# Patient Record
Sex: FEMALE | Race: BLACK OR AFRICAN AMERICAN | Employment: OTHER
[De-identification: names, ages, dates, MRNs, and addresses within clinical notes are randomized per-mention and may not be internally consistent; named-entity substitution may affect disease eponyms.]

---

## 2017-01-13 DIAGNOSIS — M17.11 PRIMARY OSTEOARTHRITIS OF RIGHT KNEE: Primary | ICD-10-CM

## 2017-01-30 ENCOUNTER — OFFICE VISIT (OUTPATIENT)
Dept: ORTHOPEDIC SURGERY | Facility: CLINIC | Age: 62
End: 2017-01-30

## 2017-01-30 VITALS — WEIGHT: 233.69 LBS | HEIGHT: 72 IN | BODY MASS INDEX: 31.65 KG/M2

## 2017-01-30 DIAGNOSIS — M17.11 PRIMARY OSTEOARTHRITIS OF RIGHT KNEE: Primary | ICD-10-CM

## 2017-01-30 DIAGNOSIS — M79.645 PAIN OF LEFT MIDDLE FINGER: ICD-10-CM

## 2017-01-30 PROCEDURE — 99213 OFFICE O/P EST LOW 20 MIN: CPT | Performed by: ORTHOPAEDIC SURGERY

## 2017-01-31 ENCOUNTER — OFFICE VISIT (OUTPATIENT)
Dept: NEUROSURGERY | Facility: CLINIC | Age: 62
End: 2017-01-31

## 2017-01-31 VITALS
HEART RATE: 82 BPM | SYSTOLIC BLOOD PRESSURE: 130 MMHG | BODY MASS INDEX: 31.29 KG/M2 | HEIGHT: 72 IN | WEIGHT: 231 LBS | DIASTOLIC BLOOD PRESSURE: 86 MMHG

## 2017-01-31 DIAGNOSIS — M51.9 LUMBAR DISC DISEASE: Primary | ICD-10-CM

## 2017-01-31 PROCEDURE — 99203 OFFICE O/P NEW LOW 30 MIN: CPT | Performed by: NEUROLOGICAL SURGERY

## 2017-02-01 ENCOUNTER — OFFICE VISIT (OUTPATIENT)
Dept: INTERNAL MEDICINE | Facility: CLINIC | Age: 62
End: 2017-02-01

## 2017-02-01 VITALS
DIASTOLIC BLOOD PRESSURE: 85 MMHG | BODY MASS INDEX: 31.37 KG/M2 | WEIGHT: 231.6 LBS | SYSTOLIC BLOOD PRESSURE: 129 MMHG | HEIGHT: 72 IN | TEMPERATURE: 97.9 F | HEART RATE: 76 BPM

## 2017-02-01 DIAGNOSIS — J41.1 MUCOPURULENT CHRONIC BRONCHITIS (HCC): ICD-10-CM

## 2017-02-01 DIAGNOSIS — E11.42 DIABETIC POLYNEUROPATHY ASSOCIATED WITH TYPE 2 DIABETES MELLITUS (HCC): ICD-10-CM

## 2017-02-01 DIAGNOSIS — J96.11 HYPOXEMIC RESPIRATORY FAILURE, CHRONIC (HCC): ICD-10-CM

## 2017-02-01 DIAGNOSIS — R11.0 NAUSEA: ICD-10-CM

## 2017-02-01 DIAGNOSIS — B18.2 CHRONIC HEPATITIS C WITHOUT HEPATIC COMA (HCC): ICD-10-CM

## 2017-02-01 DIAGNOSIS — I50.22 CHRONIC SYSTOLIC CONGESTIVE HEART FAILURE (HCC): Primary | ICD-10-CM

## 2017-02-01 PROCEDURE — 99214 OFFICE O/P EST MOD 30 MIN: CPT | Performed by: INTERNAL MEDICINE

## 2017-02-01 RX ORDER — ONDANSETRON 8 MG/1
8 TABLET, ORALLY DISINTEGRATING ORAL EVERY 8 HOURS PRN
Qty: 20 TABLET | Refills: 0 | Status: SHIPPED | OUTPATIENT
Start: 2017-02-01 | End: 2017-02-20

## 2017-02-07 ENCOUNTER — HOSPITAL ENCOUNTER (OUTPATIENT)
Dept: NON INVASIVE DIAGNOSTICS | Age: 62
Discharge: HOME | End: 2017-02-07
Payer: COMMERCIAL

## 2017-02-07 ENCOUNTER — HOSPITAL ENCOUNTER (OUTPATIENT)
Age: 62
Setting detail: SPECIMEN
Discharge: HOME | End: 2017-02-07
Payer: COMMERCIAL

## 2017-02-07 DIAGNOSIS — I50.22 CHRONIC SYSTOLIC CONGESTIVE HEART FAILURE (HCC): ICD-10-CM

## 2017-02-07 LAB
ANION GAP SERPL CALCULATED.3IONS-SCNC: 12 MMOL/L (ref 9–17)
BUN BLDV-MCNC: 10 MG/DL (ref 6–20)
BUN/CREAT BLD: ABNORMAL (ref 9–20)
CALCIUM SERPL-MCNC: 9.1 MG/DL (ref 8.6–10.4)
CHLORIDE BLD-SCNC: 101 MMOL/L (ref 98–107)
CO2: 27 MMOL/L (ref 20–31)
CREAT SERPL-MCNC: 0.54 MG/DL (ref 0.5–0.9)
GFR AFRICAN AMERICAN: >60 ML/MIN
GFR NON-AFRICAN AMERICAN: >60 ML/MIN
GFR SERPL CREATININE-BSD FRML MDRD: ABNORMAL ML/MIN/{1.73_M2}
GFR SERPL CREATININE-BSD FRML MDRD: ABNORMAL ML/MIN/{1.73_M2}
GLUCOSE BLD-MCNC: 101 MG/DL (ref 70–99)
LV EF: 58 %
LVEF MODALITY: NORMAL
POTASSIUM SERPL-SCNC: 4 MMOL/L (ref 3.7–5.3)
SODIUM BLD-SCNC: 140 MMOL/L (ref 135–144)

## 2017-02-07 PROCEDURE — 36415 COLL VENOUS BLD VENIPUNCTURE: CPT

## 2017-02-07 PROCEDURE — 93306 TTE W/DOPPLER COMPLETE: CPT

## 2017-02-07 PROCEDURE — 80048 BASIC METABOLIC PNL TOTAL CA: CPT

## 2017-02-08 ENCOUNTER — OFFICE VISIT (OUTPATIENT)
Dept: INTERNAL MEDICINE | Facility: CLINIC | Age: 62
End: 2017-02-08

## 2017-02-08 VITALS
BODY MASS INDEX: 31.22 KG/M2 | WEIGHT: 230.2 LBS | DIASTOLIC BLOOD PRESSURE: 82 MMHG | HEART RATE: 84 BPM | SYSTOLIC BLOOD PRESSURE: 123 MMHG

## 2017-02-08 DIAGNOSIS — I50.32 CHRONIC DIASTOLIC CONGESTIVE HEART FAILURE (HCC): ICD-10-CM

## 2017-02-08 DIAGNOSIS — R05.9 COUGH: ICD-10-CM

## 2017-02-08 DIAGNOSIS — E11.8 TYPE 2 DIABETES MELLITUS WITH COMPLICATION, UNSPECIFIED LONG TERM INSULIN USE STATUS: Primary | ICD-10-CM

## 2017-02-08 DIAGNOSIS — R60.0 BILATERAL EDEMA OF LOWER EXTREMITY: ICD-10-CM

## 2017-02-08 PROCEDURE — 99213 OFFICE O/P EST LOW 20 MIN: CPT | Performed by: INTERNAL MEDICINE

## 2017-02-13 ENCOUNTER — HOSPITAL ENCOUNTER (OUTPATIENT)
Age: 62
Discharge: HOME | End: 2017-02-13
Payer: COMMERCIAL

## 2017-02-13 ENCOUNTER — HOSPITAL ENCOUNTER (OUTPATIENT)
Age: 62
Setting detail: SPECIMEN
Discharge: HOME | End: 2017-02-13
Payer: COMMERCIAL

## 2017-02-13 ENCOUNTER — HOSPITAL ENCOUNTER (OUTPATIENT)
Dept: GENERAL RADIOLOGY | Age: 62
Discharge: HOME | End: 2017-02-13
Payer: COMMERCIAL

## 2017-02-13 DIAGNOSIS — I50.32 CHRONIC DIASTOLIC CONGESTIVE HEART FAILURE (HCC): ICD-10-CM

## 2017-02-13 DIAGNOSIS — R05.9 COUGH: ICD-10-CM

## 2017-02-13 LAB
BNP INTERPRETATION: NORMAL
CREATININE URINE: 77.9 MG/DL (ref 28–217)
MICROALBUMIN/CREAT 24H UR: <12 MG/L
MICROALBUMIN/CREAT UR-RTO: 15 MCG/MG CREAT
PRO-BNP: 28 PG/ML

## 2017-02-13 PROCEDURE — 82570 ASSAY OF URINE CREATININE: CPT

## 2017-02-13 PROCEDURE — 71020 XR CHEST STANDARD TWO VW: CPT

## 2017-02-13 PROCEDURE — 36415 COLL VENOUS BLD VENIPUNCTURE: CPT

## 2017-02-13 PROCEDURE — 83880 ASSAY OF NATRIURETIC PEPTIDE: CPT

## 2017-02-13 PROCEDURE — 82043 UR ALBUMIN QUANTITATIVE: CPT

## 2017-02-20 ENCOUNTER — HOSPITAL ENCOUNTER (OUTPATIENT)
Age: 62
Discharge: HOME | End: 2017-02-20
Payer: COMMERCIAL

## 2017-02-20 ENCOUNTER — OFFICE VISIT (OUTPATIENT)
Dept: INTERNAL MEDICINE | Facility: CLINIC | Age: 62
End: 2017-02-20

## 2017-02-20 VITALS
BODY MASS INDEX: 31 KG/M2 | SYSTOLIC BLOOD PRESSURE: 113 MMHG | WEIGHT: 228.6 LBS | HEART RATE: 82 BPM | DIASTOLIC BLOOD PRESSURE: 74 MMHG

## 2017-02-20 DIAGNOSIS — J41.1 MUCOPURULENT CHRONIC BRONCHITIS (HCC): ICD-10-CM

## 2017-02-20 DIAGNOSIS — M54.41 CHRONIC BILATERAL LOW BACK PAIN WITH RIGHT-SIDED SCIATICA: ICD-10-CM

## 2017-02-20 DIAGNOSIS — I10 ESSENTIAL HYPERTENSION: ICD-10-CM

## 2017-02-20 DIAGNOSIS — G89.29 CHRONIC BILATERAL LOW BACK PAIN WITH RIGHT-SIDED SCIATICA: ICD-10-CM

## 2017-02-20 DIAGNOSIS — E11.8 TYPE 2 DIABETES MELLITUS WITH COMPLICATION, WITHOUT LONG-TERM CURRENT USE OF INSULIN (HCC): ICD-10-CM

## 2017-02-20 DIAGNOSIS — I50.32 CHRONIC DIASTOLIC CONGESTIVE HEART FAILURE (HCC): ICD-10-CM

## 2017-02-20 DIAGNOSIS — J20.2 ACUTE BRONCHITIS DUE TO STREPTOCOCCUS: Primary | ICD-10-CM

## 2017-02-20 DIAGNOSIS — M25.561 BILATERAL CHRONIC KNEE PAIN: ICD-10-CM

## 2017-02-20 DIAGNOSIS — E11.40 TYPE 2 DIABETES MELLITUS WITH DIABETIC NEUROPATHY, WITHOUT LONG-TERM CURRENT USE OF INSULIN (HCC): ICD-10-CM

## 2017-02-20 DIAGNOSIS — K21.9 GASTROESOPHAGEAL REFLUX DISEASE WITHOUT ESOPHAGITIS: ICD-10-CM

## 2017-02-20 DIAGNOSIS — M25.562 BILATERAL CHRONIC KNEE PAIN: ICD-10-CM

## 2017-02-20 DIAGNOSIS — G89.29 BILATERAL CHRONIC KNEE PAIN: ICD-10-CM

## 2017-02-20 DIAGNOSIS — F51.01 PRIMARY INSOMNIA: ICD-10-CM

## 2017-02-20 DIAGNOSIS — F17.210 CIGARETTE NICOTINE DEPENDENCE WITHOUT COMPLICATION: ICD-10-CM

## 2017-02-20 PROCEDURE — 99212 OFFICE O/P EST SF 10 MIN: CPT | Performed by: INTERNAL MEDICINE

## 2017-02-20 PROCEDURE — 99214 OFFICE O/P EST MOD 30 MIN: CPT | Performed by: INTERNAL MEDICINE

## 2017-02-20 RX ORDER — GABAPENTIN 800 MG/1
800 TABLET ORAL 3 TIMES DAILY
Qty: 90 TABLET | Refills: 2 | Status: SHIPPED | OUTPATIENT
Start: 2017-02-20 | End: 2017-06-28

## 2017-02-20 RX ORDER — FUROSEMIDE 20 MG/1
20 TABLET ORAL 2 TIMES DAILY
Qty: 60 TABLET | Refills: 2 | Status: SHIPPED | OUTPATIENT
Start: 2017-02-20 | End: 2017-06-28

## 2017-02-20 RX ORDER — GUAIFENESIN 600 MG/1
600 TABLET, EXTENDED RELEASE ORAL 2 TIMES DAILY
Qty: 30 TABLET | Refills: 0 | Status: SHIPPED | OUTPATIENT
Start: 2017-02-20 | End: 2017-06-28

## 2017-02-20 RX ORDER — HYDROCHLOROTHIAZIDE 12.5 MG/1
12.5 CAPSULE, GELATIN COATED ORAL EVERY MORNING
Qty: 30 CAPSULE | Refills: 3 | Status: SHIPPED | OUTPATIENT
Start: 2017-02-20 | End: 2017-06-28

## 2017-02-20 RX ORDER — TIZANIDINE 4 MG/1
4 TABLET ORAL 3 TIMES DAILY
Qty: 90 TABLET | Refills: 2 | Status: SHIPPED | OUTPATIENT
Start: 2017-02-20 | End: 2017-06-28

## 2017-02-20 RX ORDER — TRAZODONE HYDROCHLORIDE 100 MG/1
100 TABLET ORAL NIGHTLY PRN
Qty: 30 TABLET | Refills: 2 | Status: SHIPPED | OUTPATIENT
Start: 2017-02-20 | End: 2017-06-28 | Stop reason: SDUPTHER

## 2017-02-20 RX ORDER — OMEPRAZOLE 40 MG/1
40 CAPSULE, DELAYED RELEASE ORAL DAILY
Qty: 30 CAPSULE | Refills: 2 | Status: SHIPPED | OUTPATIENT
Start: 2017-02-20 | End: 2017-03-17

## 2017-02-20 RX ORDER — AZITHROMYCIN 250 MG/1
TABLET, FILM COATED ORAL
Qty: 1 PACKET | Refills: 0 | Status: SHIPPED | OUTPATIENT
Start: 2017-02-20 | End: 2017-03-02

## 2017-02-20 RX ORDER — IBUPROFEN 800 MG/1
800 TABLET ORAL EVERY 8 HOURS PRN
Qty: 90 TABLET | Refills: 2 | Status: SHIPPED | OUTPATIENT
Start: 2017-02-20 | End: 2017-05-18 | Stop reason: SDUPTHER

## 2017-03-03 ENCOUNTER — TELEPHONE (OUTPATIENT)
Dept: INTERNAL MEDICINE | Facility: CLINIC | Age: 62
End: 2017-03-03

## 2017-03-03 DIAGNOSIS — R05.9 COUGH: Primary | ICD-10-CM

## 2017-03-03 DIAGNOSIS — I50.22 CHRONIC SYSTOLIC CONGESTIVE HEART FAILURE (HCC): ICD-10-CM

## 2017-03-03 RX ORDER — BENZONATATE 100 MG/1
100 CAPSULE ORAL 3 TIMES DAILY PRN
Qty: 30 CAPSULE | Refills: 0 | Status: SHIPPED | OUTPATIENT
Start: 2017-03-03 | End: 2017-03-10

## 2017-03-17 ENCOUNTER — OFFICE VISIT (OUTPATIENT)
Dept: INTERNAL MEDICINE | Age: 62
End: 2017-03-17
Payer: COMMERCIAL

## 2017-03-17 ENCOUNTER — CARE COORDINATOR VISIT (OUTPATIENT)
Dept: INTERNAL MEDICINE | Age: 62
End: 2017-03-17

## 2017-03-17 VITALS
DIASTOLIC BLOOD PRESSURE: 73 MMHG | BODY MASS INDEX: 30.5 KG/M2 | SYSTOLIC BLOOD PRESSURE: 113 MMHG | HEART RATE: 80 BPM | HEIGHT: 72 IN | WEIGHT: 225.2 LBS

## 2017-03-17 DIAGNOSIS — K21.9 GASTROESOPHAGEAL REFLUX DISEASE WITHOUT ESOPHAGITIS: Primary | ICD-10-CM

## 2017-03-17 DIAGNOSIS — E78.00 PURE HYPERCHOLESTEROLEMIA: ICD-10-CM

## 2017-03-17 DIAGNOSIS — J41.0 SIMPLE CHRONIC BRONCHITIS (HCC): ICD-10-CM

## 2017-03-17 DIAGNOSIS — Z12.31 ENCOUNTER FOR SCREENING MAMMOGRAM FOR BREAST CANCER: ICD-10-CM

## 2017-03-17 DIAGNOSIS — K59.09 OTHER CONSTIPATION: ICD-10-CM

## 2017-03-17 DIAGNOSIS — R20.8 BURNING SENSATION OF RECTUM: ICD-10-CM

## 2017-03-17 PROCEDURE — 99213 OFFICE O/P EST LOW 20 MIN: CPT | Performed by: INTERNAL MEDICINE

## 2017-03-17 RX ORDER — ALBUTEROL SULFATE 90 UG/1
2 AEROSOL, METERED RESPIRATORY (INHALATION) EVERY 6 HOURS PRN
Qty: 1 INHALER | Refills: 2 | Status: SHIPPED | OUTPATIENT
Start: 2017-03-17 | End: 2017-06-28

## 2017-03-17 RX ORDER — DIAPER,BRIEF,INFANT-TODD,DISP
EACH MISCELLANEOUS
Qty: 1 TUBE | Refills: 1 | Status: SHIPPED | OUTPATIENT
Start: 2017-03-17 | End: 2017-03-24

## 2017-03-17 RX ORDER — PANTOPRAZOLE SODIUM 40 MG/1
40 TABLET, DELAYED RELEASE ORAL DAILY
Qty: 30 TABLET | Refills: 3 | Status: SHIPPED | OUTPATIENT
Start: 2017-03-17 | End: 2017-06-28

## 2017-03-17 RX ORDER — ALBUTEROL SULFATE 2.5 MG/3ML
2.5 SOLUTION RESPIRATORY (INHALATION) EVERY 4 HOURS PRN
Qty: 120 EACH | Refills: 2 | Status: SHIPPED | OUTPATIENT
Start: 2017-03-17 | End: 2017-06-28

## 2017-03-17 RX ORDER — PRAVASTATIN SODIUM 40 MG
40 TABLET ORAL EVERY EVENING
Qty: 30 TABLET | Refills: 2 | Status: SHIPPED | OUTPATIENT
Start: 2017-03-17 | End: 2017-06-28

## 2017-03-20 ENCOUNTER — HOSPITAL ENCOUNTER (OUTPATIENT)
Age: 62
Discharge: HOME | End: 2017-03-20
Payer: COMMERCIAL

## 2017-03-20 DIAGNOSIS — E78.00 PURE HYPERCHOLESTEROLEMIA: ICD-10-CM

## 2017-03-20 LAB
CHOLESTEROL/HDL RATIO: 3.3
CHOLESTEROL: 144 MG/DL
HDLC SERPL-MCNC: 44 MG/DL
LDL CHOLESTEROL: 86 MG/DL (ref 0–130)
TRIGL SERPL-MCNC: 69 MG/DL
VLDLC SERPL CALC-MCNC: NORMAL MG/DL (ref 1–30)

## 2017-03-20 PROCEDURE — 36415 COLL VENOUS BLD VENIPUNCTURE: CPT

## 2017-03-20 PROCEDURE — 83013 H PYLORI (C-13) BREATH: CPT

## 2017-03-20 PROCEDURE — 80061 LIPID PANEL: CPT

## 2017-03-20 PROCEDURE — 83014 H PYLORI DRUG ADMIN: CPT

## 2017-03-21 ENCOUNTER — CARE COORDINATION (OUTPATIENT)
Dept: INTERNAL MEDICINE | Age: 62
End: 2017-03-21

## 2017-03-22 ENCOUNTER — CARE COORDINATION (OUTPATIENT)
Dept: INTERNAL MEDICINE | Age: 62
End: 2017-03-22

## 2017-03-22 LAB — H PYLORI BREATH TEST: NEGATIVE

## 2017-04-05 ENCOUNTER — OFFICE VISIT (OUTPATIENT)
Dept: INTERNAL MEDICINE | Age: 62
End: 2017-04-05
Payer: COMMERCIAL

## 2017-04-05 VITALS
DIASTOLIC BLOOD PRESSURE: 84 MMHG | SYSTOLIC BLOOD PRESSURE: 136 MMHG | WEIGHT: 227 LBS | HEART RATE: 86 BPM | HEIGHT: 72 IN | BODY MASS INDEX: 30.75 KG/M2

## 2017-04-05 DIAGNOSIS — K59.00 CONSTIPATION, UNSPECIFIED CONSTIPATION TYPE: Primary | ICD-10-CM

## 2017-04-05 PROCEDURE — 99213 OFFICE O/P EST LOW 20 MIN: CPT | Performed by: INTERNAL MEDICINE

## 2017-04-05 RX ORDER — DOCUSATE SODIUM 100 MG/1
100 CAPSULE, LIQUID FILLED ORAL DAILY PRN
Qty: 30 CAPSULE | Refills: 2 | Status: SHIPPED | OUTPATIENT
Start: 2017-04-05 | End: 2017-06-28

## 2017-05-18 DIAGNOSIS — G89.29 BILATERAL CHRONIC KNEE PAIN: ICD-10-CM

## 2017-05-18 DIAGNOSIS — M25.561 BILATERAL CHRONIC KNEE PAIN: ICD-10-CM

## 2017-05-18 DIAGNOSIS — M25.562 BILATERAL CHRONIC KNEE PAIN: ICD-10-CM

## 2017-05-18 RX ORDER — IBUPROFEN 800 MG/1
TABLET ORAL
Qty: 90 TABLET | Refills: 2 | Status: SHIPPED | OUTPATIENT
Start: 2017-05-18 | End: 2017-06-28

## 2017-06-12 DIAGNOSIS — E11.8 TYPE 2 DIABETES MELLITUS WITH COMPLICATION, WITHOUT LONG-TERM CURRENT USE OF INSULIN (HCC): ICD-10-CM

## 2017-06-28 ENCOUNTER — OFFICE VISIT (OUTPATIENT)
Dept: INTERNAL MEDICINE | Age: 62
End: 2017-06-28
Payer: COMMERCIAL

## 2017-06-28 ENCOUNTER — HOSPITAL ENCOUNTER (EMERGENCY)
Age: 62
Discharge: HOME | End: 2017-06-28
Attending: EMERGENCY MEDICINE
Payer: COMMERCIAL

## 2017-06-28 VITALS
TEMPERATURE: 98.6 F | SYSTOLIC BLOOD PRESSURE: 169 MMHG | OXYGEN SATURATION: 98 % | HEART RATE: 87 BPM | DIASTOLIC BLOOD PRESSURE: 101 MMHG | RESPIRATION RATE: 18 BRPM

## 2017-06-28 VITALS
HEIGHT: 72 IN | DIASTOLIC BLOOD PRESSURE: 85 MMHG | BODY MASS INDEX: 32.45 KG/M2 | SYSTOLIC BLOOD PRESSURE: 154 MMHG | HEART RATE: 74 BPM | WEIGHT: 239.6 LBS

## 2017-06-28 DIAGNOSIS — G89.29 BILATERAL CHRONIC KNEE PAIN: ICD-10-CM

## 2017-06-28 DIAGNOSIS — J41.1 MUCOPURULENT CHRONIC BRONCHITIS (HCC): ICD-10-CM

## 2017-06-28 DIAGNOSIS — K21.9 GASTROESOPHAGEAL REFLUX DISEASE WITHOUT ESOPHAGITIS: ICD-10-CM

## 2017-06-28 DIAGNOSIS — F51.01 PRIMARY INSOMNIA: ICD-10-CM

## 2017-06-28 DIAGNOSIS — R60.0 BILATERAL LEG EDEMA: ICD-10-CM

## 2017-06-28 DIAGNOSIS — E11.8 TYPE 2 DIABETES MELLITUS WITH COMPLICATION, UNSPECIFIED LONG TERM INSULIN USE STATUS: Primary | ICD-10-CM

## 2017-06-28 DIAGNOSIS — M25.562 BILATERAL CHRONIC KNEE PAIN: ICD-10-CM

## 2017-06-28 DIAGNOSIS — G47.33 OSA (OBSTRUCTIVE SLEEP APNEA): ICD-10-CM

## 2017-06-28 DIAGNOSIS — Z01.30 BLOOD PRESSURE CHECK: Primary | ICD-10-CM

## 2017-06-28 DIAGNOSIS — E11.40 TYPE 2 DIABETES MELLITUS WITH DIABETIC NEUROPATHY, WITHOUT LONG-TERM CURRENT USE OF INSULIN (HCC): ICD-10-CM

## 2017-06-28 DIAGNOSIS — I50.32 CHRONIC DIASTOLIC CONGESTIVE HEART FAILURE (HCC): ICD-10-CM

## 2017-06-28 DIAGNOSIS — I15.9 SECONDARY HYPERTENSION: ICD-10-CM

## 2017-06-28 DIAGNOSIS — M54.41 CHRONIC BILATERAL LOW BACK PAIN WITH RIGHT-SIDED SCIATICA: ICD-10-CM

## 2017-06-28 DIAGNOSIS — K59.09 OTHER CONSTIPATION: ICD-10-CM

## 2017-06-28 DIAGNOSIS — M25.561 BILATERAL CHRONIC KNEE PAIN: ICD-10-CM

## 2017-06-28 DIAGNOSIS — G89.29 CHRONIC BILATERAL LOW BACK PAIN WITH RIGHT-SIDED SCIATICA: ICD-10-CM

## 2017-06-28 DIAGNOSIS — E78.00 PURE HYPERCHOLESTEROLEMIA: ICD-10-CM

## 2017-06-28 DIAGNOSIS — B18.2 HEP C W/O COMA, CHRONIC (HCC): ICD-10-CM

## 2017-06-28 DIAGNOSIS — I10 ESSENTIAL HYPERTENSION: ICD-10-CM

## 2017-06-28 DIAGNOSIS — K59.00 CONSTIPATION, UNSPECIFIED CONSTIPATION TYPE: ICD-10-CM

## 2017-06-28 DIAGNOSIS — J41.0 SIMPLE CHRONIC BRONCHITIS (HCC): ICD-10-CM

## 2017-06-28 LAB — HBA1C MFR BLD: 6.1 %

## 2017-06-28 PROCEDURE — G0444 DEPRESSION SCREEN ANNUAL: HCPCS | Performed by: INTERNAL MEDICINE

## 2017-06-28 PROCEDURE — 99283 EMERGENCY DEPT VISIT LOW MDM: CPT

## 2017-06-28 PROCEDURE — 83036 HEMOGLOBIN GLYCOSYLATED A1C: CPT | Performed by: INTERNAL MEDICINE

## 2017-06-28 PROCEDURE — 99214 OFFICE O/P EST MOD 30 MIN: CPT | Performed by: INTERNAL MEDICINE

## 2017-06-28 RX ORDER — PRAVASTATIN SODIUM 40 MG
40 TABLET ORAL EVERY EVENING
Qty: 30 TABLET | Refills: 2 | Status: SHIPPED | OUTPATIENT
Start: 2017-06-28 | End: 2017-10-30 | Stop reason: SDUPTHER

## 2017-06-28 RX ORDER — FUROSEMIDE 20 MG/1
20 TABLET ORAL 2 TIMES DAILY
Qty: 60 TABLET | Refills: 2 | Status: SHIPPED | OUTPATIENT
Start: 2017-06-28 | End: 2017-10-13 | Stop reason: SDUPTHER

## 2017-06-28 RX ORDER — TIZANIDINE 4 MG/1
4 TABLET ORAL 3 TIMES DAILY
Qty: 90 TABLET | Refills: 2 | Status: SHIPPED | OUTPATIENT
Start: 2017-06-28 | End: 2017-10-30 | Stop reason: SDUPTHER

## 2017-06-28 RX ORDER — ATENOLOL 25 MG/1
25 TABLET ORAL DAILY
Qty: 30 TABLET | Refills: 2 | Status: SHIPPED | OUTPATIENT
Start: 2017-06-28 | End: 2017-09-22 | Stop reason: SDUPTHER

## 2017-06-28 RX ORDER — DOCUSATE SODIUM 100 MG/1
100 CAPSULE, LIQUID FILLED ORAL DAILY PRN
Qty: 30 CAPSULE | Refills: 2 | Status: SHIPPED | OUTPATIENT
Start: 2017-06-28 | End: 2017-10-30

## 2017-06-28 RX ORDER — PANTOPRAZOLE SODIUM 40 MG/1
40 TABLET, DELAYED RELEASE ORAL DAILY
Qty: 30 TABLET | Refills: 3 | Status: SHIPPED | OUTPATIENT
Start: 2017-06-28 | End: 2017-10-30 | Stop reason: SDUPTHER

## 2017-06-28 RX ORDER — ALBUTEROL SULFATE 90 UG/1
2 AEROSOL, METERED RESPIRATORY (INHALATION) EVERY 6 HOURS PRN
Qty: 1 INHALER | Refills: 2 | Status: SHIPPED | OUTPATIENT
Start: 2017-06-28 | End: 2017-10-04 | Stop reason: SDUPTHER

## 2017-06-28 RX ORDER — GABAPENTIN 800 MG/1
800 TABLET ORAL 3 TIMES DAILY
Qty: 90 TABLET | Refills: 2 | Status: SHIPPED | OUTPATIENT
Start: 2017-06-28 | End: 2017-10-30 | Stop reason: SDUPTHER

## 2017-06-28 RX ORDER — IBUPROFEN 800 MG/1
800 TABLET ORAL EVERY 8 HOURS PRN
Qty: 90 TABLET | Refills: 2 | Status: SHIPPED | OUTPATIENT
Start: 2017-06-28 | End: 2017-10-30 | Stop reason: SDUPTHER

## 2017-06-28 RX ORDER — ALBUTEROL SULFATE 2.5 MG/3ML
2.5 SOLUTION RESPIRATORY (INHALATION) EVERY 4 HOURS PRN
Qty: 120 EACH | Refills: 2 | Status: SHIPPED | OUTPATIENT
Start: 2017-06-28 | End: 2017-10-30 | Stop reason: SDUPTHER

## 2017-06-28 RX ORDER — HYDROCHLOROTHIAZIDE 12.5 MG/1
12.5 CAPSULE, GELATIN COATED ORAL EVERY MORNING
Qty: 30 CAPSULE | Refills: 2 | Status: SHIPPED | OUTPATIENT
Start: 2017-06-28 | End: 2017-10-13 | Stop reason: SDUPTHER

## 2017-06-28 RX ORDER — TRAZODONE HYDROCHLORIDE 100 MG/1
200 TABLET ORAL NIGHTLY PRN
Qty: 60 TABLET | Refills: 2 | Status: SHIPPED | OUTPATIENT
Start: 2017-06-28 | End: 2017-09-22 | Stop reason: SDUPTHER

## 2017-06-28 ASSESSMENT — PATIENT HEALTH QUESTIONNAIRE - PHQ9
SUM OF ALL RESPONSES TO PHQ9 QUESTIONS 1 & 2: 2
5. POOR APPETITE OR OVEREATING: 0
1. LITTLE INTEREST OR PLEASURE IN DOING THINGS: 0
7. TROUBLE CONCENTRATING ON THINGS, SUCH AS READING THE NEWSPAPER OR WATCHING TELEVISION: 0
2. FEELING DOWN, DEPRESSED OR HOPELESS: 2
9. THOUGHTS THAT YOU WOULD BE BETTER OFF DEAD, OR OF HURTING YOURSELF: 0
4. FEELING TIRED OR HAVING LITTLE ENERGY: 2
10. IF YOU CHECKED OFF ANY PROBLEMS, HOW DIFFICULT HAVE THESE PROBLEMS MADE IT FOR YOU TO DO YOUR WORK, TAKE CARE OF THINGS AT HOME, OR GET ALONG WITH OTHER PEOPLE: 1
SUM OF ALL RESPONSES TO PHQ QUESTIONS 1-9: 9
3. TROUBLE FALLING OR STAYING ASLEEP: 3
6. FEELING BAD ABOUT YOURSELF - OR THAT YOU ARE A FAILURE OR HAVE LET YOURSELF OR YOUR FAMILY DOWN: 1
8. MOVING OR SPEAKING SO SLOWLY THAT OTHER PEOPLE COULD HAVE NOTICED. OR THE OPPOSITE, BEING SO FIGETY OR RESTLESS THAT YOU HAVE BEEN MOVING AROUND A LOT MORE THAN USUAL: 1

## 2017-06-30 ENCOUNTER — TELEPHONE (OUTPATIENT)
Dept: INTERNAL MEDICINE | Age: 62
End: 2017-06-30

## 2017-07-06 ENCOUNTER — TELEPHONE (OUTPATIENT)
Dept: INTERNAL MEDICINE | Age: 62
End: 2017-07-06

## 2017-07-12 ENCOUNTER — HOSPITAL ENCOUNTER (OUTPATIENT)
Age: 62
Discharge: HOME | End: 2017-07-12
Payer: COMMERCIAL

## 2017-07-12 DIAGNOSIS — I10 ESSENTIAL HYPERTENSION: ICD-10-CM

## 2017-07-12 DIAGNOSIS — B18.2 HEP C W/O COMA, CHRONIC (HCC): ICD-10-CM

## 2017-07-12 LAB
ALBUMIN SERPL-MCNC: 4.1 G/DL (ref 3.5–5.2)
ALBUMIN/GLOBULIN RATIO: 1.1 (ref 1–2.5)
ALP BLD-CCNC: 67 U/L (ref 35–104)
ALT SERPL-CCNC: 8 U/L (ref 5–33)
ANION GAP SERPL CALCULATED.3IONS-SCNC: 12 MMOL/L (ref 9–17)
AST SERPL-CCNC: 15 U/L
BILIRUB SERPL-MCNC: 0.46 MG/DL (ref 0.3–1.2)
BILIRUBIN DIRECT: 0.14 MG/DL
BILIRUBIN, INDIRECT: 0.32 MG/DL (ref 0–1)
BUN BLDV-MCNC: 13 MG/DL (ref 8–23)
BUN/CREAT BLD: ABNORMAL (ref 9–20)
CALCIUM SERPL-MCNC: 9.7 MG/DL (ref 8.6–10.4)
CHLORIDE BLD-SCNC: 103 MMOL/L (ref 98–107)
CO2: 25 MMOL/L (ref 20–31)
CREAT SERPL-MCNC: 0.56 MG/DL (ref 0.5–0.9)
GFR AFRICAN AMERICAN: >60 ML/MIN
GFR NON-AFRICAN AMERICAN: >60 ML/MIN
GFR SERPL CREATININE-BSD FRML MDRD: ABNORMAL ML/MIN/{1.73_M2}
GFR SERPL CREATININE-BSD FRML MDRD: ABNORMAL ML/MIN/{1.73_M2}
GLOBULIN: NORMAL G/DL (ref 1.5–3.8)
GLUCOSE BLD-MCNC: 109 MG/DL (ref 70–99)
POTASSIUM SERPL-SCNC: 3.7 MMOL/L (ref 3.7–5.3)
SODIUM BLD-SCNC: 140 MMOL/L (ref 135–144)
TOTAL PROTEIN: 7.7 G/DL (ref 6.4–8.3)

## 2017-07-12 PROCEDURE — 80076 HEPATIC FUNCTION PANEL: CPT

## 2017-07-12 PROCEDURE — 80048 BASIC METABOLIC PNL TOTAL CA: CPT

## 2017-07-12 PROCEDURE — 87522 HEPATITIS C REVRS TRNSCRPJ: CPT

## 2017-07-14 LAB
DIRECT EXAM: NORMAL
Lab: NORMAL
SPECIMEN DESCRIPTION: NORMAL
STATUS: NORMAL

## 2017-07-24 ENCOUNTER — TELEPHONE (OUTPATIENT)
Dept: INTERNAL MEDICINE | Age: 62
End: 2017-07-24

## 2017-09-22 DIAGNOSIS — F51.01 PRIMARY INSOMNIA: ICD-10-CM

## 2017-09-22 DIAGNOSIS — I10 ESSENTIAL HYPERTENSION: ICD-10-CM

## 2017-09-22 RX ORDER — ATENOLOL 25 MG/1
TABLET ORAL
Qty: 30 TABLET | Refills: 2 | Status: SHIPPED | OUTPATIENT
Start: 2017-09-22 | End: 2017-12-17 | Stop reason: SDUPTHER

## 2017-09-22 RX ORDER — TRAZODONE HYDROCHLORIDE 100 MG/1
TABLET ORAL
Qty: 60 TABLET | Refills: 2 | Status: SHIPPED | OUTPATIENT
Start: 2017-09-22 | End: 2017-12-17 | Stop reason: SDUPTHER

## 2017-10-04 DIAGNOSIS — J41.0 SIMPLE CHRONIC BRONCHITIS (HCC): ICD-10-CM

## 2017-10-04 NOTE — TELEPHONE ENCOUNTER
E-scribe request for VENTOLIN HFA 90 MCG INHALER. Please review and e-scribe if applicable. Last Visit Date:  6/28/17  Next Visit Date:  10/30/2017    Hemoglobin A1C (%)   Date Value   06/28/2017 6.1   11/29/2016 6.0   05/13/2016 6.3             ( goal A1C is < 7)   Microalb/Crt.  Ratio (mcg/mg creat)   Date Value   02/13/2017 15     LDL Cholesterol (mg/dL)   Date Value   03/20/2017 86       (goal LDL is <100)   AST (U/L)   Date Value   07/12/2017 15     ALT (U/L)   Date Value   07/12/2017 8     BUN (mg/dL)   Date Value   07/12/2017 13     BP Readings from Last 3 Encounters:   06/28/17 (!) 154/85   06/28/17 (!) 169/101   04/05/17 136/84          (goal 120/80)        Patient Active Problem List:     Essential hypertension     Pure hypercholesterolemia     Depression     History of heroin use     Hep C w/o coma, chronic ([facility] [address] completed tx 2016     GERD (gastroesophageal reflux disease)     COPD (chronic obstructive pulmonary disease) (HCC)     Tobacco abuse     Chronic diastolic congestive heart failure (HCC)     Diverticulosis     Hyperplastic polyp of intestine     Diabetic polyneuropathy associated with type 2 diabetes mellitus (HCC)     Bilateral chronic knee pain     Chronic respiratory failure with hypoxia (HCC)     Type 2 diabetes mellitus with complication (HCC)     Primary insomnia     Constipation     Cigarette nicotine dependence without complication     Chronic bilateral low back pain with right-sided sciatica      ----JF

## 2017-10-13 DIAGNOSIS — K59.09 OTHER CONSTIPATION: ICD-10-CM

## 2017-10-13 DIAGNOSIS — I10 ESSENTIAL HYPERTENSION: ICD-10-CM

## 2017-10-13 DIAGNOSIS — J41.1 MUCOPURULENT CHRONIC BRONCHITIS (HCC): ICD-10-CM

## 2017-10-13 DIAGNOSIS — I50.32 CHRONIC DIASTOLIC CONGESTIVE HEART FAILURE (HCC): ICD-10-CM

## 2017-10-13 NOTE — TELEPHONE ENCOUNTER
E-scribe request for Hydrochlorothiazide 12.5 MG, Linzess 145 MCG, Dulera inhaler & Furosemide 20 MG. Please review and e-scribe if applicable. Health Maintenance   Topic Date Due    Breast cancer screen  01/28/2017    Zostavax vaccine  06/05/2017    Flu vaccine (1) 09/01/2017    HIV screen  12/19/2017 (Originally 6/5/1972)    Diabetic retinal exam  02/20/2018 (Originally 12/2/2016)    DTaP/Tdap/Td vaccine (1 - Tdap) 07/30/2026 (Originally 7/31/2016)    Diabetic foot exam  11/23/2017    Diabetic microalbuminuria test  02/13/2018    Lipid screen  03/20/2018    Diabetic hemoglobin A1C test  06/28/2018    Colon cancer screen colonoscopy  09/20/2019    Pneumococcal med risk  Completed             (applicable per patient's age: Cancer Screenings, Depression Screening, Fall Risk Screening, Immunizations)    Hemoglobin A1C (%)   Date Value   06/28/2017 6.1   11/29/2016 6.0   05/13/2016 6.3     Microalb/Crt.  Ratio (mcg/mg creat)   Date Value   02/13/2017 15     LDL Cholesterol (mg/dL)   Date Value   03/20/2017 86     AST (U/L)   Date Value   07/12/2017 15     ALT (U/L)   Date Value   07/12/2017 8     BUN (mg/dL)   Date Value   07/12/2017 13      (goal A1C is < 7)   (goal LDL is <100) need 30-50% reduction from baseline     BP Readings from Last 3 Encounters:   06/28/17 (!) 154/85   06/28/17 (!) 169/101   04/05/17 136/84    (goal /80)      All Future Testing planned in [facility]:  Lab Frequency Next Occurrence   Microalbumin, Ur Once 11/30/2017   TRACY Digital Screen Bilateral [CUI0888] Once 05/17/2018   Sleep Study with PAP Titration Once 06/30/2017   Baseline Diagnostic Sleep Study Once 06/30/2017       Next Visit Date:  Future Appointments  Date Time Provider Terry Bradford   10/30/2017 1:45 PM Antonella Sales MD [facility]            Patient Active Problem List:     Essential hypertension     Pure hypercholesterolemia     Depression     History of heroin use     Hep C w/o coma, chronic ([facility] [address] completed tx 2016     GERD (gastroesophageal reflux disease)     COPD (chronic obstructive pulmonary disease) (HCC)     Tobacco abuse     Chronic diastolic congestive heart failure (HCC)     Diverticulosis     Hyperplastic polyp of intestine     Diabetic polyneuropathy associated with type 2 diabetes mellitus (HCC)     Bilateral chronic knee pain     Chronic respiratory failure with hypoxia (HCC)     Type 2 diabetes mellitus with complication (HCC)     Primary insomnia     Constipation     Cigarette nicotine dependence without complication     Chronic bilateral low back pain with right-sided sciatica

## 2017-10-15 RX ORDER — LINACLOTIDE 145 UG/1
CAPSULE, GELATIN COATED ORAL
Qty: 30 CAPSULE | Refills: 2 | Status: SHIPPED | OUTPATIENT
Start: 2017-10-15 | End: 2018-01-14 | Stop reason: SDUPTHER

## 2017-10-15 RX ORDER — MOMETASONE FUROATE AND FORMOTEROL FUMARATE DIHYDRATE 200; 5 UG/1; UG/1
AEROSOL RESPIRATORY (INHALATION)
Qty: 13 INHALER | Refills: 2 | Status: SHIPPED | OUTPATIENT
Start: 2017-10-15 | End: 2018-01-05 | Stop reason: SDUPTHER

## 2017-10-15 RX ORDER — HYDROCHLOROTHIAZIDE 12.5 MG/1
12.5 CAPSULE, GELATIN COATED ORAL EVERY MORNING
Qty: 30 CAPSULE | Refills: 2 | Status: SHIPPED | OUTPATIENT
Start: 2017-10-15 | End: 2018-01-05 | Stop reason: SDUPTHER

## 2017-10-15 RX ORDER — FUROSEMIDE 20 MG/1
TABLET ORAL
Qty: 60 TABLET | Refills: 2 | Status: SHIPPED | OUTPATIENT
Start: 2017-10-15 | End: 2018-01-05 | Stop reason: SDUPTHER

## 2017-10-17 NOTE — TELEPHONE ENCOUNTER
PC to pt's pharmacy---asked that they add note for pt to contact office and make appt before anymore refills will be done

## 2017-10-26 ENCOUNTER — TELEPHONE (OUTPATIENT)
Dept: INTERNAL MEDICINE | Age: 62
End: 2017-10-26

## 2017-10-26 DIAGNOSIS — E11.8 TYPE 2 DIABETES MELLITUS WITH COMPLICATION, WITHOUT LONG-TERM CURRENT USE OF INSULIN (HCC): ICD-10-CM

## 2017-10-26 NOTE — TELEPHONE ENCOUNTER
PC from [facility] Nicole calling from Dr Amarjit Pan office stating that pt is currently;y at their office and states her BS has been running between 250-300 for last of 2-3 days.  Pt wants to be started back on Metformin    Pharmacy verified in chart    Previous order pended    Please review and advise

## 2017-10-30 ENCOUNTER — OFFICE VISIT (OUTPATIENT)
Dept: INTERNAL MEDICINE | Age: 62
End: 2017-10-30
Payer: COMMERCIAL

## 2017-10-30 ENCOUNTER — NURSE ONLY (OUTPATIENT)
Dept: INTERNAL MEDICINE | Age: 62
End: 2017-10-30

## 2017-10-30 VITALS
HEIGHT: 72 IN | HEART RATE: 80 BPM | WEIGHT: 235.2 LBS | DIASTOLIC BLOOD PRESSURE: 72 MMHG | BODY MASS INDEX: 31.86 KG/M2 | SYSTOLIC BLOOD PRESSURE: 125 MMHG

## 2017-10-30 DIAGNOSIS — R15.9 FULL INCONTINENCE OF FECES: ICD-10-CM

## 2017-10-30 DIAGNOSIS — J41.0 SIMPLE CHRONIC BRONCHITIS (HCC): ICD-10-CM

## 2017-10-30 DIAGNOSIS — M54.41 CHRONIC BILATERAL LOW BACK PAIN WITH RIGHT-SIDED SCIATICA: ICD-10-CM

## 2017-10-30 DIAGNOSIS — E11.40 TYPE 2 DIABETES MELLITUS WITH DIABETIC NEUROPATHY, WITHOUT LONG-TERM CURRENT USE OF INSULIN (HCC): Primary | ICD-10-CM

## 2017-10-30 DIAGNOSIS — Z71.89 ACP (ADVANCE CARE PLANNING): Primary | ICD-10-CM

## 2017-10-30 DIAGNOSIS — E78.00 PURE HYPERCHOLESTEROLEMIA: ICD-10-CM

## 2017-10-30 DIAGNOSIS — G89.29 CHRONIC BILATERAL LOW BACK PAIN WITH RIGHT-SIDED SCIATICA: ICD-10-CM

## 2017-10-30 DIAGNOSIS — F33.41 MAJOR DEPRESSIVE DISORDER, RECURRENT, IN PARTIAL REMISSION (HCC): ICD-10-CM

## 2017-10-30 DIAGNOSIS — Z23 NEED FOR PROPHYLACTIC VACCINATION AND INOCULATION AGAINST VARICELLA: ICD-10-CM

## 2017-10-30 DIAGNOSIS — K21.9 GASTROESOPHAGEAL REFLUX DISEASE WITHOUT ESOPHAGITIS: ICD-10-CM

## 2017-10-30 LAB — HBA1C MFR BLD: 6.4 %

## 2017-10-30 PROCEDURE — 99214 OFFICE O/P EST MOD 30 MIN: CPT | Performed by: INTERNAL MEDICINE

## 2017-10-30 RX ORDER — TIZANIDINE 4 MG/1
4 TABLET ORAL 3 TIMES DAILY
Qty: 90 TABLET | Refills: 2 | Status: SHIPPED | OUTPATIENT
Start: 2017-10-30 | End: 2018-01-22 | Stop reason: SDUPTHER

## 2017-10-30 RX ORDER — PANTOPRAZOLE SODIUM 40 MG/1
40 TABLET, DELAYED RELEASE ORAL DAILY
Qty: 30 TABLET | Refills: 2 | Status: SHIPPED | OUTPATIENT
Start: 2017-10-30 | End: 2018-01-22 | Stop reason: SDUPTHER

## 2017-10-30 RX ORDER — ALBUTEROL SULFATE 2.5 MG/3ML
2.5 SOLUTION RESPIRATORY (INHALATION) EVERY 4 HOURS PRN
Qty: 120 EACH | Refills: 2 | Status: SHIPPED | OUTPATIENT
Start: 2017-10-30 | End: 2018-01-22 | Stop reason: SDUPTHER

## 2017-10-30 RX ORDER — AMMONIUM LACTATE 12 G/100G
CREAM TOPICAL
Refills: 0 | COMMUNITY
Start: 2017-10-01 | End: 2018-01-22

## 2017-10-30 RX ORDER — HYDROXYZINE HYDROCHLORIDE 10 MG/1
TABLET, FILM COATED ORAL
Refills: 0 | COMMUNITY
Start: 2017-09-20 | End: 2019-09-17

## 2017-10-30 RX ORDER — BUPRENORPHINE HYDROCHLORIDE, NALOXONE HYDROCHLORIDE 4; 1 MG/1; MG/1
FILM, SOLUBLE BUCCAL; SUBLINGUAL
Refills: 0 | COMMUNITY
Start: 2017-10-11 | End: 2020-04-08

## 2017-10-30 RX ORDER — PRAVASTATIN SODIUM 40 MG
40 TABLET ORAL EVERY EVENING
Qty: 30 TABLET | Refills: 2 | Status: SHIPPED | OUTPATIENT
Start: 2017-10-30 | End: 2018-01-22 | Stop reason: SDUPTHER

## 2017-10-30 RX ORDER — IBUPROFEN 800 MG/1
800 TABLET ORAL EVERY 8 HOURS PRN
Qty: 90 TABLET | Refills: 2 | Status: SHIPPED | OUTPATIENT
Start: 2017-10-30 | End: 2017-12-11 | Stop reason: DRUGHIGH

## 2017-10-30 RX ORDER — GABAPENTIN 800 MG/1
800 TABLET ORAL 3 TIMES DAILY
Qty: 90 TABLET | Refills: 2 | Status: SHIPPED | OUTPATIENT
Start: 2017-10-30 | End: 2018-01-22 | Stop reason: SDUPTHER

## 2017-10-30 ASSESSMENT — ENCOUNTER SYMPTOMS
NAUSEA: 0
DOUBLE VISION: 0
HEMOPTYSIS: 0
HEARTBURN: 0
BLURRED VISION: 0
ABDOMINAL PAIN: 0
COUGH: 0

## 2017-10-30 NOTE — PROGRESS NOTES
[facility]  [facility] [address]  [address] [facility] [address] [address]  Dept: [phone]  Dept Fax: [phone]    Office Progress/Follow Up Note  Date of patient's visit: 10/30/2017  Patient's Name:  Maren Nicole YOB: 1955            Patient Care Team:  Antonella Carrillo MD as PCP - Ramiro Kang MD as PCP - S Attributed Provider  Quique Floyd MD as Consulting Physician (Gastroenterology)  Arianne Vazquez MD as Referring Physician (Internal Medicine)  ================================================================    REASON FOR VISIT/CHIEF COMPLAINT:  3 Month Follow-Up; Hypertension; Diabetes (BS has been running high since taken off of metformin; pt started back 10/27; ); and Health Maintenance (Labs-Reprinted, Mammogram-Reprinted, Flu- Zostavax script-Pended )    HISTORY OF PRESENTING ILLNESS:  History was obtained from: patientErinn Nicole is a 63 y.o. is here for a routine follow-up. Patient was recently re-started on metformin after she started to note that her blood sugar in the 200-300 range. 3 months ago A1c was 6.0. Today A1c is 6.4. She is restarted metformin 2 days ago. She hasn't checked her blood glucose since then. Her main complaint today is control of her bowel movement. She reported that she has to wear diapers. She denies any diarrhea. She denies any abdominal pain. She denies any GI bleeding. She has not been evaluated by GI for this. Extensive medical history has been reviewed and updated. She is due for medication refills.   Problem list, medications and blood work reviewed      Patient Active Problem List   Diagnosis    Essential hypertension    Pure hypercholesterolemia    Depression    History of heroin use    Hep C w/o coma, chronic (HCC) completed tx 2016    GERD (gastroesophageal reflux disease)    COPD [facility]chronic obstructive pulmonary disease) ([facility])    Tobacco abuse    Chronic diastolic

## 2017-10-30 NOTE — ACP (ADVANCE CARE PLANNING)
Met with Amy Sumner today in the office after Dr Carlota Coyle spoke with her regarding ACP. Amy Sumner has not been able to keep a few scheduled appt with ACP facilatator.   We made an appt for Nov 9th at [address] [place] [address]

## 2017-10-30 NOTE — PATIENT INSTRUCTIONS
Labs and mammogram reprinted and given to pt for completion/scheduling. Printed script for Zostavax vaccine signed and given to pt. Referral to Gastroenterology dropped into work queue for [facility] , they will contact the patient to schedule. Phone number given to patient. Summary of Care printed. Return To Clinic 1/31/2018. After Visit Summary  given and reviewed. --MA    It is very important for your care that you keep your appointment. If for some reason you are unable to keep your appointment it is equally important that you call our office at [phone] to cancel your appointment and reschedule. Failure to do so may result in your termination from our practice.

## 2017-10-31 ENCOUNTER — HOSPITAL ENCOUNTER (OUTPATIENT)
Age: 62
Discharge: HOME | End: 2017-10-31
Payer: COMMERCIAL

## 2017-10-31 LAB
AFP: 7.6 UG/L
CREATININE URINE: 135 MG/DL (ref 28–217)
MICROALBUMIN/CREAT 24H UR: <12 MG/L
MICROALBUMIN/CREAT UR-RTO: NORMAL MCG/MG CREAT

## 2017-10-31 PROCEDURE — 87522 HEPATITIS C REVRS TRNSCRPJ: CPT

## 2017-10-31 PROCEDURE — 82105 ALPHA-FETOPROTEIN SERUM: CPT

## 2017-10-31 PROCEDURE — 36415 COLL VENOUS BLD VENIPUNCTURE: CPT

## 2017-10-31 PROCEDURE — 82570 ASSAY OF URINE CREATININE: CPT

## 2017-10-31 PROCEDURE — 82043 UR ALBUMIN QUANTITATIVE: CPT

## 2017-11-01 ENCOUNTER — HOSPITAL ENCOUNTER (OUTPATIENT)
Dept: MAMMOGRAPHY | Age: 62
Discharge: HOME | End: 2017-11-01
Payer: COMMERCIAL

## 2017-11-01 DIAGNOSIS — Z12.31 ENCOUNTER FOR SCREENING MAMMOGRAM FOR BREAST CANCER: ICD-10-CM

## 2017-11-01 LAB
DIRECT EXAM: NORMAL
Lab: NORMAL
SPECIMEN DESCRIPTION: NORMAL
STATUS: NORMAL

## 2017-11-01 PROCEDURE — G0202 SCR MAMMO BI INCL CAD: HCPCS

## 2017-11-02 ENCOUNTER — TELEPHONE (OUTPATIENT)
Dept: INTERNAL MEDICINE | Age: 62
End: 2017-11-02

## 2017-11-02 NOTE — TELEPHONE ENCOUNTER
It usually improves after two weeks. She can take it once a daily and after two weeks increase to 2 times daily.

## 2017-11-06 ENCOUNTER — HOSPITAL ENCOUNTER (OUTPATIENT)
Dept: NUCLEAR MEDICINE | Age: 62
Discharge: HOME | End: 2017-11-06
Payer: COMMERCIAL

## 2017-11-06 DIAGNOSIS — R07.89 OTHER CHEST PAIN: ICD-10-CM

## 2017-11-06 LAB
LV EF: 72 %
LVEF MODALITY: NORMAL

## 2017-11-06 PROCEDURE — 2580000003 HC RX 258: Performed by: NURSE PRACTITIONER

## 2017-11-06 PROCEDURE — 78452 HT MUSCLE IMAGE SPECT MULT: CPT

## 2017-11-06 PROCEDURE — A9500 TC99M SESTAMIBI: HCPCS | Performed by: NURSE PRACTITIONER

## 2017-11-06 PROCEDURE — 3430000000 HC RX DIAGNOSTIC RADIOPHARMACEUTICAL: Performed by: NURSE PRACTITIONER

## 2017-11-06 RX ORDER — SODIUM CHLORIDE 0.9 % (FLUSH) 0.9 %
10 SYRINGE (ML) INJECTION 2 TIMES DAILY
Status: DISCONTINUED | OUTPATIENT
Start: 2017-11-06 | End: 2017-11-09 | Stop reason: HOSPADM

## 2017-11-06 RX ADMIN — SODIUM CHLORIDE, PRESERVATIVE FREE 10 ML: 5 INJECTION INTRAVENOUS at 11:00

## 2017-11-06 RX ADMIN — TETRAKIS(2-METHOXYISOBUTYLISOCYANIDE)COPPER(I) TETRAFLUOROBORATE 15 MILLICURIE: 1 INJECTION, POWDER, LYOPHILIZED, FOR SOLUTION INTRAVENOUS at 08:42

## 2017-11-06 RX ADMIN — SODIUM CHLORIDE, PRESERVATIVE FREE 10 ML: 5 INJECTION INTRAVENOUS at 08:42

## 2017-11-06 RX ADMIN — TETRAKIS(2-METHOXYISOBUTYLISOCYANIDE)COPPER(I) TETRAFLUOROBORATE 39.5 MILLICURIE: 1 INJECTION, POWDER, LYOPHILIZED, FOR SOLUTION INTRAVENOUS at 11:00

## 2017-11-08 ENCOUNTER — TELEPHONE (OUTPATIENT)
Dept: INTERNAL MEDICINE | Age: 62
End: 2017-11-08

## 2017-11-08 DIAGNOSIS — B18.2 HEP C W/O COMA, CHRONIC (HCC): Primary | ICD-10-CM

## 2017-11-08 DIAGNOSIS — R15.9 FULL INCONTINENCE OF FECES: ICD-10-CM

## 2017-11-08 NOTE — TELEPHONE ENCOUNTER
Pt calling stating she got a call because a referral was place for GI for Dr Theodosia Heimlich office, she said she does not want to see him, she has seen him before and wants something at [facility] V[address], GI referral to [address] [facility] pending.  Please sign if appropriate

## 2017-11-16 RX ORDER — GLUCOSAMINE HCL/CHONDROITIN SU 500-400 MG
CAPSULE ORAL
Qty: 100 STRIP | Refills: 11 | Status: SHIPPED | OUTPATIENT
Start: 2017-11-16 | End: 2018-08-09

## 2017-11-28 RX ORDER — LANCETS 30 GAUGE
EACH MISCELLANEOUS
Qty: 100 EACH | Refills: 11 | Status: SHIPPED | OUTPATIENT
Start: 2017-11-28 | End: 2018-01-22 | Stop reason: SDUPTHER

## 2017-11-28 RX ORDER — UBIQUINOL 100 MG
CAPSULE ORAL
Qty: 100 EACH | Refills: 11 | Status: SHIPPED | OUTPATIENT
Start: 2017-11-28 | End: 2018-08-09

## 2017-12-05 ENCOUNTER — TELEPHONE (OUTPATIENT)
Dept: INTERNAL MEDICINE | Age: 62
End: 2017-12-05

## 2017-12-05 RX ORDER — LANCETS 30 GAUGE
EACH MISCELLANEOUS
Qty: 100 EACH | Refills: 10 | Status: SHIPPED | OUTPATIENT
Start: 2017-12-05 | End: 2018-08-09

## 2017-12-05 NOTE — TELEPHONE ENCOUNTER
Patient needs a new lancet DEVICE ordered  The lancets that were ordered do not fit her current lancet device

## 2017-12-11 ENCOUNTER — HOSPITAL ENCOUNTER (EMERGENCY)
Age: 62
Discharge: HOME | End: 2017-12-11
Attending: EMERGENCY MEDICINE
Payer: COMMERCIAL

## 2017-12-11 VITALS
WEIGHT: 232 LBS | DIASTOLIC BLOOD PRESSURE: 75 MMHG | OXYGEN SATURATION: 92 % | TEMPERATURE: 97.2 F | BODY MASS INDEX: 31.42 KG/M2 | HEART RATE: 79 BPM | RESPIRATION RATE: 20 BRPM | SYSTOLIC BLOOD PRESSURE: 128 MMHG | HEIGHT: 72 IN

## 2017-12-11 DIAGNOSIS — R51.9 INTRACTABLE HEADACHE, UNSPECIFIED CHRONICITY PATTERN, UNSPECIFIED HEADACHE TYPE: ICD-10-CM

## 2017-12-11 DIAGNOSIS — F17.200 SMOKER: ICD-10-CM

## 2017-12-11 DIAGNOSIS — R05.9 COUGH: Primary | ICD-10-CM

## 2017-12-11 DIAGNOSIS — R05.8 PRODUCTIVE COUGH: ICD-10-CM

## 2017-12-11 LAB
CHP ED QC CHECK: YES
GLUCOSE BLD-MCNC: 148 MG/DL

## 2017-12-11 PROCEDURE — 82947 ASSAY GLUCOSE BLOOD QUANT: CPT

## 2017-12-11 PROCEDURE — 6370000000 HC RX 637 (ALT 250 FOR IP): Performed by: EMERGENCY MEDICINE

## 2017-12-11 PROCEDURE — 99283 EMERGENCY DEPT VISIT LOW MDM: CPT

## 2017-12-11 RX ORDER — BENZONATATE 100 MG/1
100 CAPSULE ORAL ONCE
Status: COMPLETED | OUTPATIENT
Start: 2017-12-11 | End: 2017-12-11

## 2017-12-11 RX ORDER — OXYMETAZOLINE HYDROCHLORIDE 0.05 G/100ML
2 SPRAY NASAL 2 TIMES DAILY
Qty: 1 BOTTLE | Refills: 0 | Status: SHIPPED | OUTPATIENT
Start: 2017-12-11 | End: 2018-01-10

## 2017-12-11 RX ORDER — ACETAMINOPHEN 325 MG/1
325 TABLET ORAL EVERY 6 HOURS PRN
Qty: 30 TABLET | Refills: 0 | Status: SHIPPED | OUTPATIENT
Start: 2017-12-11 | End: 2018-01-22 | Stop reason: SDUPTHER

## 2017-12-11 RX ORDER — ECHINACEA PURPUREA EXTRACT 125 MG
1 TABLET ORAL PRN
Qty: 1 BOTTLE | Refills: 3 | Status: ON HOLD | OUTPATIENT
Start: 2017-12-11 | End: 2018-09-21 | Stop reason: HOSPADM

## 2017-12-11 RX ORDER — IBUPROFEN 800 MG/1
800 TABLET ORAL ONCE
Status: COMPLETED | OUTPATIENT
Start: 2017-12-11 | End: 2017-12-11

## 2017-12-11 RX ORDER — BENZONATATE 100 MG/1
100 CAPSULE ORAL 3 TIMES DAILY PRN
Qty: 30 CAPSULE | Refills: 0 | Status: SHIPPED | OUTPATIENT
Start: 2017-12-11 | End: 2017-12-18

## 2017-12-11 RX ORDER — IBUPROFEN 600 MG/1
600 TABLET ORAL EVERY 6 HOURS PRN
Qty: 30 TABLET | Refills: 0 | Status: SHIPPED | OUTPATIENT
Start: 2017-12-11 | End: 2018-01-22

## 2017-12-11 RX ADMIN — BENZONATATE 100 MG: 100 CAPSULE ORAL at 08:04

## 2017-12-11 RX ADMIN — IBUPROFEN 800 MG: 800 TABLET, FILM COATED ORAL at 08:04

## 2017-12-11 ASSESSMENT — PAIN DESCRIPTION - PAIN TYPE: TYPE: ACUTE PAIN

## 2017-12-11 ASSESSMENT — PAIN SCALES - GENERAL: PAINLEVEL_OUTOF10: 3

## 2017-12-11 NOTE — ED PROVIDER NOTES
Yina Martinez MD   pravastatin (PRAVACHOL) 40 MG tablet Take 1 tablet by mouth every evening 10/30/17  Yes Antonella Dale MD   albuterol (PROVENTIL) (2.5 MG/3ML) 0.083% nebulizer solution Take 3 mLs by nebulization every 4 hours as needed for Wheezing 10/30/17  Yes Antonella Dale MD   pantoprazole (PROTONIX) 40 MG tablet Take 1 tablet by mouth daily Stop Omeprazole 10/30/17  Yes Antonella Dale MD   metFORMIN (GLUCOPHAGE) 500 MG tablet Take 1 tablet by mouth 2 times daily (with meals) 10/26/17  Yes Antonella Dale MD   hydrochlorothiazide (MICROZIDE) 12.5 MG capsule take 1 capsule by mouth every morning 10/15/17  Yes Antonella Dale MD   DULERA 200-5 MCG/ACT inhaler inhale 2 puffs every 12 hours 10/15/17  Yes Antonella Dale MD   furosemide (LASIX) 20 MG tablet take 1 tablet by mouth twice a day 10/15/17  Yes Antonella Dale MD   VENTOLIN  (90 Base) MCG/ACT inhaler inhale 2 puffs every 6 hours if needed for WHEEZING 10/4/17  Yes Carter Olson MD   traZODone (DESYREL) 100 MG tablet take 2 tablets by mouth NIGHTLY S NEEDED FOR SLEEP 9/22/17  Yes Antonella Dale MD   atenolol (TENORMIN) 25 MG tablet take 1 tablet by mouth once daily 9/22/17  Yes Antonella Dale MD   ARIPiprazole (ABILIFY) 10 MG tablet Take 10 mg by mouth daily 11/25/16  Yes Historical Provider, MD   Lancets [facility] Dx: DM2, use 2-3 times daily. Ok to dispense any brand 12/5/17   Antonella Dale MD   Alcohol Swabs (ALCOHOL PREP) 70 % PADS Use 1-2 times daily. Diagnisis:250.0  Diabetes Mellitus 2 Non-Insulin Dependent 11/28/17   MD Magdalene Del Rosario [facility] Use 1-2 times daily. Diagnisis:250.0  Diabetes Mellitus 2 Non-Insulin Dependent 11/28/17   Antonella Dale MD   Glucose Blood (BLOOD GLUCOSE TEST STRIPS) STRP Use 1-2 times daily.  Diagnisis:250.0  Diabetes Mellitus 2 Non-Insulin Dependent 11/16/17   Antonella Dale MD   ammonium lactate (AMLACTIN) 12 % cream  10/1/17   Historical Provider, MD Escobar (Benjie Broussard) 4 MG tablet Take 1 tablet by mouth 3 times daily 10/30/17   Antonella Mg MD   LINZESS 145 MCG capsule TAKE 1 CAPSULE BY MOUTH EVERY MORNING BEFORE BREAKFAST 10/15/17   Antonella Mg MD   Elastic Bandages & Supports (MEDICAL COMPRESSION STOCKINGS) MISC Dx: leg edema, use daily as needed, 20-30 mmHg 6/28/17   Antonella Mg MD       REVIEW OF SYSTEMS    (2-9 systems for level 4, 10 or more for level 5)        ROS:   For rest of ROS please see HPI  ENT: + sinus problems, congestion/obstruction  Pscyh: Denied altered mental status, SI  Integumentary:Denies skin changes, rashes    PHYSICAL EXAM   (up to 7 for level 4, 8 or more for level 5)      INITIAL VITALS:   /75   Pulse 79   Temp 97.2 °F (36.2 °C) (Oral)   Resp 20   Ht 6' (1.829 m)   Wt 232 lb (105.2 kg)   SpO2 92%   BMI 31.46 kg/m²   Vital signs reviewed. Nursing note reviewed    Constitutional: Well developed; well-nourished resting comfortably  HENT: Normocephalic, atraumatic, MMM, TM clear bilaterally no LAD, neg tonsillitis no exudates or erythema with cobblestoning.  Uvula is midline there is no trismus or drooling or bulging of a tonsil, sinus without purulent drainage but there is clear drainage, there is no submandibular swelling, erythema, patient is tolerating secretions  Eyes: Conj nl  Neck: ROM nl Supple  Pulmonary/Chest Wall: Effort normal limit, clear to ausculte bilaterally   Abdomen: Soft, non-tender  Musculoskeletal: Normal ROM as witnessed by ambulation  Neurological: Alert and Oriented x3,   Skin: Warm and dry cap refill <2 seconds   Psych: Mood/affect normal, behavior normal    DIFFERENTIAL  DIAGNOSIS     PLAN (LABS / IMAGING / EKG):  Orders Placed This Encounter   Procedures    POCT Glucose       MEDICATIONS ORDERED:  Orders Placed This Encounter   Medications    ibuprofen (ADVIL;MOTRIN) tablet 800 mg    benzonatate (TESSALON) capsule 100 mg    benzonatate (TESSALON PERLES) 100 MG capsule     Sig: Take 1 capsule by mouth 3 times daily as needed for Cough     Dispense:  30 capsule     Refill:  0    ibuprofen (ADVIL;MOTRIN) 600 MG tablet     Sig: Take 1 tablet by mouth every 6 hours as needed for Pain     Dispense:  30 tablet     Refill:  0    acetaminophen (TYLENOL) 325 MG tablet     Sig: Take 1 tablet by mouth every 6 hours as needed for Pain     Dispense:  30 tablet     Refill:  0    oxymetazoline (12 HOUR NASAL SPRAY) 0.05 % nasal spray     Si sprays by Nasal route 2 times daily No more than three days in a row     Dispense:  1 Bottle     Refill:  0    sodium chloride (OCEAN NASAL SPRAY) 0.65 % nasal spray     Si spray by Nasal route as needed for Congestion     Dispense:  1 Bottle     Refill:  3       DDX: Viral, Bacterial, Allergic, Flu, Strep, retropharyngeal abscess, peritonsillar abscess, edilson, reyna hunt syndrome      DIAGNOSTIC RESULTS / EMERGENCY DEPARTMENT COURSE / MDM     LABS:  Results for orders placed or performed during the hospital encounter of 17   POCT Glucose   Result Value Ref Range    Glucose 148 mg/dL    QC OK? yes        IMPRESSION: URI, low risk by Centor criteria no need to swab for strep I have low clinical suspicion for retropharyngeal or peritonsillar abscess or edilson angina    RADIOLOGY:  None     EKG  None     All EKG's are interpreted by the Emergency Department Physician who either signs or Co-signs this chart in the absence of a cardiologist.    EMERGENCY DEPARTMENT COURSE:  Scripts noted below for symptomatic treatment, explained to patient that symptoms will take two weeks to resolve and that sinus infections are not considered for antibiotics until after two weeks  Follow up pcp   3 minute smoking cessation provided   PROCEDURES:  None    CONSULTS:  None    CRITICAL CARE:  None    FINAL IMPRESSION      1. Cough    2. Smoker    3. Productive cough    4.  Intractable headache, unspecified chronicity pattern, unspecified headache type          DISPOSITION / PLAN

## 2017-12-11 NOTE — ED PROVIDER NOTES
[facility]     Emergency Department     Faculty Note/ Attestation      Pt Name: Ruthann Castaneda                                       MRN: [id number]  [place] 1955  Date of evaluation: 12/11/2017    Patients PCP:    Antonella Decker MD      Attestation  I performed a history and physical examination of the patient and discussed management with the resident. I reviewed the residents note and agree with the documented findings and plan of care. Any areas of disagreement are noted on the chart. I was personally present for the key portions of any procedures. I have documented in the chart those procedures where I was not present during the key portions. I have reviewed the emergency nurses triage note. I agree with the chief complaint, past medical history, past surgical history, allergies, medications, social and family history as documented unless otherwise noted below. For Physician Assistant/ Nurse Practitioner cases/documentation I have personally evaluated this patient and have completed at least one if not all key elements of the E/M (history, physical exam, and MDM). Additional findings are as noted.     Initial Screens:        Silvina Coma Scale  Eye Opening: Spontaneous  Best Verbal Response: Oriented  Best Motor Response: Obeys commands  Silvina Coma Scale Score: 15    Vitals:    Vitals:    12/11/17 0752   BP: 128/75   Pulse: 79   Resp: 20   Temp: 97.2 °F (36.2 °C)   TempSrc: Oral   SpO2: 92%   Weight: 232 lb (105.2 kg)   Height: 6' (1.829 m)       CHIEF COMPLAINT       Chief Complaint   Patient presents with    Cough     c/o productive cough x 3 days, denies sore throat, denies nausea vomiting             DIAGNOSTIC RESULTS             RADIOLOGY:   No orders to display         LABS:  Labs Reviewed - No data to display      EMERGENCY DEPARTMENT COURSE:     -------------------------  BP: 128/75, Temp: 97.2 °F (36.2 °C), Pulse: 79, Resp: 20      Comments            Mazin BYERS Fishman MD, F.A.C.E.P.   Attending Emergency Physician         Stacey Jacob MD  12/11/17 6112

## 2017-12-12 LAB — GLUCOSE BLD-MCNC: 148 MG/DL (ref 65–105)

## 2017-12-13 ENCOUNTER — CARE COORDINATION (OUTPATIENT)
Dept: CARE COORDINATION | Age: 62
End: 2017-12-13

## 2017-12-17 DIAGNOSIS — I10 ESSENTIAL HYPERTENSION: ICD-10-CM

## 2017-12-17 DIAGNOSIS — F51.01 PRIMARY INSOMNIA: ICD-10-CM

## 2017-12-18 RX ORDER — TRAZODONE HYDROCHLORIDE 100 MG/1
TABLET ORAL
Qty: 60 TABLET | Refills: 2 | Status: SHIPPED | OUTPATIENT
Start: 2017-12-18 | End: 2018-03-09 | Stop reason: SDUPTHER

## 2017-12-18 RX ORDER — ATENOLOL 25 MG/1
TABLET ORAL
Qty: 30 TABLET | Refills: 2 | Status: SHIPPED | OUTPATIENT
Start: 2017-12-18 | End: 2018-02-12 | Stop reason: ALTCHOICE

## 2017-12-18 NOTE — TELEPHONE ENCOUNTER
Pt calling states her new test strips dont fit her current meter and she needs a new one.  meter pended.

## 2017-12-18 NOTE — TELEPHONE ENCOUNTER
E-scribe request for TRAZODONE 100 MG TABLET and ATENOLOL 25 MG TABLET. Please review and e-scribe if applicable. Last Visit Date:  10/30/17  Next Visit Date:  1/31/18    Hemoglobin A1C (%)   Date Value   10/30/2017 6.4   06/28/2017 6.1   11/29/2016 6.0             ( goal A1C is < 7)   Microalb/Crt.  Ratio (mcg/mg creat)   Date Value   10/31/2017 CANNOT BE CALCULATED     LDL Cholesterol (mg/dL)   Date Value   03/20/2017 86       (goal LDL is <100)   AST (U/L)   Date Value   07/12/2017 15     ALT (U/L)   Date Value   07/12/2017 8     BUN (mg/dL)   Date Value   07/12/2017 13     BP Readings from Last 3 Encounters:   12/11/17 128/75   10/30/17 125/72   06/28/17 (!) 154/85          (goal 120/80)        Patient Active Problem List:     Essential hypertension     Pure hypercholesterolemia     Depression     History of heroin use     Hep C w/o coma, chronic ([facility] [address] completed tx 2016     GERD (gastroesophageal reflux disease)     COPD (chronic obstructive pulmonary disease) (HCC)     Tobacco abuse     Chronic diastolic congestive heart failure (HCC)     Diverticulosis     Hyperplastic polyp of intestine     Diabetic polyneuropathy associated with type 2 diabetes mellitus (HCC)     Bilateral chronic knee pain     Chronic respiratory failure with hypoxia (HCC)     Type 2 diabetes mellitus with complication (HCC)     Primary insomnia     Constipation     Cigarette nicotine dependence without complication     Chronic bilateral low back pain with right-sided sciatica      ----JF

## 2017-12-20 ENCOUNTER — TELEPHONE (OUTPATIENT)
Dept: INTERNAL MEDICINE | Age: 62
End: 2017-12-20

## 2017-12-20 DIAGNOSIS — J41.0 SIMPLE CHRONIC BRONCHITIS (HCC): ICD-10-CM

## 2017-12-20 NOTE — TELEPHONE ENCOUNTER
disease) ([facility] [address]     Tobacco abuse     Chronic diastolic congestive heart failure (HCC)     Diverticulosis     Hyperplastic polyp of intestine     Diabetic polyneuropathy associated with type 2 diabetes mellitus (HCC)     Bilateral chronic knee pain     Chronic respiratory failure with hypoxia (HCC)     Type 2 diabetes mellitus with complication (HCC)     Primary insomnia     Constipation     Cigarette nicotine dependence without complication     Chronic bilateral low back pain with right-sided sciatica

## 2017-12-27 DIAGNOSIS — M54.41 CHRONIC BILATERAL LOW BACK PAIN WITH RIGHT-SIDED SCIATICA: ICD-10-CM

## 2017-12-27 DIAGNOSIS — G89.29 CHRONIC BILATERAL LOW BACK PAIN WITH RIGHT-SIDED SCIATICA: ICD-10-CM

## 2017-12-27 RX ORDER — IBUPROFEN 800 MG/1
TABLET ORAL
Qty: 90 TABLET | Refills: 2 | Status: SHIPPED | OUTPATIENT
Start: 2017-12-27 | End: 2018-05-07 | Stop reason: SDUPTHER

## 2017-12-27 NOTE — TELEPHONE ENCOUNTER
E-Scribe request for IBUPROFEN 800 MG TABLET. Pt last seen 10/30/17  Next Visit Date:  Future Appointments  Date Time Provider Terry Bradford   1/10/2018 1:30 PM Nain Ruelas DPM [facility]   1/31/2018 1:45 PM [facility]   2/12/2018 9:00 AM Caleb Guzmán MD Resp Spec [address] Maintenance   Topic Date Due    HIV screen  06/05/1972    Zostavax vaccine  06/05/2017    Flu vaccine (1) 09/01/2017    Diabetic foot exam  11/23/2017    Diabetic retinal exam  02/20/2018 (Originally 12/2/2016)    DTaP/Tdap/Td vaccine (1 - Tdap) 07/30/2026 (Originally 7/31/2016)    Lipid screen  03/20/2018    Diabetic hemoglobin A1C test  10/30/2018    Diabetic microalbuminuria test  10/31/2018    Colon cancer screen colonoscopy  09/20/2019    Breast cancer screen  11/01/2019    Pneumococcal med risk  Completed             (applicable per patient's age: Cancer Screenings, Depression Screening, Fall Risk Screening, Immunizations)    Hemoglobin A1C (%)   Date Value   10/30/2017 6.4   06/28/2017 6.1   11/29/2016 6.0     Microalb/Crt.  Ratio (mcg/mg creat)   Date Value   10/31/2017 CANNOT BE CALCULATED     LDL Cholesterol (mg/dL)   Date Value   03/20/2017 86     AST (U/L)   Date Value   07/12/2017 15     ALT (U/L)   Date Value   07/12/2017 8     BUN (mg/dL)   Date Value   07/12/2017 13      (goal A1C is < 7)   (goal LDL is <100) need 30-50% reduction from baseline     BP Readings from Last 3 Encounters:   12/11/17 128/75   10/30/17 125/72   06/28/17 (!) 154/85    (goal /80)      All Future Testing planned in [facility]:  Lab Frequency Next Occurrence   Microalbumin, Ur Once 01/01/2018   Sleep Study with PAP Titration Once 06/30/2017   Baseline Diagnostic Sleep Study Once 06/30/2017            Patient Active Problem List:     Essential hypertension     Pure hypercholesterolemia     Depression     History of heroin use     Hep C w/o coma, chronic ([address] completed tx 2016     GERD

## 2018-01-05 DIAGNOSIS — J41.1 MUCOPURULENT CHRONIC BRONCHITIS (HCC): ICD-10-CM

## 2018-01-05 DIAGNOSIS — I10 ESSENTIAL HYPERTENSION: ICD-10-CM

## 2018-01-05 DIAGNOSIS — I50.32 CHRONIC DIASTOLIC CONGESTIVE HEART FAILURE (HCC): ICD-10-CM

## 2018-01-05 RX ORDER — FUROSEMIDE 20 MG/1
TABLET ORAL
Qty: 60 TABLET | Refills: 2 | Status: SHIPPED | OUTPATIENT
Start: 2018-01-05 | End: 2018-04-09 | Stop reason: SDUPTHER

## 2018-01-05 RX ORDER — HYDROCHLOROTHIAZIDE 12.5 MG/1
12.5 CAPSULE, GELATIN COATED ORAL EVERY MORNING
Qty: 30 CAPSULE | Refills: 2 | Status: SHIPPED | OUTPATIENT
Start: 2018-01-05 | End: 2018-04-09 | Stop reason: SDUPTHER

## 2018-01-05 RX ORDER — MOMETASONE FUROATE AND FORMOTEROL FUMARATE DIHYDRATE 200; 5 UG/1; UG/1
AEROSOL RESPIRATORY (INHALATION)
Qty: 13 G | Refills: 2 | Status: SHIPPED | OUTPATIENT
Start: 2018-01-05 | End: 2018-01-18

## 2018-01-05 NOTE — TELEPHONE ENCOUNTER
raffi request for dulera, lasix, microzide future appointment scheduled. Health Maintenance   Topic Date Due    HIV screen  06/05/1972    Creatinine monitoring  09/10/2014    Potassium monitoring  04/04/2017    Zostavax vaccine  06/05/2017    Flu vaccine (1) 09/01/2017    Diabetic foot exam  11/23/2017    Diabetic retinal exam  02/20/2018 (Originally 12/2/2016)    DTaP/Tdap/Td vaccine (1 - Tdap) 07/30/2026 (Originally 7/31/2016)    Lipid screen  03/20/2018    A1C test (Diabetic or Prediabetic)  10/30/2018    Diabetic microalbuminuria test  10/31/2018    Colon cancer screen colonoscopy  09/20/2019    Breast cancer screen  11/01/2019    Pneumococcal med risk  Completed             (applicable per patient's age: Cancer Screenings, Depression Screening, Fall Risk Screening, Immunizations)    Hemoglobin A1C (%)   Date Value   10/30/2017 6.4   06/28/2017 6.1   11/29/2016 6.0     Microalb/Crt.  Ratio (mcg/mg creat)   Date Value   10/31/2017 CANNOT BE CALCULATED     LDL Cholesterol (mg/dL)   Date Value   03/20/2017 86     AST (U/L)   Date Value   07/12/2017 15     ALT (U/L)   Date Value   07/12/2017 8     BUN (mg/dL)   Date Value   07/12/2017 13      (goal A1C is < 7)   (goal LDL is <100) need 30-50% reduction from baseline     BP Readings from Last 3 Encounters:   12/11/17 128/75   10/30/17 125/72   06/28/17 (!) 154/85    (goal /80)      All Future Testing planned in [facility]:  Lab Frequency Next Occurrence   Microalbumin, Ur Once 01/01/2018   Sleep Study with PAP Titration Once 06/30/2017   Baseline Diagnostic Sleep Study Once 06/30/2017       Next Visit Date:  Future Appointments  Date Time Provider Terry Bradford   1/10/2018 1:30 PM Ron Topete DPM [facility]   1/31/2018 1:45 PM Antonella Bernal MD [facility]   2/12/2018 9:00 AM [facility]            Patient Active Problem List:     Essential hypertension     Pure hypercholesterolemia     Depression

## 2018-01-10 ENCOUNTER — OFFICE VISIT (OUTPATIENT)
Dept: PODIATRY | Age: 63
End: 2018-01-10
Payer: COMMERCIAL

## 2018-01-10 VITALS — BODY MASS INDEX: 31.15 KG/M2 | HEIGHT: 72 IN | WEIGHT: 230 LBS

## 2018-01-10 DIAGNOSIS — B35.1 DERMATOPHYTOSIS OF NAIL: ICD-10-CM

## 2018-01-10 DIAGNOSIS — E11.51 TYPE II DIABETES MELLITUS WITH PERIPHERAL CIRCULATORY DISORDER (HCC): Primary | ICD-10-CM

## 2018-01-10 DIAGNOSIS — E11.42 DIABETIC POLYNEUROPATHY ASSOCIATED WITH TYPE 2 DIABETES MELLITUS (HCC): ICD-10-CM

## 2018-01-10 DIAGNOSIS — I73.9 PVD (PERIPHERAL VASCULAR DISEASE) (HCC): ICD-10-CM

## 2018-01-10 PROCEDURE — A5513 MULTI DEN INSERT CUSTOM MOLD: HCPCS | Performed by: PODIATRIST

## 2018-01-10 PROCEDURE — A5500 DIAB SHOE FOR DENSITY INSERT: HCPCS | Performed by: PODIATRIST

## 2018-01-10 PROCEDURE — 11721 DEBRIDE NAIL 6 OR MORE: CPT | Performed by: PODIATRIST

## 2018-01-10 NOTE — PROGRESS NOTES
[facility]  Return Patient    Chief Complaint   Patient presents with    Diabetes    Nail Problem     pcp is dr Naz Oliveira last seen october 30 2017    Peripheral Neuropathy       Subjective: This Yoana Camper comes to clinic for foot and nail care. She complains of diffuse numbness to both feet. She is currently taking Gabapentin 800 mg and states she still has numbness. Pt currently has complaint of thickened, painful, elongated nails that he/she cannot manage by themselves. Pt. Relates pain to nails with shoe gear. She also wishes to  DM shoes today. Denies any recent trauma or hospitalizations. Pt does not recall what her last blood sugar measured. Pt's primary care physician is Antonella Villegas MD.  Past Medical History:   Diagnosis Date    RACHEL (acute kidney injury) ([facility] [address] 1/22/2014    Arthritis     generalized    Cancer ([facility] [address] 2004    uterus    CHF (congestive heart failure) (HCC)     Chronic bilateral low back pain with right-sided sciatica 2/20/2017    COPD (chronic obstructive pulmonary disease) ([facility] [address]     on inhaler    Depression 10/30/2014    Diabetic polyneuropathy associated with type 2 diabetes mellitus ([address]     Diabetic polyneuropathy associated with type 2 diabetes mellitus (HCC)     Diverticulosis     Full dentures     GERD (gastroesophageal reflux disease)     Hep C w/o coma, chronic (HCC)     Hyperlipidemia     Hyperplastic polyp of intestine     Hypertension     DR. MCDANIEL-CARDIO    Liver disease     hepatitis C    Pacemaker 2012    Pneumonia 7/2012    RECENTLY HOSPITALIZED    Rectal bleeding     Tobacco abuse        Allergies   Allergen Reactions    Iv Dye [Iodides] Hives     Current Outpatient Prescriptions on File Prior to Visit   Medication Sig Dispense Refill    hydrochlorothiazide (MICROZIDE) 12.5 MG capsule take 1 capsule by mouth every morning 30 capsule 2    furosemide (LASIX) 20 MG tablet take 1 tablet by mouth twice a day 60 tablet 2    DULERA 200-5 MCG/ACT inhaler INHALE 2 PUFFS BY MOUTH EVERY 12 HOURS 13 g 2    ibuprofen (ADVIL;MOTRIN) 800 MG tablet take 1 tablet by mouth every 8 hours if needed for pain with food 90 tablet 2    VENTOLIN  (90 Base) MCG/ACT inhaler INHALE 2 PUFFS BY MOUTH EVERY 6 HOURS AS NEEDED FOR WHEEZING 18 g 2    atenolol (TENORMIN) 25 MG tablet take 1 tablet by mouth once daily 30 tablet 2    traZODone (DESYREL) 100 MG tablet take 2 tablets by mouth every evening if needed for SLEEP 60 tablet 2    Blood Glucose Monitoring Suppl SERA Dx: DM-2 use 2 times daily as needed 1 Device 0    ibuprofen (ADVIL;MOTRIN) 600 MG tablet Take 1 tablet by mouth every 6 hours as needed for Pain 30 tablet 0    acetaminophen (TYLENOL) 325 MG tablet Take 1 tablet by mouth every 6 hours as needed for Pain 30 tablet 0    oxymetazoline (12 HOUR NASAL SPRAY) 0.05 % nasal spray 2 sprays by Nasal route 2 times daily No more than three days in a row 1 Bottle 0    sodium chloride (OCEAN NASAL SPRAY) 0.65 % nasal spray 1 spray by Nasal route as needed for Congestion 1 Bottle 3    Lancets MISC Dx: DM2, use 2-3 times daily. Ok to dispense any brand 100 each 10    Alcohol Swabs (ALCOHOL PREP) 70 % PADS Use 1-2 times daily. Diagnisis:250.0  Diabetes Mellitus 2 Non-Insulin Dependent 100 each 11    Lancets MISC Use 1-2 times daily. Diagnisis:250.0  Diabetes Mellitus 2 Non-Insulin Dependent 100 each 11    Glucose Blood (BLOOD GLUCOSE TEST STRIPS) STRP Use 1-2 times daily.  Diagnisis:250.0  Diabetes Mellitus 2 Non-Insulin Dependent 100 strip 11    ammonium lactate (AMLACTIN) 12 % cream   0    hydrOXYzine (ATARAX) 10 MG tablet take 1 tablet by mouth three times a day  0    SUBOXONE 4-1 MG FILM SL film DISSOLVE 1 FILM UNDER THE TONGUE DAILY  0    gabapentin (NEURONTIN) 800 MG tablet Take 1 tablet by mouth 3 times daily DC Lyrica 90 tablet 2    pravastatin (PRAVACHOL) 40 MG tablet Take 1 tablet by mouth every evening 30 tablet 2    albuterol (PROVENTIL) (2.5 MG/3ML) 0.083% nebulizer solution Take 3 mLs by nebulization every 4 hours as needed for Wheezing 120 each 2    pantoprazole (PROTONIX) 40 MG tablet Take 1 tablet by mouth daily Stop Omeprazole 30 tablet 2    tiZANidine (ZANAFLEX) 4 MG tablet Take 1 tablet by mouth 3 times daily 90 tablet 2    metFORMIN (GLUCOPHAGE) 500 MG tablet Take 1 tablet by mouth 2 times daily (with meals) 60 tablet 2    LINZESS 145 MCG capsule TAKE 1 CAPSULE BY MOUTH EVERY MORNING BEFORE BREAKFAST 30 capsule 2    Elastic Bandages & Supports (MEDICAL COMPRESSION STOCKINGS) MISC Dx: leg edema, use daily as needed, 20-30 mmHg 2 each 2    ARIPiprazole (ABILIFY) 10 MG tablet Take 10 mg by mouth daily  0     No current facility-administered medications on file prior to visit. Review of Systems  Objective:  General: AAO x 3 in NAD. Derm  Toenail Description  Sites of Onychomycosis Involvement (Check affected area)  [x] [x] [x] [x] [x] [x] [x] [x] [x] [x]  5 4 3 2 1 1 2 3 4 5                          Right                                        Left    Thickness  [x] [x] [x] [x] [x] [x] [x] [x] [x] [x]  5 4 3 2 1 1 2 3 4 5                         Right                                        Left    Dystrophic Changes   [x] [x] [x] [x] [x] [x] [x] [x] [x] [x]  5 4 3 2 1 1 2 3 4 5                         Right                                        Left    Color  [x] [x] [x] [x] [x] [x] [x] [x] [x] [x]  5 4 3 2 1 1 2 3 4 5                          Right                                        Left    Incurvation/Ingrowin   [] [] [] [] [] [] [] [] [] []  5 4 3 2 1 1 2 3 4 5                         Right                                        Left    Inflammation/Pain   [x] [x] [x] [x] [x] [x] [x] [x] [x] [x]  5 4 3  2 1 1 2 3 4 5                         Right                                        Left       Nails are painful 1-10 with direct palpation.         Dermatologic Exam:  Skin accomodative inserts. Diagnosis was discussed with the pt and all of their questions were answered in detail. Proper foot hygiene and care was discussed with the pt. Patient to check feet daily and contact the office with any questions/problems/concerns. Other comorbidity noted and will be managed by PCP. Pain waiver discussed with patient and confirmed.    1/10/2018      Electronically signed by Eileen Kirby DPM on 1/10/2018 at 3:17 PM  1/10/2018

## 2018-01-14 DIAGNOSIS — K59.09 OTHER CONSTIPATION: ICD-10-CM

## 2018-01-15 RX ORDER — LINACLOTIDE 145 UG/1
CAPSULE, GELATIN COATED ORAL
Qty: 30 CAPSULE | Refills: 2 | Status: SHIPPED | OUTPATIENT
Start: 2018-01-15 | End: 2018-04-09 | Stop reason: SDUPTHER

## 2018-01-15 NOTE — TELEPHONE ENCOUNTER
E-scribe request for Linzess 145 MCG. Please review and e-scribe if applicable. Health Maintenance   Topic Date Due    HIV screen  06/05/1972    Zostavax vaccine  06/05/2017    Flu vaccine (1) 09/01/2017    Diabetic retinal exam  02/20/2018 (Originally 12/2/2016)    DTaP/Tdap/Td vaccine (1 - Tdap) 07/30/2026 (Originally 7/31/2016)    Lipid screen  03/20/2018    Potassium monitoring  07/12/2018    Creatinine monitoring  07/12/2018    A1C test (Diabetic or Prediabetic)  10/30/2018    Diabetic microalbuminuria test  10/31/2018    Diabetic foot exam  01/10/2019    Colon cancer screen colonoscopy  09/20/2019    Breast cancer screen  11/01/2019    Pneumococcal med risk  Completed             (applicable per patient's age: Cancer Screenings, Depression Screening, Fall Risk Screening, Immunizations)    Hemoglobin A1C (%)   Date Value   10/30/2017 6.4   06/28/2017 6.1   11/29/2016 6.0     Microalb/Crt.  Ratio (mcg/mg creat)   Date Value   10/31/2017 CANNOT BE CALCULATED     LDL Cholesterol (mg/dL)   Date Value   03/20/2017 86     AST (U/L)   Date Value   07/12/2017 15     ALT (U/L)   Date Value   07/12/2017 8     BUN (mg/dL)   Date Value   07/12/2017 13      (goal A1C is < 7)   (goal LDL is <100) need 30-50% reduction from baseline     BP Readings from Last 3 Encounters:   12/11/17 128/75   10/30/17 125/72   06/28/17 (!) 154/85    (goal /80)      All Future Testing planned in [facility]:  Lab Frequency Next Occurrence   Microalbumin, Ur Once 02/08/2018   Sleep Study with PAP Titration Once 06/30/2017   Baseline Diagnostic Sleep Study Once 06/30/2017       Next Visit Date:  Future Appointments  Date Time Provider Terry Bradford   1/31/2018 1:45 PM Antonella Enriquez MD [facility]   2/12/2018 9:00 AM Gene Hernandez MD Resp Spec [facility]   3/14/2018 1:30 PM Diandra Lee DPM [facility]            Patient Active Problem List:     Essential hypertension     Pure hypercholesterolemia

## 2018-01-17 ENCOUNTER — TELEPHONE (OUTPATIENT)
Dept: INTERNAL MEDICINE | Age: 63
End: 2018-01-17

## 2018-01-18 RX ORDER — BUDESONIDE AND FORMOTEROL FUMARATE DIHYDRATE 160; 4.5 UG/1; UG/1
2 AEROSOL RESPIRATORY (INHALATION) 2 TIMES DAILY
Qty: 1 INHALER | Refills: 2 | Status: SHIPPED | OUTPATIENT
Start: 2018-01-18 | End: 2018-05-07 | Stop reason: SDUPTHER

## 2018-01-22 ENCOUNTER — OFFICE VISIT (OUTPATIENT)
Dept: INTERNAL MEDICINE | Age: 63
End: 2018-01-22
Payer: COMMERCIAL

## 2018-01-22 VITALS
HEART RATE: 83 BPM | SYSTOLIC BLOOD PRESSURE: 123 MMHG | BODY MASS INDEX: 31.69 KG/M2 | WEIGHT: 234 LBS | DIASTOLIC BLOOD PRESSURE: 64 MMHG | HEIGHT: 72 IN

## 2018-01-22 DIAGNOSIS — G89.29 CHRONIC BILATERAL LOW BACK PAIN WITH RIGHT-SIDED SCIATICA: ICD-10-CM

## 2018-01-22 DIAGNOSIS — E11.40 TYPE 2 DIABETES MELLITUS WITH DIABETIC NEUROPATHY, WITHOUT LONG-TERM CURRENT USE OF INSULIN (HCC): ICD-10-CM

## 2018-01-22 DIAGNOSIS — K21.9 GASTROESOPHAGEAL REFLUX DISEASE WITHOUT ESOPHAGITIS: ICD-10-CM

## 2018-01-22 DIAGNOSIS — J96.11 HYPOXEMIC RESPIRATORY FAILURE, CHRONIC (HCC): ICD-10-CM

## 2018-01-22 DIAGNOSIS — M17.11 PRIMARY OSTEOARTHRITIS OF RIGHT KNEE: Primary | ICD-10-CM

## 2018-01-22 DIAGNOSIS — E11.8 TYPE 2 DIABETES MELLITUS WITH COMPLICATION, WITHOUT LONG-TERM CURRENT USE OF INSULIN (HCC): ICD-10-CM

## 2018-01-22 DIAGNOSIS — J41.0 SIMPLE CHRONIC BRONCHITIS (HCC): ICD-10-CM

## 2018-01-22 DIAGNOSIS — F33.42 RECURRENT MAJOR DEPRESSIVE EPISODES, IN FULL REMISSION (HCC): ICD-10-CM

## 2018-01-22 DIAGNOSIS — I50.32 HEART FAILURE, DIASTOLIC, CHRONIC (HCC): ICD-10-CM

## 2018-01-22 DIAGNOSIS — K73.9 CHRONIC HEPATITIS (HCC): ICD-10-CM

## 2018-01-22 DIAGNOSIS — J44.9 COPD WITH CHRONIC BRONCHITIS (HCC): ICD-10-CM

## 2018-01-22 DIAGNOSIS — E78.00 PURE HYPERCHOLESTEROLEMIA: ICD-10-CM

## 2018-01-22 DIAGNOSIS — M54.41 CHRONIC BILATERAL LOW BACK PAIN WITH RIGHT-SIDED SCIATICA: ICD-10-CM

## 2018-01-22 PROCEDURE — 99214 OFFICE O/P EST MOD 30 MIN: CPT | Performed by: INTERNAL MEDICINE

## 2018-01-22 RX ORDER — TIZANIDINE 4 MG/1
4 TABLET ORAL 3 TIMES DAILY
Qty: 90 TABLET | Refills: 2 | Status: SHIPPED | OUTPATIENT
Start: 2018-01-22 | End: 2018-05-07 | Stop reason: SDUPTHER

## 2018-01-22 RX ORDER — PANTOPRAZOLE SODIUM 40 MG/1
40 TABLET, DELAYED RELEASE ORAL DAILY
Qty: 30 TABLET | Refills: 2 | Status: SHIPPED | OUTPATIENT
Start: 2018-01-22 | End: 2018-05-07 | Stop reason: SDUPTHER

## 2018-01-22 RX ORDER — GABAPENTIN 800 MG/1
800 TABLET ORAL 3 TIMES DAILY
Qty: 90 TABLET | Refills: 2 | Status: SHIPPED | OUTPATIENT
Start: 2018-01-22 | End: 2018-05-07 | Stop reason: SDUPTHER

## 2018-01-22 RX ORDER — PRAVASTATIN SODIUM 40 MG
40 TABLET ORAL EVERY EVENING
Qty: 30 TABLET | Refills: 2 | Status: SHIPPED | OUTPATIENT
Start: 2018-01-22 | End: 2018-05-07 | Stop reason: SDUPTHER

## 2018-01-22 RX ORDER — ALBUTEROL SULFATE 2.5 MG/3ML
2.5 SOLUTION RESPIRATORY (INHALATION) EVERY 4 HOURS PRN
Qty: 120 EACH | Refills: 2 | Status: SHIPPED | OUTPATIENT
Start: 2018-01-22 | End: 2018-05-07 | Stop reason: SDUPTHER

## 2018-01-22 RX ORDER — ACETAMINOPHEN 325 MG/1
325 TABLET ORAL EVERY 6 HOURS PRN
Qty: 30 TABLET | Refills: 0 | Status: SHIPPED | OUTPATIENT
Start: 2018-01-22 | End: 2018-05-23 | Stop reason: SDUPTHER

## 2018-01-22 ASSESSMENT — ENCOUNTER SYMPTOMS
HEMOPTYSIS: 0
DOUBLE VISION: 0
HEARTBURN: 0
NAUSEA: 0
COUGH: 0
ABDOMINAL PAIN: 0
BLURRED VISION: 0

## 2018-01-22 NOTE — PATIENT INSTRUCTIONS
You have been given an order for a x-ray. Please have x-ray performed at [place], there is no need to schedule test.    Appt scheduled with Dr. Юлия Casas office on [address]. Feb 5 at 10:40 am     Return appointment card and Summary of Care was reviewed and copy was given to the patient.   RAMÍREZ

## 2018-02-08 DIAGNOSIS — M25.561 RIGHT KNEE PAIN, UNSPECIFIED CHRONICITY: Primary | ICD-10-CM

## 2018-02-12 ENCOUNTER — HOSPITAL ENCOUNTER (OUTPATIENT)
Age: 63
Discharge: HOME | End: 2018-02-14
Payer: COMMERCIAL

## 2018-02-12 ENCOUNTER — OFFICE VISIT (OUTPATIENT)
Dept: PULMONOLOGY | Age: 63
End: 2018-02-12
Payer: COMMERCIAL

## 2018-02-12 VITALS
DIASTOLIC BLOOD PRESSURE: 78 MMHG | WEIGHT: 224 LBS | TEMPERATURE: 98.4 F | OXYGEN SATURATION: 93 % | BODY MASS INDEX: 30.38 KG/M2 | SYSTOLIC BLOOD PRESSURE: 129 MMHG | RESPIRATION RATE: 16 BRPM | HEART RATE: 81 BPM

## 2018-02-12 DIAGNOSIS — J44.9 COPD WITH CHRONIC BRONCHITIS AND EMPHYSEMA (HCC): Primary | ICD-10-CM

## 2018-02-12 DIAGNOSIS — R07.1 CHEST PAIN ON BREATHING: ICD-10-CM

## 2018-02-12 DIAGNOSIS — I10 ESSENTIAL HYPERTENSION: ICD-10-CM

## 2018-02-12 DIAGNOSIS — E11.65 TYPE 2 DIABETES MELLITUS WITH HYPERGLYCEMIA, WITHOUT LONG-TERM CURRENT USE OF INSULIN (HCC): ICD-10-CM

## 2018-02-12 DIAGNOSIS — F32.4 MAJOR DEPRESSIVE DISORDER WITH SINGLE EPISODE, IN PARTIAL REMISSION (HCC): ICD-10-CM

## 2018-02-12 DIAGNOSIS — E66.9 OBESITY (BMI 30.0-34.9): ICD-10-CM

## 2018-02-12 DIAGNOSIS — G47.61 PERIODIC LIMB MOVEMENT DISORDER (PLMD): ICD-10-CM

## 2018-02-12 PROCEDURE — 99215 OFFICE O/P EST HI 40 MIN: CPT | Performed by: INTERNAL MEDICINE

## 2018-02-12 RX ORDER — ROPINIROLE 0.5 MG/1
1 TABLET, FILM COATED ORAL DAILY
Refills: 0 | COMMUNITY
Start: 2018-02-05 | End: 2018-05-07 | Stop reason: SDUPTHER

## 2018-02-12 NOTE — PROGRESS NOTES
([id number]) 10/22/2015, 12/29/2017    Td 07/30/2016        Pneumococcal Vaccine     [x] Up to date    [] Indicated   [] Refused  [] Contraindicated       Influenza Vaccine   [x] Up to date    [] Indicated   [] Refused  [] Contraindicated   REVIEW OF SYSTEMS: Reveals a systolic conductor for all other system including upper and lower extremities. No additional information was obtained. Review of Systems -  General ROS: negative for - chills, fatigue, fever or weight loss  ENT ROS: negative for - headaches, oral lesions or sore throat  Cardiovascular ROS: no chest pain , orthopnea or pnd   Gastrointestinal ROS: no abdominal pain, change in bowel habits, or black or bloody stools  Skin - no rash   Neuro - no blurry vision , no loc .  No focal weakness   msk - no jt tenderness or swelling    Vascular - no claudication , rest completed and negative   Lymphatic - complete and negative   Hematology - oncology - complete and negative   Allergy immunology - complete and negative    no burning or hematuria   Upper Extremities  Lower Extremities      Current Outpatient Prescriptions   Medication Sig Dispense Refill    rOPINIRole (REQUIP) 0.5 MG tablet Take 1 tablet by mouth daily  0    tiotropium (SPIRIVA HANDIHALER) 18 MCG inhalation capsule Inhale 1 capsule into the lungs daily 30 capsule 11    acetaminophen (TYLENOL) 325 MG tablet Take 1 tablet by mouth every 6 hours as needed for Pain 30 tablet 0    metFORMIN (GLUCOPHAGE) 500 MG tablet Take 1 tablet by mouth 2 times daily (with meals) 60 tablet 2    tiZANidine (ZANAFLEX) 4 MG tablet Take 1 tablet by mouth 3 times daily 90 tablet 2    pantoprazole (PROTONIX) 40 MG tablet Take 1 tablet by mouth daily Stop Omeprazole 30 tablet 2    albuterol (PROVENTIL) (2.5 MG/3ML) 0.083% nebulizer solution Take 3 mLs by nebulization every 4 hours as needed for Wheezing 120 each 2    pravastatin (PRAVACHOL) 40 MG tablet Take 1 tablet by mouth every evening 30 tablet 2   

## 2018-02-13 ENCOUNTER — HOSPITAL ENCOUNTER (OUTPATIENT)
Dept: GENERAL RADIOLOGY | Age: 63
Discharge: HOME | End: 2018-02-15
Payer: COMMERCIAL

## 2018-02-13 ENCOUNTER — HOSPITAL ENCOUNTER (OUTPATIENT)
Age: 63
Discharge: HOME | End: 2018-02-15
Payer: COMMERCIAL

## 2018-02-13 DIAGNOSIS — J44.9 COPD WITH CHRONIC BRONCHITIS AND EMPHYSEMA (HCC): ICD-10-CM

## 2018-02-13 DIAGNOSIS — R07.1 CHEST PAIN ON BREATHING: ICD-10-CM

## 2018-02-13 PROCEDURE — 71046 X-RAY EXAM CHEST 2 VIEWS: CPT

## 2018-02-21 ENCOUNTER — TELEPHONE (OUTPATIENT)
Dept: INTERNAL MEDICINE | Age: 63
End: 2018-02-21

## 2018-02-21 NOTE — TELEPHONE ENCOUNTER
Anoro-Ellipta is an inhaler for COPD. It is not indicated for quitting smoking. I will encourage her to decrease her smoking by 1 cigarette every week. For example if she smokes a pack a day which is 20 cigarettes a day, then starting today she will start smoking 19 cigarettes. Next week 18 cigarettes.   The following week 17 cigarette and so forth

## 2018-02-23 ENCOUNTER — TELEPHONE (OUTPATIENT)
Dept: INTERNAL MEDICINE | Age: 63
End: 2018-02-23

## 2018-02-27 ENCOUNTER — TELEPHONE (OUTPATIENT)
Dept: GASTROENTEROLOGY | Age: 63
End: 2018-02-27

## 2018-02-27 NOTE — LETTER
[address] [facility]  [address]  [address] [facility] [address] [place] [facility] [address]  Phone: [phone]  Fax: [phone]    Damien Vernon MD        February 27, 2018    [address] [facility] [address]      Dear Herve Conteh: This letter is to inform you that your appointment on Monday March 12, 2018 @ 10:00 a.m. with our GI clinic, has been rescheduled for Monday May 14, 2018 at 9:30 a.m. If you are unable to keep this appointment, please call [phone] to cancel or reschedule. If you have any questions or concerns, please don't hesitate to call.     Sincerely,        Damien Vernon MD

## 2018-03-04 DIAGNOSIS — J41.0 SIMPLE CHRONIC BRONCHITIS (HCC): ICD-10-CM

## 2018-03-05 NOTE — TELEPHONE ENCOUNTER
E-scribe request for Ventolin HFA 90 MCG inhaler . Please review and e-scribe if applicable. Health Maintenance   Topic Date Due    Shingles Vaccine (1 of 2 - 2 Dose Series) 06/05/2007    Diabetic retinal exam  12/02/2016    DTaP/Tdap/Td vaccine (1 - Tdap) 07/30/2026 (Originally 7/31/2016)    HIV screen  01/05/2029 (Originally 6/5/1972)    Lipid screen  03/20/2018    Potassium monitoring  07/12/2018    Creatinine monitoring  07/12/2018    A1C test (Diabetic or Prediabetic)  10/30/2018    Diabetic microalbuminuria test  10/31/2018    Diabetic foot exam  01/10/2019    Colon cancer screen colonoscopy  09/20/2019    Breast cancer screen  11/01/2019    Flu vaccine  Completed    Pneumococcal med risk  Completed             (applicable per patient's age: Cancer Screenings, Depression Screening, Fall Risk Screening, Immunizations)    Hemoglobin A1C (%)   Date Value   10/30/2017 6.4   06/28/2017 6.1   11/29/2016 6.0     Microalb/Crt.  Ratio (mcg/mg creat)   Date Value   10/31/2017 CANNOT BE CALCULATED     LDL Cholesterol (mg/dL)   Date Value   03/20/2017 86     AST (U/L)   Date Value   07/12/2017 15     ALT (U/L)   Date Value   07/12/2017 8     BUN (mg/dL)   Date Value   07/12/2017 13      (goal A1C is < 7)   (goal LDL is <100) need 30-50% reduction from baseline     BP Readings from Last 3 Encounters:   02/12/18 129/78   01/22/18 123/64   12/11/17 128/75    (goal /80)      All Future Testing planned in [facility]:  Lab Frequency Next Occurrence   Sleep Study with PAP Titration Once 06/30/2017   Baseline Diagnostic Sleep Study Once 06/30/2017   XR KNEE RIGHT (1-2 VIEWS) Once 04/20/2018   XR KNEE RIGHT (MIN 4 VIEWS) Once 03/01/2018       Next Visit Date:  Future Appointments  Date Time Provider Terry Bradford   3/7/2018 1:10 PM SCHEDULE, [facility]   3/14/2018 1:30 PM [facility]   3/16/2018 10:45 AM Gene Hernandez MD Resp Spec [address]

## 2018-03-09 DIAGNOSIS — I10 ESSENTIAL HYPERTENSION: ICD-10-CM

## 2018-03-09 DIAGNOSIS — F51.01 PRIMARY INSOMNIA: ICD-10-CM

## 2018-03-09 RX ORDER — TRAZODONE HYDROCHLORIDE 100 MG/1
TABLET ORAL
Qty: 60 TABLET | Refills: 2 | Status: SHIPPED | OUTPATIENT
Start: 2018-03-09 | End: 2018-05-07 | Stop reason: SDUPTHER

## 2018-03-09 RX ORDER — ATENOLOL 25 MG/1
TABLET ORAL
Qty: 30 TABLET | Refills: 2 | Status: SHIPPED | OUTPATIENT
Start: 2018-03-09 | End: 2018-05-07 | Stop reason: SDUPTHER

## 2018-03-12 ENCOUNTER — TELEPHONE (OUTPATIENT)
Dept: PULMONOLOGY | Age: 63
End: 2018-03-12

## 2018-04-09 DIAGNOSIS — K59.09 OTHER CONSTIPATION: ICD-10-CM

## 2018-04-09 DIAGNOSIS — I10 ESSENTIAL HYPERTENSION: ICD-10-CM

## 2018-04-09 DIAGNOSIS — I50.32 CHRONIC DIASTOLIC CONGESTIVE HEART FAILURE (HCC): ICD-10-CM

## 2018-04-09 RX ORDER — LINACLOTIDE 145 UG/1
CAPSULE, GELATIN COATED ORAL
Qty: 30 CAPSULE | Refills: 2 | Status: SHIPPED | OUTPATIENT
Start: 2018-04-09 | End: 2018-06-30 | Stop reason: SDUPTHER

## 2018-04-09 RX ORDER — HYDROCHLOROTHIAZIDE 12.5 MG/1
12.5 CAPSULE, GELATIN COATED ORAL EVERY MORNING
Qty: 30 CAPSULE | Refills: 2 | Status: SHIPPED | OUTPATIENT
Start: 2018-04-09 | End: 2018-06-30 | Stop reason: SDUPTHER

## 2018-04-09 RX ORDER — FUROSEMIDE 20 MG/1
TABLET ORAL
Qty: 60 TABLET | Refills: 2 | Status: SHIPPED | OUTPATIENT
Start: 2018-04-09 | End: 2018-06-30 | Stop reason: SDUPTHER

## 2018-04-17 ENCOUNTER — OFFICE VISIT (OUTPATIENT)
Dept: PULMONOLOGY | Age: 63
End: 2018-04-17
Payer: COMMERCIAL

## 2018-04-17 VITALS
BODY MASS INDEX: 31.4 KG/M2 | TEMPERATURE: 98.3 F | RESPIRATION RATE: 16 BRPM | OXYGEN SATURATION: 95 % | HEIGHT: 72 IN | HEART RATE: 84 BPM | WEIGHT: 231.8 LBS | DIASTOLIC BLOOD PRESSURE: 78 MMHG | SYSTOLIC BLOOD PRESSURE: 126 MMHG

## 2018-04-17 DIAGNOSIS — J20.4 ACUTE BRONCHITIS DUE TO PARAINFLUENZA VIRUS: ICD-10-CM

## 2018-04-17 DIAGNOSIS — E11.51 DM (DIABETES MELLITUS) TYPE II CONTROLLED PERIPHERAL VASCULAR DISORDER: ICD-10-CM

## 2018-04-17 DIAGNOSIS — I10 ESSENTIAL HYPERTENSION: ICD-10-CM

## 2018-04-17 DIAGNOSIS — F32.4 MAJOR DEPRESSIVE DISORDER WITH SINGLE EPISODE, IN PARTIAL REMISSION (HCC): ICD-10-CM

## 2018-04-17 DIAGNOSIS — E66.9 OBESITY (BMI 35.0-39.9 WITHOUT COMORBIDITY): ICD-10-CM

## 2018-04-17 DIAGNOSIS — G47.61 PLMD (PERIODIC LIMB MOVEMENT DISORDER): ICD-10-CM

## 2018-04-17 DIAGNOSIS — J44.9 COPD WITH CHRONIC BRONCHITIS AND EMPHYSEMA (HCC): Primary | ICD-10-CM

## 2018-04-17 PROCEDURE — 99215 OFFICE O/P EST HI 40 MIN: CPT | Performed by: INTERNAL MEDICINE

## 2018-04-17 RX ORDER — LEVOFLOXACIN 500 MG/1
500 TABLET, FILM COATED ORAL DAILY
Qty: 5 TABLET | Refills: 0 | Status: SHIPPED | OUTPATIENT
Start: 2018-04-17 | End: 2018-04-22

## 2018-04-17 RX ORDER — PREDNISONE 20 MG/1
40 TABLET ORAL DAILY
Qty: 10 TABLET | Refills: 0 | Status: SHIPPED | OUTPATIENT
Start: 2018-04-17 | End: 2018-04-22

## 2018-04-17 RX ORDER — UMECLIDINIUM BROMIDE AND VILANTEROL TRIFENATATE 62.5; 25 UG/1; UG/1
1 POWDER RESPIRATORY (INHALATION) DAILY
Refills: 0 | COMMUNITY
Start: 2018-02-22 | End: 2018-05-07

## 2018-04-24 ENCOUNTER — TELEPHONE (OUTPATIENT)
Dept: INTERNAL MEDICINE | Age: 63
End: 2018-04-24

## 2018-04-24 DIAGNOSIS — F17.210 CIGARETTE NICOTINE DEPENDENCE WITHOUT COMPLICATION: Primary | ICD-10-CM

## 2018-05-01 DIAGNOSIS — E11.8 TYPE 2 DIABETES MELLITUS WITH COMPLICATION, WITHOUT LONG-TERM CURRENT USE OF INSULIN (HCC): ICD-10-CM

## 2018-05-07 ENCOUNTER — OFFICE VISIT (OUTPATIENT)
Dept: INTERNAL MEDICINE | Age: 63
End: 2018-05-07
Payer: COMMERCIAL

## 2018-05-07 VITALS
SYSTOLIC BLOOD PRESSURE: 123 MMHG | HEART RATE: 71 BPM | WEIGHT: 220.8 LBS | HEIGHT: 72 IN | BODY MASS INDEX: 29.91 KG/M2 | DIASTOLIC BLOOD PRESSURE: 73 MMHG

## 2018-05-07 DIAGNOSIS — E11.40 TYPE 2 DIABETES MELLITUS WITH DIABETIC NEUROPATHY, WITHOUT LONG-TERM CURRENT USE OF INSULIN (HCC): ICD-10-CM

## 2018-05-07 DIAGNOSIS — E11.8 TYPE 2 DIABETES MELLITUS WITH COMPLICATION, UNSPECIFIED LONG TERM INSULIN USE STATUS: Primary | ICD-10-CM

## 2018-05-07 DIAGNOSIS — J41.0 SIMPLE CHRONIC BRONCHITIS (HCC): ICD-10-CM

## 2018-05-07 DIAGNOSIS — F51.01 PRIMARY INSOMNIA: ICD-10-CM

## 2018-05-07 DIAGNOSIS — E78.00 PURE HYPERCHOLESTEROLEMIA: ICD-10-CM

## 2018-05-07 DIAGNOSIS — G89.29 CHRONIC BILATERAL LOW BACK PAIN WITH RIGHT-SIDED SCIATICA: ICD-10-CM

## 2018-05-07 DIAGNOSIS — K21.9 GASTROESOPHAGEAL REFLUX DISEASE WITHOUT ESOPHAGITIS: ICD-10-CM

## 2018-05-07 DIAGNOSIS — M54.41 CHRONIC BILATERAL LOW BACK PAIN WITH RIGHT-SIDED SCIATICA: ICD-10-CM

## 2018-05-07 DIAGNOSIS — G25.81 RLS (RESTLESS LEGS SYNDROME): ICD-10-CM

## 2018-05-07 DIAGNOSIS — I10 ESSENTIAL HYPERTENSION: ICD-10-CM

## 2018-05-07 LAB — HBA1C MFR BLD: 6.4 %

## 2018-05-07 PROCEDURE — 99212 OFFICE O/P EST SF 10 MIN: CPT | Performed by: INTERNAL MEDICINE

## 2018-05-07 PROCEDURE — 83036 HEMOGLOBIN GLYCOSYLATED A1C: CPT | Performed by: INTERNAL MEDICINE

## 2018-05-07 PROCEDURE — 99214 OFFICE O/P EST MOD 30 MIN: CPT | Performed by: INTERNAL MEDICINE

## 2018-05-07 RX ORDER — BUDESONIDE AND FORMOTEROL FUMARATE DIHYDRATE 160; 4.5 UG/1; UG/1
2 AEROSOL RESPIRATORY (INHALATION) 2 TIMES DAILY
Qty: 1 INHALER | Refills: 2 | Status: SHIPPED | OUTPATIENT
Start: 2018-05-07 | End: 2018-07-18 | Stop reason: SDUPTHER

## 2018-05-07 RX ORDER — ALBUTEROL SULFATE 90 UG/1
1 AEROSOL, METERED RESPIRATORY (INHALATION) EVERY 6 HOURS PRN
Qty: 18 G | Refills: 2 | Status: SHIPPED | OUTPATIENT
Start: 2018-05-07 | End: 2018-07-18 | Stop reason: SDUPTHER

## 2018-05-07 RX ORDER — TIZANIDINE 4 MG/1
4 TABLET ORAL 3 TIMES DAILY
Qty: 90 TABLET | Refills: 2 | Status: SHIPPED | OUTPATIENT
Start: 2018-05-07 | End: 2018-07-18 | Stop reason: SDUPTHER

## 2018-05-07 RX ORDER — PANTOPRAZOLE SODIUM 40 MG/1
40 TABLET, DELAYED RELEASE ORAL DAILY
Qty: 30 TABLET | Refills: 2 | Status: SHIPPED | OUTPATIENT
Start: 2018-05-07 | End: 2018-07-18 | Stop reason: SDUPTHER

## 2018-05-07 RX ORDER — ATENOLOL 25 MG/1
TABLET ORAL
Qty: 30 TABLET | Refills: 2 | Status: SHIPPED | OUTPATIENT
Start: 2018-05-07 | End: 2018-07-18 | Stop reason: SDUPTHER

## 2018-05-07 RX ORDER — IBUPROFEN 800 MG/1
800 TABLET ORAL EVERY 8 HOURS PRN
Qty: 90 TABLET | Refills: 2 | Status: SHIPPED | OUTPATIENT
Start: 2018-05-07 | End: 2018-07-18 | Stop reason: SDUPTHER

## 2018-05-07 RX ORDER — PRAVASTATIN SODIUM 40 MG
40 TABLET ORAL EVERY EVENING
Qty: 30 TABLET | Refills: 2 | Status: SHIPPED | OUTPATIENT
Start: 2018-05-07 | End: 2018-07-18 | Stop reason: SDUPTHER

## 2018-05-07 RX ORDER — TRAZODONE HYDROCHLORIDE 100 MG/1
100 TABLET ORAL NIGHTLY
Qty: 60 TABLET | Refills: 2 | Status: SHIPPED | OUTPATIENT
Start: 2018-05-07 | End: 2018-07-18 | Stop reason: SDUPTHER

## 2018-05-07 RX ORDER — ROPINIROLE 0.5 MG/1
0.5 TABLET, FILM COATED ORAL DAILY
Qty: 30 TABLET | Refills: 2 | Status: SHIPPED | OUTPATIENT
Start: 2018-05-07 | End: 2018-07-18 | Stop reason: SDUPTHER

## 2018-05-07 RX ORDER — GABAPENTIN 800 MG/1
800 TABLET ORAL 3 TIMES DAILY
Qty: 90 TABLET | Refills: 2 | Status: SHIPPED | OUTPATIENT
Start: 2018-05-07 | End: 2018-07-18 | Stop reason: SDUPTHER

## 2018-05-07 RX ORDER — ALBUTEROL SULFATE 2.5 MG/3ML
2.5 SOLUTION RESPIRATORY (INHALATION) EVERY 4 HOURS PRN
Qty: 120 EACH | Refills: 2 | Status: SHIPPED | OUTPATIENT
Start: 2018-05-07 | End: 2018-07-18 | Stop reason: SDUPTHER

## 2018-05-08 ASSESSMENT — ENCOUNTER SYMPTOMS
DOUBLE VISION: 0
NAUSEA: 0
ABDOMINAL PAIN: 0
COUGH: 0
HEARTBURN: 0
BLURRED VISION: 0
HEMOPTYSIS: 0

## 2018-05-09 ENCOUNTER — OFFICE VISIT (OUTPATIENT)
Dept: PODIATRY | Age: 63
End: 2018-05-09
Payer: COMMERCIAL

## 2018-05-09 ENCOUNTER — TELEPHONE (OUTPATIENT)
Dept: GASTROENTEROLOGY | Age: 63
End: 2018-05-09

## 2018-05-09 VITALS — BODY MASS INDEX: 29.8 KG/M2 | HEART RATE: 68 BPM | HEIGHT: 72 IN | WEIGHT: 220 LBS | RESPIRATION RATE: 16 BRPM

## 2018-05-09 DIAGNOSIS — E11.42 DIABETIC POLYNEUROPATHY ASSOCIATED WITH TYPE 2 DIABETES MELLITUS (HCC): ICD-10-CM

## 2018-05-09 DIAGNOSIS — B35.1 DERMATOPHYTOSIS OF NAIL: Primary | ICD-10-CM

## 2018-05-09 DIAGNOSIS — M79.605 PAIN IN BOTH LOWER EXTREMITIES: ICD-10-CM

## 2018-05-09 DIAGNOSIS — M79.604 PAIN IN BOTH LOWER EXTREMITIES: ICD-10-CM

## 2018-05-09 DIAGNOSIS — I73.9 PVD (PERIPHERAL VASCULAR DISEASE) (HCC): ICD-10-CM

## 2018-05-09 PROCEDURE — 11721 DEBRIDE NAIL 6 OR MORE: CPT | Performed by: PODIATRIST

## 2018-05-09 PROCEDURE — 99213 OFFICE O/P EST LOW 20 MIN: CPT | Performed by: PODIATRIST

## 2018-05-14 ENCOUNTER — OFFICE VISIT (OUTPATIENT)
Dept: GASTROENTEROLOGY | Age: 63
End: 2018-05-14
Payer: COMMERCIAL

## 2018-05-14 VITALS
SYSTOLIC BLOOD PRESSURE: 135 MMHG | HEART RATE: 77 BPM | DIASTOLIC BLOOD PRESSURE: 78 MMHG | HEIGHT: 72 IN | WEIGHT: 226 LBS | BODY MASS INDEX: 30.61 KG/M2

## 2018-05-14 DIAGNOSIS — K60.2 ANAL FISSURE: Primary | ICD-10-CM

## 2018-05-14 PROCEDURE — 99203 OFFICE O/P NEW LOW 30 MIN: CPT

## 2018-05-14 PROCEDURE — 99213 OFFICE O/P EST LOW 20 MIN: CPT | Performed by: INTERNAL MEDICINE

## 2018-05-14 RX ORDER — DOCUSATE SODIUM 100 MG/1
100 CAPSULE, LIQUID FILLED ORAL 2 TIMES DAILY
Qty: 60 CAPSULE | Refills: 5 | Status: SHIPPED | OUTPATIENT
Start: 2018-05-14 | End: 2018-06-13

## 2018-05-14 RX ORDER — LIDOCAINE 40 MG/G
CREAM TOPICAL
Qty: 1 TUBE | Refills: 0 | Status: SHIPPED | OUTPATIENT
Start: 2018-05-14 | End: 2018-11-19

## 2018-05-23 ENCOUNTER — TELEPHONE (OUTPATIENT)
Dept: INTERNAL MEDICINE | Age: 63
End: 2018-05-23

## 2018-05-23 ENCOUNTER — OFFICE VISIT (OUTPATIENT)
Dept: INTERNAL MEDICINE | Age: 63
End: 2018-05-23
Payer: COMMERCIAL

## 2018-05-23 ENCOUNTER — HOSPITAL ENCOUNTER (OUTPATIENT)
Dept: OTHER | Age: 63
Discharge: HOME | End: 2018-05-23
Payer: COMMERCIAL

## 2018-05-23 VITALS
WEIGHT: 228 LBS | BODY MASS INDEX: 30.88 KG/M2 | HEIGHT: 72 IN | HEART RATE: 79 BPM | SYSTOLIC BLOOD PRESSURE: 124 MMHG | DIASTOLIC BLOOD PRESSURE: 63 MMHG

## 2018-05-23 VITALS
BODY MASS INDEX: 31.07 KG/M2 | SYSTOLIC BLOOD PRESSURE: 130 MMHG | DIASTOLIC BLOOD PRESSURE: 79 MMHG | RESPIRATION RATE: 18 BRPM | HEART RATE: 72 BPM | OXYGEN SATURATION: 96 % | WEIGHT: 229.13 LBS

## 2018-05-23 DIAGNOSIS — L03.119 CELLULITIS AND ABSCESS OF LOWER EXTREMITY: Primary | ICD-10-CM

## 2018-05-23 DIAGNOSIS — L02.419 CELLULITIS AND ABSCESS OF LOWER EXTREMITY: Primary | ICD-10-CM

## 2018-05-23 PROCEDURE — 99213 OFFICE O/P EST LOW 20 MIN: CPT | Performed by: INTERNAL MEDICINE

## 2018-05-23 PROCEDURE — 99212 OFFICE O/P EST SF 10 MIN: CPT | Performed by: INTERNAL MEDICINE

## 2018-05-23 PROCEDURE — 99211 OFF/OP EST MAY X REQ PHY/QHP: CPT

## 2018-05-23 RX ORDER — DOXYCYCLINE HYCLATE 100 MG
100 TABLET ORAL 2 TIMES DAILY
Qty: 20 TABLET | Refills: 0 | Status: SHIPPED | OUTPATIENT
Start: 2018-05-23 | End: 2018-06-02

## 2018-05-23 RX ORDER — ACETAMINOPHEN 500 MG
500 TABLET ORAL EVERY 6 HOURS PRN
Qty: 45 TABLET | Refills: 0 | Status: SHIPPED | OUTPATIENT
Start: 2018-05-23 | End: 2018-11-19

## 2018-05-23 ASSESSMENT — ENCOUNTER SYMPTOMS
HEARTBURN: 0
COUGH: 0
DOUBLE VISION: 0
BLURRED VISION: 0
ABDOMINAL PAIN: 0
HEMOPTYSIS: 0
NAUSEA: 0

## 2018-05-23 ASSESSMENT — PAIN DESCRIPTION - FREQUENCY: FREQUENCY: CONTINUOUS

## 2018-05-23 ASSESSMENT — PAIN DESCRIPTION - LOCATION: LOCATION: LEG

## 2018-05-23 ASSESSMENT — PAIN DESCRIPTION - DESCRIPTORS: DESCRIPTORS: BURNING;ACHING

## 2018-05-23 ASSESSMENT — PAIN DESCRIPTION - ORIENTATION: ORIENTATION: RIGHT;LEFT

## 2018-05-23 ASSESSMENT — PAIN DESCRIPTION - PROGRESSION: CLINICAL_PROGRESSION: GRADUALLY WORSENING

## 2018-05-23 ASSESSMENT — PAIN SCALES - GENERAL: PAINLEVEL_OUTOF10: 8

## 2018-05-29 ENCOUNTER — HOSPITAL ENCOUNTER (OUTPATIENT)
Dept: VASCULAR LAB | Age: 63
Discharge: HOME | End: 2018-05-29
Payer: COMMERCIAL

## 2018-05-29 DIAGNOSIS — L03.119 CELLULITIS AND ABSCESS OF LOWER EXTREMITY: ICD-10-CM

## 2018-05-29 DIAGNOSIS — L02.419 CELLULITIS AND ABSCESS OF LOWER EXTREMITY: ICD-10-CM

## 2018-05-29 PROCEDURE — 93970 EXTREMITY STUDY: CPT

## 2018-05-30 ENCOUNTER — TELEPHONE (OUTPATIENT)
Dept: INTERNAL MEDICINE | Age: 63
End: 2018-05-30

## 2018-06-20 ENCOUNTER — OFFICE VISIT (OUTPATIENT)
Dept: INTERNAL MEDICINE | Age: 63
End: 2018-06-20
Payer: COMMERCIAL

## 2018-06-20 VITALS
SYSTOLIC BLOOD PRESSURE: 121 MMHG | HEART RATE: 74 BPM | DIASTOLIC BLOOD PRESSURE: 72 MMHG | HEIGHT: 72 IN | WEIGHT: 227.6 LBS | BODY MASS INDEX: 30.83 KG/M2

## 2018-06-20 DIAGNOSIS — M25.561 BILATERAL CHRONIC KNEE PAIN: ICD-10-CM

## 2018-06-20 DIAGNOSIS — I87.2 VENOUS STASIS DERMATITIS OF BOTH LOWER EXTREMITIES: ICD-10-CM

## 2018-06-20 DIAGNOSIS — M25.562 BILATERAL CHRONIC KNEE PAIN: ICD-10-CM

## 2018-06-20 DIAGNOSIS — J20.2 ACUTE BRONCHITIS DUE TO STREPTOCOCCUS: Primary | ICD-10-CM

## 2018-06-20 DIAGNOSIS — G89.29 BILATERAL CHRONIC KNEE PAIN: ICD-10-CM

## 2018-06-20 PROCEDURE — 99213 OFFICE O/P EST LOW 20 MIN: CPT | Performed by: INTERNAL MEDICINE

## 2018-06-20 RX ORDER — AZITHROMYCIN 250 MG/1
TABLET, FILM COATED ORAL
Qty: 6 TABLET | Refills: 0 | Status: SHIPPED | OUTPATIENT
Start: 2018-06-20 | End: 2018-06-30

## 2018-06-20 RX ORDER — BENZONATATE 100 MG/1
100 CAPSULE ORAL 3 TIMES DAILY PRN
Qty: 21 CAPSULE | Refills: 0 | Status: SHIPPED | OUTPATIENT
Start: 2018-06-20 | End: 2018-06-27

## 2018-06-20 ASSESSMENT — ENCOUNTER SYMPTOMS
BLURRED VISION: 0
DOUBLE VISION: 0
BACK PAIN: 1
ABDOMINAL PAIN: 0
NAUSEA: 0
HEARTBURN: 0
COUGH: 0
HEMOPTYSIS: 0

## 2018-06-30 DIAGNOSIS — K59.09 OTHER CONSTIPATION: ICD-10-CM

## 2018-06-30 DIAGNOSIS — I10 ESSENTIAL HYPERTENSION: ICD-10-CM

## 2018-06-30 DIAGNOSIS — I50.32 CHRONIC DIASTOLIC CONGESTIVE HEART FAILURE (HCC): ICD-10-CM

## 2018-07-02 RX ORDER — LINACLOTIDE 145 UG/1
CAPSULE, GELATIN COATED ORAL
Qty: 30 CAPSULE | Refills: 2 | Status: SHIPPED | OUTPATIENT
Start: 2018-07-02 | End: 2018-09-17

## 2018-07-02 RX ORDER — HYDROCHLOROTHIAZIDE 12.5 MG/1
CAPSULE, GELATIN COATED ORAL
Qty: 30 CAPSULE | Refills: 2 | Status: SHIPPED | OUTPATIENT
Start: 2018-07-02 | End: 2018-09-17 | Stop reason: SDUPTHER

## 2018-07-02 RX ORDER — FUROSEMIDE 20 MG/1
TABLET ORAL
Qty: 60 TABLET | Refills: 2 | Status: SHIPPED | OUTPATIENT
Start: 2018-07-02 | End: 2018-09-17 | Stop reason: SDUPTHER

## 2018-07-02 NOTE — TELEPHONE ENCOUNTER
E-scribe request for Linzess 145 MCG, Hydrochlorothiazide 12.5 MG & Furosemide 20 MG. Please review and e-scribe if applicable. Health Maintenance   Topic Date Due    Shingles Vaccine (1 of 2 - 2 Dose Series) 06/05/2007    Diabetic retinal exam  12/02/2016    Lipid screen  03/20/2018    Potassium monitoring  07/12/2018    Creatinine monitoring  07/12/2018    DTaP/Tdap/Td vaccine (1 - Tdap) 07/30/2026 (Originally 7/31/2016)    HIV screen  01/05/2029 (Originally 6/5/1972)    Flu vaccine (1) 09/01/2018    Diabetic microalbuminuria test  10/31/2018    A1C test (Diabetic or Prediabetic)  05/07/2019    Diabetic foot exam  05/09/2019    Colon cancer screen colonoscopy  09/20/2019    Breast cancer screen  11/01/2019    Pneumococcal med risk  Completed             (applicable per patient's age: Cancer Screenings, Depression Screening, Fall Risk Screening, Immunizations)    Hemoglobin A1C (%)   Date Value   05/07/2018 6.4   10/30/2017 6.4   06/28/2017 6.1     Microalb/Crt.  Ratio (mcg/mg creat)   Date Value   10/31/2017 CANNOT BE CALCULATED     LDL Cholesterol (mg/dL)   Date Value   03/20/2017 86     AST (U/L)   Date Value   07/12/2017 15     ALT (U/L)   Date Value   07/12/2017 8     BUN (mg/dL)   Date Value   07/12/2017 13      (goal A1C is < 7)   (goal LDL is <100) need 30-50% reduction from baseline     BP Readings from Last 3 Encounters:   06/20/18 121/72   05/23/18 130/79   05/23/18 124/63    (goal /80)      All Future Testing planned in [facility]:  Lab Frequency Next Occurrence   XR KNEE RIGHT (1-2 VIEWS) Once 01/22/2019       Next Visit Date:  Future Appointments  Date Time Provider Terry Bradford   7/9/2018 10:30 AM Pedro [facility]ole [facility]   7/11/2018 2:30 PM Eileen Conroy DPM [facility]   7/30/2018 9:45 AM Lilly Pavon MD Resp Spec [facility]   9/21/2018 1:30 PM Mbonu Hassel Hodgkins, MD [facility] IM [facility]            Patient Active Problem List:     Essential hypertension     Pure

## 2018-07-09 ENCOUNTER — OFFICE VISIT (OUTPATIENT)
Dept: GASTROENTEROLOGY | Age: 63
End: 2018-07-09
Payer: COMMERCIAL

## 2018-07-09 VITALS
SYSTOLIC BLOOD PRESSURE: 117 MMHG | HEART RATE: 84 BPM | WEIGHT: 227 LBS | DIASTOLIC BLOOD PRESSURE: 87 MMHG | HEIGHT: 72 IN | BODY MASS INDEX: 30.75 KG/M2

## 2018-07-09 DIAGNOSIS — K60.2 ANAL FISSURE: Primary | ICD-10-CM

## 2018-07-09 PROCEDURE — 99213 OFFICE O/P EST LOW 20 MIN: CPT | Performed by: INTERNAL MEDICINE

## 2018-07-09 NOTE — PROGRESS NOTES
DIGESTIVE HEALTH PROGRESS NOTE    HISTORY OF PRESENT ILLNESS: Ms. Naldo Garner is a 66 y.o. female who presents for follow up on fecal incontinence. She continues to report incontinence every other day or so. She wears diapers. She reports also urinary incontinence. She reports a bowel movement every few days. Perianal burning. Past Medical, Family, and Social History reviewed and does not contribute to the patient presenting condition. Patient's PMH/PSH,SH,PSYCH Hx, MEDs, ALLERGIES, and ROS were all reviewed and updated in the appropriate sections. PAST MEDICAL HISTORY:  Past Medical History:   Diagnosis Date    RACHEL (acute kidney injury) ([facility] [address] 1/22/2014    Arthritis     generalized    Cancer ([facility] [address] 2004    uterus    CHF (congestive heart failure) (HCC)     Chronic bilateral low back pain with right-sided sciatica 2/20/2017    COPD (chronic obstructive pulmonary disease) ([facility] [address]     on inhaler    Depression 10/30/2014    Diabetic polyneuropathy associated with type 2 diabetes mellitus ([facility] [address]     Diabetic polyneuropathy associated with type 2 diabetes mellitus (HCC)     Diverticulosis     Full dentures     GERD (gastroesophageal reflux disease)     Hep C w/o coma, chronic (HCC)     Hyperlipidemia     Hyperplastic polyp of intestine     Hypertension     DR. MCDANIEL-CARDIO    Liver disease     hepatitis C    Pacemaker 2012    Pneumonia 7/2012    RECENTLY HOSPITALIZED    Rectal bleeding     Tobacco abuse        Past Surgical History:   Procedure Laterality Date    [facility]       cath dec. 2013    COLONOSCOPY      COLONOSCOPY  1/23/15    severe diverticula    COLONOSCOPY  09/20/2016    HYPERPLASTIC POLYP X 2     ENDOSCOPY, COLON, DIAGNOSTIC      HERNIA REPAIR      UMBILICAL    HYSTERECTOMY      LUMBAR SPINE SURGERY  9/24/13    decompression & re-exploration L3-4, L4-5    PACEMAKER INSERTION      PACEMAKER PLACEMENT      SIGMOIDOSCOPY N/A 6/26/15 flexable sigmoidscopy,HYPERPLASTIC POLYP    TONSILLECTOMY         CURRENT MEDICATIONS:    Current Outpatient Prescriptions:     LINZESS 145 MCG capsule, take 1 capsule by mouth every morning BEFORE BREAKFAST, Disp: 30 capsule, Rfl: 2    hydrochlorothiazide (MICROZIDE) 12.5 MG capsule, take 1 capsule by mouth once daily every morning, Disp: 30 capsule, Rfl: 2    furosemide (LASIX) 20 MG tablet, take 1 tablet by mouth twice a day, Disp: 60 tablet, Rfl: 2    Elastic Bandages & Supports (MEDICAL COMPRESSION STOCKINGS) MISC, Dx: leg edema, use daily as needed, 20-30 mmHg, Disp: 2 each, Rfl: 2    Elastic Bandages & Supports (LUMBAR BACK BRACE/SUPPORT PAD) MISC, Dx: Chronic back pain, use daily as needed, Disp: 1 each, Rfl: 0    acetaminophen (TYLENOL) 500 MG tablet, Take 1 tablet by mouth every 6 hours as needed for Pain, Disp: 45 tablet, Rfl: 0    lidocaine (LMX) 4 % cream, Apply topically cristopher per day as needed. , Disp: 1 Tube, Rfl: 0    tiZANidine (ZANAFLEX) 4 MG tablet, Take 1 tablet by mouth 3 times daily, Disp: 90 tablet, Rfl: 2    pantoprazole (PROTONIX) 40 MG tablet, Take 1 tablet by mouth daily Stop Omeprazole, Disp: 30 tablet, Rfl: 2    albuterol (PROVENTIL) (2.5 MG/3ML) 0.083% nebulizer solution, Take 3 mLs by nebulization every 4 hours as needed for Wheezing, Disp: 120 each, Rfl: 2    pravastatin (PRAVACHOL) 40 MG tablet, Take 1 tablet by mouth every evening, Disp: 30 tablet, Rfl: 2    albuterol sulfate HFA (VENTOLIN HFA) 108 (90 Base) MCG/ACT inhaler, Inhale 1 puff into the lungs every 6 hours as needed for Wheezing, Disp: 18 g, Rfl: 2    traZODone (DESYREL) 100 MG tablet, Take 1 tablet by mouth nightly, Disp: 60 tablet, Rfl: 2    atenolol (TENORMIN) 25 MG tablet, take 1 tablet by mouth once daily, Disp: 30 tablet, Rfl: 2    budesonide-formoterol (SYMBICORT) 160-4.5 MCG/ACT AERO, Inhale 2 puffs into the lungs 2 times daily Rinse mouth after use., Disp: 1 Inhaler, Rfl: 2    ibuprofen 13.8 05/05/2016    MPV 8.6 05/05/2016    BASOPCT 0 05/05/2016    LYMPHSABS 0.70 (L) 05/05/2016    MONOSABS 0.20 05/05/2016    NEUTROABS 8.30 (H) 05/05/2016    EOSABS 0.00 05/05/2016    BASOSABS 0.00 05/05/2016         DIAGNOSTIC TESTING:   No results found. IMPRESSION: Ms. Perea He is a 66 y.o. female with Fecal incontinence. Urinary incontinence. Very likely due to DM neuropathy. Refer to urology for sacral nerve stimulator. Follow-up in 3 months. Bernie Mendieta MD [facility]      Please note that this chart was generated using voice recognition Dragon dictation software. Although every effort was made to ensure the accuracy of this automated transcription, some errors in transcription may have occurred.

## 2018-07-18 ENCOUNTER — OFFICE VISIT (OUTPATIENT)
Dept: INTERNAL MEDICINE | Age: 63
End: 2018-07-18
Payer: COMMERCIAL

## 2018-07-18 VITALS
DIASTOLIC BLOOD PRESSURE: 64 MMHG | WEIGHT: 229.8 LBS | SYSTOLIC BLOOD PRESSURE: 113 MMHG | BODY MASS INDEX: 31.13 KG/M2 | TEMPERATURE: 98.2 F | HEIGHT: 72 IN

## 2018-07-18 DIAGNOSIS — K21.9 GASTROESOPHAGEAL REFLUX DISEASE WITHOUT ESOPHAGITIS: ICD-10-CM

## 2018-07-18 DIAGNOSIS — J20.2 ACUTE BRONCHITIS DUE TO STREPTOCOCCUS: ICD-10-CM

## 2018-07-18 DIAGNOSIS — G25.81 RLS (RESTLESS LEGS SYNDROME): ICD-10-CM

## 2018-07-18 DIAGNOSIS — R04.2 HEMOPTYSIS: ICD-10-CM

## 2018-07-18 DIAGNOSIS — J41.0 SIMPLE CHRONIC BRONCHITIS (HCC): ICD-10-CM

## 2018-07-18 DIAGNOSIS — R10.84 GENERALIZED ABDOMINAL PAIN: ICD-10-CM

## 2018-07-18 DIAGNOSIS — E11.8 TYPE 2 DIABETES MELLITUS WITH COMPLICATION, WITHOUT LONG-TERM CURRENT USE OF INSULIN (HCC): ICD-10-CM

## 2018-07-18 DIAGNOSIS — Z13.220 SCREENING FOR HYPERLIPIDEMIA: ICD-10-CM

## 2018-07-18 DIAGNOSIS — G89.29 CHRONIC BILATERAL LOW BACK PAIN WITH RIGHT-SIDED SCIATICA: ICD-10-CM

## 2018-07-18 DIAGNOSIS — F51.01 PRIMARY INSOMNIA: ICD-10-CM

## 2018-07-18 DIAGNOSIS — E11.40 TYPE 2 DIABETES MELLITUS WITH DIABETIC NEUROPATHY, WITHOUT LONG-TERM CURRENT USE OF INSULIN (HCC): ICD-10-CM

## 2018-07-18 DIAGNOSIS — E78.00 PURE HYPERCHOLESTEROLEMIA: ICD-10-CM

## 2018-07-18 DIAGNOSIS — I10 ESSENTIAL HYPERTENSION: Primary | ICD-10-CM

## 2018-07-18 DIAGNOSIS — M54.41 CHRONIC BILATERAL LOW BACK PAIN WITH RIGHT-SIDED SCIATICA: ICD-10-CM

## 2018-07-18 PROCEDURE — 99214 OFFICE O/P EST MOD 30 MIN: CPT | Performed by: INTERNAL MEDICINE

## 2018-07-18 RX ORDER — TIZANIDINE 4 MG/1
4 TABLET ORAL 3 TIMES DAILY
Qty: 90 TABLET | Refills: 2 | Status: SHIPPED | OUTPATIENT
Start: 2018-07-18 | End: 2018-09-17 | Stop reason: SDUPTHER

## 2018-07-18 RX ORDER — AZITHROMYCIN 250 MG/1
TABLET, FILM COATED ORAL
Qty: 6 TABLET | Refills: 0 | Status: SHIPPED | OUTPATIENT
Start: 2018-07-18 | End: 2018-07-28

## 2018-07-18 RX ORDER — GUAIFENESIN 100 MG/5ML
10 SYRUP ORAL EVERY 4 HOURS PRN
Qty: 240 ML | Refills: 0 | Status: SHIPPED | OUTPATIENT
Start: 2018-07-18 | End: 2018-09-17

## 2018-07-18 RX ORDER — ROPINIROLE 0.5 MG/1
0.5 TABLET, FILM COATED ORAL DAILY
Qty: 30 TABLET | Refills: 2 | Status: SHIPPED | OUTPATIENT
Start: 2018-07-18 | End: 2018-09-17 | Stop reason: SDUPTHER

## 2018-07-18 RX ORDER — ATENOLOL 25 MG/1
TABLET ORAL
Qty: 30 TABLET | Refills: 2 | Status: SHIPPED | OUTPATIENT
Start: 2018-07-18 | End: 2018-09-17 | Stop reason: SDUPTHER

## 2018-07-18 RX ORDER — ALBUTEROL SULFATE 90 UG/1
1 AEROSOL, METERED RESPIRATORY (INHALATION) EVERY 6 HOURS PRN
Qty: 18 G | Refills: 2 | Status: SHIPPED | OUTPATIENT
Start: 2018-07-18 | End: 2018-09-17 | Stop reason: SDUPTHER

## 2018-07-18 RX ORDER — GABAPENTIN 800 MG/1
800 TABLET ORAL 3 TIMES DAILY
Qty: 90 TABLET | Refills: 2 | Status: SHIPPED | OUTPATIENT
Start: 2018-07-18 | End: 2018-11-08 | Stop reason: SDUPTHER

## 2018-07-18 RX ORDER — IBUPROFEN 800 MG/1
800 TABLET ORAL EVERY 8 HOURS PRN
Qty: 90 TABLET | Refills: 2 | Status: SHIPPED | OUTPATIENT
Start: 2018-07-18 | End: 2018-09-17 | Stop reason: SDUPTHER

## 2018-07-18 RX ORDER — ALBUTEROL SULFATE 2.5 MG/3ML
2.5 SOLUTION RESPIRATORY (INHALATION) EVERY 4 HOURS PRN
Qty: 120 EACH | Refills: 2 | Status: SHIPPED | OUTPATIENT
Start: 2018-07-18 | End: 2018-09-17 | Stop reason: SDUPTHER

## 2018-07-18 RX ORDER — PANTOPRAZOLE SODIUM 40 MG/1
40 TABLET, DELAYED RELEASE ORAL DAILY
Qty: 30 TABLET | Refills: 2 | Status: SHIPPED | OUTPATIENT
Start: 2018-07-18 | End: 2018-09-17 | Stop reason: SDUPTHER

## 2018-07-18 RX ORDER — TRAZODONE HYDROCHLORIDE 100 MG/1
100 TABLET ORAL NIGHTLY
Qty: 60 TABLET | Refills: 2 | Status: SHIPPED | OUTPATIENT
Start: 2018-07-18 | End: 2018-09-17 | Stop reason: SDUPTHER

## 2018-07-18 RX ORDER — BUDESONIDE AND FORMOTEROL FUMARATE DIHYDRATE 160; 4.5 UG/1; UG/1
2 AEROSOL RESPIRATORY (INHALATION) 2 TIMES DAILY
Qty: 1 INHALER | Refills: 2 | Status: SHIPPED | OUTPATIENT
Start: 2018-07-18 | End: 2018-09-17 | Stop reason: SDUPTHER

## 2018-07-18 RX ORDER — PRAVASTATIN SODIUM 40 MG
40 TABLET ORAL EVERY EVENING
Qty: 30 TABLET | Refills: 2 | Status: SHIPPED | OUTPATIENT
Start: 2018-07-18 | End: 2018-09-17 | Stop reason: SDUPTHER

## 2018-07-18 ASSESSMENT — ENCOUNTER SYMPTOMS
HEARTBURN: 0
ABDOMINAL PAIN: 0
DOUBLE VISION: 0
COUGH: 0
BLURRED VISION: 0
HEMOPTYSIS: 0
NAUSEA: 0

## 2018-07-18 NOTE — PROGRESS NOTES
[facility]  [facility] [address] [facility]  [address] [facility] [address]  Dept: [phone]  Dept Fax: [phone]    Office Progress/Follow Up Note  Date of patient's visit: 7/18/2018  Patient's Name:  Song Cruz YOB: 1955            Patient Care Team:  Antonella Street MD as PCP - General  Antonella Street MD as PCP - [facility] Attributed Provider  Kaylee Maldonado MD as Consulting Physician (Gastroenterology)  Alis Tony MD as Referring Physician (Internal Medicine)  Yara Quiroz MD as Consulting Physician (Pulmonology)  Yara Quiroz MD as Consulting Physician (Pulmonology)  ================================================================    REASON FOR VISIT/CHIEF COMPLAINT:  Abdominal Pain (times two weeks and also coughing up blood times 2-3 days); Health Maintenance (declines shingrix); and Fatigue (times one to two weeks)    HISTORY OF PRESENTING ILLNESS:  History was obtained from: patientErinn Cruz is a 66 y.o. is here for a same-day visit with a complaint of hemoptysis which has been ongoing for a few days. Patient has history of COPD and currently smokes. She also has history of chronic respiratory failure for which she uses oxygen occasionally. She denies any worsening of shortness of breath. She is not losing weight. She is also complaining of abdominal pain and points to have need abdominal wall area. His any trauma. She doesn't have any nausea or vomiting. She denies any diarrhea or melena. She has extensive medical history as noted below. Blood pressures controlled today. Diabetes is also controlled. She has diastolic heart failure and has stable weights today. She doesn't have any congestive symptoms. Her depression is also controlled. She is due for medication refills today.   Problem list, medications and blood work reviewed       Patient Active Problem List   Diagnosis    Essential hypertension    Pure hypercholesterolemia    Depression    History of heroin use    Hep C w/o coma, chronic (HCC) completed tx 2016    GERD (gastroesophageal reflux disease)    COPD (chronic obstructive pulmonary disease) (HCC)    Tobacco abuse    Chronic diastolic congestive heart failure (HCC)    Diverticulosis    Hyperplastic polyp of intestine    Diabetic polyneuropathy associated with type 2 diabetes mellitus (HCC)    Bilateral chronic knee pain    Chronic respiratory failure with hypoxia (HCC)    Type 2 diabetes mellitus with complication (HCC)    Primary insomnia    Constipation    Cigarette nicotine dependence without complication    Chronic bilateral low back pain with right-sided sciatica       Health Maintenance Due   Topic Date Due    Shingles Vaccine (1 of 2 - 2 Dose Series) 06/05/2007    Diabetic retinal exam  12/02/2016    Lipid screen  03/20/2018    Potassium monitoring  07/12/2018    Creatinine monitoring  07/12/2018       Allergies   Allergen Reactions    Iv Dye [Iodides] Hives         Current Outpatient Prescriptions   Medication Sig Dispense Refill    tiZANidine (ZANAFLEX) 4 MG tablet Take 1 tablet by mouth 3 times daily 90 tablet 2    pantoprazole (PROTONIX) 40 MG tablet Take 1 tablet by mouth daily Stop Omeprazole 30 tablet 2    albuterol (PROVENTIL) (2.5 MG/3ML) 0.083% nebulizer solution Take 3 mLs by nebulization every 4 hours as needed for Wheezing 120 each 2    pravastatin (PRAVACHOL) 40 MG tablet Take 1 tablet by mouth every evening 30 tablet 2    gabapentin (NEURONTIN) 800 MG tablet Take 1 tablet by mouth 3 times daily for 59 days. DC Lyrica.  90 tablet 2    albuterol sulfate HFA (VENTOLIN HFA) 108 (90 Base) MCG/ACT inhaler Inhale 1 puff into the lungs every 6 hours as needed for Wheezing 18 g 2    traZODone (DESYREL) 100 MG tablet Take 1 tablet by mouth nightly 60 tablet 2    atenolol (TENORMIN) 25 MG tablet take 1 tablet by mouth once daily 30 tablet 2    budesonide-formoterol MG tablet Take 1 tablet by mouth every 6 hours as needed for Pain 45 tablet 0    nicotine polacrilex (NICORETTE) 2 MG gum Take 1 each by mouth as needed for Smoking cessation Maximum dose: 24 pieces/24 hours. 110 each 3    sodium chloride (OCEAN NASAL SPRAY) 0.65 % nasal spray 1 spray by Nasal route as needed for Congestion 1 Bottle 3     No current facility-administered medications for this visit. Social History   Substance Use Topics    Smoking status: Current Some Day Smoker     Packs/day: 0.50     Years: 40.00     Types: Cigarettes    Smokeless tobacco: Never Used    Alcohol use No       Family History   Problem Relation Age of Onset    Cancer Mother     Stroke Father     Other Sister         CROHNS        REVIEW OF SYSTEMS:  Review of Systems   Constitutional: Negative for chills and fever. HENT: Negative for congestion, ear discharge and nosebleeds. Eyes: Negative for blurred vision and double vision. Respiratory: Negative for cough and hemoptysis. Cardiovascular: Negative for chest pain and palpitations. Gastrointestinal: Negative for abdominal pain, heartburn and nausea. Genitourinary: Negative for dysuria and urgency. Musculoskeletal: Negative for myalgias and neck pain. Neurological: Negative for dizziness and headaches. Endo/Heme/Allergies: Does not bruise/bleed easily. Psychiatric/Behavioral: Negative for depression and suicidal ideas. PHYSICAL EXAM:  Vitals:    07/18/18 1406   BP: 113/64   Site: Left Arm   Position: Sitting   Cuff Size: Medium Adult   Temp: 98.2 °F (36.8 °C)   TempSrc: Oral   Weight: 229 lb 12.8 oz (104.2 kg)   Height: 6' (1.829 m)     BP Readings from Last 3 Encounters:   07/18/18 113/64   07/09/18 117/87   06/20/18 121/72        Physical Exam   Constitutional: She is oriented to person, place, and time and well-developed, well-nourished, and in no distress. No distress. HENT:   Mouth/Throat: No oropharyngeal exudate.    Neck: Normal range of Take 1 tablet by mouth 3 times daily  -     ibuprofen (ADVIL;MOTRIN) 800 MG tablet; Take 1 tablet by mouth every 8 hours as needed for Pain    Gastroesophageal reflux disease without esophagitis  -     pantoprazole (PROTONIX) 40 MG tablet; Take 1 tablet by mouth daily Stop Omeprazole    Simple chronic bronchitis (HCC)  -     albuterol (PROVENTIL) (2.5 MG/3ML) 0.083% nebulizer solution; Take 3 mLs by nebulization every 4 hours as needed for Wheezing  -     albuterol sulfate HFA (VENTOLIN HFA) 108 (90 Base) MCG/ACT inhaler; Inhale 1 puff into the lungs every 6 hours as needed for Wheezing  -     budesonide-formoterol (SYMBICORT) 160-4.5 MCG/ACT AERO; Inhale 2 puffs into the lungs 2 times daily Rinse mouth after use. Pure hypercholesterolemia  -     pravastatin (PRAVACHOL) 40 MG tablet; Take 1 tablet by mouth every evening    Type 2 diabetes mellitus with diabetic neuropathy, without long-term current use of insulin (HCC)  -     gabapentin (NEURONTIN) 800 MG tablet; Take 1 tablet by mouth 3 times daily for 59 days. DC Lyrica. Primary insomnia  -     traZODone (DESYREL) 100 MG tablet; Take 1 tablet by mouth nightly    RLS (restless legs syndrome)  -     rOPINIRole (REQUIP) 0.5 MG tablet; Take 1 tablet by mouth daily    Hemoptysis  -     CT CHEST WO CONTRAST; Future    Generalized abdominal pain  -     CT ABDOMEN PELVIS WO CONTRAST Additional Contrast? None; Future    Acute bronchitis due to Streptococcus  -     azithromycin (ZITHROMAX Z-JACE) 250 MG tablet; Take 2 tablets daily on day 1, then 1 tablet daily on days 2-5.  -     guaiFENesin (ALTARUSSIN) 100 MG/5ML syrup; Take 10 mLs by mouth every 4 hours as needed for Cough      FOLLOW UP AND INSTRUCTIONS:  · Return in about 3 months (around 10/18/2018) for HTN, DM-2. · Maureen Barrett received counseling on the following healthy behaviors: nutrition and exercise    · Discussed use, benefit, and side effects of prescribed medications.   Barriers to medication compliance

## 2018-07-18 NOTE — PROGRESS NOTES
Visit Information    Have you changed or started any medications since your last visit including any over-the-counter medicines, vitamins, or herbal medicines? no   Have you stopped taking any of your medications? Is so, why? -  no  Are you having any side effects from any of your medications? - no    Have you seen any other physician or provider since your last visit? yes - GI   Have you had any other diagnostic tests since your last visit?  no   Have you been seen in the emergency room and/or had an admission in a hospital since we last saw you?  no   Have you had your routine dental cleaning in the past 6 months?  no     Do you have an active MyChart account? If no, what is the barrier?   Yes    Patient Care Team:  Antonella Hannah MD as PCP - General  Antonella Hannah MD as PCP - S Attributed Provider  Juni Damon MD as Consulting Physician (Gastroenterology)  Antonella Hannah MD as Referring Physician (Internal Medicine)  Pepper Lozada MD as Consulting Physician (Pulmonology)  Pepper Lozada MD as Consulting Physician (Pulmonology)    Medical History Review  Past Medical, Family, and Social History reviewed and does contribute to the patient presenting condition    Health Maintenance   Topic Date Due    Shingles Vaccine (1 of 2 - 2 Dose Series) 06/05/2007    Diabetic retinal exam  12/02/2016    Lipid screen  03/20/2018    Potassium monitoring  07/12/2018    Creatinine monitoring  07/12/2018    DTaP/Tdap/Td vaccine (1 - Tdap) 07/30/2026 (Originally 7/31/2016)    HIV screen  01/05/2029 (Originally 6/5/1972)    Flu vaccine (1) 09/01/2018    Diabetic microalbuminuria test  10/31/2018    A1C test (Diabetic or Prediabetic)  05/07/2019    Diabetic foot exam  05/09/2019    Colon cancer screen colonoscopy  09/20/2019    Breast cancer screen  11/01/2019    Pneumococcal med risk  Completed

## 2018-07-23 ENCOUNTER — TELEPHONE (OUTPATIENT)
Dept: INTERNAL MEDICINE | Age: 63
End: 2018-07-23

## 2018-07-23 DIAGNOSIS — J42 CHRONIC BRONCHITIS, UNSPECIFIED CHRONIC BRONCHITIS TYPE (HCC): Primary | ICD-10-CM

## 2018-07-23 NOTE — TELEPHONE ENCOUNTER
pc asking for nebulizer / please advice   [facility]     Next Visit Date:  Future Appointments  Date Time Provider [place]   7/25/2018 2:45 PM Tacho Byers DPM [facility]   7/25/2018 4:30 PM STV CT  [facility] CT SCAN [facility] Radiolog   7/25/2018 5:00 PM STV CT  [facility] CT SCAN [facility] Radiolog   7/30/2018 9:45 AM Nona Green MD Resp Spec [place]   10/8/2018 3:15 PM Antonella Molina MD [facility] IM TOLP   10/15/2018 10:30 AM Rossana Kerr MD [facility] [address] Maintenance   Topic Date Due    Shingles Vaccine (1 of 2 - 2 Dose Series) 06/05/2007    Lipid screen  03/20/2018    Potassium monitoring  07/12/2018    Creatinine monitoring  07/12/2018    DTaP/Tdap/Td vaccine (1 - Tdap) 07/30/2026 (Originally 7/31/2016)    HIV screen  01/05/2029 (Originally 6/5/1972)    Flu vaccine (1) 09/01/2018    Diabetic microalbuminuria test  10/31/2018    Diabetic retinal exam  01/01/2019    A1C test (Diabetic or Prediabetic)  05/07/2019    Diabetic foot exam  05/09/2019    Colon cancer screen colonoscopy  09/20/2019    Breast cancer screen  11/01/2019    Pneumococcal med risk  Completed       Hemoglobin A1C (%)   Date Value   05/07/2018 6.4   10/30/2017 6.4   06/28/2017 6.1             ( goal A1C is < 7)   Microalb/Crt.  Ratio (mcg/mg creat)   Date Value   10/31/2017 CANNOT BE CALCULATED     LDL Cholesterol (mg/dL)   Date Value   03/20/2017 86   01/25/2016 78       (goal LDL is <100)   AST (U/L)   Date Value   07/12/2017 15     ALT (U/L)   Date Value   07/12/2017 8     BUN (mg/dL)   Date Value   07/12/2017 13     BP Readings from Last 3 Encounters:   07/18/18 113/64   07/09/18 117/87   06/20/18 121/72          (goal 120/80)    All Future Testing planned in [facility]  Lab Frequency Next Occurrence   XR KNEE RIGHT (1-2 VIEWS) Once 01/22/2019   Lipid, Fasting Once 88/96/9238   Basic Metabolic Panel Once 58/08/0439   CT CHEST [address] Once 07/18/2018   CT ABDOMEN PELVIS WO CONTRAST Additional Contrast? None Once 07/18/2018               Patient Active Problem List:     Essential hypertension     Pure hypercholesterolemia     Depression     History of heroin use     Hep C w/o coma, chronic ([address] completed tx 2016     GERD (gastroesophageal reflux disease)     COPD (chronic obstructive pulmonary disease) (HCC)     Tobacco abuse     Chronic diastolic congestive heart failure (HCC)     Diverticulosis     Hyperplastic polyp of intestine     Diabetic polyneuropathy associated with type 2 diabetes mellitus (HCC)     Bilateral chronic knee pain     Chronic respiratory failure with hypoxia (HCC)     Type 2 diabetes mellitus with complication (HCC)     Primary insomnia     Constipation     Cigarette nicotine dependence without complication     Chronic bilateral low back pain with right-sided sciatica

## 2018-07-25 ENCOUNTER — HOSPITAL ENCOUNTER (OUTPATIENT)
Dept: CT IMAGING | Age: 63
Discharge: HOME | End: 2018-07-27
Payer: COMMERCIAL

## 2018-07-25 DIAGNOSIS — R04.2 HEMOPTYSIS: ICD-10-CM

## 2018-07-25 DIAGNOSIS — R10.84 GENERALIZED ABDOMINAL PAIN: ICD-10-CM

## 2018-07-25 PROCEDURE — 71250 CT THORAX DX C-: CPT

## 2018-07-25 PROCEDURE — 74176 CT ABD & PELVIS W/O CONTRAST: CPT

## 2018-07-26 ENCOUNTER — TELEPHONE (OUTPATIENT)
Dept: INTERNAL MEDICINE | Age: 63
End: 2018-07-26

## 2018-07-29 ENCOUNTER — APPOINTMENT (OUTPATIENT)
Dept: GENERAL RADIOLOGY | Age: 63
DRG: 190 | End: 2018-07-29
Payer: COMMERCIAL

## 2018-07-29 ENCOUNTER — HOSPITAL ENCOUNTER (INPATIENT)
Age: 63
LOS: 3 days | Discharge: HOME HEALTH | DRG: 190 | End: 2018-08-01
Attending: EMERGENCY MEDICINE | Admitting: INTERNAL MEDICINE
Payer: COMMERCIAL

## 2018-07-29 ENCOUNTER — APPOINTMENT (OUTPATIENT)
Dept: CT IMAGING | Age: 63
DRG: 190 | End: 2018-07-29
Payer: COMMERCIAL

## 2018-07-29 DIAGNOSIS — J96.11 CHRONIC RESPIRATORY FAILURE WITH HYPOXIA (HCC): ICD-10-CM

## 2018-07-29 DIAGNOSIS — R07.9 CHEST PAIN, UNSPECIFIED TYPE: Primary | ICD-10-CM

## 2018-07-29 DIAGNOSIS — E87.6 HYPOKALEMIA: ICD-10-CM

## 2018-07-29 DIAGNOSIS — J18.9 COMMUNITY ACQUIRED PNEUMONIA, UNSPECIFIED LATERALITY: ICD-10-CM

## 2018-07-29 LAB
ABSOLUTE EOS #: 0.48 K/UL (ref 0–0.44)
ABSOLUTE IMMATURE GRANULOCYTE: 0.03 K/UL (ref 0–0.3)
ABSOLUTE LYMPH #: 1.75 K/UL (ref 1.1–3.7)
ABSOLUTE MONO #: 0.73 K/UL (ref 0.1–1.2)
ALBUMIN SERPL-MCNC: 3.6 G/DL (ref 3.5–5.2)
ALBUMIN/GLOBULIN RATIO: 0.8 (ref 1–2.5)
ALP BLD-CCNC: 89 U/L (ref 35–104)
ALT SERPL-CCNC: 9 U/L (ref 5–33)
ANION GAP SERPL CALCULATED.3IONS-SCNC: 10 MMOL/L (ref 9–17)
AST SERPL-CCNC: 14 U/L
BASOPHILS # BLD: 0 % (ref 0–2)
BASOPHILS ABSOLUTE: 0.03 K/UL (ref 0–0.2)
BILIRUB SERPL-MCNC: 0.5 MG/DL (ref 0.3–1.2)
BNP INTERPRETATION: ABNORMAL
BUN BLDV-MCNC: 7 MG/DL (ref 8–23)
BUN/CREAT BLD: ABNORMAL (ref 9–20)
CALCIUM SERPL-MCNC: 9.3 MG/DL (ref 8.6–10.4)
CHLORIDE BLD-SCNC: 96 MMOL/L (ref 98–107)
CO2: 30 MMOL/L (ref 20–31)
CREAT SERPL-MCNC: 0.54 MG/DL (ref 0.5–0.9)
DIFFERENTIAL TYPE: ABNORMAL
EKG ATRIAL RATE: 83 BPM
EKG P AXIS: 18 DEGREES
EKG P-R INTERVAL: 240 MS
EKG Q-T INTERVAL: 542 MS
EKG QRS DURATION: 102 MS
EKG QTC CALCULATION (BAZETT): 636 MS
EKG R AXIS: -16 DEGREES
EKG T AXIS: 33 DEGREES
EKG VENTRICULAR RATE: 83 BPM
EOSINOPHILS RELATIVE PERCENT: 5 % (ref 1–4)
GFR AFRICAN AMERICAN: >60 ML/MIN
GFR NON-AFRICAN AMERICAN: >60 ML/MIN
GFR SERPL CREATININE-BSD FRML MDRD: ABNORMAL ML/MIN/{1.73_M2}
GFR SERPL CREATININE-BSD FRML MDRD: ABNORMAL ML/MIN/{1.73_M2}
GLUCOSE BLD-MCNC: 160 MG/DL (ref 65–105)
GLUCOSE BLD-MCNC: 166 MG/DL (ref 70–99)
HCT VFR BLD CALC: 41.6 % (ref 36.3–47.1)
HEMOGLOBIN: 13.3 G/DL (ref 11.9–15.1)
IMMATURE GRANULOCYTES: 0 %
LIPASE: 12 U/L (ref 13–60)
LYMPHOCYTES # BLD: 17 % (ref 24–43)
MCH RBC QN AUTO: 27.8 PG (ref 25.2–33.5)
MCHC RBC AUTO-ENTMCNC: 32 G/DL (ref 28.4–34.8)
MCV RBC AUTO: 87 FL (ref 82.6–102.9)
MONOCYTES # BLD: 7 % (ref 3–12)
NRBC AUTOMATED: 0 PER 100 WBC
PDW BLD-RTO: 13.1 % (ref 11.8–14.4)
PLATELET # BLD: ABNORMAL K/UL (ref 138–453)
PLATELET ESTIMATE: ABNORMAL
PLATELET, FLUORESCENCE: NORMAL K/UL (ref 138–453)
PLATELET, IMMATURE FRACTION: NORMAL % (ref 1.1–10.3)
PMV BLD AUTO: ABNORMAL FL (ref 8.1–13.5)
POC TROPONIN I: 0 NG/ML (ref 0–0.1)
POC TROPONIN I: 0 NG/ML (ref 0–0.1)
POC TROPONIN INTERP: NORMAL
POC TROPONIN INTERP: NORMAL
POTASSIUM SERPL-SCNC: 2.8 MMOL/L (ref 3.7–5.3)
PRO-BNP: 352 PG/ML
RBC # BLD: 4.78 M/UL (ref 3.95–5.11)
RBC # BLD: ABNORMAL 10*6/UL
SEG NEUTROPHILS: 71 % (ref 36–65)
SEGMENTED NEUTROPHILS ABSOLUTE COUNT: 7.51 K/UL (ref 1.5–8.1)
SODIUM BLD-SCNC: 136 MMOL/L (ref 135–144)
TOTAL PROTEIN: 7.9 G/DL (ref 6.4–8.3)
WBC # BLD: 10.5 K/UL (ref 3.5–11.3)
WBC # BLD: ABNORMAL 10*3/UL

## 2018-07-29 PROCEDURE — 85055 RETICULATED PLATELET ASSAY: CPT

## 2018-07-29 PROCEDURE — 87899 AGENT NOS ASSAY W/OPTIC: CPT

## 2018-07-29 PROCEDURE — 80053 COMPREHEN METABOLIC PANEL: CPT

## 2018-07-29 PROCEDURE — 99285 EMERGENCY DEPT VISIT HI MDM: CPT

## 2018-07-29 PROCEDURE — 83690 ASSAY OF LIPASE: CPT

## 2018-07-29 PROCEDURE — 96376 TX/PRO/DX INJ SAME DRUG ADON: CPT

## 2018-07-29 PROCEDURE — 2500000003 HC RX 250 WO HCPCS: Performed by: EMERGENCY MEDICINE

## 2018-07-29 PROCEDURE — 96375 TX/PRO/DX INJ NEW DRUG ADDON: CPT

## 2018-07-29 PROCEDURE — S0028 INJECTION, FAMOTIDINE, 20 MG: HCPCS | Performed by: EMERGENCY MEDICINE

## 2018-07-29 PROCEDURE — G0378 HOSPITAL OBSERVATION PER HR: HCPCS

## 2018-07-29 PROCEDURE — 85025 COMPLETE CBC W/AUTO DIFF WBC: CPT

## 2018-07-29 PROCEDURE — 96365 THER/PROPH/DIAG IV INF INIT: CPT

## 2018-07-29 PROCEDURE — 2580000003 HC RX 258: Performed by: INTERNAL MEDICINE

## 2018-07-29 PROCEDURE — 6360000002 HC RX W HCPCS: Performed by: EMERGENCY MEDICINE

## 2018-07-29 PROCEDURE — 86738 MYCOPLASMA ANTIBODY: CPT

## 2018-07-29 PROCEDURE — 83880 ASSAY OF NATRIURETIC PEPTIDE: CPT

## 2018-07-29 PROCEDURE — 80307 DRUG TEST PRSMV CHEM ANLYZR: CPT

## 2018-07-29 PROCEDURE — 93005 ELECTROCARDIOGRAM TRACING: CPT

## 2018-07-29 PROCEDURE — 6360000002 HC RX W HCPCS: Performed by: STUDENT IN AN ORGANIZED HEALTH CARE EDUCATION/TRAINING PROGRAM

## 2018-07-29 PROCEDURE — 87449 NOS EACH ORGANISM AG IA: CPT

## 2018-07-29 PROCEDURE — 6370000000 HC RX 637 (ALT 250 FOR IP): Performed by: EMERGENCY MEDICINE

## 2018-07-29 PROCEDURE — 36415 COLL VENOUS BLD VENIPUNCTURE: CPT

## 2018-07-29 PROCEDURE — 84484 ASSAY OF TROPONIN QUANT: CPT

## 2018-07-29 PROCEDURE — 82947 ASSAY GLUCOSE BLOOD QUANT: CPT

## 2018-07-29 PROCEDURE — 71046 X-RAY EXAM CHEST 2 VIEWS: CPT

## 2018-07-29 PROCEDURE — 1200000000 HC SEMI PRIVATE

## 2018-07-29 PROCEDURE — 2580000003 HC RX 258: Performed by: EMERGENCY MEDICINE

## 2018-07-29 RX ORDER — DEXTROSE MONOHYDRATE 50 MG/ML
100 INJECTION, SOLUTION INTRAVENOUS PRN
Status: DISCONTINUED | OUTPATIENT
Start: 2018-07-29 | End: 2018-08-01 | Stop reason: HOSPADM

## 2018-07-29 RX ORDER — ALBUTEROL SULFATE 90 UG/1
1 AEROSOL, METERED RESPIRATORY (INHALATION) EVERY 6 HOURS PRN
Status: DISCONTINUED | OUTPATIENT
Start: 2018-07-29 | End: 2018-08-01 | Stop reason: HOSPADM

## 2018-07-29 RX ORDER — ACETAMINOPHEN 325 MG/1
650 TABLET ORAL EVERY 4 HOURS PRN
Status: DISCONTINUED | OUTPATIENT
Start: 2018-07-29 | End: 2018-08-01 | Stop reason: HOSPADM

## 2018-07-29 RX ORDER — GABAPENTIN 800 MG/1
800 TABLET ORAL 3 TIMES DAILY
Status: DISCONTINUED | OUTPATIENT
Start: 2018-07-29 | End: 2018-08-01 | Stop reason: HOSPADM

## 2018-07-29 RX ORDER — ALBUTEROL SULFATE 2.5 MG/3ML
2.5 SOLUTION RESPIRATORY (INHALATION) EVERY 4 HOURS PRN
Status: DISCONTINUED | OUTPATIENT
Start: 2018-07-29 | End: 2018-08-01 | Stop reason: HOSPADM

## 2018-07-29 RX ORDER — SODIUM CHLORIDE 0.9 % (FLUSH) 0.9 %
10 SYRINGE (ML) INJECTION PRN
Status: DISCONTINUED | OUTPATIENT
Start: 2018-07-29 | End: 2018-08-01 | Stop reason: HOSPADM

## 2018-07-29 RX ORDER — 0.9 % SODIUM CHLORIDE 0.9 %
500 INTRAVENOUS SOLUTION INTRAVENOUS ONCE
Status: COMPLETED | OUTPATIENT
Start: 2018-07-29 | End: 2018-07-29

## 2018-07-29 RX ORDER — DIPHENHYDRAMINE HYDROCHLORIDE 50 MG/ML
50 INJECTION INTRAMUSCULAR; INTRAVENOUS ONCE
Status: COMPLETED | OUTPATIENT
Start: 2018-07-29 | End: 2018-07-29

## 2018-07-29 RX ORDER — IPRATROPIUM BROMIDE AND ALBUTEROL SULFATE 2.5; .5 MG/3ML; MG/3ML
1 SOLUTION RESPIRATORY (INHALATION)
Status: DISCONTINUED | OUTPATIENT
Start: 2018-07-30 | End: 2018-07-31

## 2018-07-29 RX ORDER — AZITHROMYCIN 250 MG/1
500 TABLET, FILM COATED ORAL ONCE
Status: COMPLETED | OUTPATIENT
Start: 2018-07-29 | End: 2018-07-29

## 2018-07-29 RX ORDER — ASPIRIN 81 MG/1
324 TABLET, CHEWABLE ORAL ONCE
Status: COMPLETED | OUTPATIENT
Start: 2018-07-29 | End: 2018-07-29

## 2018-07-29 RX ORDER — PANTOPRAZOLE SODIUM 40 MG/1
40 TABLET, DELAYED RELEASE ORAL DAILY
Status: DISCONTINUED | OUTPATIENT
Start: 2018-07-29 | End: 2018-08-01 | Stop reason: HOSPADM

## 2018-07-29 RX ORDER — TRAZODONE HYDROCHLORIDE 100 MG/1
100 TABLET ORAL NIGHTLY
Status: DISCONTINUED | OUTPATIENT
Start: 2018-07-29 | End: 2018-08-01 | Stop reason: HOSPADM

## 2018-07-29 RX ORDER — GUAIFENESIN 100 MG/5ML
10 SOLUTION ORAL EVERY 4 HOURS PRN
Status: DISCONTINUED | OUTPATIENT
Start: 2018-07-29 | End: 2018-08-01 | Stop reason: HOSPADM

## 2018-07-29 RX ORDER — SODIUM CHLORIDE 0.9 % (FLUSH) 0.9 %
10 SYRINGE (ML) INJECTION EVERY 12 HOURS SCHEDULED
Status: DISCONTINUED | OUTPATIENT
Start: 2018-07-29 | End: 2018-08-01 | Stop reason: HOSPADM

## 2018-07-29 RX ORDER — METOCLOPRAMIDE HYDROCHLORIDE 5 MG/ML
10 INJECTION INTRAMUSCULAR; INTRAVENOUS ONCE
Status: COMPLETED | OUTPATIENT
Start: 2018-07-29 | End: 2018-07-29

## 2018-07-29 RX ORDER — DEXTROSE MONOHYDRATE 25 G/50ML
12.5 INJECTION, SOLUTION INTRAVENOUS PRN
Status: DISCONTINUED | OUTPATIENT
Start: 2018-07-29 | End: 2018-08-01 | Stop reason: HOSPADM

## 2018-07-29 RX ORDER — ONDANSETRON 2 MG/ML
4 INJECTION INTRAMUSCULAR; INTRAVENOUS EVERY 8 HOURS PRN
Status: DISCONTINUED | OUTPATIENT
Start: 2018-07-29 | End: 2018-08-01 | Stop reason: HOSPADM

## 2018-07-29 RX ORDER — ONDANSETRON 2 MG/ML
4 INJECTION INTRAMUSCULAR; INTRAVENOUS EVERY 6 HOURS PRN
Status: DISCONTINUED | OUTPATIENT
Start: 2018-07-29 | End: 2018-07-29

## 2018-07-29 RX ORDER — DIPHENHYDRAMINE HYDROCHLORIDE 50 MG/ML
25 INJECTION INTRAMUSCULAR; INTRAVENOUS ONCE
Status: COMPLETED | OUTPATIENT
Start: 2018-07-29 | End: 2018-07-29

## 2018-07-29 RX ORDER — TIZANIDINE 4 MG/1
4 TABLET ORAL 3 TIMES DAILY
Status: DISCONTINUED | OUTPATIENT
Start: 2018-07-29 | End: 2018-08-01 | Stop reason: HOSPADM

## 2018-07-29 RX ORDER — METHYLPREDNISOLONE SODIUM SUCCINATE 125 MG/2ML
40 INJECTION, POWDER, LYOPHILIZED, FOR SOLUTION INTRAMUSCULAR; INTRAVENOUS EVERY 4 HOURS
Status: COMPLETED | OUTPATIENT
Start: 2018-07-29 | End: 2018-07-29

## 2018-07-29 RX ORDER — PRAVASTATIN SODIUM 20 MG
40 TABLET ORAL EVERY EVENING
Status: DISCONTINUED | OUTPATIENT
Start: 2018-07-29 | End: 2018-08-01 | Stop reason: HOSPADM

## 2018-07-29 RX ORDER — POTASSIUM CHLORIDE 750 MG/1
40 CAPSULE, EXTENDED RELEASE ORAL DAILY
Status: DISCONTINUED | OUTPATIENT
Start: 2018-07-29 | End: 2018-08-01 | Stop reason: HOSPADM

## 2018-07-29 RX ORDER — NICOTINE POLACRILEX 4 MG
15 LOZENGE BUCCAL PRN
Status: DISCONTINUED | OUTPATIENT
Start: 2018-07-29 | End: 2018-08-01 | Stop reason: HOSPADM

## 2018-07-29 RX ORDER — ROPINIROLE 0.25 MG/1
0.5 TABLET, FILM COATED ORAL DAILY
Status: DISCONTINUED | OUTPATIENT
Start: 2018-07-29 | End: 2018-08-01 | Stop reason: HOSPADM

## 2018-07-29 RX ORDER — KETOROLAC TROMETHAMINE 15 MG/ML
15 INJECTION, SOLUTION INTRAMUSCULAR; INTRAVENOUS ONCE
Status: COMPLETED | OUTPATIENT
Start: 2018-07-29 | End: 2018-07-29

## 2018-07-29 RX ORDER — MAGNESIUM SULFATE 1 G/100ML
1 INJECTION INTRAVENOUS ONCE
Status: COMPLETED | OUTPATIENT
Start: 2018-07-29 | End: 2018-07-29

## 2018-07-29 RX ORDER — ARIPIPRAZOLE 10 MG/1
10 TABLET ORAL DAILY
Status: DISCONTINUED | OUTPATIENT
Start: 2018-07-29 | End: 2018-08-01 | Stop reason: HOSPADM

## 2018-07-29 RX ORDER — SODIUM CHLORIDE 0.9 % (FLUSH) 0.9 %
10 SYRINGE (ML) INJECTION EVERY 12 HOURS SCHEDULED
Status: DISCONTINUED | OUTPATIENT
Start: 2018-07-29 | End: 2018-07-29

## 2018-07-29 RX ORDER — LEVOFLOXACIN 750 MG/1
750 TABLET ORAL DAILY
Status: DISCONTINUED | OUTPATIENT
Start: 2018-07-29 | End: 2018-08-01 | Stop reason: HOSPADM

## 2018-07-29 RX ORDER — NICOTINE 21 MG/24HR
1 PATCH, TRANSDERMAL 24 HOURS TRANSDERMAL DAILY
Status: DISCONTINUED | OUTPATIENT
Start: 2018-07-29 | End: 2018-08-01 | Stop reason: HOSPADM

## 2018-07-29 RX ORDER — SODIUM CHLORIDE 0.9 % (FLUSH) 0.9 %
10 SYRINGE (ML) INJECTION PRN
Status: DISCONTINUED | OUTPATIENT
Start: 2018-07-29 | End: 2018-07-29

## 2018-07-29 RX ORDER — MORPHINE SULFATE 4 MG/ML
4 INJECTION, SOLUTION INTRAMUSCULAR; INTRAVENOUS ONCE
Status: COMPLETED | OUTPATIENT
Start: 2018-07-29 | End: 2018-07-29

## 2018-07-29 RX ADMIN — ASPIRIN 81 MG 324 MG: 81 TABLET ORAL at 15:50

## 2018-07-29 RX ADMIN — POTASSIUM CHLORIDE 40 MEQ: 750 CAPSULE, EXTENDED RELEASE ORAL at 16:53

## 2018-07-29 RX ADMIN — METHYLPREDNISOLONE SODIUM SUCCINATE 40 MG: 125 INJECTION, POWDER, FOR SOLUTION INTRAMUSCULAR; INTRAVENOUS at 17:55

## 2018-07-29 RX ADMIN — Medication 10 ML: at 22:27

## 2018-07-29 RX ADMIN — DIPHENHYDRAMINE HYDROCHLORIDE 25 MG: 50 INJECTION, SOLUTION INTRAMUSCULAR; INTRAVENOUS at 15:43

## 2018-07-29 RX ADMIN — SODIUM CHLORIDE 500 ML: 9 INJECTION, SOLUTION INTRAVENOUS at 15:41

## 2018-07-29 RX ADMIN — FAMOTIDINE 20 MG: 10 INJECTION, SOLUTION INTRAVENOUS at 15:43

## 2018-07-29 RX ADMIN — MORPHINE SULFATE 4 MG: 4 INJECTION INTRAVENOUS at 16:53

## 2018-07-29 RX ADMIN — DIPHENHYDRAMINE HYDROCHLORIDE 50 MG: 50 INJECTION, SOLUTION INTRAMUSCULAR; INTRAVENOUS at 17:54

## 2018-07-29 RX ADMIN — AZITHROMYCIN 500 MG: 250 TABLET, FILM COATED ORAL at 15:50

## 2018-07-29 RX ADMIN — DIPHENHYDRAMINE HYDROCHLORIDE 50 MG: 50 INJECTION, SOLUTION INTRAMUSCULAR; INTRAVENOUS at 22:26

## 2018-07-29 RX ADMIN — MAGNESIUM SULFATE HEPTAHYDRATE 1 G: 1 INJECTION, SOLUTION INTRAVENOUS at 16:08

## 2018-07-29 RX ADMIN — KETOROLAC TROMETHAMINE 15 MG: 15 INJECTION, SOLUTION INTRAMUSCULAR; INTRAVENOUS at 15:43

## 2018-07-29 RX ADMIN — METOCLOPRAMIDE 10 MG: 5 INJECTION, SOLUTION INTRAMUSCULAR; INTRAVENOUS at 15:43

## 2018-07-29 RX ADMIN — METHYLPREDNISOLONE SODIUM SUCCINATE 40 MG: 125 INJECTION, POWDER, FOR SOLUTION INTRAMUSCULAR; INTRAVENOUS at 22:26

## 2018-07-29 ASSESSMENT — PAIN SCALES - GENERAL
PAINLEVEL_OUTOF10: 10

## 2018-07-29 ASSESSMENT — ENCOUNTER SYMPTOMS
COLOR CHANGE: 0
ABDOMINAL PAIN: 1
COUGH: 1
DIARRHEA: 0
NAUSEA: 0
VOMITING: 0
SHORTNESS OF BREATH: 1
WHEEZING: 0
BACK PAIN: 0
BLOOD IN STOOL: 0

## 2018-07-29 ASSESSMENT — PAIN DESCRIPTION - LOCATION: LOCATION: HEAD;CHEST

## 2018-07-29 ASSESSMENT — HEART SCORE: ECG: 0

## 2018-07-29 NOTE — ED NOTES
Pt helped to RR. Ambulates with a steady gait. NAD noted.  Will medicate when returned to room      Lucy [place], [place]  07/29/18 [address]

## 2018-07-29 NOTE — ED PROVIDER NOTES
of RACHEL (acute kidney injury) ([facility].); Arthritis; Cancer [facility]); CHF (congestive heart failure) ([facility].); Chronic bilateral low back pain with right-sided sciatica; COPD (chronic obstructive pulmonary disease) ([facility] Depression; Diabetic polyneuropathy associated with type 2 diabetes mellitus ([facility] Diabetic polyneuropathy associated with type 2 diabetes mellitus ([facility] Diverticulosis; Full dentures; GERD (gastroesophageal reflux disease); Hep C w/o coma, chronic ([facility] Hyperlipidemia; Hyperplastic polyp of intestine; Hypertension; Liver disease; Pacemaker; Pneumonia; Rectal bleeding; and Tobacco abuse.     has a past surgical history that includes Pacemaker insertion; Hysterectomy; back surgery (2008); Tonsillectomy; hernia repair; Lumbar spine surgery (9/24/13); Endoscopy, colon, diagnostic; Colonoscopy; pacemaker placement; Cardiac surgery; Colonoscopy (1/23/15); Sigmoidoscopy (N/A, 6/26/15); and Colonoscopy (09/20/2016). Social History     Social History    Marital status: Single     Spouse name: N/A    Number of children: N/A    Years of education: N/A     Occupational History    Not on file. Social History Main Topics    Smoking status: Current Some Day Smoker     Packs/day: 0.50     Years: 40.00     Types: Cigarettes    Smokeless tobacco: Never Used    Alcohol use No    Drug use: No    Sexual activity: No     Other Topics Concern    Not on file     Social History Narrative    No narrative on file       Family History   Problem Relation Age of Onset    Cancer Mother     Stroke Father     Other Sister         CROHNS       Allergies: Iv dye [iodides]    Home Medications:  Prior to Admission medications    Medication Sig Start Date End Date Taking?  Authorizing Provider   Nebulizers (COMPRESSOR/NEBULIZER) MISC Dx: COPD use 3-4 times daily as needed 7/23/18   Antonella Benavides MD   tiZANidine (ZANAFLEX) 4 MG tablet Take 1 tablet by mouth 3 times daily 7/18/18   Antonella Benavides MD   pantoprazole (PROTONIX) 40 MG tablet Take 1 tablet by mouth daily Stop Omeprazole 7/18/18   Antonella Lopez MD   albuterol (PROVENTIL) (2.5 MG/3ML) 0.083% nebulizer solution Take 3 mLs by nebulization every 4 hours as needed for Wheezing 7/18/18   Antonella Lopez MD   pravastatin (PRAVACHOL) 40 MG tablet Take 1 tablet by mouth every evening 7/18/18   Antonella Lopez MD   gabapentin (NEURONTIN) 800 MG tablet Take 1 tablet by mouth 3 times daily for 59 days. DC Lyrica. 7/18/18 9/15/18  Antonella Lopez MD   albuterol sulfate HFA (VENTOLIN HFA) 108 (90 Base) MCG/ACT inhaler Inhale 1 puff into the lungs every 6 hours as needed for Wheezing 7/18/18   Antonella Lopez MD   traZODone (DESYREL) 100 MG tablet Take 1 tablet by mouth nightly 7/18/18   Antonella Lopez MD   atenolol (TENORMIN) 25 MG tablet take 1 tablet by mouth once daily 7/18/18   Antonella Lopez MD   budesonide-formoterol (SYMBICORT) 160-4.5 MCG/ACT AERO Inhale 2 puffs into the lungs 2 times daily Rinse mouth after use.  7/18/18   Antonella Lopez MD   ibuprofen (ADVIL;MOTRIN) 800 MG tablet Take 1 tablet by mouth every 8 hours as needed for Pain 7/18/18   Antonella Lopez MD   rOPINIRole (REQUIP) 0.5 MG tablet Take 1 tablet by mouth daily 7/18/18   Antonella Lopez MD   metFORMIN (GLUCOPHAGE) 500 MG tablet Take 1 tablet by mouth 2 times daily (with meals) 7/18/18   Antonella Lopez MD   guaiFENesin (ALTARUSSIN) 100 MG/5ML syrup Take 10 mLs by mouth every 4 hours as needed for Cough 7/18/18   Antonella Lopez MD   LINZESS 145 MCG capsule take 1 capsule by mouth every morning BEFORE BREAKFAST 7/2/18   Antonella Lopez MD   hydrochlorothiazide (MICROZIDE) 12.5 MG capsule take 1 capsule by mouth once daily every morning 7/2/18   Antonella Lopez MD   furosemide (LASIX) 20 MG tablet take 1 tablet by mouth twice a day 7/2/18   Antonella Lopez MD   Elastic Bandages & Supports (MEDICAL COMPRESSION STOCKINGS) MISC Dx: leg edema, use daily as needed, 20-30 mmHg Metabolic Panel    LIPASE    Brain Natriuretic Peptide    Immature Platelet Fraction    Inpatient consult to Internal Medicine    POCT troponin    POCT troponin    POCT troponin    EKG 12 Lead    PATIENT STATUS (FROM ED OR OR/PROCEDURAL) Inpatient       MEDICATIONS ORDERED:  Orders Placed This Encounter   Medications    aspirin chewable tablet 324 mg    metoclopramide (REGLAN) injection 10 mg    diphenhydrAMINE (BENADRYL) injection 25 mg    ketorolac (TORADOL) injection 15 mg    0.9 % sodium chloride bolus    famotidine (PEPCID) injection 20 mg    azithromycin (ZITHROMAX) tablet 500 mg    DISCONTD: magnesium sulfate 2 g in dextrose 5 % 100 mL IVPB    magnesium sulfate 1 g in dextrose 5% 100 mL IVPB    potassium chloride (MICRO-K) extended release capsule 40 mEq    morphine (PF) injection 4 mg    methylPREDNISolone sodium (SOLU-MEDROL) injection 40 mg    diphenhydrAMINE (BENADRYL) injection 50 mg       DDX: ACS versus STEMI versus dissection versus pneumonia versus tamponade versus musculoskeletal chest pain  versus PE versus SAH vs meningitis      DIAGNOSTIC RESULTS / EMERGENCY DEPARTMENT COURSE / MDM     LABS:  Results for orders placed or performed during the hospital encounter of 07/29/18   CBC WITH AUTO DIFFERENTIAL   Result Value Ref Range    WBC 10.5 3.5 - 11.3 k/uL    RBC 4.78 3.95 - 5.11 m/uL    Hemoglobin 13.3 11.9 - 15.1 g/dL    Hematocrit 41.6 36.3 - 47.1 %    MCV 87.0 82.6 - 102.9 fL    MCH 27.8 25.2 - 33.5 pg    MCHC 32.0 28.4 - 34.8 g/dL    RDW 13.1 11.8 - 14.4 %    Platelets See Reflexed IPF Result 138 - 453 k/uL    MPV NOT REPORTED 8.1 - 13.5 fL    NRBC Automated 0.0 0.0 per 100 WBC    Differential Type NOT REPORTED     WBC Morphology NOT REPORTED     RBC Morphology NOT REPORTED     Platelet Estimate NOT REPORTED     Seg Neutrophils 71 (H) 36 - 65 %    Lymphocytes 17 (L) 24 - 43 %    Monocytes 7 3 - 12 %    Eosinophils % 5 (H) 1 - 4 %    Basophils 0 0 - 2 %    Immature Granulocytes 0 0 %    Segs Absolute 7.51 1.50 - 8.10 k/uL    Absolute Lymph # 1.75 1.10 - 3.70 k/uL    Absolute Mono # 0.73 0.10 - 1.20 k/uL    Absolute Eos # 0.48 (H) 0.00 - 0.44 k/uL    Basophils # 0.03 0.00 - 0.20 k/uL    Absolute Immature Granulocyte 0.03 0.00 - 0.30 k/uL   Comprehensive Metabolic Panel   Result Value Ref Range    Glucose 166 (H) 70 - 99 mg/dL    BUN 7 (L) 8 - 23 mg/dL    CREATININE 0.54 0.50 - 0.90 mg/dL    Bun/Cre Ratio NOT REPORTED 9 - 20    Calcium 9.3 8.6 - 10.4 mg/dL    Sodium 136 135 - 144 mmol/L    Potassium 2.8 (LL) 3.7 - 5.3 mmol/L    Chloride 96 (L) 98 - 107 mmol/L    CO2 30 20 - 31 mmol/L    Anion Gap 10 9 - 17 mmol/L    Alkaline Phosphatase 89 35 - 104 U/L    ALT 9 5 - 33 U/L    AST 14 <32 U/L    Total Bilirubin 0.50 0.3 - 1.2 mg/dL    Total Protein 7.9 6.4 - 8.3 g/dL    Alb 3.6 3.5 - 5.2 g/dL    Albumin/Globulin Ratio 0.8 (L) 1.0 - 2.5    GFR Non-African American >60 >60 mL/min    GFR African American >60 >60 mL/min    GFR Comment          GFR Staging NOT REPORTED    LIPASE   Result Value Ref Range    Lipase 12 (L) 13 - 60 U/L   Brain Natriuretic Peptide   Result Value Ref Range    Pro- (H) <300 pg/mL    BNP Interpretation         Immature Platelet Fraction   Result Value Ref Range    Platelet, Immature Fraction NOT REPORTED 1.1 - 10.3 %    Platelet, Fluorescence Platelet clumps present, count appears adequate. 138 - 453 k/uL   POCT troponin   Result Value Ref Range    POC Troponin I 0.00 0.00 - 0.10 ng/mL    POC Troponin Interp       The Troponin-I (POC) results cannot be compared to the Troponin-T results. POCT troponin   Result Value Ref Range    POC Troponin I 0.00 0.00 - 0.10 ng/mL    POC Troponin Interp       The Troponin-I (POC) results cannot be compared to the Troponin-T results. IMPRESSION: 24-year-old female comes in with squeezing chest pain with radiations to her right shoulder and shortness of breath with associated nausea.   Patient also complaining of a headache, no focal neurologic deficits, no thunderclap quality, no fevers, no neck rigidity, headache has been ongoing for the last 5 days, lower concern for a subarachnoid hemorrhage, we will have her go ahead and treat symptomatically and will reassess from headache perspective. We will also do a cardiac workup including EKG, chest x-ray, 2 sets of troponins, patient recently had a CT was concerning for an infectious pneumonitis, we'll start a Z-Marvin as well. QTC was prolonged, given magnesium as well. Plan to admit for cardiology given elevated heart score and multiple risk factors. RADIOLOGY:    Ct Abdomen Pelvis Wo Contrast Additional Contrast? None    Result Date: 7/25/2018  EXAMINATION: CT OF THE CHEST WITHOUT CONTRAST; CT OF THE ABDOMEN AND PELVIS WITHOUT CONTRAST 7/25/2018 3:34 pm TECHNIQUE: CT of the chest was performed without the administration of intravenous contrast. Multiplanar reformatted images are provided for review. Dose modulation, iterative reconstruction, and/or weight based adjustment of the mA/kV was utilized to reduce the radiation dose to as low as reasonably achievable.; CT of the abdomen and pelvis was performed without the administration of intravenous contrast. Multiplanar reformatted images are provided for review. Dose modulation, iterative reconstruction, and/or weight based adjustment of the mA/kV was utilized to reduce the radiation dose to as low as reasonably achievable. COMPARISON: 02/18/2014 and 04/15/2015 HISTORY: ORDERING SYSTEM PROVIDED HISTORY: Hemoptysis TECHNOLOGIST PROVIDED HISTORY: Reason for exam:->pain FINDINGS: Mediastinum: Visualized portions of the thyroid gland are unremarkable. There is a conventional branching pattern to the great vessels. Borderline prominent pretracheal lymph node measuring 1 cm in short axis, otherwise no mediastinal or hilar lymphadenopathy. The heart is normal in size without pericardial fluid collection. Lungs/pleura:  There is extensive interlobular septal thickening and superimposed ground-glass opacities within the right upper and lower lobes, compatible with a crazy paving type appearance. Subtle similar opacities within the left upper lobe also noted. No pleural effusion or pneumothorax. No associated bronchiectasis or discrete nodules/masses. Central airways are patent and clear. Organs: The liver, gallbladder, pancreas, and spleen are grossly within normal limits. No evidence for intrahepatic or extrahepatic biliary ductal dilatation. GI/Bowel: There is no evidence for bowel obstruction or inflammation. No free intraperitoneal air or fluid. Small bowel loops are normal in caliber. No evidence for hiatal hernia. Appendix is unremarkable. Pelvis: No evidence for free fluid. Peritoneum/Retroperitoneum: Adrenal glands are normal in size and configuration. The kidneys are normal in size without definite nephrolithiasis or hydronephrosis. The ureters run a nonobstructed course to a normal-appearing urinary bladder. Vasculature: The aorta is normal in caliber. No definite lymphadenopathy identified. Bones/Soft Tissues:  Status post lower cervical spine fusion. Multilevel degenerative disease of the thoracic spine. Multilevel Schmorl's node formation. Status post posterior decompression within the lower lumbar spine. Severe degenerative disc disease with endplate sclerosis and vacuum disc formation involving the L3 through S1 levels. .     Interlobular septal thickening and superimposed ground-glass opacities within the right upper lower lobes and, to a lesser extent, within the left upper lobe. Differential considerations are broad and include infectious/inflammatory pathologies, ARDS, acute interstitial pneumonia, pulmonary alveolar proteinosis, and other infectious/inflammatory pathologies. No evidence for acute intra-abdominal or intrapelvic pathology. Multiple chronic findings as described above.  IMPRESSION: branching pattern to the great vessels. Borderline prominent pretracheal lymph node measuring 1 cm in short axis, otherwise no mediastinal or hilar lymphadenopathy. The heart is normal in size without pericardial fluid collection. Lungs/pleura: There is extensive interlobular septal thickening and superimposed ground-glass opacities within the right upper and lower lobes, compatible with a crazy paving type appearance. Subtle similar opacities within the left upper lobe also noted. No pleural effusion or pneumothorax. No associated bronchiectasis or discrete nodules/masses. Central airways are patent and clear. Organs: The liver, gallbladder, pancreas, and spleen are grossly within normal limits. No evidence for intrahepatic or extrahepatic biliary ductal dilatation. GI/Bowel: There is no evidence for bowel obstruction or inflammation. No free intraperitoneal air or fluid. Small bowel loops are normal in caliber. No evidence for hiatal hernia. Appendix is unremarkable. Pelvis: No evidence for free fluid. Peritoneum/Retroperitoneum: Adrenal glands are normal in size and configuration. The kidneys are normal in size without definite nephrolithiasis or hydronephrosis. The ureters run a nonobstructed course to a normal-appearing urinary bladder. Vasculature: The aorta is normal in caliber. No definite lymphadenopathy identified. Bones/Soft Tissues:  Status post lower cervical spine fusion. Multilevel degenerative disease of the thoracic spine. Multilevel Schmorl's node formation. Status post posterior decompression within the lower lumbar spine. Severe degenerative disc disease with endplate sclerosis and vacuum disc formation involving the L3 through S1 levels. .     Interlobular septal thickening and superimposed ground-glass opacities within the right upper lower lobes and, to a lesser extent, within the left upper lobe.   Differential considerations are broad and include infectious/inflammatory

## 2018-07-30 ENCOUNTER — APPOINTMENT (OUTPATIENT)
Dept: GENERAL RADIOLOGY | Age: 63
DRG: 190 | End: 2018-07-30
Payer: COMMERCIAL

## 2018-07-30 ENCOUNTER — APPOINTMENT (OUTPATIENT)
Dept: CT IMAGING | Age: 63
DRG: 190 | End: 2018-07-30
Payer: COMMERCIAL

## 2018-07-30 LAB
ABSOLUTE EOS #: <0.03 K/UL (ref 0–0.44)
ABSOLUTE IMMATURE GRANULOCYTE: 0.04 K/UL (ref 0–0.3)
ABSOLUTE LYMPH #: 0.97 K/UL (ref 1.1–3.7)
ABSOLUTE MONO #: 0.21 K/UL (ref 0.1–1.2)
ADENOVIRUS PCR: NOT DETECTED
AMPHETAMINE SCREEN URINE: NEGATIVE
ANION GAP SERPL CALCULATED.3IONS-SCNC: 12 MMOL/L (ref 9–17)
BARBITURATE SCREEN URINE: NEGATIVE
BASOPHILS # BLD: 0 % (ref 0–2)
BASOPHILS ABSOLUTE: <0.03 K/UL (ref 0–0.2)
BENZODIAZEPINE SCREEN, URINE: NEGATIVE
BORDETELLA PERTUSSIS PCR: NOT DETECTED
BUN BLDV-MCNC: 10 MG/DL (ref 8–23)
BUN/CREAT BLD: ABNORMAL (ref 9–20)
BUPRENORPHINE URINE: ABNORMAL
CALCIUM SERPL-MCNC: 9.3 MG/DL (ref 8.6–10.4)
CANNABINOID SCREEN URINE: NEGATIVE
CHLAMYDIA PNEUMONIAE BY PCR: NOT DETECTED
CHLORIDE BLD-SCNC: 97 MMOL/L (ref 98–107)
CO2: 28 MMOL/L (ref 20–31)
COCAINE METABOLITE, URINE: NEGATIVE
CORONAVIRUS 229E PCR: NOT DETECTED
CORONAVIRUS HKU1 PCR: NOT DETECTED
CORONAVIRUS NL63 PCR: NOT DETECTED
CORONAVIRUS OC43 PCR: NOT DETECTED
CREAT SERPL-MCNC: 0.54 MG/DL (ref 0.5–0.9)
DIFFERENTIAL TYPE: ABNORMAL
DIRECT EXAM: NORMAL
EOSINOPHILS RELATIVE PERCENT: 0 % (ref 1–4)
GFR AFRICAN AMERICAN: >60 ML/MIN
GFR NON-AFRICAN AMERICAN: >60 ML/MIN
GFR SERPL CREATININE-BSD FRML MDRD: ABNORMAL ML/MIN/{1.73_M2}
GFR SERPL CREATININE-BSD FRML MDRD: ABNORMAL ML/MIN/{1.73_M2}
GLUCOSE BLD-MCNC: 162 MG/DL (ref 65–105)
GLUCOSE BLD-MCNC: 174 MG/DL (ref 65–105)
GLUCOSE BLD-MCNC: 188 MG/DL (ref 70–99)
GLUCOSE BLD-MCNC: 198 MG/DL (ref 65–105)
GLUCOSE BLD-MCNC: 222 MG/DL (ref 65–105)
HCT VFR BLD CALC: 39.5 % (ref 36.3–47.1)
HEMOGLOBIN: 13 G/DL (ref 11.9–15.1)
HUMAN METAPNEUMOVIRUS PCR: NOT DETECTED
IMMATURE GRANULOCYTES: 1 %
INFLUENZA A BY PCR: NOT DETECTED
INFLUENZA A H1 (2009) PCR: NORMAL
INFLUENZA A H1 PCR: NORMAL
INFLUENZA A H3 PCR: NORMAL
INFLUENZA B BY PCR: NOT DETECTED
LEFT VENTRICULAR EJECTION FRACTION HIGH VALUE: 55 %
LEFT VENTRICULAR EJECTION FRACTION MODE: NORMAL
LV EF: 55 %
LVEF MODALITY: NORMAL
LYMPHOCYTES # BLD: 12 % (ref 24–43)
Lab: NORMAL
MAGNESIUM: 2.7 MG/DL (ref 1.6–2.6)
MCH RBC QN AUTO: 27.8 PG (ref 25.2–33.5)
MCHC RBC AUTO-ENTMCNC: 32.9 G/DL (ref 28.4–34.8)
MCV RBC AUTO: 84.6 FL (ref 82.6–102.9)
MDMA URINE: ABNORMAL
METHADONE SCREEN, URINE: NEGATIVE
METHAMPHETAMINE, URINE: ABNORMAL
MONOCYTES # BLD: 3 % (ref 3–12)
MYCOPLASMA PNEUMONIAE IGM: 0.84
MYCOPLASMA PNEUMONIAE PCR: NOT DETECTED
NRBC AUTOMATED: 0 PER 100 WBC
OPIATES, URINE: POSITIVE
OXYCODONE SCREEN URINE: NEGATIVE
PARAINFLUENZA 1 PCR: NOT DETECTED
PARAINFLUENZA 2 PCR: NOT DETECTED
PARAINFLUENZA 3 PCR: NOT DETECTED
PARAINFLUENZA 4 PCR: NOT DETECTED
PDW BLD-RTO: 13.2 % (ref 11.8–14.4)
PHENCYCLIDINE, URINE: NEGATIVE
PLATELET # BLD: ABNORMAL K/UL (ref 138–453)
PLATELET ESTIMATE: ABNORMAL
PLATELET, FLUORESCENCE: 309 K/UL (ref 138–453)
PLATELET, IMMATURE FRACTION: 2.2 % (ref 1.1–10.3)
PMV BLD AUTO: ABNORMAL FL (ref 8.1–13.5)
POTASSIUM SERPL-SCNC: 3.4 MMOL/L (ref 3.7–5.3)
PROPOXYPHENE, URINE: ABNORMAL
RBC # BLD: 4.67 M/UL (ref 3.95–5.11)
RBC # BLD: ABNORMAL 10*6/UL
RESP SYNCYTIAL VIRUS PCR: NOT DETECTED
RHINO/ENTEROVIRUS PCR: NOT DETECTED
SEG NEUTROPHILS: 85 % (ref 36–65)
SEGMENTED NEUTROPHILS ABSOLUTE COUNT: 6.82 K/UL (ref 1.5–8.1)
SODIUM BLD-SCNC: 137 MMOL/L (ref 135–144)
SOURCE: NORMAL
SPECIMEN DESCRIPTION: NORMAL
STATUS: NORMAL
TEST INFORMATION: ABNORMAL
TRICYCLIC ANTIDEPRESSANTS, UR: ABNORMAL
TROPONIN INTERP: NORMAL
TROPONIN T: <0.03 NG/ML
WBC # BLD: 8.1 K/UL (ref 3.5–11.3)
WBC # BLD: ABNORMAL 10*3/UL

## 2018-07-30 PROCEDURE — G0378 HOSPITAL OBSERVATION PER HR: HCPCS

## 2018-07-30 PROCEDURE — 6370000000 HC RX 637 (ALT 250 FOR IP): Performed by: STUDENT IN AN ORGANIZED HEALTH CARE EDUCATION/TRAINING PROGRAM

## 2018-07-30 PROCEDURE — 87633 RESP VIRUS 12-25 TARGETS: CPT

## 2018-07-30 PROCEDURE — 6370000000 HC RX 637 (ALT 250 FOR IP): Performed by: HOSPITALIST

## 2018-07-30 PROCEDURE — 93306 TTE W/DOPPLER COMPLETE: CPT

## 2018-07-30 PROCEDURE — 87804 INFLUENZA ASSAY W/OPTIC: CPT

## 2018-07-30 PROCEDURE — 36415 COLL VENOUS BLD VENIPUNCTURE: CPT

## 2018-07-30 PROCEDURE — 82947 ASSAY GLUCOSE BLOOD QUANT: CPT

## 2018-07-30 PROCEDURE — 87581 M.PNEUMON DNA AMP PROBE: CPT

## 2018-07-30 PROCEDURE — 71260 CT THORAX DX C+: CPT

## 2018-07-30 PROCEDURE — 99222 1ST HOSP IP/OBS MODERATE 55: CPT | Performed by: INTERNAL MEDICINE

## 2018-07-30 PROCEDURE — 80048 BASIC METABOLIC PNL TOTAL CA: CPT

## 2018-07-30 PROCEDURE — 96372 THER/PROPH/DIAG INJ SC/IM: CPT

## 2018-07-30 PROCEDURE — 6370000000 HC RX 637 (ALT 250 FOR IP): Performed by: EMERGENCY MEDICINE

## 2018-07-30 PROCEDURE — 6360000002 HC RX W HCPCS: Performed by: HOSPITALIST

## 2018-07-30 PROCEDURE — 87486 CHLMYD PNEUM DNA AMP PROBE: CPT

## 2018-07-30 PROCEDURE — 36416 COLLJ CAPILLARY BLOOD SPEC: CPT

## 2018-07-30 PROCEDURE — 6370000000 HC RX 637 (ALT 250 FOR IP): Performed by: INTERNAL MEDICINE

## 2018-07-30 PROCEDURE — 85025 COMPLETE CBC W/AUTO DIFF WBC: CPT

## 2018-07-30 PROCEDURE — 96376 TX/PRO/DX INJ SAME DRUG ADON: CPT

## 2018-07-30 PROCEDURE — 2580000003 HC RX 258: Performed by: INTERNAL MEDICINE

## 2018-07-30 PROCEDURE — 94640 AIRWAY INHALATION TREATMENT: CPT

## 2018-07-30 PROCEDURE — 86698 HISTOPLASMA ANTIBODY: CPT

## 2018-07-30 PROCEDURE — 71045 X-RAY EXAM CHEST 1 VIEW: CPT

## 2018-07-30 PROCEDURE — 87798 DETECT AGENT NOS DNA AMP: CPT

## 2018-07-30 PROCEDURE — 1200000000 HC SEMI PRIVATE

## 2018-07-30 PROCEDURE — 84484 ASSAY OF TROPONIN QUANT: CPT

## 2018-07-30 PROCEDURE — 96375 TX/PRO/DX INJ NEW DRUG ADDON: CPT

## 2018-07-30 PROCEDURE — 6360000004 HC RX CONTRAST MEDICATION: Performed by: INTERNAL MEDICINE

## 2018-07-30 PROCEDURE — 85055 RETICULATED PLATELET ASSAY: CPT

## 2018-07-30 PROCEDURE — 83735 ASSAY OF MAGNESIUM: CPT

## 2018-07-30 PROCEDURE — 87385 HISTOPLASMA CAPSUL AG IA: CPT

## 2018-07-30 PROCEDURE — 6360000002 HC RX W HCPCS: Performed by: INTERNAL MEDICINE

## 2018-07-30 PROCEDURE — 94762 N-INVAS EAR/PLS OXIMTRY CONT: CPT

## 2018-07-30 RX ORDER — BUPRENORPHINE AND NALOXONE 2; .5 MG/1; MG/1
1.5 FILM, SOLUBLE BUCCAL; SUBLINGUAL DAILY
Status: ON HOLD | COMMUNITY
End: 2018-08-01 | Stop reason: HOSPADM

## 2018-07-30 RX ORDER — FUROSEMIDE 10 MG/ML
40 INJECTION INTRAMUSCULAR; INTRAVENOUS ONCE
Status: COMPLETED | OUTPATIENT
Start: 2018-07-30 | End: 2018-07-30

## 2018-07-30 RX ORDER — BUPRENORPHINE AND NALOXONE 2; .5 MG/1; MG/1
1.5 FILM, SOLUBLE BUCCAL; SUBLINGUAL DAILY
Status: DISCONTINUED | OUTPATIENT
Start: 2018-07-30 | End: 2018-07-30 | Stop reason: CLARIF

## 2018-07-30 RX ORDER — IBUPROFEN 800 MG/1
400 TABLET ORAL EVERY 6 HOURS PRN
Status: DISCONTINUED | OUTPATIENT
Start: 2018-07-30 | End: 2018-08-01 | Stop reason: HOSPADM

## 2018-07-30 RX ORDER — BUPRENORPHINE HYDROCHLORIDE AND NALOXONE HYDROCHLORIDE DIHYDRATE 2; .5 MG/1; MG/1
1.5 TABLET SUBLINGUAL DAILY
Status: DISCONTINUED | OUTPATIENT
Start: 2018-07-30 | End: 2018-08-01 | Stop reason: HOSPADM

## 2018-07-30 RX ADMIN — ROPINIROLE HYDROCHLORIDE 0.5 MG: 0.25 TABLET, FILM COATED ORAL at 07:56

## 2018-07-30 RX ADMIN — GABAPENTIN 800 MG: 800 TABLET ORAL at 07:56

## 2018-07-30 RX ADMIN — GABAPENTIN 800 MG: 800 TABLET ORAL at 00:23

## 2018-07-30 RX ADMIN — IPRATROPIUM BROMIDE AND ALBUTEROL SULFATE 1 AMPULE: .5; 3 SOLUTION RESPIRATORY (INHALATION) at 21:20

## 2018-07-30 RX ADMIN — MOMETASONE FUROATE AND FORMOTEROL FUMARATE DIHYDRATE 2 PUFF: 200; 5 AEROSOL RESPIRATORY (INHALATION) at 08:12

## 2018-07-30 RX ADMIN — ACETAMINOPHEN 650 MG: 325 TABLET ORAL at 00:30

## 2018-07-30 RX ADMIN — ACETAMINOPHEN 650 MG: 325 TABLET ORAL at 07:55

## 2018-07-30 RX ADMIN — INSULIN LISPRO 2 UNITS: 100 INJECTION, SOLUTION INTRAVENOUS; SUBCUTANEOUS at 12:00

## 2018-07-30 RX ADMIN — TIZANIDINE 4 MG: 4 TABLET ORAL at 22:45

## 2018-07-30 RX ADMIN — Medication 10 ML: at 07:57

## 2018-07-30 RX ADMIN — INSULIN LISPRO 2 UNITS: 100 INJECTION, SOLUTION INTRAVENOUS; SUBCUTANEOUS at 17:27

## 2018-07-30 RX ADMIN — GABAPENTIN 800 MG: 800 TABLET ORAL at 22:45

## 2018-07-30 RX ADMIN — PANTOPRAZOLE SODIUM 40 MG: 40 TABLET, DELAYED RELEASE ORAL at 07:56

## 2018-07-30 RX ADMIN — FUROSEMIDE 40 MG: 10 INJECTION, SOLUTION INTRAMUSCULAR; INTRAVENOUS at 07:55

## 2018-07-30 RX ADMIN — GABAPENTIN 800 MG: 800 TABLET ORAL at 15:30

## 2018-07-30 RX ADMIN — ARIPIPRAZOLE 10 MG: 10 TABLET ORAL at 07:56

## 2018-07-30 RX ADMIN — MOMETASONE FUROATE AND FORMOTEROL FUMARATE DIHYDRATE 2 PUFF: 200; 5 AEROSOL RESPIRATORY (INHALATION) at 21:20

## 2018-07-30 RX ADMIN — TIZANIDINE 4 MG: 4 TABLET ORAL at 00:23

## 2018-07-30 RX ADMIN — TRAZODONE HYDROCHLORIDE 100 MG: 100 TABLET ORAL at 00:23

## 2018-07-30 RX ADMIN — TIZANIDINE 4 MG: 4 TABLET ORAL at 15:30

## 2018-07-30 RX ADMIN — LEVOFLOXACIN 750 MG: 750 TABLET, FILM COATED ORAL at 07:55

## 2018-07-30 RX ADMIN — LEVOFLOXACIN 750 MG: 750 TABLET, FILM COATED ORAL at 00:23

## 2018-07-30 RX ADMIN — Medication 10 ML: at 22:45

## 2018-07-30 RX ADMIN — PANTOPRAZOLE SODIUM 40 MG: 40 TABLET, DELAYED RELEASE ORAL at 00:24

## 2018-07-30 RX ADMIN — IPRATROPIUM BROMIDE AND ALBUTEROL SULFATE 1 AMPULE: .5; 3 SOLUTION RESPIRATORY (INHALATION) at 11:34

## 2018-07-30 RX ADMIN — IPRATROPIUM BROMIDE AND ALBUTEROL SULFATE 1 AMPULE: .5; 3 SOLUTION RESPIRATORY (INHALATION) at 08:11

## 2018-07-30 RX ADMIN — ROPINIROLE HYDROCHLORIDE 0.5 MG: 0.25 TABLET, FILM COATED ORAL at 00:23

## 2018-07-30 RX ADMIN — IOPAMIDOL 75 ML: 755 INJECTION, SOLUTION INTRAVENOUS at 00:14

## 2018-07-30 RX ADMIN — PRAVASTATIN SODIUM 40 MG: 20 TABLET ORAL at 00:24

## 2018-07-30 RX ADMIN — GUAIFENESIN 200 MG: 200 SOLUTION ORAL at 07:55

## 2018-07-30 RX ADMIN — INSULIN LISPRO 1 UNITS: 100 INJECTION, SOLUTION INTRAVENOUS; SUBCUTANEOUS at 22:50

## 2018-07-30 RX ADMIN — BUPRENORPHINE AND NALOXONE 1.5 TABLET: 2; .5 TABLET SUBLINGUAL at 11:51

## 2018-07-30 RX ADMIN — POTASSIUM CHLORIDE 40 MEQ: 750 CAPSULE, EXTENDED RELEASE ORAL at 07:56

## 2018-07-30 RX ADMIN — IBUPROFEN 400 MG: 800 TABLET ORAL at 15:29

## 2018-07-30 RX ADMIN — ENOXAPARIN SODIUM 40 MG: 40 INJECTION SUBCUTANEOUS at 07:56

## 2018-07-30 RX ADMIN — TRAZODONE HYDROCHLORIDE 100 MG: 100 TABLET ORAL at 22:45

## 2018-07-30 RX ADMIN — TIZANIDINE 4 MG: 4 TABLET ORAL at 07:56

## 2018-07-30 RX ADMIN — PRAVASTATIN SODIUM 40 MG: 20 TABLET ORAL at 17:27

## 2018-07-30 RX ADMIN — INSULIN LISPRO 4 UNITS: 100 INJECTION, SOLUTION INTRAVENOUS; SUBCUTANEOUS at 07:55

## 2018-07-30 ASSESSMENT — PAIN DESCRIPTION - ONSET: ONSET: ON-GOING

## 2018-07-30 ASSESSMENT — PAIN SCALES - GENERAL
PAINLEVEL_OUTOF10: 8

## 2018-07-30 ASSESSMENT — PAIN DESCRIPTION - PAIN TYPE
TYPE: ACUTE PAIN
TYPE: ACUTE PAIN

## 2018-07-30 ASSESSMENT — PAIN DESCRIPTION - LOCATION
LOCATION: HEAD
LOCATION: HEAD

## 2018-07-30 ASSESSMENT — PAIN DESCRIPTION - FREQUENCY: FREQUENCY: CONTINUOUS

## 2018-07-30 ASSESSMENT — PAIN DESCRIPTION - DESCRIPTORS: DESCRIPTORS: CONSTANT;DISCOMFORT

## 2018-07-30 ASSESSMENT — PAIN DESCRIPTION - PROGRESSION: CLINICAL_PROGRESSION: NOT CHANGED

## 2018-07-30 NOTE — PROGRESS NOTES
IM RESIDENT PROGRESS NOTE        Patient:  Arden Francois  YOB: 1955    MRN: [id number]     Acct: [de-identified]     Admit date: 7/29/2018    Pt seen and Chart reviewed. Chief Complaint   Patient presents with    Chest Pain    Shortness of Breath    Headache     Subjective:   Continues to have headache, chest tenderness, mild dyspnea  Denies increased phlegm production    Diet:  DIET CARB CONTROL; Carb Control: 3 carb choices (45 gms)/meal    ROS  · Constitutional: Negative for weight loss, chills  · Eyes: Negative for visual changes, diplopia, scleral icterus. · ENT: Negative for Headaches, hearing loss, vertigo, mouth sores, sore throat. · Cardiovascular: Negative for lightheadedness/orthostatic symptoms ,chest pain, dyspnea on exertion, palpitations or loss of consciousness. · Respiratory: Negative for wheezing. Cough, SOB, and pleuritic CP present. · Gastrointestinal: Negative for nausea/vomiting, change in bowel habits, dysphagia/appetite loss, hematemesis, blood in stools. Epigastric abdominal pain. · Genitourinary:Negative for change in bladder habits, dysuria, trouble voiding, hematuria. · Musculoskeletal: Negative for gait disturbance, weakness, joint complaints. · Integumentary: Negative for rash, pruritis. · Neurological: Negative for dizziness, change in muscle strength, numbness/tingling, change in gait, balance, coordination. Headaches present. · Psychiatric: negative for change in mood, affect, memory, mentation, behavior. · Endocrine: negative for temperature intolerance, excessive thirst, fluid intake, or urination, tremor. · Hematologic/Lymphatic: negative for abnormal bruising or bleeding, blood clots, swollen lymph nodes. · Allergic/Immunologic: negative for nasal congestion, pruritis, hives.     Medications:Current Inpatient    Scheduled Meds:   buprenorphine-naloxone  1.5 Film Sublingual Daily adenopathy  Chest - clear to auscultation, no wheezes, rales or rhonchi, symmetric air entry, tender in chest wall around sternum  Heart - normal rate, regular rhythm, normal S1, S2, no murmurs, rubs, clicks or gallops  Abdomen - soft, nondistended, no masses or organomegaly.  Diffuse tenderness  Neurological - alert, oriented, normal speech, no focal findings or movement disorder noted  Extremities - peripheral pulses normal, no pedal edema, no clubbing or cyanosis  Skin - normal coloration and turgor, no rashes, no suspicious skin lesions noted     Labs:-    CBC:   Recent Labs      07/29/18   1535  07/30/18   0559   WBC  10.5  8.1   HGB  13.3  13.0   PLT  See Reflexed IPF Result  See Reflexed IPF Result     BMP:  Recent Labs      07/29/18   1535  07/30/18   0559   NA  136  137   K  2.8*  3.4*   CL  96*  97*   CO2  30  28   BUN  7*  10   CREATININE  0.54  0.54   GLUCOSE  166*  188*     Calcium:  Recent Labs      07/30/18   0559   CALCIUM  9.3     Magnesium:  Recent Labs      07/30/18   0559   MG  2.7*     Glucose:  Recent Labs      07/29/18   2129  07/30/18   0625   POCGLU  160*  222*       Hepatic: Recent Labs      07/29/18   1535   ALKPHOS  89   ALT  9   AST  14   PROT  7.9   BILITOT  0.50   LABALBU  3.6     CARDIAC ENZYMES:  Recent Labs      07/29/18   1523  07/29/18   1722   TROPONINI  0.00  0.00     Thyroid:   Lab Results   Component Value Date    TSH 1.40 11/10/2014      Urinalysis: Color, UA   Date Value Ref Range Status   05/05/2016 YELLOW YEL Final     pH, UA   Date Value Ref Range Status   05/05/2016 6.0 5.0 - 8.0 Final     Specific Gravity, UA   Date Value Ref Range Status   05/05/2016 1.016 1.005 - 1.030 Final     Protein, UA   Date Value Ref Range Status   05/05/2016 NEGATIVE NEG Final     RBC, UA   Date Value Ref Range Status   01/22/2014 None 0 - 2 /HPF Final     Bacteria, UA   Date Value Ref Range Status   01/22/2014 FEW (A) NONE Final     Nitrite, Urine   Date Value Ref Range Status   05/05/2016

## 2018-07-30 NOTE — PROGRESS NOTES
Medicine Senior Resident Note    Pt seen and examined at bedside. Agree with intern assessment and plan. Added assessment and plan include:     Principal Problem:    Pneumonia  Active Problems:    Essential hypertension    History of heroin use    COPD (chronic obstructive pulmonary disease) (HCC)    Tobacco abuse    Chronic diastolic congestive heart failure (HCC)    Type 2 diabetes mellitus with complication (HCC)  Resolved Problems:    * No resolved hospital problems. *          Plan: Will treat as pneumonia. Worsening of multifocal bilateral opacifications on CT chest when compared to the CT chest on 25th of this month  Start Levaquin 750 mg daily which will also cover atypicals.   Respiratory care evaluation and treat  DuoNeb every 4 hours while awake  Monitor vitals  Will check for respiratory viral panel  Check strep pneumonia, mycoplasma, legionella  We will get 2-D echocardiogram.  Will check for histoplasma          Owen Ramirez MD  7/30/2018, 1:28 AM,  PGY - 2, Department of Internal Medicine,  [facility]

## 2018-07-30 NOTE — PLAN OF CARE
Problem: RESPIRATORY  Intervention: Respiratory assessment  PATIENT REFUSES TO WEAR BIPAP     [x] Risks and benefits explained to patient   [x] Patient refuses to wear Bipap stating \" I dont want to wear it tonight, but I will try it tomorrow. \"  [x] Patient verbalizes understanding of information presented.

## 2018-07-30 NOTE — H&P
[address]     HISTORY AND PHYSICAL EXAMINATION            Date:   7/30/2018  Patient name:  Dora Mehta  Date of admission:  7/29/2018  3:12 PM  MRN:   [id number]  YOB: 1955    CHIEF COMPLAINT     History Obtained From:  Patient and chart review. Chief Complaint   Patient presents with    Chest Pain    Shortness of Breath    Headache       HISTORY OF PRESENT ILLNESS      The patient is a 66 y.o.  female with PMH of DM2, HTN, smoker with emphysematous COPD is admitted to the hospital for dyspnea and worsening of cough. Symptoms began 2-3d ago and associated with occasional streaks of hemoptysis, but not purulent. During same timeframe patient was experiencing runny nose and chills. Patient reported to PCP Dr. Leonid Donaldson on 7/25/2018 and received a chest CT which showed ground glass opacities in RUL and LUIS FERNANDO. Patient was instructed to come to ED for workup of infectious pneumonitis vs pneumonia. Patient denies fevers, chills, nausea, vomiting. Symptoms accompanied with positional chest tenderness on right side. Patient additionally has headache, complaint with BP medications, denies dizziness, weakness. Patient additionally complains of abdominal pain, denies constipation, BM appropriate, CT abdomen done by Dr. Leonid Donaldson and is following. Patient on suboxone. In ED, repeat cxr showed worsening of infiltrates in upper lobes. Chest CT was ordered and patient was started on z-pac and admitted. PAST MEDICAL HISTORY       has a past medical history of RACHEL (acute kidney injury) ([facility] Arthritis; Cancer [facility]); CHF (congestive heart failure) ([address] Chronic bilateral low back pain with right-sided sciatica; COPD (chronic obstructive pulmonary disease) ([address] Depression; Diabetic polyneuropathy associated with type 2 diabetes mellitus ([address] Diabetic polyneuropathy associated with type 2 diabetes mellitus ([address] Diverticulosis;  Full dentures; GERD (gastroesophageal reflux GLUCOSE TEST STRIPS) STRP Use 1-2 times daily. Diagnisis:250.0  Diabetes Mellitus 2 Non-Insulin Dependent 11/16/17   Mbonu Corrinne Shiver, MD   hydrOXYzine (ATARAX) 10 MG tablet take 1 tablet by mouth three times a day 9/20/17   Historical Provider, MD   SUBOXONE 4-1 MG FILM SL film DISSOLVE 1 FILM UNDER THE TONGUE DAILY 10/11/17   Historical Provider, MD   ARIPiprazole (ABILIFY) 10 MG tablet Take 10 mg by mouth daily 11/25/16   Historical Provider, MD       ALLERGIES      Iv dye [iodides]    SOCIAL HISTORY       reports that she has been smoking Cigarettes. She has a 20.00 pack-year smoking history. She has never used smokeless tobacco. She reports that she does not drink alcohol or use drugs. FAMILY HISTORY      family history includes Cancer in her mother; Other in her sister; Stroke in her father. REVIEW OF SYSTEMS      · Constitutional: Negative for weight loss, chills  · Eyes: Negative for visual changes, diplopia, scleral icterus. · ENT: Negative for Headaches, hearing loss, vertigo, mouth sores, sore throat. · Cardiovascular: Negative for lightheadedness/orthostatic symptoms ,chest pain, dyspnea on exertion, palpitations or loss of consciousness. · Respiratory: Negative for wheezing. Cough, SOB, and pleuritic CP present. · Gastrointestinal: Negative for nausea/vomiting, change in bowel habits, dysphagia/appetite loss, hematemesis, blood in stools. Epigastric abdominal pain. · Genitourinary:Negative for change in bladder habits, dysuria, trouble voiding, hematuria. · Musculoskeletal: Negative for gait disturbance, weakness, joint complaints. · Integumentary: Negative for rash, pruritis. · Neurological: Negative for dizziness, change in muscle strength, numbness/tingling, change in gait, balance, coordination. Headaches present. · Psychiatric: negative for change in mood, affect, memory, mentation, behavior.   · Endocrine: negative for temperature intolerance, excessive thirst, fluid intake, or Alexey Deleon MD on   7/30/18 at 4:51 PM    Please note that this chart was generated using voice recognition Dragon dictation software. Although every effort was made to ensure the accuracy of this automated transcription, some errors in transcription may have occurred.

## 2018-07-30 NOTE — PROGRESS NOTES
Physical Therapy  DATE: 2018    NAME: Kain Diaz  MRN: [id number]   : 1955    Patient not seen this date for Physical Therapy due to:  [] Blood transfusion in progress  [] Hemodialysis  []  Patient Declined  [] Spine Precautions   [] Strict Bedrest  [] Surgery/ Procedure  [] Testing      [x] Other: Pt notes baseline mobility, denies any safety mobility concerns and requests to defer PT. [x] PT being discontinued at this time. Patient baseline. No further needs. [] PT being discontinued at this time as the patient has been transferred to palliative care. No further needs. Physical therapy to sign off at this time.   Becca Bautista, PT

## 2018-07-30 NOTE — PROGRESS NOTES
Graham Larkin, [facility] Pneumonia [J18.9] . Vitals:    07/29/18 2015   BP: (!) 145/75   Pulse: 75   Resp: 15   Temp: 98.5 °F (36.9 °C)   SpO2: 96%   . Patients home meds are   Prior to Admission medications    Medication Sig Start Date End Date Taking? Authorizing Provider   acetaminophen (TYLENOL) 500 MG tablet Take 1 tablet by mouth every 6 hours as needed for Pain 5/23/18  Yes Antonella Dawkins MD   Nebulizers (COMPRESSOR/NEBULIZER) MISC Dx: COPD use 3-4 times daily as needed 7/23/18   Antonella Dawkins MD   tiZANidine (ZANAFLEX) 4 MG tablet Take 1 tablet by mouth 3 times daily 7/18/18   Antonella Dawkins MD   pantoprazole (PROTONIX) 40 MG tablet Take 1 tablet by mouth daily Stop Omeprazole 7/18/18   Antonella Dawkins MD   albuterol (PROVENTIL) (2.5 MG/3ML) 0.083% nebulizer solution Take 3 mLs by nebulization every 4 hours as needed for Wheezing 7/18/18   Antonella Dawkins MD   pravastatin (PRAVACHOL) 40 MG tablet Take 1 tablet by mouth every evening 7/18/18   Antonella Dawkins MD   gabapentin (NEURONTIN) 800 MG tablet Take 1 tablet by mouth 3 times daily for 59 days. DC Lyrica. 7/18/18 9/15/18  Antonella Dawkins MD   albuterol sulfate HFA (VENTOLIN HFA) 108 (90 Base) MCG/ACT inhaler Inhale 1 puff into the lungs every 6 hours as needed for Wheezing 7/18/18   Antonella Dawkins MD   traZODone (DESYREL) 100 MG tablet Take 1 tablet by mouth nightly 7/18/18   Antonella Dawkins MD   atenolol (TENORMIN) 25 MG tablet take 1 tablet by mouth once daily 7/18/18   Antonella Dawkins MD   budesonide-formoterol (SYMBICORT) 160-4.5 MCG/ACT AERO Inhale 2 puffs into the lungs 2 times daily Rinse mouth after use.  7/18/18   Antonella Dawkins MD   ibuprofen (ADVIL;MOTRIN) 800 MG tablet Take 1 tablet by mouth every 8 hours as needed for Pain 7/18/18   Antonella Dawkins MD   rOPINIRole (REQUIP) 0.5 MG tablet Take 1 tablet by mouth daily 7/18/18   Antonella Dawkins MD   metFORMIN (GLUCOPHAGE) 500 MG tablet Take 1 ARIPiprazole (ABILIFY) 10 MG tablet Take 10 mg by mouth daily 11/25/16   Historical Provider, MD     Assessment   2-3 days of SOB without relief of her rescue inhaler which she has been using 4-5 times a day.   Chest pain, CHF , COPD and pneumonia   Pt also leaving floor to go outside and smoke    RR 20  Breath Sounds: Diminished t/o      · Bronchodilator assessment at level  3  · Hyperinflation assessment at level   · Secretion Management assessment at level    ·   · [x]    Bronchodilator Assessment  BRONCHODILATOR ASSESSMENT SCORE  Score 0 1 2 3 4 5   Breath Sounds   []  Patient Baseline []  No Wheeze good aeration []  Faint, scattered wheezing, good aeration [x]  Expiratory Wheezing and or moderately diminished []  Insp/Exp wheeze and/or very diminished []  Insp/Exp and/ or marked distress   Respiratory Rate   []  Patient Baseline []  Less than 20 []  Less than 20 [x]  20-25 []  Greater than 25 []  Greater than 25   Peak flow % of Pred or PB [x]  NA   []  Greater than 90%  []  81-90% []  71-80% []  Less than or equal to 70%  or unable to perform []  Unable due to Respiratory Distress   Dyspnea re []  Patient Baseline []  No SOB []  No SOB [x]  SOB on exertion []  SOB min activity []  At rest/acute   e FEV% Predicted       [x]  NA []  Above 69%  []  Unable []  Above 60-69%  []  Unable []  Above 50-59%  []  Unable []  Above 35-49%  []  Unable []  Less than 35%  []  Unable                 []  Hyperinflation Assessment  Score 1 2 3   CXR and Breath Sounds   []  Clear []  No atelectasis  Basilar aeration []  Atelectasis or absent basilar breath sounds   Incentive Spirometry Volume  (Per IBW)   []  Greater than or equal to 15ml/Kg []  less than 15ml/Kg []  less than 15ml/Kg   Surgery within last 2 weeks []  None or general   []  Abdominal or thoracic surgery  []  Abdominal or thoracic   Chronic Pulmonary Historyre []  No []  Yes []  Yes     []  Secretion Management Assessment  Score 1 2 3   Bilateral Breath Sounds []  Occasional Rhonchi []  Scattered Rhonchi []  Course Rhonchi and/or poor aeration   Sputum    []  Small amount of thin secretions []  Moderate amount of viscous secretions []  Copius, Viscious Yellow/ Secretions   CXR as reported by physician []  clear  []  Unavailable []  Infiltrates and/or consolidation  []  Unavailable []  Mucus Plugging and or lobar consolidation  []  Unavailable   Cough []  Strong, productive cough []  Weak productive cough []  No cough or weak non-productive cough   Graham Larkin  10:55 PM                            FEMALE                                  MALE                            FEV1 Predicted Normal Values                        FEV1 Predicted Normal Values          Age                                     Height in Feet and Inches       Age                                     Height in Feet and Inches       4' 11\" 5' 1\" 5' 3\" 5' 5\" 5' 7\" 5' 9\" 5' 11\" 6' 1\"  4' 11\" 5' 1\" 5' 3\" 5' 5\" 5' 7\" 5' 9\" 5' 11\" 6' 1\"   42 - 45 2.49 2.66 2.84 3.03 3.22 3.42 3.62 3.83 42 - 45 2.82 3.03 3.26 3.49 3.72 3.96 4.22 4.47   46 - 49 2.40 2.57 2.76 2.94 3.14 3.33 3.54 3.75 46 - 49 2.70 2.92 3.14 3.37 3.61 3.85 4.10 4.36   50 - 53 2.31 2.48 2.66 2.85 3.04 3.24 3.45 3.66 50 - 53 2.58 2.80 3.02 3.25 3.49 3.73 3.98 4.24   54 - 57 2.21 2.38 2.57 2.75 2.95 3.14 3.35 3.56 54 - 57 2.46 2.67 2.89 3.12 3.36 3.60 3.85 4.11   58 - 61 2.10 2.28 2.46 2.65 2.84 3.04 3.24 3.45 58 - 61 2.32 2.54 2.76 2.99 3.23 3.47 3.72 3.98   62 - 65 1.99 2.17 2.35 2.54 2.73 2.93 3.13 3.34 62 - 65 2.19 2.40 2.62 2.85 3.09 3.33 3.58 3.84   66 - 69 1.88 2.05 2.23 2.42 2.61 2.81 3.02 3.23 66 - 69 2.04 2.26 2.48 2.71 2.95 3.19 3.44 3.70   70+ 1.82 1.99 2.17 2.36 2.55 2.75 2.95 3.16 70+ 1.97 2.19 2.41 2.64 2.87 3.12 3.37 3.62             Predicted Peak Expiratory Flow Rate                                       Height (in)  Female       Height (in) Male           Age 64 62 64 63 57 71 78 74 Age            21 344 357 372 [phone]

## 2018-07-31 ENCOUNTER — APPOINTMENT (OUTPATIENT)
Dept: GENERAL RADIOLOGY | Age: 63
DRG: 190 | End: 2018-07-31
Payer: COMMERCIAL

## 2018-07-31 LAB
ABSOLUTE EOS #: <0.03 K/UL (ref 0–0.44)
ABSOLUTE IMMATURE GRANULOCYTE: 0.09 K/UL (ref 0–0.3)
ABSOLUTE LYMPH #: 2.26 K/UL (ref 1.1–3.7)
ABSOLUTE MONO #: 0.82 K/UL (ref 0.1–1.2)
ACTION: NORMAL
ALLEN TEST: POSITIVE
ANION GAP SERPL CALCULATED.3IONS-SCNC: 13 MMOL/L (ref 9–17)
BASOPHILS # BLD: 0 % (ref 0–2)
BASOPHILS ABSOLUTE: 0.05 K/UL (ref 0–0.2)
BUN BLDV-MCNC: 17 MG/DL (ref 8–23)
BUN/CREAT BLD: ABNORMAL (ref 9–20)
CALCIUM SERPL-MCNC: 8.9 MG/DL (ref 8.6–10.4)
CHLORIDE BLD-SCNC: 98 MMOL/L (ref 98–107)
CO2: 29 MMOL/L (ref 20–31)
CREAT SERPL-MCNC: 0.56 MG/DL (ref 0.5–0.9)
DATE AND TIME: NORMAL
DIFFERENTIAL TYPE: ABNORMAL
EOSINOPHILS RELATIVE PERCENT: 0 % (ref 1–4)
FIO2: ABNORMAL
GFR AFRICAN AMERICAN: >60 ML/MIN
GFR NON-AFRICAN AMERICAN: >60 ML/MIN
GFR SERPL CREATININE-BSD FRML MDRD: ABNORMAL ML/MIN/{1.73_M2}
GFR SERPL CREATININE-BSD FRML MDRD: ABNORMAL ML/MIN/{1.73_M2}
GLUCOSE BLD-MCNC: 123 MG/DL (ref 65–105)
GLUCOSE BLD-MCNC: 137 MG/DL (ref 65–105)
GLUCOSE BLD-MCNC: 148 MG/DL (ref 70–99)
GLUCOSE BLD-MCNC: 149 MG/DL (ref 65–105)
GLUCOSE BLD-MCNC: 163 MG/DL (ref 65–105)
HCT VFR BLD CALC: 36.6 % (ref 36.3–47.1)
HEMOGLOBIN: 12.2 G/DL (ref 11.9–15.1)
IMMATURE GRANULOCYTES: 1 %
LYMPHOCYTES # BLD: 20 % (ref 24–43)
MAGNESIUM: 2.2 MG/DL (ref 1.6–2.6)
MCH RBC QN AUTO: 28 PG (ref 25.2–33.5)
MCHC RBC AUTO-ENTMCNC: 33.3 G/DL (ref 28.4–34.8)
MCV RBC AUTO: 84.1 FL (ref 82.6–102.9)
MODE: ABNORMAL
MONOCYTES # BLD: 7 % (ref 3–12)
NEGATIVE BASE EXCESS, ART: ABNORMAL (ref 0–2)
NOTIFY: NORMAL
NRBC AUTOMATED: 0 PER 100 WBC
O2 DEVICE/FLOW/%: ABNORMAL
PATIENT TEMP: ABNORMAL
PDW BLD-RTO: 13.2 % (ref 11.8–14.4)
PLATELET # BLD: 272 K/UL (ref 138–453)
PLATELET ESTIMATE: ABNORMAL
PMV BLD AUTO: 10.2 FL (ref 8.1–13.5)
POC HCO3: 33.8 MMOL/L (ref 21–28)
POC O2 SATURATION: 85 % (ref 94–98)
POC PCO2 TEMP: ABNORMAL MM HG
POC PCO2: 49.1 MM HG (ref 35–48)
POC PH TEMP: ABNORMAL
POC PH: 7.45 (ref 7.35–7.45)
POC PO2 TEMP: ABNORMAL MM HG
POC PO2: 48.4 MM HG (ref 83–108)
POSITIVE BASE EXCESS, ART: 8 (ref 0–3)
POTASSIUM SERPL-SCNC: 3 MMOL/L (ref 3.7–5.3)
RBC # BLD: 4.35 M/UL (ref 3.95–5.11)
RBC # BLD: ABNORMAL 10*6/UL
READ BACK: YES
SAMPLE SITE: ABNORMAL
SEG NEUTROPHILS: 72 % (ref 36–65)
SEGMENTED NEUTROPHILS ABSOLUTE COUNT: 8.26 K/UL (ref 1.5–8.1)
SODIUM BLD-SCNC: 140 MMOL/L (ref 135–144)
TCO2 (CALC), ART: 35 MMOL/L (ref 22–29)
TROPONIN INTERP: NORMAL
TROPONIN T: <0.03 NG/ML
WBC # BLD: 11.5 K/UL (ref 3.5–11.3)
WBC # BLD: ABNORMAL 10*3/UL

## 2018-07-31 PROCEDURE — 84484 ASSAY OF TROPONIN QUANT: CPT

## 2018-07-31 PROCEDURE — 6370000000 HC RX 637 (ALT 250 FOR IP): Performed by: EMERGENCY MEDICINE

## 2018-07-31 PROCEDURE — 36415 COLL VENOUS BLD VENIPUNCTURE: CPT

## 2018-07-31 PROCEDURE — 71046 X-RAY EXAM CHEST 2 VIEWS: CPT

## 2018-07-31 PROCEDURE — G0378 HOSPITAL OBSERVATION PER HR: HCPCS

## 2018-07-31 PROCEDURE — 85025 COMPLETE CBC W/AUTO DIFF WBC: CPT

## 2018-07-31 PROCEDURE — 6370000000 HC RX 637 (ALT 250 FOR IP): Performed by: HOSPITALIST

## 2018-07-31 PROCEDURE — 6370000000 HC RX 637 (ALT 250 FOR IP): Performed by: STUDENT IN AN ORGANIZED HEALTH CARE EDUCATION/TRAINING PROGRAM

## 2018-07-31 PROCEDURE — 6360000002 HC RX W HCPCS: Performed by: HOSPITALIST

## 2018-07-31 PROCEDURE — 6360000002 HC RX W HCPCS: Performed by: INTERNAL MEDICINE

## 2018-07-31 PROCEDURE — 82803 BLOOD GASES ANY COMBINATION: CPT

## 2018-07-31 PROCEDURE — 99406 BEHAV CHNG SMOKING 3-10 MIN: CPT

## 2018-07-31 PROCEDURE — 36600 WITHDRAWAL OF ARTERIAL BLOOD: CPT

## 2018-07-31 PROCEDURE — 2580000003 HC RX 258: Performed by: INTERNAL MEDICINE

## 2018-07-31 PROCEDURE — 96372 THER/PROPH/DIAG INJ SC/IM: CPT

## 2018-07-31 PROCEDURE — 83735 ASSAY OF MAGNESIUM: CPT

## 2018-07-31 PROCEDURE — 1200000000 HC SEMI PRIVATE

## 2018-07-31 PROCEDURE — 94640 AIRWAY INHALATION TREATMENT: CPT

## 2018-07-31 PROCEDURE — 99232 SBSQ HOSP IP/OBS MODERATE 35: CPT | Performed by: INTERNAL MEDICINE

## 2018-07-31 PROCEDURE — 82947 ASSAY GLUCOSE BLOOD QUANT: CPT

## 2018-07-31 PROCEDURE — 80048 BASIC METABOLIC PNL TOTAL CA: CPT

## 2018-07-31 RX ORDER — DIPHENHYDRAMINE HCL 25 MG
25 TABLET ORAL ONCE
Status: COMPLETED | OUTPATIENT
Start: 2018-07-31 | End: 2018-07-31

## 2018-07-31 RX ORDER — IPRATROPIUM BROMIDE AND ALBUTEROL SULFATE 2.5; .5 MG/3ML; MG/3ML
1 SOLUTION RESPIRATORY (INHALATION) 3 TIMES DAILY
Status: DISCONTINUED | OUTPATIENT
Start: 2018-07-31 | End: 2018-08-01 | Stop reason: HOSPADM

## 2018-07-31 RX ADMIN — POTASSIUM CHLORIDE 40 MEQ: 750 CAPSULE, EXTENDED RELEASE ORAL at 09:16

## 2018-07-31 RX ADMIN — TIZANIDINE 4 MG: 4 TABLET ORAL at 09:17

## 2018-07-31 RX ADMIN — MOMETASONE FUROATE AND FORMOTEROL FUMARATE DIHYDRATE 2 PUFF: 200; 5 AEROSOL RESPIRATORY (INHALATION) at 07:55

## 2018-07-31 RX ADMIN — IPRATROPIUM BROMIDE AND ALBUTEROL SULFATE 1 AMPULE: .5; 3 SOLUTION RESPIRATORY (INHALATION) at 07:55

## 2018-07-31 RX ADMIN — Medication 10 ML: at 20:47

## 2018-07-31 RX ADMIN — TRAZODONE HYDROCHLORIDE 100 MG: 100 TABLET ORAL at 20:48

## 2018-07-31 RX ADMIN — TIZANIDINE 4 MG: 4 TABLET ORAL at 20:48

## 2018-07-31 RX ADMIN — DIPHENHYDRAMINE HCL 25 MG: 25 TABLET ORAL at 16:45

## 2018-07-31 RX ADMIN — INSULIN LISPRO 2 UNITS: 100 INJECTION, SOLUTION INTRAVENOUS; SUBCUTANEOUS at 13:28

## 2018-07-31 RX ADMIN — PANTOPRAZOLE SODIUM 40 MG: 40 TABLET, DELAYED RELEASE ORAL at 09:16

## 2018-07-31 RX ADMIN — TIZANIDINE 4 MG: 4 TABLET ORAL at 14:54

## 2018-07-31 RX ADMIN — ENOXAPARIN SODIUM 40 MG: 40 INJECTION SUBCUTANEOUS at 09:17

## 2018-07-31 RX ADMIN — IPRATROPIUM BROMIDE AND ALBUTEROL SULFATE 1 AMPULE: .5; 3 SOLUTION RESPIRATORY (INHALATION) at 20:52

## 2018-07-31 RX ADMIN — LEVOFLOXACIN 750 MG: 750 TABLET, FILM COATED ORAL at 09:17

## 2018-07-31 RX ADMIN — PRAVASTATIN SODIUM 40 MG: 20 TABLET ORAL at 18:04

## 2018-07-31 RX ADMIN — ROPINIROLE HYDROCHLORIDE 0.5 MG: 0.25 TABLET, FILM COATED ORAL at 09:16

## 2018-07-31 RX ADMIN — GABAPENTIN 800 MG: 800 TABLET ORAL at 20:48

## 2018-07-31 RX ADMIN — IPRATROPIUM BROMIDE AND ALBUTEROL SULFATE 1 AMPULE: .5; 3 SOLUTION RESPIRATORY (INHALATION) at 14:16

## 2018-07-31 RX ADMIN — GABAPENTIN 800 MG: 800 TABLET ORAL at 14:54

## 2018-07-31 RX ADMIN — BUPRENORPHINE AND NALOXONE 1.5 TABLET: 2; .5 TABLET SUBLINGUAL at 09:16

## 2018-07-31 RX ADMIN — IBUPROFEN 400 MG: 800 TABLET ORAL at 18:09

## 2018-07-31 RX ADMIN — MOMETASONE FUROATE AND FORMOTEROL FUMARATE DIHYDRATE 2 PUFF: 200; 5 AEROSOL RESPIRATORY (INHALATION) at 20:52

## 2018-07-31 RX ADMIN — ARIPIPRAZOLE 10 MG: 10 TABLET ORAL at 09:17

## 2018-07-31 RX ADMIN — IBUPROFEN 400 MG: 800 TABLET ORAL at 03:46

## 2018-07-31 RX ADMIN — INSULIN LISPRO 2 UNITS: 100 INJECTION, SOLUTION INTRAVENOUS; SUBCUTANEOUS at 18:04

## 2018-07-31 RX ADMIN — GABAPENTIN 800 MG: 800 TABLET ORAL at 09:17

## 2018-07-31 ASSESSMENT — PAIN SCALES - GENERAL
PAINLEVEL_OUTOF10: 0
PAINLEVEL_OUTOF10: 8
PAINLEVEL_OUTOF10: 0
PAINLEVEL_OUTOF10: 7
PAINLEVEL_OUTOF10: 9

## 2018-07-31 ASSESSMENT — PAIN DESCRIPTION - LOCATION: LOCATION: HEAD

## 2018-07-31 ASSESSMENT — PAIN DESCRIPTION - PAIN TYPE: TYPE: ACUTE PAIN

## 2018-07-31 NOTE — PROGRESS NOTES
TAB WILLETT, [facility] Pneumonia [J18.9] . Vitals:    07/30/18 1926   BP: (!) 106/53   Pulse: 77   Resp: 16   Temp: 97.7 °F (36.5 °C)   SpO2: 96%   . Patients home meds are   Prior to Admission medications    Medication Sig Start Date End Date Taking? Authorizing Provider   buprenorphine-naloxone (SUBOXONE) 2-0.5 MG FILM SL film Place 1.5 Film under the tongue daily. .   Yes Historical Provider, MD   acetaminophen (TYLENOL) 500 MG tablet Take 1 tablet by mouth every 6 hours as needed for Pain 5/23/18  Yes Antonella Wang MD   Nebulizers (COMPRESSOR/NEBULIZER) MISC Dx: COPD use 3-4 times daily as needed 7/23/18   Antonella Wang MD   tiZANidine (ZANAFLEX) 4 MG tablet Take 1 tablet by mouth 3 times daily 7/18/18   Antonella Wang MD   pantoprazole (PROTONIX) 40 MG tablet Take 1 tablet by mouth daily Stop Omeprazole 7/18/18   Antonella Wang MD   albuterol (PROVENTIL) (2.5 MG/3ML) 0.083% nebulizer solution Take 3 mLs by nebulization every 4 hours as needed for Wheezing 7/18/18   Antonella Wang MD   pravastatin (PRAVACHOL) 40 MG tablet Take 1 tablet by mouth every evening 7/18/18   Antonella Wang MD   gabapentin (NEURONTIN) 800 MG tablet Take 1 tablet by mouth 3 times daily for 59 days. DC Lyrica. 7/18/18 9/15/18  Antonella Wang MD   albuterol sulfate HFA (VENTOLIN HFA) 108 (90 Base) MCG/ACT inhaler Inhale 1 puff into the lungs every 6 hours as needed for Wheezing 7/18/18   Antonella Wang MD   traZODone (DESYREL) 100 MG tablet Take 1 tablet by mouth nightly 7/18/18   Antonella Wang MD   atenolol (TENORMIN) 25 MG tablet take 1 tablet by mouth once daily 7/18/18   Antonella Wang MD   budesonide-formoterol (SYMBICORT) 160-4.5 MCG/ACT AERO Inhale 2 puffs into the lungs 2 times daily Rinse mouth after use.  7/18/18   Antonella Wang MD   ibuprofen (ADVIL;MOTRIN) 800 MG tablet Take 1 tablet by mouth every 8 hours as needed for Pain 7/18/18   Antonella Wang MD rOPINIRole (REQUIP) 0.5 MG tablet Take 1 tablet by mouth daily 7/18/18   Mbonu Hassel Hodgkins, MD   metFORMIN (GLUCOPHAGE) 500 MG tablet Take 1 tablet by mouth 2 times daily (with meals) 7/18/18   Mbonu Hassel Hodgkins, MD   guaiFENesin (ALTARUSSIN) 100 MG/5ML syrup Take 10 mLs by mouth every 4 hours as needed for Cough 7/18/18   Mbonu Hassel Hodgkins, MD   LINZESS 145 MCG capsule take 1 capsule by mouth every morning BEFORE BREAKFAST 7/2/18   Mbonu Hassel Hodgkins, MD   hydrochlorothiazide (MICROZIDE) 12.5 MG capsule take 1 capsule by mouth once daily every morning 7/2/18   Mbonu Hassel Hodgkins, MD   furosemide (LASIX) 20 MG tablet take 1 tablet by mouth twice a day 7/2/18   Mbonu Hassel Hodgkins, MD   Elastic Bandages & Supports ([address]) MISC Dx: leg edema, use daily as needed, 20-30 mmHg 6/20/18   Mbonu Hassel Hodgkins, MD   lidocaine (LMX) 4 % cream Apply topically crsitopher per day as needed. 5/14/18   Rosy Wharton MD   Umeclidinium Bromide (INCRUSE ELLIPTA) 62.5 MCG/INH AEPB Inhale 1 puff into the lungs daily 5/7/18   Mbonu Hassel Hodgkins, MD   nicotine polacrilex (NICORETTE) 2 MG gum Take 1 each by mouth as needed for Smoking cessation Maximum dose: 24 pieces/24 hours. 4/24/18   Mbonu Hassel Hodgkins, MD   Blood Glucose Monitoring Suppl SERA Dx: DM-2 use 2 times daily as needed 12/18/17   Mbonu Hassel Hodgkins, MD   sodium chloride (OCEAN NASAL SPRAY) 0.65 % nasal spray 1 spray by Nasal route as needed for Congestion 12/11/17   MD Magdalene Lam MISC Dx: DM2, use 2-3 times daily. Ok to dispense any brand 12/5/17   Mbonu Hassel Hodgkins, MD   Alcohol Swabs (ALCOHOL PREP) 70 % PADS Use 1-2 times daily. Diagnisis:250.0  Diabetes Mellitus 2 Non-Insulin Dependent 11/28/17   Mbonu Hassel Hodgkins, MD   Glucose Blood (BLOOD GLUCOSE TEST STRIPS) STRP Use 1-2 times daily.  Diagnisis:250.0  Diabetes Mellitus 2 Non-Insulin Dependent 11/16/17   Mbonu Hassel Hodgkins, MD   hydrOXYzine (ATARAX) 10 MG tablet take 1 tablet by mouth three times a day

## 2018-07-31 NOTE — PROGRESS NOTES
Dr. Karina Javed informed of blood gas results. No new orders at this time.    Electronically signed by Jasmina Delgado RN on 7/31/2018 at 6:21 PM

## 2018-07-31 NOTE — PLAN OF CARE
Problem: Pain:  Goal: Pain level will decrease  Pain level will decrease   Outcome: Ongoing    Goal: Control of acute pain  Control of acute pain   Outcome: Ongoing      Problem: Falls - Risk of:  Goal: Will remain free from falls  Will remain free from falls   Outcome: Ongoing

## 2018-07-31 NOTE — PROGRESS NOTES
ipratropium-albuterol  1 ampule Inhalation TID    buprenorphine-naloxone  1.5 tablet Sublingual Daily    potassium chloride  40 mEq Oral Daily    sodium chloride flush  10 mL Intravenous 2 times per day    enoxaparin  40 mg Subcutaneous Daily    ARIPiprazole  10 mg Oral Daily    mometasone-formoterol  2 puff Inhalation BID    gabapentin  800 mg Oral TID    pantoprazole  40 mg Oral Daily    pravastatin  40 mg Oral QPM    rOPINIRole  0.5 mg Oral Daily    tiZANidine  4 mg Oral TID    traZODone  100 mg Oral Nightly    nicotine  1 patch Transdermal Daily    insulin lispro  0-12 Units Subcutaneous TID WC    insulin lispro  0-6 Units Subcutaneous Nightly    levofloxacin  750 mg Oral Daily     Continuous Infusions:   dextrose       PRN Meds:ibuprofen, sodium chloride flush, acetaminophen, ondansetron, magnesium hydroxide, albuterol, albuterol sulfate HFA, guaiFENesin, glucose, dextrose, glucagon (rDNA), dextrose    Objective:    Physical Exam:  Vitals: BP (!) 102/45   Pulse 71   Temp 97.5 °F (36.4 °C) (Oral)   Resp 16   Ht 6' (1.829 m)   Wt 225 lb 12 oz (102.4 kg)   SpO2 (!) 89%   BMI 30.62 kg/m²   24 hour intake/output:    Intake/Output Summary (Last 24 hours) at 07/31/18 1842  Last data filed at 07/31/18 0655   Gross per 24 hour   Intake              600 ml   Output             1100 ml   Net             -500 ml     Last 3 weights:   Wt Readings from Last 3 Encounters:   07/29/18 225 lb 12 oz (102.4 kg)   07/18/18 229 lb 12.8 oz (104.2 kg)   07/09/18 227 lb (103 kg)       Physical Examination:   General appearance - alert, well appearing, and in no distress  Mental status - alert, oriented to person, place, and time  Head- normocephalic, no lesions, without obvious abnormality  Eyes - pupils equal and reactive, extraocular eye movements intact  Ears - bilateral TM's and external ear canals normal  Nose - normal and patent, no erythema, discharge or polyps  Mouth - mucous membranes moist, pharynx normal without lesions  Neck - supple, no significant adenopathy  Chest - clear to auscultation, no wheezes, rales or rhonchi, symmetric air entry, tender in chest wall around sternum  Heart - normal rate, regular rhythm, normal S1, S2, no murmurs, rubs, clicks or gallops  Abdomen - soft, nondistended, no masses or organomegaly.  Diffuse tenderness  Neurological - alert, oriented, normal speech, no focal findings or movement disorder noted  Extremities - peripheral pulses normal, no pedal edema, no clubbing or cyanosis  Skin - normal coloration and turgor, no rashes, no suspicious skin lesions noted     Labs:-    CBC:   Recent Labs      07/29/18   1535  07/30/18   0559  07/31/18   0608   WBC  10.5  8.1  11.5*   HGB  13.3  13.0  12.2   PLT  See Reflexed IPF Result  See Reflexed IPF Result  272     BMP:    Recent Labs      07/29/18   1535  07/30/18   0559  07/31/18   0608   NA  136  137  140   K  2.8*  3.4*  3.0*   CL  96*  97*  98   CO2  30  28  29   BUN  7*  10  17   CREATININE  0.54  0.54  0.56   GLUCOSE  166*  188*  148*     Calcium:  Recent Labs      07/31/18   0608   CALCIUM  8.9     Magnesium:  Recent Labs      07/31/18   0608   MG  2.2     Glucose:  Recent Labs      07/31/18   0838  07/31/18   1126  07/31/18   1701   POCGLU  137*  149*  163*       Hepatic:   Recent Labs      07/29/18   1535   ALKPHOS  89   ALT  9   AST  14   PROT  7.9   BILITOT  0.50   LABALBU  3.6     CARDIAC ENZYMES:  Recent Labs      07/29/18   1523  07/29/18   1722   TROPONINI  0.00  0.00     Thyroid:   Lab Results   Component Value Date    TSH 1.40 11/10/2014      Urinalysis:   Color, UA   Date Value Ref Range Status   05/05/2016 YELLOW YEL Final     pH, UA   Date Value Ref Range Status   05/05/2016 6.0 5.0 - 8.0 Final     Specific Gravity, UA   Date Value Ref Range Status   05/05/2016 1.016 1.005 - 1.030 Final     Protein, UA   Date Value Ref Range Status   05/05/2016 NEGATIVE NEG Final     RBC, UA   Date Value Ref Range Status

## 2018-07-31 NOTE — PROGRESS NOTES
Patient's k is 3.0, patient received scheduled potassium. Waiting to see if additional coverage is needed. Patient is also wanting a nebulizer and home O2 eval is needed prior to d/c.   Electronically signed by Riccardo Jimenez RN on 7/31/2018 at 11:29 AM

## 2018-07-31 NOTE — CARE COORDINATION
Spoke with patient. [facility] is her current [facility] [place]   Phone [phone]  Fax [phone]   left with [facility] intake to verify status. Awaiting call back.

## 2018-07-31 NOTE — PROGRESS NOTES
Dr. Deng Brianna rounding at bedside, informed of K of 3.0, and patient requesting nebulizer and needing a home O2 evaluation. Order received for a 2 view CXR and resident to place additional orders.   Electronically signed by Jasmina Delgado RN on 7/31/2018 at 11:44 AM

## 2018-08-01 VITALS
BODY MASS INDEX: 30.25 KG/M2 | SYSTOLIC BLOOD PRESSURE: 118 MMHG | HEART RATE: 70 BPM | TEMPERATURE: 97.9 F | WEIGHT: 223.33 LBS | RESPIRATION RATE: 16 BRPM | HEIGHT: 72 IN | OXYGEN SATURATION: 98 % | DIASTOLIC BLOOD PRESSURE: 70 MMHG

## 2018-08-01 LAB
ABSOLUTE EOS #: 0.13 K/UL (ref 0–0.44)
ABSOLUTE IMMATURE GRANULOCYTE: 0.07 K/UL (ref 0–0.3)
ABSOLUTE LYMPH #: 2.42 K/UL (ref 1.1–3.7)
ABSOLUTE MONO #: 0.94 K/UL (ref 0.1–1.2)
ANION GAP SERPL CALCULATED.3IONS-SCNC: 9 MMOL/L (ref 9–17)
BASOPHILS # BLD: 1 % (ref 0–2)
BASOPHILS ABSOLUTE: 0.06 K/UL (ref 0–0.2)
BUN BLDV-MCNC: 12 MG/DL (ref 8–23)
BUN/CREAT BLD: ABNORMAL (ref 9–20)
CALCIUM SERPL-MCNC: 8.6 MG/DL (ref 8.6–10.4)
CHLORIDE BLD-SCNC: 101 MMOL/L (ref 98–107)
CO2: 28 MMOL/L (ref 20–31)
CREAT SERPL-MCNC: 0.48 MG/DL (ref 0.5–0.9)
DIFFERENTIAL TYPE: ABNORMAL
EOSINOPHILS RELATIVE PERCENT: 2 % (ref 1–4)
GFR AFRICAN AMERICAN: >60 ML/MIN
GFR NON-AFRICAN AMERICAN: >60 ML/MIN
GFR SERPL CREATININE-BSD FRML MDRD: ABNORMAL ML/MIN/{1.73_M2}
GFR SERPL CREATININE-BSD FRML MDRD: ABNORMAL ML/MIN/{1.73_M2}
GLUCOSE BLD-MCNC: 116 MG/DL (ref 70–99)
GLUCOSE BLD-MCNC: 148 MG/DL (ref 65–105)
HCT VFR BLD CALC: 38.4 % (ref 36.3–47.1)
HEMOGLOBIN: 12.3 G/DL (ref 11.9–15.1)
IMMATURE GRANULOCYTES: 1 %
LYMPHOCYTES # BLD: 29 % (ref 24–43)
MCH RBC QN AUTO: 27.7 PG (ref 25.2–33.5)
MCHC RBC AUTO-ENTMCNC: 32 G/DL (ref 28.4–34.8)
MCV RBC AUTO: 86.5 FL (ref 82.6–102.9)
MONOCYTES # BLD: 11 % (ref 3–12)
NRBC AUTOMATED: 0 PER 100 WBC
PDW BLD-RTO: 13.3 % (ref 11.8–14.4)
PLATELET # BLD: 260 K/UL (ref 138–453)
PLATELET ESTIMATE: ABNORMAL
PMV BLD AUTO: 9.6 FL (ref 8.1–13.5)
POTASSIUM SERPL-SCNC: 3.7 MMOL/L (ref 3.7–5.3)
RBC # BLD: 4.44 M/UL (ref 3.95–5.11)
RBC # BLD: ABNORMAL 10*6/UL
SEG NEUTROPHILS: 56 % (ref 36–65)
SEGMENTED NEUTROPHILS ABSOLUTE COUNT: 4.65 K/UL (ref 1.5–8.1)
SODIUM BLD-SCNC: 138 MMOL/L (ref 135–144)
URIC ACID: 3.5 MG/DL (ref 2.4–5.7)
WBC # BLD: 8.3 K/UL (ref 3.5–11.3)
WBC # BLD: ABNORMAL 10*3/UL

## 2018-08-01 PROCEDURE — 6370000000 HC RX 637 (ALT 250 FOR IP): Performed by: STUDENT IN AN ORGANIZED HEALTH CARE EDUCATION/TRAINING PROGRAM

## 2018-08-01 PROCEDURE — G0378 HOSPITAL OBSERVATION PER HR: HCPCS

## 2018-08-01 PROCEDURE — 99238 HOSP IP/OBS DSCHRG MGMT 30/<: CPT | Performed by: INTERNAL MEDICINE

## 2018-08-01 PROCEDURE — 82947 ASSAY GLUCOSE BLOOD QUANT: CPT

## 2018-08-01 PROCEDURE — 6360000002 HC RX W HCPCS: Performed by: INTERNAL MEDICINE

## 2018-08-01 PROCEDURE — 6360000002 HC RX W HCPCS: Performed by: HOSPITALIST

## 2018-08-01 PROCEDURE — 85025 COMPLETE CBC W/AUTO DIFF WBC: CPT

## 2018-08-01 PROCEDURE — 80048 BASIC METABOLIC PNL TOTAL CA: CPT

## 2018-08-01 PROCEDURE — 36415 COLL VENOUS BLD VENIPUNCTURE: CPT

## 2018-08-01 PROCEDURE — 2580000003 HC RX 258: Performed by: INTERNAL MEDICINE

## 2018-08-01 PROCEDURE — 94761 N-INVAS EAR/PLS OXIMETRY MLT: CPT

## 2018-08-01 PROCEDURE — 84550 ASSAY OF BLOOD/URIC ACID: CPT

## 2018-08-01 PROCEDURE — 94640 AIRWAY INHALATION TREATMENT: CPT

## 2018-08-01 PROCEDURE — 6370000000 HC RX 637 (ALT 250 FOR IP): Performed by: EMERGENCY MEDICINE

## 2018-08-01 PROCEDURE — 96372 THER/PROPH/DIAG INJ SC/IM: CPT

## 2018-08-01 RX ORDER — IPRATROPIUM BROMIDE AND ALBUTEROL SULFATE 2.5; .5 MG/3ML; MG/3ML
3 SOLUTION RESPIRATORY (INHALATION) 3 TIMES DAILY
Qty: 360 ML | Refills: 2 | Status: SHIPPED | OUTPATIENT
Start: 2018-08-01 | End: 2018-11-19 | Stop reason: SDUPTHER

## 2018-08-01 RX ORDER — DIAPER,BRIEF,INFANT-TODD,DISP
EACH MISCELLANEOUS 2 TIMES DAILY
Status: DISCONTINUED | OUTPATIENT
Start: 2018-08-01 | End: 2018-08-01 | Stop reason: HOSPADM

## 2018-08-01 RX ADMIN — ARIPIPRAZOLE 10 MG: 10 TABLET ORAL at 09:21

## 2018-08-01 RX ADMIN — IPRATROPIUM BROMIDE AND ALBUTEROL SULFATE 1 AMPULE: .5; 3 SOLUTION RESPIRATORY (INHALATION) at 14:17

## 2018-08-01 RX ADMIN — GABAPENTIN 800 MG: 800 TABLET ORAL at 09:21

## 2018-08-01 RX ADMIN — POTASSIUM CHLORIDE 40 MEQ: 750 CAPSULE, EXTENDED RELEASE ORAL at 09:21

## 2018-08-01 RX ADMIN — Medication 10 ML: at 11:43

## 2018-08-01 RX ADMIN — BUPRENORPHINE AND NALOXONE 1.5 TABLET: 2; .5 TABLET SUBLINGUAL at 09:31

## 2018-08-01 RX ADMIN — INSULIN LISPRO 2 UNITS: 100 INJECTION, SOLUTION INTRAVENOUS; SUBCUTANEOUS at 11:43

## 2018-08-01 RX ADMIN — HYDROCORTISONE: 10 CREAM TOPICAL at 11:28

## 2018-08-01 RX ADMIN — PANTOPRAZOLE SODIUM 40 MG: 40 TABLET, DELAYED RELEASE ORAL at 09:21

## 2018-08-01 RX ADMIN — MOMETASONE FUROATE AND FORMOTEROL FUMARATE DIHYDRATE 2 PUFF: 200; 5 AEROSOL RESPIRATORY (INHALATION) at 08:58

## 2018-08-01 RX ADMIN — GABAPENTIN 800 MG: 800 TABLET ORAL at 15:10

## 2018-08-01 RX ADMIN — ENOXAPARIN SODIUM 40 MG: 40 INJECTION SUBCUTANEOUS at 11:43

## 2018-08-01 RX ADMIN — TIZANIDINE 4 MG: 4 TABLET ORAL at 09:21

## 2018-08-01 RX ADMIN — LEVOFLOXACIN 750 MG: 750 TABLET, FILM COATED ORAL at 09:21

## 2018-08-01 RX ADMIN — IPRATROPIUM BROMIDE AND ALBUTEROL SULFATE 1 AMPULE: .5; 3 SOLUTION RESPIRATORY (INHALATION) at 08:57

## 2018-08-01 RX ADMIN — ROPINIROLE HYDROCHLORIDE 0.5 MG: 0.25 TABLET, FILM COATED ORAL at 09:21

## 2018-08-01 ASSESSMENT — PAIN SCALES - GENERAL: PAINLEVEL_OUTOF10: 7

## 2018-08-01 NOTE — CARE COORDINATION
Faxed Demographics and testing O2 eval to Kimberley Ibarra at [phone] with [place]. Patient discharging today. Awaiting Home O2 orders; nebulizer order and face to face. [address], face to face and Home O2 order to Kimberley Ibarra at [place]. 1600 Faxed HH order, JAZMYN to [facility],  [facility] at [phone]; spoke with vinay Bhandari, to notify patient is ready for discharge. 1607: RN notified.     1610: Discharge [facility] Case Management Department  Written by: Morenita Eduardo RN    Patient Name: Gladis Rowley  Attending Provider: Isabel Worthington MD  Admit Date: 2018  3:12 PM  MRN: [id number]  Account: [de-identified]                     : 1955  Discharge Date: 2018      Disposition: Home with [facility]    Oxygen delivered to room per Dany Gardiner RN

## 2018-08-01 NOTE — PROGRESS NOTES
Home Oxygen Evaluation    Home Oxygen Evaluation completed. Patient is on 2 liters per minute via nasal cannula.   Resting SpO2 = 94%  Resting SpO2 on room air = 85%    SpO2 on room air with exercise = n/a  SpO2 on oxygen as above with exercise = n/a      IBRAHIMA CASAS  10:15 AM

## 2018-08-01 NOTE — PROGRESS NOTES
[facility]  Internal Medicine Residency Program  Inpatient Daily Progress Note  ______________________________________________________________________________    Patient: En Kim  YOB: 1955   MRN: [id number]    Acct: [de-identified]     Admit date: 7/29/2018  Today's date: 08/01/18  Number of days in the hospital: 3  Expected Discharge Date: 08/01/18  CC--COPD  Admitting Diagnosis: Community acquired pneumonia    Subjective:   Pt seen and Chart reviewed. No acute issues overnight, saturating well on 2L nasal cannula. Review of Systems - General ROS: negative for - chills, fatigue or fever  Ophthalmic ROS: negative for - blurry vision or double vision  ENT ROS: negative for - epistaxis, headaches or hearing change  Hematological and Lymphatic ROS: negative for - bleeding problems or blood clots  Endocrine ROS: negative for - breast changes, polydipsia/polyuria or skin changes  Respiratory ROS: negative for - hemoptysis or orthopnea  Cardiovascular ROS: negative for - chest pain or edema  Gastrointestinal ROS: negative for - abdominal pain or blood in stools  Genito-Urinary ROS: negative for - dysuria or erectile dysfunction  Musculoskeletal ROS: negative for - joint stiffness or joint swelling  Neurological ROS: negative for - gait disturbance or memory loss  Dermatological ROS: negative for - acne or dry skin        Objective:   Vital Sign:  /70   Pulse 70   Temp 97.9 °F (36.6 °C) (Oral)   Resp 20   Ht 6' (1.829 m)   Wt 223 lb 5.2 oz (101.3 kg)   SpO2 98%   BMI 30.29 kg/m²       Physical Exam:  General appearance:   alert, well appearing, and in no distress  Mental Status: alert, oriented to person, place, and time  Neurologic:  alert, oriented, normal speech, no focal findings or movement disorder noted  Lungs:  Decreased breath sounds.   Heart[de-identified] normal rate and regular rhythm, S1 and S2 normal  Abdomen:  soft, nontender, nondistended, no masses or organomegaly  Extremities: peripheral pulses normal, no pedal edema, no clubbing or cyanosis   Skin: normal coloration and turgor, no rashes, no suspicious skin lesions noted  Psych--negative  Lymphatic--No enlarged nodes  Vascular--no DVT  Eyes--no Diego,no jaundice  Nose--No polyps  Medications:  Scheduled Medications   hydrocortisone   Topical BID    ipratropium-albuterol  1 ampule Inhalation TID    buprenorphine-naloxone  1.5 tablet Sublingual Daily    potassium chloride  40 mEq Oral Daily    sodium chloride flush  10 mL Intravenous 2 times per day    enoxaparin  40 mg Subcutaneous Daily    ARIPiprazole  10 mg Oral Daily    mometasone-formoterol  2 puff Inhalation BID    gabapentin  800 mg Oral TID    pantoprazole  40 mg Oral Daily    pravastatin  40 mg Oral QPM    rOPINIRole  0.5 mg Oral Daily    tiZANidine  4 mg Oral TID    traZODone  100 mg Oral Nightly    nicotine  1 patch Transdermal Daily    insulin lispro  0-12 Units Subcutaneous TID WC    insulin lispro  0-6 Units Subcutaneous Nightly    levofloxacin  750 mg Oral Daily       PRN Medications  ibuprofen 400 mg Q6H PRN   sodium chloride flush 10 mL PRN   acetaminophen 650 mg Q4H PRN   ondansetron 4 mg Q8H PRN   magnesium hydroxide 30 mL Daily PRN   albuterol 2.5 mg Q4H PRN   albuterol sulfate HFA 1 puff Q6H PRN   guaiFENesin 10 mL Q4H PRN   glucose 15 g PRN   dextrose 12.5 g PRN   glucagon (rDNA) 1 mg PRN   dextrose 100 mL/hr PRN       Diagnostic Labs and Imaging:  CBC:  Recent Labs      07/30/18   0559  07/31/18   0608  08/01/18   0451   WBC  8.1  11.5*  8.3   HGB  13.0  12.2  12.3   PLT  See Reflexed IPF Result  272  260     BMP: Recent Labs      07/30/18   0559  07/31/18   0608  08/01/18   0451   NA  137  140  138   K  3.4*  3.0*  3.7   CL  97*  98  101   CO2  28  29  28   BUN  10  17  12   CREATININE  0.54  0.56  0.48*   GLUCOSE  188*  148*  116*     Hepatic: Recent Labs      07/29/18   1535   AST  14   ALT  9   BILITOT

## 2018-08-01 NOTE — PROGRESS NOTES
Patient was evaluated today for the diagnosis of COPD with acute respiratory failure  I entered a DME order for home oxygen because the diagnosis and testing requires the patient to have supplemental oxygen. Condition will improve or be benefited by oxygen use. The patient is not able to perform good mobility in a home setting and therefore does require the use of a portable oxygen system. The need for this equipment was discussed with the patient and she understands and is in agreement.     Dr Erwin Course

## 2018-08-02 ENCOUNTER — CARE COORDINATION (OUTPATIENT)
Dept: CASE MANAGEMENT | Age: 63
End: 2018-08-02

## 2018-08-02 DIAGNOSIS — J18.9 COMMUNITY ACQUIRED PNEUMONIA, UNSPECIFIED LATERALITY: Primary | ICD-10-CM

## 2018-08-02 LAB
HISTOPLASMA AG, S: NORMAL
HISTOPLASMA ANTIGEN, SERUM: NORMAL

## 2018-08-02 PROCEDURE — 1111F DSCHRG MED/CURRENT MED MERGE: CPT | Performed by: INTERNAL MEDICINE

## 2018-08-02 NOTE — CARE COORDINATION
[facility] Transitions Initial Follow Up Call    Call within 2 business days of discharge: Yes    Patient: Trisha Duarter Patient : 1955   MRN: [id number]  Reason for Admission: Community acquired PNA  Discharge Date: 18 RARS: Readmission Risk Score: 16     Spoke with: [address]: [facility]    Non-face-to-face services provided:  Scheduled appointment with PCP-18 with PCP  Obtained and reviewed discharge summary and/or continuity of care documents     Patient reports she is feeling better and having no issues at this time. She has some dyspnea but is improved since admission and feels she is getting better. She has O2 at home as well as a nebulizer. Medications reviewed with patient, 1111F  done. [facility] is coming tomorrow at 1 pm.  She has scheduled her PCP appointment for Monday but is having difficulty getting pulmonary office. Requested that writer schedule pulmonary appointment. Denies any further needs or concerns at this time. Appointment made with [facility] for Dr. Louise Jernigan on 8/10 @ 2:20pm.  Message left with patient for appointment date and time. She is to have a chest x-ray done before appointment and has script.      Care Transitions 24 Hour Call    Schedule Follow Up Appointment with PCP:  Completed  Do you have any ongoing symptoms?:  Yes  Patient-reported symptoms:  Shortness of Breath (Comment: DOMINGUEZ but improving)  Do you have a copy of your discharge instructions?:  Yes  Do you have all of your prescriptions and are they filled?:  Yes  Have you been contacted by a [address]?:  No  Have you scheduled your follow up appointment?:  Yes  How are you going to get to your appointment?:  Public transportation (Comment: [employer])  Were you discharged with any Home Care or Post Acute Services:  Yes  Post Acute Services:  Home Health (Comment: [facility])  Do you feel like you have everything you need to keep you well at home?: Yes  Care Transitions Interventions         Follow Up  Future Appointments  Date Time Provider Terry Suzette   [facility]6/2018 1:15 PM [facility]   8/9/2018 12:30 PM [facility] RM 1 [facility]   10/8/2018 3:15 PM [facility]   10/15/2018 10:30 AM Nikki Farah MD [facility] CHELSEY Vázquez RN

## 2018-08-03 LAB
HISTOPLASMA ABS, ID: NORMAL
HISTOPLASMA ANTIBODY MYCELIAL CF: NORMAL
HISTOPLASMA ANTIBODY YEAST CF: NORMAL

## 2018-08-06 ENCOUNTER — CARE COORDINATION (OUTPATIENT)
Dept: CASE MANAGEMENT | Age: 63
End: 2018-08-06

## 2018-08-06 ENCOUNTER — OFFICE VISIT (OUTPATIENT)
Dept: INTERNAL MEDICINE | Age: 63
End: 2018-08-06
Payer: COMMERCIAL

## 2018-08-06 ENCOUNTER — HOSPITAL ENCOUNTER (OUTPATIENT)
Age: 63
Discharge: HOME | End: 2018-08-08
Payer: COMMERCIAL

## 2018-08-06 ENCOUNTER — HOSPITAL ENCOUNTER (OUTPATIENT)
Dept: GENERAL RADIOLOGY | Age: 63
Discharge: HOME | End: 2018-08-08
Payer: COMMERCIAL

## 2018-08-06 VITALS
BODY MASS INDEX: 28.85 KG/M2 | HEART RATE: 82 BPM | SYSTOLIC BLOOD PRESSURE: 109 MMHG | DIASTOLIC BLOOD PRESSURE: 61 MMHG | HEIGHT: 72 IN | WEIGHT: 213 LBS

## 2018-08-06 DIAGNOSIS — J96.11 CHRONIC RESPIRATORY FAILURE WITH HYPOXIA (HCC): ICD-10-CM

## 2018-08-06 DIAGNOSIS — F17.210 CIGARETTE NICOTINE DEPENDENCE WITHOUT COMPLICATION: ICD-10-CM

## 2018-08-06 DIAGNOSIS — E78.00 PURE HYPERCHOLESTEROLEMIA: ICD-10-CM

## 2018-08-06 DIAGNOSIS — J13 PNEUMONIA DUE TO STREPTOCOCCUS PNEUMONIAE, UNSPECIFIED LATERALITY, UNSPECIFIED PART OF LUNG (HCC): Primary | ICD-10-CM

## 2018-08-06 PROBLEM — J18.9 COMMUNITY ACQUIRED PNEUMONIA: Status: RESOLVED | Noted: 2018-07-29 | Resolved: 2018-08-06

## 2018-08-06 PROCEDURE — 1111F DSCHRG MED/CURRENT MED MERGE: CPT | Performed by: INTERNAL MEDICINE

## 2018-08-06 PROCEDURE — 99212 OFFICE O/P EST SF 10 MIN: CPT | Performed by: INTERNAL MEDICINE

## 2018-08-06 PROCEDURE — 71046 X-RAY EXAM CHEST 2 VIEWS: CPT

## 2018-08-06 PROCEDURE — 99213 OFFICE O/P EST LOW 20 MIN: CPT | Performed by: INTERNAL MEDICINE

## 2018-08-06 NOTE — CARE COORDINATION
Attempted to reach pt for care transition follow up call. Left message requesting call back.     [facility]   [phone]

## 2018-08-06 NOTE — PATIENT INSTRUCTIONS
LABORATORY INSTRUCTIONS    Your doctor has ordered blood or urine testing. You can get this testing done at the Lab located on the first floor of the [facility], or at any other [facility]. Please stop at Main Registration, before going to the lab, as you must be registered first.     Please get this lab done before your next visit. You may NOT eat, drink, smoke, or chew anything before this test for 8 hours. You may still have water. Return To Clinic 9/17/2018. After Visit Summary  given and reviewed. --MA    It is very important for your care that you keep your appointment. If for some reason you are unable to keep your appointment it is equally important that you call our office at [phone] to cancel your appointment and reschedule. Failure to do so may result in your termination from our practice.

## 2018-08-06 NOTE — PROGRESS NOTES
Visit Information    Have you changed or started any medications since your last visit including any over-the-counter medicines, vitamins, or herbal medicines? no   Have you stopped taking any of your medications? Is so, why? -  no  Are you having any side effects from any of your medications? - no    Have you seen any other physician or provider since your last visit?  no   Have you had any other diagnostic tests since your last visit? yes -    Have you been seen in the emergency room and/or had an admission in a hospital since we last saw you?  yes -    Have you had your routine dental cleaning in the past 6 months?  no     Do you have an active MyChart account? If no, what is the barrier?   Yes    Patient Care Team:  Mbonu Corrinne Shiver, MD as PCP - General  Mbonu Corrinne Shiver, MD as PCP - S Attributed Provider  Catherine Awan MD as Consulting Physician (Gastroenterology)  Mbonu Corrinne Shiver, MD as Referring Physician (Internal Medicine)  Mihaela Vigil MD as Consulting Physician (Pulmonology)  Nathalie Price RN as Care Transition  Noel Bullock MA as Care Transition  Jostin Delcid RN as Care Transition    Medical History Review  Past Medical, Family, and Social History reviewed and does contribute to the patient presenting condition    Health Maintenance   Topic Date Due    Shingles Vaccine (1 of 2 - 2 Dose Series) 06/05/2007    Lipid screen  03/20/2018    DTaP/Tdap/Td vaccine (1 - Tdap) 07/30/2026 (Originally 7/31/2016)    HIV screen  01/05/2029 (Originally 6/5/1970)    Flu vaccine (1) 09/01/2018    Diabetic microalbuminuria test  10/31/2018    Diabetic retinal exam  01/01/2019    A1C test (Diabetic or Prediabetic)  05/07/2019    Diabetic foot exam  05/09/2019    Potassium monitoring  08/01/2019    Creatinine monitoring  08/01/2019    Colon cancer screen colonoscopy  09/20/2019    Breast cancer screen  11/01/2019    Pneumococcal med risk  Completed

## 2018-08-06 NOTE — PROGRESS NOTES
Post-Discharge Transitional Care Management Services      Mita Romo   YOB: 1955    Date of Office Visit:  8/6/2018  Date of Hospital Admission: 7/29/18  Date of Hospital Discharge: 8/1/18  Risk of hospital readmission (high >=14%.  Medium >=10%) :Readmission Risk Score: 16    Care management risk score Rising risk (score 2-5) and Complex Care (Scores >=6): 7     Non face to face  following discharge, date last encounter closed (first attempt may have been earlier): 8/2/2018  2:22 PM    Call initiated 2 business days of discharge: Yes    Patient Active Problem List   Diagnosis    Essential hypertension    Pure hypercholesterolemia    Depression    History of heroin use    Hep C w/o coma, chronic ([facility] completed tx 2016    GERD (gastroesophageal reflux disease)    COPD (chronic obstructive pulmonary disease) ([facility]    Tobacco abuse    Chronic diastolic congestive heart failure ([facility]    Diverticulosis    Hyperplastic polyp of intestine    Diabetic polyneuropathy associated with type 2 diabetes mellitus ([facility]    Bilateral chronic knee pain    Chronic respiratory failure with hypoxia (HCC)    Type 2 diabetes mellitus with complication (HCC)    Primary insomnia    Constipation    Cigarette nicotine dependence without complication    Chronic bilateral low back pain with right-sided sciatica       Allergies   Allergen Reactions    Iv Dye [Iodides] Hives       Medications listed as ordered at the time of discharge from hospital   Sejose Garza   [place] Medication Instructions CZD:[id number]    Printed on:08/06/18 1321   Medication Information                      acetaminophen (TYLENOL) 500 MG tablet  Take 1 tablet by mouth every 6 hours as needed for Pain             albuterol (PROVENTIL) (2.5 MG/3ML) 0.083% nebulizer solution  Take 3 mLs by nebulization every 4 hours as needed for Wheezing             albuterol sulfate HFA (VENTOLIN HFA) 108 (90 Base) MCG/ACT inhaler  Inhale 1 (COMPRESSOR/NEBULIZER) MISC  Dx: COPD use 3-4 times daily as needed             nicotine polacrilex (NICORETTE) 2 MG gum  Take 1 each by mouth as needed for Smoking cessation Maximum dose: 24 pieces/24 hours. pantoprazole (PROTONIX) 40 MG tablet  Take 1 tablet by mouth daily Stop Omeprazole             pravastatin (PRAVACHOL) 40 MG tablet  Take 1 tablet by mouth every evening             rOPINIRole (REQUIP) 0.5 MG tablet  Take 1 tablet by mouth daily             sodium chloride (OCEAN NASAL SPRAY) 0.65 % nasal spray  1 spray by Nasal route as needed for Congestion             SUBOXONE 4-1 MG FILM SL film  DISSOLVE 1 FILM UNDER THE TONGUE DAILY             tiZANidine (ZANAFLEX) 4 MG tablet  Take 1 tablet by mouth 3 times daily             traZODone (DESYREL) 100 MG tablet  Take 1 tablet by mouth nightly             Umeclidinium Bromide (INCRUSE ELLIPTA) 62.5 MCG/INH AEPB  Inhale 1 puff into the lungs daily                   Medications marked \"taking\" at this time  Outpatient Prescriptions Marked as Taking for the 8/6/18 encounter (Office Visit) with Antonella Roe MD   Medication Sig Dispense Refill    ipratropium-albuterol (DUONEB) 0.5-2.5 (3) MG/3ML SOLN nebulizer solution Inhale 3 mLs into the lungs three times daily 360 mL 2    Nebulizers (COMPRESSOR/NEBULIZER) MISC Dx: COPD use 3-4 times daily as needed 1 each 0    tiZANidine (ZANAFLEX) 4 MG tablet Take 1 tablet by mouth 3 times daily 90 tablet 2    pantoprazole (PROTONIX) 40 MG tablet Take 1 tablet by mouth daily Stop Omeprazole 30 tablet 2    albuterol (PROVENTIL) (2.5 MG/3ML) 0.083% nebulizer solution Take 3 mLs by nebulization every 4 hours as needed for Wheezing 120 each 2    pravastatin (PRAVACHOL) 40 MG tablet Take 1 tablet by mouth every evening 30 tablet 2    gabapentin (NEURONTIN) 800 MG tablet Take 1 tablet by mouth 3 times daily for 59 days. DC Lyrica.  90 tablet 2    albuterol sulfate HFA (VENTOLIN HFA) 108 (90 Base) MCG/ACT Medications patient taking as of now reconciled against medications ordered at time of hospital discharge: No    Chief Complaint   Patient presents with   [address] from [facility] Maintenance     decline shingles vac       History of Present illness - Follow up of Hospital diagnosis(es):     Patient is here for posthospital visit. She was admitted because of multilobar pneumonia and treated with antibiotics. She reported that her shortness of breath is back to baseline. She denies any fever or chills. She still has cough but reported that is improving. She has history of COPD with chronic respiratory failure. She is on inhalers and home oxygen respectively. Continue to smoke. She is asking for Nicotrol to help her quit smoking. Problem list, medications and blood work reviewed      Vitals:    08/06/18 1311   BP: 109/61   Site: Left Arm   Position: Sitting   Cuff Size: Medium Adult   Pulse: 82   Weight: 213 lb (96.6 kg)   Height: 6' (1.829 m)     Body mass index is 28.89 kg/m².    Wt Readings from Last 3 Encounters:   08/06/18 213 lb (96.6 kg)   08/01/18 223 lb 5.2 oz (101.3 kg)   07/18/18 229 lb 12.8 oz (104.2 kg)     BP Readings from Last 3 Encounters:   08/06/18 109/61   08/01/18 118/70   07/18/18 113/64        Physical Exam:  General Appearance: alert and oriented to person, place and time, well developed and well- nourished, in no acute distress  Skin: warm and dry, no rash or erythema  Head: normocephalic and atraumatic  Eyes: pupils equal, round, and reactive to light, extraocular eye movements intact, conjunctivae normal  ENT: tympanic membrane, external ear and ear canal normal bilaterally, nose without deformity, nasal mucosa and turbinates normal without polyps  Neck: supple and non-tender without mass, no thyromegaly or thyroid nodules, no cervical lymphadenopathy  Pulmonary/Chest: clear to auscultation bilaterally- no wheezes, rales or rhonchi, normal air movement, no respiratory distress  Cardiovascular: normal rate, regular rhythm, normal S1 and S2, no murmurs, rubs, clicks, or gallops, distal pulses intact, no carotid bruits  Abdomen: soft, non-tender, non-distended, normal bowel sounds, no masses or organomegaly  Extremities: no cyanosis, clubbing or edema  Musculoskeletal: normal range of motion, no joint swelling, deformity or tenderness  Neurologic: reflexes normal and symmetric, no cranial nerve deficit, gait, coordination and speech normal    Assessment/Plan:  Maureen Barrett was seen today for follow-up from hospital and health maintenance. Diagnoses and all orders for this visit:    Pneumonia due to Streptococcus pneumoniae, unspecified laterality, unspecified part of lung ([address]  -     IA DISCHARGE MEDS RECONCILED W/ CURRENT OUTPATIENT MED LIST    Chronic respiratory failure with hypoxia ([address]    Pure hypercholesterolemia  -     Lipid Panel; Future    Cigarette nicotine dependence without complication  -     nicotine (NICOTROL) 10 MG inhaler;  Inhale 1 puff into the lungs 3 times daily as needed for Smoking cessation          Medical Decision Making: low complexity

## 2018-08-07 NOTE — CARE COORDINATION
[facility] Transitions Follow Up Call    2018    Patient: Kain Diaz  Patient : 1955   MRN: <[id number]>  Reason for Admission: There are no discharge diagnoses documented for the most recent discharge. Discharge Date: 18 RARS: Readmission Risk Score: 12       Spoke with: [address] [place] [address] Transitions Subsequent and Final Call    Subsequent and Final Calls  Do you have any ongoing symptoms?:  Yes  Onset of Patient-reported symptoms:  Today  Interventions for patient-reported symptoms:  Notified PCP/Physician  Have your medications changed?:  No  Do you have any questions related to your medications?:  No  Do you currently have any active services?:  Yes  Are you currently active with any services?:  Home Health  Do you have any needs or concerns that I can assist you with?:  No  Care Transitions Interventions  Other Interventions:        Pt states she has chills this morning. She does not have a thermometer so she can't take her temperature. States she has no other symptoms. Reports her BP was 107/61 this morning. Pt did have her chest x-ray yesterday and plans to attend her appt on Thursday. States she's staying at her cousin's house now and her cousin is at the pharmacy picking up her medications, then plans to  her oxygen from her house. Placed call to [facility] and informed of address change as pt is staying with cousin. Also informed of pt symptoms today and requested nurse visit. Severiano Mcbride at [place] [address] states nurse is scheduled to visit pt tomorrow but she will try to change to today. Pt informed of above and notified to call home care or PCP if she develops any other symptoms. Pt verbalized understanding.      Follow Up  Future Appointments  Date Time Provider Terry Bradford   2018 9:30 AM Sarah Ventura MD Resp Spec [address] [facility]   2018 12:30 PM STV CHF CLINIC RM 1 STVZ CHF CLI [facility]   2018 1:00 PM Antonella Curtis MD [facility] 10/8/2018 3:15 PM Antonella Hong MD [address] [place] [address] [facility]   10/15/2018 10:30 AM [facility]       Tabatha Ma

## 2018-08-09 ENCOUNTER — OFFICE VISIT (OUTPATIENT)
Dept: PULMONOLOGY | Age: 63
End: 2018-08-09
Payer: COMMERCIAL

## 2018-08-09 VITALS
WEIGHT: 213 LBS | DIASTOLIC BLOOD PRESSURE: 71 MMHG | HEART RATE: 70 BPM | OXYGEN SATURATION: 94 % | RESPIRATION RATE: 14 BRPM | HEIGHT: 72 IN | BODY MASS INDEX: 28.85 KG/M2 | SYSTOLIC BLOOD PRESSURE: 112 MMHG | TEMPERATURE: 97.7 F

## 2018-08-09 VITALS — HEIGHT: 72 IN | HEART RATE: 70 BPM | BODY MASS INDEX: 28.85 KG/M2 | WEIGHT: 213 LBS | OXYGEN SATURATION: 98 %

## 2018-08-09 DIAGNOSIS — J18.9 COMMUNITY ACQUIRED PNEUMONIA, UNSPECIFIED LATERALITY: ICD-10-CM

## 2018-08-09 DIAGNOSIS — I10 ESSENTIAL HYPERTENSION: ICD-10-CM

## 2018-08-09 DIAGNOSIS — E11.40 CONTROLLED TYPE 2 DIABETES MELLITUS WITH DIABETIC NEUROPATHY, UNSPECIFIED WHETHER LONG TERM INSULIN USE (HCC): ICD-10-CM

## 2018-08-09 DIAGNOSIS — I35.1 NONRHEUMATIC AORTIC VALVE INSUFFICIENCY: ICD-10-CM

## 2018-08-09 DIAGNOSIS — J44.9 COPD WITH CHRONIC BRONCHITIS AND EMPHYSEMA (HCC): ICD-10-CM

## 2018-08-09 DIAGNOSIS — G47.61 PLMD (PERIODIC LIMB MOVEMENT DISORDER): ICD-10-CM

## 2018-08-09 DIAGNOSIS — I50.1 PULMONARY EDEMA CARDIAC CAUSE (HCC): Primary | ICD-10-CM

## 2018-08-09 DIAGNOSIS — J96.11 CHRONIC RESPIRATORY FAILURE WITH HYPOXIA (HCC): Primary | ICD-10-CM

## 2018-08-09 DIAGNOSIS — Z95.0 PACEMAKER: ICD-10-CM

## 2018-08-09 DIAGNOSIS — J44.9 CHRONIC OBSTRUCTIVE PULMONARY DISEASE, UNSPECIFIED COPD TYPE (HCC): ICD-10-CM

## 2018-08-09 DIAGNOSIS — F32.4 MAJOR DEPRESSIVE DISORDER WITH SINGLE EPISODE, IN PARTIAL REMISSION (HCC): ICD-10-CM

## 2018-08-09 DIAGNOSIS — F17.200 SMOKING ADDICTION: ICD-10-CM

## 2018-08-09 DIAGNOSIS — E66.9 OBESITY (BMI 35.0-39.9 WITHOUT COMORBIDITY): ICD-10-CM

## 2018-08-09 PROCEDURE — 94726 PLETHYSMOGRAPHY LUNG VOLUMES: CPT | Performed by: INTERNAL MEDICINE

## 2018-08-09 PROCEDURE — 99214 OFFICE O/P EST MOD 30 MIN: CPT | Performed by: INTERNAL MEDICINE

## 2018-08-09 PROCEDURE — 94375 RESPIRATORY FLOW VOLUME LOOP: CPT | Performed by: INTERNAL MEDICINE

## 2018-08-09 PROCEDURE — 94729 DIFFUSING CAPACITY: CPT | Performed by: INTERNAL MEDICINE

## 2018-08-09 NOTE — PROGRESS NOTES
Community acquired pneumonia, unspecified laterality  CT CHEST HIGH RESOLUTION   3. Obesity (BMI 35.0-39.9 without comorbidity)     4. Essential hypertension     5. COPD with chronic bronchitis and emphysema (HCC)  FULL PFT STUDY WITH PRE AND POST   6. Smoking addiction     7. Controlled type 2 diabetes mellitus with diabetic neuropathy, unspecified whether long term insulin use ([facility]     8. Pacemaker     9. PLMD (periodic limb movement disorder)     10. Major depressive disorder with single episode, in partial remission ([facility]     11. Nonrheumatic aortic valve insufficiency         Plan:  Patient is doing well since discharge from the hospital.  No fever, chills or rigors. She not on any antibiotics. No cough, sputum production. I advised her to give up smoking. He continue her bronchodilators as before. I arranged for her to have a repeat high-resolution CT scan of the chest to look at the infiltrates. She'll continue the bronchodilators as before. We'll also get a pulmonary function study to further evaluate her lung disease. Her COPD is under good control. She'll continue the bronchodilators as before. She does have home oxygen that she uses at night. We will arrange for her to have a pulmonary function study done. New per    Patient was once again advised to give up smoking altogether. However, I'm afraid this never happened. Her pacemaker is functioning well. She continued to be overweight and has not been able to lose much weight. Her depression is more partially under good control. No suicidal tendencies. She has nonrheumatic aortic insufficiency noted on the echocardiography but is not hemodynamically significant. She has diabetes and that there is evidence of diabetic neuropathy which is asymptomatic, however. She'll continue her present medication for diabetes and hypertension.   It was our impression during her hospitalization that her infiltrates are very likely due

## 2018-08-10 ENCOUNTER — CARE COORDINATION (OUTPATIENT)
Dept: CASE MANAGEMENT | Age: 63
End: 2018-08-10

## 2018-08-10 NOTE — CARE COORDINATION
[facility] Transitions Follow Up Call    8/10/2018    Patient: Arden Francois  Patient : 1955   MRN: [id number]  Reason for Admission: Community acquired pneumonia  Discharge Date: 18 RARS: Readmission Risk Score: 16       Spoke with: Candice Hendrickson to patient for care transitions. Patient reports feeling well. Patient denies questions/changes to her medications at the time of writer's call. Patient confirmed [facility] continues to be active with services. Patient reports completion of hospital follow-up with PCP office and visit with pulmonary. Patient reports plan for repeat CT Scan of her chest in the future. Patient denies new needs/concerns at the time of writer's call. Care Transitions Subsequent and Final Call    Subsequent and Final Calls  Do you have any ongoing symptoms?:  No  Have your medications changed?:  No  Do you have any questions related to your medications?:  No  Do you currently have any active services?:  No  Are you currently active with any services?:  Home Health  Do you have any needs or concerns that I can assist you with?:  No  Identified Barriers:  Lack of Education, Lack of Motivation  Care Transitions Interventions  Other Interventions:             Follow Up  Future Appointments  Date Time Provider Terry Bradford   2018 4:00 PM STV CT  STVZ CT SCAN [facility]iolog   2018 10:00 AM Lilly Pavon MD Resp Spec [facility]   2018 9:30 AM [facility] CLINIC RM [facility]   2018 1:00 PM Antonella Dodge MD [facility]   10/8/2018 3:15 PM Antonella Dodge MD [facility]   10/15/2018 10:30 AM Pedro [facility]       Hyacinth Singh MA

## 2018-08-14 ENCOUNTER — HOSPITAL ENCOUNTER (OUTPATIENT)
Dept: CT IMAGING | Age: 63
Discharge: HOME | End: 2018-08-16
Payer: COMMERCIAL

## 2018-08-14 DIAGNOSIS — I50.1 PULMONARY EDEMA CARDIAC CAUSE (HCC): ICD-10-CM

## 2018-08-14 DIAGNOSIS — J18.9 COMMUNITY ACQUIRED PNEUMONIA, UNSPECIFIED LATERALITY: ICD-10-CM

## 2018-08-14 PROCEDURE — 71250 CT THORAX DX C-: CPT

## 2018-08-14 NOTE — DISCHARGE SUMMARY
[address] [place] [address]     Department of Internal Medicine - Staff Internal Medicine Service    INPATIENT DISCHARGE SUMMARY      PATIENT IDENTIFICATION:  NAME:  Talia Myles   :   1955  MRN:    [id number]     Acct:    [de-identified]   Admit Date:  2018  Discharge date:  2018  6:25 PM   Attending Provider: No att. providers found                                     REASON FOR HOSPITALIZATION:   Talia Myles is a 66 y.o. female who presented with dyspnea and cough. DIAGNOSES:  Primary:   Pneumonia [J18.9]    Secondary: Active Hospital Problems    Diagnosis Date Noted    Type 2 diabetes mellitus with complication ([facility] [U86.4] 2016    Chronic diastolic congestive heart failure (HCC) [I50.32] 10/16/2015    COPD (chronic obstructive pulmonary disease) ([facility] [J44.9] 2015    Tobacco abuse [Z72.0] 2015    History of heroin use [Z86.59] 10/30/2014    Essential hypertension [I10] 2013       TREATMENT:  Brief Inpatient Course: The patient is a 66 y.o.  female with PMH of DM2, HTN, smoker with emphysematous COPD is admitted to the hospital for dyspnea and worsening of cough. Symptoms began 2-3d prior to admission and associated with occasional streaks of hemoptysis, but not purulent. During same timeframe patient was experiencing runny nose and chills. Patient reported to PCP Dr. Mihaela Bae on 2018 and received a chest CT which showed ground glass opacities in RUL and LUIS FERNANDO. Patient was instructed to come to ED for workup of infectious pneumonitis vs pneumonia. Patient on suboxone.      In ED, repeat cxr showed worsening of infiltrates in upper lobes. Chest CT was ordered and patient was started on z-pac and admitted. Patient azithro d/c and started on levaquin. Resp viral panel with mycoplasma and histoplasmosis tests done which showed neg.  With worsening cough and sputum with increased O2 requirements, COPD exacerbation was suspected and was evaluated for home daily, Disp-30 tablet, R-2Normal      metFORMIN (GLUCOPHAGE) 500 MG tablet Take 1 tablet by mouth 2 times daily (with meals), Disp-60 tablet, R-2Normal      guaiFENesin (ALTARUSSIN) 100 MG/5ML syrup Take 10 mLs by mouth every 4 hours as needed for Cough, Disp-240 mL, R-0Normal      LINZESS 145 MCG capsule take 1 capsule by mouth every morning BEFORE BREAKFAST, Disp-30 capsule, R-2Normal      hydrochlorothiazide (MICROZIDE) 12.5 MG capsule take 1 capsule by mouth once daily every morning, Disp-30 capsule, R-2Normal      furosemide (LASIX) 20 MG tablet take 1 tablet by mouth twice a day, Disp-60 tablet, R-2Normal      Elastic Bandages & Supports (MEDICAL COMPRESSION STOCKINGS) MISC Disp-2 each, R-2, PrintDx: leg edema, use daily as needed, 20-30 mmHg      acetaminophen (TYLENOL) 500 MG tablet Take 1 tablet by mouth every 6 hours as needed for Pain, Disp-45 tablet, R-0Normal      lidocaine (LMX) 4 % cream Apply topically cristopher per day as needed. , Disp-1 Tube, R-0, Normal      Umeclidinium Bromide (INCRUSE ELLIPTA) 62.5 MCG/INH AEPB Inhale 1 puff into the lungs daily, Disp-1 each, R-5Normal      nicotine polacrilex (NICORETTE) 2 MG gum Take 1 each by mouth as needed for Smoking cessation Maximum dose: 24 pieces/24 hours. , Disp-110 each, R-3OTC      Blood Glucose Monitoring Suppl SERA Disp-1 Device, R-0, NormalDx: DM-2 use 2 times daily as needed      sodium chloride (OCEAN NASAL SPRAY) 0.65 % nasal spray 1 spray by Nasal route as needed for Congestion, Disp-1 Bottle, R-3Print      Lancets MISC Disp-100 each, R-10, NormalDx: DM2, use 2-3 times daily. Ok to dispense any brand      Alcohol Swabs (ALCOHOL PREP) 70 % PADS Disp-100 each, R-11, NormalUse 1-2 times daily. Diagnisis:250.0  Diabetes Mellitus 2 Non-Insulin Dependent      Glucose Blood (BLOOD GLUCOSE TEST STRIPS) STRP Use 1-2 times daily.  Diagnisis:250.0  Diabetes Mellitus 2 Non-Insulin Dependent, Disp-100 strip, R-11Normal      hydrOXYzine (ATARAX) 10 MG tablet take 1 tablet by mouth three times a day, R-0Historical Med      SUBOXONE 4-1 MG FILM SL film DISSOLVE 1 FILM UNDER THE TONGUE DAILY, R-0, DAWHistorical Med      ARIPiprazole (ABILIFY) 10 MG tablet Take 10 mg by mouth daily, R-0         STOP taking these medications       buprenorphine-naloxone (SUBOXONE) 2-0.5 MG FILM SL film Comments:   Reason for Stopping:             Activity: activity as tolerated    Diet: cardiac diet    Disposition: home    Follow-up:  in the next few weeks with Antonella Frederick MD,  in 1 week with Dr. Cesar Rocha .         Denita Main MD  PGY-1, Internal Medicine Resident  [facility], [place], [place]  8/14/2018, 3:25 PM

## 2018-08-15 ENCOUNTER — CARE COORDINATION (OUTPATIENT)
Dept: CASE MANAGEMENT | Age: 63
End: 2018-08-15

## 2018-08-15 NOTE — CARE COORDINATION
Attempt to reach patient for Care Transitions. [facility] requesting return call. Contact information provided.

## 2018-08-20 ENCOUNTER — TELEPHONE (OUTPATIENT)
Dept: PULMONOLOGY | Age: 63
End: 2018-08-20

## 2018-08-20 NOTE — TELEPHONE ENCOUNTER
Patient insurance denied CT Chest and they will need you to do a peer to peer review. Please call [phone] option #4 reference number [id number].

## 2018-09-04 ENCOUNTER — TELEPHONE (OUTPATIENT)
Dept: INFECTIOUS DISEASES | Age: 63
End: 2018-09-04

## 2018-09-04 NOTE — TELEPHONE ENCOUNTER
Dr. Chaka Ospina do you want to call the patient with her results of the CT Scan or do you want us to fit her in on your all ready double booked days. Please advise.

## 2018-09-07 NOTE — TELEPHONE ENCOUNTER
Please let me know if you are going to call the patient about her results or if you want her put in on your already double booked schedule.

## 2018-09-10 ENCOUNTER — OFFICE VISIT (OUTPATIENT)
Dept: PULMONOLOGY | Age: 63
End: 2018-09-10
Payer: COMMERCIAL

## 2018-09-10 VITALS
HEART RATE: 83 BPM | HEIGHT: 72 IN | BODY MASS INDEX: 28.85 KG/M2 | RESPIRATION RATE: 16 BRPM | OXYGEN SATURATION: 93 % | SYSTOLIC BLOOD PRESSURE: 117 MMHG | WEIGHT: 213 LBS | DIASTOLIC BLOOD PRESSURE: 77 MMHG

## 2018-09-10 DIAGNOSIS — F17.200 SMOKING ADDICTION: ICD-10-CM

## 2018-09-10 DIAGNOSIS — I50.1 PULMONARY EDEMA CARDIAC CAUSE (HCC): Primary | ICD-10-CM

## 2018-09-10 DIAGNOSIS — E11.65 TYPE 2 DIABETES MELLITUS WITH HYPERGLYCEMIA, WITHOUT LONG-TERM CURRENT USE OF INSULIN (HCC): ICD-10-CM

## 2018-09-10 DIAGNOSIS — J44.9 COPD WITH CHRONIC BRONCHITIS AND EMPHYSEMA (HCC): ICD-10-CM

## 2018-09-10 DIAGNOSIS — I10 ESSENTIAL HYPERTENSION: ICD-10-CM

## 2018-09-10 DIAGNOSIS — G47.33 OSA (OBSTRUCTIVE SLEEP APNEA): ICD-10-CM

## 2018-09-10 DIAGNOSIS — J96.11 CHRONIC RESPIRATORY FAILURE WITH HYPOXIA (HCC): ICD-10-CM

## 2018-09-10 DIAGNOSIS — E66.9 OBESITY (BMI 35.0-39.9 WITHOUT COMORBIDITY): ICD-10-CM

## 2018-09-10 PROCEDURE — 99213 OFFICE O/P EST LOW 20 MIN: CPT | Performed by: INTERNAL MEDICINE

## 2018-09-11 ENCOUNTER — HOSPITAL ENCOUNTER (OUTPATIENT)
Age: 63
Discharge: HOME | End: 2018-09-11
Payer: COMMERCIAL

## 2018-09-11 DIAGNOSIS — E78.00 PURE HYPERCHOLESTEROLEMIA: ICD-10-CM

## 2018-09-11 LAB
CHOLESTEROL/HDL RATIO: 3.1
CHOLESTEROL: 122 MG/DL
HDLC SERPL-MCNC: 40 MG/DL
LDL CHOLESTEROL: 65 MG/DL (ref 0–130)
TRIGL SERPL-MCNC: 84 MG/DL
VLDLC SERPL CALC-MCNC: ABNORMAL MG/DL (ref 1–30)

## 2018-09-11 PROCEDURE — 80061 LIPID PANEL: CPT

## 2018-09-11 PROCEDURE — 36415 COLL VENOUS BLD VENIPUNCTURE: CPT

## 2018-09-11 NOTE — PROGRESS NOTES
Adolph Turner  9/11/2018    Chief Complaint   Patient presents with    COPD        Adolph Turner  presented for follow up for Chronic obstructive lung disease due to chronic bronchitis and emphysema. Abdomen status is stable. There is no evidence of an acute exacerbation of COPD. In no excessive cough or sputum production. No hemoptysis. No increase in the volume or color of the sputum. She has no pedal edema. No thrombolic process. She has chronic respiratory failure, which is hypoxic in nature and she does have home oxygen that she uses regularly every night. Oxygen saturation the daytime has been satisfactory. She also uses the oxygen and she is ambulating. Patient has systemic hypertension and type 2 diabetes mellitus, which is under good control with the present regimen. She does a history of smoking. Unfortunately, she had given up smoking. Patient was recently admitted to the hospital and noted to have bilateral alveolar infiltrates which was found to be secondary to pulmonary edema, probably related to ischemic cardiomyopathy. Her cardiovascular status has been improving. Her recent x-rays revealed significant improvement in the alveolar infiltrates. There is no evidence of any pleural effusions. Denies any chest pain. Denies any proximal nocturnal dyspnea  She does history of pathological snoring and clinical features of sleep apnea and therefore more daytime sleepiness. Sleep is not  refreshing. No morning headaches. Does complain of dry mouth. Does not experience any choking episodes at night, but has to go to the bathroom frequently but she has been on diuretic and have evidence of heart failure. She has no history of thyroid dysfunction. No flowsheet data found. Review of Systems -the review is assistant was conducted for all other system including upper and lower extremities and no additional information was obtained.   General ROS: negative for - chills, fatigue, fever or weight loss  ENT ROS: negative for - headaches, oral lesions or sore throat  Cardiovascular ROS: no chest pain , orthopnea or pnd   Gastrointestinal ROS: no abdominal pain, change in bowel habits, or black or bloody stools  Skin - no rash   Neuro - no blurry vision , no loc . No focal weakness   msk - no jt tenderness or swelling    Vascular - no claudication , rest completed and negative   Lymphatic - complete and negative   Hematology - oncology - complete and negative   Allergy immunology - complete and negative    no burning or hematuria       LUNG CANCER SCREENING     1. CRITERIA MET    []     CT ORDERED  []      2. CRITERIA NOT MET   [x]      3. REFUSED                    []        REASON CRITERIA NOT MET     1. SMOKING LESS THAN 30 PY  []      2. AGE LESS THAN 55 or GREATER 77 YEARS  []      3. QUIT SMOKING 15 YEARS OR GREATER   []      4. RECENT CT WITH IN 11 MONTHS    [x]      5. LIFE EXPECTANCY < 5 YEARS   []      6.  SIGNS  AND SYMPTOMS OF LUNG CANCER   []           Immunization   Immunization History   Administered Date(s) Administered    Influenza Virus Vaccine 01/22/2014, 10/22/2015, 07/30/2016, 12/29/2017    Pneumococcal 13-valent Conjugate ([id number]) 08/15/2016    Pneumococcal Polysaccharide ([id number]) 10/22/2015, 12/29/2017    Td 07/30/2016        Pneumococcal Vaccine     [x] Up to date    [] Indicated   [] Refused  [] Contraindicated       Influenza Vaccine   [x] Up to date    [] Indicated   [] Refused  [] Contraindicated       PAST MEDICAL HISTORY:         Diagnosis Date    RACHEL (acute kidney injury) ([facility] 1/22/2014    Arthritis     generalized    Cancer ([facility] [address] 2004    uterus    CHF (congestive heart failure) ([address]     Chronic bilateral low back pain with right-sided sciatica 2/20/2017    COPD (chronic obstructive pulmonary disease) ([address]     on inhaler    Depression 10/30/2014    Diabetic polyneuropathy associated with type 2 diabetes mellitus ([address]     Diabetic polyneuropathy associated with type 2 diabetes mellitus (HCC)     Diverticulosis     Full dentures     GERD (gastroesophageal reflux disease)     Hep C w/o coma, chronic (HCC)     Hyperlipidemia     Hyperplastic polyp of intestine     Hypertension     DR. MCDANIEL-CARDIO    Liver disease     hepatitis C    Pacemaker 2012    Pneumonia 7/2012    RECENTLY HOSPITALIZED    Pneumonia     Rectal bleeding     Tobacco abuse        Family History:   Family History   Problem Relation Age of Onset    Cancer Mother     Stroke Father     Other Sister         CROHNS       SURGICAL HISTORY:   Past Surgical History:   Procedure Laterality Date    BACK SURGERY  2008    CARDIAC SURGERY       cath dec. 2013    COLONOSCOPY      COLONOSCOPY  1/23/15    severe diverticula    COLONOSCOPY  09/20/2016    HYPERPLASTIC POLYP X 2     ENDOSCOPY, COLON, DIAGNOSTIC      HERNIA REPAIR      UMBILICAL    HYSTERECTOMY      LUMBAR SPINE SURGERY  9/24/13    decompression & re-exploration L3-4, L4-5    PACEMAKER INSERTION      PACEMAKER PLACEMENT      SIGMOIDOSCOPY N/A 6/26/15    flexable sigmoidscopy,HYPERPLASTIC POLYP    TONSILLECTOMY                Not in a hospital admission. Allergies   Allergen Reactions    Iv Dye [Iodides] Hives     History   Smoking Status    Current Some Day Smoker    Packs/day: 0.50    Years: 40.00    Types: Cigarettes   Smokeless Tobacco    Never Used     Prior to Admission medications    Medication Sig Start Date End Date Taking?  Authorizing Provider   nicotine (NICOTROL) 10 MG inhaler Inhale 1 puff into the lungs 3 times daily as needed for Smoking cessation 8/6/18  Yes Antonella Hirsch MD   ipratropium-albuterol (DUONEB) 0.5-2.5 (3) MG/3ML SOLN nebulizer solution Inhale 3 mLs into the lungs three times daily 8/1/18  Yes Rosario Diallo MD   Nebulizers (COMPRESSOR/NEBULIZER) MISC Dx: COPD use 3-4 times daily as needed 7/23/18  Yes Antonella Hirsch MD   tiZANidine (ZANAFLEX) 4 MG

## 2018-09-17 ENCOUNTER — OFFICE VISIT (OUTPATIENT)
Dept: INTERNAL MEDICINE | Age: 63
End: 2018-09-17
Payer: COMMERCIAL

## 2018-09-17 VITALS
DIASTOLIC BLOOD PRESSURE: 70 MMHG | HEART RATE: 80 BPM | SYSTOLIC BLOOD PRESSURE: 128 MMHG | WEIGHT: 214 LBS | BODY MASS INDEX: 29.02 KG/M2

## 2018-09-17 DIAGNOSIS — J41.0 SIMPLE CHRONIC BRONCHITIS (HCC): ICD-10-CM

## 2018-09-17 DIAGNOSIS — I10 ESSENTIAL HYPERTENSION: Primary | ICD-10-CM

## 2018-09-17 DIAGNOSIS — G89.29 CHRONIC BILATERAL LOW BACK PAIN WITH RIGHT-SIDED SCIATICA: ICD-10-CM

## 2018-09-17 DIAGNOSIS — F51.01 PRIMARY INSOMNIA: ICD-10-CM

## 2018-09-17 DIAGNOSIS — E11.8 TYPE 2 DIABETES MELLITUS WITH COMPLICATION, WITHOUT LONG-TERM CURRENT USE OF INSULIN (HCC): ICD-10-CM

## 2018-09-17 DIAGNOSIS — M54.41 CHRONIC BILATERAL LOW BACK PAIN WITH RIGHT-SIDED SCIATICA: ICD-10-CM

## 2018-09-17 DIAGNOSIS — Z23 NEED FOR INFLUENZA VACCINATION: ICD-10-CM

## 2018-09-17 DIAGNOSIS — I50.32 CHRONIC DIASTOLIC CONGESTIVE HEART FAILURE (HCC): ICD-10-CM

## 2018-09-17 DIAGNOSIS — G25.81 RLS (RESTLESS LEGS SYNDROME): ICD-10-CM

## 2018-09-17 DIAGNOSIS — K21.9 GASTROESOPHAGEAL REFLUX DISEASE WITHOUT ESOPHAGITIS: ICD-10-CM

## 2018-09-17 DIAGNOSIS — E78.00 PURE HYPERCHOLESTEROLEMIA: ICD-10-CM

## 2018-09-17 PROCEDURE — 90471 IMMUNIZATION ADMIN: CPT | Performed by: INTERNAL MEDICINE

## 2018-09-17 PROCEDURE — 90688 IIV4 VACCINE SPLT 0.5 ML IM: CPT | Performed by: INTERNAL MEDICINE

## 2018-09-17 PROCEDURE — 99211 OFF/OP EST MAY X REQ PHY/QHP: CPT | Performed by: INTERNAL MEDICINE

## 2018-09-17 PROCEDURE — G0008 ADMIN INFLUENZA VIRUS VAC: HCPCS | Performed by: INTERNAL MEDICINE

## 2018-09-17 PROCEDURE — 99214 OFFICE O/P EST MOD 30 MIN: CPT | Performed by: INTERNAL MEDICINE

## 2018-09-17 RX ORDER — PRAVASTATIN SODIUM 40 MG
40 TABLET ORAL EVERY EVENING
Qty: 30 TABLET | Refills: 2 | Status: SHIPPED | OUTPATIENT
Start: 2018-09-17 | End: 2018-11-19 | Stop reason: SDUPTHER

## 2018-09-17 RX ORDER — ALBUTEROL SULFATE 90 UG/1
1 AEROSOL, METERED RESPIRATORY (INHALATION) EVERY 6 HOURS PRN
Qty: 18 G | Refills: 2 | Status: SHIPPED | OUTPATIENT
Start: 2018-09-17 | End: 2018-11-19 | Stop reason: SDUPTHER

## 2018-09-17 RX ORDER — BUDESONIDE AND FORMOTEROL FUMARATE DIHYDRATE 160; 4.5 UG/1; UG/1
2 AEROSOL RESPIRATORY (INHALATION) 2 TIMES DAILY
Qty: 1 INHALER | Refills: 2 | Status: SHIPPED | OUTPATIENT
Start: 2018-09-17 | End: 2018-11-19 | Stop reason: SDUPTHER

## 2018-09-17 RX ORDER — FUROSEMIDE 20 MG/1
20 TABLET ORAL 2 TIMES DAILY
Qty: 60 TABLET | Refills: 2 | Status: SHIPPED | OUTPATIENT
Start: 2018-09-17 | End: 2018-11-19 | Stop reason: SDUPTHER

## 2018-09-17 RX ORDER — ALBUTEROL SULFATE 2.5 MG/3ML
2.5 SOLUTION RESPIRATORY (INHALATION) EVERY 4 HOURS PRN
Qty: 120 EACH | Refills: 2 | Status: ON HOLD | OUTPATIENT
Start: 2018-09-17 | End: 2018-09-21 | Stop reason: HOSPADM

## 2018-09-17 RX ORDER — ROPINIROLE 0.5 MG/1
0.5 TABLET, FILM COATED ORAL DAILY
Qty: 30 TABLET | Refills: 2 | Status: SHIPPED | OUTPATIENT
Start: 2018-09-17 | End: 2018-11-19 | Stop reason: SDUPTHER

## 2018-09-17 RX ORDER — TIZANIDINE 4 MG/1
4 TABLET ORAL 3 TIMES DAILY
Qty: 90 TABLET | Refills: 2 | Status: SHIPPED | OUTPATIENT
Start: 2018-09-17 | End: 2018-11-19 | Stop reason: SDUPTHER

## 2018-09-17 RX ORDER — HYDROCHLOROTHIAZIDE 12.5 MG/1
12.5 CAPSULE, GELATIN COATED ORAL EVERY MORNING
Qty: 30 CAPSULE | Refills: 2 | Status: SHIPPED | OUTPATIENT
Start: 2018-09-17 | End: 2018-11-19 | Stop reason: SDUPTHER

## 2018-09-17 RX ORDER — IBUPROFEN 800 MG/1
800 TABLET ORAL EVERY 8 HOURS PRN
Qty: 90 TABLET | Refills: 2 | Status: SHIPPED | OUTPATIENT
Start: 2018-09-17 | End: 2018-11-19 | Stop reason: SDUPTHER

## 2018-09-17 RX ORDER — PANTOPRAZOLE SODIUM 40 MG/1
40 TABLET, DELAYED RELEASE ORAL DAILY
Qty: 30 TABLET | Refills: 2 | Status: SHIPPED | OUTPATIENT
Start: 2018-09-17 | End: 2018-11-19 | Stop reason: SDUPTHER

## 2018-09-17 RX ORDER — ATENOLOL 25 MG/1
TABLET ORAL
Qty: 30 TABLET | Refills: 2 | Status: SHIPPED | OUTPATIENT
Start: 2018-09-17 | End: 2018-11-19 | Stop reason: SDUPTHER

## 2018-09-17 RX ORDER — TRAZODONE HYDROCHLORIDE 100 MG/1
100 TABLET ORAL NIGHTLY
Qty: 60 TABLET | Refills: 2 | Status: SHIPPED | OUTPATIENT
Start: 2018-09-17 | End: 2018-11-19 | Stop reason: SDUPTHER

## 2018-09-17 ASSESSMENT — ENCOUNTER SYMPTOMS
NAUSEA: 0
HEARTBURN: 0
HEMOPTYSIS: 0
DOUBLE VISION: 0
COUGH: 0
BLURRED VISION: 0
ABDOMINAL PAIN: 0

## 2018-09-17 NOTE — PROGRESS NOTES
Prediabetic)  05/07/2019    Diabetic foot exam  05/09/2019    Potassium monitoring  08/01/2019    Creatinine monitoring  08/01/2019    Lipid screen  09/11/2019    Colon cancer screen colonoscopy  09/20/2019    Breast cancer screen  11/01/2019    Pneumococcal med risk  Completed

## 2018-09-20 ENCOUNTER — APPOINTMENT (OUTPATIENT)
Dept: GENERAL RADIOLOGY | Age: 63
End: 2018-09-20
Payer: COMMERCIAL

## 2018-09-20 ENCOUNTER — HOSPITAL ENCOUNTER (OUTPATIENT)
Age: 63
Setting detail: OBSERVATION
Discharge: HOME | End: 2018-09-21
Attending: EMERGENCY MEDICINE | Admitting: EMERGENCY MEDICINE
Payer: COMMERCIAL

## 2018-09-20 DIAGNOSIS — R07.9 CHEST PAIN, UNSPECIFIED TYPE: Primary | ICD-10-CM

## 2018-09-20 LAB
ABSOLUTE EOS #: 0.25 K/UL (ref 0–0.44)
ABSOLUTE IMMATURE GRANULOCYTE: <0.03 K/UL (ref 0–0.3)
ABSOLUTE LYMPH #: 3.71 K/UL (ref 1.1–3.7)
ABSOLUTE MONO #: 0.73 K/UL (ref 0.1–1.2)
ANION GAP SERPL CALCULATED.3IONS-SCNC: 11 MMOL/L (ref 9–17)
BASOPHILS # BLD: 1 % (ref 0–2)
BASOPHILS ABSOLUTE: 0.06 K/UL (ref 0–0.2)
BNP INTERPRETATION: NORMAL
BUN BLDV-MCNC: 10 MG/DL (ref 8–23)
BUN/CREAT BLD: ABNORMAL (ref 9–20)
CALCIUM SERPL-MCNC: 10 MG/DL (ref 8.6–10.4)
CHLORIDE BLD-SCNC: 103 MMOL/L (ref 98–107)
CO2: 26 MMOL/L (ref 20–31)
CREAT SERPL-MCNC: 0.59 MG/DL (ref 0.5–0.9)
DIFFERENTIAL TYPE: ABNORMAL
EOSINOPHILS RELATIVE PERCENT: 3 % (ref 1–4)
GFR AFRICAN AMERICAN: >60 ML/MIN
GFR NON-AFRICAN AMERICAN: >60 ML/MIN
GFR SERPL CREATININE-BSD FRML MDRD: ABNORMAL ML/MIN/{1.73_M2}
GFR SERPL CREATININE-BSD FRML MDRD: ABNORMAL ML/MIN/{1.73_M2}
GLUCOSE BLD-MCNC: 131 MG/DL (ref 70–99)
HCT VFR BLD CALC: 44.1 % (ref 36.3–47.1)
HEMOGLOBIN: 13.9 G/DL (ref 11.9–15.1)
IMMATURE GRANULOCYTES: 0 %
LYMPHOCYTES # BLD: 42 % (ref 24–43)
MCH RBC QN AUTO: 28.1 PG (ref 25.2–33.5)
MCHC RBC AUTO-ENTMCNC: 31.5 G/DL (ref 28.4–34.8)
MCV RBC AUTO: 89.1 FL (ref 82.6–102.9)
MONOCYTES # BLD: 8 % (ref 3–12)
NRBC AUTOMATED: 0 PER 100 WBC
PDW BLD-RTO: 14.7 % (ref 11.8–14.4)
PLATELET # BLD: 216 K/UL (ref 138–453)
PLATELET ESTIMATE: ABNORMAL
PMV BLD AUTO: 11.2 FL (ref 8.1–13.5)
POC TROPONIN I: 0 NG/ML (ref 0–0.1)
POC TROPONIN I: 0.01 NG/ML (ref 0–0.1)
POC TROPONIN INTERP: NORMAL
POC TROPONIN INTERP: NORMAL
POTASSIUM SERPL-SCNC: 3.9 MMOL/L (ref 3.7–5.3)
PRO-BNP: 65 PG/ML
RBC # BLD: 4.95 M/UL (ref 3.95–5.11)
RBC # BLD: ABNORMAL 10*6/UL
SEG NEUTROPHILS: 46 % (ref 36–65)
SEGMENTED NEUTROPHILS ABSOLUTE COUNT: 4.08 K/UL (ref 1.5–8.1)
SODIUM BLD-SCNC: 140 MMOL/L (ref 135–144)
WBC # BLD: 8.8 K/UL (ref 3.5–11.3)
WBC # BLD: ABNORMAL 10*3/UL

## 2018-09-20 PROCEDURE — 84484 ASSAY OF TROPONIN QUANT: CPT

## 2018-09-20 PROCEDURE — G0378 HOSPITAL OBSERVATION PER HR: HCPCS

## 2018-09-20 PROCEDURE — 93005 ELECTROCARDIOGRAM TRACING: CPT

## 2018-09-20 PROCEDURE — 80048 BASIC METABOLIC PNL TOTAL CA: CPT

## 2018-09-20 PROCEDURE — 83880 ASSAY OF NATRIURETIC PEPTIDE: CPT

## 2018-09-20 PROCEDURE — 96374 THER/PROPH/DIAG INJ IV PUSH: CPT

## 2018-09-20 PROCEDURE — 6370000000 HC RX 637 (ALT 250 FOR IP): Performed by: STUDENT IN AN ORGANIZED HEALTH CARE EDUCATION/TRAINING PROGRAM

## 2018-09-20 PROCEDURE — 6360000002 HC RX W HCPCS: Performed by: STUDENT IN AN ORGANIZED HEALTH CARE EDUCATION/TRAINING PROGRAM

## 2018-09-20 PROCEDURE — 99285 EMERGENCY DEPT VISIT HI MDM: CPT

## 2018-09-20 PROCEDURE — 85025 COMPLETE CBC W/AUTO DIFF WBC: CPT

## 2018-09-20 PROCEDURE — 71046 X-RAY EXAM CHEST 2 VIEWS: CPT

## 2018-09-20 RX ORDER — PRAVASTATIN SODIUM 20 MG
40 TABLET ORAL EVERY EVENING
Status: DISCONTINUED | OUTPATIENT
Start: 2018-09-20 | End: 2018-09-21 | Stop reason: HOSPADM

## 2018-09-20 RX ORDER — ASPIRIN 81 MG/1
324 TABLET, CHEWABLE ORAL ONCE
Status: COMPLETED | OUTPATIENT
Start: 2018-09-20 | End: 2018-09-20

## 2018-09-20 RX ORDER — IPRATROPIUM BROMIDE AND ALBUTEROL SULFATE 2.5; .5 MG/3ML; MG/3ML
3 SOLUTION RESPIRATORY (INHALATION) 3 TIMES DAILY
Status: DISCONTINUED | OUTPATIENT
Start: 2018-09-21 | End: 2018-09-21 | Stop reason: HOSPADM

## 2018-09-20 RX ORDER — ACETAMINOPHEN 500 MG
500 TABLET ORAL EVERY 6 HOURS PRN
Status: DISCONTINUED | OUTPATIENT
Start: 2018-09-20 | End: 2018-09-20 | Stop reason: SDUPTHER

## 2018-09-20 RX ORDER — SODIUM CHLORIDE 0.9 % (FLUSH) 0.9 %
10 SYRINGE (ML) INJECTION EVERY 12 HOURS SCHEDULED
Status: DISCONTINUED | OUTPATIENT
Start: 2018-09-20 | End: 2018-09-21 | Stop reason: HOSPADM

## 2018-09-20 RX ORDER — TRAZODONE HYDROCHLORIDE 100 MG/1
100 TABLET ORAL NIGHTLY
Status: DISCONTINUED | OUTPATIENT
Start: 2018-09-20 | End: 2018-09-21 | Stop reason: HOSPADM

## 2018-09-20 RX ORDER — ARIPIPRAZOLE 10 MG/1
10 TABLET ORAL DAILY
Status: DISCONTINUED | OUTPATIENT
Start: 2018-09-21 | End: 2018-09-21 | Stop reason: HOSPADM

## 2018-09-20 RX ORDER — PANTOPRAZOLE SODIUM 40 MG/1
40 TABLET, DELAYED RELEASE ORAL DAILY
Status: DISCONTINUED | OUTPATIENT
Start: 2018-09-21 | End: 2018-09-21 | Stop reason: HOSPADM

## 2018-09-20 RX ORDER — IBUPROFEN 800 MG/1
800 TABLET ORAL EVERY 8 HOURS PRN
Status: DISCONTINUED | OUTPATIENT
Start: 2018-09-20 | End: 2018-09-21 | Stop reason: HOSPADM

## 2018-09-20 RX ORDER — ALBUTEROL SULFATE 90 UG/1
1 AEROSOL, METERED RESPIRATORY (INHALATION) EVERY 6 HOURS PRN
Status: DISCONTINUED | OUTPATIENT
Start: 2018-09-20 | End: 2018-09-21 | Stop reason: HOSPADM

## 2018-09-20 RX ORDER — HYDROCHLOROTHIAZIDE 12.5 MG/1
12.5 CAPSULE, GELATIN COATED ORAL EVERY MORNING
Status: DISCONTINUED | OUTPATIENT
Start: 2018-09-21 | End: 2018-09-21 | Stop reason: HOSPADM

## 2018-09-20 RX ORDER — ECHINACEA PURPUREA EXTRACT 125 MG
1 TABLET ORAL PRN
Status: DISCONTINUED | OUTPATIENT
Start: 2018-09-20 | End: 2018-09-21 | Stop reason: HOSPADM

## 2018-09-20 RX ORDER — GABAPENTIN 800 MG/1
800 TABLET ORAL 3 TIMES DAILY
Status: DISCONTINUED | OUTPATIENT
Start: 2018-09-20 | End: 2018-09-21 | Stop reason: HOSPADM

## 2018-09-20 RX ORDER — NICOTINE 21 MG/24HR
1 PATCH, TRANSDERMAL 24 HOURS TRANSDERMAL DAILY
Status: DISCONTINUED | OUTPATIENT
Start: 2018-09-21 | End: 2018-09-21 | Stop reason: HOSPADM

## 2018-09-20 RX ORDER — FUROSEMIDE 20 MG/1
20 TABLET ORAL 2 TIMES DAILY
Status: DISCONTINUED | OUTPATIENT
Start: 2018-09-20 | End: 2018-09-21 | Stop reason: HOSPADM

## 2018-09-20 RX ORDER — TIZANIDINE 4 MG/1
4 TABLET ORAL 3 TIMES DAILY
Status: DISCONTINUED | OUTPATIENT
Start: 2018-09-20 | End: 2018-09-21 | Stop reason: HOSPADM

## 2018-09-20 RX ORDER — ROPINIROLE 0.25 MG/1
0.5 TABLET, FILM COATED ORAL DAILY
Status: DISCONTINUED | OUTPATIENT
Start: 2018-09-21 | End: 2018-09-21 | Stop reason: HOSPADM

## 2018-09-20 RX ORDER — ATENOLOL 25 MG/1
25 TABLET ORAL DAILY
Status: DISCONTINUED | OUTPATIENT
Start: 2018-09-21 | End: 2018-09-21 | Stop reason: HOSPADM

## 2018-09-20 RX ORDER — BUPRENORPHINE HYDROCHLORIDE AND NALOXONE HYDROCHLORIDE DIHYDRATE 2; .5 MG/1; MG/1
2 TABLET SUBLINGUAL DAILY
Status: DISCONTINUED | OUTPATIENT
Start: 2018-09-21 | End: 2018-09-21 | Stop reason: HOSPADM

## 2018-09-20 RX ORDER — SODIUM CHLORIDE 0.9 % (FLUSH) 0.9 %
10 SYRINGE (ML) INJECTION PRN
Status: DISCONTINUED | OUTPATIENT
Start: 2018-09-20 | End: 2018-09-21 | Stop reason: HOSPADM

## 2018-09-20 RX ORDER — NITROGLYCERIN 0.4 MG/1
0.4 TABLET SUBLINGUAL EVERY 5 MIN PRN
Status: DISCONTINUED | OUTPATIENT
Start: 2018-09-20 | End: 2018-09-21 | Stop reason: HOSPADM

## 2018-09-20 RX ORDER — ALBUTEROL SULFATE 2.5 MG/3ML
2.5 SOLUTION RESPIRATORY (INHALATION) EVERY 4 HOURS PRN
Status: DISCONTINUED | OUTPATIENT
Start: 2018-09-20 | End: 2018-09-21 | Stop reason: HOSPADM

## 2018-09-20 RX ORDER — ACETAMINOPHEN 325 MG/1
650 TABLET ORAL EVERY 4 HOURS PRN
Status: DISCONTINUED | OUTPATIENT
Start: 2018-09-20 | End: 2018-09-21 | Stop reason: HOSPADM

## 2018-09-20 RX ORDER — HYDROXYZINE HYDROCHLORIDE 10 MG/1
10 TABLET, FILM COATED ORAL 3 TIMES DAILY
Status: DISCONTINUED | OUTPATIENT
Start: 2018-09-20 | End: 2018-09-21 | Stop reason: HOSPADM

## 2018-09-20 RX ORDER — SODIUM CHLORIDE 9 MG/ML
INJECTION, SOLUTION INTRAVENOUS CONTINUOUS
Status: DISCONTINUED | OUTPATIENT
Start: 2018-09-20 | End: 2018-09-21 | Stop reason: HOSPADM

## 2018-09-20 RX ORDER — KETOROLAC TROMETHAMINE 30 MG/ML
30 INJECTION, SOLUTION INTRAMUSCULAR; INTRAVENOUS ONCE
Status: COMPLETED | OUTPATIENT
Start: 2018-09-20 | End: 2018-09-20

## 2018-09-20 RX ADMIN — KETOROLAC TROMETHAMINE 30 MG: 30 INJECTION, SOLUTION INTRAMUSCULAR at 22:15

## 2018-09-20 RX ADMIN — ASPIRIN 81 MG 324 MG: 81 TABLET ORAL at 20:33

## 2018-09-20 ASSESSMENT — PAIN DESCRIPTION - LOCATION
LOCATION: CHEST
LOCATION: CHEST

## 2018-09-20 ASSESSMENT — PAIN DESCRIPTION - ONSET: ONSET: ON-GOING

## 2018-09-20 ASSESSMENT — ENCOUNTER SYMPTOMS
CHEST TIGHTNESS: 1
SHORTNESS OF BREATH: 1
VOMITING: 0
PHOTOPHOBIA: 0
NAUSEA: 0
RHINORRHEA: 0
COLOR CHANGE: 0
BACK PAIN: 0
COUGH: 1

## 2018-09-20 ASSESSMENT — PAIN SCALES - GENERAL
PAINLEVEL_OUTOF10: 10
PAINLEVEL_OUTOF10: 5
PAINLEVEL_OUTOF10: 10

## 2018-09-20 ASSESSMENT — PAIN DESCRIPTION - DESCRIPTORS
DESCRIPTORS: DISCOMFORT;SORE
DESCRIPTORS: PRESSURE;SHARP

## 2018-09-20 ASSESSMENT — PAIN DESCRIPTION - PAIN TYPE: TYPE: ACUTE PAIN

## 2018-09-20 ASSESSMENT — PAIN DESCRIPTION - ORIENTATION
ORIENTATION: MID
ORIENTATION: MID

## 2018-09-21 VITALS
WEIGHT: 215 LBS | HEART RATE: 68 BPM | BODY MASS INDEX: 29.12 KG/M2 | HEIGHT: 72 IN | RESPIRATION RATE: 14 BRPM | SYSTOLIC BLOOD PRESSURE: 121 MMHG | DIASTOLIC BLOOD PRESSURE: 79 MMHG | TEMPERATURE: 97.7 F | OXYGEN SATURATION: 91 %

## 2018-09-21 LAB
EKG ATRIAL RATE: 72 BPM
EKG ATRIAL RATE: 88 BPM
EKG P AXIS: 15 DEGREES
EKG P AXIS: 3 DEGREES
EKG P-R INTERVAL: 222 MS
EKG P-R INTERVAL: 288 MS
EKG Q-T INTERVAL: 364 MS
EKG Q-T INTERVAL: 412 MS
EKG QRS DURATION: 86 MS
EKG QRS DURATION: 90 MS
EKG QTC CALCULATION (BAZETT): 440 MS
EKG QTC CALCULATION (BAZETT): 451 MS
EKG R AXIS: -24 DEGREES
EKG R AXIS: -7 DEGREES
EKG T AXIS: 27 DEGREES
EKG T AXIS: 48 DEGREES
EKG VENTRICULAR RATE: 72 BPM
EKG VENTRICULAR RATE: 88 BPM

## 2018-09-21 PROCEDURE — 6370000000 HC RX 637 (ALT 250 FOR IP): Performed by: EMERGENCY MEDICINE

## 2018-09-21 PROCEDURE — 6360000002 HC RX W HCPCS: Performed by: EMERGENCY MEDICINE

## 2018-09-21 PROCEDURE — 2580000003 HC RX 258: Performed by: EMERGENCY MEDICINE

## 2018-09-21 PROCEDURE — G0378 HOSPITAL OBSERVATION PER HR: HCPCS

## 2018-09-21 PROCEDURE — 6370000000 HC RX 637 (ALT 250 FOR IP): Performed by: STUDENT IN AN ORGANIZED HEALTH CARE EDUCATION/TRAINING PROGRAM

## 2018-09-21 PROCEDURE — 94640 AIRWAY INHALATION TREATMENT: CPT

## 2018-09-21 RX ADMIN — GABAPENTIN 800 MG: 800 TABLET, FILM COATED ORAL at 10:43

## 2018-09-21 RX ADMIN — BUPRENORPHINE AND NALOXONE 2 TABLET: 2; .5 TABLET SUBLINGUAL at 10:47

## 2018-09-21 RX ADMIN — TIZANIDINE 4 MG: 4 TABLET ORAL at 00:11

## 2018-09-21 RX ADMIN — SODIUM CHLORIDE: 9 INJECTION, SOLUTION INTRAVENOUS at 00:12

## 2018-09-21 RX ADMIN — IPRATROPIUM BROMIDE AND ALBUTEROL SULFATE 3 ML: .5; 3 SOLUTION RESPIRATORY (INHALATION) at 08:28

## 2018-09-21 RX ADMIN — HYDROXYZINE HYDROCHLORIDE 10 MG: 10 TABLET ORAL at 10:43

## 2018-09-21 RX ADMIN — PANTOPRAZOLE SODIUM 40 MG: 40 TABLET, DELAYED RELEASE ORAL at 10:42

## 2018-09-21 RX ADMIN — Medication 2 PUFF: at 08:29

## 2018-09-21 RX ADMIN — TIOTROPIUM BROMIDE 18 MCG: 18 CAPSULE ORAL; RESPIRATORY (INHALATION) at 08:29

## 2018-09-21 RX ADMIN — IBUPROFEN 800 MG: 800 TABLET ORAL at 10:51

## 2018-09-21 RX ADMIN — METFORMIN HYDROCHLORIDE 500 MG: 500 TABLET ORAL at 10:42

## 2018-09-21 RX ADMIN — TIZANIDINE 4 MG: 4 TABLET ORAL at 10:43

## 2018-09-21 ASSESSMENT — PAIN SCALES - GENERAL
PAINLEVEL_OUTOF10: 6
PAINLEVEL_OUTOF10: 6

## 2018-09-21 ASSESSMENT — PAIN DESCRIPTION - LOCATION: LOCATION: CHEST

## 2018-09-21 ASSESSMENT — PAIN DESCRIPTION - PAIN TYPE: TYPE: ACUTE PAIN

## 2018-09-21 NOTE — ED NOTES
Pt to room 39 with c/o mid sternal chest pain. Pt reports that pain started a little bit ago while she was cooking dinner. Pt reports that she has had pain like this before, but the pain would normally pass. Pt reports that this pain has persisted. Pt denies N/V/D. Pt placed on continuous cardiac monitor, bp and pulse ox. EKG completed, IV established, blood work drawn. POC troponin running. Pt alert and oriented x4, talking in complete sentences, respirations even and unlabored. Pt acting age appropriate. White board updated, will continue to monitor.        Patricia Jaeger RN  09/20/18 2000

## 2018-09-21 NOTE — CONSULTS
[facility]  CONSULT NOTE                  Date:   9/21/2018  Patient name: Ketan Deleon  Date of admission:  9/20/2018  7:46 PM  MRN:   [id number]  YOB: 1955    Reason for Admission: Cardiac evaluation    CHIEF COMPLAINT:   Right-sided chest pain    History Obtained From:  Patient and chart review     HISTORY OF PRESENT ILLNESS:    53-year-old female who presented with right-sided chest pain. The pain started when she was doing her household work in the kitchen yesterday. The pain is localized in the right lower sternal border. It was sharp pain and nonradiating. It lasted for many hours and was relieved partially by pain medication and ER. Prior to this episode she is a pretty active and denies any exertional angina and dyspnea. She also denies any orthopnea palpitations or syncope. No previous cardiac history. She denies any recent drug use but has history of illicit drug abuse with hepatitis C. Past Medical History:   has a past medical history of RACHEL (acute kidney injury) ([facility] [address] Arthritis; Cancer [facility]); CHF (congestive heart failure) ([facility] [address] Chronic bilateral low back pain with right-sided sciatica; COPD (chronic obstructive pulmonary disease) ([facility] [address] Depression; Diabetic polyneuropathy associated with type 2 diabetes mellitus ([facility] [address] Diabetic polyneuropathy associated with type 2 diabetes mellitus ([facility] [address] Diverticulosis; Full dentures; GERD (gastroesophageal reflux disease); Hep C w/o coma, chronic ([facility] Hyperlipidemia; Hyperplastic polyp of intestine; Hypertension; Liver disease; Pacemaker; Pneumonia; Pneumonia; Rectal bleeding; and Tobacco abuse. Past Surgical History:   has a past surgical history that includes Pacemaker insertion; Hysterectomy; back surgery (2008); Tonsillectomy; hernia repair; Lumbar spine surgery (9/24/13); Endoscopy, colon, diagnostic; Colonoscopy; pacemaker placement; Cardiac surgery; Colonoscopy (1/23/15);  Sigmoidoscopy (N/A, 6/26/15); and Colonoscopy (09/20/2016). Home Medications:    Prior to Admission medications    Medication Sig Start Date End Date Taking? Authorizing Provider   hydrochlorothiazide (MICROZIDE) 12.5 MG capsule Take 1 capsule by mouth every morning 9/17/18  Yes Antonella Curtis MD   furosemide (LASIX) 20 MG tablet Take 1 tablet by mouth 2 times daily 9/17/18  Yes Antonella Curtis MD   metFORMIN (GLUCOPHAGE) 500 MG tablet Take 1 tablet by mouth 2 times daily (with meals) 9/17/18  Yes Antonella Curtis MD   rOPINIRole (REQUIP) 0.5 MG tablet Take 1 tablet by mouth daily 9/17/18  Yes Antonella Curtis MD   ibuprofen (ADVIL;MOTRIN) 800 MG tablet Take 1 tablet by mouth every 8 hours as needed for Pain 9/17/18  Yes Antonella Curtis MD   budesonide-formoterol (SYMBICORT) 160-4.5 MCG/ACT AERO Inhale 2 puffs into the lungs 2 times daily Rinse mouth after use.  9/17/18  Yes Antonella Curtis MD   atenolol (TENORMIN) 25 MG tablet take 1 tablet by mouth once daily 9/17/18  Yes Antonella Curtis MD   traZODone (DESYREL) 100 MG tablet Take 1 tablet by mouth nightly 9/17/18  Yes Antonella Curtis MD   albuterol sulfate HFA (VENTOLIN HFA) 108 (90 Base) MCG/ACT inhaler Inhale 1 puff into the lungs every 6 hours as needed for Wheezing 9/17/18  Yes Antonella Curtis MD   pravastatin (PRAVACHOL) 40 MG tablet Take 1 tablet by mouth every evening 9/17/18  Yes Antonella Curtis MD   albuterol (PROVENTIL) (2.5 MG/3ML) 0.083% nebulizer solution Take 3 mLs by nebulization every 4 hours as needed for Wheezing 9/17/18  Yes Antonella Curtis MD   pantoprazole (PROTONIX) 40 MG tablet Take 1 tablet by mouth daily Stop Omeprazole 9/17/18  Yes Antonella Curtis MD   tiZANidine (ZANAFLEX) 4 MG tablet Take 1 tablet by mouth 3 times daily 9/17/18  Yes Antonella Curtis MD   nicotine (NICOTROL) 10 MG inhaler Inhale 1 puff into the lungs 3 times daily as needed for Smoking cessation 8/6/18  Yes Antonella Curtis MD   ipratropium-albuterol (DUONEB) 0.5-2.5 (3) hematuria. · Musculoskeletal:  No gait disturbance, No weakness or joint complaints. · Integumentary: No rash or pruritis. · Neurological: No headache, weakness, numbness or tingling. No change in gait, balance, coordination. · Psychiatric: No anxiety, or depression. · Endocrine: No temperature intolerance. No excessive thirst, fluid intake, or urination. No tremor. · Hematologic/Lymphatic: No abnormal bruising or bleeding, blood clots or swollen lymph nodes. · Allergic/Immunologic: No nasal congestion or hives. PHYSICAL EXAM:    Physical Examination:    /70   Pulse 70   Temp 98.1 °F (36.7 °C) (Oral)   Resp 15   Ht 6' (1.829 m)   Wt 215 lb (97.5 kg)   SpO2 98%   BMI 29.16 kg/m²    Constitutional and General Appearance: alert, cooperative, in no distress   HEENT: PERRL, no cervical lymphadenopathy. Normal oral mucosa  Respiratory:  · Normal excursion and expansion without use of accessory muscles  · Resp Auscultation: Good respiratory effort. No for increased work of breathing. On auscultation: clear to auscultation bilaterally, no wheezing, no rales. Cardiovascular:  · The apical impulse is not displaced  · Heart tones are crisp and normal. regular S1 and S2. Murmurs: None   · tenderness to palpation of right lower chest  · Jugular venous pulsation Normal  · Thre is no carotid bruit   · Peripheral pulses are symmetrical and full   Abdomen:  · No masses or tenderness  · Bowel sounds present  Extremities:  ·  No Cyanosis or Clubbing  ·  Lower extremity edema: No edema   ·  Skin: Warm and dry  Neurological:  · Not done     DATA:    Diagnostics:      EKG:   Atrial paced rhythm, incomplete RBBB, inferior infract can not be ruled out (no new change from before)         Previous cardiac testing:     ECHO 7/30/18: EF 55%, mild LVH, moderate AI, trivial MR, mild TR, RVSP is 30 mmHg.      STRESS 11/7/17: No ischemia or infarct.  EF 72%.      CATH 12/13/13: Normal coronaries, mild PHTN     PPM 10/24/12:

## 2018-09-21 NOTE — CARE COORDINATION
Case Management Initial Discharge Plan  Kain Diaz,             Met with:patient to discuss discharge plans. Information verified: address, contacts, phone number, , insurance Yes  PCP: Antonella Curtis MD  Date of last visit: Last Monday    Insurance Provider: Vibha Juárez    Discharge Planning    Living Arrangements:  Alone   Support Systems:  Family Members    Home has 1 stories  0 stairs to climb to get into front door, na stairs to climb to reach second floor  Location of bedroom/bathroom in home first floor    Patient able to perform ADL's:Independent    Current Services (outpatient & in home) none   DME equipment: wheeled walker  DME provider: huber     Pharmacy: [facility] on [address] Medications:  No  Does patient want to participate in local refill/ meds to beds program?  N/A    Potential Assistance Needed:  N/A    Patient agreeable to home care: No  Nash of choice provided:  n/a    Prior SNF/Rehab Placement and Facility: no  Agreeable to SNF/Rehab: No  Nash of choice provided: n/a   Evaluation: no    Expected Discharge date:  18  Patient expects to be discharged to:  home  Follow Up Appointment: Best Day/ Time: Monday AM    Transportation provider: STEVE stein  Transportation arrangements needed for discharge: No    Readmission Risk              Risk of Unplanned Readmission:        13               Does patient have a readmission risk score greater than 14?: No  If yes, follow-up appointment must be made within 7 days of discharge.      Discharge Plan: Home independently           Electronically signed by Floresita Garcia RN on 18 at 12:56 PM

## 2018-09-21 NOTE — H&P
back pain with right-sided sciatica; COPD (chronic obstructive pulmonary disease) ([facility] Depression; Diabetic poly[facility]uropathy associated with type [facility] Diabetic polyneuropathy associated with type 2 diabetes mellitus ([facility] Diverticulosis; Full dentures; GERD (gastroesophageal reflux disease); Hep C w/o coma, chronic ([facility] Hyperlipidemia; Hyperplastic polyp of intestine; Hypertension; Liver disease; Pacemaker; Pneumonia; Pneumonia; Rectal bleeding; and Tobacco abuse. I have reviewed the past medical history with the patient and it is pertinent to this complaint. SURGICAL HISTORY      has a past surgical history that includes Pacemaker insertion; Hysterectomy; back surgery (2008); Tonsillectomy; hernia repair; Lumbar spine surgery (9/24/13); Endoscopy, colon, diagnostic; Colonoscopy; pacemaker placement; Cardiac surgery; Colonoscopy (1/23/15); Sigmoidoscopy (N/A, 6/26/15); and Colonoscopy (09/20/2016). I have reviewed and agree with Surgical History entered and it is pertinent to this complaint.      CURRENT MEDICATIONS       nitroGLYCERIN (NITROSTAT) SL tablet 0.4 mg Q5 Min PRN   ARIPiprazole (ABILIFY) tablet 10 mg Daily   hydrOXYzine (ATARAX) tablet 10 mg TID   buprenorphine-naloxone (SUBOXONE) 2-0.5 MG SL tablet 2 tablet Daily   sodium chloride (OCEAN) 0.65 % nasal spray 1 spray PRN   gabapentin (NEURONTIN) tablet 800 mg TID   ipratropium-albuterol (DUONEB) nebulizer solution 3 mL TID   nicotine (NICODERM CQ) 21 MG/24HR 1 patch Daily   hydrochlorothiazide (MICROZIDE) capsule 12.5 mg QAM   furosemide (LASIX) tablet 20 mg BID   metFORMIN (GLUCOPHAGE) tablet 500 mg BID WC   rOPINIRole (REQUIP) tablet 0.5 mg Daily   ibuprofen (ADVIL;MOTRIN) tablet 800 mg Q8H PRN   mometasone-formoterol (DULERA) 200-5 MCG/ACT inhaler 2 puff BID   atenolol (TENORMIN) tablet 25 mg Daily   traZODone (DESYREL) tablet 100 mg Nightly   albuterol sulfate  (90 Base) MCG/ACT inhaler 1 puff Q6H PRN   pravastatin and volume loss most consistent with atelectasis. Superimposed mild edema or infection may be present in the appropriate clinical setting. LABS:  I have reviewed and interpreted all available lab results. Labs Reviewed   BASIC METABOLIC PANEL - Abnormal; Notable for the following:        Result Value    Glucose 131 (*)     All other components within normal limits   CBC WITH AUTO DIFFERENTIAL - Abnormal; Notable for the following:     RDW 14.7 (*)     Absolute Lymph # 3.71 (*)     All other components within normal limits   BRAIN NATRIURETIC PEPTIDE   POCT TROPONIN   POCT TROPONIN   POCT TROPONIN   POCT TROPONIN     SCREENING TOOLS:    HEART Risk Score for Chest Pain Patients   History and Physical Exam Suspicion Level  (Nausea, Vomiting, Diaphoresis, Radiation, Exertion)   Slightly Suspicious (0 pts)   Moderately Suspicious (1 pt)   Highly Suspicious (2 pts)   EKG Interpretation   Normal (0 pts)   Non-Specific Repolarization Disturbance (1 pt)   Significant ST-Depression (2 pts)   Age of Patient (in years)   = 39 (0 pts)   46-64 (1 pt)   = 65 (2 pts)   Risk Factors   No Risk Factors (0 pts)   1-2 Risk Factors (1 pt)   = 3 Risk Factors (2 pts)   Risk Factors Include:   Hypercholesterolemia   Hypertension   Diabetes Mellitus   Cigarette smoking   Positive family history   Obesity   CAD   (SLE, CKDz, HIV, Cocaine abuse)   Troponin Levels   = Normal Limit (0 pts)   1-3 Times Normal Limit (1 pt)   > 3 Times Normal Limit (2 pts)  TOTAL: 3    Percent Risk for Major Adverse Cardiac Event (MACE)  0-3 pts indicates low risk for MACE   2.5% (DISCHARGE)   4-7 pts indicates moderate risk for MACE  20.3% (OBS)  8-10 pts indicates high risk for MACE  72.7% (EARLY INVASIVE TX)    CDU IMPRESSION / PLAN      Kain Diaz is a 66 y.o. female who presents with:    1. Acute onset of right sided chest pain, of uncertain etiology:  -ED workup negative  -EKG and HEART score as above  -Cardiology consulted.  Appreciate recommendations. · Continue home medications and pain control  · Monitor vitals, labs, and imaging  · DISPO: pending consults and clinical improvement    CONSULTS:    IP CONSULT TO CARDIOLOGY    PROCEDURES:  Not indicated     PATIENT REFERRED TO:    Antonella Pemberton MD  [address]  [phone]          Antonella Pemberton MD  [address] [facility] [id number] [phone]            --  Craige Ditch, DO   Craige Ditch    This dictation was generated by voice recognition computer software. Although all attempts are made to edit the dictation for accuracy, there may be errors in the transcription that are not intended.

## 2018-09-21 NOTE — PROGRESS NOTES
[address] [facility]  CDU / OBSERVATION eNCOUnter  Attending NOte       I performed a history and physical examination of the patient and discussed management with the resident. I reviewed the residents note and agree with the documented findings and plan of care. Any areas of disagreement are noted on the chart. I was personally present for the key portions of any procedures. I have documented in the chart those procedures where I was not present during the key portions. I have reviewed the nurses notes. I agree with the chief complaint, past medical history, past surgical history, allergies, medications, social and family history as documented unless otherwise noted below. The Family history, social history, and ROS are effectively unchanged since admission unless noted elsewhere in the chart. Complaints of chest pain now better. Reproducible. Seen by audiology. No further workup or cardiology team.  Patient comfortable. Acceptable for discharge.     Aubrey Nichole MD  Attending Emergency  Physician

## 2018-09-21 NOTE — ED PROVIDER NOTES
visual disturbance. Respiratory: Positive for cough, chest tightness and shortness of breath (baseline per patient). Cardiovascular: Positive for chest pain. Negative for palpitations and leg swelling. Gastrointestinal: Negative for nausea and vomiting. Genitourinary: Negative for decreased urine volume and urgency. Musculoskeletal: Negative for back pain and gait problem. Skin: Negative for color change and pallor. Neurological: Negative for light-headedness and headaches. PHYSICAL EXAM   (up to 7 for level 4, 8 or more for level 5)      INITIAL VITALS:   /70   Pulse 70   Temp 98.1 °F (36.7 °C) (Oral)   Resp 15   Ht 6' (1.829 m)   Wt 215 lb (97.5 kg)   SpO2 98%   BMI 29.16 kg/m²     Physical Exam   Constitutional: She is oriented to person, place, and time. She appears well-developed and well-nourished. No distress. HENT:   Head: Normocephalic and atraumatic. Mouth/Throat: Oropharynx is clear and moist.   Eyes: Pupils are equal, round, and reactive to light. Right eye exhibits no discharge. Left eye exhibits no discharge. Cardiovascular: Normal rate. No murmur heard. Pulmonary/Chest: Effort normal. No respiratory distress. She has no wheezes. She exhibits tenderness. Reproducible midline chestwall tenderness     Musculoskeletal: Normal range of motion. She exhibits no edema, tenderness or deformity. Neurological: She is alert and oriented to person, place, and time. Skin: Skin is warm. No rash noted. No pallor. Nursing note and vitals reviewed.       DIFFERENTIAL  DIAGNOSIS     PLAN (LABS / IMAGING / EKG):  Orders Placed This Encounter   Procedures    XR CHEST STANDARD (2 VW)    Basic Metabolic Panel    CBC Auto Differential    Brain Natriuretic Peptide    Diet NPO, After Midnight    Continuous Pulse Oximetry    Telemetry monitoring    Vital signs per unit routine    Notify physician    Notify physician    Up as tolerated    Place intermittent pneumatic Value Ref Range    Glucose 131 (H) 70 - 99 mg/dL    BUN 10 8 - 23 mg/dL    CREATININE 0.59 0.50 - 0.90 mg/dL    Bun/Cre Ratio NOT REPORTED 9 - 20    Calcium 10.0 8.6 - 10.4 mg/dL    Sodium 140 135 - 144 mmol/L    Potassium 3.9 3.7 - 5.3 mmol/L    Chloride 103 98 - 107 mmol/L    CO2 26 20 - 31 mmol/L    Anion Gap 11 9 - 17 mmol/L    GFR Non-African American >60 >60 mL/min    GFR African American >60 >60 mL/min    GFR Comment          GFR Staging NOT REPORTED    CBC Auto Differential   Result Value Ref Range    WBC 8.8 3.5 - 11.3 k/uL    RBC 4.95 3.95 - 5.11 m/uL    Hemoglobin 13.9 11.9 - 15.1 g/dL    Hematocrit 44.1 36.3 - 47.1 %    MCV 89.1 82.6 - 102.9 fL    MCH 28.1 25.2 - 33.5 pg    MCHC 31.5 28.4 - 34.8 g/dL    RDW 14.7 (H) 11.8 - 14.4 %    Platelets 825 153 - 167 k/uL    MPV 11.2 8.1 - 13.5 fL    NRBC Automated 0.0 0.0 per 100 WBC    Differential Type NOT REPORTED     Seg Neutrophils 46 36 - 65 %    Lymphocytes 42 24 - 43 %    Monocytes 8 3 - 12 %    Eosinophils % 3 1 - 4 %    Basophils 1 0 - 2 %    Immature Granulocytes 0 0 %    Segs Absolute 4.08 1.50 - 8.10 k/uL    Absolute Lymph # 3.71 (H) 1.10 - 3.70 k/uL    Absolute Mono # 0.73 0.10 - 1.20 k/uL    Absolute Eos # 0.25 0.00 - 0.44 k/uL    Basophils # 0.06 0.00 - 0.20 k/uL    Absolute Immature Granulocyte <0.03 0.00 - 0.30 k/uL    WBC Morphology NOT REPORTED     RBC Morphology ANISOCYTOSIS PRESENT     Platelet Estimate NOT REPORTED    Brain Natriuretic Peptide   Result Value Ref Range    Pro-BNP 65 <300 pg/mL    BNP Interpretation         POCT troponin   Result Value Ref Range    POC Troponin I 0.01 0.00 - 0.10 ng/mL    POC Troponin Interp       The Troponin-I (POC) results cannot be compared to the Troponin-T results. POCT troponin   Result Value Ref Range    POC Troponin I 0.00 0.00 - 0.10 ng/mL    POC Troponin Interp       The Troponin-I (POC) results cannot be compared to the Troponin-T results.    EKG 12 Lead   Result Value Ref Range    Ventricular Rate 72 BPM    Atrial Rate 72 BPM    P-R Interval 288 ms    QRS Duration 90 ms    Q-T Interval 412 ms    QTc Calculation (Bazett) 451 ms    P Axis 3 degrees    R Axis -7 degrees    T Axis 27 degrees       RADIOLOGY:  Xr Chest Standard (2 Vw)    Result Date: 9/20/2018  EXAMINATION: TWO VIEWS OF THE CHEST 9/20/2018 9:06 pm COMPARISON: 08/06/2018 HISTORY: ORDERING SYSTEM PROVIDED HISTORY: cp/sob TECHNOLOGIST PROVIDED HISTORY: cp/sob Ordering Physician Provided Reason for Exam: Chest Pain short of breath Acuity: Unknown Type of Exam: Unknown FINDINGS: Cardiac and mediastinal contours are stable. Pacemaker remains in place. There is increased bibasilar ground-glass opacity and volume loss. No pneumothorax or pleural effusion is seen. No acute osseous abnormality is identified. Lower cervical spinal fixation hardware is noted. Increased bibasilar opacity and volume loss most consistent with atelectasis. Superimposed mild edema or infection may be present in the appropriate clinical setting. EKG  EKG Interpretation    Interpreted by me    Rhythm: Atrial paced  Rate: normal  Axis: normal  Ectopy: none  Conduction: Atrial paced, normal  ST Segments: no acute change  T Waves: no acute change  Q Waves: Inferior leads     Clinical Impression: Atrial paced rhythm, nonspecific EKG      All EKG's are interpreted by the Emergency Department Physician who either signs or Co-signs this chart in the absence of a cardiologist.    [facility]/EMERGENCY DEPARTMENT COURSE:  601 PM: 82-year-old female with multiple risk factors presenting with mid acute onset of midsternal chest pain. Nontoxic-appearing, vital signs within normal limits, does have some reproducible midline chest wall tenderness, but given risk factors and age, we'll get a cardiac workup and plan for admission. Patient follows with Dr. Kiki Macedo, cardiology. We will plan for admission to the ETU workup is negative for evaluation by cardiology.     ED Course as of

## 2018-09-21 NOTE — PROGRESS NOTES
Cardiac Testing      ECHO 7/30/18: EF 55%, mild LVH, moderate AI, trivial MR, mild TR, RVSP is 30 mmHg. STRESS 11/7/17: No ischemia or infarct. EF 72%. CATH 12/13/13: Normal coronaries, mild PHTN    PPM 10/24/12: Medtronic device placed by Dr. Francesca Whelan for SB. 1. Vasovagal syncope, status post Medtronic Adapta pacemaker. 2. History of drug abuse with hepatitis C.   3. COPD.   4. Obesity. 5. Tobacco abuse.

## 2018-09-21 NOTE — ED PROVIDER NOTES
[address] [facility]     Emergency Department     Faculty Attestation    I performed a history and physical examination of the patient and discussed management with the resident. I reviewed the residents note and agree with the documented findings and plan of care. Any areas of disagreement are noted on the chart. I was personally present for the key portions of any procedures. I have documented in the chart those procedures where I was not present during the key portions. I have reviewed the emergency nurses triage note. I agree with the chief complaint, past medical history, past surgical history, allergies, medications, social and family history as documented unless otherwise noted below. For Physician Assistant/ Nurse Practitioner cases/documentation I have personally evaluated this patient and have completed at least one if not all key elements of the E/M (history, physical exam, and MDM). Additional findings are as noted. Decreased breath sounds at the bases,  Heart regular rate and rhythm , trace bilateral pretibial pitting without calf pain , equal pulses both wrists , trachea midline. Abdomen is nontender without pulsatile mass or bruit. Chest pain does not radiate to the back , and the pain is not pleuritic. Patient appears uncomfortable, skin is warm and dry.     Gene Blanc MD [facility]  Attending Physician     EKG Interpretation    Interpreted by me    Rhythm: Atrial paced rhythm  Rate: normal/88  Axis: normal -24  Ectopy: none  Conduction: Atrial paced UT interval 222 ms, RR prime pattern without QRS widening  ST Segments: no acute change  T Waves: no acute change  Q Waves: Inferior    Clinical Impression: Abnormal EKG               Ryne Jensen MD  09/20/18 [id number]

## 2018-09-22 NOTE — DISCHARGE SUMMARY
daily             lidocaine (LMX) 4 % cream  Apply topically cristopher per day as needed.              metFORMIN (GLUCOPHAGE) 500 MG tablet  Take 1 tablet by mouth 2 times daily (with meals)             Nebulizers (COMPRESSOR/NEBULIZER) MISC  Dx: COPD use 3-4 times daily as needed             nicotine (NICOTROL) 10 MG inhaler  Inhale 1 puff into the lungs 3 times daily as needed for Smoking cessation             pantoprazole (PROTONIX) 40 MG tablet  Take 1 tablet by mouth daily Stop Omeprazole             pravastatin (PRAVACHOL) 40 MG tablet  Take 1 tablet by mouth every evening             rOPINIRole (REQUIP) 0.5 MG tablet  Take 1 tablet by mouth daily             SUBOXONE 4-1 MG FILM SL film  DISSOLVE 1 FILM UNDER THE TONGUE DAILY             tiZANidine (ZANAFLEX) 4 MG tablet  Take 1 tablet by mouth 3 times daily             traZODone (DESYREL) 100 MG tablet  Take 1 tablet by mouth nightly             Umeclidinium Bromide (INCRUSE ELLIPTA) 62.5 MCG/INH AEPB  Inhale 1 puff into the lungs daily                 Diet:    , Advance as tolerated     Activity:  As tolerated    Consultants: IP CONSULT TO CARDIOLOGY    Procedures:  Not indicated     Diagnostic Test:   Results for orders placed or performed during the hospital encounter of 96/54/42   Basic Metabolic Panel   Result Value Ref Range    Glucose 131 (H) 70 - 99 mg/dL    BUN 10 8 - 23 mg/dL    CREATININE 0.59 0.50 - 0.90 mg/dL    Bun/Cre Ratio NOT REPORTED 9 - 20    Calcium 10.0 8.6 - 10.4 mg/dL    Sodium 140 135 - 144 mmol/L    Potassium 3.9 3.7 - 5.3 mmol/L    Chloride 103 98 - 107 mmol/L    CO2 26 20 - 31 mmol/L    Anion Gap 11 9 - 17 mmol/L    GFR Non-African American >60 >60 mL/min    GFR African American >60 >60 mL/min    GFR Comment          GFR Staging NOT REPORTED    CBC Auto Differential   Result Value Ref Range    WBC 8.8 3.5 - 11.3 k/uL    RBC 4.95 3.95 - 5.11 m/uL    Hemoglobin 13.9 11.9 - 15.1 g/dL    Hematocrit 44.1 36.3 - 47.1 %    MCV 89.1 82.6 - 102.9 fL    MCH 28.1 25.2 - 33.5 pg    MCHC 31.5 28.4 - 34.8 g/dL    RDW 14.7 (H) 11.8 - 14.4 %    Platelets 966 127 - 793 k/uL    MPV 11.2 8.1 - 13.5 fL    NRBC Automated 0.0 0.0 per 100 WBC    Differential Type NOT REPORTED     Seg Neutrophils 46 36 - 65 %    Lymphocytes 42 24 - 43 %    Monocytes 8 3 - 12 %    Eosinophils % 3 1 - 4 %    Basophils 1 0 - 2 %    Immature Granulocytes 0 0 %    Segs Absolute 4.08 1.50 - 8.10 k/uL    Absolute Lymph # 3.71 (H) 1.10 - 3.70 k/uL    Absolute Mono # 0.73 0.10 - 1.20 k/uL    Absolute Eos # 0.25 0.00 - 0.44 k/uL    Basophils # 0.06 0.00 - 0.20 k/uL    Absolute Immature Granulocyte <0.03 0.00 - 0.30 k/uL    WBC Morphology NOT REPORTED     RBC Morphology ANISOCYTOSIS PRESENT     Platelet Estimate NOT REPORTED    Brain Natriuretic Peptide   Result Value Ref Range    Pro-BNP 65 <300 pg/mL    BNP Interpretation         POCT troponin   Result Value Ref Range    POC Troponin I 0.01 0.00 - 0.10 ng/mL    POC Troponin Interp       The Troponin-I (POC) results cannot be compared to the Troponin-T results. POCT troponin   Result Value Ref Range    POC Troponin I 0.00 0.00 - 0.10 ng/mL    POC Troponin Interp       The Troponin-I (POC) results cannot be compared to the Troponin-T results.    EKG 12 Lead   Result Value Ref Range    Ventricular Rate 88 BPM    Atrial Rate 88 BPM    P-R Interval 222 ms    QRS Duration 86 ms    Q-T Interval 364 ms    QTc Calculation (Bazett) 440 ms    P Axis 15 degrees    R Axis -24 degrees    T Axis 48 degrees   EKG 12 Lead   Result Value Ref Range    Ventricular Rate 72 BPM    Atrial Rate 72 BPM    P-R Interval 288 ms    QRS Duration 90 ms    Q-T Interval 412 ms    QTc Calculation (Bazett) 451 ms    P Axis 3 degrees    R Axis -7 degrees    T Axis 27 degrees     Xr Chest Standard (2 Vw)    Result Date: 9/20/2018  EXAMINATION: TWO VIEWS OF THE CHEST 9/20/2018 9:06 pm COMPARISON: 08/06/2018 HISTORY: ORDERING SYSTEM PROVIDED HISTORY: cp/sob TECHNOLOGIST PROVIDED HISTORY: cp/sob Ordering Physician Provided Reason for Exam: Chest Pain short of breath Acuity: Unknown Type of Exam: Unknown FINDINGS: Cardiac and mediastinal contours are stable. Pacemaker remains in place. There is increased bibasilar ground-glass opacity and volume loss. No pneumothorax or pleural effusion is seen. No acute osseous abnormality is identified. Lower cervical spinal fixation hardware is noted. Increased bibasilar opacity and volume loss most consistent with atelectasis. Superimposed mild edema or infection may be present in the appropriate clinical setting. Physical Exam:    General appearance - NAD, AOx 3   Lungs -CTAB, no R/R/R  Heart - RRR, no M/R/G  Abdomen - Soft, NT/ND  Neurological:  MAEx4, No focal motor deficit, sensory loss  Extremities - No lower extremity edema or calf tenderness to palpation. Skin -warm, dry over exposed skin      Hospital Course:  Clinical course has improved, labs and imaging reviewed. Naldo Garner originally presented to the hospital on 9/20/2018  7:46 PM. with right sided chest pain. ED work up negative. At that time it was determined that She required further observation and cardiology consultation. She was admitted and labs and imaging were followed daily. Imaging results as above. Cardiology did not feel further work up was warranted given negative cardiac testing and reproducibility of chest pain with palpation. She is medically stable to be discharged. Disposition: Home    Patient stated that they will not drive themselves home from the hospital if they have gotten pain killers/ narcotics earlier that day and that they will arrange for transportation on their own or work with the  for a ride. Patient counseled NOT to drive while under the influence of narcotics/ pain killers. Condition: Good    Patient stable and ready for discharge home. I have discussed plan of care with patient and they are in understanding. They were instructed to read discharge paperwork. All of their questions and concerns were addressed. Time Spent: 0 day      --  Mckenna Silverman, DO Mckenna Silverman  Emergency Medicine Resident Physician    This dictation was generated by voice recognition computer software. Although all attempts are made to edit the dictation for accuracy, there may be errors in the transcription that are not intended.

## 2018-09-25 ENCOUNTER — HOSPITAL ENCOUNTER (OUTPATIENT)
Dept: OTHER | Age: 63
Discharge: HOME | End: 2018-09-25
Payer: COMMERCIAL

## 2018-09-25 VITALS
OXYGEN SATURATION: 99 % | BODY MASS INDEX: 29 KG/M2 | WEIGHT: 213.8 LBS | RESPIRATION RATE: 16 BRPM | SYSTOLIC BLOOD PRESSURE: 117 MMHG | DIASTOLIC BLOOD PRESSURE: 76 MMHG | HEART RATE: 77 BPM

## 2018-09-25 PROCEDURE — 99211 OFF/OP EST MAY X REQ PHY/QHP: CPT

## 2018-09-25 ASSESSMENT — PAIN SCALES - GENERAL: PAINLEVEL_OUTOF10: 0

## 2018-10-01 ENCOUNTER — OFFICE VISIT (OUTPATIENT)
Dept: INTERNAL MEDICINE | Age: 63
End: 2018-10-01
Payer: COMMERCIAL

## 2018-10-01 VITALS
DIASTOLIC BLOOD PRESSURE: 58 MMHG | WEIGHT: 209.4 LBS | SYSTOLIC BLOOD PRESSURE: 108 MMHG | TEMPERATURE: 98.7 F | BODY MASS INDEX: 28.4 KG/M2 | HEART RATE: 81 BPM

## 2018-10-01 DIAGNOSIS — J44.1 COPD EXACERBATION (HCC): ICD-10-CM

## 2018-10-01 DIAGNOSIS — J20.2 ACUTE BRONCHITIS DUE TO STREPTOCOCCUS: Primary | ICD-10-CM

## 2018-10-01 PROCEDURE — 99213 OFFICE O/P EST LOW 20 MIN: CPT | Performed by: INTERNAL MEDICINE

## 2018-10-01 PROCEDURE — 99211 OFF/OP EST MAY X REQ PHY/QHP: CPT | Performed by: INTERNAL MEDICINE

## 2018-10-01 RX ORDER — PREDNISONE 10 MG/1
10 TABLET ORAL DAILY
Qty: 5 TABLET | Refills: 0 | Status: SHIPPED | OUTPATIENT
Start: 2018-10-01 | End: 2018-10-06

## 2018-10-01 RX ORDER — GUAIFENESIN 100 MG/5ML
10 SYRUP ORAL EVERY 4 HOURS PRN
Qty: 240 ML | Refills: 0 | Status: SHIPPED | OUTPATIENT
Start: 2018-10-01 | End: 2018-11-19

## 2018-10-01 RX ORDER — AZITHROMYCIN 250 MG/1
TABLET, FILM COATED ORAL
Qty: 6 TABLET | Refills: 0 | Status: SHIPPED | OUTPATIENT
Start: 2018-10-01 | End: 2018-10-11

## 2018-10-01 ASSESSMENT — ENCOUNTER SYMPTOMS
BLURRED VISION: 0
COUGH: 1
ABDOMINAL PAIN: 0
HEARTBURN: 0
NAUSEA: 0
HEMOPTYSIS: 0
DOUBLE VISION: 0
SPUTUM PRODUCTION: 1

## 2018-10-03 DIAGNOSIS — K59.09 OTHER CONSTIPATION: ICD-10-CM

## 2018-10-03 RX ORDER — LINACLOTIDE 145 UG/1
CAPSULE, GELATIN COATED ORAL
Qty: 30 CAPSULE | Refills: 2 | Status: SHIPPED | OUTPATIENT
Start: 2018-10-03 | End: 2018-11-19 | Stop reason: SDUPTHER

## 2018-10-03 NOTE — TELEPHONE ENCOUNTER
1/11/2019 10:45 AM Tan Massey MD Resp Spec [address] [facility]            Patient Active Problem List:     Essential hypertension     Pure hypercholesterolemia     Depression     History of heroin use     Hep C w/o coma, chronic ([address] completed tx 2016     GERD (gastroesophageal reflux disease)     COPD (chronic obstructive pulmonary disease) (HCC)     Tobacco abuse     Chronic diastolic congestive heart failure (HCC)     Diverticulosis     Hyperplastic polyp of intestine     Diabetic polyneuropathy associated with type 2 diabetes mellitus (HCC)     Bilateral chronic knee pain     Chronic respiratory failure with hypoxia (HCC)     Type 2 diabetes mellitus with complication (HCC)     Primary insomnia     Constipation     Cigarette nicotine dependence without complication     Chronic bilateral low back pain with right-sided sciatica     Chest pain

## 2018-10-08 ENCOUNTER — TELEPHONE (OUTPATIENT)
Dept: PULMONOLOGY | Age: 63
End: 2018-10-08

## 2018-10-08 DIAGNOSIS — J96.11 CHRONIC RESPIRATORY FAILURE WITH HYPOXIA (HCC): Primary | ICD-10-CM

## 2018-10-08 DIAGNOSIS — J44.9 COPD WITH CHRONIC BRONCHITIS AND EMPHYSEMA (HCC): ICD-10-CM

## 2018-10-08 NOTE — TELEPHONE ENCOUNTER
Patient is asking for a POC. Thought it was supposed to be ordered already. Order sent to [place] [facility] [place].

## 2018-10-12 ENCOUNTER — TELEPHONE (OUTPATIENT)
Dept: INTERNAL MEDICINE | Age: 63
End: 2018-10-12

## 2018-10-12 DIAGNOSIS — J43.2 CENTRILOBULAR EMPHYSEMA (HCC): Primary | ICD-10-CM

## 2018-10-16 NOTE — TELEPHONE ENCOUNTER
Script faxed to [facility] RM 1 [facility] CLI [facility]   11/19/2018 1:45 PM Antonella Sellers MD [facility] IM [facility]   1/11/2019 10:45 AM Loly Hatch MD Resp Spec [address] [facility]            Patient Active Problem List:     Essential hypertension     Pure hypercholesterolemia     Depression     History of heroin use     Hep C w/o coma, chronic ([address] completed tx 2016     GERD (gastroesophageal reflux disease)     COPD (chronic obstructive pulmonary disease) (HCC)     Tobacco abuse     Chronic diastolic congestive heart failure (HCC)     Diverticulosis     Hyperplastic polyp of intestine     Diabetic polyneuropathy associated with type 2 diabetes mellitus (HCC)     Bilateral chronic knee pain     Chronic respiratory failure with hypoxia (HCC)     Type 2 diabetes mellitus with complication (HCC)     Primary insomnia     Constipation     Cigarette nicotine dependence without complication     Chronic bilateral low back pain with right-sided sciatica     Chest pain

## 2018-10-22 ENCOUNTER — OFFICE VISIT (OUTPATIENT)
Dept: GASTROENTEROLOGY | Age: 63
End: 2018-10-22
Payer: COMMERCIAL

## 2018-10-22 VITALS
WEIGHT: 214.7 LBS | SYSTOLIC BLOOD PRESSURE: 135 MMHG | HEART RATE: 72 BPM | BODY MASS INDEX: 29.12 KG/M2 | DIASTOLIC BLOOD PRESSURE: 64 MMHG

## 2018-10-22 DIAGNOSIS — R15.9 INCONTINENCE OF FECES, UNSPECIFIED FECAL INCONTINENCE TYPE: ICD-10-CM

## 2018-10-22 DIAGNOSIS — R14.0 BLOATING: ICD-10-CM

## 2018-10-22 DIAGNOSIS — K57.90 DIVERTICULOSIS OF INTESTINE WITHOUT BLEEDING, UNSPECIFIED INTESTINAL TRACT LOCATION: Primary | ICD-10-CM

## 2018-10-22 DIAGNOSIS — R32 URINARY INCONTINENCE, UNSPECIFIED TYPE: ICD-10-CM

## 2018-10-22 DIAGNOSIS — R20.8 RECTAL BURNING: ICD-10-CM

## 2018-10-22 DIAGNOSIS — R10.9 ABDOMINAL PAIN, UNSPECIFIED ABDOMINAL LOCATION: ICD-10-CM

## 2018-10-22 DIAGNOSIS — R19.7 DIARRHEA, UNSPECIFIED TYPE: ICD-10-CM

## 2018-10-22 PROCEDURE — 99214 OFFICE O/P EST MOD 30 MIN: CPT | Performed by: INTERNAL MEDICINE

## 2018-10-22 RX ORDER — CLOPIDOGREL BISULFATE 75 MG/1
75 TABLET ORAL DAILY
COMMUNITY
End: 2018-10-22

## 2018-10-22 RX ORDER — HYOSCYAMINE SULFATE 0.12 MG/1
1 TABLET SUBLINGUAL 4 TIMES DAILY PRN
Qty: 120 EACH | Refills: 5 | Status: ON HOLD | OUTPATIENT
Start: 2018-10-22 | End: 2019-03-14 | Stop reason: ALTCHOICE

## 2018-10-22 RX ORDER — ASPIRIN 81 MG/1
81 TABLET, CHEWABLE ORAL DAILY
COMMUNITY
End: 2018-10-22

## 2018-10-22 ASSESSMENT — ENCOUNTER SYMPTOMS
ABDOMINAL DISTENTION: 1
SINUS PAIN: 0
TROUBLE SWALLOWING: 0
ABDOMINAL PAIN: 1
COUGH: 0
DIARRHEA: 1
CHEST TIGHTNESS: 1
SHORTNESS OF BREATH: 0
SINUS PRESSURE: 0
BACK PAIN: 0
RECTAL PAIN: 1
SORE THROAT: 0

## 2018-10-22 NOTE — PROGRESS NOTES
UMBILICAL    HYSTERECTOMY      LUMBAR SPINE SURGERY  9/24/13    decompression & re-exploration L3-4, L4-5    PACEMAKER INSERTION      PACEMAKER PLACEMENT      SIGMOIDOSCOPY N/A 6/26/15    flexable sigmoidscopy,HYPERPLASTIC POLYP    TONSILLECTOMY         CURRENT MEDICATIONS:    Current Outpatient Prescriptions:     Nebulizers MISC, Dx: COPD use daily as needed, Disp: 1 each, Rfl: 0    LINZESS 145 MCG capsule, take 1 capsule by mouth every morning before BREAKFAST, Disp: 30 capsule, Rfl: 2    guaiFENesin (ALTARUSSIN) 100 MG/5ML syrup, Take 10 mLs by mouth every 4 hours as needed for Cough, Disp: 240 mL, Rfl: 0    hydrochlorothiazide (MICROZIDE) 12.5 MG capsule, Take 1 capsule by mouth every morning, Disp: 30 capsule, Rfl: 2    furosemide (LASIX) 20 MG tablet, Take 1 tablet by mouth 2 times daily, Disp: 60 tablet, Rfl: 2    metFORMIN (GLUCOPHAGE) 500 MG tablet, Take 1 tablet by mouth 2 times daily (with meals), Disp: 60 tablet, Rfl: 2    rOPINIRole (REQUIP) 0.5 MG tablet, Take 1 tablet by mouth daily, Disp: 30 tablet, Rfl: 2    ibuprofen (ADVIL;MOTRIN) 800 MG tablet, Take 1 tablet by mouth every 8 hours as needed for Pain, Disp: 90 tablet, Rfl: 2    budesonide-formoterol (SYMBICORT) 160-4.5 MCG/ACT AERO, Inhale 2 puffs into the lungs 2 times daily Rinse mouth after use., Disp: 1 Inhaler, Rfl: 2    atenolol (TENORMIN) 25 MG tablet, take 1 tablet by mouth once daily, Disp: 30 tablet, Rfl: 2    traZODone (DESYREL) 100 MG tablet, Take 1 tablet by mouth nightly, Disp: 60 tablet, Rfl: 2    albuterol sulfate HFA (VENTOLIN HFA) 108 (90 Base) MCG/ACT inhaler, Inhale 1 puff into the lungs every 6 hours as needed for Wheezing, Disp: 18 g, Rfl: 2    pravastatin (PRAVACHOL) 40 MG tablet, Take 1 tablet by mouth every evening, Disp: 30 tablet, Rfl: 2    pantoprazole (PROTONIX) 40 MG tablet, Take 1 tablet by mouth daily Stop Omeprazole, Disp: 30 tablet, Rfl: 2    tiZANidine (ZANAFLEX) 4 MG tablet, Take 1 tablet by for unexpected weight change (loss). Negative for appetite change, fatigue and fever. HENT: Negative for congestion, sinus pain, sinus pressure, sore throat and trouble swallowing. Eyes: Positive for visual disturbance (reading glasses). Respiratory: Positive for chest tightness. Negative for cough and shortness of breath. Cardiovascular: Positive for chest pain. Negative for palpitations and leg swelling. Gastrointestinal: Positive for abdominal distention, abdominal pain, diarrhea and rectal pain (burning). Endocrine: Negative. Genitourinary: Negative for difficulty urinating. Musculoskeletal: Negative for arthralgias, back pain and gait problem. Skin: Negative. Allergic/Immunologic: Negative for environmental allergies and food allergies. Neurological: Negative for dizziness, weakness, light-headedness and headaches. Hematological: Does not bruise/bleed easily. Psychiatric/Behavioral: Positive for sleep disturbance. The patient is not nervous/anxious. PHYSICAL EXAMINATION: Vital signs reviewed per the nursing documentation. /64 (Site: Left Upper Arm, Position: Sitting, Cuff Size: Large Adult)   Pulse 72   Wt 214 lb 11.2 oz (97.4 kg)   BMI 29.12 kg/m²   Body mass index is 29.12 kg/m². I personally reviewed the nurse's notes and documentation and I agree with her notes. General: alert, appears stated age and cooperative Psych: Normal. and Alert and oriented, appropriate affect. . Normal affect. Mentation normal  HEENT: PERRLA. Clear conjunctivae and sclerae. Moist oral mucosae, no lesions or ulcers. The neck is supple, without lymphadenopathy or jugular venous distension. No masses. Normal thyroid. Cardiovascular: S1 S2 RRR no rubs or murmurs. Pulmonary: clear BL. No accessory muscle usage. Abdominal Exam: Soft, NT ND, no hepato or spleno megaly, +BS, no ascites.         LABORATORY DATA: Reviewed  Lab Results   Component Value Date    WBC 8.8 09/20/2018

## 2018-10-23 ENCOUNTER — HOSPITAL ENCOUNTER (OUTPATIENT)
Dept: OTHER | Age: 63
Discharge: HOME | End: 2018-10-23
Payer: COMMERCIAL

## 2018-10-23 VITALS
SYSTOLIC BLOOD PRESSURE: 107 MMHG | RESPIRATION RATE: 20 BRPM | DIASTOLIC BLOOD PRESSURE: 70 MMHG | HEART RATE: 77 BPM | OXYGEN SATURATION: 98 % | WEIGHT: 215 LBS | BODY MASS INDEX: 29.16 KG/M2

## 2018-10-23 PROCEDURE — 99211 OFF/OP EST MAY X REQ PHY/QHP: CPT

## 2018-10-23 ASSESSMENT — PAIN DESCRIPTION - FREQUENCY: FREQUENCY: INTERMITTENT

## 2018-10-23 ASSESSMENT — PAIN DESCRIPTION - DESCRIPTORS: DESCRIPTORS: ACHING

## 2018-10-23 ASSESSMENT — PAIN DESCRIPTION - LOCATION: LOCATION: ABDOMEN

## 2018-10-23 ASSESSMENT — PAIN SCALES - GENERAL: PAINLEVEL_OUTOF10: 4

## 2018-11-05 ENCOUNTER — TELEPHONE (OUTPATIENT)
Dept: GASTROENTEROLOGY | Age: 63
End: 2018-11-05

## 2018-11-05 NOTE — TELEPHONE ENCOUNTER
Patient came into [facility] this morning to inform us that her insurance will not cover the medication prescribed for her by Dr. Hughes Holding at her last OV and would like to know if there is something else he can prescribe for her. She states that she is still in pain and her stomach was hurting her this morning as well.

## 2018-11-06 ENCOUNTER — TELEPHONE (OUTPATIENT)
Dept: GASTROENTEROLOGY | Age: 63
End: 2018-11-06

## 2018-11-08 DIAGNOSIS — E11.40 TYPE 2 DIABETES MELLITUS WITH DIABETIC NEUROPATHY, WITHOUT LONG-TERM CURRENT USE OF INSULIN (HCC): ICD-10-CM

## 2018-11-08 RX ORDER — GABAPENTIN 800 MG/1
800 TABLET ORAL 3 TIMES DAILY
Qty: 90 TABLET | Refills: 2 | Status: SHIPPED | OUTPATIENT
Start: 2018-11-08 | End: 2019-02-05 | Stop reason: SDUPTHER

## 2018-11-08 NOTE — TELEPHONE ENCOUNTER
[facility]   1/11/2019 10:45 AM Austyn Schneider MD Resp Spec [facility]   1/28/2019 8:45 AM Messi Barroso MD [facility]            Patient Active Problem List:     Essential hypertension     Pure hypercholesterolemia     Depression     History of heroin use     Hep C w/o coma, chronic ([facility] [address] completed tx 2016     GERD (gastroesophageal reflux disease)     COPD (chronic obstructive pulmonary disease) (HCC)     Tobacco abuse     Chronic diastolic congestive heart failure (HCC)     Diverticulosis     Hyperplastic polyp of intestine     Diabetic polyneuropathy associated with type 2 diabetes mellitus (HCC)     Bilateral chronic knee pain     Chronic respiratory failure with hypoxia (HCC)     Type 2 diabetes mellitus with complication (HCC)     Primary insomnia     Constipation     Cigarette nicotine dependence without complication     Chronic bilateral low back pain with right-sided sciatica     Chest pain     Rectal burning     Diarrhea     Abdominal cramping     Bloating

## 2018-11-09 RX ORDER — DICYCLOMINE HCL 20 MG
20 TABLET ORAL 3 TIMES DAILY PRN
Qty: 120 TABLET | Refills: 0 | Status: SHIPPED | OUTPATIENT
Start: 2018-11-09 | End: 2018-11-19

## 2018-11-12 RX ORDER — DOCUSATE SODIUM 100 MG/1
CAPSULE ORAL
Qty: 60 CAPSULE | Refills: 3 | Status: SHIPPED | OUTPATIENT
Start: 2018-11-12 | End: 2019-02-15 | Stop reason: SDUPTHER

## 2018-11-19 ENCOUNTER — OFFICE VISIT (OUTPATIENT)
Dept: INTERNAL MEDICINE | Age: 63
End: 2018-11-19
Payer: COMMERCIAL

## 2018-11-19 VITALS
WEIGHT: 218 LBS | HEART RATE: 70 BPM | HEIGHT: 72 IN | DIASTOLIC BLOOD PRESSURE: 78 MMHG | SYSTOLIC BLOOD PRESSURE: 125 MMHG | BODY MASS INDEX: 29.53 KG/M2

## 2018-11-19 DIAGNOSIS — E78.00 PURE HYPERCHOLESTEROLEMIA: ICD-10-CM

## 2018-11-19 DIAGNOSIS — F17.210 CIGARETTE NICOTINE DEPENDENCE WITHOUT COMPLICATION: ICD-10-CM

## 2018-11-19 DIAGNOSIS — J41.0 SIMPLE CHRONIC BRONCHITIS (HCC): Primary | ICD-10-CM

## 2018-11-19 DIAGNOSIS — K59.09 OTHER CONSTIPATION: ICD-10-CM

## 2018-11-19 DIAGNOSIS — I50.32 CHRONIC DIASTOLIC CONGESTIVE HEART FAILURE (HCC): ICD-10-CM

## 2018-11-19 DIAGNOSIS — G89.29 CHRONIC BILATERAL LOW BACK PAIN WITH RIGHT-SIDED SCIATICA: ICD-10-CM

## 2018-11-19 DIAGNOSIS — K21.9 GASTROESOPHAGEAL REFLUX DISEASE WITHOUT ESOPHAGITIS: ICD-10-CM

## 2018-11-19 DIAGNOSIS — E11.8 TYPE 2 DIABETES MELLITUS WITH COMPLICATION, WITHOUT LONG-TERM CURRENT USE OF INSULIN (HCC): ICD-10-CM

## 2018-11-19 DIAGNOSIS — G25.81 RLS (RESTLESS LEGS SYNDROME): ICD-10-CM

## 2018-11-19 DIAGNOSIS — F51.01 PRIMARY INSOMNIA: ICD-10-CM

## 2018-11-19 DIAGNOSIS — M54.41 CHRONIC BILATERAL LOW BACK PAIN WITH RIGHT-SIDED SCIATICA: ICD-10-CM

## 2018-11-19 DIAGNOSIS — Z23 NEED FOR PROPHYLACTIC VACCINATION AND INOCULATION AGAINST VARICELLA: ICD-10-CM

## 2018-11-19 DIAGNOSIS — I10 ESSENTIAL HYPERTENSION: ICD-10-CM

## 2018-11-19 DIAGNOSIS — E11.42 DIABETIC POLYNEUROPATHY ASSOCIATED WITH TYPE 2 DIABETES MELLITUS (HCC): ICD-10-CM

## 2018-11-19 LAB — HBA1C MFR BLD: 6.1 %

## 2018-11-19 PROCEDURE — 99214 OFFICE O/P EST MOD 30 MIN: CPT | Performed by: INTERNAL MEDICINE

## 2018-11-19 PROCEDURE — 99211 OFF/OP EST MAY X REQ PHY/QHP: CPT | Performed by: INTERNAL MEDICINE

## 2018-11-19 PROCEDURE — 83036 HEMOGLOBIN GLYCOSYLATED A1C: CPT | Performed by: INTERNAL MEDICINE

## 2018-11-19 RX ORDER — PANTOPRAZOLE SODIUM 40 MG/1
40 TABLET, DELAYED RELEASE ORAL DAILY
Qty: 30 TABLET | Refills: 2 | Status: SHIPPED | OUTPATIENT
Start: 2018-11-19 | End: 2019-02-15 | Stop reason: SDUPTHER

## 2018-11-19 RX ORDER — TRAZODONE HYDROCHLORIDE 100 MG/1
100 TABLET ORAL NIGHTLY
Qty: 60 TABLET | Refills: 2 | Status: SHIPPED | OUTPATIENT
Start: 2018-11-19 | End: 2019-02-15 | Stop reason: SDUPTHER

## 2018-11-19 RX ORDER — IPRATROPIUM BROMIDE AND ALBUTEROL SULFATE 2.5; .5 MG/3ML; MG/3ML
3 SOLUTION RESPIRATORY (INHALATION) 3 TIMES DAILY
Qty: 360 ML | Refills: 5 | Status: SHIPPED | OUTPATIENT
Start: 2018-11-19 | End: 2019-05-29 | Stop reason: SDUPTHER

## 2018-11-19 RX ORDER — PRAVASTATIN SODIUM 40 MG
40 TABLET ORAL EVERY EVENING
Qty: 30 TABLET | Refills: 2 | Status: SHIPPED | OUTPATIENT
Start: 2018-11-19 | End: 2019-02-15 | Stop reason: SDUPTHER

## 2018-11-19 RX ORDER — ALBUTEROL SULFATE 90 UG/1
1 AEROSOL, METERED RESPIRATORY (INHALATION) EVERY 6 HOURS PRN
Qty: 18 G | Refills: 2 | Status: SHIPPED | OUTPATIENT
Start: 2018-11-19 | End: 2019-02-15 | Stop reason: SDUPTHER

## 2018-11-19 RX ORDER — ROPINIROLE 0.5 MG/1
0.5 TABLET, FILM COATED ORAL DAILY
Qty: 30 TABLET | Refills: 2 | Status: SHIPPED | OUTPATIENT
Start: 2018-11-19 | End: 2019-02-15 | Stop reason: SDUPTHER

## 2018-11-19 RX ORDER — TIZANIDINE 4 MG/1
4 TABLET ORAL 3 TIMES DAILY
Qty: 90 TABLET | Refills: 2 | Status: SHIPPED | OUTPATIENT
Start: 2018-11-19 | End: 2019-02-15 | Stop reason: SDUPTHER

## 2018-11-19 RX ORDER — BUDESONIDE AND FORMOTEROL FUMARATE DIHYDRATE 160; 4.5 UG/1; UG/1
2 AEROSOL RESPIRATORY (INHALATION) 2 TIMES DAILY
Qty: 1 INHALER | Refills: 2 | Status: SHIPPED | OUTPATIENT
Start: 2018-11-19 | End: 2019-02-15 | Stop reason: SDUPTHER

## 2018-11-19 RX ORDER — FUROSEMIDE 20 MG/1
20 TABLET ORAL 2 TIMES DAILY
Qty: 60 TABLET | Refills: 2 | Status: SHIPPED | OUTPATIENT
Start: 2018-11-19 | End: 2019-02-15 | Stop reason: SDUPTHER

## 2018-11-19 RX ORDER — IBUPROFEN 800 MG/1
800 TABLET ORAL EVERY 8 HOURS PRN
Qty: 90 TABLET | Refills: 2 | Status: SHIPPED | OUTPATIENT
Start: 2018-11-19 | End: 2019-02-01 | Stop reason: SDUPTHER

## 2018-11-19 RX ORDER — ATENOLOL 25 MG/1
TABLET ORAL
Qty: 30 TABLET | Refills: 2 | Status: SHIPPED | OUTPATIENT
Start: 2018-11-19 | End: 2019-02-15 | Stop reason: SDUPTHER

## 2018-11-19 RX ORDER — HYDROCHLOROTHIAZIDE 12.5 MG/1
12.5 CAPSULE, GELATIN COATED ORAL EVERY MORNING
Qty: 30 CAPSULE | Refills: 2 | Status: SHIPPED | OUTPATIENT
Start: 2018-11-19 | End: 2019-02-15 | Stop reason: SDUPTHER

## 2018-11-19 ASSESSMENT — ENCOUNTER SYMPTOMS
ABDOMINAL PAIN: 0
WHEEZING: 0
SORE THROAT: 0
COUGH: 0
BLOOD IN STOOL: 0
DIARRHEA: 0
SHORTNESS OF BREATH: 0
NAUSEA: 0
VOMITING: 0
COLOR CHANGE: 0
EYE DISCHARGE: 0
CONSTIPATION: 0
PHOTOPHOBIA: 0
EYE PAIN: 0

## 2018-11-19 NOTE — PROGRESS NOTES
Diverticulosis    Hyperplastic polyp of intestine    Diabetic polyneuropathy associated with type 2 diabetes mellitus (HCC)    Bilateral chronic knee pain    Chronic respiratory failure with hypoxia (HCC)    Type 2 diabetes mellitus with complication (HCC)    Primary insomnia    Constipation    Cigarette nicotine dependence without complication    Chronic bilateral low back pain with right-sided sciatica    Chest pain    Rectal burning    Diarrhea    Abdominal cramping    Bloating       Health Maintenance Due   Topic Date Due    Shingles Vaccine (1 of 2 - 2 Dose Series) 06/05/2007    Diabetic microalbuminuria test  10/31/2018       Allergies   Allergen Reactions    Iv Dye [Iodides] Hives         Current Outpatient Prescriptions   Medication Sig Dispense Refill    ipratropium-albuterol (DUONEB) 0.5-2.5 (3) MG/3ML SOLN nebulizer solution Inhale 3 mLs into the lungs three times daily 360 mL 5    Umeclidinium Bromide (INCRUSE ELLIPTA) 62.5 MCG/INH AEPB Inhale 1 puff into the lungs daily 1 each 5    nicotine (NICOTROL) 10 MG inhaler Inhale 1 puff into the lungs 3 times daily as needed for Smoking cessation 90 each 1    tiZANidine (ZANAFLEX) 4 MG tablet Take 1 tablet by mouth 3 times daily 90 tablet 2    pantoprazole (PROTONIX) 40 MG tablet Take 1 tablet by mouth daily Stop Omeprazole 30 tablet 2    pravastatin (PRAVACHOL) 40 MG tablet Take 1 tablet by mouth every evening 30 tablet 2    albuterol sulfate HFA (VENTOLIN HFA) 108 (90 Base) MCG/ACT inhaler Inhale 1 puff into the lungs every 6 hours as needed for Wheezing 18 g 2    traZODone (DESYREL) 100 MG tablet Take 1 tablet by mouth nightly 60 tablet 2    atenolol (TENORMIN) 25 MG tablet take 1 tablet by mouth once daily 30 tablet 2    budesonide-formoterol (SYMBICORT) 160-4.5 MCG/ACT AERO Inhale 2 puffs into the lungs 2 times daily Rinse mouth after use.  1 Inhaler 2    ibuprofen (ADVIL;MOTRIN) 800 MG tablet Take 1 tablet by mouth every 8 hours as needed for Pain 90 tablet 2    rOPINIRole (REQUIP) 0.5 MG tablet Take 1 tablet by mouth daily 30 tablet 2    metFORMIN (GLUCOPHAGE) 500 MG tablet Take 1 tablet by mouth 2 times daily (with meals) 60 tablet 2    furosemide (LASIX) 20 MG tablet Take 1 tablet by mouth 2 times daily 60 tablet 2    hydrochlorothiazide (MICROZIDE) 12.5 MG capsule Take 1 capsule by mouth every morning 30 capsule 2    linaclotide (LINZESS) 145 MCG capsule Take 1 capsule by mouth every morning (before breakfast) 30 capsule 2    DOCQLACE 100 MG capsule take 1 capsule by mouth twice a day 60 capsule 3    gabapentin (NEURONTIN) 800 MG tablet Take 1 tablet by mouth 3 times daily for 59 days. DC Lyrica. 90 tablet 2    Hyoscyamine Sulfate SL (LEVSIN/SL) 0.125 MG SUBL Place 1 tablet under the tongue 4 times daily as needed (abdominal pain) 120 each 5    hydrOXYzine (ATARAX) 10 MG tablet take 1 tablet by mouth three times a day  0    SUBOXONE 4-1 MG FILM SL film DISSOLVE 1 FILM UNDER THE TONGUE DAILY  0     No current facility-administered medications for this visit. Social History   Substance Use Topics    Smoking status: Current Some Day Smoker     Packs/day: 0.50     Years: 40.00     Types: Cigarettes    Smokeless tobacco: Never Used    Alcohol use No       Family History   Problem Relation Age of Onset    Cancer Mother     Stroke Father     Other Sister         CROHNS        REVIEW OF SYSTEMS:  Review of Systems   Constitutional: Negative for fatigue and fever. HENT: Negative for congestion and sore throat. Eyes: Negative for photophobia, pain, discharge and visual disturbance. Respiratory: Negative for cough, shortness of breath and wheezing. Cardiovascular: Negative for chest pain, palpitations and leg swelling. Gastrointestinal: Negative for abdominal pain, blood in stool, constipation, diarrhea, nausea and vomiting. Genitourinary: Negative for dysuria, frequency and urgency.    Musculoskeletal: Negative for arthralgias and myalgias. Skin: Negative for color change and rash. Neurological: Negative for dizziness, tremors, seizures, syncope, weakness, numbness and headaches. Psychiatric/Behavioral: Negative for agitation, behavioral problems, confusion, sleep disturbance and suicidal ideas. PHYSICAL EXAM:  Vitals:    11/19/18 1358 11/19/18 1403   BP: (!) 140/80 125/78   Site: Left Upper Arm    Position: Sitting    Cuff Size: Medium Adult    Pulse: 84 70   Weight: 218 lb (98.9 kg)    Height: 6' (1.829 m)      BP Readings from Last 3 Encounters:   11/19/18 125/78   10/23/18 107/70   10/22/18 135/64        Physical Exam   Constitutional: She is oriented to person, place, and time. No distress. HENT:   Head: Normocephalic and atraumatic. Right Ear: External ear normal.   Left Ear: External ear normal.   Nose: Nose normal.   Mouth/Throat: No oropharyngeal exudate. Eyes: Pupils are equal, round, and reactive to light. EOM are normal.   Neck: Normal range of motion. Neck supple. No thyromegaly present. Cardiovascular: Normal rate, regular rhythm, normal heart sounds and intact distal pulses. Pulmonary/Chest: Effort normal and breath sounds normal. No respiratory distress. She has no wheezes. Abdominal: Soft. Bowel sounds are normal. She exhibits no distension and no mass. There is no tenderness. Musculoskeletal: Normal range of motion. She exhibits no edema or tenderness. Neurological: She is alert and oriented to person, place, and time. No cranial nerve deficit. Skin: Skin is warm. No rash noted. She is not diaphoretic. No erythema. Psychiatric: Judgment normal.         DIAGNOSTIC FINDINGS:  CBC:  Lab Results   Component Value Date    WBC 8.8 09/20/2018    HGB 13.9 09/20/2018     09/20/2018    PLT Platelet clumps present, count appears adequate.  02/26/2012       BMP:    Lab Results   Component Value Date     09/20/2018    K 3.9 09/20/2018     09/20/2018    CO2 26

## 2018-11-21 ENCOUNTER — TELEPHONE (OUTPATIENT)
Dept: INTERNAL MEDICINE | Age: 63
End: 2018-11-21

## 2018-11-21 NOTE — TELEPHONE ENCOUNTER
1/28/2019 8:45 AM Messi Barroso MD [facility]   2/11/2019 1:00 PM Antonella Alas MD [facility]            Patient Active Problem List:     Essential hypertension     Pure hypercholesterolemia     Depression     History of heroin use     Hep C w/o coma, chronic ([facility] [address] completed tx 2016     GERD (gastroesophageal reflux disease)     COPD (chronic obstructive pulmonary disease) (HCC)     Tobacco abuse     Chronic diastolic congestive heart failure (HCC)     Diverticulosis     Hyperplastic polyp of intestine     Diabetic polyneuropathy associated with type 2 diabetes mellitus (HCC)     Bilateral chronic knee pain     Chronic respiratory failure with hypoxia (HCC)     Type 2 diabetes mellitus with complication (HCC)     Primary insomnia     Constipation     Cigarette nicotine dependence without complication     Chronic bilateral low back pain with right-sided sciatica     Chest pain     Rectal burning     Diarrhea     Abdominal cramping     Bloating

## 2018-11-28 ENCOUNTER — TELEPHONE (OUTPATIENT)
Dept: PULMONOLOGY | Age: 63
End: 2018-11-28

## 2018-11-28 NOTE — TELEPHONE ENCOUNTER
Pt called stated she still hasnt received her POC, called over to [address] [place] [address] to figure out what is missing, waiting to hear back

## 2018-12-06 ENCOUNTER — TELEPHONE (OUTPATIENT)
Dept: GASTROENTEROLOGY | Age: 63
End: 2018-12-06

## 2018-12-06 NOTE — TELEPHONE ENCOUNTER
Writer spoke with patient and confirmed colon scheduled 12/10/18 at [facility], understanding of instructions, arrival time and need for a .

## 2018-12-10 ENCOUNTER — TELEPHONE (OUTPATIENT)
Dept: GASTROENTEROLOGY | Age: 63
End: 2018-12-10

## 2018-12-10 ENCOUNTER — HOSPITAL ENCOUNTER (OUTPATIENT)
Age: 63
Setting detail: OUTPATIENT SURGERY
Discharge: HOME HEALTH | End: 2018-12-10
Attending: INTERNAL MEDICINE | Admitting: INTERNAL MEDICINE
Payer: COMMERCIAL

## 2018-12-10 LAB — GLUCOSE BLD-MCNC: 92 MG/DL (ref 65–105)

## 2018-12-10 PROCEDURE — 82947 ASSAY GLUCOSE BLOOD QUANT: CPT

## 2018-12-18 RX ORDER — DICYCLOMINE HCL 20 MG
TABLET ORAL
Qty: 120 TABLET | Refills: 0 | Status: SHIPPED | OUTPATIENT
Start: 2018-12-18 | End: 2019-01-27 | Stop reason: SDUPTHER

## 2019-01-29 RX ORDER — DICYCLOMINE HCL 20 MG
TABLET ORAL
Qty: 120 TABLET | Refills: 0 | Status: SHIPPED | OUTPATIENT
Start: 2019-01-29 | End: 2019-02-15 | Stop reason: SDUPTHER

## 2019-02-01 DIAGNOSIS — G89.29 CHRONIC BILATERAL LOW BACK PAIN WITH RIGHT-SIDED SCIATICA: ICD-10-CM

## 2019-02-01 DIAGNOSIS — M54.41 CHRONIC BILATERAL LOW BACK PAIN WITH RIGHT-SIDED SCIATICA: ICD-10-CM

## 2019-02-01 RX ORDER — IBUPROFEN 800 MG/1
800 TABLET ORAL EVERY 8 HOURS PRN
Qty: 90 TABLET | Refills: 2 | Status: SHIPPED | OUTPATIENT
Start: 2019-02-01 | End: 2019-05-29 | Stop reason: SDUPTHER

## 2019-02-05 ENCOUNTER — TELEPHONE (OUTPATIENT)
Dept: GASTROENTEROLOGY | Age: 64
End: 2019-02-05

## 2019-02-05 DIAGNOSIS — E11.40 TYPE 2 DIABETES MELLITUS WITH DIABETIC NEUROPATHY, WITHOUT LONG-TERM CURRENT USE OF INSULIN (HCC): ICD-10-CM

## 2019-02-06 RX ORDER — GABAPENTIN 800 MG/1
800 TABLET ORAL 3 TIMES DAILY
Qty: 90 TABLET | Refills: 2 | Status: SHIPPED | OUTPATIENT
Start: 2019-02-06 | End: 2019-05-24 | Stop reason: SDUPTHER

## 2019-02-15 ENCOUNTER — OFFICE VISIT (OUTPATIENT)
Dept: INTERNAL MEDICINE | Age: 64
End: 2019-02-15
Payer: COMMERCIAL

## 2019-02-15 VITALS
SYSTOLIC BLOOD PRESSURE: 129 MMHG | WEIGHT: 212.4 LBS | DIASTOLIC BLOOD PRESSURE: 69 MMHG | HEART RATE: 77 BPM | BODY MASS INDEX: 28.81 KG/M2

## 2019-02-15 DIAGNOSIS — J41.0 SIMPLE CHRONIC BRONCHITIS (HCC): ICD-10-CM

## 2019-02-15 DIAGNOSIS — J43.2 CENTRILOBULAR EMPHYSEMA (HCC): ICD-10-CM

## 2019-02-15 DIAGNOSIS — G25.81 RLS (RESTLESS LEGS SYNDROME): ICD-10-CM

## 2019-02-15 DIAGNOSIS — I10 ESSENTIAL HYPERTENSION: ICD-10-CM

## 2019-02-15 DIAGNOSIS — G89.29 CHRONIC BILATERAL LOW BACK PAIN WITH RIGHT-SIDED SCIATICA: ICD-10-CM

## 2019-02-15 DIAGNOSIS — K58.2 IRRITABLE BOWEL SYNDROME WITH BOTH CONSTIPATION AND DIARRHEA: ICD-10-CM

## 2019-02-15 DIAGNOSIS — F17.210 CIGARETTE NICOTINE DEPENDENCE WITHOUT COMPLICATION: ICD-10-CM

## 2019-02-15 DIAGNOSIS — E78.00 PURE HYPERCHOLESTEROLEMIA: ICD-10-CM

## 2019-02-15 DIAGNOSIS — E11.8 TYPE 2 DIABETES MELLITUS WITH COMPLICATION, WITHOUT LONG-TERM CURRENT USE OF INSULIN (HCC): ICD-10-CM

## 2019-02-15 DIAGNOSIS — I50.32 CHRONIC DIASTOLIC CONGESTIVE HEART FAILURE (HCC): ICD-10-CM

## 2019-02-15 DIAGNOSIS — J96.11 CHRONIC RESPIRATORY FAILURE WITH HYPOXIA (HCC): ICD-10-CM

## 2019-02-15 DIAGNOSIS — F32.4 MAJOR DEPRESSIVE DISORDER, SINGLE EPISODE, IN PARTIAL REMISSION (HCC): ICD-10-CM

## 2019-02-15 DIAGNOSIS — B18.2 HEP C W/O COMA, CHRONIC (HCC): ICD-10-CM

## 2019-02-15 DIAGNOSIS — E11.42 DIABETIC POLYNEUROPATHY ASSOCIATED WITH TYPE 2 DIABETES MELLITUS (HCC): ICD-10-CM

## 2019-02-15 DIAGNOSIS — K21.9 GASTROESOPHAGEAL REFLUX DISEASE WITHOUT ESOPHAGITIS: ICD-10-CM

## 2019-02-15 DIAGNOSIS — F51.01 PRIMARY INSOMNIA: ICD-10-CM

## 2019-02-15 DIAGNOSIS — M54.41 CHRONIC BILATERAL LOW BACK PAIN WITH RIGHT-SIDED SCIATICA: ICD-10-CM

## 2019-02-15 PROBLEM — R10.9 ABDOMINAL CRAMPING: Status: RESOLVED | Noted: 2018-10-22 | Resolved: 2019-02-15

## 2019-02-15 PROBLEM — R19.7 DIARRHEA: Status: RESOLVED | Noted: 2018-10-22 | Resolved: 2019-02-15

## 2019-02-15 PROBLEM — R14.0 BLOATING: Status: RESOLVED | Noted: 2018-10-22 | Resolved: 2019-02-15

## 2019-02-15 PROBLEM — R20.8 RECTAL BURNING: Status: RESOLVED | Noted: 2018-10-22 | Resolved: 2019-02-15

## 2019-02-15 PROBLEM — R07.9 CHEST PAIN: Status: RESOLVED | Noted: 2018-09-20 | Resolved: 2019-02-15

## 2019-02-15 PROCEDURE — 99214 OFFICE O/P EST MOD 30 MIN: CPT | Performed by: INTERNAL MEDICINE

## 2019-02-15 PROCEDURE — 99211 OFF/OP EST MAY X REQ PHY/QHP: CPT | Performed by: INTERNAL MEDICINE

## 2019-02-15 RX ORDER — ATENOLOL 25 MG/1
TABLET ORAL
Qty: 30 TABLET | Refills: 2 | Status: SHIPPED | OUTPATIENT
Start: 2019-02-15 | End: 2019-05-13 | Stop reason: SDUPTHER

## 2019-02-15 RX ORDER — DICYCLOMINE HCL 20 MG
20 TABLET ORAL EVERY 6 HOURS
Qty: 120 TABLET | Refills: 2 | Status: SHIPPED | OUTPATIENT
Start: 2019-02-15 | End: 2019-04-07

## 2019-02-15 RX ORDER — PANTOPRAZOLE SODIUM 40 MG/1
40 TABLET, DELAYED RELEASE ORAL DAILY
Qty: 30 TABLET | Refills: 2 | Status: SHIPPED | OUTPATIENT
Start: 2019-02-15 | End: 2019-05-29 | Stop reason: SDUPTHER

## 2019-02-15 RX ORDER — DOCUSATE SODIUM 100 MG/1
100 CAPSULE, LIQUID FILLED ORAL 2 TIMES DAILY
Qty: 60 CAPSULE | Refills: 2 | Status: SHIPPED | OUTPATIENT
Start: 2019-02-15 | End: 2019-05-29 | Stop reason: SDUPTHER

## 2019-02-15 RX ORDER — PRAVASTATIN SODIUM 40 MG
40 TABLET ORAL EVERY EVENING
Qty: 30 TABLET | Refills: 2 | Status: SHIPPED | OUTPATIENT
Start: 2019-02-15 | End: 2019-05-29 | Stop reason: SDUPTHER

## 2019-02-15 RX ORDER — TIZANIDINE 4 MG/1
4 TABLET ORAL 3 TIMES DAILY
Qty: 90 TABLET | Refills: 2 | Status: SHIPPED | OUTPATIENT
Start: 2019-02-15 | End: 2019-05-24 | Stop reason: SDUPTHER

## 2019-02-15 RX ORDER — TRAZODONE HYDROCHLORIDE 100 MG/1
100 TABLET ORAL NIGHTLY
Qty: 60 TABLET | Refills: 2 | Status: SHIPPED | OUTPATIENT
Start: 2019-02-15 | End: 2019-05-29 | Stop reason: SDUPTHER

## 2019-02-15 RX ORDER — FUROSEMIDE 20 MG/1
20 TABLET ORAL 2 TIMES DAILY
Qty: 60 TABLET | Refills: 2 | Status: SHIPPED | OUTPATIENT
Start: 2019-02-15 | End: 2019-05-29 | Stop reason: SDUPTHER

## 2019-02-15 RX ORDER — VORTIOXETINE 10 MG/1
10 TABLET, FILM COATED ORAL DAILY
Refills: 0 | Status: ON HOLD | COMMUNITY
Start: 2019-02-06 | End: 2019-03-14 | Stop reason: ALTCHOICE

## 2019-02-15 RX ORDER — HYDROCHLOROTHIAZIDE 12.5 MG/1
12.5 CAPSULE, GELATIN COATED ORAL EVERY MORNING
Qty: 30 CAPSULE | Refills: 2 | Status: SHIPPED | OUTPATIENT
Start: 2019-02-15 | End: 2019-05-29 | Stop reason: SDUPTHER

## 2019-02-15 RX ORDER — BUDESONIDE AND FORMOTEROL FUMARATE DIHYDRATE 160; 4.5 UG/1; UG/1
2 AEROSOL RESPIRATORY (INHALATION) 2 TIMES DAILY
Qty: 1 INHALER | Refills: 2 | Status: SHIPPED | OUTPATIENT
Start: 2019-02-15 | End: 2019-05-29 | Stop reason: SDUPTHER

## 2019-02-15 RX ORDER — ROPINIROLE 0.5 MG/1
0.5 TABLET, FILM COATED ORAL DAILY
Qty: 30 TABLET | Refills: 2 | Status: SHIPPED | OUTPATIENT
Start: 2019-02-15 | End: 2019-05-29 | Stop reason: SDUPTHER

## 2019-02-15 RX ORDER — ALBUTEROL SULFATE 90 UG/1
1 AEROSOL, METERED RESPIRATORY (INHALATION) EVERY 6 HOURS PRN
Qty: 18 G | Refills: 2 | Status: SHIPPED | OUTPATIENT
Start: 2019-02-15 | End: 2019-05-29 | Stop reason: SDUPTHER

## 2019-02-15 ASSESSMENT — ENCOUNTER SYMPTOMS
COUGH: 0
SHORTNESS OF BREATH: 0
PHOTOPHOBIA: 0
EYE DISCHARGE: 0
CONSTIPATION: 0
NAUSEA: 0
SORE THROAT: 0
ABDOMINAL PAIN: 0
DIARRHEA: 0
WHEEZING: 0
BLOOD IN STOOL: 0
EYE PAIN: 0
COLOR CHANGE: 0
VOMITING: 0

## 2019-02-19 ENCOUNTER — TELEPHONE (OUTPATIENT)
Dept: GASTROENTEROLOGY | Age: 64
End: 2019-02-19

## 2019-02-22 RX ORDER — GLUCOSAMINE HCL/CHONDROITIN SU 500-400 MG
CAPSULE ORAL
Qty: 100 EACH | Refills: 5 | Status: SHIPPED | OUTPATIENT
Start: 2019-02-22 | End: 2019-05-29 | Stop reason: SDUPTHER

## 2019-02-25 RX ORDER — PERPHENAZINE 16 MG/1
TABLET, FILM COATED ORAL
Qty: 1150 STRIP | Refills: 0 | Status: SHIPPED | OUTPATIENT
Start: 2019-02-25 | End: 2019-05-29 | Stop reason: SDUPTHER

## 2019-03-06 ENCOUNTER — HOSPITAL ENCOUNTER (EMERGENCY)
Age: 64
Discharge: HOME | End: 2019-03-06
Attending: EMERGENCY MEDICINE
Payer: COMMERCIAL

## 2019-03-06 ENCOUNTER — OFFICE VISIT (OUTPATIENT)
Dept: GASTROENTEROLOGY | Age: 64
End: 2019-03-06
Payer: COMMERCIAL

## 2019-03-06 ENCOUNTER — APPOINTMENT (OUTPATIENT)
Dept: CT IMAGING | Age: 64
End: 2019-03-06
Payer: COMMERCIAL

## 2019-03-06 VITALS
DIASTOLIC BLOOD PRESSURE: 74 MMHG | SYSTOLIC BLOOD PRESSURE: 117 MMHG | BODY MASS INDEX: 29.51 KG/M2 | WEIGHT: 217.6 LBS | HEART RATE: 77 BPM

## 2019-03-06 VITALS
RESPIRATION RATE: 14 BRPM | TEMPERATURE: 97 F | OXYGEN SATURATION: 98 % | SYSTOLIC BLOOD PRESSURE: 149 MMHG | DIASTOLIC BLOOD PRESSURE: 93 MMHG | HEART RATE: 61 BPM

## 2019-03-06 DIAGNOSIS — S06.0X0A CONCUSSION WITHOUT LOSS OF CONSCIOUSNESS, INITIAL ENCOUNTER: ICD-10-CM

## 2019-03-06 DIAGNOSIS — B18.2 HEP C W/O COMA, CHRONIC (HCC): ICD-10-CM

## 2019-03-06 DIAGNOSIS — S16.1XXA ACUTE STRAIN OF NECK MUSCLE, INITIAL ENCOUNTER: Primary | ICD-10-CM

## 2019-03-06 DIAGNOSIS — K21.9 GASTROESOPHAGEAL REFLUX DISEASE WITHOUT ESOPHAGITIS: Primary | ICD-10-CM

## 2019-03-06 PROCEDURE — 70450 CT HEAD/BRAIN W/O DYE: CPT

## 2019-03-06 PROCEDURE — 99284 EMERGENCY DEPT VISIT MOD MDM: CPT

## 2019-03-06 PROCEDURE — 72125 CT NECK SPINE W/O DYE: CPT

## 2019-03-06 PROCEDURE — 99213 OFFICE O/P EST LOW 20 MIN: CPT | Performed by: INTERNAL MEDICINE

## 2019-03-06 ASSESSMENT — ENCOUNTER SYMPTOMS
BACK PAIN: 0
ABDOMINAL PAIN: 0
EYE PAIN: 0
SHORTNESS OF BREATH: 0

## 2019-03-06 ASSESSMENT — PAIN DESCRIPTION - LOCATION: LOCATION: NECK;HEAD

## 2019-03-06 ASSESSMENT — PAIN SCALES - GENERAL: PAINLEVEL_OUTOF10: 7

## 2019-03-06 ASSESSMENT — PAIN DESCRIPTION - DESCRIPTORS: DESCRIPTORS: ACHING

## 2019-03-06 ASSESSMENT — PAIN DESCRIPTION - FREQUENCY: FREQUENCY: CONTINUOUS

## 2019-03-06 ASSESSMENT — PAIN DESCRIPTION - PAIN TYPE: TYPE: ACUTE PAIN

## 2019-03-07 ENCOUNTER — CARE COORDINATION (OUTPATIENT)
Dept: CARE COORDINATION | Age: 64
End: 2019-03-07

## 2019-03-08 RX ORDER — SODIUM, POTASSIUM,MAG SULFATES 17.5-3.13G
SOLUTION, RECONSTITUTED, ORAL ORAL
Qty: 1 BOTTLE | Refills: 0 | Status: SHIPPED | OUTPATIENT
Start: 2019-03-08 | End: 2019-04-06

## 2019-03-12 ENCOUNTER — ANESTHESIA EVENT (OUTPATIENT)
Dept: ENDOSCOPY | Age: 64
End: 2019-03-12
Payer: COMMERCIAL

## 2019-03-13 ENCOUNTER — TELEPHONE (OUTPATIENT)
Dept: GASTROENTEROLOGY | Age: 64
End: 2019-03-13

## 2019-03-13 ENCOUNTER — ANESTHESIA (OUTPATIENT)
Dept: ENDOSCOPY | Age: 64
End: 2019-03-13
Payer: COMMERCIAL

## 2019-03-14 ENCOUNTER — HOSPITAL ENCOUNTER (OUTPATIENT)
Age: 64
Setting detail: OUTPATIENT SURGERY
Discharge: HOME | End: 2019-03-14
Attending: INTERNAL MEDICINE | Admitting: INTERNAL MEDICINE
Payer: COMMERCIAL

## 2019-03-14 VITALS
WEIGHT: 210 LBS | RESPIRATION RATE: 16 BRPM | OXYGEN SATURATION: 98 % | SYSTOLIC BLOOD PRESSURE: 131 MMHG | HEIGHT: 72 IN | TEMPERATURE: 98.4 F | BODY MASS INDEX: 28.44 KG/M2 | HEART RATE: 74 BPM | DIASTOLIC BLOOD PRESSURE: 57 MMHG

## 2019-03-14 VITALS
DIASTOLIC BLOOD PRESSURE: 52 MMHG | OXYGEN SATURATION: 98 % | RESPIRATION RATE: 16 BRPM | SYSTOLIC BLOOD PRESSURE: 113 MMHG

## 2019-03-14 PROCEDURE — 76937 US GUIDE VASCULAR ACCESS: CPT

## 2019-03-14 PROCEDURE — 3700000001 HC ADD 15 MINUTES (ANESTHESIA): Performed by: INTERNAL MEDICINE

## 2019-03-14 PROCEDURE — 2709999900 HC NON-CHARGEABLE SUPPLY: Performed by: INTERNAL MEDICINE

## 2019-03-14 PROCEDURE — 88342 IMHCHEM/IMCYTCHM 1ST ANTB: CPT

## 2019-03-14 PROCEDURE — 3700000000 HC ANESTHESIA ATTENDED CARE: Performed by: INTERNAL MEDICINE

## 2019-03-14 PROCEDURE — 3609012400 HC EGD TRANSORAL BIOPSY SINGLE/MULTIPLE: Performed by: INTERNAL MEDICINE

## 2019-03-14 PROCEDURE — 43239 EGD BIOPSY SINGLE/MULTIPLE: CPT | Performed by: INTERNAL MEDICINE

## 2019-03-14 PROCEDURE — 2580000003 HC RX 258: Performed by: INTERNAL MEDICINE

## 2019-03-14 PROCEDURE — 6360000002 HC RX W HCPCS: Performed by: NURSE ANESTHETIST, CERTIFIED REGISTERED

## 2019-03-14 PROCEDURE — G0121 COLON CA SCRN NOT HI RSK IND: HCPCS | Performed by: INTERNAL MEDICINE

## 2019-03-14 PROCEDURE — 7100000010 HC PHASE II RECOVERY - FIRST 15 MIN: Performed by: INTERNAL MEDICINE

## 2019-03-14 PROCEDURE — 2500000003 HC RX 250 WO HCPCS: Performed by: NURSE ANESTHETIST, CERTIFIED REGISTERED

## 2019-03-14 PROCEDURE — 3609009500 HC COLONOSCOPY DIAGNOSTIC OR SCREENING: Performed by: INTERNAL MEDICINE

## 2019-03-14 PROCEDURE — 7100000011 HC PHASE II RECOVERY - ADDTL 15 MIN: Performed by: INTERNAL MEDICINE

## 2019-03-14 PROCEDURE — 88305 TISSUE EXAM BY PATHOLOGIST: CPT

## 2019-03-14 RX ORDER — SODIUM CHLORIDE 9 MG/ML
INJECTION, SOLUTION INTRAVENOUS ONCE
Status: COMPLETED | OUTPATIENT
Start: 2019-03-14 | End: 2019-03-14

## 2019-03-14 RX ORDER — PROPOFOL 10 MG/ML
INJECTION, EMULSION INTRAVENOUS PRN
Status: DISCONTINUED | OUTPATIENT
Start: 2019-03-14 | End: 2019-03-14 | Stop reason: SDUPTHER

## 2019-03-14 RX ORDER — LIDOCAINE HYDROCHLORIDE 10 MG/ML
INJECTION, SOLUTION EPIDURAL; INFILTRATION; INTRACAUDAL; PERINEURAL PRN
Status: DISCONTINUED | OUTPATIENT
Start: 2019-03-14 | End: 2019-03-14 | Stop reason: SDUPTHER

## 2019-03-14 RX ADMIN — SODIUM CHLORIDE: 9 INJECTION, SOLUTION INTRAVENOUS at 11:40

## 2019-03-14 RX ADMIN — PROPOFOL 660 MG: 10 INJECTION, EMULSION INTRAVENOUS at 12:22

## 2019-03-14 RX ADMIN — LIDOCAINE HYDROCHLORIDE 50 MG: 10 INJECTION, SOLUTION EPIDURAL; INFILTRATION; INTRACAUDAL; PERINEURAL at 12:22

## 2019-03-14 ASSESSMENT — PAIN SCALES - GENERAL
PAINLEVEL_OUTOF10: 5
PAINLEVEL_OUTOF10: 0
PAINLEVEL_OUTOF10: 0

## 2019-03-14 ASSESSMENT — PAIN DESCRIPTION - DESCRIPTORS: DESCRIPTORS: CRAMPING

## 2019-03-14 ASSESSMENT — PAIN DESCRIPTION - LOCATION: LOCATION: ABDOMEN

## 2019-03-14 ASSESSMENT — PAIN - FUNCTIONAL ASSESSMENT: PAIN_FUNCTIONAL_ASSESSMENT: 0-10

## 2019-03-14 ASSESSMENT — PAIN DESCRIPTION - FREQUENCY: FREQUENCY: INTERMITTENT

## 2019-03-15 LAB — SURGICAL PATHOLOGY REPORT: NORMAL

## 2019-03-25 ENCOUNTER — TELEPHONE (OUTPATIENT)
Dept: GASTROENTEROLOGY | Age: 64
End: 2019-03-25

## 2019-04-01 ENCOUNTER — OFFICE VISIT (OUTPATIENT)
Dept: PULMONOLOGY | Age: 64
End: 2019-04-01
Payer: COMMERCIAL

## 2019-04-01 VITALS
HEIGHT: 72 IN | HEART RATE: 83 BPM | BODY MASS INDEX: 28.58 KG/M2 | WEIGHT: 211 LBS | OXYGEN SATURATION: 96 % | RESPIRATION RATE: 16 BRPM | SYSTOLIC BLOOD PRESSURE: 132 MMHG | DIASTOLIC BLOOD PRESSURE: 82 MMHG

## 2019-04-01 DIAGNOSIS — E11.8 TYPE 2 DIABETES MELLITUS WITH COMPLICATION, WITHOUT LONG-TERM CURRENT USE OF INSULIN (HCC): ICD-10-CM

## 2019-04-01 DIAGNOSIS — Z99.81 ON HOME O2: ICD-10-CM

## 2019-04-01 DIAGNOSIS — E66.01 CLASS 2 SEVERE OBESITY DUE TO EXCESS CALORIES WITH SERIOUS COMORBIDITY IN ADULT, UNSPECIFIED BMI (HCC): ICD-10-CM

## 2019-04-01 DIAGNOSIS — Z91.14 NONCOMPLIANCE WITH CPAP TREATMENT: ICD-10-CM

## 2019-04-01 DIAGNOSIS — J42 CHRONIC BRONCHITIS, UNSPECIFIED CHRONIC BRONCHITIS TYPE (HCC): Primary | ICD-10-CM

## 2019-04-01 DIAGNOSIS — I10 ESSENTIAL HYPERTENSION: ICD-10-CM

## 2019-04-01 DIAGNOSIS — Z72.0 TOBACCO ABUSE: ICD-10-CM

## 2019-04-01 DIAGNOSIS — G47.33 OSA (OBSTRUCTIVE SLEEP APNEA): ICD-10-CM

## 2019-04-01 PROCEDURE — 99214 OFFICE O/P EST MOD 30 MIN: CPT | Performed by: INTERNAL MEDICINE

## 2019-04-01 NOTE — PROGRESS NOTES
Lisa Gramajo  4/1/2019    Chief Complaint   Patient presents with    Follow-up    COPD, obesity, sleep apnea syndrome    Lisa Gramajo  presented for follow up for chronic obstructive lung disease due to chronic bronchitis and emphysema causing chronic respiratory failure. She is not a CO2 retainer. She does have home oxygen that she uses at night. He uses the oxygen every night. She doesn't benefit her. According to the patient. She has not noted any morning headaches. Does not wake up at night because of orthopnea or paroxysmal nocturnal dyspnea. There is no evidence of an acute exacerbation of COPD is no increase in the sputum. No change in the color of the sputum, no increased dyspnea or wheezing. No fever, chills or rigors. No pedal edema no thromboembolic process. Unfortunately, the patient continued to smoke in spite of repeated counseling advice and I am afraid she is unable to give it up also. She is known to have systemic hypertension that is under good control with present therapy. She is known to have sleep apnea syndrome, but refused to use the CPAP or BiPAP because she cannot tolerate it. She is a morbidly obese and has not been amenable to lose any weight. No flowsheet data found. Patient did not complete his [place] Sleepiness Scale            Review of Systems -as assistant were conductive for all other system including upper and lower extremities. No additional information was obtained. General ROS: negative for - chills, fatigue, fever or weight loss  ENT ROS: negative for - headaches, oral lesions or sore throat  Cardiovascular ROS: no chest pain , orthopnea or pnd   Gastrointestinal ROS: no abdominal pain, change in bowel habits, or black or bloody stools  Skin - no rash   Neuro - no blurry vision , no loc .  No focal weakness   msk - no jt tenderness or swelling    Vascular - no claudication , rest completed and negative   Lymphatic - complete and negative   Hematology - oncology - complete and negative   Allergy immunology - complete and negative    no burning or hematuria       LUNG CANCER SCREENING     1. CRITERIA MET    []     CT ORDERED  [x]      2. CRITERIA NOT MET   []      3. REFUSED                    []        REASON CRITERIA NOT MET     1. SMOKING LESS THAN 30 PY  []      2. AGE LESS THAN 55 or GREATER 77 YEARS  []      3. QUIT SMOKING 15 YEARS OR GREATER   []      4. RECENT CT WITH IN 11 MONTHS    []      5. LIFE EXPECTANCY < 5 YEARS   []      6.  SIGNS  AND SYMPTOMS OF LUNG CANCER   []           Immunization   Immunization History   Administered Date(s) Administered    Influenza Virus Vaccine 01/22/2014, 10/22/2015, 07/30/2016, 12/29/2017    Influenza, Levipeyton Patel, 3 Years and older, IM (Fluzone 3 yrs and older or Afluria 5 yrs and older) 09/17/2018    Pneumococcal 13-valent Conjugate ([id number]) 08/15/2016    Pneumococcal Polysaccharide ([id number]) 10/22/2015, 12/29/2017    Td, unspecified formulation 07/30/2016        Pneumococcal Vaccine     [x] Up to date    [] Indicated   [] Refused  [] Contraindicated       Influenza Vaccine   [x] Up to date    [] Indicated   [] Refused  [] Contraindicated       PAST MEDICAL HISTORY:         Diagnosis Date    RACHEL (acute kidney injury) ([address] 1/22/2014    Arthritis     generalized    Bronchiectasis without complication ([address]     Cancer ([address] 2004    uterus    CHF (congestive heart failure) (HCC)     Chronic bilateral low back pain with right-sided sciatica 2/20/2017    [facility]     COPD (chronic obstructive pulmonary disease) ([address]     on inhaler /  COPD with chronic bronchitis and emphysema    Depression 10/30/2014    Diabetic polyneuropathy associated with type 2 diabetes mellitus (HCC)     Diverticulosis     Full dentures     GERD (gastroesophageal reflux disease)     Hep C w/o coma, chronic (HCC)     Hyperlipidemia     Hyperplastic polyp of intestine     Hypertension Antonella Matson MD   ibuprofen (ADVIL;MOTRIN) 800 MG tablet Take 1 tablet by mouth every 8 hours as needed for Pain 2/1/19  Yes Antonella Matson MD   ipratropium-albuterol (DUONEB) 0.5-2.5 (3) MG/3ML SOLN nebulizer solution Inhale 3 mLs into the lungs three times daily 11/19/18  Yes Antonella Matson MD   Umeclidinium Bromide (INCRUSE ELLIPTA) 62.5 MCG/INH AEPB Inhale 1 puff into the lungs daily 11/19/18  Yes Antonella Matson MD   hydrOXYzine (ATARAX) 10 MG tablet take 1 tablet by mouth three times a day 9/20/17  Yes Historical Provider, MD   SUBOXONE 4-1 MG FILM SL film DISSOLVE 1 FILM UNDER THE TONGUE DAILY 10/11/17  Yes Historical Provider, MD   magnesium citrate solution Take 296 mLs by mouth once for 1 dose Patient to drink (2) bottles in divided doses as additional bowel prep. 3/13/19 3/13/19  Delphine Carrion MD         Physical Exam  General Appearance:    Alert, cooperative, no distress, appears stated age, morbid obesity, no distress, not using accessory muscles. Very pleasant    Head:    Normocephalic, without obvious abnormality, atraumatic   Eye examination revealed no jaundice. No Diego's syndrome. Nose revealed no polyps. No sinus tenderness. Throat examination unremarkable. Ear examination is unremarkable.                 :    Neck:   Supple, symmetrical, trachea midline, no adenopathy;     thyroid:  no enlargement/tenderness/nodules; no carotid    bruit or JVD   Back:     Symmetric, no curvature, ROM normal, no CVA tenderness   Lungs:       Lv is increased. Percussion note is hyperresonant. Expiration is prolonged. No rales or rhonchi are audible. No pleural friction rub is audible.        Chest Wall:    No tenderness or deformity      Heart:    Regular rate and rhythm, S1 and S2 normal, no murmur, rub        or gallop no rvh                           Abdomen:                                                 Pulses:                                            Lymph nodes: Neurologic:                  Soft, non-tender, bowel sounds active all four quadrants,     no masses, no organomegaly         2+ and symmetric all extremities            Cervical, supraclavicular not enlarged or matted or tender      CNII-XII intact, normal strength 5/5 . Sensation grossly normal  and reflexes normal 2+  throughout     Clubbing No  Lower ext edema No1+   [] , 2 +  [] , 3+   []  Upper ext edema No       Musculoskeletal - no joint swelling or tenderness or synovitis               /82 (Site: Left Upper Arm, Position: Sitting, Cuff Size: Medium Adult)   Pulse 83   Resp 16   Ht 6' (1.829 m)   Wt 211 lb (95.7 kg)   SpO2 96% Comment: room air at restr  BMI 28.62 kg/m²     CXR  No recent chest x-ray showed chest x-ray reviewed and didn't reveal COPD no other abnormality detected      CT Scans  UCD was also evaluated no nodules are evidence of malignancy noted    Echo  No recent echo        Assessment   Diagnosis Orders   1. Chronic bronchitis, unspecified chronic bronchitis type ([address]     2. Class 2 severe obesity due to excess calories with serious comorbidity in adult, unspecified BMI ([address]     3. Essential hypertension     4. Type 2 diabetes mellitus with complication, without long-term current use of insulin ([address]     5. Tobacco abuse     6. On home O2     7. JIMENA (obstructive sleep apnea)     8. Noncompliance with CPAP treatment         1. Plan:  2. Patient has a COPD. Morphology. She continues smoke. I advised her to continue the present bronchodilator therapy, which is giving her relief from the symptoms. 3.   4. Patient was advised to give up smoking. 5.   6. She already had Pneumovax. New pressure already had influenza vaccine. She did not require any prescriptions. 7.   8. The no evidence of clinical heart failure. 9.   10. She'll continue her antihypertensive therapy as before. 11.   12. She'll continue her home oxygen as before.   13.   14. We discussed about the

## 2019-04-06 ENCOUNTER — HOSPITAL ENCOUNTER (EMERGENCY)
Age: 64
Discharge: HOME | End: 2019-04-07
Attending: EMERGENCY MEDICINE
Payer: COMMERCIAL

## 2019-04-06 ENCOUNTER — APPOINTMENT (OUTPATIENT)
Dept: GENERAL RADIOLOGY | Age: 64
End: 2019-04-06
Payer: COMMERCIAL

## 2019-04-06 DIAGNOSIS — R10.9 CHRONIC ABDOMINAL PAIN: Primary | ICD-10-CM

## 2019-04-06 DIAGNOSIS — G89.29 CHRONIC ABDOMINAL PAIN: Primary | ICD-10-CM

## 2019-04-06 LAB
ABSOLUTE EOS #: 0.12 K/UL (ref 0–0.44)
ABSOLUTE IMMATURE GRANULOCYTE: <0.03 K/UL (ref 0–0.3)
ABSOLUTE LYMPH #: 3.09 K/UL (ref 1.1–3.7)
ABSOLUTE MONO #: 0.46 K/UL (ref 0.1–1.2)
BASOPHILS # BLD: 0 % (ref 0–2)
BASOPHILS ABSOLUTE: 0.03 K/UL (ref 0–0.2)
DIFFERENTIAL TYPE: NORMAL
EOSINOPHILS RELATIVE PERCENT: 2 % (ref 1–4)
HCT VFR BLD CALC: 46.4 % (ref 36.3–47.1)
HEMOGLOBIN: 14.6 G/DL (ref 11.9–15.1)
IMMATURE GRANULOCYTES: 0 %
LACTIC ACID, WHOLE BLOOD: 1.4 MMOL/L (ref 0.7–2.1)
LYMPHOCYTES # BLD: 40 % (ref 24–43)
MCH RBC QN AUTO: 29.9 PG (ref 25.2–33.5)
MCHC RBC AUTO-ENTMCNC: 31.5 G/DL (ref 28.4–34.8)
MCV RBC AUTO: 94.9 FL (ref 82.6–102.9)
MONOCYTES # BLD: 6 % (ref 3–12)
NRBC AUTOMATED: 0 PER 100 WBC
PDW BLD-RTO: 13.3 % (ref 11.8–14.4)
PLATELET # BLD: 171 K/UL (ref 138–453)
PLATELET ESTIMATE: NORMAL
PMV BLD AUTO: 10.2 FL (ref 8.1–13.5)
RBC # BLD: 4.89 M/UL (ref 3.95–5.11)
RBC # BLD: NORMAL 10*6/UL
SEG NEUTROPHILS: 52 % (ref 36–65)
SEGMENTED NEUTROPHILS ABSOLUTE COUNT: 4.06 K/UL (ref 1.5–8.1)
TROPONIN INTERP: NORMAL
TROPONIN T: NORMAL NG/ML
TROPONIN, HIGH SENSITIVITY: <6 NG/L (ref 0–14)
WBC # BLD: 7.8 K/UL (ref 3.5–11.3)
WBC # BLD: NORMAL 10*3/UL

## 2019-04-06 PROCEDURE — 6370000000 HC RX 637 (ALT 250 FOR IP): Performed by: EMERGENCY MEDICINE

## 2019-04-06 PROCEDURE — 99284 EMERGENCY DEPT VISIT MOD MDM: CPT

## 2019-04-06 PROCEDURE — 80053 COMPREHEN METABOLIC PANEL: CPT

## 2019-04-06 PROCEDURE — 85025 COMPLETE CBC W/AUTO DIFF WBC: CPT

## 2019-04-06 PROCEDURE — 83690 ASSAY OF LIPASE: CPT

## 2019-04-06 PROCEDURE — 74022 RADEX COMPL AQT ABD SERIES: CPT

## 2019-04-06 PROCEDURE — 93005 ELECTROCARDIOGRAM TRACING: CPT

## 2019-04-06 PROCEDURE — 83605 ASSAY OF LACTIC ACID: CPT

## 2019-04-06 PROCEDURE — 84484 ASSAY OF TROPONIN QUANT: CPT

## 2019-04-06 RX ORDER — 0.9 % SODIUM CHLORIDE 0.9 %
1000 INTRAVENOUS SOLUTION INTRAVENOUS ONCE
Status: COMPLETED | OUTPATIENT
Start: 2019-04-06 | End: 2019-04-07

## 2019-04-06 RX ORDER — MAGNESIUM HYDROXIDE/ALUMINUM HYDROXICE/SIMETHICONE 120; 1200; 1200 MG/30ML; MG/30ML; MG/30ML
30 SUSPENSION ORAL ONCE
Status: COMPLETED | OUTPATIENT
Start: 2019-04-06 | End: 2019-04-06

## 2019-04-06 RX ORDER — ONDANSETRON 2 MG/ML
4 INJECTION INTRAMUSCULAR; INTRAVENOUS ONCE
Status: DISCONTINUED | OUTPATIENT
Start: 2019-04-06 | End: 2019-04-07

## 2019-04-06 RX ADMIN — ALUMINUM HYDROXIDE, MAGNESIUM HYDROXIDE, AND SIMETHICONE 30 ML: 200; 200; 20 SUSPENSION ORAL at 22:04

## 2019-04-06 ASSESSMENT — ENCOUNTER SYMPTOMS
SHORTNESS OF BREATH: 1
CHEST TIGHTNESS: 0
BLOOD IN STOOL: 0
CONSTIPATION: 1
NAUSEA: 1
WHEEZING: 0
SORE THROAT: 0
PHOTOPHOBIA: 0
EYE REDNESS: 0
COUGH: 0
BACK PAIN: 0
ABDOMINAL PAIN: 1
DIARRHEA: 0
TROUBLE SWALLOWING: 0
VOMITING: 0

## 2019-04-06 ASSESSMENT — PAIN DESCRIPTION - PROGRESSION: CLINICAL_PROGRESSION: GRADUALLY WORSENING

## 2019-04-06 ASSESSMENT — PAIN DESCRIPTION - LOCATION: LOCATION: ABDOMEN

## 2019-04-06 ASSESSMENT — PAIN SCALES - GENERAL: PAINLEVEL_OUTOF10: 10

## 2019-04-06 ASSESSMENT — PAIN DESCRIPTION - ONSET: ONSET: ON-GOING

## 2019-04-06 ASSESSMENT — PAIN DESCRIPTION - PAIN TYPE: TYPE: ACUTE PAIN

## 2019-04-06 ASSESSMENT — PAIN DESCRIPTION - FREQUENCY: FREQUENCY: CONTINUOUS

## 2019-04-07 VITALS
TEMPERATURE: 98.3 F | DIASTOLIC BLOOD PRESSURE: 68 MMHG | OXYGEN SATURATION: 94 % | WEIGHT: 214 LBS | SYSTOLIC BLOOD PRESSURE: 136 MMHG | RESPIRATION RATE: 17 BRPM | BODY MASS INDEX: 29.02 KG/M2 | HEART RATE: 71 BPM

## 2019-04-07 LAB
-: NORMAL
ALBUMIN SERPL-MCNC: 4 G/DL (ref 3.5–5.2)
ALBUMIN/GLOBULIN RATIO: 1.3 (ref 1–2.5)
ALP BLD-CCNC: 72 U/L (ref 35–104)
ALT SERPL-CCNC: 6 U/L (ref 5–33)
ANION GAP SERPL CALCULATED.3IONS-SCNC: 11 MMOL/L (ref 9–17)
AST SERPL-CCNC: 11 U/L
BILIRUB SERPL-MCNC: <0.1 MG/DL (ref 0.3–1.2)
BILIRUBIN URINE: NEGATIVE
BUN BLDV-MCNC: 11 MG/DL (ref 8–23)
BUN/CREAT BLD: ABNORMAL (ref 9–20)
CALCIUM SERPL-MCNC: 9.8 MG/DL (ref 8.6–10.4)
CHLORIDE BLD-SCNC: 98 MMOL/L (ref 98–107)
CO2: 28 MMOL/L (ref 20–31)
COLOR: YELLOW
COMMENT UA: NORMAL
CREAT SERPL-MCNC: 0.74 MG/DL (ref 0.5–0.9)
GFR AFRICAN AMERICAN: >60 ML/MIN
GFR NON-AFRICAN AMERICAN: >60 ML/MIN
GFR SERPL CREATININE-BSD FRML MDRD: ABNORMAL ML/MIN/{1.73_M2}
GFR SERPL CREATININE-BSD FRML MDRD: ABNORMAL ML/MIN/{1.73_M2}
GLUCOSE BLD-MCNC: 129 MG/DL (ref 70–99)
GLUCOSE URINE: NEGATIVE
KETONES, URINE: NEGATIVE
LEUKOCYTE ESTERASE, URINE: NEGATIVE
LIPASE: 18 U/L (ref 13–60)
NITRITE, URINE: NEGATIVE
PH UA: 5.5 (ref 5–8)
POTASSIUM SERPL-SCNC: 3.3 MMOL/L (ref 3.7–5.3)
PROTEIN UA: NEGATIVE
REASON FOR REJECTION: NORMAL
SODIUM BLD-SCNC: 137 MMOL/L (ref 135–144)
SPECIFIC GRAVITY UA: 1.03 (ref 1–1.03)
TOTAL PROTEIN: 7.1 G/DL (ref 6.4–8.3)
TURBIDITY: CLEAR
URINE HGB: NEGATIVE
UROBILINOGEN, URINE: NORMAL
ZZ NTE CLEAN UP: ORDERED TEST: NORMAL
ZZ NTE WITH NAME CLEAN UP: SPECIMEN SOURCE: NORMAL

## 2019-04-07 PROCEDURE — 2580000003 HC RX 258: Performed by: EMERGENCY MEDICINE

## 2019-04-07 PROCEDURE — 81003 URINALYSIS AUTO W/O SCOPE: CPT

## 2019-04-07 PROCEDURE — 6370000000 HC RX 637 (ALT 250 FOR IP): Performed by: EMERGENCY MEDICINE

## 2019-04-07 RX ORDER — ONDANSETRON 4 MG/1
4 TABLET, ORALLY DISINTEGRATING ORAL ONCE
Status: COMPLETED | OUTPATIENT
Start: 2019-04-07 | End: 2019-04-07

## 2019-04-07 RX ORDER — DICYCLOMINE HCL 20 MG
20 TABLET ORAL 3 TIMES DAILY PRN
Qty: 60 TABLET | Refills: 0 | Status: SHIPPED | OUTPATIENT
Start: 2019-04-07 | End: 2019-05-29 | Stop reason: SDUPTHER

## 2019-04-07 RX ORDER — FAMOTIDINE 20 MG/1
20 TABLET, FILM COATED ORAL ONCE
Status: COMPLETED | OUTPATIENT
Start: 2019-04-07 | End: 2019-04-07

## 2019-04-07 RX ADMIN — ONDANSETRON 4 MG: 4 TABLET, ORALLY DISINTEGRATING ORAL at 00:13

## 2019-04-07 RX ADMIN — FAMOTIDINE 20 MG: 20 TABLET, FILM COATED ORAL at 00:13

## 2019-04-07 RX ADMIN — SODIUM CHLORIDE 1000 ML: 9 INJECTION, SOLUTION INTRAVENOUS at 00:16

## 2019-04-07 NOTE — ED PROVIDER NOTES
[facility]     Emergency Department     Faculty Attestation    I performed a history and physical examination of the patient and discussed management with the resident. I reviewed the residents note and agree with the documented findings and plan of care. Any areas of disagreement are noted on the chart. I was personally present for the key portions of any procedures. I have documented in the chart those procedures where I was not present during the key portions. I have reviewed the emergency nurses triage note. I agree with the chief complaint, past medical history, past surgical history, allergies, medications, social and family history as documented unless otherwise noted below. For Physician Assistant/ Nurse Practitioner cases/documentation I have personally evaluated this patient and have completed at least one if not all key elements of the E/M (history, physical exam, and MDM). Additional findings are as noted. Abdominal pain, chronic issue for patient, no new symptoms. No vomiting. No fevers. Denied vertigo to me just feels lightheaded. On exam nontoxic well-appearing. Abdomen soft no focal tenderness no rebound or guarding. We'll check labs, EKG, review prior workup, probable discharge given chronicity of symptoms. EKG interpretation: Atrial paced rhythm with a rate is 72. Normal intervals. Left axis deviation.   No acute ST changes, however patient has T-wave inversion in V2 and a biphasic T-wave in V3 jennifer that is seen on a prior EKG from 9/20/18 with an incomplete right bundle branch block    Critical Care     none    Tanya Barr MD, Ravi Hernandez  Attending Emergency  Physician             Tanya Barr MD  04/06/19 0838

## 2019-04-07 NOTE — ED PROVIDER NOTES
[facility] ED  Emergency Department Encounter  EmergencyMedicine Resident     Pt Name:Donna Myles  MRN: [id number]  Ryliegfrox 1955  Date of evaluation: 4/6/19  PCP:  Antonella Melendez MD    [address] [place] [address]       Chief Complaint   Patient presents with    Abdominal Pain     Stomach pain and 'feels dizzy\", vertigo for 3 days, stomach pain going on for awhile        HISTORY OF PRESENT ILLNESS  (Location/Symptom, Timing/Onset, Context/Setting, Quality, Duration, Modifying Factors, Severity.)      Tylor Edwards is a 66 y.o. female who presents with abdominal pain and lightheadedness. He states that abdominal pain is epigastric, sharp in nature and nonradiating. Associated with nausea without any vomiting. She states it is constant and has no association with eating or drinking. She denies any fevers or chills. Denies any vomiting. She denies any diarrhea but does note a chronic history of constipation. She recently had a colonoscopy. PAST MEDICAL / SURGICAL / SOCIAL / FAMILY HISTORY      has a past medical history of RACHEL (acute kidney injury) ([address] Arthritis, Bronchiectasis without complication ([address] Cancer ([address] CHF (congestive heart failure) ([address] Chronic bilateral low back pain with right-sided sciatica, Chronic respiratory failure with hypoxia ([address] COPD (chronic obstructive pulmonary disease) ([address] Depression, Diabetic polyneuropathy associated with type 2 diabetes mellitus ([address] Diverticulosis, Full dentures, GERD (gastroesophageal reflux disease), Hep C w/o coma, chronic ([address] Hyperlipidemia, Hyperplastic polyp of intestine, Hypertension, Liver disease, Nasal polyposis, Obesity (BMI 35.0-39.9 without comorbidity), JIMENA (obstructive sleep apnea), Pacemaker, Pneumonia, Pulmonary edema cardiac cause ([address] Rectal bleeding, Smoking addiction, and Tobacco abuse.     has a past surgical history that includes Pacemaker insertion; Hysterectomy; back surgery (2008);  Tonsillectomy; hernia repair; Lumbar spine surgery (9/24/13); Endoscopy, colon, diagnostic; Colonoscopy; pacemaker placement; Cardiac surgery; Colonoscopy (1/23/15); Sigmoidoscopy (N/A, 6/26/15); Colonoscopy (09/20/2016); Colonoscopy (N/A, 3/14/2019); and Upper gastrointestinal endoscopy (N/A, 3/14/2019). Social History     Socioeconomic History    Marital status: Single     Spouse name: Not on file    Number of children: Not on file    Years of education: Not on file    Highest education level: Not on file   Occupational History    Not on file   Social Needs    Financial resource strain: Not on file    Food insecurity:     Worry: Not on file     Inability: Not on file    Transportation needs:     Medical: Not on file     Non-medical: Not on file   Tobacco Use    Smoking status: Current Some Day Smoker     Packs/day: 0.50     Years: 40.00     Pack years: 20.00     Types: Cigarettes     Start date: 1969    Smokeless tobacco: Never Used   Substance and Sexual Activity    Alcohol use: No     Alcohol/week: 0.0 oz    Drug use: No     Comment:  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    Sexual activity: Never   Lifestyle    Physical activity:     Days per week: Not on file     Minutes per session: Not on file    Stress: Not on file   Relationships    Social connections:     Talks on phone: Not on file     Gets together: Not on file     Attends Nondenominational service: Not on file     Active member of club or organization: Not on file     Attends meetings of clubs or organizations: Not on file     Relationship status: Not on file    Intimate partner violence:     Fear of current or ex partner: Not on file     Emotionally abused: Not on file     Physically abused: Not on file     Forced sexual activity: Not on file   Other Topics Concern    Not on file   Social History Narrative    Not on file       Family History   Problem Relation Age of Onset    Cancer Mother     Stroke Father     Crohn's Disease Sister        Allergies:   Iv dye [iodides]    Home Medications:  Prior to Admission medications    Medication Sig Start Date End Date Taking?  Authorizing Provider   dicyclomine (BENTYL) 20 MG tablet Take 1 tablet by mouth 3 times daily as needed (abdominal pain) 4/7/19  Yes Vergia Neer, DO   CONTOUR NEXT TEST strip TEST 1-2 TIMES PER DAY 2/25/19   Antonella Mcgovern MD   Alcohol Swabs 70 % PADS Dx: DM-2 use 2-3 times daily 2/22/19   Antonella Mcgovern MD   tiZANidine (ZANAFLEX) 4 MG tablet Take 1 tablet by mouth 3 times daily 2/15/19   Antonella Mcgovern MD   pantoprazole (PROTONIX) 40 MG tablet Take 1 tablet by mouth daily Stop Omeprazole 2/15/19   Antonella Mcgovern MD   pravastatin (PRAVACHOL) 40 MG tablet Take 1 tablet by mouth every evening 2/15/19   Antonella Mcgovern MD   albuterol sulfate HFA (VENTOLIN HFA) 108 (90 Base) MCG/ACT inhaler Inhale 1 puff into the lungs every 6 hours as needed for Wheezing 2/15/19   Antonella Mcgovern MD   traZODone (DESYREL) 100 MG tablet Take 1 tablet by mouth nightly 2/15/19   Antonella Mcgovern MD   atenolol (TENORMIN) 25 MG tablet take 1 tablet by mouth once daily 2/15/19   Antonella Mcgovern MD   budesonide-formoterol (SYMBICORT) 160-4.5 MCG/ACT AERO Inhale 2 puffs into the lungs 2 times daily Rinse mouth after use. 2/15/19   Antonella Mcgovern MD   rOPINIRole (REQUIP) 0.5 MG tablet Take 1 tablet by mouth daily 2/15/19   Antonella Mcgovern MD   metFORMIN (GLUCOPHAGE) 500 MG tablet Take 1 tablet by mouth 2 times daily (with meals) 2/15/19   Antonella Mcgovern MD   furosemide (LASIX) 20 MG tablet Take 1 tablet by mouth 2 times daily 2/15/19   Antonella Mcgovern MD   hydrochlorothiazide (MICROZIDE) 12.5 MG capsule Take 1 capsule by mouth every morning 2/15/19   Antonella cMgovern MD   linaclotide (LINZESS) 145 MCG capsule Take 1 capsule by mouth every morning (before breakfast) 2/15/19   Antonella Mcgovern MD   docusate sodium (DOCQLACE) 100 MG capsule Take 1 capsule by mouth 2 times daily 2/15/19   Antonella Villela, MD   gabapentin (NEURONTIN) 800 MG tablet Take 1 tablet by mouth 3 times daily for 59 days. DC Lyrica. 2/6/19 4/6/19  Antonella Romero MD   ibuprofen (ADVIL;MOTRIN) 800 MG tablet Take 1 tablet by mouth every 8 hours as needed for Pain 2/1/19   Antonella Romero MD   ipratropium-albuterol (DUONEB) 0.5-2.5 (3) MG/3ML SOLN nebulizer solution Inhale 3 mLs into the lungs three times daily 11/19/18   Antonella Romero MD   Umeclidinium Bromide (INCRUSE ELLIPTA) 62.5 MCG/INH AEPB Inhale 1 puff into the lungs daily 11/19/18   Antonella Romero MD   hydrOXYzine (ATARAX) 10 MG tablet take 1 tablet by mouth three times a day 9/20/17   Historical Provider, MD   SUBOXONE 4-1 MG FILM SL film DISSOLVE 1 FILM UNDER THE TONGUE DAILY 10/11/17   Historical Provider, MD       REVIEW OF SYSTEMS    (2-9 systems for level 4, 10 or more for level 5)      Review of Systems   Constitutional: Negative for chills, fatigue and fever. HENT: Negative for congestion, sore throat and trouble swallowing. Eyes: Negative for photophobia and redness. Respiratory: Positive for shortness of breath. Negative for cough, chest tightness and wheezing. Cardiovascular: Negative for chest pain, palpitations and leg swelling. Gastrointestinal: Positive for abdominal pain, constipation and nausea. Negative for blood in stool, diarrhea and vomiting. Genitourinary: Negative for dysuria, hematuria, pelvic pain, vaginal bleeding and vaginal discharge. Musculoskeletal: Negative for back pain and neck pain. Skin: Negative for pallor and rash. Neurological: Positive for light-headedness. Negative for syncope and headaches. Psychiatric/Behavioral: Negative for agitation and confusion. The patient is not nervous/anxious. All other systems reviewed and are negative.       PHYSICAL EXAM   (up to 7 for level 4, 8 or more for level 5)      INITIAL VITALS:   /68   Pulse 71   Temp 98.3 °F (36.8 °C) (Oral)   Resp 17   Wt 214 lb (97.1 kg) SpO2 94%   BMI 29.02 kg/m²     Physical Exam   Constitutional: She is oriented to person, place, and time. She appears well-developed and well-nourished. No distress. HENT:   Head: Normocephalic and atraumatic. Nose: Nose normal.   Mouth/Throat: Oropharynx is clear and moist. No oropharyngeal exudate. Eyes: Pupils are equal, round, and reactive to light. Conjunctivae and EOM are normal. No scleral icterus. Neck: Normal range of motion. Neck supple. No JVD present. No tracheal deviation present. Cardiovascular: Normal rate, regular rhythm, normal heart sounds and intact distal pulses. Exam reveals no gallop and no friction rub. No murmur heard. Pulmonary/Chest: Breath sounds normal. No stridor. No respiratory distress. She has no wheezes. She has no rales. She exhibits no tenderness. Abdominal: Soft. Bowel sounds are normal. There is generalized tenderness. There is no rigidity, no rebound, no guarding, no tenderness at McBurney's point and negative Marti's sign. Musculoskeletal: Normal range of motion. She exhibits no edema or tenderness. Lymphadenopathy:     She has no cervical adenopathy. Neurological: She is alert and oriented to person, place, and time. She has normal reflexes. She displays normal reflexes. No cranial nerve deficit. She exhibits normal muscle tone. Coordination normal.   Skin: Skin is warm and dry. No rash noted. She is not diaphoretic. No erythema. No pallor. Psychiatric: She has a normal mood and affect. Her behavior is normal. Judgment and thought content normal.   Nursing note and vitals reviewed.       DIFFERENTIAL  DIAGNOSIS     PLAN (LABS / IMAGING / EKG):  Orders Placed This Encounter   Procedures    XR Acute Abd Series Chest 1 VW    CBC WITH AUTO DIFFERENTIAL    Comprehensive Metabolic Panel    LIPASE    Lactic Acid, Whole Blood    Troponin    SPECIMEN REJECTION    PREVIOUS SPECIMEN    Urinalysis Reflex to Culture    EKG 12 Lead       MEDICATIONS ORDERED:  Orders Placed This Encounter   Medications    0.9 % sodium chloride bolus    DISCONTD: ondansetron (ZOFRAN) injection 4 mg    aluminum & magnesium hydroxide-simethicone (MAALOX) 200-200-20 MG/5ML suspension 30 mL    DISCONTD: famotidine (PEPCID) injection 20 mg    famotidine (PEPCID) tablet 20 mg    ondansetron (ZOFRAN-ODT) disintegrating tablet 4 mg    dicyclomine (BENTYL) 20 MG tablet     Sig: Take 1 tablet by mouth 3 times daily as needed (abdominal pain)     Dispense:  60 tablet     Refill:  0       DDX: gastritis, gastroenteritis, pancreatitis, biliary colic.      DIAGNOSTIC RESULTS / EMERGENCY DEPARTMENT COURSE / MDM     LABS:  Results for orders placed or performed during the hospital encounter of 04/06/19   CBC WITH AUTO DIFFERENTIAL   Result Value Ref Range    WBC 7.8 3.5 - 11.3 k/uL    RBC 4.89 3.95 - 5.11 m/uL    Hemoglobin 14.6 11.9 - 15.1 g/dL    Hematocrit 46.4 36.3 - 47.1 %    MCV 94.9 82.6 - 102.9 fL    MCH 29.9 25.2 - 33.5 pg    MCHC 31.5 28.4 - 34.8 g/dL    RDW 13.3 11.8 - 14.4 %    Platelets 384 513 - 886 k/uL    MPV 10.2 8.1 - 13.5 fL    NRBC Automated 0.0 0.0 per 100 WBC    Differential Type NOT REPORTED     Seg Neutrophils 52 36 - 65 %    Lymphocytes 40 24 - 43 %    Monocytes 6 3 - 12 %    Eosinophils % 2 1 - 4 %    Basophils 0 0 - 2 %    Immature Granulocytes 0 0 %    Segs Absolute 4.06 1.50 - 8.10 k/uL    Absolute Lymph # 3.09 1.10 - 3.70 k/uL    Absolute Mono # 0.46 0.10 - 1.20 k/uL    Absolute Eos # 0.12 0.00 - 0.44 k/uL    Basophils # 0.03 0.00 - 0.20 k/uL    Absolute Immature Granulocyte <0.03 0.00 - 0.30 k/uL    WBC Morphology NOT REPORTED     RBC Morphology NOT REPORTED     Platelet Estimate NOT REPORTED    Comprehensive Metabolic Panel   Result Value Ref Range    Glucose 129 (H) 70 - 99 mg/dL    BUN 11 8 - 23 mg/dL    CREATININE 0.74 0.50 - 0.90 mg/dL    Bun/Cre Ratio NOT REPORTED 9 - 20    Calcium 9.8 8.6 - 10.4 mg/dL    Sodium 137 135 - 144 mmol/L    Potassium 3.3 (L) with generalized tenderness but no rebound or guarding. Plan is labs, an acute abdominal series will consider CT scan. RADIOLOGY:  Xr Acute Abd Series Chest 1 Vw    Result Date: 4/7/2019  EXAMINATION: TWO XRAY VIEWS OF THE ABDOMEN AND SINGLE  XRAY VIEW OF THE CHEST 4/6/2019 11:50 pm COMPARISON: None. HISTORY: ORDERING SYSTEM PROVIDED HISTORY: lightheadedness, epigastric pain TECHNOLOGIST PROVIDED HISTORY: lightheadedness, epigastric pain FINDINGS: Bipolar pacer on the left. Mild cardiomegaly. Mediastinum unremarkable. Bibasilar interstitial markings prominent. No subdiaphragmatic air. Cervical hardware noted. Bowel gas pattern nonspecific. Laminectomy and spondylosis lower lumbar spine. No acute disease       EKG  EKG Interpretation    Interpreted by me    Rhythm: Atrial paced rhythm 72   Rate: normal  Axis: Left  Ectopy: none  Conduction: right bundle branch block (incomplete)  ST Segments: depression in  v1 and v2  T Waves: inversion in  v1 and v2  Q Waves: nonspecific    Clinical Impression: no acute changes and incomplete right bundle branch block      All EKG's are interpreted by the Emergency Department Physician who either signs or Co-signs this chart in the absence of a cardiologist.    EMERGENCY DEPARTMENT COURSE:  Patient reports some improvement was found to be sleeping in her bed. Lab and workup is within normal limits. Patient's okay with discharge and follow up with PCP outpatient. We'll discharge her home. PROCEDURES:  None    CONSULTS:  None    CRITICAL CARE:  None    FINAL IMPRESSION      1.  Chronic abdominal pain          DISPOSITION / PLAN     DISPOSITION  Discharge      PATIENT REFERRED TO:  Antonella Morse MD  [address] [place] [address]  [phone]            DISCHARGE MEDICATIONS:  Discharge Medication List as of 4/7/2019  1:32 AM          Saji Bentley DO  Emergency Medicine Resident    (Please note that portions of thisnote were completed with a voice recognition program.  Efforts were made to edit the dictations but occasionally words are mis-transcribed. )        Raad Rust DO  Resident  04/07/19 [id number]

## 2019-04-09 LAB
EKG ATRIAL RATE: 72 BPM
EKG P AXIS: 12 DEGREES
EKG P-R INTERVAL: 244 MS
EKG Q-T INTERVAL: 422 MS
EKG QRS DURATION: 96 MS
EKG QTC CALCULATION (BAZETT): 462 MS
EKG R AXIS: -29 DEGREES
EKG T AXIS: 29 DEGREES
EKG VENTRICULAR RATE: 72 BPM

## 2019-04-10 ENCOUNTER — OFFICE VISIT (OUTPATIENT)
Dept: GASTROENTEROLOGY | Age: 64
End: 2019-04-10
Payer: COMMERCIAL

## 2019-04-10 VITALS
DIASTOLIC BLOOD PRESSURE: 85 MMHG | HEART RATE: 75 BPM | SYSTOLIC BLOOD PRESSURE: 139 MMHG | WEIGHT: 213 LBS | BODY MASS INDEX: 28.89 KG/M2

## 2019-04-10 DIAGNOSIS — R10.9 CHRONIC ABDOMINAL PAIN: ICD-10-CM

## 2019-04-10 DIAGNOSIS — K59.00 CONSTIPATION, UNSPECIFIED CONSTIPATION TYPE: Primary | ICD-10-CM

## 2019-04-10 DIAGNOSIS — G89.29 CHRONIC ABDOMINAL PAIN: ICD-10-CM

## 2019-04-10 PROCEDURE — 99213 OFFICE O/P EST LOW 20 MIN: CPT | Performed by: INTERNAL MEDICINE

## 2019-04-10 NOTE — PROGRESS NOTES
DIGESTIVE HEALTH PROGRESS NOTE    HISTORY OF PRESENT ILLNESS: Ms. Dhiraj Thorpe is a 66 y.o. female who presents for follow up on abd pain. She continues to report diffuse abdominal pain. No clear triggers. She had to go the emergency room recently for pain. Constant. Cramping. She reports constipation is not under good control. She has been taking Linzess daily. She also takes Colace and MiraLAX. She reports normal bowel movements for about 1 week. Recent colonoscopy showed no evidence of blockage. Past Medical, Family, and Social History reviewed and does not contribute to the patient presenting condition. Patient's PMH/PSH,SH,PSYCH Hx, MEDs, ALLERGIES, and ROS were all reviewed and updated in the appropriate sections. PAST MEDICAL HISTORY:  Past Medical History:   Diagnosis Date    RACHEL (acute kidney injury) ([address] 1/22/2014    Arthritis     generalized    Bronchiectasis without complication ([address]     Cancer ([address] 2004    uterus    CHF (congestive heart failure) (HCC)     Chronic bilateral low back pain with right-sided sciatica 2/20/2017    Chronic respiratory failure with hypoxia (HCC)     COPD (chronic obstructive pulmonary disease) (HCC)     on inhaler /  COPD with chronic bronchitis and emphysema    Depression 10/30/2014    Diabetic polyneuropathy associated with type 2 diabetes mellitus (HCC)     Diverticulosis     Full dentures     GERD (gastroesophageal reflux disease)     Hep C w/o coma, chronic (HCC)     Hyperlipidemia     Hyperplastic polyp of intestine     Hypertension     DR. MCDANIEL-CARDIO    Liver disease     hepatitis C    Nasal polyposis     Obesity (BMI 35.0-39.9 without comorbidity)     JIMENA (obstructive sleep apnea)     Pacemaker 2012    Pneumonia 7/2012    RECENTLY HOSPITALIZED    Pulmonary edema cardiac cause (HCC)     Rectal bleeding     Smoking addiction     Tobacco abuse        Past Surgical History:   Procedure Laterality Date    BACK SURGERY tablet, Rfl: 2    metFORMIN (GLUCOPHAGE) 500 MG tablet, Take 1 tablet by mouth 2 times daily (with meals), Disp: 60 tablet, Rfl: 2    furosemide (LASIX) 20 MG tablet, Take 1 tablet by mouth 2 times daily, Disp: 60 tablet, Rfl: 2    hydrochlorothiazide (MICROZIDE) 12.5 MG capsule, Take 1 capsule by mouth every morning, Disp: 30 capsule, Rfl: 2    linaclotide (LINZESS) 145 MCG capsule, Take 1 capsule by mouth every morning (before breakfast), Disp: 30 capsule, Rfl: 2    docusate sodium (DOCQLACE) 100 MG capsule, Take 1 capsule by mouth 2 times daily, Disp: 60 capsule, Rfl: 2    ibuprofen (ADVIL;MOTRIN) 800 MG tablet, Take 1 tablet by mouth every 8 hours as needed for Pain, Disp: 90 tablet, Rfl: 2    ipratropium-albuterol (DUONEB) 0.5-2.5 (3) MG/3ML SOLN nebulizer solution, Inhale 3 mLs into the lungs three times daily, Disp: 360 mL, Rfl: 5    Umeclidinium Bromide (INCRUSE ELLIPTA) 62.5 MCG/INH AEPB, Inhale 1 puff into the lungs daily, Disp: 1 each, Rfl: 5    hydrOXYzine (ATARAX) 10 MG tablet, take 1 tablet by mouth three times a day, Disp: , Rfl: 0    SUBOXONE 4-1 MG FILM SL film, DISSOLVE 1 FILM UNDER THE TONGUE DAILY, Disp: , Rfl: 0    gabapentin (NEURONTIN) 800 MG tablet, Take 1 tablet by mouth 3 times daily for 59 days.  DC Lyrica., Disp: 90 tablet, Rfl: 2    ALLERGIES:   Allergies   Allergen Reactions    Iv Dye [Iodides] Hives       SOCIAL HISTORY:   Social History     Socioeconomic History    Marital status: Single     Spouse name: Not on file    Number of children: Not on file    Years of education: Not on file    Highest education level: Not on file   Occupational History    Not on file   Social Needs    Financial resource strain: Not on file    Food insecurity:     Worry: Not on file     Inability: Not on file    Transportation needs:     Medical: Not on file     Non-medical: Not on file   Tobacco Use    Smoking status: Current Some Day Smoker     Packs/day: 0.50     Years: 40.00     Pack +BS, no ascites. LABORATORY DATA: Reviewed  Lab Results   Component Value Date    WBC 7.8 04/06/2019    HGB 14.6 04/06/2019    HCT 46.4 04/06/2019    MCV 94.9 04/06/2019     04/06/2019     04/06/2019    K 3.3 (L) 04/06/2019    CL 98 04/06/2019    CO2 28 04/06/2019    BUN 11 04/06/2019    CREATININE 0.74 04/06/2019    LABPROT 7.5 11/12/2012    LABALBU 4.0 04/06/2019    BILITOT <0.10 (L) 04/06/2019    ALKPHOS 72 04/06/2019    AST 11 04/06/2019    ALT 6 04/06/2019    INR 1.1 03/05/2015         Lab Results   Component Value Date    RBC 4.89 04/06/2019    HGB 14.6 04/06/2019    MCV 94.9 04/06/2019    MCH 29.9 04/06/2019    MCHC 31.5 04/06/2019    RDW 13.3 04/06/2019    MPV 10.2 04/06/2019    BASOPCT 0 04/06/2019    LYMPHSABS 3.09 04/06/2019    MONOSABS 0.46 04/06/2019    NEUTROABS 4.06 04/06/2019    EOSABS 0.12 04/06/2019    BASOSABS 0.03 04/06/2019         DIAGNOSTIC TESTING:   Xr Acute Abd Series Chest 1 Vw    Result Date: 4/7/2019  EXAMINATION: TWO XRAY VIEWS OF THE ABDOMEN AND SINGLE  XRAY VIEW OF THE CHEST 4/6/2019 11:50 pm COMPARISON: None. HISTORY: ORDERING SYSTEM PROVIDED HISTORY: lightheadedness, epigastric pain TECHNOLOGIST PROVIDED HISTORY: lightheadedness, epigastric pain FINDINGS: Bipolar pacer on the left. Mild cardiomegaly. Mediastinum unremarkable. Bibasilar interstitial markings prominent. No subdiaphragmatic air. Cervical hardware noted. Bowel gas pattern nonspecific. Laminectomy and spondylosis lower lumbar spine. No acute disease          IMPRESSION: Ms. Remigio Ramsey is a 66 y.o. female with abdominal pain. Chronic constipation. We will obtain CT scan of the abdomen and pelvis. We will give her prescription for magnesium citrate for 1 time. She would need to add some milk of magnesia on daily basis. Follow-up in 3-4 weeks. Lindy Seip MD [facility]      Please note that this chart was generated using voice recognition Dragon dictation software.   Although every effort was made

## 2019-04-12 DIAGNOSIS — K58.2 IRRITABLE BOWEL SYNDROME WITH BOTH CONSTIPATION AND DIARRHEA: Primary | ICD-10-CM

## 2019-04-22 ENCOUNTER — HOSPITAL ENCOUNTER (OUTPATIENT)
Dept: CT IMAGING | Age: 64
Discharge: HOME | End: 2019-04-24
Payer: COMMERCIAL

## 2019-04-22 DIAGNOSIS — K58.2 IRRITABLE BOWEL SYNDROME WITH BOTH CONSTIPATION AND DIARRHEA: ICD-10-CM

## 2019-04-22 PROCEDURE — 74176 CT ABD & PELVIS W/O CONTRAST: CPT

## 2019-04-23 ENCOUNTER — HOSPITAL ENCOUNTER (OUTPATIENT)
Age: 64
Discharge: HOME | End: 2019-04-23
Payer: COMMERCIAL

## 2019-04-23 LAB
ABSOLUTE EOS #: 0.32 K/UL (ref 0–0.44)
ABSOLUTE IMMATURE GRANULOCYTE: <0.03 K/UL (ref 0–0.3)
ABSOLUTE LYMPH #: 2.86 K/UL (ref 1.1–3.7)
ABSOLUTE MONO #: 0.66 K/UL (ref 0.1–1.2)
ALBUMIN SERPL-MCNC: 4.1 G/DL (ref 3.5–5.2)
ALBUMIN/GLOBULIN RATIO: 1.2 (ref 1–2.5)
ALP BLD-CCNC: 64 U/L (ref 35–104)
ALT SERPL-CCNC: 7 U/L (ref 5–33)
ANION GAP SERPL CALCULATED.3IONS-SCNC: 12 MMOL/L (ref 9–17)
AST SERPL-CCNC: 11 U/L
BASOPHILS # BLD: 1 % (ref 0–2)
BASOPHILS ABSOLUTE: 0.06 K/UL (ref 0–0.2)
BILIRUB SERPL-MCNC: 0.2 MG/DL (ref 0.3–1.2)
BUN BLDV-MCNC: 17 MG/DL (ref 8–23)
BUN/CREAT BLD: ABNORMAL (ref 9–20)
CALCIUM SERPL-MCNC: 9.7 MG/DL (ref 8.6–10.4)
CHLORIDE BLD-SCNC: 105 MMOL/L (ref 98–107)
CHOLESTEROL, FASTING: 149 MG/DL
CHOLESTEROL/HDL RATIO: 3.4
CO2: 25 MMOL/L (ref 20–31)
CREAT SERPL-MCNC: 0.57 MG/DL (ref 0.5–0.9)
DIFFERENTIAL TYPE: NORMAL
EOSINOPHILS RELATIVE PERCENT: 4 % (ref 1–4)
ESTIMATED AVERAGE GLUCOSE: 128 MG/DL
GFR AFRICAN AMERICAN: >60 ML/MIN
GFR NON-AFRICAN AMERICAN: >60 ML/MIN
GFR SERPL CREATININE-BSD FRML MDRD: ABNORMAL ML/MIN/{1.73_M2}
GFR SERPL CREATININE-BSD FRML MDRD: ABNORMAL ML/MIN/{1.73_M2}
GLUCOSE BLD-MCNC: 120 MG/DL (ref 70–99)
HBA1C MFR BLD: 6.1 % (ref 4–6)
HCT VFR BLD CALC: 43.2 % (ref 36.3–47.1)
HDLC SERPL-MCNC: 44 MG/DL
HEMOGLOBIN: 14.1 G/DL (ref 11.9–15.1)
IMMATURE GRANULOCYTES: 0 %
LDL CHOLESTEROL: 88 MG/DL (ref 0–130)
LYMPHOCYTES # BLD: 36 % (ref 24–43)
MCH RBC QN AUTO: 30.2 PG (ref 25.2–33.5)
MCHC RBC AUTO-ENTMCNC: 32.6 G/DL (ref 28.4–34.8)
MCV RBC AUTO: 92.5 FL (ref 82.6–102.9)
MONOCYTES # BLD: 8 % (ref 3–12)
NRBC AUTOMATED: 0 PER 100 WBC
PDW BLD-RTO: 13 % (ref 11.8–14.4)
PLATELET # BLD: 202 K/UL (ref 138–453)
PLATELET ESTIMATE: NORMAL
PMV BLD AUTO: 10.7 FL (ref 8.1–13.5)
POTASSIUM SERPL-SCNC: 3.3 MMOL/L (ref 3.7–5.3)
RBC # BLD: 4.67 M/UL (ref 3.95–5.11)
RBC # BLD: NORMAL 10*6/UL
SEG NEUTROPHILS: 51 % (ref 36–65)
SEGMENTED NEUTROPHILS ABSOLUTE COUNT: 4.12 K/UL (ref 1.5–8.1)
SODIUM BLD-SCNC: 142 MMOL/L (ref 135–144)
TOTAL PROTEIN: 7.5 G/DL (ref 6.4–8.3)
TRIGLYCERIDE, FASTING: 87 MG/DL
TSH SERPL DL<=0.05 MIU/L-ACNC: 1.31 MIU/L (ref 0.3–5)
VLDLC SERPL CALC-MCNC: NORMAL MG/DL (ref 1–30)
WBC # BLD: 8 K/UL (ref 3.5–11.3)
WBC # BLD: NORMAL 10*3/UL

## 2019-04-23 PROCEDURE — 84443 ASSAY THYROID STIM HORMONE: CPT

## 2019-04-23 PROCEDURE — 80061 LIPID PANEL: CPT

## 2019-04-23 PROCEDURE — 83036 HEMOGLOBIN GLYCOSYLATED A1C: CPT

## 2019-04-23 PROCEDURE — 80053 COMPREHEN METABOLIC PANEL: CPT

## 2019-04-23 PROCEDURE — 85025 COMPLETE CBC W/AUTO DIFF WBC: CPT

## 2019-04-23 PROCEDURE — 36415 COLL VENOUS BLD VENIPUNCTURE: CPT

## 2019-04-29 ENCOUNTER — TELEPHONE (OUTPATIENT)
Dept: INTERNAL MEDICINE | Age: 64
End: 2019-04-29

## 2019-04-29 NOTE — TELEPHONE ENCOUNTER
Patient's insurance does not cover Lyndle Conception. Patient needs to be referred to another DME supplier. See form  scanned in patient's media.

## 2019-05-10 DIAGNOSIS — I10 ESSENTIAL HYPERTENSION: ICD-10-CM

## 2019-05-10 NOTE — TELEPHONE ENCOUNTER
depressive disorder ([facility]     History of heroin use     Hep C w/o coma, chronic ([facility] completed tx 2016     GERD (gastroesophageal reflux disease)     COPD (chronic obstructive pulmonary disease) (HCC)     Tobacco abuse     Chronic diastolic congestive heart failure (HCC)     Diverticulosis     Hyperplastic polyp of intestine     Diabetic polyneuropathy associated with type 2 diabetes mellitus (HCC)     Bilateral chronic knee pain     Chronic respiratory failure with hypoxia (HCC)     Type 2 diabetes mellitus with complication (HCC)     Primary insomnia     Cigarette nicotine dependence without complication     Chronic bilateral low back pain with right-sided sciatica     Irritable bowel syndrome with both constipation and diarrhea

## 2019-05-13 RX ORDER — ATENOLOL 25 MG/1
TABLET ORAL
Qty: 30 TABLET | Refills: 2 | Status: SHIPPED | OUTPATIENT
Start: 2019-05-13 | End: 2019-08-08 | Stop reason: SDUPTHER

## 2019-05-24 DIAGNOSIS — E11.40 TYPE 2 DIABETES MELLITUS WITH DIABETIC NEUROPATHY, WITHOUT LONG-TERM CURRENT USE OF INSULIN (HCC): ICD-10-CM

## 2019-05-24 DIAGNOSIS — G89.29 CHRONIC BILATERAL LOW BACK PAIN WITH RIGHT-SIDED SCIATICA: ICD-10-CM

## 2019-05-24 DIAGNOSIS — M54.41 CHRONIC BILATERAL LOW BACK PAIN WITH RIGHT-SIDED SCIATICA: ICD-10-CM

## 2019-05-24 RX ORDER — TIZANIDINE 4 MG/1
4 TABLET ORAL 3 TIMES DAILY
Qty: 90 TABLET | Refills: 2 | Status: SHIPPED | OUTPATIENT
Start: 2019-05-24 | End: 2019-08-12 | Stop reason: SDUPTHER

## 2019-05-24 RX ORDER — ARIPIPRAZOLE 15 MG/1
TABLET ORAL
Refills: 0 | COMMUNITY
Start: 2019-04-24 | End: 2019-05-24 | Stop reason: SDUPTHER

## 2019-05-24 RX ORDER — GABAPENTIN 800 MG/1
800 TABLET ORAL 3 TIMES DAILY
Qty: 90 TABLET | Refills: 2 | Status: SHIPPED | OUTPATIENT
Start: 2019-05-24 | End: 2019-08-12 | Stop reason: SDUPTHER

## 2019-05-24 RX ORDER — ARIPIPRAZOLE 15 MG/1
15 TABLET ORAL DAILY
Qty: 30 TABLET | Refills: 0 | Status: SHIPPED | OUTPATIENT
Start: 2019-05-24 | End: 2019-09-17 | Stop reason: SDUPTHER

## 2019-05-24 NOTE — TELEPHONE ENCOUNTER
Refill requested. Last visit: 2/15/19  Last Med refill: 4/15/19  Does patient have enough medication for 72 hours: No: last filled on 4/15/19    Next Visit Date:  Future Appointments   Date Time Provider Terry Gonzalezi   5/29/2019  2:00 PM Antonella Kendall MD [id number]   8/5/2019 10:45 AM Annie Tyler MD Resp Spec [address] Maintenance   Topic Date Due    Hepatitis A vaccine (1 of 2 - Risk 2-dose series) 06/05/1956    Hepatitis B Vaccine (1 of 3 - Risk 3-dose series) 06/05/1976    Shingles Vaccine (1 of 2) 06/05/2007    Diabetic microalbuminuria test  10/31/2018    Diabetic retinal exam  01/01/2019    Diabetic foot exam  05/09/2019    DTaP/Tdap/Td vaccine (1 - Tdap) 07/30/2026 (Originally 7/31/2016)    HIV screen  01/05/2029 (Originally 6/5/1970)    Breast cancer screen  11/01/2019    A1C test (Diabetic or Prediabetic)  04/23/2020    Lipid screen  04/23/2020    Potassium monitoring  04/23/2020    Creatinine monitoring  04/23/2020    Colon cancer screen colonoscopy  03/14/2022    Flu vaccine  Completed    Pneumococcal 0-64 years Vaccine  Completed       Hemoglobin A1C (%)   Date Value   04/23/2019 6.1 (H)   11/19/2018 6.1   05/07/2018 6.4             ( goal A1C is < 7)   Microalb/Crt.  Ratio (mcg/mg creat)   Date Value   10/31/2017 CANNOT BE CALCULATED     LDL Cholesterol (mg/dL)   Date Value   04/23/2019 88   09/11/2018 65       (goal LDL is <100)   AST (U/L)   Date Value   04/23/2019 11     ALT (U/L)   Date Value   04/23/2019 7     BUN (mg/dL)   Date Value   04/23/2019 17     BP Readings from Last 3 Encounters:   04/10/19 139/85   04/07/19 136/68   04/01/19 132/82          (goal 120/80)    All Future Testing planned in [facility]  Lab Frequency Next Occurrence   Basic Metabolic Panel Once 71/46/0167   Baseline Diagnostic Sleep Study Once 09/17/2018   Sleep Study with PAP Titration Once 09/17/2018   EGD Once 01/22/2019   Microalbumin, Ur Once 05/29/2019   EGD Once 06/06/2019 Patient Active Problem List:     Essential hypertension     Pure hypercholesterolemia     Moderate episode of recurrent major depressive disorder ([facility]     History of heroin use     Hep C w/o coma, chronic (HCC) completed tx 2016     GERD (gastroesophageal reflux disease)     COPD (chronic obstructive pulmonary disease) (HCC)     Tobacco abuse     Chronic diastolic congestive heart failure (HCC)     Diverticulosis     Hyperplastic polyp of intestine     Diabetic polyneuropathy associated with type 2 diabetes mellitus (HCC)     Bilateral chronic knee pain     Chronic respiratory failure with hypoxia (HCC)     Type 2 diabetes mellitus with complication (HCC)     Primary insomnia     Cigarette nicotine dependence without complication     Chronic bilateral low back pain with right-sided sciatica     Irritable bowel syndrome with both constipation and diarrhea

## 2019-05-29 ENCOUNTER — OFFICE VISIT (OUTPATIENT)
Dept: INTERNAL MEDICINE | Age: 64
End: 2019-05-29
Payer: COMMERCIAL

## 2019-05-29 VITALS
BODY MASS INDEX: 29.53 KG/M2 | WEIGHT: 218 LBS | SYSTOLIC BLOOD PRESSURE: 122 MMHG | HEIGHT: 72 IN | HEART RATE: 87 BPM | DIASTOLIC BLOOD PRESSURE: 80 MMHG

## 2019-05-29 DIAGNOSIS — T50.2X5A DIURETIC-INDUCED HYPOKALEMIA: ICD-10-CM

## 2019-05-29 DIAGNOSIS — I10 ESSENTIAL HYPERTENSION: ICD-10-CM

## 2019-05-29 DIAGNOSIS — E11.8 TYPE 2 DIABETES MELLITUS WITH COMPLICATION, WITHOUT LONG-TERM CURRENT USE OF INSULIN (HCC): ICD-10-CM

## 2019-05-29 DIAGNOSIS — F51.01 PRIMARY INSOMNIA: ICD-10-CM

## 2019-05-29 DIAGNOSIS — G89.29 CHRONIC BILATERAL LOW BACK PAIN WITH RIGHT-SIDED SCIATICA: ICD-10-CM

## 2019-05-29 DIAGNOSIS — I50.32 CHRONIC DIASTOLIC CONGESTIVE HEART FAILURE (HCC): ICD-10-CM

## 2019-05-29 DIAGNOSIS — E87.6 DIURETIC-INDUCED HYPOKALEMIA: ICD-10-CM

## 2019-05-29 DIAGNOSIS — E78.00 PURE HYPERCHOLESTEROLEMIA: ICD-10-CM

## 2019-05-29 DIAGNOSIS — M54.41 CHRONIC BILATERAL LOW BACK PAIN WITH RIGHT-SIDED SCIATICA: ICD-10-CM

## 2019-05-29 DIAGNOSIS — K58.2 IRRITABLE BOWEL SYNDROME WITH BOTH CONSTIPATION AND DIARRHEA: ICD-10-CM

## 2019-05-29 DIAGNOSIS — J41.0 SIMPLE CHRONIC BRONCHITIS (HCC): Primary | ICD-10-CM

## 2019-05-29 DIAGNOSIS — K21.9 GASTROESOPHAGEAL REFLUX DISEASE WITHOUT ESOPHAGITIS: ICD-10-CM

## 2019-05-29 DIAGNOSIS — K59.01 SLOW TRANSIT CONSTIPATION: ICD-10-CM

## 2019-05-29 DIAGNOSIS — G25.81 RLS (RESTLESS LEGS SYNDROME): ICD-10-CM

## 2019-05-29 PROCEDURE — 99214 OFFICE O/P EST MOD 30 MIN: CPT | Performed by: INTERNAL MEDICINE

## 2019-05-29 PROCEDURE — 99211 OFF/OP EST MAY X REQ PHY/QHP: CPT | Performed by: INTERNAL MEDICINE

## 2019-05-29 RX ORDER — IPRATROPIUM BROMIDE AND ALBUTEROL SULFATE 2.5; .5 MG/3ML; MG/3ML
3 SOLUTION RESPIRATORY (INHALATION) 3 TIMES DAILY
Qty: 360 ML | Refills: 5 | Status: SHIPPED | OUTPATIENT
Start: 2019-05-29 | End: 2019-09-17 | Stop reason: SDUPTHER

## 2019-05-29 RX ORDER — DICYCLOMINE HCL 20 MG
20 TABLET ORAL 2 TIMES DAILY PRN
Qty: 60 TABLET | Refills: 2 | Status: SHIPPED | OUTPATIENT
Start: 2019-05-29 | End: 2019-09-17 | Stop reason: SDUPTHER

## 2019-05-29 RX ORDER — IBUPROFEN 800 MG/1
800 TABLET ORAL EVERY 8 HOURS PRN
Qty: 90 TABLET | Refills: 2 | Status: SHIPPED | OUTPATIENT
Start: 2019-05-29 | End: 2019-09-17

## 2019-05-29 RX ORDER — POTASSIUM CHLORIDE 20 MEQ/1
20 TABLET, EXTENDED RELEASE ORAL DAILY
Qty: 30 TABLET | Refills: 2 | Status: SHIPPED | OUTPATIENT
Start: 2019-05-29 | End: 2020-04-08

## 2019-05-29 RX ORDER — FUROSEMIDE 20 MG/1
20 TABLET ORAL 2 TIMES DAILY
Qty: 60 TABLET | Refills: 2 | Status: SHIPPED | OUTPATIENT
Start: 2019-05-29 | End: 2019-08-28 | Stop reason: SDUPTHER

## 2019-05-29 RX ORDER — PANTOPRAZOLE SODIUM 40 MG/1
40 TABLET, DELAYED RELEASE ORAL DAILY
Qty: 30 TABLET | Refills: 2 | Status: SHIPPED | OUTPATIENT
Start: 2019-05-29 | End: 2019-09-17 | Stop reason: SDUPTHER

## 2019-05-29 RX ORDER — ROPINIROLE 0.5 MG/1
0.5 TABLET, FILM COATED ORAL DAILY
Qty: 30 TABLET | Refills: 2 | Status: SHIPPED | OUTPATIENT
Start: 2019-05-29 | End: 2019-09-17 | Stop reason: SDUPTHER

## 2019-05-29 RX ORDER — TRAZODONE HYDROCHLORIDE 100 MG/1
100 TABLET ORAL NIGHTLY
Qty: 60 TABLET | Refills: 2 | Status: SHIPPED | OUTPATIENT
Start: 2019-05-29 | End: 2019-09-17 | Stop reason: SDUPTHER

## 2019-05-29 RX ORDER — DOCUSATE SODIUM 100 MG/1
100 CAPSULE, LIQUID FILLED ORAL 2 TIMES DAILY
Qty: 60 CAPSULE | Refills: 2 | Status: SHIPPED | OUTPATIENT
Start: 2019-05-29 | End: 2019-09-22 | Stop reason: SDUPTHER

## 2019-05-29 RX ORDER — ALBUTEROL SULFATE 90 UG/1
1 AEROSOL, METERED RESPIRATORY (INHALATION) EVERY 6 HOURS PRN
Qty: 18 G | Refills: 2 | Status: SHIPPED | OUTPATIENT
Start: 2019-05-29 | End: 2019-09-17 | Stop reason: SDUPTHER

## 2019-05-29 RX ORDER — GLUCOSAMINE HCL/CHONDROITIN SU 500-400 MG
CAPSULE ORAL
Qty: 100 EACH | Refills: 5 | Status: SHIPPED | OUTPATIENT
Start: 2019-05-29 | End: 2019-09-17 | Stop reason: SDUPTHER

## 2019-05-29 RX ORDER — BUDESONIDE AND FORMOTEROL FUMARATE DIHYDRATE 160; 4.5 UG/1; UG/1
2 AEROSOL RESPIRATORY (INHALATION) 2 TIMES DAILY
Qty: 1 INHALER | Refills: 2 | Status: SHIPPED | OUTPATIENT
Start: 2019-05-29 | End: 2019-09-17 | Stop reason: SDUPTHER

## 2019-05-29 RX ORDER — HYDROCHLOROTHIAZIDE 12.5 MG/1
12.5 CAPSULE, GELATIN COATED ORAL EVERY MORNING
Qty: 30 CAPSULE | Refills: 2 | Status: SHIPPED | OUTPATIENT
Start: 2019-05-29 | End: 2019-08-21 | Stop reason: SDUPTHER

## 2019-05-29 RX ORDER — PRAVASTATIN SODIUM 40 MG
40 TABLET ORAL EVERY EVENING
Qty: 30 TABLET | Refills: 2 | Status: SHIPPED | OUTPATIENT
Start: 2019-05-29 | End: 2019-09-17 | Stop reason: SDUPTHER

## 2019-05-29 ASSESSMENT — ENCOUNTER SYMPTOMS
COLOR CHANGE: 0
CONSTIPATION: 0
PHOTOPHOBIA: 0
VOMITING: 0
EYE PAIN: 0
BLOOD IN STOOL: 0
NAUSEA: 0
EYE DISCHARGE: 0
DIARRHEA: 0
SHORTNESS OF BREATH: 0
COUGH: 0
ABDOMINAL PAIN: 0
WHEEZING: 0
SORE THROAT: 0

## 2019-05-29 NOTE — PROGRESS NOTES
[facility]  [facility] [address] [facility]  [address] [facility] [address]  Dept: [phone]  Dept Fax: [phone]    Office Progress/Follow Up Note  Date ofpatient's visit: 5/29/2019  Patient's Name:  Kristy Palacios YOB: 1955            Patient Care Team:  Antonella Sen MD as PCP - General  Antonella Sen MD as PCP - S Attributed Provider  Braxton Chin MD as Consulting Physician (Gastroenterology)  Philip Loza MD as Referring Physician (Internal Medicine)  Dmitri Christian MD as Consulting Physician (Pulmonology)  ================================================================    REASON FOR VISIT/CHIEF COMPLAINT:  Hypertension; Diabetes; and Medication Refill    HISTORY OF PRESENTING ILLNESS:  History was obtained from: patient. Bernadine Soliman a 66 y.o. is here for a follow-up visit. Overall patient is doing well. She doesn't have any new complaints today. She has hypertension which is controlled. Her depression is also controlled. She has good affect today and denies any suicidal ideations. The OPD and takes an inhaler. She denies any exertional shortness of breath or chest pain today. She is due for medication refills.   Problem list, medications and blood work reviewed       Patient Active Problem List   Diagnosis    Essential hypertension    Pure hypercholesterolemia    Moderate episode of recurrent major depressive disorder ([address]    History of heroin use    Hep C w/o coma, chronic (HCC) completed tx 2016    GERD (gastroesophageal reflux disease)    COPD (chronic obstructive pulmonary disease) (HCC)    Tobacco abuse    Chronic diastolic congestive heart failure (HCC)    Diverticulosis    Hyperplastic polyp of intestine    Diabetic polyneuropathy associated with type 2 diabetes mellitus (HCC)    Bilateral chronic knee pain    Chronic respiratory failure with hypoxia (HCC)    Type 2 diabetes mellitus with complication ([address] daily 60 capsule 2    ibuprofen (ADVIL;MOTRIN) 800 MG tablet Take 1 tablet by mouth every 8 hours as needed for Pain 90 tablet 2    Umeclidinium Bromide (INCRUSE ELLIPTA) 62.5 MCG/INH AEPB Inhale 1 puff into the lungs daily 1 each 5    ipratropium-albuterol (DUONEB) 0.5-2.5 (3) MG/3ML SOLN nebulizer solution Inhale 3 mLs into the lungs three times daily 360 mL 5    dicyclomine (BENTYL) 20 MG tablet Take 1 tablet by mouth 2 times daily as needed (abdominal pain) 60 tablet 2    magnesium citrate solution Take 296 mLs by mouth once for 1 dose 296 mL 2    potassium chloride (KLOR-CON M) 20 MEQ extended release tablet Take 1 tablet by mouth daily 30 tablet 2    tiZANidine (ZANAFLEX) 4 MG tablet Take 1 tablet by mouth 3 times daily 90 tablet 2    gabapentin (NEURONTIN) 800 MG tablet Take 1 tablet by mouth 3 times daily for 59 days. DC Lyrica 90 tablet 2    ARIPiprazole (ABILIFY) 15 MG tablet Take 1 tablet by mouth daily 30 tablet 0    atenolol (TENORMIN) 25 MG tablet take 1 tablet by mouth once daily 30 tablet 2    hydrOXYzine (ATARAX) 10 MG tablet take 1 tablet by mouth three times a day  0    SUBOXONE 4-1 MG FILM SL film DISSOLVE 1 FILM UNDER THE TONGUE DAILY  0     No current facility-administered medications for this visit. Social History     Tobacco Use    Smoking status: Current Some Day Smoker     Packs/day: 0.50     Years: 40.00     Pack years: 20.00     Types: Cigarettes     Start date: 1969    Smokeless tobacco: Never Used   Substance Use Topics    Alcohol use: No     Alcohol/week: 0.0 oz    Drug use: No     Comment:  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       Family History   Problem Relation Age of Onset    Cancer Mother         lungs and brain    Stroke Father     Crohn's Disease Sister         REVIEW OF SYSTEMS:  Review of Systems   Constitutional: Negative for fatigue and fever. HENT: Negative for congestion and sore throat.     Eyes: Negative for photophobia, pain, discharge and visual disturbance. Respiratory: Negative for cough, shortness of breath and wheezing. Cardiovascular: Negative for chest pain, palpitations and leg swelling. Gastrointestinal: Negative for abdominal pain, blood in stool, constipation, diarrhea, nausea and vomiting. Genitourinary: Negative for dysuria, frequency and urgency. Musculoskeletal: Negative for arthralgias and myalgias. Skin: Negative for color change and rash. Neurological: Negative for dizziness, tremors, seizures, syncope, weakness, numbness and headaches. Psychiatric/Behavioral: Negative for agitation, behavioral problems, confusion, sleep disturbance and suicidal ideas. PHYSICAL EXAM:  Vitals:    05/29/19 1414   BP: 122/80   Site: Left Upper Arm   Position: Sitting   Cuff Size: Medium Adult   Pulse: 87   Weight: 218 lb (98.9 kg)   Height: 6' (1.829 m)     BP Readings from Last 3 Encounters:   05/29/19 122/80   04/10/19 139/85   04/07/19 136/68        Physical Exam   Constitutional: She is oriented to person, place, and time. No distress. HENT:   Head: Normocephalic and atraumatic. Right Ear: External ear normal.   Left Ear: External ear normal.   Nose: Nose normal.   Mouth/Throat: No oropharyngeal exudate. Eyes: Pupils are equal, round, and reactive to light. EOM are normal.   Neck: Normal range of motion. Neck supple. No thyromegaly present. Cardiovascular: Normal rate, regular rhythm, normal heart sounds and intact distal pulses. Pulmonary/Chest: Effort normal and breath sounds normal. No respiratory distress. She has no wheezes. Abdominal: Soft. Bowel sounds are normal. She exhibits no distension and no mass. There is no tenderness. Musculoskeletal: Normal range of motion. She exhibits no edema or tenderness. Neurological: She is alert and oriented to person, place, and time. No cranial nerve deficit. Skin: Skin is warm. No rash noted. She is not diaphoretic. No erythema.    Psychiatric: Judgment normal. times daily.  -     Alcohol Swabs 70 % PADS; Dx: DM-2 use 2-3 times daily  -     metFORMIN (GLUCOPHAGE) 500 MG tablet; Take 1 tablet by mouth 2 times daily (with meals)  -     Microalbumin, Ur; Future    Chronic diastolic congestive heart failure (HCC)  -     furosemide (LASIX) 20 MG tablet; Take 1 tablet by mouth 2 times daily    Essential hypertension  -     hydrochlorothiazide (MICROZIDE) 12.5 MG capsule; Take 1 capsule by mouth every morning    Irritable bowel syndrome with both constipation and diarrhea  -     linaclotide (LINZESS) 145 MCG capsule; Take 1 capsule by mouth every morning (before breakfast)  -     docusate sodium (DOCQLACE) 100 MG capsule; Take 1 capsule by mouth 2 times daily    Chronic bilateral low back pain with right-sided sciatica  -     ibuprofen (ADVIL;MOTRIN) 800 MG tablet; Take 1 tablet by mouth every 8 hours as needed for Pain    Slow transit constipation  -     dicyclomine (BENTYL) 20 MG tablet; Take 1 tablet by mouth 2 times daily as needed (abdominal pain)  -     magnesium citrate solution; Take 296 mLs by mouth once for 1 dose    Diuretic-induced hypokalemia  -     potassium chloride (KLOR-CON M) 20 MEQ extended release tablet; Take 1 tablet by mouth daily      FOLLOW UP AND INSTRUCTIONS:  · Return in about 3 months (around 8/29/2019) for HTN, DM-2. · Bob Blinks received counseling on the following healthy behaviors: nutrition and exercise    · Discussed use, benefit, and side effects of prescribed medications. Barriers to medication compliance addressed. All patient questions answered. Pt voiced understanding. · Patient given educational materials - see patient instructions    Antonella eSn M.D., F.A.C.P.   Internal Medicine  5/29/2019, 4:02 PM    This note is created with the assistance of a speech-recognition program. While intending to generate a document that actually reflects the content of thevisit, the document can still have some mistakes which may not have been identified and corrected by editing.

## 2019-05-29 NOTE — PATIENT INSTRUCTIONS
LABORATORY INSTRUCTIONS    Your doctor has ordered blood or urine testing. You can get this testing done at the Lab located on the first floor of the [facility], or at any other [facility]. Please stop at Main Registration, before going to the lab, as you must be registered first.     Please get this lab done before your next visit. You may eat or drink before this test.     We will call you to schedule your 3 month follow up appointment. Please return to the clinic as needed. Return To Clinic As Needed. After Visit Summary  given and reviewed. --MA    It is very important for your care that you keep your appointment. If for some reason you are unable to keep your appointment it is equally important that you call our office at [phone] to cancel your appointment and reschedule. Failure to do so may result in your termination from our practice.

## 2019-05-29 NOTE — PROGRESS NOTES
Risk 2-dose series) 06/05/1956    Hepatitis B Vaccine (1 of 3 - Risk 3-dose series) 06/05/1976    Shingles Vaccine (1 of 2) 06/05/2007    Diabetic microalbuminuria test  10/31/2018    Diabetic retinal exam  01/01/2019    Diabetic foot exam  05/09/2019    DTaP/Tdap/Td vaccine (1 - Tdap) 07/30/2026 (Originally 7/31/2016)    HIV screen  01/05/2029 (Originally 6/5/1972)    Breast cancer screen  11/01/2019    A1C test (Diabetic or Prediabetic)  04/23/2020    Lipid screen  04/23/2020    Potassium monitoring  04/23/2020    Creatinine monitoring  04/23/2020    Colon cancer screen colonoscopy  03/14/2022    Flu vaccine  Completed    Pneumococcal 0-64 years Vaccine  Completed

## 2019-06-11 ENCOUNTER — HOSPITAL ENCOUNTER (OUTPATIENT)
Age: 64
Setting detail: OBSERVATION
Discharge: HOME | End: 2019-06-12
Attending: EMERGENCY MEDICINE | Admitting: EMERGENCY MEDICINE
Payer: COMMERCIAL

## 2019-06-11 ENCOUNTER — APPOINTMENT (OUTPATIENT)
Dept: CT IMAGING | Age: 64
End: 2019-06-11
Payer: COMMERCIAL

## 2019-06-11 DIAGNOSIS — R10.9 ABDOMINAL PAIN, UNSPECIFIED ABDOMINAL LOCATION: Primary | ICD-10-CM

## 2019-06-11 DIAGNOSIS — R11.2 NON-INTRACTABLE VOMITING WITH NAUSEA, UNSPECIFIED VOMITING TYPE: ICD-10-CM

## 2019-06-11 DIAGNOSIS — K52.9 ENTERITIS: ICD-10-CM

## 2019-06-11 LAB
ABSOLUTE EOS #: 0.03 K/UL (ref 0–0.44)
ABSOLUTE IMMATURE GRANULOCYTE: <0.03 K/UL (ref 0–0.3)
ABSOLUTE LYMPH #: 1.49 K/UL (ref 1.1–3.7)
ABSOLUTE MONO #: 0.43 K/UL (ref 0.1–1.2)
ALBUMIN SERPL-MCNC: 4.2 G/DL (ref 3.5–5.2)
ALBUMIN/GLOBULIN RATIO: 1.3 (ref 1–2.5)
ALP BLD-CCNC: 72 U/L (ref 35–104)
ALT SERPL-CCNC: 9 U/L (ref 5–33)
ANION GAP SERPL CALCULATED.3IONS-SCNC: 11 MMOL/L (ref 9–17)
AST SERPL-CCNC: 12 U/L
BASOPHILS # BLD: 0 % (ref 0–2)
BASOPHILS ABSOLUTE: <0.03 K/UL (ref 0–0.2)
BILIRUB SERPL-MCNC: 0.29 MG/DL (ref 0.3–1.2)
BILIRUBIN DIRECT: 0.1 MG/DL
BILIRUBIN, INDIRECT: 0.19 MG/DL (ref 0–1)
BUN BLDV-MCNC: 11 MG/DL (ref 8–23)
BUN/CREAT BLD: ABNORMAL (ref 9–20)
CALCIUM SERPL-MCNC: 9.5 MG/DL (ref 8.6–10.4)
CHLORIDE BLD-SCNC: 104 MMOL/L (ref 98–107)
CO2: 26 MMOL/L (ref 20–31)
CREAT SERPL-MCNC: 0.49 MG/DL (ref 0.5–0.9)
DIFFERENTIAL TYPE: ABNORMAL
EOSINOPHILS RELATIVE PERCENT: 1 % (ref 1–4)
GFR AFRICAN AMERICAN: >60 ML/MIN
GFR NON-AFRICAN AMERICAN: >60 ML/MIN
GFR SERPL CREATININE-BSD FRML MDRD: ABNORMAL ML/MIN/{1.73_M2}
GFR SERPL CREATININE-BSD FRML MDRD: ABNORMAL ML/MIN/{1.73_M2}
GLOBULIN: ABNORMAL G/DL (ref 1.5–3.8)
GLUCOSE BLD-MCNC: 112 MG/DL (ref 65–105)
GLUCOSE BLD-MCNC: 149 MG/DL (ref 70–99)
HCT VFR BLD CALC: 46.1 % (ref 36.3–47.1)
HEMOGLOBIN: 14.9 G/DL (ref 11.9–15.1)
IMMATURE GRANULOCYTES: 0 %
LACTIC ACID, WHOLE BLOOD: 1.2 MMOL/L (ref 0.7–2.1)
LIPASE: 21 U/L (ref 13–60)
LYMPHOCYTES # BLD: 25 % (ref 24–43)
MCH RBC QN AUTO: 29.7 PG (ref 25.2–33.5)
MCHC RBC AUTO-ENTMCNC: 32.3 G/DL (ref 28.4–34.8)
MCV RBC AUTO: 92 FL (ref 82.6–102.9)
MONOCYTES # BLD: 7 % (ref 3–12)
NRBC AUTOMATED: 0 PER 100 WBC
PDW BLD-RTO: 13.6 % (ref 11.8–14.4)
PLATELET # BLD: ABNORMAL K/UL (ref 138–453)
PLATELET ESTIMATE: ABNORMAL
PLATELET, FLUORESCENCE: 134 K/UL (ref 138–453)
PLATELET, IMMATURE FRACTION: 3.6 % (ref 1.1–10.3)
PMV BLD AUTO: ABNORMAL FL (ref 8.1–13.5)
POTASSIUM SERPL-SCNC: 3.7 MMOL/L (ref 3.7–5.3)
RBC # BLD: 5.01 M/UL (ref 3.95–5.11)
RBC # BLD: ABNORMAL 10*6/UL
SEG NEUTROPHILS: 67 % (ref 36–65)
SEGMENTED NEUTROPHILS ABSOLUTE COUNT: 3.99 K/UL (ref 1.5–8.1)
SODIUM BLD-SCNC: 141 MMOL/L (ref 135–144)
TOTAL PROTEIN: 7.4 G/DL (ref 6.4–8.3)
WBC # BLD: 6 K/UL (ref 3.5–11.3)
WBC # BLD: ABNORMAL 10*3/UL

## 2019-06-11 PROCEDURE — 85025 COMPLETE CBC W/AUTO DIFF WBC: CPT

## 2019-06-11 PROCEDURE — 2580000003 HC RX 258: Performed by: EMERGENCY MEDICINE

## 2019-06-11 PROCEDURE — 80048 BASIC METABOLIC PNL TOTAL CA: CPT

## 2019-06-11 PROCEDURE — 6370000000 HC RX 637 (ALT 250 FOR IP): Performed by: EMERGENCY MEDICINE

## 2019-06-11 PROCEDURE — 82947 ASSAY GLUCOSE BLOOD QUANT: CPT

## 2019-06-11 PROCEDURE — 6360000002 HC RX W HCPCS: Performed by: EMERGENCY MEDICINE

## 2019-06-11 PROCEDURE — 80076 HEPATIC FUNCTION PANEL: CPT

## 2019-06-11 PROCEDURE — 93005 ELECTROCARDIOGRAM TRACING: CPT | Performed by: EMERGENCY MEDICINE

## 2019-06-11 PROCEDURE — 93005 ELECTROCARDIOGRAM TRACING: CPT

## 2019-06-11 PROCEDURE — 85055 RETICULATED PLATELET ASSAY: CPT

## 2019-06-11 PROCEDURE — 96372 THER/PROPH/DIAG INJ SC/IM: CPT

## 2019-06-11 PROCEDURE — G0378 HOSPITAL OBSERVATION PER HR: HCPCS

## 2019-06-11 PROCEDURE — 96374 THER/PROPH/DIAG INJ IV PUSH: CPT

## 2019-06-11 PROCEDURE — 96361 HYDRATE IV INFUSION ADD-ON: CPT

## 2019-06-11 PROCEDURE — 83690 ASSAY OF LIPASE: CPT

## 2019-06-11 PROCEDURE — 83605 ASSAY OF LACTIC ACID: CPT

## 2019-06-11 PROCEDURE — 94640 AIRWAY INHALATION TREATMENT: CPT

## 2019-06-11 PROCEDURE — 99285 EMERGENCY DEPT VISIT HI MDM: CPT

## 2019-06-11 PROCEDURE — 74176 CT ABD & PELVIS W/O CONTRAST: CPT

## 2019-06-11 PROCEDURE — 6360000002 HC RX W HCPCS: Performed by: STUDENT IN AN ORGANIZED HEALTH CARE EDUCATION/TRAINING PROGRAM

## 2019-06-11 RX ORDER — ACETAMINOPHEN 325 MG/1
650 TABLET ORAL EVERY 4 HOURS PRN
Status: DISCONTINUED | OUTPATIENT
Start: 2019-06-11 | End: 2019-06-12 | Stop reason: HOSPADM

## 2019-06-11 RX ORDER — TIZANIDINE 4 MG/1
4 TABLET ORAL 3 TIMES DAILY
Status: DISCONTINUED | OUTPATIENT
Start: 2019-06-11 | End: 2019-06-12 | Stop reason: HOSPADM

## 2019-06-11 RX ORDER — MAGNESIUM HYDROXIDE/ALUMINUM HYDROXICE/SIMETHICONE 120; 1200; 1200 MG/30ML; MG/30ML; MG/30ML
30 SUSPENSION ORAL ONCE
Status: COMPLETED | OUTPATIENT
Start: 2019-06-11 | End: 2019-06-11

## 2019-06-11 RX ORDER — IPRATROPIUM BROMIDE AND ALBUTEROL SULFATE 2.5; .5 MG/3ML; MG/3ML
3 SOLUTION RESPIRATORY (INHALATION) 3 TIMES DAILY
Status: DISCONTINUED | OUTPATIENT
Start: 2019-06-11 | End: 2019-06-11

## 2019-06-11 RX ORDER — SODIUM CHLORIDE 0.9 % (FLUSH) 0.9 %
10 SYRINGE (ML) INJECTION EVERY 12 HOURS SCHEDULED
Status: DISCONTINUED | OUTPATIENT
Start: 2019-06-11 | End: 2019-06-12 | Stop reason: HOSPADM

## 2019-06-11 RX ORDER — PROMETHAZINE HYDROCHLORIDE 25 MG/ML
12.5 INJECTION, SOLUTION INTRAMUSCULAR; INTRAVENOUS ONCE
Status: COMPLETED | OUTPATIENT
Start: 2019-06-11 | End: 2019-06-11

## 2019-06-11 RX ORDER — MORPHINE SULFATE 4 MG/ML
4 INJECTION, SOLUTION INTRAMUSCULAR; INTRAVENOUS ONCE
Status: COMPLETED | OUTPATIENT
Start: 2019-06-11 | End: 2019-06-11

## 2019-06-11 RX ORDER — ONDANSETRON 2 MG/ML
4 INJECTION INTRAMUSCULAR; INTRAVENOUS ONCE
Status: DISCONTINUED | OUTPATIENT
Start: 2019-06-11 | End: 2019-06-12 | Stop reason: HOSPADM

## 2019-06-11 RX ORDER — ATENOLOL 25 MG/1
25 TABLET ORAL DAILY
Status: DISCONTINUED | OUTPATIENT
Start: 2019-06-11 | End: 2019-06-12 | Stop reason: HOSPADM

## 2019-06-11 RX ORDER — BUPRENORPHINE HYDROCHLORIDE AND NALOXONE HYDROCHLORIDE DIHYDRATE 2; .5 MG/1; MG/1
1.5 TABLET SUBLINGUAL DAILY
Status: DISCONTINUED | OUTPATIENT
Start: 2019-06-11 | End: 2019-06-12 | Stop reason: HOSPADM

## 2019-06-11 RX ORDER — GABAPENTIN 800 MG/1
800 TABLET ORAL 3 TIMES DAILY
Status: DISCONTINUED | OUTPATIENT
Start: 2019-06-11 | End: 2019-06-12 | Stop reason: HOSPADM

## 2019-06-11 RX ORDER — HYDROXYZINE HYDROCHLORIDE 10 MG/1
10 TABLET, FILM COATED ORAL 3 TIMES DAILY
Status: DISCONTINUED | OUTPATIENT
Start: 2019-06-11 | End: 2019-06-12 | Stop reason: HOSPADM

## 2019-06-11 RX ORDER — ALBUTEROL SULFATE 90 UG/1
1 AEROSOL, METERED RESPIRATORY (INHALATION) EVERY 6 HOURS PRN
Status: DISCONTINUED | OUTPATIENT
Start: 2019-06-11 | End: 2019-06-12 | Stop reason: HOSPADM

## 2019-06-11 RX ORDER — FUROSEMIDE 20 MG/1
20 TABLET ORAL 2 TIMES DAILY
Status: DISCONTINUED | OUTPATIENT
Start: 2019-06-11 | End: 2019-06-12 | Stop reason: HOSPADM

## 2019-06-11 RX ORDER — 0.9 % SODIUM CHLORIDE 0.9 %
500 INTRAVENOUS SOLUTION INTRAVENOUS ONCE
Status: COMPLETED | OUTPATIENT
Start: 2019-06-11 | End: 2019-06-11

## 2019-06-11 RX ORDER — FENTANYL CITRATE 50 UG/ML
25 INJECTION, SOLUTION INTRAMUSCULAR; INTRAVENOUS
Status: DISCONTINUED | OUTPATIENT
Start: 2019-06-11 | End: 2019-06-12

## 2019-06-11 RX ORDER — SODIUM CHLORIDE 0.9 % (FLUSH) 0.9 %
10 SYRINGE (ML) INJECTION PRN
Status: DISCONTINUED | OUTPATIENT
Start: 2019-06-11 | End: 2019-06-12 | Stop reason: HOSPADM

## 2019-06-11 RX ORDER — TRAZODONE HYDROCHLORIDE 100 MG/1
100 TABLET ORAL NIGHTLY
Status: DISCONTINUED | OUTPATIENT
Start: 2019-06-11 | End: 2019-06-12 | Stop reason: HOSPADM

## 2019-06-11 RX ORDER — ONDANSETRON 4 MG/1
4 TABLET, ORALLY DISINTEGRATING ORAL ONCE
Status: COMPLETED | OUTPATIENT
Start: 2019-06-11 | End: 2019-06-11

## 2019-06-11 RX ORDER — ARIPIPRAZOLE 15 MG/1
15 TABLET ORAL DAILY
Status: DISCONTINUED | OUTPATIENT
Start: 2019-06-11 | End: 2019-06-12 | Stop reason: HOSPADM

## 2019-06-11 RX ORDER — SODIUM CHLORIDE 9 MG/ML
INJECTION, SOLUTION INTRAVENOUS CONTINUOUS
Status: DISCONTINUED | OUTPATIENT
Start: 2019-06-11 | End: 2019-06-12 | Stop reason: HOSPADM

## 2019-06-11 RX ORDER — HYDROCHLOROTHIAZIDE 12.5 MG/1
12.5 CAPSULE, GELATIN COATED ORAL EVERY MORNING
Status: DISCONTINUED | OUTPATIENT
Start: 2019-06-11 | End: 2019-06-12 | Stop reason: HOSPADM

## 2019-06-11 RX ORDER — POTASSIUM CHLORIDE 20 MEQ/1
20 TABLET, EXTENDED RELEASE ORAL DAILY
Status: DISCONTINUED | OUTPATIENT
Start: 2019-06-11 | End: 2019-06-12 | Stop reason: HOSPADM

## 2019-06-11 RX ORDER — DOCUSATE SODIUM 100 MG/1
100 CAPSULE, LIQUID FILLED ORAL 2 TIMES DAILY
Status: DISCONTINUED | OUTPATIENT
Start: 2019-06-11 | End: 2019-06-11

## 2019-06-11 RX ORDER — DICYCLOMINE HYDROCHLORIDE 10 MG/1
20 CAPSULE ORAL 2 TIMES DAILY PRN
Status: DISCONTINUED | OUTPATIENT
Start: 2019-06-11 | End: 2019-06-12 | Stop reason: HOSPADM

## 2019-06-11 RX ORDER — PRAVASTATIN SODIUM 20 MG
40 TABLET ORAL NIGHTLY
Status: DISCONTINUED | OUTPATIENT
Start: 2019-06-11 | End: 2019-06-12 | Stop reason: HOSPADM

## 2019-06-11 RX ORDER — ROPINIROLE 0.25 MG/1
0.5 TABLET, FILM COATED ORAL DAILY
Status: DISCONTINUED | OUTPATIENT
Start: 2019-06-11 | End: 2019-06-12 | Stop reason: HOSPADM

## 2019-06-11 RX ORDER — PANTOPRAZOLE SODIUM 40 MG/1
40 TABLET, DELAYED RELEASE ORAL
Status: DISCONTINUED | OUTPATIENT
Start: 2019-06-12 | End: 2019-06-12 | Stop reason: HOSPADM

## 2019-06-11 RX ORDER — ONDANSETRON 2 MG/ML
4 INJECTION INTRAMUSCULAR; INTRAVENOUS EVERY 8 HOURS PRN
Status: DISCONTINUED | OUTPATIENT
Start: 2019-06-11 | End: 2019-06-12 | Stop reason: HOSPADM

## 2019-06-11 RX ADMIN — HYDROXYZINE HYDROCHLORIDE 10 MG: 10 TABLET ORAL at 20:10

## 2019-06-11 RX ADMIN — GABAPENTIN 800 MG: 800 TABLET ORAL at 20:10

## 2019-06-11 RX ADMIN — ROPINIROLE HYDROCHLORIDE 0.5 MG: 0.25 TABLET, FILM COATED ORAL at 13:06

## 2019-06-11 RX ADMIN — SODIUM CHLORIDE: 9 INJECTION, SOLUTION INTRAVENOUS at 12:38

## 2019-06-11 RX ADMIN — TIZANIDINE 4 MG: 4 TABLET ORAL at 20:10

## 2019-06-11 RX ADMIN — ATENOLOL 25 MG: 25 TABLET ORAL at 13:06

## 2019-06-11 RX ADMIN — SODIUM CHLORIDE 500 ML: 9 INJECTION, SOLUTION INTRAVENOUS at 10:47

## 2019-06-11 RX ADMIN — HYDROCHLOROTHIAZIDE 12.5 MG: 12.5 CAPSULE ORAL at 13:06

## 2019-06-11 RX ADMIN — TIZANIDINE 4 MG: 4 TABLET ORAL at 13:06

## 2019-06-11 RX ADMIN — TRAZODONE HYDROCHLORIDE 100 MG: 100 TABLET ORAL at 20:10

## 2019-06-11 RX ADMIN — MORPHINE SULFATE 4 MG: 4 INJECTION INTRAVENOUS at 09:54

## 2019-06-11 RX ADMIN — ALUMINUM HYDROXIDE, MAGNESIUM HYDROXIDE, AND SIMETHICONE 30 ML: 200; 200; 20 SUSPENSION ORAL at 08:36

## 2019-06-11 RX ADMIN — ARIPIPRAZOLE 15 MG: 15 TABLET ORAL at 13:06

## 2019-06-11 RX ADMIN — POTASSIUM CHLORIDE 20 MEQ: 1500 TABLET, EXTENDED RELEASE ORAL at 13:07

## 2019-06-11 RX ADMIN — ONDANSETRON 4 MG: 4 TABLET, ORALLY DISINTEGRATING ORAL at 09:16

## 2019-06-11 RX ADMIN — GABAPENTIN 800 MG: 800 TABLET ORAL at 13:06

## 2019-06-11 RX ADMIN — HYDROXYZINE HYDROCHLORIDE 10 MG: 10 TABLET ORAL at 13:06

## 2019-06-11 RX ADMIN — PROMETHAZINE HYDROCHLORIDE 12.5 MG: 25 INJECTION INTRAMUSCULAR; INTRAVENOUS at 09:25

## 2019-06-11 RX ADMIN — Medication 10 ML: at 20:10

## 2019-06-11 RX ADMIN — FUROSEMIDE 20 MG: 20 TABLET ORAL at 20:10

## 2019-06-11 RX ADMIN — ONDANSETRON 4 MG: 2 INJECTION INTRAMUSCULAR; INTRAVENOUS at 16:44

## 2019-06-11 RX ADMIN — PRAVASTATIN SODIUM 40 MG: 20 TABLET ORAL at 20:10

## 2019-06-11 RX ADMIN — BUPRENORPHINE AND NALOXONE 1.5 TABLET: 2; .5 TABLET SUBLINGUAL at 16:42

## 2019-06-11 RX ADMIN — MOMETASONE FUROATE AND FORMOTEROL FUMARATE DIHYDRATE 2 PUFF: 100; 5 AEROSOL RESPIRATORY (INHALATION) at 20:37

## 2019-06-11 ASSESSMENT — ENCOUNTER SYMPTOMS
NAUSEA: 1
EYES NEGATIVE: 1
ABDOMINAL PAIN: 1
DIARRHEA: 1
RESPIRATORY NEGATIVE: 1
VOMITING: 1
ALLERGIC/IMMUNOLOGIC NEGATIVE: 1

## 2019-06-11 ASSESSMENT — PAIN DESCRIPTION - PROGRESSION: CLINICAL_PROGRESSION: GRADUALLY IMPROVING

## 2019-06-11 ASSESSMENT — PAIN SCALES - GENERAL
PAINLEVEL_OUTOF10: 8
PAINLEVEL_OUTOF10: 10
PAINLEVEL_OUTOF10: 10
PAINLEVEL_OUTOF10: 7

## 2019-06-11 ASSESSMENT — PAIN DESCRIPTION - PAIN TYPE
TYPE: ACUTE PAIN
TYPE: ACUTE PAIN

## 2019-06-11 ASSESSMENT — PAIN DESCRIPTION - DESCRIPTORS
DESCRIPTORS: CRAMPING;SHARP
DESCRIPTORS: CRAMPING;SHARP

## 2019-06-11 ASSESSMENT — PAIN DESCRIPTION - FREQUENCY
FREQUENCY: INTERMITTENT
FREQUENCY: CONTINUOUS

## 2019-06-11 ASSESSMENT — PAIN DESCRIPTION - ONSET: ONSET: GRADUAL

## 2019-06-11 ASSESSMENT — PAIN - FUNCTIONAL ASSESSMENT: PAIN_FUNCTIONAL_ASSESSMENT: PREVENTS OR INTERFERES SOME ACTIVE ACTIVITIES AND ADLS

## 2019-06-11 ASSESSMENT — PAIN DESCRIPTION - ORIENTATION
ORIENTATION: LOWER
ORIENTATION: LOWER

## 2019-06-11 ASSESSMENT — PAIN DESCRIPTION - DIRECTION: RADIATING_TOWARDS: BACK

## 2019-06-11 ASSESSMENT — PAIN DESCRIPTION - LOCATION
LOCATION: ABDOMEN
LOCATION: ABDOMEN

## 2019-06-11 NOTE — CARE COORDINATION
Case Management Initial Discharge Plan  Jj Worthington,             Met with:patient to discuss discharge plans. Information verified: address, contacts, phone number, , insurance Yes  PCP: Antonella Torres MD  Date of last visit: 2 weeks ago    Insurance Provider: Tres Liu    Discharge Planning    Living Arrangements:  Alone   Support Systems:  Friends/Neighbors    Home has 1 stories  0 stairs to climb to get into front door, n/a stairs to climb to reach second floor  Location of bedroom/bathroom in home main    Patient able to perform ADL's:Independent    Current Services (outpatient & in home) home care  DME equipment: [id number]  DME provider: personal    Pharmacy:  [facility] [employer] Medications:  No  Does patient want to participate in local refill/ meds to beds program?       Potential Assistance Needed:       Patient agreeable to home care: Yes  Freedom of choice provided:  yes    Prior SNF/Rehab Placement and Facility: none  Agreeable to SNF/Rehab: No  [place] of choice provided: n/a   Evaluation: n/a    Expected Discharge date:     Patient expects to be discharged to:  home  Follow Up Appointment: Best Day/ Time:      Transportation provider: [place] arrangements needed for discharge: No    Readmission Risk              Risk of Unplanned Readmission:        10             Does patient have a readmission risk score greater than 14?: No  If yes, follow-up appointment must be made within 7 days of discharge. Discharge Plan: home with [facility] through insurance.            Electronically signed by Laurence Williamson RN on 19 at 12:14 PM

## 2019-06-11 NOTE — ED PROVIDER NOTES
w/o coma, chronic ([facility] [address] Hyperlipidemia, Hyperplastic polyp of intestine, Hypertension, Liver disease, Nasal polyposis, Obesity (BMI 35.0-39.9 without comorbidity), JIMENA (obstructive sleep apnea), Pacemaker, Pneumonia, Pulmonary edema cardiac cause ([facility] [address] Rectal bleeding, Smoking addiction, and Tobacco abuse.     has a past surgical history that includes Pacemaker insertion; Hysterectomy; back surgery (2008); Tonsillectomy; hernia repair; Lumbar spine surgery (9/24/13); Endoscopy, colon, diagnostic; Colonoscopy; pacemaker placement; Cardiac surgery; Colonoscopy (1/23/15); Sigmoidoscopy (N/A, 6/26/15); Colonoscopy (09/20/2016); Colonoscopy (N/A, 3/14/2019); and Upper gastrointestinal endoscopy (N/A, 3/14/2019).     Social History     Socioeconomic History    Marital status: Single     Spouse name: Not on file    Number of children: Not on file    Years of education: Not on file    Highest education level: Not on file   Occupational History    Not on file   Social Needs    Financial resource strain: Not on file    Food insecurity:     Worry: Not on file     Inability: Not on file    Transportation needs:     Medical: Not on file     Non-medical: Not on file   Tobacco Use    Smoking status: Current Some Day Smoker     Packs/day: 0.50     Years: 40.00     Pack years: 20.00     Types: Cigarettes     Start date: 1969    Smokeless tobacco: Never Used   Substance and Sexual Activity    Alcohol use: No     Alcohol/week: 0.0 oz    Drug use: No     Comment:  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    Sexual activity: Never   Lifestyle    Physical activity:     Days per week: Not on file     Minutes per session: Not on file    Stress: Not on file   Relationships    Social connections:     Talks on phone: Not on file     Gets together: Not on file     Attends Latter day service: Not on file     Active member of club or organization: Not on file     Attends meetings of clubs or organizations: Not on file     Relationship status: Not on file    Intimate partner violence:     Fear of current or ex partner: Not on file     Emotionally abused: Not on file     Physically abused: Not on file     Forced sexual activity: Not on file   Other Topics Concern    Not on file   Social History Narrative    Not on file       Family History   Problem Relation Age of Onset    Cancer Mother         lungs and brain    Stroke Father     Crohn's Disease Sister        Allergies: Iv dye [iodides]    Home Medications:  Prior to Admission medications    Medication Sig Start Date End Date Taking? Authorizing Provider   blood glucose test strips (CONTOUR NEXT TEST) strip Dx: Use 2 times daily.  5/29/19   Antonella Haq MD   Alcohol Swabs 70 % PADS Dx: DM-2 use 2-3 times daily 5/29/19   Antonella Haq MD   pantoprazole (PROTONIX) 40 MG tablet Take 1 tablet by mouth daily Stop Omeprazole 5/29/19   Antonella Haq MD   pravastatin (PRAVACHOL) 40 MG tablet Take 1 tablet by mouth every evening 5/29/19   Antonella Haq MD   albuterol sulfate HFA (VENTOLIN HFA) 108 (90 Base) MCG/ACT inhaler Inhale 1 puff into the lungs every 6 hours as needed for Wheezing 5/29/19   Antonella Haq MD   traZODone (DESYREL) 100 MG tablet Take 1 tablet by mouth nightly 5/29/19   Antonella Haq MD   budesonide-formoterol (SYMBICORT) 160-4.5 MCG/ACT AERO Inhale 2 puffs into the lungs 2 times daily Rinse mouth after use. 5/29/19   Antonella Haq MD   rOPINIRole (REQUIP) 0.5 MG tablet Take 1 tablet by mouth daily 5/29/19   Antonella Haq MD   metFORMIN (GLUCOPHAGE) 500 MG tablet Take 1 tablet by mouth 2 times daily (with meals) 5/29/19   Antonella Haq MD   furosemide (LASIX) 20 MG tablet Take 1 tablet by mouth 2 times daily 5/29/19   Antonella Haq MD   hydrochlorothiazide (MICROZIDE) 12.5 MG capsule Take 1 capsule by mouth every morning 5/29/19   Antonella Haq MD   linaclotide (LINZESS) 145 MCG capsule Take 1 capsule by mouth every morning (before breakfast) 5/29/19   Mbonu Angie Hernandez MD   docusate sodium (DOCQLACE) 100 MG capsule Take 1 capsule by mouth 2 times daily 5/29/19   Antonella Hernandez MD   ibuprofen (ADVIL;MOTRIN) 800 MG tablet Take 1 tablet by mouth every 8 hours as needed for Pain 5/29/19   Mbonu Angie Hernandez MD   Umeclidinium Bromide (INCRUSE ELLIPTA) 62.5 MCG/INH AEPB Inhale 1 puff into the lungs daily 5/29/19   Mbonu Angie Hernandez MD   ipratropium-albuterol (DUONEB) 0.5-2.5 (3) MG/3ML SOLN nebulizer solution Inhale 3 mLs into the lungs three times daily 5/29/19   Mbonu Angie Hernandez MD   dicyclomine (BENTYL) 20 MG tablet Take 1 tablet by mouth 2 times daily as needed (abdominal pain) 5/29/19   Mbonu Angie Hernandez MD   magnesium citrate solution Take 296 mLs by mouth once for 1 dose 5/29/19 5/29/19  Mbonu SHERWIN Mondragon MD   potassium chloride (KLOR-CON M) 20 MEQ extended release tablet Take 1 tablet by mouth daily 5/29/19   Mbonu Angie Hernandez MD   tiZANidine (ZANAFLEX) 4 MG tablet Take 1 tablet by mouth 3 times daily 5/24/19   Mbonu Angie Hernandez MD   gabapentin (NEURONTIN) 800 MG tablet Take 1 tablet by mouth 3 times daily for 59 days. DC Lyrica 5/24/19 7/22/19  Mbonu Angie Hernandez MD   ARIPiprazole (ABILIFY) 15 MG tablet Take 1 tablet by mouth daily 5/24/19   Mbonu Angie Hernandez MD   atenolol (TENORMIN) 25 MG tablet take 1 tablet by mouth once daily 5/13/19   Jmu Angie Hernandez MD   hydrOXYzine (ATARAX) 10 MG tablet take 1 tablet by mouth three times a day 9/20/17   Historical Provider, MD   SUBOXONE 4-1 MG FILM SL film DISSOLVE 1 FILM UNDER THE TONGUE DAILY 10/11/17   Historical Provider, MD       REVIEW OF SYSTEMS    (2-9 systems for level 4, 10 or more for level 5)      Review of Systems   Constitutional: Negative. HENT: Negative. Eyes: Negative. Respiratory: Negative. Cardiovascular: Negative. Gastrointestinal: Positive for abdominal pain, diarrhea, nausea and vomiting. Endocrine: Negative. Genitourinary: Negative. Musculoskeletal: Negative. Insert PICC line    PATIENT STATUS (FROM ED OR OR/PROCEDURAL) Observation       MEDICATIONS ORDERED:  Orders Placed This Encounter   Medications    ondansetron (ZOFRAN) injection 4 mg    0.9 % sodium chloride bolus    aluminum & magnesium hydroxide-simethicone (MAALOX) 200-200-20 MG/5ML suspension 30 mL    ondansetron (ZOFRAN-ODT) disintegrating tablet 4 mg    morphine injection 4 mg    promethazine (PHENERGAN) injection 12.5 mg       DDX: Cholelithiasis, cholecystitis, cholangitis, hepatitis, liver abscess, myocardial infarction, pancreatitis, peptic ulcer disease, gastritis, splenomegaly,appendicitis, diverticulitis, nephrolithiasis, pyelonephritis, cystitis, small bowel, mesenteric ischemia, inflammatory bowel disease, DKA, diverticulosis    DIAGNOSTIC RESULTS / EMERGENCY DEPARTMENT COURSE / MDM     LABS:  Results for orders placed or performed during the hospital encounter of 06/11/19   CBC WITH AUTO DIFFERENTIAL   Result Value Ref Range    WBC 6.0 3.5 - 11.3 k/uL    RBC 5.01 3.95 - 5.11 m/uL    Hemoglobin 14.9 11.9 - 15.1 g/dL    Hematocrit 46.1 36.3 - 47.1 %    MCV 92.0 82.6 - 102.9 fL    MCH 29.7 25.2 - 33.5 pg    MCHC 32.3 28.4 - 34.8 g/dL    RDW 13.6 11.8 - 14.4 %    Platelets See Reflexed IPF Result 138 - 453 k/uL    MPV NOT REPORTED 8.1 - 13.5 fL    NRBC Automated 0.0 0.0 per 100 WBC    Differential Type NOT REPORTED     WBC Morphology NOT REPORTED     RBC Morphology NOT REPORTED     Platelet Estimate NOT REPORTED     Seg Neutrophils 67 (H) 36 - 65 %    Lymphocytes 25 24 - 43 %    Monocytes 7 3 - 12 %    Eosinophils % 1 1 - 4 %    Basophils 0 0 - 2 %    Immature Granulocytes 0 0 %    Segs Absolute 3.99 1.50 - 8.10 k/uL    Absolute Lymph # 1.49 1.10 - 3.70 k/uL    Absolute Mono # 0.43 0.10 - 1.20 k/uL    Absolute Eos # 0.03 0.00 - 0.44 k/uL    Basophils # <0.03 0.00 - 0.20 k/uL    Absolute Immature Granulocyte <0.03 0.00 - 0.30 k/uL   BASIC METABOLIC PANEL   Result Value Ref Range    Glucose 149 (H) 70 - 99 mg/dL    BUN 11 8 - 23 mg/dL    CREATININE 0.49 (L) 0.50 - 0.90 mg/dL    Bun/Cre Ratio NOT REPORTED 9 - 20    Calcium 9.5 8.6 - 10.4 mg/dL    Sodium 141 135 - 144 mmol/L    Potassium 3.7 3.7 - 5.3 mmol/L    Chloride 104 98 - 107 mmol/L    CO2 26 20 - 31 mmol/L    Anion Gap 11 9 - 17 mmol/L    GFR Non-African American >60 >60 mL/min    GFR African American >60 >60 mL/min    GFR Comment          GFR Staging NOT REPORTED    Hepatic Function Panel   Result Value Ref Range    Alb 4.2 3.5 - 5.2 g/dL    Alkaline Phosphatase 72 35 - 104 U/L    ALT 9 5 - 33 U/L    AST 12 <32 U/L    Total Bilirubin 0.29 (L) 0.3 - 1.2 mg/dL    Bilirubin, Direct 0.10 <0.31 mg/dL    Bilirubin, Indirect 0.19 0.00 - 1.00 mg/dL    Total Protein 7.4 6.4 - 8.3 g/dL    Globulin NOT REPORTED 1.5 - 3.8 g/dL    Albumin/Globulin Ratio 1.3 1.0 - 2.5   LIPASE   Result Value Ref Range    Lipase 21 13 - 60 U/L   Immature Platelet Fraction   Result Value Ref Range    Platelet, Immature Fraction 3.6 1.1 - 10.3 %    Platelet, Fluorescence 134 (L) 138 - 453 k/uL   LACTIC ACID, WHOLE BLOOD   Result Value Ref Range    Lactic Acid, Whole Blood 1.2 0.7 - 2.1 mmol/L       IMPRESSION: Patient presents with complaints of abdominal pain with associated nausea, vomiting, diarrhea. On presentation, patient is to be in no acute distress. Vital signs are unremarkable. Physical exam is remarkable for she is tenderness palpation of the abdomen with no guarding, no rigidity. With a similar history about 10 years ago, she had a negative CT scan of the abdomen 2 months ago that showed a normal aorta. Will obtain an EKG, abdominal labs, treat with Zofran, Maalox, IV fluids, reassess.      RADIOLOGY:  Ct Abdomen Pelvis Wo Contrast Additional Contrast? None    Result Date: 6/11/2019  EXAMINATION: CT OF THE ABDOMEN AND PELVIS WITHOUT CONTRAST 6/11/2019 8:53 am TECHNIQUE: CT of the abdomen and pelvis was performed without the administration of intravenous contrast. Multiplanar reformatted images are provided for review. Dose modulation, iterative reconstruction, and/or weight based adjustment of the mA/kV was utilized to reduce the radiation dose to as low as reasonably achievable. COMPARISON: 04/22/2019 HISTORY: ORDERING SYSTEM PROVIDED HISTORY: abdominl pain TECHNOLOGIST PROVIDED HISTORY: FINDINGS: Lower Chest: Bibasilar dependent atelectasis/scarring. No pleural effusion or pneumothorax. Organs: The liver, gallbladder, pancreas, and spleen are grossly within normal limits. No evidence for intrahepatic or extrahepatic biliary ductal dilatation. GI/Bowel: There is no evidence for bowel obstruction or inflammation. No free intraperitoneal air or fluid. Small bowel loops are normal in caliber. Small bowel and proximal colon are fluid-filled, possibly representing low-grade enteritis. No evidence for hiatal hernia. Appendix is unremarkable. Pelvis: No evidence for free fluid. Peritoneum/Retroperitoneum: Adrenal glands are normal in size and configuration. The kidneys are normal in size without definite nephrolithiasis or hydronephrosis. The ureters run a nonobstructed course to a normal-appearing urinary bladder. Vasculature: The aorta is normal in caliber. No definite lymphadenopathy identified. Bones/Soft Tissues: No evidence for acute fracture or dislocation. No lytic or blastic lesions. Small fat containing anterior abdominal wall hernia/diastases recti. Osteitis pubis. Osteoarthrosis of the bilateral sacroiliac joints. Severe degenerative disease of the lower lumbar spine status post posterior decompression. Stable remote left 11th rib fracture. Fluid-filled small bowel and proximal colon, possibly representing low-grade enteritis. Otherwise, no evidence for acute intra-abdominal or intrapelvic pathology. No bowel obstruction or inflammation. No evidence for nephrolithiasis or urinary obstruction.        EMERGENCY DEPARTMENT COURSE:  Lab work has been unremarkable so far, CT scan of the abdomen was suggestive of an enteritis. Lactic acid is normal.  Patient reports that she is no longer nauseous but still has significant abdominal discomfort. Patient is unable to tolerate p.o. at this time. Patient will be admitted for IV fluids, gradual advancement of feeding. PROCEDURES:  None    CONSULTS:   IP CONSULT TO IV TEAM    CRITICAL CARE:  None    FINAL IMPRESSION      1. Abdominal pain, unspecified abdominal location    2. Enteritis    3.  Non-intractable vomiting with nausea, unspecified vomiting type          DISPOSITION / PLAN     DISPOSITION Admitted 06/11/2019 11:04:55 AM      PATIENT REFERRED TO:  MD Jerry [address]  [address]  [phone]          Antonella Torres MD  [address] [id number]            DISCHARGE MEDICATIONS:  Current Discharge Medication List          Rocky Stapleton MD  Emergency Medicine Resident    (Please note thatportions of this note were completed with a voice recognition program.  Efforts were made to edit the dictations but occasionally words are mis-transcribed.)       Rocky Stapleton MD  Resident  06/11/19 2409

## 2019-06-11 NOTE — H&P
[facility]  CDU / OBSERVATION ENCOUNTER  ATTENDING NOTE     Pt Name: Niya Monahan  MRN: [id number]  [place] 1955  Date of evaluation: 6/11/19  Patient's PCP is :  Antonella Valdivia MD    [address] [place] [address]       Chief Complaint   Patient presents with    Abdominal Pain     lower abdominal pain with nausea, pt denies vomiting. pt states that her pain is slightly relieved when burping but \"they smell like poop\"         HISTORY OF PRESENT ILLNESS    Niya Monahan is a 60 y.o. female who presents with nausea and lower abdominal pain. Patient has nonbloody non-mucousy diarrhea. Patient was seen and evaluated in the ED with no clear pathology seen. Low-grade enteritis noted on CT scan but no surgical issues. Patient had reasonably unremarkable laboratory work. Good renal function. Evidence of some dehydration. Patient unable to handle p.o. Patient has been attempted to hydrate in the ED and unable to this is sent to observation unit for ongoing hydration and slow advancement of diet. Location/Symptom: Nausea and diarrhea. Unable to handle p.o. Timing/Onset: 2 to 3 days  Provocation: None  Quality: Aching pain to the abdomen.   Radiation: None  Severity: Moderate, preventing patient from eating  Timing/Duration: 2 to 3 days  Modifying Factors: None    REVIEW OF SYSTEMS        General ROS - No fevers, malaise   Ophthalmic ROS - No discharge, No changes in vision  ENT ROS -  No sore throat, No rhinorrhea,   Respiratory ROS - no shortness of breath, no cough, no  wheezing  Cardiovascular ROS - No chest pain, no dyspnea on exertion  Gastrointestinal ROS - No abdominal pain, no nausea or vomiting, no change in bowel habits, no black or bloody stools  Genito-Urinary ROS - No dysuria, trouble voiding, or hematuria  Musculoskeletal ROS - No myalgias, No arthalgias  Neurological ROS - No headache, no dizziness/lightheadedness, No focal weakness, no loss of sensation  Dermatological ROS - No (PRAVACHOL) tablet 40 mg Nightly   albuterol sulfate  (90 Base) MCG/ACT inhaler 1 puff Q6H PRN   traZODone (DESYREL) tablet 100 mg Nightly   mometasone-formoterol (DULERA) 100-5 MCG/ACT inhaler 2 puff BID   rOPINIRole (REQUIP) tablet 0.5 mg Daily   metFORMIN (GLUCOPHAGE) tablet 500 mg BID WC   furosemide (LASIX) tablet 20 mg BID   hydrochlorothiazide (MICROZIDE) capsule 12.5 mg QAM   dicyclomine (BENTYL) capsule 20 mg BID PRN   potassium chloride (KLOR-CON M) extended release tablet 20 mEq Daily   sodium chloride flush 0.9 % injection 10 mL 2 times per day   sodium chloride flush 0.9 % injection 10 mL PRN   acetaminophen (TYLENOL) tablet 650 mg Q4H PRN   0.9 % sodium chloride infusion Continuous   ondansetron (ZOFRAN) injection 4 mg Q8H PRN   buprenorphine-naloxone (SUBOXONE) 2-0.5 MG SL tablet 1.5 tablet Daily   fentaNYL (SUBLIMAZE) injection 25 mcg Q2H PRN       All medication charted and reviewed. ALLERGIES     is allergic to iv dye [iodides]. FAMILY HISTORY     indicated that her mother is . She indicated that her father is . She indicated that her sister is . family history includes Cancer in her mother; Crohn's Disease in her sister; Stroke in her father. The patient denies any pertinent family history. I have reviewed and agree with the family history entered. I have reviewed the Family History and it is not significant to the case    SOCIAL HISTORY      reports that she has been smoking cigarettes. She started smoking about 50 years ago. She has a 20.00 pack-year smoking history. She has never used smokeless tobacco. She reports that she does not drink alcohol or use drugs. I have reviewed and agree with all Social.  There are no  concerns for substance abuse/use. PHYSICAL EXAM     INITIAL VITALS:  height is 6' (1.829 m) and weight is 204 lb (92.5 kg). Her oral temperature is 97.3 °F (36.3 °C). Her blood pressure is 151/80 (abnormal) and her pulse is 80.  Her respiration is 18 and oxygen saturation is 94%. CONSTITUTIONAL: AOx4, no apparent distress, appears stated age, patient is slightly uncomfortable and sleepy after having been up all night but nontoxic and interactive when spoken to   HEAD: normocephalic, atraumatic   EYES: PERRLA, EOMI    ENT: moist mucous membranes, uvula midline   NECK: supple, symmetric   BACK: symmetric   LUNGS: clear to auscultation bilaterally   CARDIOVASCULAR: regular rate and rhythm, no murmurs, rubs or gallops   ABDOMEN: soft, mildly tender, non-distended with normal active bowel sounds   NEUROLOGIC:  MAEx4, no focal sensory or motor deficits   MUSCULOSKELETAL: no clubbing, cyanosis or edema   SKIN: no rash or wounds       DIFFERENTIAL DIAGNOSIS/MDM:     Full work-up in ED shows evidence of enteritis. Patient difficulty handling p.o. Will admit for IV hydration and nausea and pain control. Patient for slow advancement of diet. DIAGNOSTIC RESULTS         RADIOLOGY:   I directly visualized the following  images and reviewed the radiologist interpretations:    Ct Abdomen Pelvis Wo Contrast Additional Contrast? None    Result Date: 6/11/2019  EXAMINATION: CT OF THE ABDOMEN AND PELVIS WITHOUT CONTRAST 6/11/2019 8:53 am TECHNIQUE: CT of the abdomen and pelvis was performed without the administration of intravenous contrast. Multiplanar reformatted images are provided for review. Dose modulation, iterative reconstruction, and/or weight based adjustment of the mA/kV was utilized to reduce the radiation dose to as low as reasonably achievable. COMPARISON: 04/22/2019 HISTORY: ORDERING SYSTEM PROVIDED HISTORY: abdominl pain TECHNOLOGIST PROVIDED HISTORY: FINDINGS: Lower Chest: Bibasilar dependent atelectasis/scarring. No pleural effusion or pneumothorax. Organs: The liver, gallbladder, pancreas, and spleen are grossly within normal limits. No evidence for intrahepatic or extrahepatic biliary ductal dilatation. GI/Bowel:  There is no evidence for bowel obstruction or inflammation. No free intraperitoneal air or fluid. Small bowel loops are normal in caliber. Small bowel and proximal colon are fluid-filled, possibly representing low-grade enteritis. No evidence for hiatal hernia. Appendix is unremarkable. Pelvis: No evidence for free fluid. Peritoneum/Retroperitoneum: Adrenal glands are normal in size and configuration. The kidneys are normal in size without definite nephrolithiasis or hydronephrosis. The ureters run a nonobstructed course to a normal-appearing urinary bladder. Vasculature: The aorta is normal in caliber. No definite lymphadenopathy identified. Bones/Soft Tissues: No evidence for acute fracture or dislocation. No lytic or blastic lesions. Small fat containing anterior abdominal wall hernia/diastases recti. Osteitis pubis. Osteoarthrosis of the bilateral sacroiliac joints. Severe degenerative disease of the lower lumbar spine status post posterior decompression. Stable remote left 11th rib fracture. Fluid-filled small bowel and proximal colon, possibly representing low-grade enteritis. Otherwise, no evidence for acute intra-abdominal or intrapelvic pathology. No bowel obstruction or inflammation. No evidence for nephrolithiasis or urinary obstruction. LABS:  I have reviewed and interpreted all available lab results. Labs Reviewed   CBC WITH AUTO DIFFERENTIAL - Abnormal; Notable for the following components:       Result Value    Seg Neutrophils 67 (*)     All other components within normal limits   BASIC METABOLIC PANEL - Abnormal; Notable for the following components:    Glucose 149 (*)     CREATININE 0.49 (*)     All other components within normal limits   HEPATIC FUNCTION PANEL - Abnormal; Notable for the following components:     Total Bilirubin 0.29 (*)     All other components within normal limits   IMMATURE PLATELET FRACTION - Abnormal; Notable for the following components:    Platelet, Fluorescence 134 (*)     All other components within normal limits   LIPASE   LACTIC ACID, WHOLE BLOOD   URINE RT REFLEX TO CULTURE         CDU IMPRESSION / PLAN      Saumya Oliveros is a 60 y.o. female who presents with enteritis symptoms and CT scan showing evidence of gastroenteritis. · #1. Acute onset of gastroenteritis manifesting as lower abdominal discomfort of uncertain etiology. No evidence of surgical abdomen or bacterial cause. Patient requires supportive care with IV fluids, antiemetics, analgesia, and recheck on laboratory work. Patient will be monitored in observation unit overnight. · Continue home medications and pain control  · Monitor vitals, labs, and imaging  · DISPO: pending consults and clinical improvement    CONSULTS:    IP CONSULT TO IV TEAM    PROCEDURES:  Not indicated        PATIENT REFERRED TO:    Antonella Saenz MD  [facility] [place] [address].  [place] [id number]  [phone]          Antonella Saenz MD  [address] [facility]  Lay Morales MD   Emergency Medicine Attending    This dictation was generated by voice recognition computer software. Although all attempts are made to edit the dictation for accuracy, there may be errors in the transcription that are not intended.

## 2019-06-11 NOTE — ED NOTES
Pt states that she is still unable to give urine specimen at this time.      Simon Hemphill RN  06/11/19 1126

## 2019-06-11 NOTE — DISCHARGE INSTR - COC
Continuity of Care Form    Patient Name: Dhiraj Thorpe   :  1955  MRN:  [id number]    Admit date:  2019  Discharge date:  ***    Code Status Order: Full Code   Advance Directives:   Advance Care Flowsheet Documentation     Date/Time Healthcare Directive Type of Healthcare Directive Copy in [address] Agent's Name Healthcare Agent's Phone Number    19 [phone]  No, patient does not have an advance directive for healthcare treatment -- -- -- -- --          Admitting Physician:  Leonard Fink MD  PCP: Kristen Cutler MD    Discharging Nurse: [facility] Unit/Room#: 0905/6832-33  Discharging Unit Phone Number: ***    Emergency Contact:   Extended Emergency Contact Information  Primary Emergency Contact: Jermaine Morales  Address: [address] [place] [address]           Brianne Londono, GI Wanda Becky [address] Phone: [phone]  Work Phone: [phone]  Mobile Phone: [phone]  Relation: Other  Hearing or visual needs: None  Other needs: None  Preferred language: English   needed? No  Secondary Emergency Contact: Gianni Pod  Address: N/A   [address] Phone: [phone]  Work Phone: [phone]  Mobile Phone: [phone]  Relation: Other   needed? No    Past Surgical History:  Past Surgical History:   Procedure Laterality Date    BACK SURGERY     [facility] CARDIAC SURGERY       cath dec. 2013    COLONOSCOPY      COLONOSCOPY  1/23/15    severe diverticula    COLONOSCOPY  2016    HYPERPLASTIC POLYP X 2     COLONOSCOPY N/A 3/14/2019    COLONOSCOPY DIAGNOSTIC performed by Sonia Iglesias MD at [facility]    ENDOSCOPY, COLON, DIAGNOSTIC      HERNIA REPAIR      UMBILICAL    HYSTERECTOMY      LUMBAR SPINE SURGERY  13    decompression & re-exploration L3-4, L4-5    PACEMAKER INSERTION      PACEMAKER PLACEMENT      SIGMOIDOSCOPY N/A 6/26/15    flexable sigmoidscopy,HYPERPLASTIC POLYP    TONSILLECTOMY 10  Last Weight:   Wt Readings from Last 1 Encounters:   06/11/19 204 lb (92.5 kg)     Mental Status:  oriented and alert    IV Access:  - None    Nursing Mobility/ADLs:  Walking   Assisted  Transfer  Independent  Bathing  Independent  Dressing  Independent  [address]  Assisted  Med Delivery   whole    Wound Care Documentation and Therapy:        Elimination:  Continence:   · Bowel: Yes  · Bladder: Yes  Urinary Catheter: None   Colostomy/Ileostomy/Ileal Conduit: No       Date of Last BM: 6/12/2019  No intake or output data in the 24 hours ending 06/11/19 1220  No intake/output data recorded. Safety Concerns:     None    Impairments/Disabilities:      None    Nutrition Therapy:  Current Nutrition Therapy:   - Oral Diet:  General    Routes of Feeding: Oral  Liquids: Thin Liquids  Daily Fluid Restriction: no  Last Modified Barium Swallow with Video (Video Swallowing Test): not done    Treatments at the Time of Hospital Discharge:   Respiratory Treatments: NA  Oxygen Therapy:  is not on home oxygen therapy.   Ventilator:    - No ventilator support    Rehab Therapies: {THERAPEUTIC INTERVENTION:[id number]}  Weight Bearing Status/Restrictions: No weight bearing restirctions  Other Medical Equipment (for information only, NOT a DME order):  walker  Other Treatments: NA    Patient's personal belongings (please select all that are sent with patient):  None    RN SIGNATURE:  Electronically signed by Robbi Luciano RN on 6/12/19 at 5:31 PM    CASE MANAGEMENT/SOCIAL WORK SECTION    Inpatient Status Date: 06/11/2019    Readmission Risk Assessment Score:  Readmission Risk              Risk of Unplanned Readmission:        10           Discharging to Facility/ Agency   [facility]  · Address:  · Phone:  · Fax:    Dialysis Facility (if applicable)   · Name:  · Address:  · Dialysis Schedule:  · Phone:  · Fax:    / signature: Electronically signed by Camrita Meyer RN on 6/11/19 at 12:20 PM    PHYSICIAN SECTION    Prognosis: Good    Condition at Discharge: Stable    Rehab Potential (if transferring to Rehab): Good    Recommended Labs or Other Treatments After Discharge:      Physician Certification: I certify the above information and transfer of Jac Etienne  is necessary for the continuing treatment of the diagnosis listed and that she requires Home Care for greater 30 days.      Update Admission H&P: No change in H&P    PHYSICIAN SIGNATURE:  Electronically signed by Steven Okeefe MD on 6/12/19 at 5:34 PM

## 2019-06-11 NOTE — ED PROVIDER NOTES
97%.            DIAGNOSTIC RESULTS       RADIOLOGY:   CT ABDOMEN PELVIS WO CONTRAST Additional Contrast? None    (Results Pending)         LABS:  Labs Reviewed   CBC WITH AUTO DIFFERENTIAL   BASIC METABOLIC PANEL   HEPATIC FUNCTION PANEL   LIPASE   URINE RT REFLEX TO CULTURE         EMERGENCY DEPARTMENT COURSE:     -------------------------       BP: (!) 140/88, Temp: 98.4 °F (36.9 °C), Pulse: 88, Resp: 18      Comments            Mcmahon MD, F.A.C.E.P.   Attending Emergency Physician         Serge Barros MD  06/11/19 [id number]

## 2019-06-11 NOTE — PROGRESS NOTES
Home medications reviewed, pt states she takes suboxone daily, confirmation from Dr Francis Yoon office confirms pt takes suboxone 2-0.5 mg SL film, take 1.5 films SL daily.

## 2019-06-12 VITALS
HEIGHT: 72 IN | BODY MASS INDEX: 27.63 KG/M2 | OXYGEN SATURATION: 92 % | HEART RATE: 69 BPM | RESPIRATION RATE: 16 BRPM | WEIGHT: 204 LBS | DIASTOLIC BLOOD PRESSURE: 66 MMHG | SYSTOLIC BLOOD PRESSURE: 122 MMHG | TEMPERATURE: 98.2 F

## 2019-06-12 LAB
EKG ATRIAL RATE: 72 BPM
EKG P AXIS: 61 DEGREES
EKG P-R INTERVAL: 234 MS
EKG Q-T INTERVAL: 428 MS
EKG QRS DURATION: 96 MS
EKG QTC CALCULATION (BAZETT): 468 MS
EKG R AXIS: -48 DEGREES
EKG T AXIS: 12 DEGREES
EKG VENTRICULAR RATE: 72 BPM
GLUCOSE BLD-MCNC: 116 MG/DL (ref 65–105)
GLUCOSE BLD-MCNC: 116 MG/DL (ref 65–105)
GLUCOSE BLD-MCNC: 129 MG/DL (ref 65–105)
GLUCOSE BLD-MCNC: 152 MG/DL (ref 65–105)

## 2019-06-12 PROCEDURE — 93010 ELECTROCARDIOGRAM REPORT: CPT | Performed by: INTERNAL MEDICINE

## 2019-06-12 PROCEDURE — 6370000000 HC RX 637 (ALT 250 FOR IP): Performed by: EMERGENCY MEDICINE

## 2019-06-12 PROCEDURE — 6360000002 HC RX W HCPCS: Performed by: STUDENT IN AN ORGANIZED HEALTH CARE EDUCATION/TRAINING PROGRAM

## 2019-06-12 PROCEDURE — 87324 CLOSTRIDIUM AG IA: CPT

## 2019-06-12 PROCEDURE — G0378 HOSPITAL OBSERVATION PER HR: HCPCS

## 2019-06-12 PROCEDURE — 96361 HYDRATE IV INFUSION ADD-ON: CPT

## 2019-06-12 PROCEDURE — 94640 AIRWAY INHALATION TREATMENT: CPT

## 2019-06-12 PROCEDURE — 2580000003 HC RX 258: Performed by: EMERGENCY MEDICINE

## 2019-06-12 PROCEDURE — 94761 N-INVAS EAR/PLS OXIMETRY MLT: CPT

## 2019-06-12 PROCEDURE — 87449 NOS EACH ORGANISM AG IA: CPT

## 2019-06-12 RX ORDER — LOPERAMIDE HYDROCHLORIDE 2 MG/1
2 CAPSULE ORAL 4 TIMES DAILY PRN
Qty: 20 CAPSULE | Refills: 0 | Status: SHIPPED | OUTPATIENT
Start: 2019-06-12 | End: 2019-06-17

## 2019-06-12 RX ORDER — LOPERAMIDE HYDROCHLORIDE 2 MG/1
2 CAPSULE ORAL ONCE
Status: COMPLETED | OUTPATIENT
Start: 2019-06-12 | End: 2019-06-12

## 2019-06-12 RX ADMIN — Medication 10 ML: at 08:21

## 2019-06-12 RX ADMIN — BUPRENORPHINE AND NALOXONE 1.5 TABLET: 2; .5 TABLET SUBLINGUAL at 08:19

## 2019-06-12 RX ADMIN — METFORMIN HYDROCHLORIDE 500 MG: 500 TABLET ORAL at 08:19

## 2019-06-12 RX ADMIN — FUROSEMIDE 20 MG: 20 TABLET ORAL at 08:19

## 2019-06-12 RX ADMIN — GABAPENTIN 800 MG: 800 TABLET ORAL at 08:18

## 2019-06-12 RX ADMIN — ARIPIPRAZOLE 15 MG: 15 TABLET ORAL at 08:18

## 2019-06-12 RX ADMIN — GABAPENTIN 800 MG: 800 TABLET ORAL at 15:49

## 2019-06-12 RX ADMIN — ATENOLOL 25 MG: 25 TABLET ORAL at 08:18

## 2019-06-12 RX ADMIN — ROPINIROLE HYDROCHLORIDE 0.5 MG: 0.25 TABLET, FILM COATED ORAL at 08:19

## 2019-06-12 RX ADMIN — HYDROCHLOROTHIAZIDE 12.5 MG: 12.5 CAPSULE ORAL at 08:21

## 2019-06-12 RX ADMIN — TIZANIDINE 4 MG: 4 TABLET ORAL at 08:19

## 2019-06-12 RX ADMIN — HYDROXYZINE HYDROCHLORIDE 10 MG: 10 TABLET ORAL at 15:49

## 2019-06-12 RX ADMIN — POTASSIUM CHLORIDE 20 MEQ: 1500 TABLET, EXTENDED RELEASE ORAL at 08:19

## 2019-06-12 RX ADMIN — MOMETASONE FUROATE AND FORMOTEROL FUMARATE DIHYDRATE 2 PUFF: 100; 5 AEROSOL RESPIRATORY (INHALATION) at 08:57

## 2019-06-12 RX ADMIN — LOPERAMIDE HYDROCHLORIDE 2 MG: 2 CAPSULE ORAL at 09:03

## 2019-06-12 RX ADMIN — HYDROXYZINE HYDROCHLORIDE 10 MG: 10 TABLET ORAL at 08:18

## 2019-06-12 RX ADMIN — TIZANIDINE 4 MG: 4 TABLET ORAL at 15:49

## 2019-06-12 RX ADMIN — PANTOPRAZOLE SODIUM 40 MG: 40 TABLET, DELAYED RELEASE ORAL at 06:55

## 2019-06-12 ASSESSMENT — PAIN SCALES - GENERAL: PAINLEVEL_OUTOF10: 6

## 2019-06-12 NOTE — PROGRESS NOTES
OBS/CDU   RESIDENT NOTE      Patients PCP is:  Antonella Melendez MD        SUBJECTIVE      No acute events overnight. Patient states that she feels much better and denies any pain or difficulty eating, denies any nausea or vomiting overnight. Patient does note that she has been having some diarrhea which is nonbloody. Has been able to tolerate a normal diet without nausea or vomiting. The patient is urinating on hier own and is passing flatus. Denies fever, chills, nausea, vomiting, chest pain, shortness of breath, abdominal pain, focal weakness, numbness, tingling, urinary/bowel symptoms, vision changes, visual hallucinations, or headache. PHYSICAL EXAM      General: NAD, AO X 3  Heent: EMOI, PERRL  Neck: SUPPLE, NO JVD  Cardiovascular: RRR, S1S2  Pulmonary: CTAB, NO SOB  Abdomen: SOFT, NTTP, ND, +BS  Extremities: +2/4 PULSES DISTAL, NO SWELLING  Neuro / Psych: NO NUMBNESS OR TINGLING, MENTATION AT BASELINE    PERTINENT TEST /EXAMS      I have reviewed all available laboratory results.     MEDICATIONS CURRENT     ondansetron (ZOFRAN) injection 4 mg Once   hydrOXYzine (ATARAX) tablet 10 mg TID   atenolol (TENORMIN) tablet 25 mg Daily   tiZANidine (ZANAFLEX) tablet 4 mg TID   gabapentin (NEURONTIN) tablet 800 mg TID   ARIPiprazole (ABILIFY) tablet 15 mg Daily   pantoprazole (PROTONIX) tablet 40 mg QAM AC   pravastatin (PRAVACHOL) tablet 40 mg Nightly   albuterol sulfate  (90 Base) MCG/ACT inhaler 1 puff Q6H PRN   traZODone (DESYREL) tablet 100 mg Nightly   mometasone-formoterol (DULERA) 100-5 MCG/ACT inhaler 2 puff BID   rOPINIRole (REQUIP) tablet 0.5 mg Daily   metFORMIN (GLUCOPHAGE) tablet 500 mg BID WC   furosemide (LASIX) tablet 20 mg BID   hydrochlorothiazide (MICROZIDE) capsule 12.5 mg QAM   dicyclomine (BENTYL) capsule 20 mg BID PRN   potassium chloride (KLOR-CON M) extended release tablet 20 mEq Daily   sodium chloride flush 0.9 % injection 10 mL 2 times per day   sodium chloride flush 0.9 % injection 10 mL PRN   acetaminophen (TYLENOL) tablet 650 mg Q4H PRN   0.9 % sodium chloride infusion Continuous   ondansetron (ZOFRAN) injection 4 mg Q8H PRN   buprenorphine-naloxone (SUBOXONE) 2-0.5 MG SL tablet 1.5 tablet Daily   fentaNYL (SUBLIMAZE) injection 25 mcg Q2H PRN       All medication charted and reviewed. CONSULTS      IP CONSULT TO IV TEAM  IP CONSULT TO IV TEAM    ASSESSMENT/PLAN       Malachi Johnson is a 60 y.o. female who presents with    1.) Acute onset of gastroenteritis manifesting as lower abdominal discomfort of uncertain etiology. No evidence of surgical abdomen or bacterial cause. -Received IV fluids, IV antiemetics, and IV analgesia overnight  -Symptoms much improved this morning, patient eating and drinking without difficulty or nausea/vomiting  -Patient requesting discharge      · Continue home medications  · Monitor vitals, labs, and imaging  · DISPO: Discharge    --  Tristin Mckeon  Emergency Medicine Resident Physician     This dictation was generated by voice recognition computer software. Although all attempts are made to edit the dictation for accuracy, there may be errors in the transcription that are not intended.

## 2019-06-12 NOTE — PROGRESS NOTES
[address] [facility]  CDU / OBSERVATION eNCOUnter  Attending NOte       I performed a history and physical examination of the patient and discussed management with the resident. I reviewed the residents note and agree with the documented findings and plan of care. Any areas of disagreement are noted on the chart. I was personally present for the key portions of any procedures. I have documented in the chart those procedures where I was not present during the key portions. I have reviewed the nurses notes. I agree with the chief complaint, past medical history, past surgical history, allergies, medications, social and family history as documented unless otherwise noted below. The Family history, social history, and ROS are effectively unchanged since admission unless noted elsewhere in the chart. Some ongoing symptomatology. No vomiting. Handling p.o. Will advance diet. Patient with diarrhea which is nonbloody and no mucus. Normal lactate. Pending C. difficile. Patient somewhat improved but still has symptoms. Will have to improve symptoms a bit further prior to discharge. Patient without blood loss or mucus. CT scan shows mild enteritis. Will reevaluate later this morning. Possible discharge this afternoon.     Shy Borden MD  Attending Emergency  Physician

## 2019-06-13 ENCOUNTER — TELEPHONE (OUTPATIENT)
Dept: INTERNAL MEDICINE | Age: 64
End: 2019-06-13

## 2019-06-13 LAB
C DIFF AG + TOXIN: NEGATIVE
SPECIMEN DESCRIPTION: NORMAL

## 2019-06-13 NOTE — TELEPHONE ENCOUNTER
Patient said she gave a stool specimen because she has been having diarrhea times 4 days.   Results should be in computer    Please advise

## 2019-06-13 NOTE — DISCHARGE SUMMARY
CDU Discharge Summary        Patient:  Jenifer Manning  YOB: 1955    MRN: [id number]   Acct: [de-identified]    Primary Care Physician: Antonella Matson MD    Admit date:  6/11/2019  7:53 AM  Discharge date: 6/12/2019  5:35 PM     Discharge Diagnoses:     1.) Acute onset of gastroenteritis manifesting as lower abdominal discomfort of uncertain etiology. No evidence of surgical abdomen or bacterial cause.     -Received IV fluids, IV antiemetics, and IV analgesia overnight  -Symptoms much improved this morning, patient eating and drinking without difficulty or nausea/vomiting  -Patient did have multiple episodes of watery diarrhea, C. difficile sample was obtained, diarrhea improved with Imodium and patient was discharged with instructions to follow-up with primary care physician  -Patient requesting discharge      Follow-up:  Call today/tomorrow for a follow up appointment with Antonella Matson MD , or return to the Emergency Room with worsening symptoms    Stressed to patient the importance of following up with primary care doctor for further workup/management of symptoms. Pt verbalizes understanding and agrees with plan.     Discharge Medication Changes:       Medication List      START taking these medications    loperamide 2 MG capsule  Commonly known as:  RA ANTI-DIARRHEAL  Take 1 capsule by mouth 4 times daily as needed for Diarrhea        CHANGE how you take these medications    docusate sodium 100 MG capsule  Commonly known as:  DOCQLACE  Take 1 capsule by mouth 2 times daily  What changed:  when to take this     furosemide 20 MG tablet  Commonly known as:  LASIX  Take 1 tablet by mouth 2 times daily  What changed:  when to take this     metFORMIN 500 MG tablet  Commonly known as:  GLUCOPHAGE  Take 1 tablet by mouth 2 times daily (with meals)  What changed:  when to take this        CONTINUE taking these medications    albuterol sulfate  (90 Base) MCG/ACT inhaler  Commonly known as: VENTOLIN HFA  Inhale 1 puff into the lungs every 6 hours as needed for Wheezing     Alcohol Swabs 70 % Pads  Dx: DM-2 use 2-3 times daily     ARIPiprazole 15 MG tablet  Commonly known as:  ABILIFY  Take 1 tablet by mouth daily     atenolol 25 MG tablet  Commonly known as:  TENORMIN  take 1 tablet by mouth once daily     blood glucose test strips strip  Commonly known as:  CONTOUR NEXT TEST  Dx: Use 2 times daily. budesonide-formoterol 160-4.5 MCG/ACT Aero  Commonly known as:  SYMBICORT  Inhale 2 puffs into the lungs 2 times daily Rinse mouth after use. dicyclomine 20 MG tablet  Commonly known as:  BENTYL  Take 1 tablet by mouth 2 times daily as needed (abdominal pain)     gabapentin 800 MG tablet  Commonly known as:  NEURONTIN  Take 1 tablet by mouth 3 times daily for 59 days.  DC Lyrica     hydrochlorothiazide 12.5 MG capsule  Commonly known as:  MICROZIDE  Take 1 capsule by mouth every morning     hydrOXYzine 10 MG tablet  Commonly known as:  ATARAX     ibuprofen 800 MG tablet  Commonly known as:  ADVIL;MOTRIN  Take 1 tablet by mouth every 8 hours as needed for Pain     ipratropium-albuterol 0.5-2.5 (3) MG/3ML Soln nebulizer solution  Commonly known as:  DUONEB  Inhale 3 mLs into the lungs three times daily     linaclotide 145 MCG capsule  Commonly known as:  LINZESS  Take 1 capsule by mouth every morning (before breakfast)     magnesium citrate solution  Take 296 mLs by mouth once for 1 dose     pantoprazole 40 MG tablet  Commonly known as:  PROTONIX  Take 1 tablet by mouth daily Stop Omeprazole     potassium chloride 20 MEQ extended release tablet  Commonly known as:  KLOR-CON M  Take 1 tablet by mouth daily     pravastatin 40 MG tablet  Commonly known as:  PRAVACHOL  Take 1 tablet by mouth every evening     rOPINIRole 0.5 MG tablet  Commonly known as:  REQUIP  Take 1 tablet by mouth daily     SUBOXONE 4-1 MG Film SL film  Generic drug:  buprenorphine-naloxone     tiZANidine 4 MG tablet  Commonly known as:   ZANAFLEX  Take 1 tablet by mouth 3 times daily     traZODone 100 MG tablet  Commonly known as:  DESYREL  Take 1 tablet by mouth nightly     Umeclidinium Bromide 62.5 MCG/INH Aepb  Commonly known as:  INCRUSE ELLIPTA  Inhale 1 puff into the lungs daily           Where to Get Your Medications      You can get these medications from any pharmacy    Bring a paper prescription for each of these medications  · loperamide 2 MG capsule         Diet:  No diet orders on file, advance as tolerated     Activity:  As tolerated    Consultants: IP CONSULT TO IV TEAM  IP CONSULT TO IV TEAM    Procedures:  Not indicated     Diagnostic Test:   Results for orders placed or performed during the hospital encounter of 06/11/19   CBC WITH AUTO DIFFERENTIAL   Result Value Ref Range    WBC 6.0 3.5 - 11.3 k/uL    RBC 5.01 3.95 - 5.11 m/uL    Hemoglobin 14.9 11.9 - 15.1 g/dL    Hematocrit 46.1 36.3 - 47.1 %    MCV 92.0 82.6 - 102.9 fL    MCH 29.7 25.2 - 33.5 pg    MCHC 32.3 28.4 - 34.8 g/dL    RDW 13.6 11.8 - 14.4 %    Platelets See Reflexed IPF Result 138 - 453 k/uL    MPV NOT REPORTED 8.1 - 13.5 fL    NRBC Automated 0.0 0.0 per 100 WBC    Differential Type NOT REPORTED     WBC Morphology NOT REPORTED     RBC Morphology NOT REPORTED     Platelet Estimate NOT REPORTED     Seg Neutrophils 67 (H) 36 - 65 %    Lymphocytes 25 24 - 43 %    Monocytes 7 3 - 12 %    Eosinophils % 1 1 - 4 %    Basophils 0 0 - 2 %    Immature Granulocytes 0 0 %    Segs Absolute 3.99 1.50 - 8.10 k/uL    Absolute Lymph # 1.49 1.10 - 3.70 k/uL    Absolute Mono # 0.43 0.10 - 1.20 k/uL    Absolute Eos # 0.03 0.00 - 0.44 k/uL    Basophils # <0.03 0.00 - 0.20 k/uL    Absolute Immature Granulocyte <0.03 0.00 - 0.30 k/uL   BASIC METABOLIC PANEL   Result Value Ref Range    Glucose 149 (H) 70 - 99 mg/dL    BUN 11 8 - 23 mg/dL    CREATININE 0.49 (L) 0.50 - 0.90 mg/dL    Bun/Cre Ratio NOT REPORTED 9 - 20    Calcium 9.5 8.6 - 10.4 mg/dL    Sodium 141 135 - 144 mmol/L Potassium 3.7 3.7 - 5.3 mmol/L    Chloride 104 98 - 107 mmol/L    CO2 26 20 - 31 mmol/L    Anion Gap 11 9 - 17 mmol/L    GFR Non-African American >60 >60 mL/min    GFR African American >60 >60 mL/min    GFR Comment          GFR Staging NOT REPORTED    Hepatic Function Panel   Result Value Ref Range    Alb 4.2 3.5 - 5.2 g/dL    Alkaline Phosphatase 72 35 - 104 U/L    ALT 9 5 - 33 U/L    AST 12 <32 U/L    Total Bilirubin 0.29 (L) 0.3 - 1.2 mg/dL    Bilirubin, Direct 0.10 <0.31 mg/dL    Bilirubin, Indirect 0.19 0.00 - 1.00 mg/dL    Total Protein 7.4 6.4 - 8.3 g/dL    Globulin NOT REPORTED 1.5 - 3.8 g/dL    Albumin/Globulin Ratio 1.3 1.0 - 2.5   LIPASE   Result Value Ref Range    Lipase 21 13 - 60 U/L   Immature Platelet Fraction   Result Value Ref Range    Platelet, Immature Fraction 3.6 1.1 - 10.3 %    Platelet, Fluorescence 134 (L) 138 - 453 k/uL   LACTIC ACID, WHOLE BLOOD   Result Value Ref Range    Lactic Acid, Whole Blood 1.2 0.7 - 2.1 mmol/L   POC Glucose Fingerstick   Result Value Ref Range    POC Glucose 112 (H) 65 - 105 mg/dL   POC Glucose Fingerstick   Result Value Ref Range    POC Glucose 152 (H) 65 - 105 mg/dL   POC Glucose Fingerstick   Result Value Ref Range    POC Glucose 116 (H) 65 - 105 mg/dL   POC Glucose Fingerstick   Result Value Ref Range    POC Glucose 129 (H) 65 - 105 mg/dL   POC Glucose Fingerstick   Result Value Ref Range    POC Glucose 116 (H) 65 - 105 mg/dL   EKG 12 Lead   Result Value Ref Range    Ventricular Rate 72 BPM    Atrial Rate 72 BPM    P-R Interval 234 ms    QRS Duration 96 ms    Q-T Interval 428 ms    QTc Calculation (Bazett) 468 ms    P Axis 61 degrees    R Axis -48 degrees    T Axis 12 degrees     Ct Abdomen Pelvis Wo Contrast Additional Contrast? None    Result Date: 6/11/2019  EXAMINATION: CT OF THE ABDOMEN AND PELVIS WITHOUT CONTRAST 6/11/2019 8:53 am TECHNIQUE: CT of the abdomen and pelvis was performed without the administration of intravenous contrast. Multiplanar transcription that are not intended.

## 2019-06-13 NOTE — TELEPHONE ENCOUNTER
CT of the abdomen shows mild case of inflammation in the small bowel. No antibiotics is indicated at this time. Would just have to watch it. She should call us back if she starts to have blood in her stools or watery diarrhea.

## 2019-07-04 DIAGNOSIS — J41.0 SIMPLE CHRONIC BRONCHITIS (HCC): ICD-10-CM

## 2019-07-04 DIAGNOSIS — K59.01 SLOW TRANSIT CONSTIPATION: ICD-10-CM

## 2019-07-05 RX ORDER — ALBUTEROL SULFATE 90 UG/1
AEROSOL, METERED RESPIRATORY (INHALATION)
Qty: 54 G | Refills: 0 | Status: SHIPPED | OUTPATIENT
Start: 2019-07-05 | End: 2019-09-17 | Stop reason: ALTCHOICE

## 2019-07-05 NOTE — TELEPHONE ENCOUNTER
Haylie Nelson MD Resp Spec [facility]            Patient Active Problem List:     Essential hypertension     Pure hypercholesterolemia     Moderate episode of recurrent major depressive disorder ([facility] [address]     History of heroin use     Hep C w/o coma, chronic ([facility] [address] completed tx 2016     GERD (gastroesophageal reflux disease)     COPD (chronic obstructive pulmonary disease) (HCC)     Tobacco abuse     Chronic diastolic congestive heart failure (HCC)     Diverticulosis     Hyperplastic polyp of intestine     Diabetic polyneuropathy associated with type 2 diabetes mellitus (HCC)     Bilateral chronic knee pain     Chronic respiratory failure with hypoxia (HCC)     Type 2 diabetes mellitus with complication (HCC)     Primary insomnia     Cigarette nicotine dependence without complication     Chronic bilateral low back pain with right-sided sciatica     Irritable bowel syndrome with both constipation and diarrhea     Enteritis

## 2019-08-08 DIAGNOSIS — I10 ESSENTIAL HYPERTENSION: ICD-10-CM

## 2019-08-08 NOTE — TELEPHONE ENCOUNTER
Refill request for Atenolol. If appropriate please send medication(s) to patients pharmacy. Next appt: 09[facility]17/2019[facility]    Health Maintenance   Topic Date Due    Hepatitis A vaccine (1 of 2 - Risk 2-dose series) 06/05/1956    Hepatitis B Vaccine (1 of 3 - Risk 3-dose series) 06/05/1976    Shingles Vaccine (1 of 2) 06/05/2007    Diabetic microalbuminuria test  10/31/2018    Diabetic retinal exam  01/01/2019    Diabetic foot exam  05/09/2019    HIV screen  01/05/2029 (Originally 6/5/1972)    Flu vaccine (1) 09/01/2019    Breast cancer screen  11/01/2019    A1C test (Diabetic or Prediabetic)  04/23/2020    Lipid screen  04/23/2020    Potassium monitoring  06/11/2020    Creatinine monitoring  06/11/2020    Colon cancer screen colonoscopy  03/14/2022    DTaP/Tdap/Td vaccine (2 - Td) 09/30/2026    Pneumococcal 0-64 years Vaccine  Completed       Hemoglobin A1C (%)   Date Value   04/23/2019 6.1 (H)   11/19/2018 6.1   05/07/2018 6.4             ( goal A1C is < 7)   Microalb/Crt.  Ratio (mcg/mg creat)   Date Value   10/31/2017 CANNOT BE CALCULATED     LDL Cholesterol (mg/dL)   Date Value   04/23/2019 88       (goal LDL is <100)   AST (U/L)   Date Value   06/11/2019 12     ALT (U/L)   Date Value   06/11/2019 9     BUN (mg/dL)   Date Value   06/11/2019 11     BP Readings from Last 3 Encounters:   06/12/19 122/66   05/29/19 122/80   04/10/19 139/85          (goal 120/80)          Patient Active Problem List:     Essential hypertension     Pure hypercholesterolemia     Moderate episode of recurrent major depressive disorder ([address]     History of heroin use     Hep C w/o coma, chronic ([address] completed tx 2016     GERD (gastroesophageal reflux disease)     COPD (chronic obstructive pulmonary disease) (HCC)     Tobacco abuse     Chronic diastolic congestive heart failure (HCC)     Diverticulosis     Hyperplastic polyp of intestine     Diabetic polyneuropathy associated with type 2 diabetes mellitus ([address]

## 2019-08-09 DIAGNOSIS — F51.01 PRIMARY INSOMNIA: ICD-10-CM

## 2019-08-09 RX ORDER — ATENOLOL 25 MG/1
TABLET ORAL
Qty: 30 TABLET | Refills: 2 | Status: SHIPPED | OUTPATIENT
Start: 2019-08-09 | End: 2019-10-29 | Stop reason: SDUPTHER

## 2019-08-09 RX ORDER — TRAZODONE HYDROCHLORIDE 100 MG/1
100 TABLET ORAL NIGHTLY
Qty: 180 TABLET | Refills: 3 | Status: SHIPPED | OUTPATIENT
Start: 2019-08-09 | End: 2019-09-17

## 2019-08-12 DIAGNOSIS — M54.41 CHRONIC BILATERAL LOW BACK PAIN WITH RIGHT-SIDED SCIATICA: ICD-10-CM

## 2019-08-12 DIAGNOSIS — G89.29 CHRONIC BILATERAL LOW BACK PAIN WITH RIGHT-SIDED SCIATICA: ICD-10-CM

## 2019-08-12 DIAGNOSIS — E11.40 TYPE 2 DIABETES MELLITUS WITH DIABETIC NEUROPATHY, WITHOUT LONG-TERM CURRENT USE OF INSULIN (HCC): ICD-10-CM

## 2019-08-12 NOTE — TELEPHONE ENCOUNTER
Refill request for Tizanidine & Gabapentin. If appropriate please send medication(s) to patients pharmacy. Next appt: 09/17/2019[facility]    Health Maintenance   Topic Date Due    Hepatitis A vaccine (1 of 2 - Risk 2-dose series) 06/05/1956    Hepatitis B Vaccine (1 of 3 - Risk 3-dose series) 06/05/1976    Shingles Vaccine (1 of 2) 06/05/2007    Diabetic microalbuminuria test  10/31/2018    Diabetic retinal exam  01/01/2019    Diabetic foot exam  05/09/2019    HIV screen  01/05/2029 (Originally 6/5/1972)    Flu vaccine (1) 09/01/2019    Breast cancer screen  11/01/2019    A1C test (Diabetic or Prediabetic)  04/23/2020    Lipid screen  04/23/2020    Potassium monitoring  06/11/2020    Creatinine monitoring  06/11/2020    Colon cancer screen colonoscopy  03/14/2022    DTaP/Tdap/Td vaccine (2 - Td) 09/30/2026    Pneumococcal 0-64 years Vaccine  Completed       Hemoglobin A1C (%)   Date Value   04/23/2019 6.1 (H)   11/19/2018 6.1   05/07/2018 6.4             ( goal A1C is < 7)   Microalb/Crt.  Ratio (mcg/mg creat)   Date Value   10/31/2017 CANNOT BE CALCULATED     LDL Cholesterol (mg/dL)   Date Value   04/23/2019 88       (goal LDL is <100)   AST (U/L)   Date Value   06/11/2019 12     ALT (U/L)   Date Value   06/11/2019 9     BUN (mg/dL)   Date Value   06/11/2019 11     BP Readings from Last 3 Encounters:   06/12/19 122/66   05/29/19 122/80   04/10/19 139/85          (goal 120/80)          Patient Active Problem List:     Essential hypertension     Pure hypercholesterolemia     Moderate episode of recurrent major depressive disorder ([address]     History of heroin use     Hep C w/o coma, chronic ([address] completed tx 2016     GERD (gastroesophageal reflux disease)     COPD (chronic obstructive pulmonary disease) (HCC)     Tobacco abuse     Chronic diastolic congestive heart failure (HCC)     Diverticulosis     Hyperplastic polyp of intestine     Diabetic polyneuropathy associated with type 2 diabetes

## 2019-08-14 RX ORDER — GABAPENTIN 800 MG/1
TABLET ORAL
Qty: 90 TABLET | Refills: 2 | Status: SHIPPED | OUTPATIENT
Start: 2019-08-14 | End: 2019-09-17 | Stop reason: SDUPTHER

## 2019-08-14 RX ORDER — TIZANIDINE 4 MG/1
TABLET ORAL
Qty: 90 TABLET | Refills: 2 | Status: SHIPPED | OUTPATIENT
Start: 2019-08-14 | End: 2019-09-17

## 2019-08-21 DIAGNOSIS — I10 ESSENTIAL HYPERTENSION: ICD-10-CM

## 2019-08-21 RX ORDER — HYDROCHLOROTHIAZIDE 12.5 MG/1
12.5 CAPSULE, GELATIN COATED ORAL EVERY MORNING
Qty: 90 CAPSULE | Refills: 0 | Status: SHIPPED | OUTPATIENT
Start: 2019-08-21 | End: 2019-09-17 | Stop reason: SDUPTHER

## 2019-08-21 NOTE — TELEPHONE ENCOUNTER
Escribe request for hydrochlorothiazide    Next Visit Date:  Future Appointments   Date Time Provider Terry Gonzalezi   9/17/2019  1:00 PM Casper Melendez MD [facility]   10/2/2019  9:30 AM Oliver Estevez MD [facility]   10/10/2019 11:00 AM Kelly Damon MD [facility] [address] Maintenance   Topic Date Due    Hepatitis A vaccine (1 of 2 - Risk 2-dose series) [birth year]/1958    Hepatitis B Vaccine (1 of 3 - Risk 3-dose series) 06/05/1976    Shingles Vaccine (1 of 2) 06/05/2007    Diabetic microalbuminuria test  10/31/2018    Diabetic retinal exam  01/01/2019    Diabetic foot exam  05/09/2019    HIV screen  01/05/2029 (Originally 6/5/1970)    Flu vaccine (1) 09/01/2019    Breast cancer screen  11/01/2019    A1C test (Diabetic or Prediabetic)  04/23/2020    Lipid screen  04/23/2020    Potassium monitoring  06/11/2020    Creatinine monitoring  06/11/2020    Colon cancer screen colonoscopy  03/14/2022    DTaP/Tdap/Td vaccine (2 - Td) 09/30/2026    Pneumococcal 0-64 years Vaccine  Completed             (applicable per patient's age: Cancer Screenings, Depression Screening, Fall Risk Screening, Immunizations)    Hemoglobin A1C (%)   Date Value   04/23/2019 6.1 (H)   11/19/2018 6.1   05/07/2018 6.4     Microalb/Crt.  Ratio (mcg/mg creat)   Date Value   10/31/2017 CANNOT BE CALCULATED     LDL Cholesterol (mg/dL)   Date Value   04/23/2019 88     AST (U/L)   Date Value   06/11/2019 12     ALT (U/L)   Date Value   06/11/2019 9     BUN (mg/dL)   Date Value   06/11/2019 11      (goal A1C is < 7)   (goal LDL is <100) need 30-50% reduction from baseline     BP Readings from Last 3 Encounters:   06/12/19 122/66   05/29/19 122/80   04/10/19 139/85    (goal /80)      All Future Testing planned in [facility]:  Lab Frequency Next Occurrence   Baseline Diagnostic Sleep Study Once 09/17/2018   Sleep Study with PAP Titration Once 09/17/2018   EGD Once 01/22/2019   EGD Once 06/06/2019

## 2019-08-28 DIAGNOSIS — I50.32 CHRONIC DIASTOLIC CONGESTIVE HEART FAILURE (HCC): ICD-10-CM

## 2019-08-29 RX ORDER — FUROSEMIDE 20 MG/1
TABLET ORAL
Qty: 60 TABLET | Refills: 2 | Status: SHIPPED | OUTPATIENT
Start: 2019-08-29 | End: 2019-09-17 | Stop reason: SDUPTHER

## 2019-09-04 ENCOUNTER — APPOINTMENT (OUTPATIENT)
Dept: GENERAL RADIOLOGY | Age: 64
End: 2019-09-04
Payer: COMMERCIAL

## 2019-09-04 ENCOUNTER — HOSPITAL ENCOUNTER (OUTPATIENT)
Age: 64
Setting detail: OBSERVATION
Discharge: HOME | End: 2019-09-05
Attending: EMERGENCY MEDICINE | Admitting: EMERGENCY MEDICINE
Payer: COMMERCIAL

## 2019-09-04 DIAGNOSIS — R07.9 CHEST PAIN, UNSPECIFIED TYPE: Primary | ICD-10-CM

## 2019-09-04 LAB
ABSOLUTE EOS #: 0.03 K/UL (ref 0–0.44)
ABSOLUTE IMMATURE GRANULOCYTE: <0.03 K/UL (ref 0–0.3)
ABSOLUTE LYMPH #: 2.11 K/UL (ref 1.1–3.7)
ABSOLUTE MONO #: 0.67 K/UL (ref 0.1–1.2)
ANION GAP SERPL CALCULATED.3IONS-SCNC: 14 MMOL/L (ref 9–17)
BASOPHILS # BLD: 1 % (ref 0–2)
BASOPHILS ABSOLUTE: 0.04 K/UL (ref 0–0.2)
BNP INTERPRETATION: NORMAL
BUN BLDV-MCNC: 11 MG/DL (ref 8–23)
BUN/CREAT BLD: ABNORMAL (ref 9–20)
CALCIUM SERPL-MCNC: 9.4 MG/DL (ref 8.6–10.4)
CHLORIDE BLD-SCNC: 103 MMOL/L (ref 98–107)
CO2: 21 MMOL/L (ref 20–31)
CREAT SERPL-MCNC: 0.5 MG/DL (ref 0.5–0.9)
DIFFERENTIAL TYPE: ABNORMAL
EOSINOPHILS RELATIVE PERCENT: 0 % (ref 1–4)
GFR AFRICAN AMERICAN: >60 ML/MIN
GFR NON-AFRICAN AMERICAN: >60 ML/MIN
GFR SERPL CREATININE-BSD FRML MDRD: ABNORMAL ML/MIN/{1.73_M2}
GFR SERPL CREATININE-BSD FRML MDRD: ABNORMAL ML/MIN/{1.73_M2}
GLUCOSE BLD-MCNC: 192 MG/DL (ref 70–99)
HCT VFR BLD CALC: 42.6 % (ref 36.3–47.1)
HEMOGLOBIN: 13.4 G/DL (ref 11.9–15.1)
IMMATURE GRANULOCYTES: 0 %
LYMPHOCYTES # BLD: 28 % (ref 24–43)
MCH RBC QN AUTO: 29.3 PG (ref 25.2–33.5)
MCHC RBC AUTO-ENTMCNC: 31.5 G/DL (ref 28.4–34.8)
MCV RBC AUTO: 93.2 FL (ref 82.6–102.9)
MONOCYTES # BLD: 9 % (ref 3–12)
NRBC AUTOMATED: 0 PER 100 WBC
PDW BLD-RTO: 13.5 % (ref 11.8–14.4)
PLATELET # BLD: 177 K/UL (ref 138–453)
PLATELET ESTIMATE: ABNORMAL
PMV BLD AUTO: 10.4 FL (ref 8.1–13.5)
POTASSIUM SERPL-SCNC: 4.1 MMOL/L (ref 3.7–5.3)
PRO-BNP: 80 PG/ML
RBC # BLD: 4.57 M/UL (ref 3.95–5.11)
RBC # BLD: ABNORMAL 10*6/UL
SEG NEUTROPHILS: 62 % (ref 36–65)
SEGMENTED NEUTROPHILS ABSOLUTE COUNT: 4.81 K/UL (ref 1.5–8.1)
SODIUM BLD-SCNC: 138 MMOL/L (ref 135–144)
TROPONIN INTERP: NORMAL
TROPONIN INTERP: NORMAL
TROPONIN T: NORMAL NG/ML
TROPONIN T: NORMAL NG/ML
TROPONIN, HIGH SENSITIVITY: <6 NG/L (ref 0–14)
TROPONIN, HIGH SENSITIVITY: <6 NG/L (ref 0–14)
WBC # BLD: 7.7 K/UL (ref 3.5–11.3)
WBC # BLD: ABNORMAL 10*3/UL

## 2019-09-04 PROCEDURE — 71046 X-RAY EXAM CHEST 2 VIEWS: CPT

## 2019-09-04 PROCEDURE — 6360000002 HC RX W HCPCS: Performed by: STUDENT IN AN ORGANIZED HEALTH CARE EDUCATION/TRAINING PROGRAM

## 2019-09-04 PROCEDURE — 99285 EMERGENCY DEPT VISIT HI MDM: CPT

## 2019-09-04 PROCEDURE — 36415 COLL VENOUS BLD VENIPUNCTURE: CPT

## 2019-09-04 PROCEDURE — 96374 THER/PROPH/DIAG INJ IV PUSH: CPT

## 2019-09-04 PROCEDURE — 85025 COMPLETE CBC W/AUTO DIFF WBC: CPT

## 2019-09-04 PROCEDURE — 84484 ASSAY OF TROPONIN QUANT: CPT

## 2019-09-04 PROCEDURE — 93005 ELECTROCARDIOGRAM TRACING: CPT | Performed by: STUDENT IN AN ORGANIZED HEALTH CARE EDUCATION/TRAINING PROGRAM

## 2019-09-04 PROCEDURE — 94640 AIRWAY INHALATION TREATMENT: CPT

## 2019-09-04 PROCEDURE — 6370000000 HC RX 637 (ALT 250 FOR IP): Performed by: STUDENT IN AN ORGANIZED HEALTH CARE EDUCATION/TRAINING PROGRAM

## 2019-09-04 PROCEDURE — 83880 ASSAY OF NATRIURETIC PEPTIDE: CPT

## 2019-09-04 PROCEDURE — 80048 BASIC METABOLIC PNL TOTAL CA: CPT

## 2019-09-04 RX ORDER — KETOROLAC TROMETHAMINE 15 MG/ML
15 INJECTION, SOLUTION INTRAMUSCULAR; INTRAVENOUS ONCE
Status: COMPLETED | OUTPATIENT
Start: 2019-09-04 | End: 2019-09-04

## 2019-09-04 RX ORDER — ALBUTEROL SULFATE 90 UG/1
2 AEROSOL, METERED RESPIRATORY (INHALATION)
Status: DISCONTINUED | OUTPATIENT
Start: 2019-09-04 | End: 2019-09-05 | Stop reason: HOSPADM

## 2019-09-04 RX ORDER — ALBUTEROL SULFATE 2.5 MG/3ML
5 SOLUTION RESPIRATORY (INHALATION)
Status: DISCONTINUED | OUTPATIENT
Start: 2019-09-04 | End: 2019-09-05 | Stop reason: HOSPADM

## 2019-09-04 RX ORDER — IPRATROPIUM BROMIDE AND ALBUTEROL SULFATE 2.5; .5 MG/3ML; MG/3ML
1 SOLUTION RESPIRATORY (INHALATION)
Status: DISCONTINUED | OUTPATIENT
Start: 2019-09-05 | End: 2019-09-05 | Stop reason: HOSPADM

## 2019-09-04 RX ORDER — NITROGLYCERIN 0.4 MG/1
0.4 TABLET SUBLINGUAL EVERY 5 MIN PRN
Status: DISCONTINUED | OUTPATIENT
Start: 2019-09-04 | End: 2019-09-05 | Stop reason: HOSPADM

## 2019-09-04 RX ORDER — ASPIRIN 81 MG/1
324 TABLET, CHEWABLE ORAL ONCE
Status: COMPLETED | OUTPATIENT
Start: 2019-09-04 | End: 2019-09-04

## 2019-09-04 RX ADMIN — ALBUTEROL SULFATE 5 MG: 5 SOLUTION RESPIRATORY (INHALATION) at 23:34

## 2019-09-04 RX ADMIN — IPRATROPIUM BROMIDE AND ALBUTEROL SULFATE 1 AMPULE: .5; 3 SOLUTION RESPIRATORY (INHALATION) at 23:34

## 2019-09-04 RX ADMIN — KETOROLAC TROMETHAMINE 15 MG: 15 INJECTION, SOLUTION INTRAMUSCULAR; INTRAVENOUS at 23:22

## 2019-09-04 RX ADMIN — ASPIRIN 81 MG 324 MG: 81 TABLET ORAL at 22:18

## 2019-09-04 ASSESSMENT — PAIN DESCRIPTION - DESCRIPTORS: DESCRIPTORS: SHARP

## 2019-09-04 ASSESSMENT — PAIN DESCRIPTION - LOCATION
LOCATION: CHEST
LOCATION: CHEST

## 2019-09-04 ASSESSMENT — PAIN DESCRIPTION - FREQUENCY: FREQUENCY: CONTINUOUS

## 2019-09-04 ASSESSMENT — PAIN SCALES - GENERAL
PAINLEVEL_OUTOF10: 6
PAINLEVEL_OUTOF10: 10

## 2019-09-04 ASSESSMENT — PAIN DESCRIPTION - PAIN TYPE
TYPE: ACUTE PAIN
TYPE: ACUTE PAIN

## 2019-09-04 ASSESSMENT — HEART SCORE: ECG: 1

## 2019-09-05 VITALS
BODY MASS INDEX: 28.44 KG/M2 | RESPIRATION RATE: 24 BRPM | TEMPERATURE: 98.2 F | OXYGEN SATURATION: 93 % | SYSTOLIC BLOOD PRESSURE: 130 MMHG | HEIGHT: 72 IN | HEART RATE: 72 BPM | DIASTOLIC BLOOD PRESSURE: 67 MMHG | WEIGHT: 210 LBS

## 2019-09-05 LAB
EKG ATRIAL RATE: 72 BPM
EKG ATRIAL RATE: 74 BPM
EKG ATRIAL RATE: 77 BPM
EKG P AXIS: 11 DEGREES
EKG P AXIS: 20 DEGREES
EKG P AXIS: 21 DEGREES
EKG P-R INTERVAL: 238 MS
EKG P-R INTERVAL: 254 MS
EKG P-R INTERVAL: 258 MS
EKG Q-T INTERVAL: 410 MS
EKG Q-T INTERVAL: 410 MS
EKG Q-T INTERVAL: 422 MS
EKG QRS DURATION: 92 MS
EKG QRS DURATION: 92 MS
EKG QRS DURATION: 96 MS
EKG QTC CALCULATION (BAZETT): 455 MS
EKG QTC CALCULATION (BAZETT): 462 MS
EKG QTC CALCULATION (BAZETT): 463 MS
EKG R AXIS: -11 DEGREES
EKG R AXIS: -15 DEGREES
EKG R AXIS: 0 DEGREES
EKG T AXIS: 52 DEGREES
EKG T AXIS: 54 DEGREES
EKG T AXIS: 64 DEGREES
EKG VENTRICULAR RATE: 72 BPM
EKG VENTRICULAR RATE: 74 BPM
EKG VENTRICULAR RATE: 77 BPM

## 2019-09-05 PROCEDURE — 93005 ELECTROCARDIOGRAM TRACING: CPT | Performed by: INTERNAL MEDICINE

## 2019-09-05 PROCEDURE — G0378 HOSPITAL OBSERVATION PER HR: HCPCS

## 2019-09-05 PROCEDURE — 94761 N-INVAS EAR/PLS OXIMETRY MLT: CPT

## 2019-09-05 PROCEDURE — 94640 AIRWAY INHALATION TREATMENT: CPT

## 2019-09-05 PROCEDURE — 6370000000 HC RX 637 (ALT 250 FOR IP): Performed by: STUDENT IN AN ORGANIZED HEALTH CARE EDUCATION/TRAINING PROGRAM

## 2019-09-05 PROCEDURE — 93010 ELECTROCARDIOGRAM REPORT: CPT | Performed by: INTERNAL MEDICINE

## 2019-09-05 PROCEDURE — 93005 ELECTROCARDIOGRAM TRACING: CPT | Performed by: EMERGENCY MEDICINE

## 2019-09-05 PROCEDURE — 96376 TX/PRO/DX INJ SAME DRUG ADON: CPT

## 2019-09-05 PROCEDURE — 6360000002 HC RX W HCPCS: Performed by: GENERAL PRACTICE

## 2019-09-05 RX ORDER — FUROSEMIDE 20 MG/1
20 TABLET ORAL 2 TIMES DAILY
Status: DISCONTINUED | OUTPATIENT
Start: 2019-09-05 | End: 2019-09-05 | Stop reason: HOSPADM

## 2019-09-05 RX ORDER — TRAZODONE HYDROCHLORIDE 100 MG/1
100 TABLET ORAL NIGHTLY
Status: DISCONTINUED | OUTPATIENT
Start: 2019-09-05 | End: 2019-09-05 | Stop reason: HOSPADM

## 2019-09-05 RX ORDER — AZITHROMYCIN 250 MG/1
250 TABLET, FILM COATED ORAL SEE ADMIN INSTRUCTIONS
Qty: 6 TABLET | Refills: 0 | Status: SHIPPED | OUTPATIENT
Start: 2019-09-05 | End: 2019-09-10

## 2019-09-05 RX ORDER — HYDROCHLOROTHIAZIDE 12.5 MG/1
12.5 CAPSULE, GELATIN COATED ORAL EVERY MORNING
Status: DISCONTINUED | OUTPATIENT
Start: 2019-09-05 | End: 2019-09-05 | Stop reason: HOSPADM

## 2019-09-05 RX ORDER — ACETAMINOPHEN 325 MG/1
650 TABLET ORAL EVERY 4 HOURS PRN
Status: DISCONTINUED | OUTPATIENT
Start: 2019-09-05 | End: 2019-09-05 | Stop reason: HOSPADM

## 2019-09-05 RX ORDER — GABAPENTIN 800 MG/1
800 TABLET ORAL 2 TIMES DAILY
Status: DISCONTINUED | OUTPATIENT
Start: 2019-09-05 | End: 2019-09-05 | Stop reason: HOSPADM

## 2019-09-05 RX ORDER — PREDNISONE 20 MG/1
20 TABLET ORAL 2 TIMES DAILY
Qty: 10 TABLET | Refills: 0 | Status: SHIPPED | OUTPATIENT
Start: 2019-09-05 | End: 2019-09-10

## 2019-09-05 RX ORDER — PANTOPRAZOLE SODIUM 40 MG/1
40 TABLET, DELAYED RELEASE ORAL DAILY
Status: DISCONTINUED | OUTPATIENT
Start: 2019-09-05 | End: 2019-09-05 | Stop reason: HOSPADM

## 2019-09-05 RX ORDER — ALBUTEROL SULFATE 90 UG/1
1 AEROSOL, METERED RESPIRATORY (INHALATION) EVERY 6 HOURS PRN
Status: DISCONTINUED | OUTPATIENT
Start: 2019-09-05 | End: 2019-09-05 | Stop reason: HOSPADM

## 2019-09-05 RX ORDER — PRAVASTATIN SODIUM 20 MG
40 TABLET ORAL EVERY EVENING
Status: DISCONTINUED | OUTPATIENT
Start: 2019-09-05 | End: 2019-09-05 | Stop reason: HOSPADM

## 2019-09-05 RX ORDER — KETOROLAC TROMETHAMINE 30 MG/ML
15 INJECTION, SOLUTION INTRAMUSCULAR; INTRAVENOUS ONCE
Status: COMPLETED | OUTPATIENT
Start: 2019-09-05 | End: 2019-09-05

## 2019-09-05 RX ORDER — ATENOLOL 25 MG/1
25 TABLET ORAL DAILY
Status: DISCONTINUED | OUTPATIENT
Start: 2019-09-05 | End: 2019-09-05 | Stop reason: HOSPADM

## 2019-09-05 RX ADMIN — IPRATROPIUM BROMIDE AND ALBUTEROL SULFATE 1 AMPULE: .5; 3 SOLUTION RESPIRATORY (INHALATION) at 12:32

## 2019-09-05 RX ADMIN — IPRATROPIUM BROMIDE AND ALBUTEROL SULFATE 1 AMPULE: .5; 3 SOLUTION RESPIRATORY (INHALATION) at 06:21

## 2019-09-05 RX ADMIN — KETOROLAC TROMETHAMINE 15 MG: 30 INJECTION, SOLUTION INTRAMUSCULAR; INTRAVENOUS at 07:57

## 2019-09-05 RX ADMIN — ATENOLOL 25 MG: 25 TABLET ORAL at 10:27

## 2019-09-05 RX ADMIN — MOMETASONE FUROATE AND FORMOTEROL FUMARATE DIHYDRATE 2 PUFF: 200; 5 AEROSOL RESPIRATORY (INHALATION) at 09:41

## 2019-09-05 RX ADMIN — GABAPENTIN 800 MG: 800 TABLET ORAL at 10:27

## 2019-09-05 RX ADMIN — FUROSEMIDE 20 MG: 20 TABLET ORAL at 10:27

## 2019-09-05 RX ADMIN — PANTOPRAZOLE SODIUM 40 MG: 40 TABLET, DELAYED RELEASE ORAL at 10:27

## 2019-09-05 ASSESSMENT — PAIN SCALES - GENERAL
PAINLEVEL_OUTOF10: 0
PAINLEVEL_OUTOF10: 9

## 2019-09-05 ASSESSMENT — ENCOUNTER SYMPTOMS
NAUSEA: 1
COUGH: 1
ABDOMINAL PAIN: 0
SHORTNESS OF BREATH: 1
VOMITING: 1

## 2019-09-05 NOTE — ED NOTES
Pt reports to ED with complaints of chest pain and SOB. PT says that the chest pain is in the center of her chest and radiated to her left arm. She says that pain comes primarily when she coughs. Pt says it all started 2 nights ago when she was using her oxygen. She has a history of COPD and uses her O2 only at night after work because she is tired. She says that lately the O2 has been making her head hurt and sometimes worsens the chest pain. She also says that her fingers have been getting numb. She also has abdominal pain only when palpated. She says that she hasnt had a bowel movement x1 week, but that is normal for her.  She has a pacemaker - left upper chest.      Andreas Olivarez RN  09/04/19 [id number]

## 2019-09-05 NOTE — ED PROVIDER NOTES
activity:     Days per week: Not on file     Minutes per session: Not on file    Stress: Not on file   Relationships    Social connections:     Talks on phone: Not on file     Gets together: Not on file     Attends Oriental orthodox service: Not on file     Active member of club or organization: Not on file     Attends meetings of clubs or organizations: Not on file     Relationship status: Not on file    Intimate partner violence:     Fear of current or ex partner: Not on file     Emotionally abused: Not on file     Physically abused: Not on file     Forced sexual activity: Not on file   Other Topics Concern    Not on file   Social History Narrative    Not on file         Family History   Problem Relation Age of Onset    Cancer Mother         lungs and brain    Stroke Father     Crohn's Disease Sister        Portions of the past medical history, past surgical history, social history, and family history were discussed and reviewed with thepatient/family and is included in HPI if pertinent. ALLERGIES / IMMUNIZATIONS / HOME MEDICATIONS     Allergies: Iv dye [iodides]    IMMUNIZATIONS    Immunization History   Administered Date(s) Administered    Influenza Virus Vaccine 01/22/2014, 10/22/2015, 07/30/2016, 12/29/2017    Influenza, Cristobal Apa, IM, (6 mo and older Fluzone, Flulaval, Fluarix and 3 yrs and older Afluria) 09/17/2018    Pneumococcal Conjugate 13-valent ([id number]) 08/15/2016    Pneumococcal Polysaccharide ([id number]) 10/22/2015, 12/29/2017    Td, unspecified formulation 07/30/2016         Home Medications:  Prior to Admission medications    Medication Sig Start Date End Date Taking?  Authorizing Provider   furosemide (LASIX) 20 MG tablet take 1 tablet by mouth twice a day 8/29/19   Antonella Dumont MD   hydrochlorothiazide (MICROZIDE) 12.5 MG capsule take 1 capsule by mouth every morning 8/21/19   Antonella Dumont MD   tiZANidine (ZANAFLEX) 4 MG tablet take 1 tablet by mouth three times a day 8/14/19 daily  5/29/19   Antonella Dotson MD   ibuprofen (ADVIL;MOTRIN) 800 MG tablet Take 1 tablet by mouth every 8 hours as needed for Pain 5/29/19   Antonella Dotson MD   Umeclidinium Bromide (INCRUSE ELLIPTA) 62.5 MCG/INH AEPB Inhale 1 puff into the lungs daily 5/29/19   Antonella Dotson MD   ipratropium-albuterol (DUONEB) 0.5-2.5 (3) MG/3ML SOLN nebulizer solution Inhale 3 mLs into the lungs three times daily 5/29/19   Antonella Dotson MD   dicyclomine (BENTYL) 20 MG tablet Take 1 tablet by mouth 2 times daily as needed (abdominal pain) 5/29/19   Antonella Dotson MD   potassium chloride (KLOR-CON M) 20 MEQ extended release tablet Take 1 tablet by mouth daily 5/29/19   Antonella Dotson MD   ARIPiprazole (ABILIFY) 15 MG tablet Take 1 tablet by mouth daily 5/24/19   Antonella Dotson MD   hydrOXYzine (ATARAX) 10 MG tablet take 1 tablet by mouth three times a day 9/20/17   Historical Provider, MD   SUBOXONE 4-1 MG FILM SL film DISSOLVE 1 FILM UNDER THE TONGUE DAILY 10/11/17   Historical Provider, MD       PHYSICAL EXAM   (up to 7 for level 4, 8 or more for level 5)      INITIAL VITALS:    height is 6' (1.829 m) and weight is 210 lb (95.3 kg). Her temperature is 97.9 °F (36.6 °C). Her blood pressure is 108/60 and her pulse is 70. Her respiration is 18 and oxygen saturation is 91%. Physical Exam   Constitutional: She is oriented to person, place, and time. She appears well-developed and well-nourished. HENT:   Head: Normocephalic and atraumatic. Mouth/Throat: Oropharynx is clear and moist.   Eyes: Pupils are equal, round, and reactive to light. EOM are normal.   Neck: Normal range of motion. Neck supple. Cardiovascular: Normal rate, regular rhythm, normal heart sounds and intact distal pulses. Pulmonary/Chest: Effort normal. No respiratory distress. Questionable mild rhonchi otherwise clear breath sounds   Abdominal: Soft. Bowel sounds are normal. She exhibits no distension.  There is tenderness (LUQ- she

## 2019-09-05 NOTE — CONSULTS
I was not present during the key portions. I have personally evaluated this patient and have completed at least one if not all key elements of the E/M (history, physical exam, and MDM). Additional findings are as noted. Chest pain only with coughing with reproducible area on palpation. No further cardiac work up indicated as this point in time.   [facility] Cardiology Consult  [phone]

## 2019-09-05 NOTE — PROGRESS NOTES
FEMALE                                  MALE                            FEV1 Predicted Normal Values                        FEV1 Predicted Normal Values          Age                                     Height in Feet and Inches       Age                                     Height in Feet and Inches       4' 11\" 5' 1\" 5' 3\" 5' 5\" 5' 7\" 5' 9\" 5' 11\" 6' 1\"  4' 11\" 5' 1\" 5' 3\" 5' 5\" 5' 7\" 5' 9\" 5' 11\" 6' 1\"   43 - 45 2.49 2.66 2.84 3.03 3.22 3.42 3.62 3.83 42 - 45 2.82 3.03 3.26 3.49 3.72 3.96 4.22 4.47   46 - 49 2.40 2.57 2.76 2.94 3.14 3.33 3.54 3.75 46 - 49 2.70 2.92 3.14 3.37 3.61 3.85 4.10 4.36   50 - 53 2.31 2.48 2.66 2.85 3.04 3.24 3.45 3.66 50 - 53 2.58 2.80 3.02 3.25 3.49 3.73 3.98 4.24   54 - 57 2.21 2.38 2.57 2.75 2.95 3.14 3.35 3.56 54 - 57 2.46 2.67 2.89 3.12 3.36 3.60 3.85 4.11   58 - 61 2.10 2.28 2.46 2.65 2.84 3.04 3.24 3.45 58 - 61 2.32 2.54 2.76 2.99 3.23 3.47 3.72 3.98   62 - 65 1.99 2.17 2.35 2.54 2.73 2.93 3.13 3.34 62 - 65 2.19 2.40 2.62 2.85 3.09 3.33 3.58 3.84   66 - 69 1.88 2.05 2.23 2.42 2.61 2.81 3.02 3.23 66 - 69 2.04 2.26 2.48 2.71 2.95 3.19 3.44 3.70   70+ 1.82 1.99 2.17 2.36 2.55 2.75 2.95 3.16 70+ 1.97 2.19 2.41 2.64 2.87 3.12 3.37 3.62             Predicted Peak Expiratory Flow Rate                                       Height (in)  Female       Height (in) Male           Age 74 93 82 05 52 77 78 74 Age            21 344 357 372 387 [phone] 446  60 62 64 66 68 70 72 74 76   25 337 352 366 381 396 411 426 441 25 447 476 505 533 562 [phone]   30 329 344 359 374 389 404 419 434 30 437 466 494 523 552 580 [phone]   35 322 337 351 366 381 396 411 426 35 426 455 484 512 541 [phone]   40 314 329 344 359 374 389 404 419 40 416 445 473 502 531 559 [phone]   45 307 322 336 351 366 381 396 411 45 405 434 463 491 520 549 [phone]   50 299 314 329 344 359 374 389 404 50 395 424 452 481 510 538 [phone]   55 292 307 321 336 351 366 381 396 55 [phone]

## 2019-09-05 NOTE — DISCHARGE SUMMARY
MG tablet  Commonly known as:  ZANAFLEX  take 1 tablet by mouth three times a day     * traZODone 100 MG tablet  Commonly known as:  DESYREL  Take 1 tablet by mouth nightly     * traZODone 100 MG tablet  Commonly known as:  DESYREL  take 1 tablet by mouth nightly     Umeclidinium Bromide 62.5 MCG/INH Aepb  Inhale 1 puff into the lungs daily         * This list has 4 medication(s) that are the same as other medications prescribed for you. Read the directions carefully, and ask your doctor or other care provider to review them with you.                Where to Get Your Medications      You can get these medications from any pharmacy    Bring a paper prescription for each of these medications  · azithromycin 250 MG tablet  · predniSONE 20 MG tablet         Diet:  No diet orders on file, advance as tolerated     Activity:  As tolerated    Consultants: IP CONSULT TO CARDIOLOGY  IP CONSULT TO HOME CARE NEEDS    Procedures:  Not indicated     Diagnostic Test:   Results for orders placed or performed during the hospital encounter of 09/04/19   CBC WITH AUTO DIFFERENTIAL   Result Value Ref Range    WBC 7.7 3.5 - 11.3 k/uL    RBC 4.57 3.95 - 5.11 m/uL    Hemoglobin 13.4 11.9 - 15.1 g/dL    Hematocrit 42.6 36.3 - 47.1 %    MCV 93.2 82.6 - 102.9 fL    MCH 29.3 25.2 - 33.5 pg    MCHC 31.5 28.4 - 34.8 g/dL    RDW 13.5 11.8 - 14.4 %    Platelets 868 824 - 254 k/uL    MPV 10.4 8.1 - 13.5 fL    NRBC Automated 0.0 0.0 per 100 WBC    Differential Type NOT REPORTED     Seg Neutrophils 62 36 - 65 %    Lymphocytes 28 24 - 43 %    Monocytes 9 3 - 12 %    Eosinophils % 0 (L) 1 - 4 %    Basophils 1 0 - 2 %    Immature Granulocytes 0 0 %    Segs Absolute 4.81 1.50 - 8.10 k/uL    Absolute Lymph # 2.11 1.10 - 3.70 k/uL    Absolute Mono # 0.67 0.10 - 1.20 k/uL    Absolute Eos # 0.03 0.00 - 0.44 k/uL    Basophils Absolute 0.04 0.00 - 0.20 k/uL    Absolute Immature Granulocyte <0.03 0.00 - 0.30 k/uL    WBC Morphology NOT REPORTED     RBC Morphology NOT REPORTED     Platelet Estimate NOT REPORTED    Basic Metabolic Panel   Result Value Ref Range    Glucose 192 (H) 70 - 99 mg/dL    BUN 11 8 - 23 mg/dL    CREATININE 0.50 0.50 - 0.90 mg/dL    Bun/Cre Ratio NOT REPORTED 9 - 20    Calcium 9.4 8.6 - 10.4 mg/dL    Sodium 138 135 - 144 mmol/L    Potassium 4.1 3.7 - 5.3 mmol/L    Chloride 103 98 - 107 mmol/L    CO2 21 20 - 31 mmol/L    Anion Gap 14 9 - 17 mmol/L    GFR Non-African American >60 >60 mL/min    GFR African American >60 >60 mL/min    GFR Comment          GFR Staging NOT REPORTED    Troponin   Result Value Ref Range    Troponin, High Sensitivity <6 0 - 14 ng/L    Troponin T NOT REPORTED <0.03 ng/mL    Troponin Interp NOT REPORTED    Brain Natriuretic Peptide   Result Value Ref Range    Pro-BNP 80 <300 pg/mL    BNP Interpretation Pro-BNP Reference Range:    Troponin   Result Value Ref Range    Troponin, High Sensitivity <6 0 - 14 ng/L    Troponin T NOT REPORTED <0.03 ng/mL    Troponin Interp NOT REPORTED    EKG 12 Lead   Result Value Ref Range    Ventricular Rate 74 BPM    Atrial Rate 74 BPM    P-R Interval 254 ms    QRS Duration 92 ms    Q-T Interval 410 ms    QTc Calculation (Bazett) 455 ms    P Axis 20 degrees    R Axis -15 degrees    T Axis 64 degrees   EKG 12 Lead   Result Value Ref Range    Ventricular Rate 77 BPM    Atrial Rate 77 BPM    P-R Interval 238 ms    QRS Duration 96 ms    Q-T Interval 410 ms    QTc Calculation (Bazett) 463 ms    P Axis 11 degrees    R Axis -11 degrees    T Axis 52 degrees   EKG 12 Lead   Result Value Ref Range    Ventricular Rate 72 BPM    Atrial Rate 72 BPM    P-R Interval 258 ms    QRS Duration 92 ms    Q-T Interval 422 ms    QTc Calculation (Bazett) 462 ms    P Axis 21 degrees    R Axis 0 degrees    T Axis 54 degrees     Xr Chest Standard (2 Vw)    Result Date: 9/4/2019  EXAMINATION: TWO XRAY VIEWS OF THE CHEST 9/4/2019 10:54 pm COMPARISON: Chest radiograph 02/20/2018.  HISTORY: ORDERING SYSTEM PROVIDED HISTORY:

## 2019-09-05 NOTE — CARE COORDINATION
Case Management Initial Discharge Plan  García Zhao,             Met with:patient to discuss discharge plans. Information verified: address, contacts, phone number, , insurance Yes  PCP: Antonella Mccarty MD  Date of last visit: 4-5 months ago    Insurance Provider: Tania Elias    Discharge Planning    Living Arrangements:  Alone   Support Systems:  None, Family Members    Home has 0 stories  0 stairs to climb to get into front door, 0stairs to climb to reach second floor  Location of bedroom/bathroom in home 0    Patient able to perform ADL's:Independent    Current Services (outpatient & in home) senior care  DME equipment: oxygen  DME provider: smita    Pharmacy: [facility]   Potential Assistance Purchasing Medications:  No  Does patient want to participate in local refill/ meds to beds program?  No    Potential Assistance Needed:  N/A    Patient agreeable to home care: Yes  Freedom of choice provided:  n/a    Prior SNF/Rehab Placement and Facility: no  Agreeable to SNF/Rehab: No  [place] of choice provided: n/a   Evaluation: no    Expected Discharge date:  19  Patient expects to be discharged to:  home  Follow Up Appointment: Best Day/ Time: Monday AM    Transportation provider: black and white  Transportation arrangements needed for discharge: Yes    Readmission Risk              Risk of Unplanned Readmission:        13             Does patient have a readmission risk score greater than 14?: No  If yes, follow-up appointment must be made within 7 days of discharge.      Discharge Plan: discharge home continue senior care          Electronically signed by Anahi Emanuel RN on 19 at 10:30 AM

## 2019-09-05 NOTE — DISCHARGE INSTR - COC
scale): Pain Level: 9  Last Weight:   Wt Readings from Last 1 Encounters:   09/04/19 210 lb (95.3 kg)     Mental Status:  oriented    IV Access:  - None    Nursing Mobility/ADLs:  Walking   Independent  Transfer  Independent  Bathing  Independent  Dressing  Independent  Toileting  Independent  Feeding  Independent  Med [address]  Med Delivery   whole    Wound Care Documentation and Therapy:        Elimination:  Continence:   · Bowel: No  · Bladder: Yes  Urinary Catheter: None   Colostomy/Ileostomy/Ileal Conduit: Yes and No       Date of Last BM: ***  No intake or output data in the 24 hours ending 09/05/19 1334  No intake/output data recorded. Safety Concerns: At Risk for Falls    Impairments/Disabilities:      None    Nutrition Therapy:  Current Nutrition Therapy:   - Oral Diet:  General    Routes of Feeding: Oral  Liquids: No Restrictions  Daily Fluid Restriction: no  Last Modified Barium Swallow with Video (Video Swallowing Test): not done    Treatments at the Time of Hospital Discharge:   Respiratory Treatments: ***  Oxygen Therapy:  is not on home oxygen therapy.   Ventilator:    - No ventilator support    Rehab Therapies: {THERAPEUTIC INTERVENTION:8306601883}  Weight Bearing Status/Restrictions: No weight bearing restirctions  Other Medical Equipment (for information only, NOT a DME order):  {EQUIPMENT:[id number]}  Other Treatments: ***    Patient's personal belongings (please select all that are sent with patient):  {[facility] DME Belongings:200035306}    RN SIGNATURE:  Electronically signed by Ankit Elliott RN on 9/5/19 at 1:57 PM    CASE MANAGEMENT/SOCIAL WORK SECTION    Inpatient Status Date: ***    Readmission Risk Assessment Score:  Readmission Risk              Risk of Unplanned Readmission:        13           Discharging to Facility/ Agency   · Name:   · Address:  · Phone:  · Fax:    Dialysis Facility (if applicable)   · Name:  · Address:  · Dialysis Schedule:  · Phone:  · Fax:    Case Manager/ signature: {Esignature:[id number]}    PHYSICIAN SECTION    Prognosis: Good    Condition at Discharge: Stable    Rehab Potential (if transferring to Rehab): Good    Recommended Labs or Other Treatments After Discharge:     Physician Certification: I certify the above information and transfer of Varsha Valladares  is necessary for the continuing treatment of the diagnosis listed and that she requires Home Care for greater 30 days.      Update Admission H&P: No change in H&P    PHYSICIAN SIGNATURE:  Electronically signed by Yoni Osborne MD on 9/5/19 at 1:34 PM

## 2019-09-05 NOTE — ED PROVIDER NOTES
[facility] ED  Emergency Department  Emergency Medicine Resident Sign-out     Care of Emily Mcdaniels was assumed from Dr. Santi Hebert and is being seen for Chest Pain (for the past 2 days. Hx of COPD. Chest hurts when she coughs )  . The patient's initial evaluation and plan have been discussed with the prior provider who initially evaluated the patient.      EMERGENCY DEPARTMENT COURSE / MEDICAL DECISION MAKING:       MEDICATIONS GIVEN:  Orders Placed This Encounter   Medications    aspirin chewable tablet 324 mg    nitroGLYCERIN (NITROSTAT) SL tablet 0.4 mg    ketorolac (TORADOL) injection 15 mg    albuterol (PROVENTIL) nebulizer solution 5 mg    ipratropium-albuterol (DUONEB) nebulizer solution 1 ampule    albuterol (PROVENTIL) nebulizer solution 5 mg    albuterol sulfate  (90 Base) MCG/ACT inhaler 2 puff    AND Linked Order Group     albuterol sulfate  (90 Base) MCG/ACT inhaler 2 puff     ipratropium (ATROVENT HFA) 17 MCG/ACT inhaler 2 puff    ipratropium (ATROVENT) 0.02 % nebulizer solution 0.5 mg    albuterol sulfate  (90 Base) MCG/ACT inhaler 1 puff    atenolol (TENORMIN) tablet 25 mg    mometasone-formoterol (DULERA) 200-5 MCG/ACT inhaler 2 puff    furosemide (LASIX) tablet 20 mg    gabapentin (NEURONTIN) tablet 800 mg    hydrochlorothiazide (MICROZIDE) capsule 12.5 mg    linaclotide (LINZESS) capsule 145 mcg    pantoprazole (PROTONIX) tablet 40 mg    pravastatin (PRAVACHOL) tablet 40 mg    traZODone (DESYREL) tablet 100 mg       LABS / RADIOLOGY:     Labs Reviewed   CBC WITH AUTO DIFFERENTIAL - Abnormal; Notable for the following components:       Result Value    Eosinophils % 0 (*)     All other components within normal limits   BASIC METABOLIC PANEL - Abnormal; Notable for the following components:    Glucose 192 (*)     All other components within normal limits   TROPONIN   BRAIN NATRIURETIC PEPTIDE   TROPONIN       Xr Chest Standard (2 Vw)    Result Date: 9/4/2019  EXAMINATION: TWO XRAY VIEWS OF THE CHEST 9/4/2019 10:54 pm COMPARISON: Chest radiograph 02/20/2018. HISTORY: ORDERING SYSTEM PROVIDED HISTORY: chest pain TECHNOLOGIST PROVIDED HISTORY: chest pain Reason for Exam: chest pain shortness of breath cough Relevant Medical/Surgical History: chf  pacemaker FINDINGS: Two views provided. Stable mediastinal silhouette with borderline enlarged cardiac silhouette. Left-sided pacemaker with stable appearance of the leads. Stable lung volumes with chronic lower lobe atelectasis. No acute consolidation or interstitial edema. No effusion or pneumothorax. No free subdiaphragmatic air. Stable chronic lower lobe atelectasis. No acute abnormality. RECENT VITALS:     Temp: 97.9 °F (36.6 °C),  Pulse: 70, Resp: 18, BP: 108/60, SpO2: 91 %    This patient is a 60 y.o. Female with Chest pain with SOB, diaphoresis, nasuea. Cardiac work up unremarkable with interval improvement following analgesics, breathing treatment. Hx of COPD. Admitted to ETU for cardiology evaluation. OUTSTANDING TASKS / RECOMMENDATIONS:    1. Monitor until transport to floor     FINAL IMPRESSION:     1.  Chest pain, unspecified type        DISPOSITION:         DISPOSITION:  []  Discharge   []  Transfer -    [x]  Admission -  OBS   []  Against Medical Advice   []  Eloped   FOLLOW-UP: Antonella Arzate MD  [address] [place], [place] [address]  [phone]          Antonella Arzate MD  [phone]           DISCHARGE MEDICATIONS: New Prescriptions    No medications on file          Bettina Schaffer MD  Emergency Medicine Resident  [facility]      Bettina Schaffer MD  09/05/19 3592

## 2019-09-05 NOTE — H&P
components:       Result Value    Eosinophils % 0 (*)     All other components within normal limits   BASIC METABOLIC PANEL - Abnormal; Notable for the following components:    Glucose 192 (*)     All other components within normal limits   TROPONIN   BRAIN NATRIURETIC PEPTIDE   TROPONIN       SCREENING TOOLS:  Heart Score for chest pain patients  History: Highly Suspicious  ECG: Non-Specifc repolarization disturbance/LBTB/PM  Patient Age: > 39 and < 65 years  *Risk factors for Atherosclerotic disease: Cigarette smoking, Diabetes Mellitus, Hypercholesterolemia, Hypertension, Obesity  Risk Factors: > 3 Risk factors or history of atherosclerotic disease*  Troponin: < 1X normal limit  Heart Score Total: 6    Percent Risk for Major Adverse Cardiac Event (MACE)  0-3 pts indicates low risk for MACE   2.5% (DISCHARGE)   4-7 pts indicates moderate risk for MACE  20.3% (OBS)  8-10 pts indicates high risk for MACE  72.7% (EARLY INVASIVE TX)  CDU IMPRESSION / PLAN      Varsha Valladares is a 60 y.o. female who presents with     1) acute substernal chest pain without radiation due to unknown etiology. Suspect COPD exacerbation  - CBC, proBNP, troponin x2, CXR all negative for acute changes. -2D echocardiogram 8/30/2018: Normal EF, no wall motion abnormalities, normal echo. - Lexiscan stress test on 11/6/2017 was negative  -Cardiology consult placed. No further workup recommended.     2) chronic substance abuse: Tobacco  -  -      · Continue home medications and pain control  · Monitor vitals, labs, and imaging  · DISPO: pending consults and clinical improvement    CONSULTS:    IP CONSULT TO CARDIOLOGY    PROCEDURES:  Not indicated       PATIENT REFERRED TO:    Antonella Villanueva MD  [address]  [phone]          Antonella Villanueva MD  [address] [id number]  [facility]   Observation Resident   This dictation was generated by voice

## 2019-09-11 DIAGNOSIS — J41.0 SIMPLE CHRONIC BRONCHITIS (HCC): ICD-10-CM

## 2019-09-11 RX ORDER — ALBUTEROL SULFATE 2.5 MG/3ML
SOLUTION RESPIRATORY (INHALATION)
Qty: 300 ML | Refills: 2 | Status: SHIPPED | OUTPATIENT
Start: 2019-09-11 | End: 2019-09-17 | Stop reason: SDUPTHER

## 2019-09-11 NOTE — TELEPHONE ENCOUNTER
Refill request for Proventil. If appropriate please send medication(s) to patients pharmacy. Next appt: 09/17/2019[facility]    Health Maintenance   Topic Date Due    Hepatitis A vaccine (1 of 2 - Risk 2-dose series) 06/05/1958    Hepatitis B Vaccine (1 of 3 - Risk 3-dose series) 06/05/1976    Shingles Vaccine (1 of 2) 06/05/2007    Diabetic microalbuminuria test  10/31/2018    Diabetic retinal exam  01/01/2019    Diabetic foot exam  05/09/2019    Annual Wellness Visit (AWV)  05/29/2019    Flu vaccine (1) 09/01/2019    HIV screen  01/05/2029 (Originally 6/5/1972)    Breast cancer screen  11/01/2019    A1C test (Diabetic or Prediabetic)  04/23/2020    Lipid screen  04/23/2020    Potassium monitoring  09/04/2020    Creatinine monitoring  09/04/2020    Colon cancer screen colonoscopy  03/14/2022    DTaP/Tdap/Td vaccine (2 - Td) 09/30/2026    Pneumococcal 0-64 years Vaccine  Completed       Hemoglobin A1C (%)   Date Value   04/23/2019 6.1 (H)   11/19/2018 6.1   05/07/2018 6.4             ( goal A1C is < 7)   Microalb/Crt.  Ratio (mcg/mg creat)   Date Value   10/31/2017 CANNOT BE CALCULATED     LDL Cholesterol (mg/dL)   Date Value   04/23/2019 88       (goal LDL is <100)   AST (U/L)   Date Value   06/11/2019 12     ALT (U/L)   Date Value   06/11/2019 9     BUN (mg/dL)   Date Value   09/04/2019 11     BP Readings from Last 3 Encounters:   09/05/19 130/67   06/12/19 122/66   05/29/19 122/80          (goal 120/80)          Patient Active Problem List:     Essential hypertension     Pure hypercholesterolemia     Moderate episode of recurrent major depressive disorder ([address]     History of heroin use     Hep C w/o coma, chronic ([address] completed tx 2016     GERD (gastroesophageal reflux disease)     COPD (chronic obstructive pulmonary disease) (HCC)     Tobacco abuse     Chronic diastolic congestive heart failure (HCC)     Diverticulosis     Hyperplastic polyp of intestine     Diabetic polyneuropathy

## 2019-09-16 ENCOUNTER — TELEPHONE (OUTPATIENT)
Dept: PULMONOLOGY | Age: 64
End: 2019-09-16

## 2019-09-17 ENCOUNTER — OFFICE VISIT (OUTPATIENT)
Dept: INTERNAL MEDICINE | Age: 64
End: 2019-09-17
Payer: MEDICAID

## 2019-09-17 VITALS
SYSTOLIC BLOOD PRESSURE: 105 MMHG | HEIGHT: 72 IN | BODY MASS INDEX: 27.09 KG/M2 | HEART RATE: 81 BPM | DIASTOLIC BLOOD PRESSURE: 66 MMHG | WEIGHT: 200 LBS

## 2019-09-17 DIAGNOSIS — R63.4 UNINTENTIONAL WEIGHT LOSS: Primary | ICD-10-CM

## 2019-09-17 DIAGNOSIS — G25.81 RLS (RESTLESS LEGS SYNDROME): ICD-10-CM

## 2019-09-17 DIAGNOSIS — E11.40 TYPE 2 DIABETES MELLITUS WITH DIABETIC NEUROPATHY, WITHOUT LONG-TERM CURRENT USE OF INSULIN (HCC): ICD-10-CM

## 2019-09-17 DIAGNOSIS — K21.9 GASTROESOPHAGEAL REFLUX DISEASE WITHOUT ESOPHAGITIS: ICD-10-CM

## 2019-09-17 DIAGNOSIS — B18.2 HEP C W/O COMA, CHRONIC (HCC): ICD-10-CM

## 2019-09-17 DIAGNOSIS — I10 ESSENTIAL HYPERTENSION: ICD-10-CM

## 2019-09-17 DIAGNOSIS — E78.00 PURE HYPERCHOLESTEROLEMIA: ICD-10-CM

## 2019-09-17 DIAGNOSIS — E11.8 TYPE 2 DIABETES MELLITUS WITH COMPLICATION, WITHOUT LONG-TERM CURRENT USE OF INSULIN (HCC): ICD-10-CM

## 2019-09-17 DIAGNOSIS — R51.9 NONINTRACTABLE EPISODIC HEADACHE, UNSPECIFIED HEADACHE TYPE: ICD-10-CM

## 2019-09-17 DIAGNOSIS — I50.32 CHRONIC DIASTOLIC CONGESTIVE HEART FAILURE (HCC): ICD-10-CM

## 2019-09-17 DIAGNOSIS — J41.0 SIMPLE CHRONIC BRONCHITIS (HCC): ICD-10-CM

## 2019-09-17 DIAGNOSIS — K59.01 SLOW TRANSIT CONSTIPATION: ICD-10-CM

## 2019-09-17 DIAGNOSIS — I95.9 HYPOTENSION, UNSPECIFIED HYPOTENSION TYPE: ICD-10-CM

## 2019-09-17 DIAGNOSIS — M89.9 DISORDER OF BONE: ICD-10-CM

## 2019-09-17 DIAGNOSIS — M54.41 CHRONIC BILATERAL LOW BACK PAIN WITH RIGHT-SIDED SCIATICA: ICD-10-CM

## 2019-09-17 DIAGNOSIS — G89.29 CHRONIC BILATERAL LOW BACK PAIN WITH RIGHT-SIDED SCIATICA: ICD-10-CM

## 2019-09-17 DIAGNOSIS — F51.01 PRIMARY INSOMNIA: ICD-10-CM

## 2019-09-17 LAB — HBA1C MFR BLD: 5.9 %

## 2019-09-17 PROCEDURE — 99213 OFFICE O/P EST LOW 20 MIN: CPT | Performed by: STUDENT IN AN ORGANIZED HEALTH CARE EDUCATION/TRAINING PROGRAM

## 2019-09-17 PROCEDURE — 83036 HEMOGLOBIN GLYCOSYLATED A1C: CPT | Performed by: STUDENT IN AN ORGANIZED HEALTH CARE EDUCATION/TRAINING PROGRAM

## 2019-09-17 RX ORDER — IBUPROFEN 800 MG/1
800 TABLET ORAL EVERY 8 HOURS PRN
Qty: 90 TABLET | Refills: 2 | Status: CANCELLED | OUTPATIENT
Start: 2019-09-17

## 2019-09-17 RX ORDER — ATENOLOL 25 MG/1
TABLET ORAL
Qty: 30 TABLET | Refills: 2 | Status: CANCELLED | OUTPATIENT
Start: 2019-09-17

## 2019-09-17 RX ORDER — ROPINIROLE 0.5 MG/1
0.5 TABLET, FILM COATED ORAL DAILY
Qty: 30 TABLET | Refills: 2 | Status: SHIPPED | OUTPATIENT
Start: 2019-09-17 | End: 2019-10-09 | Stop reason: SDUPTHER

## 2019-09-17 RX ORDER — TRAZODONE HYDROCHLORIDE 100 MG/1
100 TABLET ORAL NIGHTLY
Qty: 60 TABLET | Refills: 2 | Status: SHIPPED | OUTPATIENT
Start: 2019-09-17 | End: 2020-04-08 | Stop reason: SDUPTHER

## 2019-09-17 RX ORDER — IPRATROPIUM BROMIDE AND ALBUTEROL SULFATE 2.5; .5 MG/3ML; MG/3ML
3 SOLUTION RESPIRATORY (INHALATION) 3 TIMES DAILY
Qty: 360 ML | Refills: 5 | Status: SHIPPED | OUTPATIENT
Start: 2019-09-17 | End: 2019-10-09 | Stop reason: SDUPTHER

## 2019-09-17 RX ORDER — GLUCOSAMINE HCL/CHONDROITIN SU 500-400 MG
CAPSULE ORAL
Qty: 100 EACH | Refills: 5 | Status: SHIPPED | OUTPATIENT
Start: 2019-09-17 | End: 2020-04-08 | Stop reason: SDUPTHER

## 2019-09-17 RX ORDER — ALBUTEROL SULFATE 2.5 MG/3ML
SOLUTION RESPIRATORY (INHALATION)
Qty: 300 ML | Refills: 2 | Status: SHIPPED | OUTPATIENT
Start: 2019-09-17 | End: 2019-10-09 | Stop reason: SDUPTHER

## 2019-09-17 RX ORDER — DICYCLOMINE HCL 20 MG
20 TABLET ORAL 2 TIMES DAILY PRN
Qty: 60 TABLET | Refills: 2 | Status: SHIPPED | OUTPATIENT
Start: 2019-09-17 | End: 2019-12-05 | Stop reason: SDUPTHER

## 2019-09-17 RX ORDER — GABAPENTIN 800 MG/1
400 TABLET ORAL 3 TIMES DAILY
Qty: 45 TABLET | Refills: 0 | Status: SHIPPED | OUTPATIENT
Start: 2019-09-17 | End: 2019-10-09 | Stop reason: SDUPTHER

## 2019-09-17 RX ORDER — ALBUTEROL SULFATE 90 UG/1
1 AEROSOL, METERED RESPIRATORY (INHALATION) EVERY 6 HOURS PRN
Qty: 18 G | Refills: 2 | Status: SHIPPED | OUTPATIENT
Start: 2019-09-17 | End: 2019-10-09 | Stop reason: SDUPTHER

## 2019-09-17 RX ORDER — FUROSEMIDE 20 MG/1
TABLET ORAL
Qty: 60 TABLET | Refills: 2 | Status: SHIPPED | OUTPATIENT
Start: 2019-09-17 | End: 2020-02-28

## 2019-09-17 RX ORDER — HYDROXYZINE HYDROCHLORIDE 10 MG/1
TABLET, FILM COATED ORAL
Refills: 0 | Status: CANCELLED | OUTPATIENT
Start: 2019-09-17

## 2019-09-17 RX ORDER — ALBUTEROL SULFATE 90 UG/1
AEROSOL, METERED RESPIRATORY (INHALATION)
Qty: 54 G | Refills: 0 | Status: SHIPPED | OUTPATIENT
Start: 2019-09-17 | End: 2019-10-09 | Stop reason: SDUPTHER

## 2019-09-17 RX ORDER — TIZANIDINE 4 MG/1
TABLET ORAL
Qty: 90 TABLET | Refills: 2 | Status: CANCELLED | OUTPATIENT
Start: 2019-09-17

## 2019-09-17 RX ORDER — BUDESONIDE AND FORMOTEROL FUMARATE DIHYDRATE 160; 4.5 UG/1; UG/1
2 AEROSOL RESPIRATORY (INHALATION) 2 TIMES DAILY
Qty: 1 INHALER | Refills: 2 | Status: SHIPPED | OUTPATIENT
Start: 2019-09-17 | End: 2019-10-09 | Stop reason: SDUPTHER

## 2019-09-17 RX ORDER — PANTOPRAZOLE SODIUM 40 MG/1
40 TABLET, DELAYED RELEASE ORAL DAILY
Qty: 30 TABLET | Refills: 2 | Status: SHIPPED | OUTPATIENT
Start: 2019-09-17 | End: 2019-12-07 | Stop reason: SDUPTHER

## 2019-09-17 RX ORDER — ARIPIPRAZOLE 15 MG/1
15 TABLET ORAL DAILY
Qty: 30 TABLET | Refills: 0 | Status: SHIPPED | OUTPATIENT
Start: 2019-09-17 | End: 2020-01-12

## 2019-09-17 RX ORDER — HYDROCHLOROTHIAZIDE 12.5 MG/1
12.5 CAPSULE, GELATIN COATED ORAL EVERY MORNING
Qty: 90 CAPSULE | Refills: 0 | Status: SHIPPED | OUTPATIENT
Start: 2019-09-17 | End: 2019-12-10 | Stop reason: SDUPTHER

## 2019-09-17 RX ORDER — PRAVASTATIN SODIUM 40 MG
40 TABLET ORAL EVERY EVENING
Qty: 30 TABLET | Refills: 2 | Status: SHIPPED | OUTPATIENT
Start: 2019-09-17 | End: 2020-01-14

## 2019-09-17 NOTE — PROGRESS NOTES
[facility]    Progress Note    Date of patient's visit: 9/17/2019  YOB: 1955  Patient's Name:  Tish Black    Patient Care Team:  Karrie Vergara MD as PCP - General (Internal Medicine)  Antonella Kathleen MD as PCP - [facility] Empaneled Provider  Cheryl Carson MD as Consulting Physician (Gastroenterology)  Eliu Francois MD as Referring Physician (Internal Medicine)  Antonieta Bloch, MD as Consulting Physician (Pulmonology)    REASON FOR VISIT: Routine outpatient follow     HISTORY OF PRESENT ILLNESS:    History was obtained from the patient. Tish Black is a 60 y.o. is here for a  Follow up visit for her diabetes and hypertension. Diabetes remains well controlled. Patient's HbA1c was checked in office and was 5.9. Patient states that she has recently been losing weight. She has lost 18 pounds in the last 4 months. Patient is a known hypertensive on hydrochlorothiazide 12.5 once daily and atenolol 25mg once daily. Patient reports headache since 4 days, and an episode of headache with low blood pressure last evening where she measured it at 90/60. She also states that her tongue became numb at this time. Patient was recently admitted to the hospital for an episode with vomiting, headache, tongue numbness and was treated for COPD, given 5 days of azithromycin for her cough and 20mg prednisolone twice daily for 5 days.     Patient Active Problem List   Diagnosis    Essential hypertension    Pure hypercholesterolemia    Moderate episode of recurrent major depressive disorder ([facility] [address]    History of heroin use    Hep C w/o coma, chronic (HCC) completed tx 2016    GERD (gastroesophageal reflux disease)    COPD (chronic obstructive pulmonary disease) (HCC)    Tobacco abuse    Chronic diastolic congestive heart failure (HCC)    Diverticulosis    Hyperplastic polyp of intestine    Diabetic polyneuropathy associated with type 2 diabetes negative for black or bloody stools  Neurological: Positive for weakness, peripheral neuropathy, numbness of tongue. Endocrine: positive for weight loss, negative for chills, anxiety. Genito-Urinary: no dysuria, trouble voiding, or hematuria  Musculoskeletal: negative for muscle pain, positive for weakness. Dermatological: negative for rashes, ulcers. PHYSICAL EXAM:      Vitals:    09/17/19 1410   BP: 105/66   Pulse: 81     General appearance - alert, well appearing, and in no distress, oriented to person, place, and time and normal appearing weight  Mental status - alert, oriented to person, place, and time, normal mood, behavior, speech, dress, motor activity, and thought processes  Eyes - pupils equal and reactive, extraocular eye movements intact, sclera anicteric  Chest - clear to auscultation, no wheezes, rales or rhonchi, symmetric air entry  Heart - normal rate and regular rhythm  Abdomen - soft, nontender, nondistended, no masses or organomegaly  tenderness noted generally all over abdomen. Patient reports abdomen is always tender  scars from previous incisions midline scar present from previous hernial repair    LABORATORY FINDINGS:    CBC:  Lab Results   Component Value Date    WBC 7.7 09/04/2019    HGB 13.4 09/04/2019     09/04/2019    PLT Platelet clumps present, count appears adequate.  02/26/2012     BMP:    Lab Results   Component Value Date     09/04/2019    K 4.1 09/04/2019     09/04/2019    CO2 21 09/04/2019    BUN 11 09/04/2019    CREATININE 0.50 09/04/2019    GLUCOSE 192 09/04/2019    GLUCOSE 120 02/26/2012     HEMOGLOBIN A1C:   Lab Results   Component Value Date    LABA1C 6.1 04/23/2019     MICROALBUMIN URINE:   Lab Results   Component Value Date    MICROALBUR <12 10/31/2017     FASTING LIPID PANEL:  Lab Results   Component Value Date    CHOL 122 09/11/2018    HDL 44 04/23/2019    TRIG 84 09/11/2018     LIVER PROFILE:  Lab Results   Component Value Date    ALT 9 30 tablet; Refill: 2    7. Pure hypercholesterolemia  - Well controlled. Continue same medications. Repeat lipid profile in 6 months. - pravastatin (PRAVACHOL) 40 MG tablet; Take 1 tablet by mouth every evening  Dispense: 30 tablet; Refill: 2    8. Gastroesophageal reflux disease without esophagitis  - pantoprazole (PROTONIX) 40 MG tablet; Take 1 tablet by mouth daily Stop Omeprazole  Dispense: 30 tablet; Refill: 2    9. Type 2 diabetes mellitus with complication, without long-term current use of insulin ([facility])  - HbA1c 5.9. Well controlled. Continue medications. - metFORMIN (GLUCOPHAGE) 500 MG tablet; Take 1 tablet by mouth 2 times daily (with meals)  Dispense: 60 tablet; Refill: 2  - blood glucose test strips (CONTOUR NEXT TEST) strip; Dx: Use 2 times daily. Dispense: 60 strip; Refill: 5  - Alcohol Swabs 70 % PADS; Dx: DM-2 use 2-3 times daily  Dispense: 100 each; Refill: 5    10. Slow transit constipation  - magnesium citrate solution; Take 296 mLs by mouth once for 1 dose  Dispense: 296 mL; Refill: 2  - dicyclomine (BENTYL) 20 MG tablet; Take 1 tablet by mouth 2 times daily as needed (abdominal pain)  Dispense: 60 tablet; Refill: 2    11. Type 2 diabetes mellitus with diabetic neuropathy, without long-term current use of insulin ([facility] [address]  - Foot care. - gabapentin (NEURONTIN) 800 MG tablet  Dispense: 90 tablet; Refill: 2    12. Chronic diastolic congestive heart failure (HCC)  - furosemide (LASIX) 20 MG tablet  Dispense: 60 tablet; Refill: 2    13. Nonintractable episodic headache, unspecified headache type  - Associated with episode of vomiting, tongue numbness.  - CT head to evaluate for intracranial pathology    14. Hypotension, unspecified hypotension type  - Acute episode of hypotension. Orthostatics negative. - Continue blood pressure monitoring. FOLLOW UP:       1. Sam Larson received counseling on the following healthy behaviors: tobacco cessation  2.   Patient given educational materials - see patient

## 2019-09-17 NOTE — PROGRESS NOTES
HYPERTENSION visit     BP Readings from Last 3 Encounters:   09/05/19 130/67   06/12/19 122/66   05/29/19 122/80       LDL Cholesterol (mg/dL)   Date Value   04/23/2019 88     HDL (mg/dL)   Date Value   04/23/2019 44     BUN (mg/dL)   Date Value   09/04/2019 11     CREATININE (mg/dL)   Date Value   09/04/2019 0.50     Glucose (mg/dL)   Date Value   09/04/2019 192 (H)   02/26/2012 120 (H)              Have you changed or started any medications since your last visit including any over-the-counter medicines, vitamins, or herbal medicines? no   Have you stopped taking any of your medications? Is so, why? -  no  Are you having any side effects from any of your medications? - no  How often do you miss doses of your medication? no      Have you seen any other physician or provider since your last visit?  no   Have you had any other diagnostic tests since your last visit? yes -    Have you been seen in the emergency room and/or had an admission in a hospital since we last saw you?  yes - [facility]   Have you had your routine dental cleaning in the past 6 months?  no     Do you have an active MyChart account? If no, what is the barrier?   Yes    Patient Care Team:  Antonella Jernigan MD as PCP - General  Antonella Jernigan MD as PCP - [facility] Provider  Venkata Betancourt MD as Consulting Physician (Gastroenterology)  Antonella Jernigan MD as Referring Physician (Internal Medicine)  Trisha Pina MD as Consulting Physician (Pulmonology)    Medical History Review  Past Medical, Family, and Social History reviewed and does not contribute to the patient presenting condition    Health Maintenance   Topic Date Due    Hepatitis A vaccine (1 of 2 - Risk 2-dose series) 06/05/1958    Hepatitis B Vaccine (1 of 3 - Risk 3-dose series) 06/05/1976    Shingles Vaccine (1 of 2) 06/05/2007    Diabetic microalbuminuria test  10/31/2018    Diabetic retinal exam  01/01/2019    Diabetic foot exam  05/09/2019   [place] Annual Wellness Visit (AWV)  05/29/2019    Flu vaccine (1) 09/01/2019    HIV screen  01/05/2029 (Originally 6/5/1972)    Breast cancer screen  11/01/2019    A1C test (Diabetic or Prediabetic)  04/23/2020    Lipid screen  04/23/2020    Potassium monitoring  09/04/2020    Creatinine monitoring  09/04/2020    Colon cancer screen colonoscopy  03/14/2022    DTaP/Tdap/Td vaccine (2 - Td) 09/30/2026    Pneumococcal 0-64 years Vaccine  Completed

## 2019-09-17 NOTE — PATIENT INSTRUCTIONS
-Xray order given to pt, this test does not need to be scheduled, please take order with you to registration. Labs given to patient, they will have them done before their next visit. Medications e-scribe to pharmacy of pt's choice. Follow-up appointment scheduled for   , AVS given to patient.     tv

## 2019-09-18 DIAGNOSIS — G89.29 CHRONIC BILATERAL LOW BACK PAIN WITH RIGHT-SIDED SCIATICA: ICD-10-CM

## 2019-09-18 DIAGNOSIS — M54.41 CHRONIC BILATERAL LOW BACK PAIN WITH RIGHT-SIDED SCIATICA: ICD-10-CM

## 2019-09-20 ENCOUNTER — HOSPITAL ENCOUNTER (OUTPATIENT)
Age: 64
Setting detail: SPECIMEN
Discharge: HOME | End: 2019-09-20
Payer: COMMERCIAL

## 2019-09-20 DIAGNOSIS — E11.8 TYPE 2 DIABETES MELLITUS WITH COMPLICATION, WITHOUT LONG-TERM CURRENT USE OF INSULIN (HCC): ICD-10-CM

## 2019-09-20 DIAGNOSIS — M89.9 DISORDER OF BONE: ICD-10-CM

## 2019-09-20 DIAGNOSIS — R63.4 UNINTENTIONAL WEIGHT LOSS: ICD-10-CM

## 2019-09-20 DIAGNOSIS — B18.2 HEP C W/O COMA, CHRONIC (HCC): ICD-10-CM

## 2019-09-20 LAB
-: ABNORMAL
ABSOLUTE EOS #: 0.15 K/UL (ref 0–0.44)
ABSOLUTE IMMATURE GRANULOCYTE: <0.03 K/UL (ref 0–0.3)
ABSOLUTE LYMPH #: 2.9 K/UL (ref 1.1–3.7)
ABSOLUTE MONO #: 0.55 K/UL (ref 0.1–1.2)
AFP: 5.9 UG/L
ALBUMIN SERPL-MCNC: 4 G/DL (ref 3.5–5.2)
ALBUMIN/GLOBULIN RATIO: 1.1 (ref 1–2.5)
ALP BLD-CCNC: 71 U/L (ref 35–104)
ALT SERPL-CCNC: 7 U/L (ref 5–33)
AMORPHOUS: ABNORMAL
ANION GAP SERPL CALCULATED.3IONS-SCNC: 16 MMOL/L (ref 9–17)
AST SERPL-CCNC: 11 U/L
BACTERIA: ABNORMAL
BASOPHILS # BLD: 1 % (ref 0–2)
BASOPHILS ABSOLUTE: 0.05 K/UL (ref 0–0.2)
BILIRUB SERPL-MCNC: 0.29 MG/DL (ref 0.3–1.2)
BILIRUBIN URINE: NEGATIVE
BUN BLDV-MCNC: 9 MG/DL (ref 8–23)
BUN/CREAT BLD: ABNORMAL (ref 9–20)
C-REACTIVE PROTEIN: 5.3 MG/L (ref 0–5)
CALCIUM SERPL-MCNC: 9.8 MG/DL (ref 8.6–10.4)
CASTS UA: ABNORMAL /LPF (ref 0–2)
CHLORIDE BLD-SCNC: 105 MMOL/L (ref 98–107)
CO2: 25 MMOL/L (ref 20–31)
COLOR: YELLOW
COMMENT UA: ABNORMAL
CREAT SERPL-MCNC: 0.59 MG/DL (ref 0.5–0.9)
CREATININE URINE: 257.1 MG/DL (ref 28–217)
CRYSTALS, UA: ABNORMAL /HPF
CRYSTALS, UA: ABNORMAL /HPF
DIFFERENTIAL TYPE: ABNORMAL
EOSINOPHILS RELATIVE PERCENT: 2 % (ref 1–4)
EPITHELIAL CELLS UA: ABNORMAL /HPF (ref 0–5)
GFR AFRICAN AMERICAN: >60 ML/MIN
GFR NON-AFRICAN AMERICAN: >60 ML/MIN
GFR SERPL CREATININE-BSD FRML MDRD: ABNORMAL ML/MIN/{1.73_M2}
GFR SERPL CREATININE-BSD FRML MDRD: ABNORMAL ML/MIN/{1.73_M2}
GLUCOSE BLD-MCNC: 106 MG/DL (ref 70–99)
GLUCOSE URINE: NEGATIVE
HCT VFR BLD CALC: 44.8 % (ref 36.3–47.1)
HEMOGLOBIN: 14.4 G/DL (ref 11.9–15.1)
HIV AG/AB: NONREACTIVE
IMMATURE GRANULOCYTES: 0 %
KETONES, URINE: NEGATIVE
LEUKOCYTE ESTERASE, URINE: NEGATIVE
LYMPHOCYTES # BLD: 45 % (ref 24–43)
MCH RBC QN AUTO: 30.3 PG (ref 25.2–33.5)
MCHC RBC AUTO-ENTMCNC: 32.1 G/DL (ref 28.4–34.8)
MCV RBC AUTO: 94.1 FL (ref 82.6–102.9)
MICROALBUMIN/CREAT 24H UR: <12 MG/L
MICROALBUMIN/CREAT UR-RTO: ABNORMAL MCG/MG CREAT
MONOCYTES # BLD: 9 % (ref 3–12)
MUCUS: ABNORMAL
NITRITE, URINE: NEGATIVE
NRBC AUTOMATED: 0 PER 100 WBC
OTHER OBSERVATIONS UA: ABNORMAL
PDW BLD-RTO: 13.8 % (ref 11.8–14.4)
PH UA: 5.5 (ref 5–8)
PLATELET # BLD: 232 K/UL (ref 138–453)
PLATELET ESTIMATE: ABNORMAL
PMV BLD AUTO: 10.4 FL (ref 8.1–13.5)
POTASSIUM SERPL-SCNC: 4.1 MMOL/L (ref 3.7–5.3)
PROTEIN UA: ABNORMAL
RBC # BLD: 4.76 M/UL (ref 3.95–5.11)
RBC # BLD: ABNORMAL 10*6/UL
RBC UA: ABNORMAL /HPF (ref 0–2)
RENAL EPITHELIAL, UA: ABNORMAL /HPF
SEDIMENTATION RATE, ERYTHROCYTE: 7 MM (ref 0–20)
SEG NEUTROPHILS: 43 % (ref 36–65)
SEGMENTED NEUTROPHILS ABSOLUTE COUNT: 2.79 K/UL (ref 1.5–8.1)
SODIUM BLD-SCNC: 146 MMOL/L (ref 135–144)
SPECIFIC GRAVITY UA: 1.02 (ref 1–1.03)
TOTAL PROTEIN: 7.5 G/DL (ref 6.4–8.3)
TRICHOMONAS: ABNORMAL
TSH SERPL DL<=0.05 MIU/L-ACNC: 0.6 MIU/L (ref 0.3–5)
TURBIDITY: ABNORMAL
URINE HGB: NEGATIVE
UROBILINOGEN, URINE: NORMAL
VITAMIN D 25-HYDROXY: 29.7 NG/ML (ref 30–100)
WBC # BLD: 6.5 K/UL (ref 3.5–11.3)
WBC # BLD: ABNORMAL 10*3/UL
WBC UA: ABNORMAL /HPF (ref 0–5)
YEAST: ABNORMAL

## 2019-09-20 PROCEDURE — 82306 VITAMIN D 25 HYDROXY: CPT

## 2019-09-20 PROCEDURE — 84443 ASSAY THYROID STIM HORMONE: CPT

## 2019-09-20 PROCEDURE — 36415 COLL VENOUS BLD VENIPUNCTURE: CPT

## 2019-09-20 PROCEDURE — 82105 ALPHA-FETOPROTEIN SERUM: CPT

## 2019-09-20 PROCEDURE — 82570 ASSAY OF URINE CREATININE: CPT

## 2019-09-20 PROCEDURE — 81001 URINALYSIS AUTO W/SCOPE: CPT

## 2019-09-20 PROCEDURE — 87389 HIV-1 AG W/HIV-1&-2 AB AG IA: CPT

## 2019-09-20 PROCEDURE — 86140 C-REACTIVE PROTEIN: CPT

## 2019-09-20 PROCEDURE — 85025 COMPLETE CBC W/AUTO DIFF WBC: CPT

## 2019-09-20 PROCEDURE — 80053 COMPREHEN METABOLIC PANEL: CPT

## 2019-09-20 PROCEDURE — 82043 UR ALBUMIN QUANTITATIVE: CPT

## 2019-09-20 PROCEDURE — 85651 RBC SED RATE NONAUTOMATED: CPT

## 2019-09-22 DIAGNOSIS — K58.2 IRRITABLE BOWEL SYNDROME WITH BOTH CONSTIPATION AND DIARRHEA: ICD-10-CM

## 2019-09-23 ENCOUNTER — TELEPHONE (OUTPATIENT)
Dept: INTERNAL MEDICINE | Age: 64
End: 2019-09-23

## 2019-09-23 NOTE — TELEPHONE ENCOUNTER
Pt calling requesting the results of her Labs that was done on Friday. Health Maintenance   Topic Date Due    Hepatitis A vaccine (1 of 2 - Risk 2-dose series) 06/05/1958    Hepatitis B Vaccine (1 of 3 - Risk 3-dose series) 06/05/1976    Shingles Vaccine (1 of 2) 06/05/2007    Diabetic retinal exam  01/01/2019    Diabetic foot exam  05/09/2019    Annual Wellness Visit (AWV)  05/29/2019    Breast cancer screen  11/01/2019    Lipid screen  04/23/2020    A1C test (Diabetic or Prediabetic)  09/17/2020    Diabetic microalbuminuria test  09/20/2020    Potassium monitoring  09/20/2020    Creatinine monitoring  09/20/2020    Colon cancer screen colonoscopy  03/14/2022    DTaP/Tdap/Td vaccine (2 - Td) 09/30/2026    Flu vaccine  Completed    Pneumococcal 0-64 years Vaccine  Completed    HIV screen  Completed             (applicable per patient's age: Cancer Screenings, Depression Screening, Fall Risk Screening, Immunizations)    Hemoglobin A1C (%)   Date Value   09/17/2019 5.9   04/23/2019 6.1 (H)   11/19/2018 6.1     Microalb/Crt.  Ratio (mcg/mg creat)   Date Value   09/20/2019 CANNOT BE CALCULATED     LDL Cholesterol (mg/dL)   Date Value   04/23/2019 88     AST (U/L)   Date Value   09/20/2019 11     ALT (U/L)   Date Value   09/20/2019 7     BUN (mg/dL)   Date Value   09/20/2019 9      (goal A1C is < 7)   (goal LDL is <100) need 30-50% reduction from baseline     BP Readings from Last 3 Encounters:   09/17/19 105/66   09/05/19 130/67   06/12/19 122/66    (goal /80)      All Future Testing planned in [facility]:  Lab Frequency Next Occurrence   EGD Once 01/22/2019   EGD Once 06/06/2019   XR CHEST STANDARD (2 VW) Once 09/17/2019   US LIVER Once 09/17/2019       Next Visit Date:  Future Appointments   Date Time Provider Terry Bradford   9/30/2019  7:30 AM STV ULTRASOUND RM 1 [facility] US [facility] Radiolog   10/2/2019  9:30 AM Karen Sagastume MD [facility]   10/9/2019  3:20 PM Yomi Green MD [facility] IM

## 2019-09-30 ENCOUNTER — HOSPITAL ENCOUNTER (OUTPATIENT)
Dept: GENERAL RADIOLOGY | Age: 64
Discharge: HOME | End: 2019-10-02
Payer: COMMERCIAL

## 2019-09-30 ENCOUNTER — HOSPITAL ENCOUNTER (OUTPATIENT)
Age: 64
Discharge: HOME | End: 2019-10-02
Payer: COMMERCIAL

## 2019-09-30 ENCOUNTER — HOSPITAL ENCOUNTER (OUTPATIENT)
Dept: ULTRASOUND IMAGING | Age: 64
Discharge: HOME | End: 2019-10-02
Payer: COMMERCIAL

## 2019-09-30 DIAGNOSIS — B18.2 HEP C W/O COMA, CHRONIC (HCC): ICD-10-CM

## 2019-09-30 DIAGNOSIS — R63.4 UNINTENTIONAL WEIGHT LOSS: ICD-10-CM

## 2019-09-30 PROCEDURE — 71046 X-RAY EXAM CHEST 2 VIEWS: CPT

## 2019-09-30 PROCEDURE — 76705 ECHO EXAM OF ABDOMEN: CPT

## 2019-10-01 RX ORDER — DOCUSATE SODIUM 100 MG/1
CAPSULE, LIQUID FILLED ORAL
Qty: 180 CAPSULE | Refills: 0 | Status: SHIPPED | OUTPATIENT
Start: 2019-10-01 | End: 2020-01-12

## 2019-10-01 RX ORDER — LINACLOTIDE 145 UG/1
CAPSULE, GELATIN COATED ORAL
Qty: 90 CAPSULE | Refills: 0 | Status: SHIPPED | OUTPATIENT
Start: 2019-10-01 | End: 2020-01-12

## 2019-10-01 RX ORDER — IBUPROFEN 800 MG/1
TABLET ORAL
Qty: 270 TABLET | Refills: 0 | OUTPATIENT
Start: 2019-10-01

## 2019-10-09 ENCOUNTER — OFFICE VISIT (OUTPATIENT)
Dept: INTERNAL MEDICINE | Age: 64
End: 2019-10-09
Payer: COMMERCIAL

## 2019-10-09 VITALS
BODY MASS INDEX: 27.47 KG/M2 | SYSTOLIC BLOOD PRESSURE: 129 MMHG | HEART RATE: 80 BPM | HEIGHT: 72 IN | WEIGHT: 202.8 LBS | DIASTOLIC BLOOD PRESSURE: 78 MMHG

## 2019-10-09 DIAGNOSIS — K52.9 ENTERITIS: Primary | ICD-10-CM

## 2019-10-09 DIAGNOSIS — Z12.39 SCREENING FOR BREAST CANCER: ICD-10-CM

## 2019-10-09 DIAGNOSIS — F17.200 SMOKER: ICD-10-CM

## 2019-10-09 DIAGNOSIS — B18.2 HEP C W/O COMA, CHRONIC (HCC): ICD-10-CM

## 2019-10-09 DIAGNOSIS — J41.0 SIMPLE CHRONIC BRONCHITIS (HCC): ICD-10-CM

## 2019-10-09 DIAGNOSIS — G25.81 RLS (RESTLESS LEGS SYNDROME): ICD-10-CM

## 2019-10-09 DIAGNOSIS — Z87.891 PERSONAL HISTORY OF TOBACCO USE, PRESENTING HAZARDS TO HEALTH: ICD-10-CM

## 2019-10-09 DIAGNOSIS — G62.9 NEUROPATHY: ICD-10-CM

## 2019-10-09 DIAGNOSIS — R63.4 RECENT UNINTENTIONAL WEIGHT LOSS OVER SEVERAL MONTHS: ICD-10-CM

## 2019-10-09 PROCEDURE — 99214 OFFICE O/P EST MOD 30 MIN: CPT | Performed by: INTERNAL MEDICINE

## 2019-10-09 PROCEDURE — 99211 OFF/OP EST MAY X REQ PHY/QHP: CPT

## 2019-10-09 RX ORDER — NITROGLYCERIN 0.4 MG/1
0.4 TABLET SUBLINGUAL EVERY 5 MIN PRN
Qty: 25 TABLET | Refills: 11 | Status: SHIPPED | OUTPATIENT
Start: 2019-10-09 | End: 2022-01-18 | Stop reason: SDUPTHER

## 2019-10-09 RX ORDER — VORTIOXETINE 10 MG/1
TABLET, FILM COATED ORAL
Refills: 0 | COMMUNITY
Start: 2019-09-20 | End: 2020-04-08

## 2019-10-09 RX ORDER — HYDROXYZINE HYDROCHLORIDE 10 MG/1
TABLET, FILM COATED ORAL
COMMUNITY
Start: 2017-09-20 | End: 2020-01-14 | Stop reason: ALTCHOICE

## 2019-10-09 RX ORDER — IPRATROPIUM BROMIDE AND ALBUTEROL SULFATE 2.5; .5 MG/3ML; MG/3ML
3 SOLUTION RESPIRATORY (INHALATION) 3 TIMES DAILY
Qty: 360 ML | Refills: 5 | Status: SHIPPED | OUTPATIENT
Start: 2019-10-09 | End: 2019-12-10 | Stop reason: SDUPTHER

## 2019-10-09 RX ORDER — ALBUTEROL SULFATE 90 UG/1
AEROSOL, METERED RESPIRATORY (INHALATION)
Qty: 54 G | Refills: 0 | Status: SHIPPED | OUTPATIENT
Start: 2019-10-09 | End: 2021-01-19

## 2019-10-09 RX ORDER — IBUPROFEN 800 MG/1
TABLET ORAL
COMMUNITY
Start: 2019-05-29 | End: 2020-04-08 | Stop reason: SDUPTHER

## 2019-10-09 RX ORDER — BUDESONIDE AND FORMOTEROL FUMARATE DIHYDRATE 160; 4.5 UG/1; UG/1
2 AEROSOL RESPIRATORY (INHALATION) 2 TIMES DAILY
Qty: 1 INHALER | Refills: 2 | Status: SHIPPED | OUTPATIENT
Start: 2019-10-09 | End: 2020-07-07

## 2019-10-09 RX ORDER — GABAPENTIN 600 MG/1
800 TABLET ORAL 3 TIMES DAILY
Qty: 135 TABLET | Refills: 3 | Status: SHIPPED | OUTPATIENT
Start: 2019-10-09 | End: 2019-11-19 | Stop reason: SDUPTHER

## 2019-10-09 RX ORDER — ROPINIROLE 0.5 MG/1
0.5 TABLET, FILM COATED ORAL DAILY
Qty: 30 TABLET | Refills: 2 | Status: SHIPPED | OUTPATIENT
Start: 2019-10-09 | End: 2020-03-09 | Stop reason: SDUPTHER

## 2019-10-09 RX ORDER — IBUPROFEN 800 MG/1
TABLET ORAL
Qty: 120 TABLET | Status: CANCELLED | OUTPATIENT
Start: 2019-10-09

## 2019-10-09 RX ORDER — ALBUTEROL SULFATE 2.5 MG/3ML
SOLUTION RESPIRATORY (INHALATION)
Qty: 300 ML | Refills: 2 | Status: SHIPPED | OUTPATIENT
Start: 2019-10-09 | End: 2020-06-09

## 2019-10-09 RX ORDER — TIZANIDINE 4 MG/1
TABLET ORAL
COMMUNITY
Start: 2019-08-14 | End: 2019-11-19 | Stop reason: SDUPTHER

## 2019-10-09 RX ORDER — ALBUTEROL SULFATE 90 UG/1
1 AEROSOL, METERED RESPIRATORY (INHALATION) EVERY 6 HOURS PRN
Qty: 18 G | Refills: 2 | Status: SHIPPED | OUTPATIENT
Start: 2019-10-09 | End: 2020-06-09

## 2019-10-09 RX ORDER — NITROGLYCERIN 0.4 MG/1
0.4 TABLET SUBLINGUAL
COMMUNITY
Start: 2019-09-04 | End: 2019-10-09 | Stop reason: SDUPTHER

## 2019-10-09 ASSESSMENT — PATIENT HEALTH QUESTIONNAIRE - PHQ9
2. FEELING DOWN, DEPRESSED OR HOPELESS: 0
SUM OF ALL RESPONSES TO PHQ QUESTIONS 1-9: 0
1. LITTLE INTEREST OR PLEASURE IN DOING THINGS: 0
SUM OF ALL RESPONSES TO PHQ9 QUESTIONS 1 & 2: 0
SUM OF ALL RESPONSES TO PHQ QUESTIONS 1-9: 0

## 2019-10-11 ENCOUNTER — HOSPITAL ENCOUNTER (OUTPATIENT)
Age: 64
Setting detail: SPECIMEN
Discharge: HOME | End: 2019-10-11
Payer: COMMERCIAL

## 2019-10-11 DIAGNOSIS — B18.2 CHRONIC HEPATITIS C WITHOUT HEPATIC COMA (HCC): Primary | ICD-10-CM

## 2019-10-11 LAB — HEPATITIS C ANTIBODY: REACTIVE

## 2019-10-11 PROCEDURE — 86803 HEPATITIS C AB TEST: CPT

## 2019-10-11 PROCEDURE — 36415 COLL VENOUS BLD VENIPUNCTURE: CPT

## 2019-10-14 ENCOUNTER — HOSPITAL ENCOUNTER (OUTPATIENT)
Age: 64
Discharge: HOME | End: 2019-10-14
Payer: COMMERCIAL

## 2019-10-14 ENCOUNTER — TELEPHONE (OUTPATIENT)
Dept: ONCOLOGY | Age: 64
End: 2019-10-14

## 2019-10-14 DIAGNOSIS — G62.9 NEUROPATHY: ICD-10-CM

## 2019-10-14 LAB
IRON SATURATION: 33 % (ref 20–55)
IRON: 84 UG/DL (ref 37–145)
TOTAL IRON BINDING CAPACITY: 251 UG/DL (ref 250–450)
UNSATURATED IRON BINDING CAPACITY: 167 UG/DL (ref 112–347)
VITAMIN B-12: 337 PG/ML (ref 232–1245)

## 2019-10-14 PROCEDURE — 36415 COLL VENOUS BLD VENIPUNCTURE: CPT

## 2019-10-14 PROCEDURE — 83540 ASSAY OF IRON: CPT

## 2019-10-14 PROCEDURE — 82607 VITAMIN B-12: CPT

## 2019-10-14 PROCEDURE — 83550 IRON BINDING TEST: CPT

## 2019-10-21 ENCOUNTER — TELEPHONE (OUTPATIENT)
Dept: INTERNAL MEDICINE | Age: 64
End: 2019-10-21

## 2019-10-21 ENCOUNTER — HOSPITAL ENCOUNTER (OUTPATIENT)
Age: 64
Discharge: HOME | End: 2019-10-21
Payer: COMMERCIAL

## 2019-10-21 DIAGNOSIS — E78.00 PURE HYPERCHOLESTEROLEMIA: ICD-10-CM

## 2019-10-21 DIAGNOSIS — Z87.891 PERSONAL HISTORY OF TOBACCO USE, PRESENTING HAZARDS TO HEALTH: Primary | ICD-10-CM

## 2019-10-21 LAB
ABSOLUTE EOS #: 0.41 K/UL (ref 0–0.44)
ABSOLUTE IMMATURE GRANULOCYTE: 0 K/UL (ref 0–0.3)
ABSOLUTE LYMPH #: 3.61 K/UL (ref 1.1–3.7)
ABSOLUTE MONO #: 0.74 K/UL (ref 0.1–1.2)
AFP: 5.1 UG/L
ALBUMIN SERPL-MCNC: 3.9 G/DL (ref 3.5–5.2)
ALBUMIN/GLOBULIN RATIO: 1.1 (ref 1–2.5)
ALP BLD-CCNC: 74 U/L (ref 35–104)
ALT SERPL-CCNC: 8 U/L (ref 5–33)
ANION GAP SERPL CALCULATED.3IONS-SCNC: 11 MMOL/L (ref 9–17)
AST SERPL-CCNC: 13 U/L
BASOPHILS # BLD: 1 % (ref 0–2)
BASOPHILS ABSOLUTE: 0.08 K/UL (ref 0–0.2)
BILIRUB SERPL-MCNC: 0.2 MG/DL (ref 0.3–1.2)
BILIRUBIN URINE: NEGATIVE
BUN BLDV-MCNC: 9 MG/DL (ref 8–23)
BUN/CREAT BLD: ABNORMAL (ref 9–20)
C-REACTIVE PROTEIN: 1.9 MG/L (ref 0–5)
CALCIUM SERPL-MCNC: 9.5 MG/DL (ref 8.6–10.4)
CHLORIDE BLD-SCNC: 103 MMOL/L (ref 98–107)
CO2: 24 MMOL/L (ref 20–31)
COLOR: YELLOW
COMMENT UA: NORMAL
CREAT SERPL-MCNC: 0.61 MG/DL (ref 0.5–0.9)
DIFFERENTIAL TYPE: ABNORMAL
EOSINOPHILS RELATIVE PERCENT: 5 % (ref 1–4)
GFR AFRICAN AMERICAN: >60 ML/MIN
GFR NON-AFRICAN AMERICAN: >60 ML/MIN
GFR SERPL CREATININE-BSD FRML MDRD: ABNORMAL ML/MIN/{1.73_M2}
GFR SERPL CREATININE-BSD FRML MDRD: ABNORMAL ML/MIN/{1.73_M2}
GLUCOSE BLD-MCNC: 98 MG/DL (ref 70–99)
GLUCOSE URINE: NEGATIVE
HCT VFR BLD CALC: 47.2 % (ref 36.3–47.1)
HEMOGLOBIN: 14.9 G/DL (ref 11.9–15.1)
HIV AG/AB: NONREACTIVE
IMMATURE GRANULOCYTES: 0 %
KETONES, URINE: NEGATIVE
LEUKOCYTE ESTERASE, URINE: NEGATIVE
LYMPHOCYTES # BLD: 44 % (ref 24–43)
MCH RBC QN AUTO: 29.8 PG (ref 25.2–33.5)
MCHC RBC AUTO-ENTMCNC: 31.6 G/DL (ref 28.4–34.8)
MCV RBC AUTO: 94.4 FL (ref 82.6–102.9)
MONOCYTES # BLD: 9 % (ref 3–12)
MORPHOLOGY: NORMAL
NITRITE, URINE: NEGATIVE
NRBC AUTOMATED: 0 PER 100 WBC
PDW BLD-RTO: 13.8 % (ref 11.8–14.4)
PH UA: 6.5 (ref 5–8)
PLATELET # BLD: 181 K/UL (ref 138–453)
PLATELET ESTIMATE: ABNORMAL
PMV BLD AUTO: 10.1 FL (ref 8.1–13.5)
POTASSIUM SERPL-SCNC: 3.9 MMOL/L (ref 3.7–5.3)
PROTEIN UA: NEGATIVE
RBC # BLD: 5 M/UL (ref 3.95–5.11)
RBC # BLD: ABNORMAL 10*6/UL
SEDIMENTATION RATE, ERYTHROCYTE: 4 MM (ref 0–20)
SEG NEUTROPHILS: 41 % (ref 36–65)
SEGMENTED NEUTROPHILS ABSOLUTE COUNT: 3.36 K/UL (ref 1.5–8.1)
SODIUM BLD-SCNC: 138 MMOL/L (ref 135–144)
SPECIFIC GRAVITY UA: 1.02 (ref 1–1.03)
TOTAL PROTEIN: 7.4 G/DL (ref 6.4–8.3)
TSH SERPL DL<=0.05 MIU/L-ACNC: 1.3 MIU/L (ref 0.3–5)
TURBIDITY: CLEAR
URINE HGB: NEGATIVE
UROBILINOGEN, URINE: NORMAL
VITAMIN D 25-HYDROXY: 28.8 NG/ML (ref 30–100)
WBC # BLD: 8.2 K/UL (ref 3.5–11.3)
WBC # BLD: ABNORMAL 10*3/UL

## 2019-10-21 PROCEDURE — 82105 ALPHA-FETOPROTEIN SERUM: CPT

## 2019-10-21 PROCEDURE — 87389 HIV-1 AG W/HIV-1&-2 AB AG IA: CPT

## 2019-10-21 PROCEDURE — 36415 COLL VENOUS BLD VENIPUNCTURE: CPT

## 2019-10-21 PROCEDURE — 80053 COMPREHEN METABOLIC PANEL: CPT

## 2019-10-21 PROCEDURE — 85651 RBC SED RATE NONAUTOMATED: CPT

## 2019-10-21 PROCEDURE — 82306 VITAMIN D 25 HYDROXY: CPT

## 2019-10-21 PROCEDURE — 85025 COMPLETE CBC W/AUTO DIFF WBC: CPT

## 2019-10-21 PROCEDURE — 81003 URINALYSIS AUTO W/O SCOPE: CPT

## 2019-10-21 PROCEDURE — 84443 ASSAY THYROID STIM HORMONE: CPT

## 2019-10-21 PROCEDURE — 86140 C-REACTIVE PROTEIN: CPT

## 2019-10-22 RX ORDER — PRAVASTATIN SODIUM 40 MG
TABLET ORAL
Qty: 90 TABLET | Refills: 0 | Status: SHIPPED | OUTPATIENT
Start: 2019-10-22 | End: 2019-11-15

## 2019-10-28 ENCOUNTER — TELEPHONE (OUTPATIENT)
Dept: INTERNAL MEDICINE | Age: 64
End: 2019-10-28

## 2019-10-29 DIAGNOSIS — I10 ESSENTIAL HYPERTENSION: ICD-10-CM

## 2019-10-29 RX ORDER — ATENOLOL 25 MG/1
TABLET ORAL
Qty: 30 TABLET | Refills: 2 | Status: SHIPPED | OUTPATIENT
Start: 2019-10-29 | End: 2020-01-30

## 2019-10-30 ENCOUNTER — TELEPHONE (OUTPATIENT)
Dept: ONCOLOGY | Age: 64
End: 2019-10-30

## 2019-10-30 NOTE — TELEPHONE ENCOUNTER
Patient does not meet criteria for lung screening. Pt at this point does NOT meet criteria. Pt must have a 30 pack year smoking history and Pt. Only has 20 pack year history. If you find that Pt. does have a higher smoking history, an initial CT lung screening Ct needs to be done. Otherwise Patient can follow strictly with pulmonologist. Patient is scheduled on 11/5/19 and will be cancelled if not updated. Thank you. Criteria is: Between ages 50-69         Current smoker with at least 27 pack/year smoking history (pack per day multiplied by years smoked equals pack year). IE: 1 pack a day for 30 years = 30 pack year         Former smoker who has quit with in the last 15 years with at least a 30 pack year history. Smoked cigarettes. Cigars and pipes do not qualify. Also needs evidence of shared decision making prior to baseline screening, NPI of Ordering Provider, NO Chest CT within the last 12 months. Patient also needs to be Asymptomatic, No signs or symptoms  IE: cough, chest pain, hemoptysis, SOB    For Medicare and Medicaid patients, need diagnosis of Z87.897 Personal History of Nicotine Dependence on the order.

## 2019-11-05 ENCOUNTER — HOSPITAL ENCOUNTER (OUTPATIENT)
Dept: CT IMAGING | Age: 64
Discharge: HOME | End: 2019-11-07
Payer: COMMERCIAL

## 2019-11-05 DIAGNOSIS — Z87.891 PERSONAL HISTORY OF TOBACCO USE, PRESENTING HAZARDS TO HEALTH: ICD-10-CM

## 2019-11-05 PROCEDURE — G0297 LDCT FOR LUNG CA SCREEN: HCPCS

## 2019-11-11 ENCOUNTER — OFFICE VISIT (OUTPATIENT)
Dept: PULMONOLOGY | Age: 64
End: 2019-11-11
Payer: MEDICAID

## 2019-11-11 VITALS
HEART RATE: 98 BPM | DIASTOLIC BLOOD PRESSURE: 87 MMHG | TEMPERATURE: 97.4 F | OXYGEN SATURATION: 85 % | WEIGHT: 205.6 LBS | BODY MASS INDEX: 27.88 KG/M2 | SYSTOLIC BLOOD PRESSURE: 136 MMHG

## 2019-11-11 DIAGNOSIS — G47.33 OBSTRUCTIVE SLEEP APNEA: ICD-10-CM

## 2019-11-11 DIAGNOSIS — I10 ESSENTIAL HYPERTENSION: ICD-10-CM

## 2019-11-11 DIAGNOSIS — Z72.0 TOBACCO ABUSE: ICD-10-CM

## 2019-11-11 DIAGNOSIS — E11.42 DIABETIC POLYNEUROPATHY ASSOCIATED WITH TYPE 2 DIABETES MELLITUS (HCC): ICD-10-CM

## 2019-11-11 DIAGNOSIS — Z99.89 OSA ON CPAP: ICD-10-CM

## 2019-11-11 DIAGNOSIS — J96.10 CHRONIC RESPIRATORY FAILURE, UNSPECIFIED WHETHER WITH HYPOXIA OR HYPERCAPNIA (HCC): ICD-10-CM

## 2019-11-11 DIAGNOSIS — E66.01 CLASS 2 SEVERE OBESITY DUE TO EXCESS CALORIES WITH SERIOUS COMORBIDITY IN ADULT, UNSPECIFIED BMI (HCC): ICD-10-CM

## 2019-11-11 DIAGNOSIS — G47.33 OSA ON CPAP: ICD-10-CM

## 2019-11-11 DIAGNOSIS — J42 CHRONIC BRONCHITIS, UNSPECIFIED CHRONIC BRONCHITIS TYPE (HCC): Primary | ICD-10-CM

## 2019-11-11 PROCEDURE — 94618 PULMONARY STRESS TESTING: CPT | Performed by: INTERNAL MEDICINE

## 2019-11-11 PROCEDURE — 99214 OFFICE O/P EST MOD 30 MIN: CPT | Performed by: INTERNAL MEDICINE

## 2019-11-11 ASSESSMENT — SLEEP AND FATIGUE QUESTIONNAIRES
ESS TOTAL SCORE: 18
HOW LIKELY ARE YOU TO NOD OFF OR FALL ASLEEP WHILE WATCHING TV: 3
HOW LIKELY ARE YOU TO NOD OFF OR FALL ASLEEP WHILE SITTING QUIETLY AFTER LUNCH WITHOUT ALCOHOL: 2
HOW LIKELY ARE YOU TO NOD OFF OR FALL ASLEEP WHILE SITTING INACTIVE IN A PUBLIC PLACE: 2
HOW LIKELY ARE YOU TO NOD OFF OR FALL ASLEEP WHILE LYING DOWN TO REST IN THE AFTERNOON WHEN CIRCUMSTANCES PERMIT: 2
HOW LIKELY ARE YOU TO NOD OFF OR FALL ASLEEP WHILE SITTING AND READING: 3
HOW LIKELY ARE YOU TO NOD OFF OR FALL ASLEEP IN A CAR, WHILE STOPPED FOR A FEW MINUTES IN TRAFFIC: 2
HOW LIKELY ARE YOU TO NOD OFF OR FALL ASLEEP WHEN YOU ARE A PASSENGER IN A CAR FOR AN HOUR WITHOUT A BREAK: 3
HOW LIKELY ARE YOU TO NOD OFF OR FALL ASLEEP WHILE SITTING AND TALKING TO SOMEONE: 1

## 2019-11-12 RX ORDER — LANCETS 33 GAUGE
EACH MISCELLANEOUS
Qty: 100 EACH | Refills: 10 | Status: SHIPPED | OUTPATIENT
Start: 2019-11-12 | End: 2020-04-08 | Stop reason: SDUPTHER

## 2019-11-15 ENCOUNTER — OFFICE VISIT (OUTPATIENT)
Dept: INTERNAL MEDICINE | Age: 64
End: 2019-11-15
Payer: COMMERCIAL

## 2019-11-15 VITALS
SYSTOLIC BLOOD PRESSURE: 129 MMHG | DIASTOLIC BLOOD PRESSURE: 72 MMHG | WEIGHT: 206 LBS | HEART RATE: 79 BPM | HEIGHT: 72 IN | BODY MASS INDEX: 27.9 KG/M2

## 2019-11-15 DIAGNOSIS — E11.40 TYPE 2 DIABETES MELLITUS WITH DIABETIC NEUROPATHY, WITHOUT LONG-TERM CURRENT USE OF INSULIN (HCC): ICD-10-CM

## 2019-11-15 DIAGNOSIS — B18.2 HEP C W/O COMA, CHRONIC (HCC): ICD-10-CM

## 2019-11-15 DIAGNOSIS — F17.200 SMOKER: ICD-10-CM

## 2019-11-15 DIAGNOSIS — H00.014 HORDEOLUM EXTERNUM OF LEFT UPPER EYELID: ICD-10-CM

## 2019-11-15 DIAGNOSIS — I10 ESSENTIAL HYPERTENSION: Primary | ICD-10-CM

## 2019-11-15 PROCEDURE — 99211 OFF/OP EST MAY X REQ PHY/QHP: CPT | Performed by: INTERNAL MEDICINE

## 2019-11-15 PROCEDURE — 99213 OFFICE O/P EST LOW 20 MIN: CPT | Performed by: STUDENT IN AN ORGANIZED HEALTH CARE EDUCATION/TRAINING PROGRAM

## 2019-11-15 RX ORDER — CIPROFLOXACIN HYDROCHLORIDE 3.5 MG/ML
1 SOLUTION/ DROPS TOPICAL
Qty: 120 DROP | Refills: 0 | Status: SHIPPED | OUTPATIENT
Start: 2019-11-15 | End: 2019-11-25

## 2019-11-18 ENCOUNTER — TELEPHONE (OUTPATIENT)
Dept: PULMONOLOGY | Age: 64
End: 2019-11-18

## 2019-11-19 DIAGNOSIS — G89.29 CHRONIC BILATERAL LOW BACK PAIN WITH RIGHT-SIDED SCIATICA: Primary | ICD-10-CM

## 2019-11-19 DIAGNOSIS — M54.41 CHRONIC BILATERAL LOW BACK PAIN WITH RIGHT-SIDED SCIATICA: Primary | ICD-10-CM

## 2019-11-19 RX ORDER — GABAPENTIN 800 MG/1
800 TABLET ORAL 3 TIMES DAILY
Qty: 135 TABLET | Refills: 3 | Status: SHIPPED | OUTPATIENT
Start: 2019-11-19 | End: 2020-04-08 | Stop reason: SDUPTHER

## 2019-11-19 RX ORDER — TIZANIDINE 2 MG/1
2 TABLET ORAL 2 TIMES DAILY PRN
Qty: 30 TABLET | Refills: 0 | Status: SHIPPED | OUTPATIENT
Start: 2019-11-19 | End: 2019-11-22 | Stop reason: ALTCHOICE

## 2019-11-22 ENCOUNTER — TELEPHONE (OUTPATIENT)
Dept: INTERNAL MEDICINE | Age: 64
End: 2019-11-22

## 2019-11-22 DIAGNOSIS — M54.41 CHRONIC BILATERAL LOW BACK PAIN WITH RIGHT-SIDED SCIATICA: ICD-10-CM

## 2019-11-22 DIAGNOSIS — G89.29 CHRONIC BILATERAL LOW BACK PAIN WITH RIGHT-SIDED SCIATICA: ICD-10-CM

## 2019-11-22 RX ORDER — TIZANIDINE 4 MG/1
4 TABLET ORAL 3 TIMES DAILY PRN
Qty: 90 TABLET | Refills: 3 | Status: SHIPPED | OUTPATIENT
Start: 2019-11-22 | End: 2020-02-28

## 2019-11-22 NOTE — TELEPHONE ENCOUNTER
pc asking why she has prescribed 2 mg zanaflex , it does not help her and wants her 10 mg back please advise      Health Maintenance   Topic Date Due    Hepatitis A vaccine (1 of 2 - Risk 2-dose series) 06/05/1958    Hepatitis B vaccine (1 of 3 - Risk 3-dose series) 06/05/1976    Shingles Vaccine (1 of 2) 06/05/2007    Diabetic retinal exam  01/01/2019    Diabetic foot exam  05/09/2019    Annual Wellness Visit (AWV)  05/29/2019    Breast cancer screen  11/01/2019    Lipid screen  04/23/2020    A1C test (Diabetic or Prediabetic)  09/17/2020    Diabetic microalbuminuria test  09/20/2020    Potassium monitoring  10/21/2020    Creatinine monitoring  10/21/2020    Colon cancer screen colonoscopy  03/14/2022    DTaP/Tdap/Td vaccine (2 - Td) 09/30/2026    Flu vaccine  Completed    Pneumococcal 0-64 years Vaccine  Completed    HIV screen  Completed             (applicable per patient's age: Cancer Screenings, Depression Screening, Fall Risk Screening, Immunizations)    Hemoglobin A1C (%)   Date Value   09/17/2019 5.9   04/23/2019 6.1 (H)   11/19/2018 6.1     Microalb/Crt.  Ratio (mcg/mg creat)   Date Value   09/20/2019 CANNOT BE CALCULATED     LDL Cholesterol (mg/dL)   Date Value   04/23/2019 88     AST (U/L)   Date Value   10/21/2019 13     ALT (U/L)   Date Value   10/21/2019 8     BUN (mg/dL)   Date Value   10/21/2019 9      (goal A1C is < 7)   (goal LDL is <100) need 30-50% reduction from baseline     BP Readings from Last 3 Encounters:   11/15/19 129/72   11/11/19 136/87   10/09/19 129/78    (goal /80)      All Future Testing planned in [facility]:  Lab Frequency Next Occurrence   EGD Once 06/06/2019   TRACY DIGITAL SCREEN W CAD BILATERAL Once 01/09/2020   Low Dose Chest CT -Abnormal Lung Screen Follow up Once 01/24/2020       Next Visit Date:  Future Appointments   Date Time Provider Terry Bradford   11/25/2019  1:00 PM Treva Hooper DPM [facility]   1/14/2020  2:30 PM Casper Salmon

## 2019-11-25 ENCOUNTER — OFFICE VISIT (OUTPATIENT)
Dept: PODIATRY | Age: 64
End: 2019-11-25
Payer: MEDICAID

## 2019-11-25 VITALS — HEIGHT: 72 IN | RESPIRATION RATE: 16 BRPM | WEIGHT: 206 LBS | BODY MASS INDEX: 27.9 KG/M2

## 2019-11-25 DIAGNOSIS — M79.605 PAIN IN BOTH LOWER EXTREMITIES: ICD-10-CM

## 2019-11-25 DIAGNOSIS — M79.604 PAIN IN BOTH LOWER EXTREMITIES: ICD-10-CM

## 2019-11-25 DIAGNOSIS — D23.71 BENIGN NEOPLASM OF SKIN OF RIGHT FOOT: ICD-10-CM

## 2019-11-25 DIAGNOSIS — L85.3 XEROSIS CUTIS: ICD-10-CM

## 2019-11-25 DIAGNOSIS — E11.51 TYPE II DIABETES MELLITUS WITH PERIPHERAL CIRCULATORY DISORDER (HCC): ICD-10-CM

## 2019-11-25 DIAGNOSIS — M20.41 HAMMERTOE OF RIGHT FOOT: ICD-10-CM

## 2019-11-25 DIAGNOSIS — B35.1 DERMATOPHYTOSIS OF NAIL: Primary | ICD-10-CM

## 2019-11-25 PROCEDURE — 17110 DESTRUCTION B9 LES UP TO 14: CPT | Performed by: PODIATRIST

## 2019-11-25 PROCEDURE — 11721 DEBRIDE NAIL 6 OR MORE: CPT | Performed by: PODIATRIST

## 2019-11-26 DIAGNOSIS — J41.0 SIMPLE CHRONIC BRONCHITIS (HCC): ICD-10-CM

## 2019-11-26 DIAGNOSIS — I10 ESSENTIAL HYPERTENSION: ICD-10-CM

## 2019-12-05 DIAGNOSIS — K59.01 SLOW TRANSIT CONSTIPATION: ICD-10-CM

## 2019-12-06 RX ORDER — DICYCLOMINE HCL 20 MG
TABLET ORAL
Qty: 60 TABLET | Refills: 2 | Status: SHIPPED | OUTPATIENT
Start: 2019-12-06 | End: 2020-01-14 | Stop reason: ALTCHOICE

## 2019-12-07 DIAGNOSIS — K21.9 GASTROESOPHAGEAL REFLUX DISEASE WITHOUT ESOPHAGITIS: ICD-10-CM

## 2019-12-09 ENCOUNTER — HOSPITAL ENCOUNTER (OUTPATIENT)
Dept: CT IMAGING | Age: 64
Discharge: HOME | End: 2019-12-11
Payer: COMMERCIAL

## 2019-12-09 DIAGNOSIS — R10.0 ACUTE ABDOMINAL PAIN SYNDROME: ICD-10-CM

## 2019-12-09 PROCEDURE — 74176 CT ABD & PELVIS W/O CONTRAST: CPT

## 2019-12-10 RX ORDER — PANTOPRAZOLE SODIUM 40 MG/1
TABLET, DELAYED RELEASE ORAL
Qty: 30 TABLET | Refills: 2 | Status: SHIPPED | OUTPATIENT
Start: 2019-12-10 | End: 2020-02-21

## 2019-12-10 RX ORDER — HYDROCHLOROTHIAZIDE 12.5 MG/1
12.5 CAPSULE, GELATIN COATED ORAL EVERY MORNING
Qty: 90 CAPSULE | Refills: 0 | Status: SHIPPED | OUTPATIENT
Start: 2019-12-10 | End: 2020-04-08 | Stop reason: SDUPTHER

## 2019-12-10 RX ORDER — IPRATROPIUM BROMIDE AND ALBUTEROL SULFATE 2.5; .5 MG/3ML; MG/3ML
3 SOLUTION RESPIRATORY (INHALATION) 3 TIMES DAILY
Qty: 360 ML | Refills: 5 | Status: SHIPPED | OUTPATIENT
Start: 2019-12-10 | End: 2020-06-09

## 2019-12-12 ENCOUNTER — TELEPHONE (OUTPATIENT)
Dept: GASTROENTEROLOGY | Age: 64
End: 2019-12-12

## 2019-12-13 ENCOUNTER — TELEPHONE (OUTPATIENT)
Dept: GASTROENTEROLOGY | Age: 64
End: 2019-12-13

## 2019-12-13 DIAGNOSIS — K59.00 CONSTIPATION, UNSPECIFIED CONSTIPATION TYPE: Primary | ICD-10-CM

## 2019-12-16 RX ORDER — SODIUM, POTASSIUM,MAG SULFATES 17.5-3.13G
SOLUTION, RECONSTITUTED, ORAL ORAL
Qty: 1 BOTTLE | Refills: 0 | Status: SHIPPED | OUTPATIENT
Start: 2019-12-16 | End: 2020-01-14 | Stop reason: ALTCHOICE

## 2019-12-30 NOTE — TELEPHONE ENCOUNTER
Request for RA COL-RITE - medication pended. Please fill if appropriate. Next Visit Date:  Future Appointments   Date Time Provider Terry Suzette   1/14/2020  2:30 PM [facility] Wor[facility]   1/22/2020 10:30 AM [facility] Vu [facility]   2/3/2020  1:15 PM Nikki Louis DPM [facility]   2/17/2020  9:15 AM Oralia Wong MD Resp Spec [address] Maintenance   Topic Date Due    Hepatitis A vaccine (1 of 2 - Risk 2-dose series) 06[birth year]05/1958    Hepatitis B vaccine (1 of 3 - Risk 3-dose series) 06/05/1976    Shingles Vaccine (1 of 2) 06/05/2007    Diabetic retinal exam  01/01/2019    Annual Wellness Visit (AWV)  05/29/2019    Breast cancer screen  11/01/2019    Lipid screen  04/23/2020    A1C test (Diabetic or Prediabetic)  09/17/2020    Diabetic microalbuminuria test  09/20/2020    Potassium monitoring  10/21/2020    Creatinine monitoring  10/21/2020    Diabetic foot exam  12/03/2020    Colon cancer screen colonoscopy  03/14/2022    DTaP/Tdap/Td vaccine (2 - Td) 09/30/2026    Flu vaccine  Completed    Pneumococcal 0-64 years Vaccine  Completed    HIV screen  Completed       Hemoglobin A1C (%)   Date Value   09/17/2019 5.9   04/23/2019 6.1 (H)   11/19/2018 6.1             ( goal A1C is < 7)   Microalb/Crt.  Ratio (mcg/mg creat)   Date Value   09/20/2019 CANNOT BE CALCULATED     LDL Cholesterol (mg/dL)   Date Value   04/23/2019 88       (goal LDL is <100)   AST (U/L)   Date Value   10/21/2019 13     ALT (U/L)   Date Value   10/21/2019 8     BUN (mg/dL)   Date Value   10/21/2019 9     BP Readings from Last 3 Encounters:   11/15/19 129/72   11/11/19 136/87   10/09/19 129/78          (goal 120/80)    All Future Testing planned in [facility]  Lab Frequency Next Occurrence   EGD Once 06/06/2019   TRACY DIGITAL SCREEN W CAD BILATERAL Once 01/09/2020   Low Dose Chest CT -Abnormal Lung Screen Follow up Once 01/24/2020   Colonoscopy Routine Once 12/13/2019 Patient Active Problem List:     Essential hypertension     Pure hypercholesterolemia     Moderate episode of recurrent major depressive disorder ([facility] [address]     History of heroin use     Hep C w/o coma, chronic (HCC) completed tx 2016     GERD (gastroesophageal reflux disease)     COPD (chronic obstructive pulmonary disease) (HCC)     Tobacco abuse     Chronic diastolic congestive heart failure (HCC)     Diverticulosis     Hyperplastic polyp of intestine     Diabetic polyneuropathy associated with type 2 diabetes mellitus (HCC)     Bilateral chronic knee pain     Chronic respiratory failure with hypoxia (HCC)     Type 2 diabetes mellitus with complication (HCC)     Primary insomnia     Cigarette nicotine dependence without complication     Chronic bilateral low back pain with right-sided sciatica     Irritable bowel syndrome with both constipation and diarrhea     Enteritis     Chest pain

## 2020-01-07 ENCOUNTER — TELEPHONE (OUTPATIENT)
Dept: GASTROENTEROLOGY | Age: 65
End: 2020-01-07

## 2020-01-07 NOTE — TELEPHONE ENCOUNTER
Spoke with patient to confirm colon scheduled 1/9/20 at [facility]. Patient has a  and expressed understanding of CLD and bowel prep instructions.

## 2020-01-08 ENCOUNTER — ANESTHESIA EVENT (OUTPATIENT)
Dept: OPERATING ROOM | Age: 65
End: 2020-01-08
Payer: COMMERCIAL

## 2020-01-09 ENCOUNTER — ANESTHESIA (OUTPATIENT)
Dept: OPERATING ROOM | Age: 65
End: 2020-01-09
Payer: COMMERCIAL

## 2020-01-09 ENCOUNTER — HOSPITAL ENCOUNTER (OUTPATIENT)
Age: 65
Setting detail: OUTPATIENT SURGERY
Discharge: HOME | End: 2020-01-09
Attending: INTERNAL MEDICINE | Admitting: INTERNAL MEDICINE
Payer: COMMERCIAL

## 2020-01-09 VITALS
OXYGEN SATURATION: 100 % | SYSTOLIC BLOOD PRESSURE: 141 MMHG | RESPIRATION RATE: 11 BRPM | DIASTOLIC BLOOD PRESSURE: 81 MMHG

## 2020-01-09 VITALS
BODY MASS INDEX: 26.55 KG/M2 | HEART RATE: 71 BPM | DIASTOLIC BLOOD PRESSURE: 95 MMHG | SYSTOLIC BLOOD PRESSURE: 137 MMHG | WEIGHT: 196 LBS | TEMPERATURE: 96.8 F | RESPIRATION RATE: 11 BRPM | OXYGEN SATURATION: 98 % | HEIGHT: 72 IN

## 2020-01-09 LAB
GLUCOSE BLD-MCNC: 110 MG/DL (ref 65–105)
GLUCOSE BLD-MCNC: 77 MG/DL (ref 65–105)

## 2020-01-09 PROCEDURE — 2580000003 HC RX 258: Performed by: NURSE ANESTHETIST, CERTIFIED REGISTERED

## 2020-01-09 PROCEDURE — 3700000000 HC ANESTHESIA ATTENDED CARE: Performed by: INTERNAL MEDICINE

## 2020-01-09 PROCEDURE — 45380 COLONOSCOPY AND BIOPSY: CPT | Performed by: INTERNAL MEDICINE

## 2020-01-09 PROCEDURE — 2709999900 HC NON-CHARGEABLE SUPPLY: Performed by: INTERNAL MEDICINE

## 2020-01-09 PROCEDURE — 6360000002 HC RX W HCPCS: Performed by: NURSE ANESTHETIST, CERTIFIED REGISTERED

## 2020-01-09 PROCEDURE — 7100000010 HC PHASE II RECOVERY - FIRST 15 MIN: Performed by: INTERNAL MEDICINE

## 2020-01-09 PROCEDURE — 3609010300 HC COLONOSCOPY W/BIOPSY SINGLE/MULTIPLE: Performed by: INTERNAL MEDICINE

## 2020-01-09 PROCEDURE — 82947 ASSAY GLUCOSE BLOOD QUANT: CPT

## 2020-01-09 PROCEDURE — 3700000001 HC ADD 15 MINUTES (ANESTHESIA): Performed by: INTERNAL MEDICINE

## 2020-01-09 PROCEDURE — 2580000003 HC RX 258: Performed by: ANESTHESIOLOGY

## 2020-01-09 PROCEDURE — 2500000003 HC RX 250 WO HCPCS: Performed by: NURSE ANESTHETIST, CERTIFIED REGISTERED

## 2020-01-09 PROCEDURE — 88305 TISSUE EXAM BY PATHOLOGIST: CPT

## 2020-01-09 PROCEDURE — 7100000011 HC PHASE II RECOVERY - ADDTL 15 MIN: Performed by: INTERNAL MEDICINE

## 2020-01-09 RX ORDER — SODIUM CHLORIDE, SODIUM LACTATE, POTASSIUM CHLORIDE, CALCIUM CHLORIDE 600; 310; 30; 20 MG/100ML; MG/100ML; MG/100ML; MG/100ML
INJECTION, SOLUTION INTRAVENOUS CONTINUOUS
Status: DISCONTINUED | OUTPATIENT
Start: 2020-01-10 | End: 2020-01-09 | Stop reason: HOSPADM

## 2020-01-09 RX ORDER — PROPOFOL 10 MG/ML
INJECTION, EMULSION INTRAVENOUS PRN
Status: DISCONTINUED | OUTPATIENT
Start: 2020-01-09 | End: 2020-01-09 | Stop reason: SDUPTHER

## 2020-01-09 RX ORDER — LIDOCAINE HYDROCHLORIDE 10 MG/ML
1 INJECTION, SOLUTION EPIDURAL; INFILTRATION; INTRACAUDAL; PERINEURAL
Status: DISCONTINUED | OUTPATIENT
Start: 2020-01-10 | End: 2020-01-09 | Stop reason: HOSPADM

## 2020-01-09 RX ORDER — LIDOCAINE HYDROCHLORIDE 20 MG/ML
INJECTION, SOLUTION EPIDURAL; INFILTRATION; INTRACAUDAL; PERINEURAL PRN
Status: DISCONTINUED | OUTPATIENT
Start: 2020-01-09 | End: 2020-01-09 | Stop reason: SDUPTHER

## 2020-01-09 RX ORDER — SODIUM CHLORIDE, SODIUM LACTATE, POTASSIUM CHLORIDE, CALCIUM CHLORIDE 600; 310; 30; 20 MG/100ML; MG/100ML; MG/100ML; MG/100ML
INJECTION, SOLUTION INTRAVENOUS CONTINUOUS PRN
Status: DISCONTINUED | OUTPATIENT
Start: 2020-01-09 | End: 2020-01-09 | Stop reason: SDUPTHER

## 2020-01-09 RX ORDER — AMOXICILLIN 250 MG
1 CAPSULE ORAL 2 TIMES DAILY
Qty: 60 TABLET | Refills: 5 | Status: SHIPPED | OUTPATIENT
Start: 2020-01-09 | End: 2020-04-08 | Stop reason: SDUPTHER

## 2020-01-09 RX ADMIN — PROPOFOL 50 MG: 10 INJECTION, EMULSION INTRAVENOUS at 09:34

## 2020-01-09 RX ADMIN — PROPOFOL 50 MG: 10 INJECTION, EMULSION INTRAVENOUS at 09:49

## 2020-01-09 RX ADMIN — SODIUM CHLORIDE, POTASSIUM CHLORIDE, SODIUM LACTATE AND CALCIUM CHLORIDE: 600; 310; 30; 20 INJECTION, SOLUTION INTRAVENOUS at 09:17

## 2020-01-09 RX ADMIN — PROPOFOL 50 MG: 10 INJECTION, EMULSION INTRAVENOUS at 09:53

## 2020-01-09 RX ADMIN — PROPOFOL 50 MG: 10 INJECTION, EMULSION INTRAVENOUS at 09:38

## 2020-01-09 RX ADMIN — PROPOFOL 50 MG: 10 INJECTION, EMULSION INTRAVENOUS at 09:42

## 2020-01-09 RX ADMIN — PROPOFOL 80 MG: 10 INJECTION, EMULSION INTRAVENOUS at 09:28

## 2020-01-09 RX ADMIN — SODIUM CHLORIDE, POTASSIUM CHLORIDE, SODIUM LACTATE AND CALCIUM CHLORIDE: 600; 310; 30; 20 INJECTION, SOLUTION INTRAVENOUS at 09:07

## 2020-01-09 RX ADMIN — PROPOFOL 50 MG: 10 INJECTION, EMULSION INTRAVENOUS at 09:46

## 2020-01-09 RX ADMIN — PROPOFOL 50 MG: 10 INJECTION, EMULSION INTRAVENOUS at 09:57

## 2020-01-09 RX ADMIN — LIDOCAINE HYDROCHLORIDE 60 MG: 20 INJECTION, SOLUTION EPIDURAL; INFILTRATION; INTRACAUDAL; PERINEURAL at 09:28

## 2020-01-09 RX ADMIN — PROPOFOL 20 MG: 10 INJECTION, EMULSION INTRAVENOUS at 09:31

## 2020-01-09 ASSESSMENT — PULMONARY FUNCTION TESTS
PIF_VALUE: 1
PIF_VALUE: 0
PIF_VALUE: 1

## 2020-01-09 ASSESSMENT — PAIN SCALES - GENERAL
PAINLEVEL_OUTOF10: 0
PAINLEVEL_OUTOF10: 0

## 2020-01-09 ASSESSMENT — PAIN - FUNCTIONAL ASSESSMENT: PAIN_FUNCTIONAL_ASSESSMENT: 0-10

## 2020-01-09 NOTE — H&P
History and Physical Service   [facility] 12    HISTORY AND PHYSICAL EXAMINATION            Date of Evaluation: 1/9/2020  Patient name:  Krunal Barth  MRN:   [id number]  YOB: 1955  PCP:    Yamileth Parada MD    History Obtained From:     Patient, medical records    History of Present Illness: This is Krunal Barth a 60 y.o. female who presents today for a screening colonoscopy by Dr. Caio Davidson. She has had almost a 40  Pound unintentional weight loss in the past 3 months she reports. She has diffuse left lower abdominal pain associated with a fever on 12/9/19 and was told at [facility]'s ED that it looked like she possibly had a bowel infection. CT abdomen pelvis 12/9/19 without constrast:  Impression   Focal area of wall thickening involving the transverse colon could be on an   infectious or inflammatory basis.  Malignancy should be excluded.  Follow-up   with colonoscopy is recommended.         The patient denies any  nausea, vomiting, diarrhea, constipation. No dark stools or bleeding. No family history of colon cancer. Last colonoscopy was 3/2019. The patient has a history of colon polyps, hepatitis C and  GERD, heroin abuse and clean she reports past 10 years, diastolic heart failure with pacemaker, cancer of the uterus and COPD with a history of respiratory failure.    T    Past Medical History:     Past Medical History:   Diagnosis Date    RACHEL (acute kidney injury) ([facility] [address] 1/22/2014    Arthritis     generalized    Bronchiectasis without complication ([facility] [address]     Cancer ([facility] [address] 2004    uterus    CHF (congestive heart failure) (HCC)     Chronic bilateral low back pain with right-sided sciatica 2/20/2017    Chronic bronchitis (HCC)     Chronic respiratory failure with hypoxia (HCC)     COPD (chronic obstructive pulmonary disease) (HCC)     on inhaler /  COPD with chronic bronchitis and emphysema    Depression 10/30/2014    Diabetic polyneuropathy MD Anayeli   blood glucose test strips (CONTOUR NEXT TEST) strip Dx: Use 2 times daily. 11/12/19   Casper Garces MD   mometasone-formoterol (DULERA) 200-5 MCG/ACT inhaler Inhale 2 puffs into the lungs 9/5/19   Historical Provider, MD   albuterol sulfate HFA (VENTOLIN HFA) 108 (90 Base) MCG/ACT inhaler Inhale 1 puff into the lungs every 6 hours as needed for Wheezing 10/9/19   Iona Hernandez MD   albuterol sulfate  (90 Base) MCG/ACT inhaler inhale 1 puff by mouth every 6 hours if needed for wheezing 10/9/19   Iona Hernandez MD   nitroGLYCERIN (NITROSTAT) 0.4 MG SL tablet Place 1 tablet under the tongue every 5 minutes as needed for Chest pain 10/9/19   Iona Hernandez MD   LINZESS 145 MCG capsule take 1 capsule by mouth every morning BEFORE BREAKFAST 10/1/19   Casper Guadalupe MD   RA COL-RITE 100 MG capsule take 1 capsule by mouth twice a day 10/1/19   Casper Guadalupe MD   Umeclidinium Bromide (INCRUSE ELLIPTA) 62.5 MCG/INH AEPB Inhale 1 puff into the lungs daily 9/17/19   Gennaro Dunn MD   furosemide (LASIX) 20 MG tablet take 1 tablet by mouth twice a day 9/17/19   Gennaro Dunn MD   ARIPiprazole (ABILIFY) 15 MG tablet Take 1 tablet by mouth daily 9/17/19   Gennaro Dunn MD   Alcohol Swabs 70 % PADS Dx: DM-2 use 2-3 times daily 9/17/19   Gennaro Dunn MD   potassium chloride (KLOR-CON M) 20 MEQ extended release tablet Take 1 tablet by mouth daily 5/29/19   Antonella Mobley MD        Allergies: Iv dye [iodides]    Social History:     Tobacco:    reports that she has been smoking cigarettes. She started smoking about 51 years ago. She has a 20.00 pack-year smoking history. She has never used smokeless tobacco.  Alcohol:      reports no history of alcohol use. Drug Use:  reports no history of drug use.     Family History:     Family History   Problem Relation Age of Onset   Ana Lilia Fleming Cancer Mother         lungs and brain    Stroke Father     Crohn's Disease Sister        Review of Systems:     Positive and Negative as described in HPI. CONSTITUTIONAL:  negative for fevers, chills, sweats, fatigue, weight loss  HEENT:  negative for vision, hearing changes, runny nose, throat pain  RESPIRATORY:  negative for shortness of breath, cough, congestion, wheezing. CARDIOVASCULAR:  negative for chest pain, palpitations. GASTROINTESTINAL: See HPI. GENITOURINARY:  negative for difficulty of urination, burning with urination, frequency   INTEGUMENT:  negative for rash, skin lesions, easy bruising   HEMATOLOGIC/LYMPHATIC:  negative for swelling/edema   ALLERGIC/IMMUNOLOGIC:  negative for urticaria , itching  ENDOCRINE:  negative increase in drinking, increase in urination, hot or cold intolerance  MUSCULOSKELETAL:  negative joint pains, muscle aches, swelling of joints  NEUROLOGICAL:  negative for headaches, dizziness, lightheadedness, numbness, pain, tingling extremities  BEHAVIOR/PSYCH:  negative for depression, anxiety    Physical Exam:   BP (!) 140/78   Pulse 85   Temp 98.8 °F (37.1 °C) (Oral)   Resp 20   Ht 6' (1.829 m)   Wt 196 lb (88.9 kg)   SpO2 99%   BMI 26.58 kg/m²   No LMP recorded. Patient has had a hysterectomy. General Appearance:  alert, well appearing, and in no acute distress  Mental status: oriented to person, place, and time with normal affect  Head:  normocephalic, atraumatic. Eye: no icterus, redness, pupils equal and reactive, extraocular eye movements intact, conjunctiva clear  Ear: normal external ear, no discharge, hearing intact  Nose:  no drainage noted  Mouth: mucous membranes moist  Neck: supple, no carotid bruits, thyroid not palpable  Lungs: Bilateral equal air entry, clear to ausculation, no wheezing, rales or rhonchi, normal effort  Cardiovascular: normal rate, regular rhythm, no murmur, gallop, rub.   Abdomen: Soft, diffuse mild abdominal pain with palpation; nondistended, normal bowel sounds, no hepatomegaly or splenomegaly  Neurologic: There are no new focal motor or sensory deficits, normal muscle tone and bulk, no abnormal sensation, normal speech, cranial nerves II through XII grossly intact  Skin: No gross lesions, rashes, bruising or bleeding on exposed skin area  Extremities:  peripheral pulses palpable, no pedal edema or calf pain with palpation  Psych: normal affect         Diagnosis:    Screening colonocopy      MARCIA Armando CNP  1/9/2020  8:46 AM

## 2020-01-09 NOTE — OP NOTE
[facility]     PROCEDURE DATE: 01/09/20    REFERRING PHYSICIAN: No ref. provider found     PRIMARY CARE PROVIDER: Edilson Banks MD    ATTENDING PHYSICIAN: Maya Marie MD     HISTORY: Ms. Maura Perez is a 60 y.o. female who presents to the [place] [address] Endoscopy unit for colonoscopy. The patient's clinical history is remarkable for abd pain. She is currently medically stable and appropriate for the planned procedure. PREOPERATIVE DIAGNOSIS: chronic abdominal pain. PROCEDURES:   Transanal Colonoscopy with biopsy. POSTPROCEDURE DIAGNOSIS:  Segmental colitis    MEDICATIONS:     MAC per anesthesia    EBL: minimal    INSTRUMENT: Olympus PCF-H190 AL flexible Colonoscope. PREPARATION: The nature and character of the procedure as well as risks, benefits, and alternatives were discussed with the patient and informed consent was obtained. Complications were said to include, but were not limited to: medication allergy, medication reaction, cardiovascular and respiratory problems, bleeding, perforation, infection, and/or missed diagnosis. Following arrival in the endoscopy room, the patient was placed in the left lateral decubitus position and final time-out accomplished in the presence of the nursing staff. Baseline vital signs were obtained and reviewed, and IV sedation was subsequently initiated. FINDINGS: Rectal examination demonstrated no significant visible external abnormality and digital palpation was unremarkable. Following adequate conscious sedation the colonoscope was introduced and advanced under direct visualization to the terminal ileum. The bowel preparation was felt to be adequate. This included small amounts of thick, thin and watery stool that mostly was able to be adequately irrigated and aspirated. Cecal intubation time was 3 minutes. Once maximally inserted, the endoscope was withdrawn and the mucosa was carefully inspected.  The

## 2020-01-09 NOTE — ANESTHESIA POSTPROCEDURE EVALUATION
Department of Anesthesiology  Postprocedure Note    Patient: Jacky Fong  MRN: [id number]  YOB: 1955  Date of evaluation: 1/9/2020  Time:  4:18 PM     Procedure Summary     Date:  01/09/20 Room / Location:  [place] [address] / [facility] - INPATIENT    Anesthesia Start:  9749 Anesthesia Stop:  1009    Procedure:  COLONOSCOPY WITH BIOPSY (N/A ) Diagnosis:  (DX CONSTIPATION)    Surgeon:  Arelis Rothman MD Responsible Provider:  Kaaren Kawasaki, DO    Anesthesia Type:  MAC, general ASA Status:  3          Anesthesia Type: MAC, general    Michael Phase I: Michael Score: 10    Michael Phase II: Michael Score: 8    Last vitals: Reviewed and per EMR flowsheets.        Anesthesia Post Evaluation    Patient location during evaluation: PACU  Patient participation: complete - patient participated  Level of consciousness: awake and alert  Airway patency: patent  Nausea & Vomiting: no nausea and no vomiting  Complications: no  Cardiovascular status: hemodynamically stable  Respiratory status: acceptable  Hydration status: stable

## 2020-01-10 LAB — SURGICAL PATHOLOGY REPORT: NORMAL

## 2020-01-10 NOTE — TELEPHONE ENCOUNTER
Request for abilify - medication pended. Please fill if appropriate. Next Visit Date:  Future Appointments   Date Time Provider Terry Suzette   1/14/2020  2:30 PM Casper Rust MD [facility]   1/22/2020 10:30 AM Cuba Nolan MD sv gr lks [facility]   1/29/2020  1:15 PM STA MAMMO RM 1 STAZ MAMMO STA Radiolog   2/3/2020  1:15 PM Morelia Gloria DPM [facility]   2/17/2020  9:15 AM Nancy Roberto MD Resp Spec [address] Maintenance   Topic Date Due    Hepatitis A vaccine (1 of 2 - Risk 2-dose series) [birth year]05/1958    Hepatitis B vaccine (1 of 3 - Risk 3-dose series) 06/05/1976    Shingles Vaccine (1 of 2) 06/05/2007    Diabetic retinal exam  01/01/2019    Annual Wellness Visit (AWV)  05/29/2019    Breast cancer screen  11/01/2019    Lipid screen  04/23/2020    A1C test (Diabetic or Prediabetic)  09/17/2020    Diabetic microalbuminuria test  09/20/2020    Potassium monitoring  10/21/2020    Creatinine monitoring  10/21/2020    Diabetic foot exam  12/03/2020    Colon cancer screen colonoscopy  01/09/2023    DTaP/Tdap/Td vaccine (2 - Td) 09/30/2026    Flu vaccine  Completed    Pneumococcal 0-64 years Vaccine  Completed    HIV screen  Completed       Hemoglobin A1C (%)   Date Value   09/17/2019 5.9   04/23/2019 6.1 (H)   11/19/2018 6.1             ( goal A1C is < 7)   Microalb/Crt.  Ratio (mcg/mg creat)   Date Value   09/20/2019 CANNOT BE CALCULATED     LDL Cholesterol (mg/dL)   Date Value   04/23/2019 88       (goal LDL is <100)   AST (U/L)   Date Value   10/21/2019 13     ALT (U/L)   Date Value   10/21/2019 8     BUN (mg/dL)   Date Value   10/21/2019 9     BP Readings from Last 3 Encounters:   01/09/20 (!) 141/81   01/09/20 (!) 137/95   11/15/19 129/72          (goal 120/80)    All Future Testing planned in [facility]  Lab Frequency Next Occurrence   EGD Once 06/06/2019   TRACY DIGITAL SCREEN W CAD BILATERAL Once 01/09/2020   Low Dose Chest CT -Abnormal Lung Screen Follow up Once 01/24/2020   Colonoscopy Routine Once 12/13/2019         Patient Active Problem List:     Essential hypertension     Pure hypercholesterolemia     Moderate episode of recurrent major depressive disorder ([facility] [address]     History of heroin use     Hep C w/o coma, chronic ([facility] [address] completed tx 2016     GERD (gastroesophageal reflux disease)     COPD (chronic obstructive pulmonary disease) (HCC)     Tobacco abuse     Chronic diastolic congestive heart failure (HCC)     Diverticulosis     Hyperplastic polyp of intestine     Diabetic polyneuropathy associated with type 2 diabetes mellitus (HCC)     Bilateral chronic knee pain     Chronic respiratory failure with hypoxia (HCC)     Type 2 diabetes mellitus with complication (HCC)     Primary insomnia     Cigarette nicotine dependence without complication     Chronic bilateral low back pain with right-sided sciatica     Irritable bowel syndrome with both constipation and diarrhea     Enteritis     Chest pain

## 2020-01-12 RX ORDER — LINACLOTIDE 145 UG/1
CAPSULE, GELATIN COATED ORAL
Qty: 90 CAPSULE | Refills: 0 | Status: SHIPPED | OUTPATIENT
Start: 2020-01-12 | End: 2020-01-14 | Stop reason: ALTCHOICE

## 2020-01-12 RX ORDER — ARIPIPRAZOLE 15 MG/1
TABLET ORAL
Qty: 30 TABLET | Refills: 0 | Status: SHIPPED | OUTPATIENT
Start: 2020-01-12 | End: 2020-02-13

## 2020-01-12 RX ORDER — DOCUSATE SODIUM 100 MG/1
CAPSULE, LIQUID FILLED ORAL
Qty: 180 CAPSULE | Refills: 0 | Status: SHIPPED | OUTPATIENT
Start: 2020-01-12 | End: 2020-04-08 | Stop reason: SDUPTHER

## 2020-01-14 ENCOUNTER — OFFICE VISIT (OUTPATIENT)
Dept: INTERNAL MEDICINE | Age: 65
End: 2020-01-14
Payer: COMMERCIAL

## 2020-01-14 VITALS
BODY MASS INDEX: 26.55 KG/M2 | DIASTOLIC BLOOD PRESSURE: 77 MMHG | WEIGHT: 196 LBS | SYSTOLIC BLOOD PRESSURE: 137 MMHG | HEIGHT: 72 IN | HEART RATE: 73 BPM

## 2020-01-14 PROCEDURE — 99214 OFFICE O/P EST MOD 30 MIN: CPT | Performed by: STUDENT IN AN ORGANIZED HEALTH CARE EDUCATION/TRAINING PROGRAM

## 2020-01-14 PROCEDURE — 99211 OFF/OP EST MAY X REQ PHY/QHP: CPT | Performed by: INTERNAL MEDICINE

## 2020-01-14 RX ORDER — POLYETHYLENE GLYCOL 3350 17 G/17G
17 POWDER, FOR SOLUTION ORAL DAILY
Qty: 510 G | Refills: 0 | Status: SHIPPED | OUTPATIENT
Start: 2020-01-14 | End: 2020-02-13

## 2020-01-14 RX ORDER — MELATONIN 10 MG
1 CAPSULE ORAL NIGHTLY PRN
Qty: 30 CAPSULE | Refills: 11 | Status: SHIPPED | OUTPATIENT
Start: 2020-01-14 | End: 2020-04-08 | Stop reason: SDUPTHER

## 2020-01-14 RX ORDER — AMOXICILLIN 250 MG
2 CAPSULE ORAL DAILY PRN
Qty: 60 TABLET | Refills: 2 | Status: SHIPPED | OUTPATIENT
Start: 2020-01-14 | End: 2020-04-08 | Stop reason: SDUPTHER

## 2020-01-14 RX ORDER — PRAVASTATIN SODIUM 40 MG
TABLET ORAL
Qty: 90 TABLET | Refills: 3 | Status: SHIPPED | OUTPATIENT
Start: 2020-01-14 | End: 2020-04-08 | Stop reason: SDUPTHER

## 2020-01-14 RX ORDER — ZOLPIDEM TARTRATE 5 MG/1
5 TABLET ORAL NIGHTLY PRN
Qty: 14 TABLET | Refills: 0 | Status: CANCELLED | OUTPATIENT
Start: 2020-01-14 | End: 2020-01-28

## 2020-01-14 RX ORDER — LEVOFLOXACIN 750 MG/1
750 TABLET ORAL DAILY
Qty: 10 TABLET | Refills: 0 | Status: SHIPPED | OUTPATIENT
Start: 2020-01-14 | End: 2020-01-24

## 2020-01-14 NOTE — PROGRESS NOTES
[facility]    Progress Note    Date of patient's visit: 1/14/2020  YOB: 1955  Patient's Name:  Jacky Fong    Patient Care Team:  Jf Coleman MD as PCP - General (Internal Medicine)  Yarely Guzman MD as Consulting Physician (Gastroenterology)  Efra Morales MD as Referring Physician (Internal Medicine)  Lisy Rojas MD as Consulting Physician (Pulmonology)    REASON FOR VISIT: Routine outpatient follow     HISTORY OF PRESENT ILLNESS:    History was obtained from the patient. Jacky Fong is a 60 y.o. is here for a follow up visit from her visit with me in September. Ms Lars Carrasquillo continues to complain of unintentional weight loss. And has lost 13lbs in the last 4 months. Patient states that she has had recent worsening of her chronic cough that is productive with yellow sputum. She reports contact with sick people during the course of her work. Patient also complains of constipation and states she recently had a colonoscopy and is going to follow up with her gastroenterologist in a few days. Chronic complaints the patient has include HTN, COPD for which she uses oxygen. Patient also reports sleeplessness for which she uses trazodone which has not been helpful.     Patient Active Problem List   Diagnosis    Essential hypertension    Pure hypercholesterolemia    Moderate episode of recurrent major depressive disorder ([address]    History of heroin use    Hep C w/o coma, chronic (HCC) completed tx 2016    GERD (gastroesophageal reflux disease)    COPD (chronic obstructive pulmonary disease) (HCC)    Tobacco abuse    Chronic diastolic congestive heart failure (HCC)    Diverticulosis    Hyperplastic polyp of intestine    Diabetic polyneuropathy associated with type 2 diabetes mellitus (HCC)    Bilateral chronic knee pain    Chronic respiratory failure with hypoxia (HCC)    Type 2 diabetes mellitus with complication ([address]    Primary insomnia    Cigarette nicotine dependence without complication    Chronic bilateral low back pain with right-sided sciatica    Irritable bowel syndrome with both constipation and diarrhea    Enteritis    Chest pain       ALLERGIES      Allergies   Allergen Reactions    Iv Dye [Iodides] Hives         MEDICATIONS:      Current Outpatient Medications on File Prior to Visit   Medication Sig Dispense Refill    LINZESS 145 MCG capsule TAKE 1 CAPSULE BY MOUTH EVERY MORNING BEFORE BREAKFAST 90 capsule 0    RA COL-RITE 100 MG capsule take 1 capsule by mouth twice a day if needed 180 capsule 0    ARIPiprazole (ABILIFY) 15 MG tablet take 1 tablet by mouth once daily 30 tablet 0    senna-docusate (PERICOLACE) 8.6-50 MG per tablet Take 1 tablet by mouth 2 times daily 60 tablet 5    Na Sulfate-K Sulfate-Mg Sulf 17.5-3.13-1.6 GM/177ML SOLN Use as directed in your patient instructions. 1 Bottle 0    hydrochlorothiazide (MICROZIDE) 12.5 MG capsule Take 1 capsule by mouth every morning 90 capsule 0    ipratropium-albuterol (DUONEB) 0.5-2.5 (3) MG/3ML SOLN nebulizer solution Inhale 3 mLs into the lungs three times daily 360 mL 5    pantoprazole (PROTONIX) 40 MG tablet take 1 tablet by mouth once daily --STOP OMEPRAZOLE 30 tablet 2    dicyclomine (BENTYL) 20 MG tablet take 1 tablet by mouth twice a day if needed for abdominal pain 60 tablet 2    tiZANidine (ZANAFLEX) 4 MG tablet Take 1 tablet by mouth 3 times daily as needed (for muscular pain) 90 tablet 3    ONETOUCH DELICA LANCETS 13M MISC USE 2 TO 3 TIMES DAILY AS DIRECTED 100 each 10    blood glucose test strips (CONTOUR NEXT TEST) strip Dx: Use 2 times daily.  60 strip 5    atenolol (TENORMIN) 25 MG tablet take 1 tablet by mouth once daily 30 tablet 2    hydrOXYzine (ATARAX) 10 MG tablet Take by mouth      ibuprofen (ADVIL;MOTRIN) 800 MG tablet Take by mouth      mometasone-formoterol (DULERA) 200-5 MCG/ACT inhaler Inhale 2 puffs into the lungs      TRINTELLIX 10 MG TABS tablet   0    albuterol (PROVENTIL) (2.5 MG/3ML) 0.083% nebulizer solution inhale contents of 1 vial in nebulizer every 4 hours if needed for wheezing 300 mL 2    albuterol sulfate HFA (VENTOLIN HFA) 108 (90 Base) MCG/ACT inhaler Inhale 1 puff into the lungs every 6 hours as needed for Wheezing 18 g 2    albuterol sulfate  (90 Base) MCG/ACT inhaler inhale 1 puff by mouth every 6 hours if needed for wheezing 54 g 0    budesonide-formoterol (SYMBICORT) 160-4.5 MCG/ACT AERO Inhale 2 puffs into the lungs 2 times daily Rinse mouth after use. 1 Inhaler 2    nitroGLYCERIN (NITROSTAT) 0.4 MG SL tablet Place 1 tablet under the tongue every 5 minutes as needed for Chest pain 25 tablet 11    rOPINIRole (REQUIP) 0.5 MG tablet Take 1 tablet by mouth daily 30 tablet 2    Umeclidinium Bromide (INCRUSE ELLIPTA) 62.5 MCG/INH AEPB Inhale 1 puff into the lungs daily 1 each 5    traZODone (DESYREL) 100 MG tablet Take 1 tablet by mouth nightly 60 tablet 2    pravastatin (PRAVACHOL) 40 MG tablet Take 1 tablet by mouth every evening 30 tablet 2    furosemide (LASIX) 20 MG tablet take 1 tablet by mouth twice a day 60 tablet 2    Alcohol Swabs 70 % PADS Dx: DM-2 use 2-3 times daily 100 each 5    potassium chloride (KLOR-CON M) 20 MEQ extended release tablet Take 1 tablet by mouth daily 30 tablet 2    SUBOXONE 4-1 MG FILM SL film DISSOLVE 1 FILM UNDER THE TONGUE DAILY  0    gabapentin (NEURONTIN) 800 MG tablet Take 1 tablet by mouth 3 times daily for 30 days. 135 tablet 3    magnesium citrate solution Take 296 mLs by mouth once for 1 dose 296 mL 2     No current facility-administered medications on file prior to visit. SOCIAL HISTORY    Reviewed and no change from previous record. Laina Cruz  reports that she has been smoking cigarettes. She started smoking about 51 years ago. She has a 20.00 pack-year smoking history.  She has never used smokeless tobacco.    FAMILY HISTORY:    Reviewed and No change from previous visit    REVIEW OF SYSTEMS:    ENT: negative  Respiratory: no cough, shortness of breath, or wheezing  Cardiovascular: no chest pain or dyspnea on exertion  Gastrointestinal: no abdominal pain, black or bloody stools  Neurological: no TIA or stroke symptoms  Endocrine: negative  Genito-Urinary: no dysuria, trouble voiding, or hematuria  Musculoskeletal: negative  Dermatological: negative    PHYSICAL EXAM:      Vitals:    01/14/20 1402   BP: 137/77   Pulse: 73     General appearance - alert, well appearing, and in no distress, oriented to person, place, and time and normal appearing weight  Mental status - alert, oriented to person, place, and time, anxious  Eyes - pupils equal and reactive, extraocular eye movements intact  Chest - no tachypnea, retractions or cyanosis, wheezing noted bilaterally  Heart - normal rate, regular rhythm, normal S1, S2, no murmurs, rubs, clicks or gallops  Abdomen - soft, nontender, nondistended, no masses or organomegaly  Neurological - alert, oriented, normal speech, no focal findings or movement disorder noted  Extremities - peripheral pulses normal, no pedal edema, no cyanosis  Skin - normal coloration and turgor, no rashes, no suspicious skin lesions noted    LABORATORY FINDINGS:    CBC:  Lab Results   Component Value Date    WBC 8.2 10/21/2019    HGB 14.9 10/21/2019     10/21/2019    PLT Platelet clumps present, count appears adequate.  02/26/2012     BMP:    Lab Results   Component Value Date     10/21/2019    K 3.9 10/21/2019     10/21/2019    CO2 24 10/21/2019    BUN 9 10/21/2019    CREATININE 0.61 10/21/2019    GLUCOSE 98 10/21/2019    GLUCOSE 120 02/26/2012     HEMOGLOBIN A1C:   Lab Results   Component Value Date    LABA1C 5.9 09/17/2019     MICROALBUMIN URINE:   Lab Results   Component Value Date    MICROALBUR <12 09/20/2019     FASTING LIPID PANEL:  Lab Results   Component Value Date    CHOL 122 09/11/2018    HDL 44 04/23/2019    TRIG side effects of prescribed medications. Barriers to medication compliance addressed. All patient questions answered. Pt voiced understanding. 4.  Reviewed prior labs and health maintenance  5. Continue current medications, diet and exercise.       Ciara Sanches MD  PGY-1, Internal Medicine Resident  [facility]         1/14/2020, 3:21 PM

## 2020-01-14 NOTE — PATIENT INSTRUCTIONS
Follow-up appointment scheduled for  06/09/20    , AVS given to patient. Medications e-scribed to pharmacy of patient's choice. Your doctor has ordered fasting blood work. It can be done at [facility] or at the [facility]. Jeremias Howard will need to fast for 12 hours and bring order with you to registration department.     tv

## 2020-01-14 NOTE — TELEPHONE ENCOUNTER
Request for pravastatin - medication pended. Please fill if appropriate. Next Visit Date:  Future Appointments   Date Time Provider Terry Bradford   1/14/2020  2:30 PM Casper Juárez MD [facility]   1/22/2020 10:30 AM Naveed Ruiz MD sv gr lks [facility]   1/29/2020  1:15 PM STA MAMMO RM 1 STAZ MAMMO STA Radiolog   2/3/2020  1:15 PM Dennis Escamilla DPM [facility]   2/17/2020  9:15 AM Yaniv Pfeiffer MD Resp Spec [address] Maintenance   Topic Date Due    Hepatitis A vaccine (1 of 2 - Risk 2-dose series) 06/05/1958    Hepatitis B vaccine (1 of 3 - Risk 3-dose series) 06/05/1976    Shingles Vaccine (1 of 2) 06/05/2007    Diabetic retinal exam  01/01/2019    Annual Wellness Visit (AWV)  05/29/2019    Breast cancer screen  11/01/2019    Lipid screen  04/23/2020    A1C test (Diabetic or Prediabetic)  09/17/2020    Diabetic microalbuminuria test  09/20/2020    Potassium monitoring  10/21/2020    Creatinine monitoring  10/21/2020    Diabetic foot exam  12/03/2020    Colon cancer screen colonoscopy  01/09/2023    DTaP/Tdap/Td vaccine (2 - Td) 09/30/2026    Flu vaccine  Completed    Pneumococcal 0-64 years Vaccine  Completed    HIV screen  Completed       Hemoglobin A1C (%)   Date Value   09/17/2019 5.9   04/23/2019 6.1 (H)   11/19/2018 6.1             ( goal A1C is < 7)   Microalb/Crt.  Ratio (mcg/mg creat)   Date Value   09/20/2019 CANNOT BE CALCULATED     LDL Cholesterol (mg/dL)   Date Value   04/23/2019 88       (goal LDL is <100)   AST (U/L)   Date Value   10/21/2019 13     ALT (U/L)   Date Value   10/21/2019 8     BUN (mg/dL)   Date Value   10/21/2019 9     BP Readings from Last 3 Encounters:   01/09/20 (!) 141/81   01/09/20 (!) 137/95   11/15/19 129/72          (goal 120/80)    All Future Testing planned in [facility]  Lab Frequency Next Occurrence   EGD Once 06/06/2019   TRACY DIGITAL SCREEN W CAD BILATERAL Once 01/09/2020   Low Dose Chest CT -Abnormal Lung

## 2020-01-14 NOTE — PROGRESS NOTES
(Diabetic or Prediabetic)  09/17/2020    Diabetic microalbuminuria test  09/20/2020    Potassium monitoring  10/21/2020    Creatinine monitoring  10/21/2020    Diabetic foot exam  12/03/2020    Colon cancer screen colonoscopy  01/09/2023    DTaP/Tdap/Td vaccine (2 - Td) 09/30/2026    Flu vaccine  Completed    Pneumococcal 0-64 years Vaccine  Completed    HIV screen  Completed

## 2020-01-22 ENCOUNTER — OFFICE VISIT (OUTPATIENT)
Dept: GASTROENTEROLOGY | Age: 65
End: 2020-01-22
Payer: MEDICAID

## 2020-01-22 VITALS
DIASTOLIC BLOOD PRESSURE: 64 MMHG | BODY MASS INDEX: 27.26 KG/M2 | SYSTOLIC BLOOD PRESSURE: 101 MMHG | HEART RATE: 76 BPM | WEIGHT: 201 LBS

## 2020-01-22 PROCEDURE — 99213 OFFICE O/P EST LOW 20 MIN: CPT | Performed by: INTERNAL MEDICINE

## 2020-01-22 RX ORDER — MESALAMINE 1.2 G/1
1200 TABLET, DELAYED RELEASE ORAL
Qty: 30 TABLET | Refills: 3 | Status: SHIPPED | OUTPATIENT
Start: 2020-01-22 | End: 2021-01-19

## 2020-01-22 ASSESSMENT — ENCOUNTER SYMPTOMS
DIARRHEA: 0
ANAL BLEEDING: 0
CHOKING: 0
SORE THROAT: 0
WHEEZING: 0
VOMITING: 0
NAUSEA: 0
VOICE CHANGE: 0
ALLERGIC/IMMUNOLOGIC NEGATIVE: 1
RECTAL PAIN: 0
COUGH: 1
ABDOMINAL PAIN: 0
CONSTIPATION: 1
TROUBLE SWALLOWING: 0
ABDOMINAL DISTENTION: 1
BLOOD IN STOOL: 0
BACK PAIN: 1
SINUS PRESSURE: 0

## 2020-01-22 NOTE — PROGRESS NOTES
GI CLINIC FOLLOW UP    INTERVAL HISTORY:   No referring provider defined for this encounter. Chief Complaint   Patient presents with    Constipation     IBS Pericolace and Miralax daily has gotten her regulated.  Colonoscopy     CT ABD abd Pelvis done as well and the colonoscopy 01/09/2020           HISTORY OF PRESENT ILLNESS: Ms.Sharon AGATA Montiel is a 60 y.o. female constipation. She is much better on MiraLAX. Colonoscopy showed colitis. She has family history of Crohn's disease. Past Medical,Family, and Social History reviewed and does not contribute to the patient presentingcondition. Patient's PMH/PSH,SH,PSYCH Hx, MEDs, ALLERGIES, and ROS were all reviewed and updated in the appropriate sections. PAST MEDICAL HISTORY:  Past Medical History:   Diagnosis Date    RACHEL (acute kidney injury) ([facility] 1/22/2014    Arthritis     generalized    Bronchiectasis without complication ([facility] [address] [facility]     Cancer ([facility] [address] 2004    uterus    CHF (congestive heart failure) (HCC)     Chronic bilateral low back pain with right-sided sciatica 2/20/2017    Chronic bronchitis (HCC)     Chronic respiratory failure with hypoxia (HCC)     COPD (chronic obstructive pulmonary disease) (HCC)     on inhaler /  COPD with chronic bronchitis and emphysema    Depression 10/30/2014    Diabetic polyneuropathy associated with type 2 diabetes mellitus (HCC)     Diverticulosis     Full dentures     GERD (gastroesophageal reflux disease)     Hep C w/o coma, chronic (HCC)     Hyperlipidemia     Hyperplastic polyp of intestine     Hypertension     DR. MCDANIEL-CARDIO    Liver disease     hepatitis C    Nasal polyposis     Noncompliance with CPAP treatment     Obesity (BMI 35.0-39.9 without comorbidity)     On home O2     JIMENA (obstructive sleep apnea)     Pacemaker 2012    Pneumonia 7/2012    RECENTLY HOSPITALIZED    Pulmonary edema cardiac cause (HCC)     Rectal bleeding     Smoking addiction     Tobacco abuse Past Surgical History:   Procedure Laterality Date    BACK SURGERY  2008   [facility] CARDIAC SURGERY       cath dec. 2013    COLONOSCOPY      COLONOSCOPY  1/23/15    severe diverticula    COLONOSCOPY  09/20/2016    HYPERPLASTIC POLYP X 2     COLONOSCOPY N/A 3/14/2019    COLONOSCOPY DIAGNOSTIC performed by Alisson Salmeron MD at [facility]    COLONOSCOPY N/A 1/9/2020    COLONOSCOPY WITH BIOPSY performed by Alisson Salmeron MD at [address], COLON, DIAGNOSTIC     [address]  9/24/13    decompression & re-exploration L3-4, L4-5    PACEMAKER INSERTION      PACEMAKER PLACEMENT      SIGMOIDOSCOPY N/A 6/26/15    flexable sigmoidscopy,HYPERPLASTIC POLYP    TONSILLECTOMY      UPPER GASTROINTESTINAL ENDOSCOPY N/A 3/14/2019    EGD BIOPSY performed by Alisson Salmeron MD at [facility]       CURRENT MEDICATIONS:    Current Outpatient Medications:     pravastatin (PRAVACHOL) 40 MG tablet, take 1 tablet by mouth at bedtime, Disp: 90 tablet, Rfl: 3    blood glucose test strips (ONE TOUCH ULTRA TEST) strip, 1 each by In Vitro route 4 times daily As needed. , Disp: 100 each, Rfl: 3    levofloxacin (LEVAQUIN) 750 MG tablet, Take 1 tablet by mouth daily for 10 days, Disp: 10 tablet, Rfl: 0    blood glucose test strips (CONTOUR NEXT TEST) strip, Dx: Use 2 times daily. , Disp: 60 strip, Rfl: 5    polyethylene glycol (GLYCOLAX) powder, Take 17 g by mouth daily, Disp: 510 g, Rfl: 0    senna-docusate (PERICOLACE) 8.6-50 MG per tablet, Take 2 tablets by mouth daily as needed for Constipation, Disp: 60 tablet, Rfl: 2    Melatonin 10 MG CAPS, Take 1 tablet by mouth nightly as needed (for sleep), Disp: 30 capsule, Rfl: 11    RA COL-RITE 100 MG capsule, take 1 capsule by mouth twice a day if needed, Disp: 180 capsule, Rfl: 0    ARIPiprazole (ABILIFY) 15 MG tablet, take 1 tablet by mouth once daily, Disp: 30 tablet, Rfl: 0    senna-docusate Take 1 tablet by mouth daily, Disp: 30 tablet, Rfl: 2    Umeclidinium Bromide (INCRUSE ELLIPTA) 62.5 MCG/INH AEPB, Inhale 1 puff into the lungs daily, Disp: 1 each, Rfl: 5    traZODone (DESYREL) 100 MG tablet, Take 1 tablet by mouth nightly, Disp: 60 tablet, Rfl: 2    furosemide (LASIX) 20 MG tablet, take 1 tablet by mouth twice a day, Disp: 60 tablet, Rfl: 2    Alcohol Swabs 70 % PADS, Dx: DM-2 use 2-3 times daily, Disp: 100 each, Rfl: 5    potassium chloride (KLOR-CON M) 20 MEQ extended release tablet, Take 1 tablet by mouth daily, Disp: 30 tablet, Rfl: 2    SUBOXONE 4-1 MG FILM SL film, DISSOLVE 1 FILM UNDER THE TONGUE DAILY, Disp: , Rfl: 0    ALLERGIES:   Allergies   Allergen Reactions    Iv Dye [Iodides] Hives       FAMILY HISTORY:       Problem Relation Age of Onset    Cancer Mother         lungs and brain    Stroke Father     Crohn's Disease Sister          SOCIAL HISTORY:   Social History     Socioeconomic History    Marital status: Single     Spouse name: Not on file    Number of children: Not on file    Years of education: Not on file    Highest education level: Not on file   Occupational History    Not on file   Social Needs    Financial resource strain: Not on file    Food insecurity:     Worry: Not on file     Inability: Not on file    Transportation needs:     Medical: Not on file     Non-medical: Not on file   Tobacco Use    Smoking status: Current Some Day Smoker     Packs/day: 0.50     Years: 40.00     Pack years: 20.00     Types: Cigarettes     Start date: 1969    Smokeless tobacco: Never Used   Substance and Sexual Activity    Alcohol use: No     Alcohol/week: 0.0 standard drinks    Drug use: No     Comment:  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    Sexual activity: Never   Lifestyle    Physical activity:     Days per week: Not on file     Minutes per session: Not on file    Stress: Not on file   Relationships    Social connections:     Talks on phone: Not on file     Gets together: Ms. Josefa Jeffries. For any further questions please do not hesitate to contact me. I have reviewed and agree with the ROS entered by the MA/LPN. Note is dictated utilizing voice recognition software. Unfortunately this leads to occasional typographical errors. Please contact our office if you have any questions.     Damon Castillo MD  [facility]  O: #[phone]

## 2020-01-24 ENCOUNTER — TELEPHONE (OUTPATIENT)
Dept: GASTROENTEROLOGY | Age: 65
End: 2020-01-24

## 2020-01-27 NOTE — TELEPHONE ENCOUNTER
01/31/2020   Low Dose Chest CT -Abnormal Lung Screen Follow up Once 01/24/2020   Lipid Panel Once 02/14/2020   Hepatitis C RNA, Quantitative, PCR Once 04/22/2020   FL SMALL BOWEL FOLLOW THROUGH ONLY Once 01/22/2020         Patient Active Problem List:     Essential hypertension     Pure hypercholesterolemia     Moderate episode of recurrent major depressive disorder ([facility] [address]     History of heroin use     Hep C w/o coma, chronic ([facility] [address] completed tx 2016     GERD (gastroesophageal reflux disease)     COPD (chronic obstructive pulmonary disease) (HCC)     Tobacco abuse     Chronic diastolic congestive heart failure (HCC)     Diverticulosis     Hyperplastic polyp of intestine     Diabetic polyneuropathy associated with type 2 diabetes mellitus (HCC)     Bilateral chronic knee pain     Chronic respiratory failure with hypoxia (HCC)     Type 2 diabetes mellitus with complication (HCC)     Primary insomnia     Cigarette nicotine dependence without complication     Chronic bilateral low back pain with right-sided sciatica     Irritable bowel syndrome with both constipation and diarrhea     Enteritis     Chest pain

## 2020-01-30 RX ORDER — ATENOLOL 25 MG/1
TABLET ORAL
Qty: 30 TABLET | Refills: 2 | Status: SHIPPED | OUTPATIENT
Start: 2020-01-30 | End: 2020-04-08 | Stop reason: SDUPTHER

## 2020-02-05 ENCOUNTER — HOSPITAL ENCOUNTER (OUTPATIENT)
Age: 65
Discharge: HOME | End: 2020-02-05
Payer: COMMERCIAL

## 2020-02-05 ENCOUNTER — HOSPITAL ENCOUNTER (OUTPATIENT)
Dept: GENERAL RADIOLOGY | Age: 65
Discharge: HOME | End: 2020-02-07
Payer: COMMERCIAL

## 2020-02-05 LAB
CHOLESTEROL/HDL RATIO: 3.5
CHOLESTEROL: 129 MG/DL
HDLC SERPL-MCNC: 37 MG/DL
LDL CHOLESTEROL: 81 MG/DL (ref 0–130)
TRIGL SERPL-MCNC: 54 MG/DL
VLDLC SERPL CALC-MCNC: ABNORMAL MG/DL (ref 1–30)

## 2020-02-05 PROCEDURE — 80061 LIPID PANEL: CPT

## 2020-02-05 PROCEDURE — 2500000003 HC RX 250 WO HCPCS: Performed by: INTERNAL MEDICINE

## 2020-02-05 PROCEDURE — 74250 X-RAY XM SM INT 1CNTRST STD: CPT

## 2020-02-05 PROCEDURE — 36415 COLL VENOUS BLD VENIPUNCTURE: CPT

## 2020-02-05 RX ADMIN — BARIUM SULFATE 600 ML: 960 POWDER, FOR SUSPENSION ORAL at 10:17

## 2020-02-13 RX ORDER — ARIPIPRAZOLE 15 MG/1
TABLET ORAL
Qty: 30 TABLET | Refills: 0 | Status: SHIPPED | OUTPATIENT
Start: 2020-02-13 | End: 2020-03-10

## 2020-02-17 ENCOUNTER — OFFICE VISIT (OUTPATIENT)
Dept: PULMONOLOGY | Age: 65
End: 2020-02-17
Payer: MEDICAID

## 2020-02-17 VITALS
RESPIRATION RATE: 18 BRPM | DIASTOLIC BLOOD PRESSURE: 80 MMHG | HEIGHT: 72 IN | SYSTOLIC BLOOD PRESSURE: 130 MMHG | WEIGHT: 204.6 LBS | BODY MASS INDEX: 27.71 KG/M2 | HEART RATE: 81 BPM | OXYGEN SATURATION: 97 %

## 2020-02-17 PROCEDURE — 99214 OFFICE O/P EST MOD 30 MIN: CPT | Performed by: INTERNAL MEDICINE

## 2020-02-17 RX ORDER — ALBUTEROL SULFATE 90 UG/1
2 AEROSOL, METERED RESPIRATORY (INHALATION) EVERY 6 HOURS PRN
Qty: 1 INHALER | Refills: 3 | Status: SHIPPED | OUTPATIENT
Start: 2020-02-17 | End: 2021-01-19

## 2020-02-17 ASSESSMENT — SLEEP AND FATIGUE QUESTIONNAIRES
HOW LIKELY ARE YOU TO NOD OFF OR FALL ASLEEP WHILE SITTING QUIETLY AFTER LUNCH WITHOUT ALCOHOL: 3
HOW LIKELY ARE YOU TO NOD OFF OR FALL ASLEEP WHILE WATCHING TV: 3
HOW LIKELY ARE YOU TO NOD OFF OR FALL ASLEEP WHEN YOU ARE A PASSENGER IN A CAR FOR AN HOUR WITHOUT A BREAK: 3
HOW LIKELY ARE YOU TO NOD OFF OR FALL ASLEEP WHILE SITTING AND TALKING TO SOMEONE: 0
HOW LIKELY ARE YOU TO NOD OFF OR FALL ASLEEP WHILE LYING DOWN TO REST IN THE AFTERNOON WHEN CIRCUMSTANCES PERMIT: 0
HOW LIKELY ARE YOU TO NOD OFF OR FALL ASLEEP WHILE SITTING AND READING: 3
ESS TOTAL SCORE: 15
HOW LIKELY ARE YOU TO NOD OFF OR FALL ASLEEP WHILE SITTING INACTIVE IN A PUBLIC PLACE: 3
HOW LIKELY ARE YOU TO NOD OFF OR FALL ASLEEP IN A CAR, WHILE STOPPED FOR A FEW MINUTES IN TRAFFIC: 0

## 2020-02-17 NOTE — PROGRESS NOTES
tenderness   Lungs:       Lv is increased. Percussion note is hyperresonant. Expiration is prolonged. No rales or rhonchi are audible. No pleural friction rub is audible. Chest Wall:    No tenderness or deformity significant tenderness over the anterior chest especially on the second left intercostal space and intercostal cartilages. No pericardial friction is audible no pleural pericardial rub is audible. No gallops are audible. Heart:    Regular rate and rhythm, S1 and S2 normal, no murmur, rub        or gallop no rvh murmurs or gallops no pericardial friction rub                          Abdomen:                                                 Pulses:                                            Lymph nodes:                    Neurologic:                  Soft, non-tender, bowel sounds active all four quadrants,     no masses, no organomegaly         2+ and symmetric all extremities            Cervical, supraclavicular not enlarged or matted or tender      CNII-XII intact, normal strength 5/5 . Sensation grossly normal  and reflexes normal 2+  throughout     Clubbing No  Lower ext edema No1+   [] , 2 +  [] , 3+   []  Upper ext edema No       Musculoskeletal - no joint swelling or tenderness or synovitis               /80 (Site: Left Upper Arm, Position: Sitting, Cuff Size: Large Adult)   Pulse 81   Resp 18   Ht 6' (1.829 m)   Wt 204 lb 9.6 oz (92.8 kg)   SpO2 97% Comment: room air at rest  BMI 27.75 kg/m²     CXR  No recent chest x-ray showed chest x-ray reviewed and didn't reveal COPD no other abnormality detected      CT Scans   was also evaluated no nodules are evidence of malignancy noted    Echo  No recent echo        Assessment   Diagnosis Orders   1. Chronic bronchitis, unspecified chronic bronchitis type ([address]     2. Cigarette nicotine dependence without complication     3.  Class 2 severe obesity due to excess calories with serious comorbidity in adult, unspecified BMI

## 2020-02-21 RX ORDER — PANTOPRAZOLE SODIUM 40 MG/1
TABLET, DELAYED RELEASE ORAL
Qty: 30 TABLET | Refills: 2 | Status: SHIPPED | OUTPATIENT
Start: 2020-02-21 | End: 2020-03-09

## 2020-02-26 NOTE — TELEPHONE ENCOUNTER
Request for tizanidine - medication pended. Patient has appt in June. Please fill if appropriate. Next Visit Date:  Future Appointments   Date Time Provider Terry Bradford   3/2/2020  1:30 PM STA MAMMO RM 1 STAZ MAMMO STA Radiolog   3/4/2020  1:15 PM Lino Sorensen DPM [facility]   3/18/2020 10:15 AM Evan Raines MD sv [facility]ks [facility]   6/9/2020  1:00 PM Casper Reed MD [address] [facility]   6/22/2020  9:30 AM Evie Doyle  [address] Maintenance   Topic Date Due    Hepatitis A vaccine (1 of 2 - Risk 2-dose series) 06/05/1956    Hepatitis B vaccine (1 of 3 - Risk 3-dose series) 06/05/1976    Shingles Vaccine (1 of 2) 06/05/2007    Diabetic retinal exam  01/01/2019    Annual Wellness Visit (AWV)  05/29/2019    Breast cancer screen  11/01/2019    A1C test (Diabetic or Prediabetic)  09/17/2020    Diabetic microalbuminuria test  09/20/2020    Potassium monitoring  10/21/2020    Creatinine monitoring  10/21/2020    Diabetic foot exam  12/03/2020    Lipid screen  02/05/2021    Colon cancer screen colonoscopy  01/09/2023    DTaP/Tdap/Td vaccine (2 - Td) 09/30/2026    Flu vaccine  Completed    Pneumococcal 0-64 years Vaccine  Completed    HIV screen  Completed    Hib vaccine  Aged Out    Meningococcal (ACWY) vaccine  Aged Out       Hemoglobin A1C (%)   Date Value   09/17/2019 5.9   04/23/2019 6.1 (H)   11/19/2018 6.1             ( goal A1C is < 7)   Microalb/Crt.  Ratio (mcg/mg creat)   Date Value   09/20/2019 CANNOT BE CALCULATED     LDL Cholesterol (mg/dL)   Date Value   02/05/2020 81       (goal LDL is <100)   AST (U/L)   Date Value   10/21/2019 13     ALT (U/L)   Date Value   10/21/2019 8     BUN (mg/dL)   Date Value   10/21/2019 9     BP Readings from Last 3 Encounters:   02/17/20 130/80   01/22/20 101/64   01/14/20 137/77          (goal 120/80)    All Future Testing planned in [facility]  Lab Frequency Next Occurrence   EGD Once 06/06/2019   TRACY

## 2020-02-27 NOTE — TELEPHONE ENCOUNTER
Request for lasix. Next Visit Date:  Future Appointments   Date Time Provider Terry Bradford   3/2/2020  1:30 PM STA MAMMO RM 1 STAZ MAMMO STA Radiolog   3/4/2020  1:15 PM Jasmyn Cassidy DPM [facility]   3/18/2020 10:15 AM Damon Castillo MD [facility]   6/9/2020  [facility]00 [facility]   6/22/2020  9:30 AM Kaden Wahl  [address] Maintenance   Topic Date Due    Hepatitis A vaccine (1 of 2 - Risk 2-dose series) 06/05/1956    Hepatitis B vaccine (1 of 3 - Risk 3-dose series) 06/05/1976    Shingles Vaccine (1 of 2) 06/05/2007    Diabetic retinal exam  01/01/2019    Annual Wellness Visit (AWV)  05/29/2019    Breast cancer screen  11/01/2019    A1C test (Diabetic or Prediabetic)  09/17/2020    Diabetic microalbuminuria test  09/20/2020    Potassium monitoring  10/21/2020    Creatinine monitoring  10/21/2020    Diabetic foot exam  12/03/2020    Lipid screen  02/05/2021    Colon cancer screen colonoscopy  01/09/2023    DTaP/Tdap/Td vaccine (2 - Td) 09/30/2026    Flu vaccine  Completed    Pneumococcal 0-64 years Vaccine  Completed    HIV screen  Completed    Hib vaccine  Aged Out    Meningococcal (ACWY) vaccine  Aged Out       Hemoglobin A1C (%)   Date Value   09/17/2019 5.9   04/23/2019 6.1 (H)   11/19/2018 6.1             ( goal A1C is < 7)   Microalb/Crt.  Ratio (mcg/mg creat)   Date Value   09/20/2019 CANNOT BE CALCULATED     LDL Cholesterol (mg/dL)   Date Value   02/05/2020 81       (goal LDL is <100)   AST (U/L)   Date Value   10/21/2019 13     ALT (U/L)   Date Value   10/21/2019 8     BUN (mg/dL)   Date Value   10/21/2019 9     BP Readings from Last 3 Encounters:   02/17/20 130/80   01/22/20 101/64   01/14/20 137/77          (goal 120/80)    All Future Testing planned in [facility]  Lab Frequency Next Occurrence   EGD Once 06/06/2019   TRACY DIGITAL SCREEN W CAD BILATERAL Once 03/09/2020   Low Dose Chest CT -Abnormal

## 2020-02-28 RX ORDER — FUROSEMIDE 20 MG/1
TABLET ORAL
Qty: 60 TABLET | Refills: 2 | Status: SHIPPED | OUTPATIENT
Start: 2020-02-28 | End: 2020-04-08 | Stop reason: SDUPTHER

## 2020-02-28 RX ORDER — TIZANIDINE 4 MG/1
TABLET ORAL
Qty: 90 TABLET | Refills: 3 | Status: SHIPPED | OUTPATIENT
Start: 2020-02-28 | End: 2020-04-08 | Stop reason: SDUPTHER

## 2020-03-02 NOTE — TELEPHONE ENCOUNTER
Pt request medication refill, Ropinrole  Next Visit Date:  Future Appointments   Date Time Provider Terry Bradford   3/2/2020  1:30 PM STA MAMMO RM 1 STAZ MAMMO STA Radiolog   3/4/2020  1:15 PM ROSELIA [facility]   3/18/2020 10:15 AM Davey Beckford MD sv [facility]ks [facility]   6/9/2020  1:00 [facility]   6/22/2020  9:30 AM Renay Lopez  [address] Maintenance   Topic Date Due    Hepatitis A vaccine (1 of 2 - Risk 2-dose series) 06/05/1956    Hepatitis B vaccine (1 of 3 - Risk 3-dose series) 06/05/1976    Shingles Vaccine (1 of 2) 06/05/2007    Diabetic retinal exam  01/01/2019    Annual Wellness Visit (AWV)  05/29/2019    Breast cancer screen  11/01/2019    A1C test (Diabetic or Prediabetic)  09/17/2020    Diabetic microalbuminuria test  09/20/2020    Potassium monitoring  10/21/2020    Creatinine monitoring  10/21/2020    Diabetic foot exam  12/03/2020    Lipid screen  02/05/2021    Colon cancer screen colonoscopy  01/09/2023    DTaP/Tdap/Td vaccine (2 - Td) 09/30/2026    Flu vaccine  Completed    Pneumococcal 0-64 years Vaccine  Completed    HIV screen  Completed    Hib vaccine  Aged Out    Meningococcal (ACWY) vaccine  Aged Out       Hemoglobin A1C (%)   Date Value   09/17/2019 5.9   04/23/2019 6.1 (H)   11/19/2018 6.1             ( goal A1C is < 7)   Microalb/Crt.  Ratio (mcg/mg creat)   Date Value   09/20/2019 CANNOT BE CALCULATED     LDL Cholesterol (mg/dL)   Date Value   02/05/2020 81   04/23/2019 88       (goal LDL is <100)   AST (U/L)   Date Value   10/21/2019 13     ALT (U/L)   Date Value   10/21/2019 8     BUN (mg/dL)   Date Value   10/21/2019 9     BP Readings from Last 3 Encounters:   02/17/20 130/80   01/22/20 101/64   01/14/20 137/77          (goal 120/80)    All Future Testing planned in [facility]  Lab Frequency Next Occurrence   EGD Once 06/06/2019   TRACY DIGITAL SCREEN W CAD BILATERAL Once 03/09/2020 Low Dose Chest CT -Abnormal Lung Screen Follow up Once 05/11/2020   Hepatitis C RNA, Quantitative, PCR Once 04/22/2020               Patient Active Problem List:     Essential hypertension     Pure hypercholesterolemia     Moderate episode of recurrent major depressive disorder ([facility] [address]     History of heroin use     Hep C w/o coma, chronic ([facility] [address] completed tx 2016     GERD (gastroesophageal reflux disease)     COPD (chronic obstructive pulmonary disease) (HCC)     Tobacco abuse     Chronic diastolic congestive heart failure (HCC)     Diverticulosis     Hyperplastic polyp of intestine     Diabetic polyneuropathy associated with type 2 diabetes mellitus (HCC)     Bilateral chronic knee pain     Chronic respiratory failure with hypoxia (HCC)     Type 2 diabetes mellitus with complication (HCC)     Primary insomnia     Cigarette nicotine dependence without complication     Chronic bilateral low back pain with right-sided sciatica     Irritable bowel syndrome with both constipation and diarrhea     Enteritis     Chest pain

## 2020-03-09 RX ORDER — PANTOPRAZOLE SODIUM 40 MG/1
TABLET, DELAYED RELEASE ORAL
Qty: 30 TABLET | Refills: 2 | Status: SHIPPED | OUTPATIENT
Start: 2020-03-09 | End: 2020-04-08 | Stop reason: SDUPTHER

## 2020-03-09 RX ORDER — ROPINIROLE 0.5 MG/1
0.5 TABLET, FILM COATED ORAL DAILY
Qty: 30 TABLET | Refills: 2 | Status: SHIPPED | OUTPATIENT
Start: 2020-03-09 | End: 2020-04-08 | Stop reason: SDUPTHER

## 2020-03-09 NOTE — TELEPHONE ENCOUNTER
Pt calling stating she needs a refill on her test strips. Health Maintenance   Topic Date Due    Hepatitis A vaccine (1 of 2 - Risk 2-dose series) 06/05/1956    Hepatitis B vaccine (1 of 3 - Risk 3-dose series) 06/05/1976    Shingles Vaccine (1 of 2) 06/05/2007    Diabetic retinal exam  01/01/2019    Annual Wellness Visit (AWV)  05/29/2019    Breast cancer screen  11/01/2019    A1C test (Diabetic or Prediabetic)  09/17/2020    Diabetic microalbuminuria test  09/20/2020    Potassium monitoring  10/21/2020    Creatinine monitoring  10/21/2020    Diabetic foot exam  12/03/2020    Lipid screen  02/05/2021    Colon cancer screen colonoscopy  01/09/2023    DTaP/Tdap/Td vaccine (2 - Td) 09/30/2026    Flu vaccine  Completed    Pneumococcal 0-64 years Vaccine  Completed    HIV screen  Completed    Hib vaccine  Aged Out    Meningococcal (ACWY) vaccine  Aged Out             (applicable per patient's age: Cancer Screenings, Depression Screening, Fall Risk Screening, Immunizations)    Hemoglobin A1C (%)   Date Value   09/17/2019 5.9   04/23/2019 6.1 (H)   11/19/2018 6.1     Microalb/Crt.  Ratio (mcg/mg creat)   Date Value   09/20/2019 CANNOT BE CALCULATED     LDL Cholesterol (mg/dL)   Date Value   02/05/2020 81     AST (U/L)   Date Value   10/21/2019 13     ALT (U/L)   Date Value   10/21/2019 8     BUN (mg/dL)   Date Value   10/21/2019 9      (goal A1C is < 7)   (goal LDL is <100) need 30-50% reduction from baseline     BP Readings from Last 3 Encounters:   02/17/20 130/80   01/22/20 101/64   01/14/20 137/77    (goal /80)      All Future Testing planned in [facility]:  Lab Frequency Next Occurrence   TRACY DIGITAL SCREEN W CAD BILATERAL Once 03/09/2020   Low Dose Chest CT -Abnormal Lung Screen Follow up Once 05/11/2020   Hepatitis C RNA, Quantitative, PCR Once 04/22/2020       Next Visit Date:  Future Appointments   Date Time Provider Terry Gonzalezi   3/18/2020 10:15 AM Tiffany Rojo MD [facility]

## 2020-03-09 NOTE — TELEPHONE ENCOUNTER
Refilled pantoprazole
Essential hypertension     Pure hypercholesterolemia     Moderate episode of recurrent major depressive disorder (HCC)     History of heroin use     Hep C w/o coma, chronic ([facility] [address] completed tx 2016     GERD (gastroesophageal reflux disease)     COPD (chronic obstructive pulmonary disease) (HCC)     Tobacco abuse     Chronic diastolic congestive heart failure (HCC)     Diverticulosis     Hyperplastic polyp of intestine     Diabetic polyneuropathy associated with type 2 diabetes mellitus (HCC)     Bilateral chronic knee pain     Chronic respiratory failure with hypoxia (HCC)     Type 2 diabetes mellitus with complication (HCC)     Primary insomnia     Cigarette nicotine dependence without complication     Chronic bilateral low back pain with right-sided sciatica     Irritable bowel syndrome with both constipation and diarrhea     Enteritis     Chest pain

## 2020-03-10 RX ORDER — ARIPIPRAZOLE 15 MG/1
TABLET ORAL
Qty: 30 TABLET | Refills: 0 | Status: SHIPPED | OUTPATIENT
Start: 2020-03-10 | End: 2020-04-08 | Stop reason: SDUPTHER

## 2020-03-10 NOTE — TELEPHONE ENCOUNTER
Request for Abilify . Next Visit Date:  Future Appointments   Date Time Provider Terry Gonzalezi   3/18/2020 10:15 AM Leann Balderrama MD sv gr [facility]   6/9/2020  1:00 PM Casper Andrade MD [facility]   6/22/2020  9:30 AM Jin Zuniga MD [address] Maintenance   Topic Date Due    Hepatitis A vaccine (1 of 2 - Risk 2-dose series) [birth year]05/1958    Hepatitis B vaccine (1 of 3 - Risk 3-dose series) 06/05/1976    Shingles Vaccine (1 of 2) 06/05/2007    Diabetic retinal exam  01/01/2019    Annual Wellness Visit (AWV)  05/29/2019    Breast cancer screen  11/01/2019    A1C test (Diabetic or Prediabetic)  09/17/2020    Diabetic microalbuminuria test  09/20/2020    Potassium monitoring  10/21/2020    Creatinine monitoring  10/21/2020    Diabetic foot exam  12/03/2020    Lipid screen  02/05/2021    Colon cancer screen colonoscopy  01/09/2023    DTaP/Tdap/Td vaccine (2 - Td) 09/30/2026    Flu vaccine  Completed    Pneumococcal 0-64 years Vaccine  Completed    HIV screen  Completed    Hib vaccine  Aged Out    Meningococcal (ACWY) vaccine  Aged Out       Hemoglobin A1C (%)   Date Value   09/17/2019 5.9   04/23/2019 6.1 (H)   11/19/2018 6.1             ( goal A1C is < 7)   Microalb/Crt.  Ratio (mcg/mg creat)   Date Value   09/20/2019 CANNOT BE CALCULATED     LDL Cholesterol (mg/dL)   Date Value   02/05/2020 81       (goal LDL is <100)   AST (U/L)   Date Value   10/21/2019 13     ALT (U/L)   Date Value   10/21/2019 8     BUN (mg/dL)   Date Value   10/21/2019 9     BP Readings from Last 3 Encounters:   02/17/20 130/80   01/22/20 101/64   01/14/20 137/77          (goal 120/80)    All Future Testing planned in [facility]  Lab Frequency Next Occurrence   TRACY DIGITAL SCREEN W CAD BILATERAL Once 03/09/2020   Low Dose Chest CT -Abnormal Lung Screen Follow up Once 05/11/2020   Hepatitis C RNA, Quantitative, PCR Once 04/22/2020         Patient Active Problem List:

## 2020-03-17 ENCOUNTER — TELEPHONE (OUTPATIENT)
Dept: INTERNAL MEDICINE | Age: 65
End: 2020-03-17

## 2020-03-17 NOTE — TELEPHONE ENCOUNTER
PC from [facility] stating that the order for the mammogram placed on 10/09/2019 is not covered by pt insurance. The code that can be used is Z12.31. Please revise. Thank you.

## 2020-03-19 ENCOUNTER — TELEPHONE (OUTPATIENT)
Dept: INTERNAL MEDICINE | Age: 65
End: 2020-03-19

## 2020-03-26 ENCOUNTER — TELEPHONE (OUTPATIENT)
Dept: INTERNAL MEDICINE | Age: 65
End: 2020-03-26

## 2020-04-08 RX ORDER — TRAZODONE HYDROCHLORIDE 100 MG/1
100 TABLET ORAL NIGHTLY
Qty: 30 TABLET | Refills: 4 | Status: SHIPPED | OUTPATIENT
Start: 2020-04-08 | End: 2020-10-06

## 2020-04-08 RX ORDER — PRAVASTATIN SODIUM 40 MG
40 TABLET ORAL DAILY
Qty: 30 TABLET | Refills: 4 | Status: SHIPPED | OUTPATIENT
Start: 2020-04-08 | End: 2020-08-27 | Stop reason: SDUPTHER

## 2020-04-08 RX ORDER — GLUCOSAMINE HCL/CHONDROITIN SU 500-400 MG
1 CAPSULE ORAL 2 TIMES DAILY
Qty: 60 EACH | Refills: 4 | Status: SHIPPED | OUTPATIENT
Start: 2020-04-08 | End: 2021-01-19

## 2020-04-08 RX ORDER — HYDROCHLOROTHIAZIDE 12.5 MG/1
12.5 CAPSULE, GELATIN COATED ORAL EVERY MORNING
Qty: 30 CAPSULE | Refills: 4 | Status: SHIPPED | OUTPATIENT
Start: 2020-04-08 | End: 2020-09-24 | Stop reason: SDUPTHER

## 2020-04-08 RX ORDER — FUROSEMIDE 20 MG/1
20 TABLET ORAL 2 TIMES DAILY
Qty: 60 TABLET | Refills: 4 | Status: SHIPPED | OUTPATIENT
Start: 2020-04-08 | End: 2020-09-24 | Stop reason: SDUPTHER

## 2020-04-08 RX ORDER — ARIPIPRAZOLE 15 MG/1
15 TABLET ORAL DAILY
Qty: 30 TABLET | Refills: 4 | Status: SHIPPED | OUTPATIENT
Start: 2020-04-08 | End: 2020-08-27

## 2020-04-08 RX ORDER — PERPHENAZINE 16 MG/1
1 TABLET, FILM COATED ORAL 2 TIMES DAILY
Qty: 60 STRIP | Refills: 4 | Status: SHIPPED | OUTPATIENT
Start: 2020-04-08 | End: 2020-06-09

## 2020-04-08 RX ORDER — IBUPROFEN 800 MG/1
800 TABLET ORAL EVERY 8 HOURS PRN
Qty: 90 TABLET | Refills: 4 | Status: SHIPPED | OUTPATIENT
Start: 2020-04-08 | End: 2020-06-09

## 2020-04-08 RX ORDER — GABAPENTIN 800 MG/1
800 TABLET ORAL 3 TIMES DAILY
Qty: 90 TABLET | Refills: 4 | Status: SHIPPED | OUTPATIENT
Start: 2020-04-08 | End: 2020-09-24 | Stop reason: SDUPTHER

## 2020-04-08 RX ORDER — ATENOLOL 25 MG/1
25 TABLET ORAL DAILY
Qty: 30 TABLET | Refills: 4 | Status: SHIPPED | OUTPATIENT
Start: 2020-04-08 | End: 2020-09-24 | Stop reason: SDUPTHER

## 2020-04-08 RX ORDER — LANCETS 33 GAUGE
1 EACH MISCELLANEOUS 2 TIMES DAILY
Qty: 60 EACH | Refills: 4 | Status: SHIPPED | OUTPATIENT
Start: 2020-04-08 | End: 2022-01-18 | Stop reason: SDUPTHER

## 2020-04-08 RX ORDER — AMOXICILLIN 250 MG
2 CAPSULE ORAL DAILY PRN
Qty: 60 TABLET | Refills: 4 | Status: SHIPPED | OUTPATIENT
Start: 2020-04-08 | End: 2020-09-05

## 2020-04-08 RX ORDER — TIZANIDINE 4 MG/1
4 TABLET ORAL EVERY 8 HOURS PRN
Qty: 90 TABLET | Refills: 4 | Status: SHIPPED | OUTPATIENT
Start: 2020-04-08 | End: 2020-09-05

## 2020-04-08 RX ORDER — MELATONIN 10 MG
1 CAPSULE ORAL NIGHTLY PRN
Qty: 30 CAPSULE | Refills: 4 | Status: SHIPPED | OUTPATIENT
Start: 2020-04-08 | End: 2020-06-09

## 2020-04-08 RX ORDER — ROPINIROLE 0.5 MG/1
0.5 TABLET, FILM COATED ORAL DAILY
Qty: 30 TABLET | Refills: 4 | Status: SHIPPED | OUTPATIENT
Start: 2020-04-08 | End: 2020-11-02

## 2020-04-08 RX ORDER — DOCUSATE SODIUM 100 MG/1
100 CAPSULE, LIQUID FILLED ORAL 2 TIMES DAILY PRN
Qty: 60 CAPSULE | Refills: 4 | Status: SHIPPED | OUTPATIENT
Start: 2020-04-08 | End: 2020-04-09 | Stop reason: SDUPTHER

## 2020-04-08 RX ORDER — AMOXICILLIN 250 MG
1 CAPSULE ORAL 2 TIMES DAILY
Qty: 60 TABLET | Refills: 4 | Status: SHIPPED | OUTPATIENT
Start: 2020-04-08 | End: 2020-06-09

## 2020-04-08 RX ORDER — PANTOPRAZOLE SODIUM 40 MG/1
40 TABLET, DELAYED RELEASE ORAL DAILY
Qty: 30 TABLET | Refills: 4 | Status: SHIPPED | OUTPATIENT
Start: 2020-04-08 | End: 2020-09-24 | Stop reason: SDUPTHER

## 2020-04-09 ENCOUNTER — TELEPHONE (OUTPATIENT)
Dept: GASTROENTEROLOGY | Age: 65
End: 2020-04-09

## 2020-04-09 RX ORDER — DOCUSATE SODIUM 100 MG/1
100 CAPSULE, LIQUID FILLED ORAL 2 TIMES DAILY PRN
Qty: 60 CAPSULE | Refills: 1 | Status: SHIPPED | OUTPATIENT
Start: 2020-04-09 | End: 2020-09-06

## 2020-04-09 RX ORDER — DICYCLOMINE HCL 20 MG
20 TABLET ORAL 4 TIMES DAILY
Qty: 120 TABLET | Refills: 1 | Status: SHIPPED | OUTPATIENT
Start: 2020-04-09 | End: 2020-05-07

## 2020-04-09 NOTE — TELEPHONE ENCOUNTER
Patient called stating she is having the pain on her side again and she needs a refill of the little blue pill she was given . Patient is referring to Bentyl. Refill of Bentyl and Docusate sent to the pharmacy.  Patient will reschedule her appointment when the threat of COVID-19 is done

## 2020-04-30 ENCOUNTER — TELEPHONE (OUTPATIENT)
Dept: INTERNAL MEDICINE | Age: 65
End: 2020-04-30

## 2020-05-07 RX ORDER — DICYCLOMINE HCL 20 MG
TABLET ORAL
Qty: 120 TABLET | Refills: 0 | Status: SHIPPED | OUTPATIENT
Start: 2020-05-07 | End: 2020-06-01

## 2020-05-18 ENCOUNTER — TELEPHONE (OUTPATIENT)
Dept: GASTROENTEROLOGY | Age: 65
End: 2020-05-18

## 2020-05-28 ENCOUNTER — VIRTUAL VISIT (OUTPATIENT)
Dept: GASTROENTEROLOGY | Age: 65
End: 2020-05-28
Payer: MEDICAID

## 2020-05-28 PROCEDURE — 4004F PT TOBACCO SCREEN RCVD TLK: CPT | Performed by: INTERNAL MEDICINE

## 2020-05-28 PROCEDURE — 99213 OFFICE O/P EST LOW 20 MIN: CPT | Performed by: INTERNAL MEDICINE

## 2020-05-28 PROCEDURE — 3017F COLORECTAL CA SCREEN DOC REV: CPT | Performed by: INTERNAL MEDICINE

## 2020-05-28 PROCEDURE — G8419 CALC BMI OUT NRM PARAM NOF/U: HCPCS | Performed by: INTERNAL MEDICINE

## 2020-05-28 PROCEDURE — G8427 DOCREV CUR MEDS BY ELIG CLIN: HCPCS | Performed by: INTERNAL MEDICINE

## 2020-05-28 RX ORDER — LUBIPROSTONE 24 UG/1
24 CAPSULE, GELATIN COATED ORAL 2 TIMES DAILY WITH MEALS
Qty: 60 CAPSULE | Refills: 5 | Status: SHIPPED | OUTPATIENT
Start: 2020-05-28 | End: 2021-10-21

## 2020-05-28 ASSESSMENT — ENCOUNTER SYMPTOMS
NAUSEA: 0
WHEEZING: 0
VOICE CHANGE: 0
VOMITING: 0
SINUS PRESSURE: 0
CONSTIPATION: 1
ABDOMINAL PAIN: 0
DIARRHEA: 0
COUGH: 1
ALLERGIC/IMMUNOLOGIC NEGATIVE: 1
BACK PAIN: 1
BLOOD IN STOOL: 0
RECTAL PAIN: 1
ABDOMINAL DISTENTION: 1
ANAL BLEEDING: 1
CHOKING: 0
TROUBLE SWALLOWING: 0
SORE THROAT: 0

## 2020-05-28 NOTE — PROGRESS NOTES
Pete Gupta MD   atenolol (TENORMIN) 25 MG tablet Take 1 tablet by mouth daily Yes Casper Guadalupe MD   pravastatin (PRAVACHOL) 40 MG tablet Take 1 tablet by mouth daily Yes Desire Padilla MD   blood glucose test strips (CONTOUR NEXT TEST) strip Apply 1 each topically 2 times daily Dx: Use 2 times daily. Yes Larry Guadalupe MD   senna-docusate (PERICOLACE) 8.6-50 MG per tablet Take 2 tablets by mouth daily as needed for Constipation Yes Casper Guadalupe MD   Melatonin 10 MG CAPS Take 1 tablet by mouth nightly as needed (for sleep) Yes MD carolin Garciana-docusate (PERICOLACE) 8.6-50 MG per tablet Take 1 tablet by mouth 2 times daily Yes Casper Guadalupe MD   gabapentin (NEURONTIN) 800 MG tablet Take 1 tablet by mouth 3 times daily for 150 days.  Yes Casper Gupta MD   OneTouch Delica Lancets 97I MISC Apply 1 Units topically 2 times daily USE 2 TO 3 TIMES DAILY AS DIRECTED Yes Larry Guadalupe MD   ibuprofen (ADVIL;MOTRIN) 800 MG tablet Take 1 tablet by mouth every 8 hours as needed for Pain Yes Desire Padilla MD   traZODone (DESYREL) 100 MG tablet Take 1 tablet by mouth nightly Yes Desire Padilla MD   Alcohol Swabs 70 % PADS Apply 1 Units topically 2 times daily Dx: DM-2 use 2-3 times daily Yes Casper Guadalupe MD   hydrochlorothiazide (MICROZIDE) 12.5 MG capsule Take 1 capsule by mouth every morning Yes Larry Guadalupe MD   albuterol sulfate HFA (PROAIR HFA) 108 (90 Base) MCG/ACT inhaler Inhale 2 puffs into the lungs every 6 hours as needed for Wheezing Yes Jarrett Jernigan MD   mesalamine (LIALDA) 1.2 g EC tablet Take 1 tablet by mouth daily (with breakfast) Yes Barrington Dodge MD   ipratropium-albuterol (DUONEB) 0.5-2.5 (3) MG/3ML SOLN nebulizer solution Inhale 3 mLs into the lungs three times daily Yes Casper Neida Ruiz MD   mometasone-formoterol (DULERA) 200-5 MCG/ACT inhaler Inhale 2 puffs into the lungs Yes Historical Provider, MD   albuterol (PROVENTIL) (2.5 MG/3ML) 0.083% nebulizer solution inhale contents of 1 vial in nebulizer every 4 hours if needed for wheezing Yes Susanne Dorado MD   albuterol sulfate HFA (VENTOLIN HFA) 108 (90 Base) MCG/ACT inhaler Inhale 1 puff into the lungs every 6 hours as needed for Wheezing Yes Susanne Dorado MD   albuterol sulfate  (90 Base) MCG/ACT inhaler inhale 1 puff by mouth every 6 hours if needed for wheezing Yes Susanne Dorado MD   budesonide-formoterol (SYMBICORT) 160-4.5 MCG/ACT AERO Inhale 2 puffs into the lungs 2 times daily Rinse mouth after use.  Yes Eleanor Gil MD   nitroGLYCERIN (NITROSTAT) 0.4 MG SL tablet Place 1 tablet under the tongue every 5 minutes as needed for Chest pain Yes Eleanor Gil MD   Umeclidinium Bromide (INCRUSE ELLIPTA) 62.5 MCG/INH AEPB Inhale 1 puff into the lungs daily Yes Basilio Melara MD       Social History     Tobacco Use    Smoking status: Current Some Day Smoker     Packs/day: 0.50     Years: 40.00     Pack years: 20.00     Types: Cigarettes     Start date: 1969    Smokeless tobacco: Never Used   Substance Use Topics    Alcohol use: No     Alcohol/week: 0.0 standard drinks    Drug use: No     Comment:  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        Allergies   Allergen Reactions    Iv Dye [Iodides] Hives   ,   Past Medical History:   Diagnosis Date    RACHEL (acute kidney injury) ([facility] [address] 1/22/2014    Arthritis     generalized    [facility]ct[facility]     Cancer ([facility] 2004    uterus    CHF (congestive heart failure) (HCC)     Chronic bilateral low back pain with right-sided sciatica 2/20/2017    Chronic bronchitis (HCC)     Chronic bronchitis (HCC)     Chronic respiratory failure with hypoxia (HCC)     COPD (chronic obstructive pulmonary disease) (HCC)     on inhaler /  COPD with chronic bronchitis

## 2020-06-01 RX ORDER — DICYCLOMINE HCL 20 MG
TABLET ORAL
Qty: 120 TABLET | Refills: 0 | Status: SHIPPED | OUTPATIENT
Start: 2020-06-01 | End: 2020-07-29

## 2020-06-04 ENCOUNTER — TELEPHONE (OUTPATIENT)
Dept: GASTROENTEROLOGY | Age: 65
End: 2020-06-04

## 2020-06-08 ENCOUNTER — HOSPITAL ENCOUNTER (OUTPATIENT)
Dept: PREADMISSION TESTING | Age: 65
Setting detail: SPECIMEN
Discharge: HOME | End: 2020-06-12
Payer: COMMERCIAL

## 2020-06-09 ENCOUNTER — OFFICE VISIT (OUTPATIENT)
Dept: INTERNAL MEDICINE | Age: 65
End: 2020-06-09
Payer: COMMERCIAL

## 2020-06-09 VITALS
DIASTOLIC BLOOD PRESSURE: 56 MMHG | BODY MASS INDEX: 28.74 KG/M2 | HEART RATE: 71 BPM | TEMPERATURE: 97.2 F | SYSTOLIC BLOOD PRESSURE: 100 MMHG | WEIGHT: 212.2 LBS | HEIGHT: 72 IN

## 2020-06-09 PROBLEM — R07.9 CHEST PAIN: Status: RESOLVED | Noted: 2019-09-04 | Resolved: 2020-06-09

## 2020-06-09 LAB — HBA1C MFR BLD: 6 %

## 2020-06-09 PROCEDURE — 99213 OFFICE O/P EST LOW 20 MIN: CPT | Performed by: STUDENT IN AN ORGANIZED HEALTH CARE EDUCATION/TRAINING PROGRAM

## 2020-06-09 PROCEDURE — 83036 HEMOGLOBIN GLYCOSYLATED A1C: CPT | Performed by: STUDENT IN AN ORGANIZED HEALTH CARE EDUCATION/TRAINING PROGRAM

## 2020-06-09 RX ORDER — POTASSIUM CHLORIDE 20 MEQ/1
20 TABLET, EXTENDED RELEASE ORAL DAILY
COMMUNITY
Start: 2019-05-29 | End: 2022-01-18 | Stop reason: SDUPTHER

## 2020-06-09 RX ORDER — MIRTAZAPINE 15 MG/1
15 TABLET, FILM COATED ORAL DAILY
COMMUNITY
Start: 2020-03-15 | End: 2020-06-09

## 2020-06-09 ASSESSMENT — ENCOUNTER SYMPTOMS
COUGH: 0
ABDOMINAL PAIN: 0
PHOTOPHOBIA: 0
RHINORRHEA: 0
SORE THROAT: 0
SHORTNESS OF BREATH: 0
SINUS PRESSURE: 0
CHEST TIGHTNESS: 0
VOMITING: 0
DIARRHEA: 0
NAUSEA: 0

## 2020-06-09 NOTE — PROGRESS NOTES
Attending Physician Statement  I have discussed the care of Yenni Hough, including pertinent history and exam findings with the resident. I have reviewed the key elements of all parts of the encounter with the resident. I agree with the assessment, and status of the problem list as documented. Diagnosis Orders   1. Type 2 diabetes mellitus with complication (HCC)  POCT glycosylated hemoglobin (Hb A1C)    Basic Metabolic Panel   2. Chronic obstructive pulmonary disease, unspecified COPD type ([facility] [address]     3. Essential hypertension  Basic Metabolic Panel   4. Irritable bowel syndrome with both constipation and diarrhea     5. Primary insomnia       The plan and orders should include   Orders Placed This Encounter   Procedures    Basic Metabolic Panel    POCT glycosylated hemoglobin (Hb A1C)    and this was also documented by the resident. .The medication list was reviewed with the resident and is up to date. The return visit should be in 6 months .     Dr Sisi Bryant MD, [place]  [facility] , Department of Internal Medicine  Resident Ambulatory Site Medical Director  [address] [facility] Internal Medicine  [address] [facility]  Internal Medicine Clerkship - Kristin Sumner    6/9/2020, 1:56 PM
monitoring  10/21/2020    Creatinine monitoring  10/21/2020    Diabetic foot exam  12/03/2020    Lipid screen  02/05/2021    Colon cancer screen colonoscopy  01/09/2023    DTaP/Tdap/Td vaccine (2 - Td) 09/30/2026    Flu vaccine  Completed    Pneumococcal 0-64 years Vaccine  Completed    HIV screen  Completed    Hib vaccine  Aged Out    Meningococcal (ACWY) vaccine  Aged Out

## 2020-06-21 ENCOUNTER — HOSPITAL ENCOUNTER (OUTPATIENT)
Dept: PREADMISSION TESTING | Age: 65
Setting detail: SPECIMEN
Discharge: HOME | End: 2020-06-25
Payer: COMMERCIAL

## 2020-07-07 RX ORDER — BUDESONIDE AND FORMOTEROL FUMARATE DIHYDRATE 160; 4.5 UG/1; UG/1
AEROSOL RESPIRATORY (INHALATION)
Qty: 10.2 G | Refills: 5 | Status: SHIPPED | OUTPATIENT
Start: 2020-07-07 | End: 2022-05-17

## 2020-07-07 NOTE — TELEPHONE ENCOUNTER
Refilled symbicort
hypertension     Pure hypercholesterolemia     Moderate episode of recurrent major depressive disorder (HCC)     History of heroin use     Hep C w/o coma, chronic ([facility] [address] completed tx 2016     GERD (gastroesophageal reflux disease)     COPD (chronic obstructive pulmonary disease) (HCC)     Tobacco abuse     Chronic diastolic congestive heart failure (HCC)     Diverticulosis     Hyperplastic polyp of intestine     Diabetic polyneuropathy associated with type 2 diabetes mellitus (HCC)     Bilateral chronic knee pain     Chronic respiratory failure with hypoxia (HCC)     Type 2 diabetes mellitus with complication (HCC)     Primary insomnia     Cigarette nicotine dependence without complication     Chronic bilateral low back pain with right-sided sciatica     Irritable bowel syndrome with both constipation and diarrhea     Enteritis

## 2020-07-12 NOTE — CARE COORDINATION
Ambulatory Care Coordination ED Follow up Call       Reason for ED Visit:  cough  Care Management Risk Score: CMRS 7  How are you feeling? :     not changed  Patient Reports the following:  Patient states she mainly coughs at night. She states she is using her albuterol solution in the nebulizer 2 times a day and using her inhalers. She states she gets SOB and is SOB while talking to writer on the phone. She states she is not using O2 at the moment and may need to start back using O2. Patient also needs a glucometer. She was advised to reach out to PCP office to request a new one. She voiced understanding. Contact [facility] regarding any worsening symptoms from above. Did you call your PCP prior to going to the ED? No          Post Discharge Status:  What health concerns since you left the Emergency Room? None    Do you have wounds that you are caring for at home? No    Do you have a follow up appt scheduled? Yes, pt may make a sooner appt if no improvement within a week    Review of Instructions:                                 Do you have any questions regarding your discharge instructions?:  No  Medications:    What questions do you have about your medications? No  Are you taking your medications as directed? If not - why? Yes   Can you afford your medications? yes  ADLS:  Do you need assistance of any kind at home? N/A   What assistance is needed?  none      FU appts/Provider:    Future Appointments  Date Time Provider Terry Bradford   1/31/2018 1:45 PM Antonella Mcfarland MD [address] [facility] Maintenance Due   Topic Date Due    Zostavax vaccine  06/05/2017    Flu vaccine (1) 09/01/2017    Diabetic foot exam  11/23/2017     Patient advised to contact PCP office to have HM items/records faxed to PCP Office directly?   N/A      As a reminder, writer explained the importance of first calling their PCP to get an appointment instead going the ER especially Monday- Friday during office Patient refused CPAP machine for his FELIPE. hours for non-emergency problems. Writer advised the patient to speak to a nurse or MOA to assist them with the issue and maybe get them in as a same day/sick call before they decide to go to the ER. Writer also stressed that they can call me for any help regarding appointments,medications and questions/concerns regarding ER visits, patient understood well.

## 2020-07-16 ENCOUNTER — VIRTUAL VISIT (OUTPATIENT)
Dept: PULMONOLOGY | Age: 65
End: 2020-07-16
Payer: COMMERCIAL

## 2020-07-16 PROCEDURE — 99214 OFFICE O/P EST MOD 30 MIN: CPT | Performed by: INTERNAL MEDICINE

## 2020-07-16 NOTE — PROGRESS NOTES
2020    TELEHEALTH EVALUATION -- Audio/Visual ([facility]    Patient and physician are located in their individual locations. This is visit is completed via Cliq application []/ Doxy. me[x] / Telephone []     HPI:    Kofi Franco (:  1955) has requested an audio/video evaluation for the following concern(s):          Review of Systems    Prior to Visit Medications    Medication Sig Taking? Authorizing Provider   SYMBICORT 160-4.5 MCG/ACT AERO inhale 2 puffs by mouth twice a day Rinse mouth after use Yes Terrell Cedeño MD   potassium chloride (KLOR-CON M) 20 MEQ extended release tablet Take 20 mEq by mouth daily Yes Historical Provider, MD   VORTIoxetine (TRINTELLIX) 10 MG TABS tablet Take 10 mg by mouth daily Yes Historical Provider, MD   dicyclomine (BENTYL) 20 MG tablet take 1 tablet by mouth four times a day Yes Carlos Eduardo Frausto MD   lubiprostone (AMITIZA) 24 MCG capsule Take 1 capsule by mouth 2 times daily (with meals) Yes Carlos Eduardo Frausto MD   docusate sodium (RA COL-RITE) 100 MG capsule Take 1 capsule by mouth 2 times daily as needed for Constipation Yes Carlos Eduardo Fruasto MD   blood glucose test strips (ONE TOUCH ULTRA TEST) strip 1 each by In Vitro route 4 times daily As needed.  Yes [facility] Viky Guadalupe MD   ARIPiprazole (ABILIFY) 15 MG tablet Take 1 tablet by mouth daily Yes Casper Guadalupe MD   rOPINIRole (REQUIP) 0.5 MG tablet Take 1 tablet by mouth daily Yes [facility] Viky Guadalupe MD   pantoprazole (PROTONIX) 40 MG tablet Take 1 tablet by mouth daily Yes [facility] Viky Guadalupe MD   tiZANidine (ZANAFLEX) 4 MG tablet Take 1 tablet by mouth every 8 hours as needed (Back Pain) Yes Casper Guadalupe MD   furosemide (LASIX) 20 MG tablet Take 1 tablet by mouth 2 times daily Yes Casper Guadalupe MD   atenolol (TENORMIN) 25 MG tablet Take 1 tablet by mouth daily Yes [facility] Ish Galaviz MD   pravastatin (PRAVACHOL) 40 MG tablet Take 1 tablet by mouth daily Yes Casper Guadalupe MD   senna-docusate (PERICOLACE) 8.6-50 MG per tablet Take 2 tablets by mouth daily as needed for Constipation Yes Tessa Guadalupe MD   gabapentin (NEURONTIN) 800 MG tablet Take 1 tablet by mouth 3 times daily for 150 days. Yes Tessa Galaviz MD   OneTouch Delica Lancets 32R MISC Apply 1 Units topically 2 times daily USE 2 TO 3 TIMES DAILY AS DIRECTED Yes Keira Bowden MD   traZODone (DESYREL) 100 MG tablet Take 1 tablet by mouth nightly Yes Keira Bowden MD   Alcohol Swabs 70 % PADS Apply 1 Units topically 2 times daily Dx: DM-2 use 2-3 times daily Yes Keira Bowden MD   hydrochlorothiazide (MICROZIDE) 12.5 MG capsule Take 1 capsule by mouth every morning Yes Tessa Guadalupe MD   albuterol sulfate HFA (PROAIR HFA) 108 (90 Base) MCG/ACT inhaler Inhale 2 puffs into the lungs every 6 hours as needed for Wheezing Yes Ángel Dupree MD   mesalamine (LIALDA) 1.2 g EC tablet Take 1 tablet by mouth daily (with breakfast) Yes Nicki Chun MD   mometasone-formoterol (DULERA) 200-5 MCG/ACT inhaler Inhale 2 puffs into the lungs Yes Historical Provider, MD   albuterol sulfate  (90 Base) MCG/ACT inhaler inhale 1 puff by mouth every 6 hours if needed for wheezing Yes Marsha Officer, MD   nitroGLYCERIN (NITROSTAT) 0.4 MG SL tablet Place 1 tablet under the tongue every 5 minutes as needed for Chest pain Yes Marsha Barakat MD   Umeclidinium Bromide (INCRUSE ELLIPTA) 62.5 MCG/INH AEPB Inhale 1 puff into the lungs daily Yes Paul Swann MD       Social History     Tobacco Use    Smoking status: Current Some Day Smoker     Packs/day: 0.50     Years: 40.00     Pack years: 20.00     Types: Cigarettes     Start date: 1969    Smokeless tobacco: Never Used   Substance Use Topics    Alcohol use:  No Alcohol/week: 0.0 standard drinks    Drug use: No     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 Wt Readings from Last 3 Encounters:   06/09/20 212 lb 3.2 oz (96.3 kg)   02/17/20 204 lb 9.6 oz (92.8 kg)   01/22/20 201 lb (91.2 kg)       Results for orders placed or performed in visit on 06/09/20   POCT glycosylated hemoglobin (Hb A1C)   Result Value Ref Range    Hemoglobin A1C 6.0 %       No results found. PHYSICAL EXAMINATION:  Due to this being a TeleHealth encounter, evaluation of the following organ systems is limited: Vitals/Constitutional/EENT/Resp/CV/GI//MS/Neuro/Skin/Heme-Lymph-Imm. Constitutional: [x] Appears well-developed and well-nourished. [x] Abnormal obesity mental status  [x] Alert and awake  [x] Oriented to person/place/time [x]Able to follow commands    [x] No apparent distress      Eyes:  EOM    [x]  Normal  [] Abnormal-  Sclera  [x]  Normal  [] Abnormal -         Discharge [x]  None visible  [x] Abnormal -    HENT:   [x] Normocephalic, atraumatic. [] Abnormal shaped head   [x] Mouth/Throat: Mucous membranes are moist.     Ears [x] Normal  [] Abnormal-    Neck: [x] Normal range of motion [x] Supple [x] No visualized mass. Pulmonary/Chest: [x] Respiratory effort normal.  [x] No visualized signs of difficulty breathing or respiratory distress        [] Abnormal      Musculoskeletal:   [x] Normal range of motion. [x] Normal gait with no signs of ataxia. [x]  No signs of cyanosis of the peripheral portions of extremities.          [] Abnormal       Neurological:        [x] Normal cranial nerve (limited exam to video visit) [x] No focal weakness observed       [] Abnormal          Speech       [x] Normal   [] Abnormal     Skin:        [x] No rash on visible skin  [x] Normal  [] Abnormal     Psychiatric:       [x] Normal  [] Abnormal        [x] Normal Mood  [] Anxious appearing        Other Pertinent Exam patient.     Services were provided through a video synchronous discussion virtually to substitute for in-person clinic visit.     _______________________________________________________________________________________________________________

## 2020-07-20 ENCOUNTER — HOSPITAL ENCOUNTER (OUTPATIENT)
Dept: GENERAL RADIOLOGY | Age: 65
Discharge: HOME | End: 2020-07-22
Payer: COMMERCIAL

## 2020-07-20 ENCOUNTER — HOSPITAL ENCOUNTER (OUTPATIENT)
Age: 65
Discharge: HOME | End: 2020-07-22
Payer: COMMERCIAL

## 2020-07-20 PROCEDURE — 71046 X-RAY EXAM CHEST 2 VIEWS: CPT

## 2020-07-24 ENCOUNTER — OFFICE VISIT (OUTPATIENT)
Dept: PULMONOLOGY | Age: 65
End: 2020-07-24
Payer: COMMERCIAL

## 2020-07-24 VITALS
HEART RATE: 62 BPM | SYSTOLIC BLOOD PRESSURE: 122 MMHG | DIASTOLIC BLOOD PRESSURE: 83 MMHG | BODY MASS INDEX: 28.62 KG/M2 | WEIGHT: 211 LBS | OXYGEN SATURATION: 96 % | TEMPERATURE: 97.2 F

## 2020-07-24 PROCEDURE — 99214 OFFICE O/P EST MOD 30 MIN: CPT | Performed by: INTERNAL MEDICINE

## 2020-07-24 RX ORDER — ALBUTEROL SULFATE 90 UG/1
2 AEROSOL, METERED RESPIRATORY (INHALATION) EVERY 6 HOURS PRN
Qty: 1 INHALER | Refills: 3 | Status: SHIPPED | OUTPATIENT
Start: 2020-07-24 | End: 2020-11-06

## 2020-07-24 ASSESSMENT — SLEEP AND FATIGUE QUESTIONNAIRES
HOW LIKELY ARE YOU TO NOD OFF OR FALL ASLEEP WHILE SITTING AND TALKING TO SOMEONE: 0
HOW LIKELY ARE YOU TO NOD OFF OR FALL ASLEEP WHILE SITTING QUIETLY AFTER LUNCH WITHOUT ALCOHOL: 3
HOW LIKELY ARE YOU TO NOD OFF OR FALL ASLEEP WHILE SITTING AND READING: 3
ESS TOTAL SCORE: 17
HOW LIKELY ARE YOU TO NOD OFF OR FALL ASLEEP WHEN YOU ARE A PASSENGER IN A CAR FOR AN HOUR WITHOUT A BREAK: 3
HOW LIKELY ARE YOU TO NOD OFF OR FALL ASLEEP WHILE LYING DOWN TO REST IN THE AFTERNOON WHEN CIRCUMSTANCES PERMIT: 3
HOW LIKELY ARE YOU TO NOD OFF OR FALL ASLEEP WHILE WATCHING TV: 3
HOW LIKELY ARE YOU TO NOD OFF OR FALL ASLEEP IN A CAR, WHILE STOPPED FOR A FEW MINUTES IN TRAFFIC: 0
HOW LIKELY ARE YOU TO NOD OFF OR FALL ASLEEP WHILE SITTING INACTIVE IN A PUBLIC PLACE: 2

## 2020-07-24 NOTE — PROGRESS NOTES
Kofi Franco  7/24/2020    Chief Complaint   Patient presents with    Follow-up     chronic bronchitis;  chest xray completed on monday    COPD, obesity, sleep apnea syndrome  The patient is known to have chronic obstructive pulmonary disease  And is being treated with bronchodilators. He is using her bronchodilators regularly. Unfortunately she continued to smoke in spite of repeated advice to give up smoking. He still smoking about a pack a day. No evidence of an acute exacerbation of COPD there is no increase in the volume or change in the color of the sputum no hemoptysis no chest pain no pedal edema no thromboembolic process. He has no symptoms of nasal stuffiness or sinusitis. The hot humid weather does bother her. Patient has a sleep apnea syndrome also. She supposed to use CPAP however I wonder if he really using it. An [place] Sleepiness Scale revealed a score of 17 indicating significant degree of daytime sleepiness. However she denied any sleepiness subjectively. He has hypertension which is under good control. Her blood sugars are also well controlled. She continues to be morbidly obese and has not been able to lose much weight. I reviewed the chest x-ray Done this week. Does show bibasilar areas of linear scarring no masses noted no cardiomegaly noted no pleural effusion noted.     Kofi Franco    Sleep Medicine 7/24/2020 2/17/2020 11/11/2019   Sitting and reading 3 3 3   Watching TV 3 3 3   Sitting, inactive in a public place (e.g. a theatre or a meeting) 2 3 2   As a passenger in a car for an hour without a break 3 3 3   Lying down to rest in the afternoon when circumstances permit 3 0 2   Sitting and talking to someone 0 0 1   Sitting quietly after a lunch without alcohol 3 3 2   In a car, while stopped for a few minutes in traffic 0 0 2   Total score 17 15 18                 Review of Systems -as assistant were conductive for all other system including upper and lower extremities. No additional information was obtained. General ROS: negative for - chills, fatigue, fever or weight loss  ENT ROS: negative for - headaches, oral lesions or sore throat  Cardiovascular ROS: no chest pain , orthopnea or pnd   Gastrointestinal ROS: no abdominal pain, change in bowel habits, or black or bloody stools  Skin - no rash   Neuro - no blurry vision , no loc . No focal weakness   msk - no jt tenderness or swelling    Vascular - no claudication , rest completed and negative   Lymphatic - complete and negative   Hematology - oncology - complete and negative   Allergy immunology - complete and negative    no burning or hematuria       LUNG CANCER SCREENING     1. CRITERIA MET    [x]     CT ORDERED  [x]      2. CRITERIA NOT MET   []      3. REFUSED                    [x]        REASON CRITERIA NOT MET     1. SMOKING LESS THAN 30 PY  []      2. AGE LESS THAN 55 or GREATER 77 YEARS  []      3. QUIT SMOKING 15 YEARS OR GREATER   []      4. RECENT CT WITH IN 11 MONTHS    []      5. LIFE EXPECTANCY < 5 YEARS   []      6. SIGNS  AND SYMPTOMS OF LUNG CANCER   []           Immunization   Immunization History   Administered Date(s) Administered    Influenza Virus Vaccine 01/22/2014, 10/22/2015, 08/15/2016, 08/10/2017, 12/29/2017, 09/17/2018, 08/13/2019, 08/13/2019    Influenza Whole 07/30/2016    Influenza, Quadv, IM, (6 mo and older Fluzone, Flulaval, Fluarix and 3 yrs and older Afluria) 09/17/2018, 08/13/2019    Influenza, Filemon Martinez, IM, PF (6 mo and older Fluzone, Flulaval, Fluarix, and 3 yrs and older Afluria) 08/15/2016, 08/10/2017, 12/29/2017, 08/16/2019    Pneumococcal Conjugate 13-valent ([id number]) 08/15/2016    Pneumococcal Polysaccharide ([id number]) 10/22/2015, 12/29/2017    Td, unspecified formulation 07/30/2016    Tdap (Boostrix, Adacel) 09/30/2016        Pneumococcal Vaccine she did not take her flu shot this year.     [x] Up to date    [] Indicated   [] Refused  [] Contraindicated Influenza Vaccine   [x] Up to date    [] Indicated   [] Refused  [] Contraindicated       PAST MEDICAL HISTORY:         Diagnosis Date    RACHEL (acute kidney injury) ([facility] 1/22/2014    Arthritis     generalized    Bronchiectasis without complication ([facility]     Cancer ([facility] 2004    uterus    CHF (congestive heart failure) (HCC)     Chronic bilateral low back pain with right-sided sciatica 2/20/2017    Chronic bronchitis (HCC)     Chronic bronchitis (HCC)     Chronic respiratory failure with hypoxia (HCC)     COPD (chronic obstructive pulmonary disease) (HCC)     on inhaler /  COPD with chronic bronchitis and emphysema    Current smoker on some days     Depression 10/30/2014    Diabetic polyneuropathy associated with type 2 diabetes mellitus (HCC)     Diverticulosis     Full dentures     GERD (gastroesophageal reflux disease)     Hep C w/o coma, chronic (HCC)     Hyperlipidemia     Hyperplastic polyp of intestine     Hypertension     DR. MCDANIEL-CARDIO    Liver disease     hepatitis C    Nasal polyposis     Noncompliance with CPAP treatment     Obesity (BMI 35.0-39.9 without comorbidity)     On home O2     JIMENA (obstructive sleep apnea)     JIMENA on CPAP    Pacemaker 2012    Pneumonia 7/2012    RECENTLY HOSPITALIZED    Pulmonary edema cardiac cause (HCC)     Rectal bleeding     Smoking addiction     Tobacco abuse        Family History:       Problem Relation Age of Onset    Cancer Mother         lungs and brain    Stroke Father     Crohn's Disease Sister        SURGICAL HISTORY:   Past Surgical History:   Procedure Laterality Date    BACK SURGERY  2008    CARDIAC SURGERY       cath dec. 2013    COLONOSCOPY      COLONOSCOPY  1/23/15    severe diverticula    COLONOSCOPY  09/20/2016    HYPERPLASTIC POLYP X 2     COLONOSCOPY N/A 3/14/2019    COLONOSCOPY DIAGNOSTIC performed by Jarrell Teixeira MD at [facility]    COLONOSCOPY N/A 1/9/2020    COLONOSCOPY WITH BIOPSY performed by Carlos Eduardo Fraust[facility] at [facility], COLON, DIAGNOSTIC      HERNIA REPAIR      UMBILICAL    HYSTERECTOMY      LUMBAR SPINE SURGERY  9/24/13    decompression & re-exploration L3-4, L4-5    PACEMAKER INSERTION      PACEMAKER PLACEMENT      SIGMOIDOSCOPY N/A 6/26/15    flexable sigmoidscopy,HYPERPLASTIC POLYP    TONSILLECTOMY      UPPER GASTROINTESTINAL ENDOSCOPY N/A 3/14/2019    EGD BIOPSY performed by Carlos Eduardo Frausto MD at [facility]              Not in a hospital admission. Allergies   Allergen Reactions    Iv Dye [Iodides] Hives     Social History     Tobacco Use   Smoking Status Current Some Day Smoker    Packs/day: 0.50    Years: 40.00    Pack years: 20.00    Types: Cigarettes    Start date: 1969   Smokeless Tobacco Never Used     Prior to Admission medications    Medication Sig Start Date End Date Taking? Authorizing Provider   albuterol sulfate HFA (PROAIR HFA) 108 (90 Base) MCG/ACT inhaler Inhale 2 puffs into the lungs every 6 hours as needed for Wheezing 7/24/20  Yes Carmelo Giraldo MD   SYMBICORT 160-4.5 MCG/ACT AERO inhale 2 puffs by mouth twice a day Rinse mouth after use 7/7/20  Yes Casper Guadalupe MD   potassium chloride (KLOR-CON M) 20 MEQ extended release tablet Take 20 mEq by mouth daily 5/29/19  Yes Historical Provider, MD   VORTIoxetine (TRINTELLIX) 10 MG TABS tablet Take 10 mg by mouth daily 9/20/19  Yes Historical Provider, MD   dicyclomine (BENTYL) 20 MG tablet take 1 tablet by mouth four times a day 6/1/20  Yes Carlos Eduardo Frausto MD   lubiprostone (AMITIZA) 24 MCG capsule Take 1 capsule by mouth 2 times daily (with meals) 5/28/20 7/27/20 Yes Carlos Eduardo Frausto MD   docusate sodium (RA COL-RITE) 100 MG capsule Take 1 capsule by mouth 2 times daily as needed for Constipation 4/9/20 9/6/20 Yes Carlos Eduardo Frausto MD   blood glucose test strips (ONE TOUCH ULTRA TEST) strip 1 each by In Vitro route 4 times daily As needed.  4/8/20  Yes Casper Calix MD   ARIPiprazole (ABILIFY) 15 MG tablet Take 1 tablet by mouth daily 4/8/20 9/5/20 Yes Storm Guadalupe MD   rOPINIRole (REQUIP) 0.5 MG tablet Take 1 tablet by mouth daily 4/8/20 9/5/20 Yes Storm Guadalupe MD   pantoprazole (PROTONIX) 40 MG tablet Take 1 tablet by mouth daily 4/8/20 9/5/20 Yes Storm Guadalupe MD   tiZANidine (ZANAFLEX) 4 MG tablet Take 1 tablet by mouth every 8 hours as needed (Back Pain) 4/8/20 9/5/20 Yes Casper Guadalupe MD   furosemide (LASIX) 20 MG tablet Take 1 tablet by mouth 2 times daily 4/8/20 9/5/20 Yes Storm Guadalupe MD   atenolol (TENORMIN) 25 MG tablet Take 1 tablet by mouth daily 4/8/20 9/5/20 Yes Storm Guadalupe MD   pravastatin (PRAVACHOL) 40 MG tablet Take 1 tablet by mouth daily 4/8/20 9/5/20 Yes Casper Guadalupe MD   senna-docusate (PERICOLACE) 8.6-50 MG per tablet Take 2 tablets by mouth daily as needed for Constipation 4/8/20 9/5/20 Yes Elsa Evangelista MD   gabapentin (NEURONTIN) 800 MG tablet Take 1 tablet by mouth 3 times daily for 150 days.  4/8/20 9/5/20 Yes MD Wayne Russo Delica Lancets 01B MISC Apply 1 Units topically 2 times daily USE 2 TO 3 TIMES DAILY AS DIRECTED 4/8/20 9/5/20 Yes Casper Coombs MD   traZODone (DESYREL) 100 MG tablet Take 1 tablet by mouth nightly 4/8/20 9/5/20 Yes Storm Guadalupe MD   Alcohol Swabs 70 % PADS Apply 1 Units topically 2 times daily Dx: DM-2 use 2-3 times daily 4/8/20 9/5/20 Yes Storm Guadalupe MD   hydrochlorothiazide (MICROZIDE) 12.5 MG capsule Take 1 capsule by mouth every morning 4/8/20 9/5/20 Yes Storm Guadalupe MD   albuterol sulfate HFA (PROAIR HFA) 108 (90 Base) MCG/ACT inhaler Inhale 2 puffs into the lungs every 6 hours as needed for Wheezing 2/17/20  Yes Veronica Espinal MD   mesalamine (LIALDA) 1.2 g EC tablet Take 1 tablet by mouth daily (with breakfast) 1/22/20  Yes Kam Lomas MD   mometasone-formoterol (DULERA) 200-5 MCG/ACT inhaler Inhale 2 puffs into the lungs 9/5/19  Yes Historical Provider, MD   albuterol sulfate  (90 Base) MCG/ACT inhaler inhale 1 puff by mouth every 6 hours if needed for wheezing 10/9/19  Yes Leonora Adames MD   nitroGLYCERIN (NITROSTAT) 0.4 MG SL tablet Place 1 tablet under the tongue every 5 minutes as needed for Chest pain 10/9/19  Yes Leonora Adames MD   Umeclidinium Bromide (INCRUSE ELLIPTA) 62.5 MCG/INH AEPB Inhale 1 puff into the lungs daily 9/17/19  Yes Kyle Cleveland MD         Physical Exam  General Appearance:    Alert, cooperative, no distress, appears stated age, morbid obesity, no distress, not using accessory muscles. Very pleasant obese not excessively using accessory muscles no fever no distress   Head:    Normocephalic, without obvious abnormality, atraumatic   Eye examination revealed no jaundice. No Diego's syndrome. Nose revealed no polyps. No sinus tenderness. Throat examination unremarkable. Ear examination is unremarkable.                 :    Neck:   Supple, symmetrical, trachea midline, no adenopathy;     thyroid:  no enlargement/tenderness/nodules; no carotid    bruit or JVD none. Hepatojugular reflux is not elevated neck is short and fat   Back:     Symmetric, no curvature, ROM normal, no CVA tenderness   Lungs:       Lv is increased. Percussion note is hyperresonant. Expiration is prolonged. No rales or rhonchi are audible. No pleural friction rub is audible. Chest Wall:    No tenderness or deformity significant tenderness over the anterior chest especially on the second left intercostal space and intercostal cartilages. No pericardial friction is audible no pleural pericardial rub is audible. No gallops are audible.           Heart:    Regular rate and rhythm, S1 and S2 normal, no murmur, rub        or gallop no rvh murmurs or gallops no pericardial friction rub                          Abdomen:                                                 Pulses:                                            Lymph nodes:                    Neurologic:                  Soft, non-tender, bowel sounds active all four quadrants,     no masses, no organomegaly         2+ and symmetric all extremities            Cervical, supraclavicular not enlarged or matted or tender      CNII-XII intact, normal strength 5/5 . Sensation grossly normal  and reflexes normal 2+  throughout     Clubbing No  Lower ext edema No1+   [] , 2 +  [] , 3+   []  Upper ext edema No       Musculoskeletal - no joint swelling or tenderness or synovitis               /83   Pulse 62   Temp 97.2 °F (36.2 °C)   Wt 211 lb (95.7 kg)   SpO2 96%   BMI 28.62 kg/m²     CXR  Chest x-ray done this week were reviewed. She has no cardiomegaly no enlarged lymph nodes no pleural effusion does have linear areas of atelectasis at the bases no evidence of any mass or tumor noted. CT Scans  No new CT scan    Echo  No recent echo        Assessment   Diagnosis Orders   1. Chronic bronchitis, unspecified chronic bronchitis type ([address]     2. Tobacco abuse     3. Essential hypertension     4. Gastroesophageal reflux disease with esophagitis     5. Type 2 diabetes mellitus with complication (HCC)     6. JIMENA on CPAP     7. Obesity (BMI 35.0-39.9 without comorbidity)         Plan:  Her pulmonary status very stable COPD is under good control she will continue the bronchodilators. Her prescription for pro-air was refilled. She was once again counseled regarding the need to give up smoking altogether. He was also counseled regarding the need to use the CPAP machine regularly which I do not think she is doing so. He encouraged to lose weight    She will continue anti-antihypertensive therapy as before    Continue the treatment of diabetes as before.     She already had Pneumovax    She already had flu vaccine. Chest x-ray and TSH recent CT was reviewed no masses or nodules noted. We will continue to follow her for lung cancer screening. We will see her follow-up in few months. Patient advised to avoid hot humid weather. Advised to may wear a mask whenever she is outside. Her symptoms of reflux are also improved.

## 2020-07-29 RX ORDER — DICYCLOMINE HCL 20 MG
TABLET ORAL
Qty: 120 TABLET | Refills: 0 | Status: SHIPPED | OUTPATIENT
Start: 2020-07-29 | End: 2020-08-28

## 2020-08-17 ENCOUNTER — TELEPHONE (OUTPATIENT)
Dept: INTERNAL MEDICINE | Age: 65
End: 2020-08-17

## 2020-08-18 ENCOUNTER — OFFICE VISIT (OUTPATIENT)
Dept: PODIATRY | Age: 65
End: 2020-08-18
Payer: COMMERCIAL

## 2020-08-18 VITALS — TEMPERATURE: 97.6 F | BODY MASS INDEX: 28.58 KG/M2 | WEIGHT: 211 LBS | RESPIRATION RATE: 16 BRPM | HEIGHT: 72 IN

## 2020-08-18 PROCEDURE — 11721 DEBRIDE NAIL 6 OR MORE: CPT | Performed by: PODIATRIST

## 2020-08-18 PROCEDURE — 99213 OFFICE O/P EST LOW 20 MIN: CPT | Performed by: PODIATRIST

## 2020-08-18 PROCEDURE — 17110 DESTRUCTION B9 LES UP TO 14: CPT | Performed by: PODIATRIST

## 2020-08-18 NOTE — PROGRESS NOTES
[facility] PHYSICIANS  [facility]  [address]  Dept: [phone]  Dept Fax: [phone]    DIABETIC PROGRESS NOTE  Date of patient's visit: 8/18/2020  Patient's Name:  Gaudencio Escobar YOB: 1955            Patient Care Team:  Zac Rossi MD as PCP - General (Internal Medicine)  Juan Dodson MD as Consulting Physician (Gastroenterology)  Indio Powers MD as Referring Physician (Internal Medicine)  Becky Quach MD as Consulting Physician (Pulmonology)  Lizeth Lala MD as Consulting Physician (Internal Medicine)  Andree Jaimes DPM as Physician (Podiatry)          Chief Complaint   Patient presents with    Foot Pain    Nail Problem    Diabetes    Benign Neoplasm       Subjective:   Gaudencio Escobar comes to clinic for Foot Pain; Nail Problem; Diabetes; and Benign Neoplasm    she is a diabetic and states that she is doing well. Pt currently has complaint of thickened, elongated nails that they cannot manage by themselves. Pt's primary care physician is Zac Rossi MD last seen 01/14/2020. Pt's last blood sugar was 128 this morning . Pt also relates to painful skin spots to the bottom of both feet. Pt has tried pads and changing shoes but it has note helped the pain    Pt has a new complaint of swelling to the right and left leg that is getting worse. Pt relates to being on the feet more and sleeping in a chair       Lab Results   Component Value Date    LABA1C 6.0 06/09/2020      Complains of numbness in the feet bilat.   Past Medical History:   Diagnosis Date    RACHEL (acute kidney injury) ([address] 1/22/2014    Arthritis     generalized    Bronchiectasis without complication ([address]     Cancer ([address] 2004    uterus    CHF (congestive heart failure) (HCC)     Chronic bilateral low back pain with right-sided sciatica 2/20/2017    Chronic bronchitis (HCC)     Chronic bronchitis ([address]     Chronic respiratory failure with hypoxia (HCC)     COPD (chronic obstructive pulmonary disease) (HCC)     on inhaler /  COPD with chronic bronchitis and emphysema    Current smoker on some days     Depression 10/30/2014    Diabetic polyneuropathy associated with type 2 diabetes mellitus (HCC)     Diverticulosis     Full dentures     GERD (gastroesophageal reflux disease)     Hep C w/o coma, chronic (HCC)     Hyperlipidemia     Hyperplastic polyp of intestine     Hypertension     DR. MCDANIEL-CARDIO    Liver disease     hepatitis C    Nasal polyposis     Noncompliance with CPAP treatment     Obesity (BMI 35.0-39.9 without comorbidity)     On home O2     JIMENA (obstructive sleep apnea)     JIMENA on CPAP    Pacemaker 2012    Pneumonia 7/2012    RECENTLY HOSPITALIZED    Pulmonary edema cardiac cause (HCC)     Rectal bleeding     Smoking addiction     Tobacco abuse        Allergies   Allergen Reactions    Iv Dye [Iodides] Hives     Current Outpatient Medications on File Prior to Visit   Medication Sig Dispense Refill    dicyclomine (BENTYL) 20 MG tablet take 1 tablet by mouth four times a day 120 tablet 0    albuterol sulfate HFA (PROAIR HFA) 108 (90 Base) MCG/ACT inhaler Inhale 2 puffs into the lungs every 6 hours as needed for Wheezing 1 Inhaler 3    SYMBICORT 160-4.5 MCG/ACT AERO inhale 2 puffs by mouth twice a day Rinse mouth after use 10.2 g 5    potassium chloride (KLOR-CON M) 20 MEQ extended release tablet Take 20 mEq by mouth daily      VORTIoxetine (TRINTELLIX) 10 MG TABS tablet Take 10 mg by mouth daily      docusate sodium (RA COL-RITE) 100 MG capsule Take 1 capsule by mouth 2 times daily as needed for Constipation 60 capsule 1    blood glucose test strips (ONE TOUCH ULTRA TEST) strip 1 each by In Vitro route 4 times daily As needed.  200 each 3    ARIPiprazole (ABILIFY) 15 MG tablet Take 1 tablet by mouth daily 30 tablet 4    rOPINIRole (REQUIP) 0.5 MG tablet Take 1 tablet by mouth daily 30 tablet 4    pantoprazole (PROTONIX) 40 MG tablet Take 1 tablet by mouth daily 30 tablet 4    tiZANidine (ZANAFLEX) 4 MG tablet Take 1 tablet by mouth every 8 hours as needed (Back Pain) 90 tablet 4    furosemide (LASIX) 20 MG tablet Take 1 tablet by mouth 2 times daily 60 tablet 4    atenolol (TENORMIN) 25 MG tablet Take 1 tablet by mouth daily 30 tablet 4    pravastatin (PRAVACHOL) 40 MG tablet Take 1 tablet by mouth daily 30 tablet 4    senna-docusate (PERICOLACE) 8.6-50 MG per tablet Take 2 tablets by mouth daily as needed for Constipation 60 tablet 4    gabapentin (NEURONTIN) 800 MG tablet Take 1 tablet by mouth 3 times daily for 150 days.  90 tablet 4    OneTouch Delica Lancets 68W MISC Apply 1 Units topically 2 times daily USE 2 TO 3 TIMES DAILY AS DIRECTED 60 each 4    traZODone (DESYREL) 100 MG tablet Take 1 tablet by mouth nightly 30 tablet 4    Alcohol Swabs 70 % PADS Apply 1 Units topically 2 times daily Dx: DM-2 use 2-3 times daily 60 each 4    hydrochlorothiazide (MICROZIDE) 12.5 MG capsule Take 1 capsule by mouth every morning 30 capsule 4    albuterol sulfate HFA (PROAIR HFA) 108 (90 Base) MCG/ACT inhaler Inhale 2 puffs into the lungs every 6 hours as needed for Wheezing 1 Inhaler 3    mesalamine (LIALDA) 1.2 g EC tablet Take 1 tablet by mouth daily (with breakfast) 30 tablet 3    mometasone-formoterol (DULERA) 200-5 MCG/ACT inhaler Inhale 2 puffs into the lungs      albuterol sulfate  (90 Base) MCG/ACT inhaler inhale 1 puff by mouth every 6 hours if needed for wheezing 54 g 0    nitroGLYCERIN (NITROSTAT) 0.4 MG SL tablet Place 1 tablet under the tongue every 5 minutes as needed for Chest pain 25 tablet 11    Umeclidinium Bromide (INCRUSE ELLIPTA) 62.5 MCG/INH AEPB Inhale 1 puff into the lungs daily 1 each 5    lubiprostone (AMITIZA) 24 MCG capsule Take 1 capsule by mouth 2 times daily (with meals) 60 capsule 5     No current facility-administered medications on file prior to visit. Review of Systems    Review of Systems:   History obtained from chart review and the patient  General ROS: negative for - chills, fatigue, fever, night sweats or weight gain  Constitutional: Negative for chills, diaphoresis, fatigue, fever and unexpected weight change. Musculoskeletal: Positive for arthralgias, gait problem and joint swelling. Neurological ROS: negative for - behavioral changes, confusion, headaches or seizures. Negative for weakness and numbness. Dermatological ROS: negative for - mole changes, rash  Cardiovascular: Negative for leg swelling. Gastrointestinal: Negative for constipation, diarrhea, nausea and vomiting. Objective:  Dermatologic Exam:  Skin lesion present to the right and left plantar foot with a central core and petchaie noted to the lesions periphery. Pain on palpation of the lesion     Skin is thin, with flaky sloughing skin as well as decreased hair growth to the lower leg  Small red hemosiderin deposits seen dorsal foot   Musculoskeletal:     1st MPJ ROM decreased, Bilateral.  Muscle strength 5/5, Bilateral.  Pain present upon palpation of toenails 1-5, Bilateral. decreased medial longitudinal arch, Bilateral.  Ankle ROM decreased,Bilateral.    Dorsally contracted digits present digits 2, Bilateral.     Vascular:   Pitting 2+ edema noted to the right and left foot to the level of the midleg.       DP pulses 1/4 bilateral.  PT pulses 0/4 bilateral.   CFT <5 seconds, Bilateral.  Hair growth absent to the level of the digits, Bilateral.  Edema present, Bilateral.  Varicosities absent, Bilateral. Erythema absent, Bilateral    Neurological: Sensation diminshed to light touch to level of digits, Bilateral.  Protective sensation intact 6/10 sites via 5.07/10g [place] Monofilament, Bilateral.  negative Tinel's, Bilateral.  negative Valleix sign, Bilateral.      Integument: Warm, dry, supple, Bilateral.  Open lesion absent, Bilateral. 6 OR MORE    25901 - ID DESTRUCTION BENIGN LESIONS UP TO 14   3. Type II diabetes mellitus with peripheral circulatory disorder (HCC)  [facility] DIABETES FOOT EXAM    58871 - ID DEBRIDEMENT OF NAILS, 6 OR MORE    68627 - ID DESTRUCTION BENIGN LESIONS UP TO 14   4. Pain in both lower extremities  [facility] DIABETES FOOT EXAM    80247 - ID DEBRIDEMENT OF NAILS, 6 OR MORE    35907 - ID DESTRUCTION BENIGN LESIONS UP TO 14   5. Benign neoplasm of skin of right foot  [facility] DIABETES FOOT EXAM    12167 - ID DESTRUCTION BENIGN LESIONS UP TO 14   6. Peripheral vascular disorder (HCC)  [facility] DIABETES FOOT EXAM    54590 - ID DEBRIDEMENT OF NAILS, 6 OR MORE    90760 - ID DESTRUCTION BENIGN LESIONS UP TO 14   7. Benign neoplasm of skin of lower limb, including hip, left  87728 - ID DESTRUCTION BENIGN LESIONS UP TO 14   8. Edema of lower extremity   DIABETES FOOT EXAM             Q7   []Yes    []No                Q8   [x]Yes    []No                     Q9   []Yes    []No    Plan:   Pt was evaluated and examined. Patient was given personalized discharge instructions. For the NEW leg swelling  Rx given for compression stockings to be worn during the day. Pt to elevate at night above the heart       The lesions was partially excised and Pyrogallic acid was applied under occlusion. The patient will leave in place for 24-48 hours and than remove. The patient tolerated the procedure well and without complication. Nails 1-10 were debrided sharply in length and thickness with a nipper and , without incident. Pt will follow up in 9 weeks or sooner if any problems arise. Diagnosis was discussed with the pt and all of their questions were answered in detail. Proper foot hygiene and care was discussed with the pt. Informed patient on proper diabetic foot care and importance of tight glycemic control. Patient to check feet daily and contact the office with any questions/problems/concerns.    Other comorbidity noted and will be managed by PCP.  8/18/2020    Electronically signed by Tawnya Gonsalez DPM on 8/18/2020 at 1:01 PM  8/18/2020

## 2020-08-24 NOTE — TELEPHONE ENCOUNTER
Can you please schedule the patient for a virtual visit with me sometime in the next 2 weeks. Will go over sugar log and recent illness with patient and reorder metformin if appropriate.  Thank you
PC to pt-- spoke with her and made appt for 8/27
PC to pt--unable to LM to contact office
Small Bowel Follow Through Only Once 53/89/3704   Basic Metabolic Panel Once 22/58/8223       Next Visit Date:  Future Appointments   Date Time Provider Terry Suzette   8/18/2020  1:00 PM Trent Garcia DPM [facility]   8/20/2020 10:00 AM SCHEDULE, [facility]   8/24/2020 10:00 AM STV GI RM 5 STVZ RAD [facility]   9/4/2020  9:30 AM Rosy Wharton MD [facility] GI [facility]   9/21/2020  5:00 PM STA MAMMO RM 1 STAZ MAMMO STA Radiolog   11/23/2020  9:30 AM Teresa Wiley MD Resp Spec [facility]            Patient Active Problem List:     Essential hypertension     Pure hypercholesterolemia     Moderate episode of recurrent major depressive disorder ([facility] [address]     History of heroin use     Hep C w/o coma, chronic (HCC) completed tx 2016     GERD (gastroesophageal reflux disease)     COPD (chronic obstructive pulmonary disease) (HCC)     Tobacco abuse     Chronic diastolic congestive heart failure (HCC)     Diverticulosis     Hyperplastic polyp of intestine     Diabetic polyneuropathy associated with type 2 diabetes mellitus (HCC)     Bilateral chronic knee pain     Chronic respiratory failure with hypoxia (HCC)     Type 2 diabetes mellitus with complication (HCC)     Primary insomnia     Cigarette nicotine dependence without complication     Chronic bilateral low back pain with right-sided sciatica     Irritable bowel syndrome with both constipation and diarrhea     Enteritis

## 2020-08-27 ENCOUNTER — OFFICE VISIT (OUTPATIENT)
Dept: INTERNAL MEDICINE | Age: 65
End: 2020-08-27
Payer: COMMERCIAL

## 2020-08-27 PROCEDURE — 99443 PR PHYS/QHP TELEPHONE EVALUATION 21-30 MIN: CPT | Performed by: STUDENT IN AN ORGANIZED HEALTH CARE EDUCATION/TRAINING PROGRAM

## 2020-08-27 RX ORDER — BUPRENORPHINE AND NALOXONE 2; .5 MG/1; MG/1
1.5 FILM, SOLUBLE BUCCAL; SUBLINGUAL DAILY
COMMUNITY
Start: 2020-08-12

## 2020-08-27 NOTE — PROGRESS NOTES
Patient initiated a phone visit to go over her hypertension and diabetes. She is on metformin but she states it was stopped a few months ago and now her sugars are going up. She would like to restart it. She also says her blood pressure is running high. She states she is taking all her medications. Patient advised to restart metformin and get labs done. She is advised to come into the office for a blood pressure check and to go over all her labs. She is advised to cut back on smoking. Attending Physician Statement  I have discussed the care of Mic Johnson, including pertinent history and exam findings,  with the resident. I have reviewed the key elements of all parts of the encounter with the resident. I agree with the assessment, plan and orders as documented by the resident.   (GE Modifier)

## 2020-08-27 NOTE — PROGRESS NOTES
[facility]  [facility] [address] [address]  Dept: [phone]  Dept Fax: [phone]    Office Virtual Visit Note  Date of Appointment: 8/27/2020  Patient's Name:  Lucila Crowder YOB: 1955            Patient Care Team:  Ct Dao MD as PCP - General (Internal Medicine)  Mira Sen MD as Consulting Physician (Gastroenterology)  Fred Cason MD as Referring Physician (Internal Medicine)  Beny Wallace MD as Consulting Physician (Pulmonology)  Jazmin Frye MD as Consulting Physician (Internal Medicine)  Terrell Hart DPM as Physician (Podiatry)  ________________________________________________________________________      Reason for Appointment: Routine outpatient follow up  Type of Virtual Visit: Phone  ________________________________________________________________________  Consent:  She and/or health care decision maker is aware that that she may receive a bill for this telephone service, depending on her insurance coverage, and has provided verbal consent to proceed: Yes  ________________________________________________________________________  Declaration:  Lucila Crowder is a 63 y.o. female being evaluated by a Virtual Visit Encounter to address concerns as mentioned above. A caregiver was present when appropriate. Due to this being a TeleHealth encounter (During BSXLG-00 public health emergency), evaluation of the following organ systems was limited: Vitals/Constitutional/EENT/Resp/CV/GI//MS/Neuro/Skin/Heme-Lymph-Imm. Pursuant to the emergency declaration under the [address] [place] [address] authority and the [employer] Act, this Virtual Visit was conducted with patient's (and/or legal guardian's) consent, to reduce the patient's risk of exposure to COVID-19 and provide necessary medical care.  The patient (and/or legal guardian) has also been advised to contact this office for worsening conditions or problems, and seek emergency medical treatment and/or call 911 if deemed necessary. Patient identification was verified at the start of the visit: Yes    Services were provided through an audio or audio&video synchronous discussion virtually to substitute for in-person clinic visit. Patient and provider were located at their distinct individual locations. ________________________________________________________________________  Chief Complaint:  Diabetes (before breakfast glucose 469) and Health Maintenance (pt has eye exam next week, declined vaccinations)    ________________________________________________________________________  History of Presenting Illness:  History was obtained from: patient. Shy Cha is a 63 y.o. female evaluated by telephone for a follow up visit for her Hypertension and Type 2 DM. Patient reports that recently her blood pressure has been running high and self reports systolic blood pressures going as high as 160. She reports compliance with her medications but states that she only takes the Lasix when she feels her feet are swollen. Patient also has history of Type 2 DM and was on Metformin 500mg BiD which was stopped several months ago. Patient now reports that her blood sugars are higher than normal ranging from 200 to levels of around 400 on some occassions. Patient also reports that she has ongoing diarrhea and constipation and is being worked up by Dr Amanda Panchal, [facility]. She states that she is due to get a Covid-19 test done so she can go ahead with the colonoscopy that GI has planned. She also mentions that she recently got a UTI for which she went to urgent care and was prescribed antibiotics. She reports that her condition has improved with the antibiotics and she has been compliant with the medication. She does not remember what antibiotics she is taking.  She denies any other issues at this time. Patient Active Problem List   Diagnosis    Essential hypertension    Pure hypercholesterolemia    Moderate episode of recurrent major depressive disorder ([facility] [address]    History of heroin use    Hep C w/o coma, chronic (HCC) completed tx 2016    GERD (gastroesophageal reflux disease)    COPD (chronic obstructive pulmonary disease) (HCC)    Tobacco abuse    Chronic diastolic congestive heart failure (HCC)    Diverticulosis    Hyperplastic polyp of intestine    Diabetic polyneuropathy associated with type 2 diabetes mellitus (HCC)    Bilateral chronic knee pain    Chronic respiratory failure with hypoxia (HCC)    Type 2 diabetes mellitus with complication (HCC)    Primary insomnia    Cigarette nicotine dependence without complication    Chronic bilateral low back pain with right-sided sciatica    Irritable bowel syndrome with both constipation and diarrhea    Enteritis       Allergies   Allergen Reactions    Iv Dye [Iodides] Hives         Current Outpatient Medications   Medication Sig Dispense Refill    dicyclomine (BENTYL) 20 MG tablet take 1 tablet by mouth four times a day 120 tablet 0    SYMBICORT 160-4.5 MCG/ACT AERO inhale 2 puffs by mouth twice a day Rinse mouth after use 10.2 g 5    VORTIoxetine (TRINTELLIX) 10 MG TABS tablet Take 10 mg by mouth daily      lubiprostone (AMITIZA) 24 MCG capsule Take 1 capsule by mouth 2 times daily (with meals) 60 capsule 5    docusate sodium (RA COL-RITE) 100 MG capsule Take 1 capsule by mouth 2 times daily as needed for Constipation 60 capsule 1    blood glucose test strips (ONE TOUCH ULTRA TEST) strip 1 each by In Vitro route 4 times daily As needed.  200 each 3    ARIPiprazole (ABILIFY) 15 MG tablet Take 1 tablet by mouth daily 30 tablet 4    rOPINIRole (REQUIP) 0.5 MG tablet Take 1 tablet by mouth daily 30 tablet 4    pantoprazole (PROTONIX) 40 MG tablet Take 1 tablet by mouth daily 30 tablet 4    tiZANidine (ZANAFLEX) 4 MG tablet Take 1 tablet by mouth every 8 hours as needed (Back Pain) 90 tablet 4    furosemide (LASIX) 20 MG tablet Take 1 tablet by mouth 2 times daily 60 tablet 4    atenolol (TENORMIN) 25 MG tablet Take 1 tablet by mouth daily 30 tablet 4    pravastatin (PRAVACHOL) 40 MG tablet Take 1 tablet by mouth daily 30 tablet 4    senna-docusate (PERICOLACE) 8.6-50 MG per tablet Take 2 tablets by mouth daily as needed for Constipation 60 tablet 4    gabapentin (NEURONTIN) 800 MG tablet Take 1 tablet by mouth 3 times daily for 150 days. 90 tablet 4    OneTouch Delica Lancets 70T MISC Apply 1 Units topically 2 times daily USE 2 TO 3 TIMES DAILY AS DIRECTED 60 each 4    traZODone (DESYREL) 100 MG tablet Take 1 tablet by mouth nightly 30 tablet 4    Alcohol Swabs 70 % PADS Apply 1 Units topically 2 times daily Dx: DM-2 use 2-3 times daily 60 each 4    hydrochlorothiazide (MICROZIDE) 12.5 MG capsule Take 1 capsule by mouth every morning 30 capsule 4    albuterol sulfate HFA (PROAIR HFA) 108 (90 Base) MCG/ACT inhaler Inhale 2 puffs into the lungs every 6 hours as needed for Wheezing 1 Inhaler 3    mesalamine (LIALDA) 1.2 g EC tablet Take 1 tablet by mouth daily (with breakfast) 30 tablet 3    mometasone-formoterol (DULERA) 200-5 MCG/ACT inhaler Inhale 2 puffs into the lungs      nitroGLYCERIN (NITROSTAT) 0.4 MG SL tablet Place 1 tablet under the tongue every 5 minutes as needed for Chest pain 25 tablet 11    Umeclidinium Bromide (INCRUSE ELLIPTA) 62.5 MCG/INH AEPB Inhale 1 puff into the lungs daily 1 each 5    buprenorphine-naloxone (SUBOXONE) 2-0.5 MG FILM SL film Place 1.5 Film under the tongue daily.       albuterol sulfate HFA (PROAIR HFA) 108 (90 Base) MCG/ACT inhaler Inhale 2 puffs into the lungs every 6 hours as needed for Wheezing 1 Inhaler 3    potassium chloride (KLOR-CON M) 20 MEQ extended release tablet Take 20 mEq by mouth daily      albuterol sulfate  (90 Base) MCG/ACT inhaler inhale 1 puff by mouth every 6 hours if needed for wheezing 54 g 0     No current facility-administered medications for this visit. Social History     Tobacco Use    Smoking status: Current Some Day Smoker     Packs/day: 0.50     Years: 40.00     Pack years: 20.00     Types: Cigarettes     Start date: 1969    Smokeless tobacco: Never Used   Substance Use Topics    Alcohol use: No     Alcohol/week: 0.0 standard drinks    Drug use: No     Comment:  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       Family History   Problem Relation Age of Onset    Cancer Mother         lungs and brain    Stroke Father     Crohn's Disease Sister       ________________________________________________________________________  Review of Systems:  CONSTITUTIONAL: Denies: fever, chills  PSYCH: Denies: anxiety, depression  ENT: Denies: sore throat, nasal congestion  CARDIOVASCULAR: Denies: chest pain, dyspnea on exertion  RESPIRATORY: Denies: cough, hemoptysis, shortness of breath  GI: Denies: Denies: abdominal pain, flank pain. Reports constipation and diarrhea. : Denies: Positive for: dysuria and frequency/urgency  SKIN: Denies: rash, itching  ________________________________________________________________________  Physical Exam:  Unable to obtain in virtual phone visit. Directly measured vitals unavailable for this virtual visit.  ________________________________________________________________________  Diagnostic findings:  CBC:  Lab Results   Component Value Date    WBC 8.2 10/21/2019    HGB 14.9 10/21/2019     10/21/2019    PLT Platelet clumps present, count appears adequate.  02/26/2012       BMP:    Lab Results   Component Value Date     10/21/2019    K 3.9 10/21/2019     10/21/2019    CO2 24 10/21/2019    BUN 9 10/21/2019    CREATININE 0.61 10/21/2019    GLUCOSE 98 10/21/2019    GLUCOSE 120 02/26/2012       HEMOGLOBIN A1C:   Lab Results   Component Value Date    LABA1C 6.0 06/09/2020       FASTING LIPID PANEL:  Lab Results   Component given educational materials - see patient instructions  ________________________________________________________________________  Total time spent on this encounter: 20-30minutes    Jie Heart MD  PGY-2, Internal Medicine Resident  [place] [facility] [facility] 8/27/2020, 1:45 PM      This note is created with the assistance of a speech-recognition program. While intending to generate a document that actually reflects the content of the visit, the document can still have some mistakes which may not have been identified and corrected by editing.

## 2020-08-27 NOTE — PATIENT INSTRUCTIONS
Labs mailed to pt. Medications e-scribe to pharmacy of pt's choice. An After Visit Summary was printed and given to the patient.   INÉS

## 2020-09-04 ENCOUNTER — VIRTUAL VISIT (OUTPATIENT)
Dept: INTERNAL MEDICINE | Age: 65
End: 2020-09-04
Payer: COMMERCIAL

## 2020-09-04 PROCEDURE — G0438 PPPS, INITIAL VISIT: HCPCS | Performed by: NURSE PRACTITIONER

## 2020-09-04 PROCEDURE — 99406 BEHAV CHNG SMOKING 3-10 MIN: CPT | Performed by: NURSE PRACTITIONER

## 2020-09-04 ASSESSMENT — PATIENT HEALTH QUESTIONNAIRE - PHQ9
SUM OF ALL RESPONSES TO PHQ QUESTIONS 1-9: 0
2. FEELING DOWN, DEPRESSED OR HOPELESS: 0
SUM OF ALL RESPONSES TO PHQ9 QUESTIONS 1 & 2: 0
SUM OF ALL RESPONSES TO PHQ QUESTIONS 1-9: 0
1. LITTLE INTEREST OR PLEASURE IN DOING THINGS: 0

## 2020-09-04 ASSESSMENT — LIFESTYLE VARIABLES: HOW OFTEN DO YOU HAVE A DRINK CONTAINING ALCOHOL: 0

## 2020-09-04 NOTE — PATIENT INSTRUCTIONS
Advance Directives: Care Instructions  Overview  An advance directive is a legal way to state your wishes at the end of your life. It tells your family and your doctor what to do if you can't say what you want. There are two main types of advance directives. You can change them any time your wishes change. Living will. This form tells your family and your doctor your wishes about life support and other treatment. The form is also called a declaration. Medical power of . This form lets you name a person to make treatment decisions for you when you can't speak for yourself. This person is called a health care agent (health care proxy, health care surrogate). The form is also called a durable power of  for health care. If you do not have an advance directive, decisions about your medical care may be made by a family member, or by a doctor or a  who doesn't know you. It may help to think of an advance directive as a gift to the people who care for you. If you have one, they won't have to make tough decisions by themselves. Follow-up care is a key part of your treatment and safety. Be sure to make and go to all appointments, and call your doctor if you are having problems. It's also a good idea to know your test results and keep a list of the medicines you take. What should you include in an advance directive? Many states have a unique advance directive form. (It may ask you to address specific issues.) Or you might use a universal form that's approved by many states. If your form doesn't tell you what to address, it may be hard to know what to include in your advance directive. Use the questions below to help you get started. · Who do you want to make decisions about your medical care if you are not able to? · What life-support measures do you want if you have a serious illness that gets worse over time or can't be cured? · What are you most afraid of that might happen? (Maybe you're afraid of having pain, losing your independence, or being kept alive by machines.)  · Where would you prefer to die? (Your home? A hospital? A nursing home?)  · Do you want to donate your organs when you die? · Do you want certain Pentecostalism practices performed before you die? When should you call for help? Be sure to contact your doctor if you have any questions. Where can you learn more? Go to https://chpepiceweb.Candescent [email or web address] org and sign in to your MediaLAB account. Enter R264 in the [employer] box to learn more about \"Advance Directives: Care Instructions. \"     If you do not have an account, please click on the \"Sign Up Now\" link. Current as of: December 9, 2019               Content Version: 12.5  © 1302-8292 Healthwise, Incorporated. Care instructions adapted under license by [facility] ([facility]). If you have questions about a medical condition or this instruction, always ask your healthcare professional. [place] [address] any warranty or liability for your use of this information. Learning About Medical Power of   What is a medical power of ? A medical power of , also called a durable power of  for health care, is one type of the legal forms called advance directives. It lets you name the person you want to make treatment decisions for you if you can't speak or decide for yourself. The person you choose is called your health care agent. This person is also called a health care proxy or health care surrogate. A medical power of  may be called something else in your state. How do you choose a health care agent? Choose your health care agent carefully. This person may or may not be a family member. Talk to the person before you make your final decision. Make sure he or she is comfortable with this responsibility. It's a good idea to choose someone who:  · Is at least 25years old.   · Knows you well and understands what makes life meaningful for you. · Understands your Confucianism and moral values. · Will do what you want, not what he or she wants. · Will be able to make difficult choices at a stressful time. · Will be able to refuse or stop treatment, if that is what you would want, even if you could die. · Will be firm and confident with health professionals if needed. · Will ask questions to get needed information. · Lives near you or agrees to travel to you if needed. Your family may help you make medical decisions while you can still be part of that process. But it's important to choose one person to be your health care agent in case you aren't able to make decisions for yourself. If you don't fill out the legal form and name a health care agent, the decisions your family can make may be limited. A health care agent may be called something else in your state. Who will make decisions for you if you don't have a health care agent? If you don't have a health care agent or a living will, you may not get the care you want. Decisions may be made by family members who disagree about your medical care. Or decisions may be made by a medical professional who doesn't know you well. In some cases, a  makes the decisions. When you name a health care agent, it is very clear who has the power to make health decisions for you. How do you name a health care agent? You name your health care agent on a legal form. This form is usually called a medical power of . Ask your hospital, state bar association, or office on aging where to find these forms. You must sign the form to make it legal. Some states require you to get the form notarized. This means that a person called a  watches you sign the form and then he or she signs the form. Some states also require that two or more witnesses sign the form. Be sure to tell your family members and doctors who your health care agent is.   Where can you learn more? Go to https://chpepiceweb.365 Good Teacher. org and sign in to your ShomoLive account. Enter [id number] in the [facility] box to learn more about \"Learning About [address]. \"     If you do not have an account, please click on the \"Sign Up Now\" link. Current as of: December 9, 2019               Content Version: 12.5  © [id number] Zeel. Care instructions adapted under license by [facility] ([facility]). If you have questions about a medical condition or this instruction, always ask your healthcare professional. [address] any warranty or liability for your use of this information. Learning About Joshua Poe  What is a living will? A living will, also called a declaration, is a legal form. It tells your family and your doctor your wishes when you can't speak for yourself. It's used by the health professionals who will treat you as you near the end of your life or if you get seriously hurt or ill. If you put your wishes in writing, your loved ones and others will know what kind of care you want. They won't need to guess. This can ease your mind and be helpful to others. And you can change or cancel your living will at any time. A living will is not the same as an estate or property will. An estate will explains what you want to happen with your money and property after you die. How do you use it? A living will is used to describe the kinds of treatment or life support you want as you near the end of your life or if you get seriously hurt or ill. Keep these facts in mind about living milligan. · Your living will is used only if you can't speak or make decisions for yourself. Most often, one or more doctors must certify that you can't speak or decide for yourself before your living will takes effect. · If you get better and can speak for yourself again, you can accept or refuse any treatment.  It doesn't matter what you said in your can't breathe on your own, your heart stops, or you can't swallow. · What things would you still want to be able to do after you receive life-support methods? Would you want to be able to walk? To speak? To eat on your own? To live without the help of machines? · Do you want certain Amish practices performed if you become very ill? · If you have a choice, where do you want to be cared for? In your home? At a hospital or nursing home? · If you have a choice at the end of your life, where would you prefer to die? At home? In a hospital or nursing home? Somewhere else? · Would you prefer to be buried or cremated? · Do you want your organs to be donated after you die? What should you do with your living will? · Make sure that your family members and your health care agent have copies of your living will (also called a declaration). · Give your doctor a copy of your living will. Ask him or her to keep it as part of your medical record. If you have more than one doctor, make sure that each one has a copy. · Put a copy of your living will where it can be easily found. For example, some people may put a copy on their refrigerator door. If you are using a digital copy, be sure your doctor, family members, and health care agent know how to find and access it. Where can you learn more? Go to https://Learneratorpepiceweb.[email or web address] Moteng Website. org and sign in to your Ringostat account. Enter P245 in the [facility] box to learn more about \"Learning About Living Isabell. \"     If you do not have an account, please click on the \"Sign Up Now\" link. Current as of: December 9, 2019               Content Version: 12.5  © 7578-6319 Healthwise, Incorporated. Care instructions adapted under license by [facility] ([facility]). If you have questions about a medical condition or this instruction, always ask your healthcare professional. [address] any warranty or liability for your use of this information. Stopping Smoking: Care Instructions  Your Care Instructions     Cigarette smokers crave the nicotine in cigarettes. Giving it up is much harder than simply changing a habit. Your body has to stop craving the nicotine. It is hard to quit, but you can do it. There are many tools that people use to quit smoking. You may find that combining tools works best for you. There are several steps to quitting. First you get ready to quit. Then you get support to help you. After that, you learn new skills and behaviors to become a nonsmoker. For many people, a necessary step is getting and using medicine. Your doctor will help you set up the plan that best meets your needs. You may want to attend a smoking cessation program to help you quit smoking. When you choose a program, look for one that has proven success. Ask your doctor for ideas. You will greatly increase your chances of success if you take medicine as well as get counseling or join a cessation program.  Some of the changes you feel when you first quit tobacco are uncomfortable. Your body will miss the nicotine at first, and you may feel short-tempered and grumpy. You may have trouble sleeping or concentrating. Medicine can help you deal with these symptoms. You may struggle with changing your smoking habits and rituals. The last step is the tricky one: Be prepared for the smoking urge to continue for a time. This is a lot to deal with, but keep at it. You will feel better. Follow-up care is a key part of your treatment and safety. Be sure to make and go to all appointments, and call your doctor if you are having problems. It's also a good idea to know your test results and keep a list of the medicines you take. How can you care for yourself at home? · Ask your family, friends, and coworkers for support. You have a better chance of quitting if you have help and support.   · Join a support group, such as Nicotine Anonymous, for people who are trying to quit smoking. · Consider signing up for a smoking cessation program, such as the American Lung Association's Freedom from Smoking program.  · Get text messaging support. Go to the website at www.smokefree. gov to sign up for the [facility] program.  · Set a quit date. Pick your date carefully so that it is not right in the middle of a big deadline or stressful time. Once you quit, do not even take a puff. Get rid of all ashtrays and lighters after your last cigarette. Clean your house and your clothes so that they do not smell of smoke. · Learn how to be a nonsmoker. Think about ways you can avoid those things that make you reach for a cigarette. ? Avoid situations that put you at greatest risk for smoking. For some people, it is hard to have a drink with friends without smoking. For others, they might skip a coffee break with coworkers who smoke. ? Change your daily routine. Take a different route to work or eat a meal in a different place. · Cut down on stress. Calm yourself or release tension by doing an activity you enjoy, such as reading a book, taking a hot bath, or gardening. · Talk to your doctor or pharmacist about nicotine replacement therapy, which replaces the nicotine in your body. You still get nicotine but you do not use tobacco. Nicotine replacement products help you slowly reduce the amount of nicotine you need. These products come in several forms, many of them available over-the-counter:  ? Nicotine patches  ? Nicotine gum and lozenges  ? Nicotine inhaler  · Ask your doctor about bupropion (Wellbutrin) or varenicline (Chantix), which are prescription medicines. They do not contain nicotine. They help you by reducing withdrawal symptoms, such as stress and anxiety. · Some people find hypnosis, acupuncture, and massage helpful for ending the smoking habit. · Eat a healthy diet and get regular exercise. Having healthy habits will help your body move past its craving for nicotine.   · Be prepared to keep trying. Most people are not successful the first few times they try to quit. Do not get mad at yourself if you smoke again. Make a list of things you learned and think about when you want to try again, such as next week, next month, or next year. Where can you learn more? Go to https://chpeeleanorewmiladis.healthDialogfeed. org and sign in to your [employer] account. Enter V910 in the [employer] box to learn more about \"Stopping Smoking: Care Instructions. \"     If you do not have an account, please click on the \"Sign Up Now\" link. Current as of: March 12, 2020               Content Version: 12.5  © 2006-2020 Healthwise, BioMax. Care instructions adapted under license by [facility] ([facility]). If you have questions about a medical condition or this instruction, always ask your healthcare professional. [place] [address] any warranty or liability for your use of this information. Learning About Benefits From Quitting Smoking  How does quitting smoking make you healthier? If you're thinking about quitting smoking, you may have a few reasons to be smoke-free. Your health may be one of them. · When you quit smoking, you lower your risks for cancer, lung disease, heart attack, stroke, blood vessel disease, and blindness from macular degeneration. · When you're smoke-free, you get sick less often, and you heal faster. You are less likely to get colds, flu, bronchitis, and pneumonia. · As a nonsmoker, you may find that your mood is better and you are less stressed. When and how will you feel healthier? Quitting has real health benefits that start from day 1 of being smoke-free. And the longer you stay smoke-free, the healthier you get and the better you feel. The first hours  · After just 20 minutes, your blood pressure and heart rate go down. That means there's less stress on your heart and blood vessels.   · Within 12 hours, the level of carbon monoxide in your blood drops back to normal. That makes room for more oxygen. With more oxygen in your body, you may notice that you have more energy than when you smoked. After 2 weeks  · Your lungs start to work better. · Your risk of heart attack starts to drop. After 1 month  · When your lungs are clear, you cough less and breathe deeper, so it's easier to be active. · Your sense of taste and smell return. That means you can enjoy food more than you have since you started smoking. Over the years  · Over the years, your risks of heart disease, heart attack, and stroke are lower. · After 10 years, your risk of dying from lung cancer is cut by about half. And your risk for many other types of cancer is lower too. How would quitting help others in your life? When you quit smoking, you improve the health of everyone who now breathes in your smoke. · Their heart, lung, and cancer risks drop, much like yours. · They are sick less. For babies and small children, living smoke-free means they're less likely to have ear infections, pneumonia, and bronchitis. · If you're a woman who is or will be pregnant someday, quitting smoking means a healthier . · Children who are close to you are less likely to become adult smokers. Where can you learn more? Go to https://Hug & Co.Podclass. org and sign in to your GreenPal account. Enter [id number] in the [facility] box to learn more about \"Learning About Benefits From Quitting Smoking. \"     If you do not have an account, please click on the \"Sign Up Now\" link. Current as of: 2020               Content Version: 12.5  © 0951-7831 Healthwise, Incorporated. Care instructions adapted under license by [facility]. If you have questions about a medical condition or this instruction, always ask your healthcare professional. [place] [address] any warranty or liability for your use of this information.            Deciding About Using Medicines To Quit Smoking  How can you decide about using medicines to quit smoking? What are the medicines you can use? Your doctor may prescribe varenicline (Chantix) or bupropion (Zyban). These medicines can help you cope with cravings for tobacco. They are pills that don't contain nicotine. You also can use nicotine replacement products. These do contain nicotine. There are many types. · Gum and lozenges slowly release nicotine into your mouth. · Patches stick to your skin. They slowly release nicotine into your bloodstream.  · An inhaler has a gagnon that contains nicotine. You breathe in a puff of nicotine vapor through your mouth and throat. · Nasal spray releases a mist that contains nicotine. What are key points about this decision? · Using medicines can double your chances of quitting smoking. They can ease cravings and withdrawal symptoms. · Getting counseling along with using medicine can raise your chances of quitting even more. · If you smoke fewer than 5 cigarettes a day, you may not need medicines to help you quit smoking. · These medicines have less nicotine than cigarettes. And by itself, nicotine is not nearly as harmful as smoking. The tars, carbon monoxide, and other toxic chemicals in tobacco cause the harmful effects. · The side effects of nicotine replacement products depend on the type of product. For example, a patch can make your skin red and itchy. Medicines in pill form can make you sick to your stomach. They can also cause dry mouth and trouble sleeping. For most people, the side effects are not bad enough to make them stop using the products. Why might you choose to use medicines to quit smoking? · You have tried on your own to stop smoking, but you were not able to stop. · You smoke more than 5 cigarettes a day. · You want to increase your chances of quitting smoking. · You want to reduce your cravings and withdrawal symptoms.   · You feel the benefits of medicine outweigh the side effects. Why might you choose not to use medicine? · You want to try quitting on your own by stopping all at once (\"cold turkey\"). · You want to cut back slowly on the number of cigarettes you smoke. · You smoke fewer than 5 cigarettes a day. · You do not like using medicine. · You feel the side effects of medicines outweigh the benefits. · You are worried about the cost of medicines. Your decision  Thinking about the facts and your feelings can help you make a decision that is right for you. Be sure you understand the benefits and risks of your options, and think about what else you need to do before you make the decision. Where can you learn more? Go to https://BrightkitpeRevee.NATION Technologies. org and sign in to your Elementum account. Enter P396 in the [employer] box to learn more about \"Deciding About Using Medicines To Quit Smoking. \"     If you do not have an account, please click on the \"Sign Up Now\" link. Current as of: March 12, 2020               Content Version: 12.5  © 1639-7634 Healthwise, Incorporated. Care instructions adapted under license by [facility] ([facility]). If you have questions about a medical condition or this instruction, always ask your healthcare professional. [place] [address] any warranty or liability for your use of this information. Personalized Preventive Plan for Kristine Doll - 9/4/2020  Medicare offers a range of preventive health benefits. Some of the tests and screenings are paid in full while other may be subject to a deductible, co-insurance, and/or copay. Some of these benefits include a comprehensive review of your medical history including lifestyle, illnesses that may run in your family, and various assessments and screenings as appropriate. After reviewing your medical record and screening and assessments performed today your provider may have ordered immunizations, labs, imaging, and/or referrals for you.   A list of these orders (if applicable) as well as your Preventive Care list are included within your After Visit Summary for your review. Other Preventive Recommendations:    · A preventive eye exam performed by an eye specialist is recommended every 1-2 years to screen for glaucoma; cataracts, macular degeneration, and other eye disorders. · A preventive dental visit is recommended every 6 months. · Try to get at least 150 minutes of exercise per week or 10,000 steps per day on a pedometer . · Order or download the FREE \"Exercise & Physical Activity: Your Everyday Guide\" from The [employer]. Call 0-618.623.6173 or search The [employer] online. · You need 3944-0934 mg of calcium and 8697-0758 IU of vitamin D per day. It is possible to meet your calcium requirement with diet alone, but a vitamin D supplement is usually necessary to meet this goal.  · When exposed to the sun, use a sunscreen that protects against both UVA and UVB radiation with an SPF of 30 or greater. Reapply every 2 to 3 hours or after sweating, drying off with a towel, or swimming. · Always wear a seat belt when traveling in a car. Always wear a helmet when riding a bicycle or motorcycle. An After Visit Summary was printed and mailed to the patient.   INÉS

## 2020-09-04 NOTE — PROGRESS NOTES
TEST) strip 1 each by In Vitro route 4 times daily As needed. Yes Casper Guadalupe MD   rOPINIRole (REQUIP) 0.5 MG tablet Take 1 tablet by mouth daily Yes Johnny Olmos MD   pantoprazole (PROTONIX) 40 MG tablet Take 1 tablet by mouth daily Yes Johnny Olmos MD   tiZANidine (ZANAFLEX) 4 MG tablet Take 1 tablet by mouth every 8 hours as needed (Back Pain) Yes Casper Guadalupe MD   furosemide (LASIX) 20 MG tablet Take 1 tablet by mouth 2 times daily Yes Medardo Guadalupe MD   atenolol (TENORMIN) 25 MG tablet Take 1 tablet by mouth daily Yes Casper Guadalupe MD   senna-docusate (PERICOLACE) 8.6-50 MG per tablet Take 2 tablets by mouth daily as needed for Constipation Yes Medardo Guadalupe MD   gabapentin (NEURONTIN) 800 MG tablet Take 1 tablet by mouth 3 times daily for 150 days.  Yes Casper Palafox MD   OneTouch Delica Lancets 21J MISC Apply 1 Units topically 2 times daily USE 2 TO 3 TIMES DAILY AS DIRECTED Yes Medardo Guadalupe MD   traZODone (DESYREL) 100 MG tablet Take 1 tablet by mouth nightly Yes Johnny Olmos MD   Alcohol Swabs 70 % PADS Apply 1 Units topically 2 times daily Dx: DM-2 use 2-3 times daily Yes Casper Guadalupe MD   hydrochlorothiazide (MICROZIDE) 12.5 MG capsule Take 1 capsule by mouth every morning Yes Medardo Guadalupe MD   albuterol sulfate HFA (PROAIR HFA) 108 (90 Base) MCG/ACT inhaler Inhale 2 puffs into the lungs every 6 hours as needed for Wheezing Yes Carlos Trujillo MD   mesalamine (LIALDA) 1.2 g EC tablet Take 1 tablet by mouth daily (with breakfast) Yes Susie Sosa MD   mometasone-formoterol (DULERA) 200-5 MCG/ACT inhaler Inhale 2 puffs into the lungs Yes Elizabeth Anton MD   albuterol sulfate  (90 Base) MCG/ACT inhaler inhale 1 puff by mouth every 6 hours if needed for wheezing Yes Susanne MD Estrada   nitroGLYCERIN (NITROSTAT) 0.4 MG SL tablet Place 1 tablet under the tongue every 5 minutes as needed for Chest pain Yes Narayan Sam MD   Umeclidinium Bromide (INCRUSE ELLIPTA) 62.5 MCG/INH AEPB Inhale 1 puff into the lungs daily Yes Paige Tong MD   lubiprostone (AMITIZA) 24 MCG capsule Take 1 capsule by mouth 2 times daily (with meals)  Rosa Maria Gallegos MD         Past Medical History:   Diagnosis Date    RACHEL (acute kidney injury) ([facility] 1/22/2014    Arthritis     generalized    Bronchiectasis without complication ([facility]     Cancer ([facility] 2004    uterus    CHF (congestive heart failure) (HCC)     Chronic bilateral low back pain with right-sided sciatica 2/20/2017    Chronic bronchitis (HCC)     Chronic bronchitis (HCC)     Chronic respiratory failure with hypoxia (HCC)     COPD (chronic obstructive pulmonary disease) ([facility]     on inhaler /  COPD with chronic bronchitis and emphysema    Current smoker on some days     Depression 10/30/2014    Diabetic polyneuropathy associated with type 2 diabetes mellitus (HCC)     Diverticulosis     Full dentures     GERD (gastroesophageal reflux disease)     Hep C w/o coma, chronic (HCC)     Hyperlipidemia     Hyperplastic polyp of intestine     Hypertension     DR. MCDANIEL-CARDIO    Liver disease     hepatitis C    Nasal polyposis     Noncompliance with CPAP treatment     Obesity (BMI 35.0-39.9 without comorbidity)     On home O2     JIMENA (obstructive sleep apnea)     JIMENA on CPAP    Pacemaker 2012    Pneumonia 7/2012    RECENTLY HOSPITALIZED    Pulmonary edema cardiac cause (HCC)     Rectal bleeding     Smoking addiction     Tobacco abuse        Past Surgical History:   Procedure Laterality Date    BACK SURGERY  2008   [facility]       cath dec. 2013    COLONOSCOPY      COLONOSCOPY  1/23/15    severe diverticula    COLONOSCOPY  09/20/2016    HYPERPLASTIC POLYP X 2     COLONOSCOPY N/A 3/14/2019    COLONOSCOPY DIAGNOSTIC Last 3 Encounters:   07/24/20 122/83   06/09/20 (!) 100/56   02/17/20 130/80     Pulse Readings from Last 3 Encounters:   07/24/20 62   06/09/20 71   02/17/20 81       Patient's complete Health Risk Assessment and screening values have been reviewed and are found in Flowsheets. The following problems were reviewed today and where indicated follow up appointments were made and/or referrals ordered. Positive Risk Factor Screenings with Interventions:     Substance Abuse:  Social History     Tobacco History     Smoking Status  Current Some Day Smoker Smoking Start Date  1/1/1969 Smoking Frequency  0.5 packs/day for 40 years (20 pk yrs) Smoking Tobacco Type  Cigarettes    Smokeless Tobacco Use  Never Used          Alcohol History     Alcohol Use Status  No          Drug Use     Drug Use Status  No Comment   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          Sexual Activity     Sexually Active  Never               Audit Questionnaire: Screen for Alcohol Misuse  How often do you have a drink containing alcohol?: Never  Substance Abuse Interventions:  · Tobacco abuse:  tobacco cessation tips and resources provided    General Health:  General  In general, how would you say your health is?: Fair  In the past 7 days, have you experienced any of the following?  New or Increased Pain, New or Increased Fatigue, Loneliness, Social Isolation, Stress or Anger?: (!) New or Increased Pain(hand pain recent  urgent care visit and tx)  Do you get the social and emotional support that you need?: Yes  Do you have a Living Will?: (!) No  General Health Risk Interventions:  · No Living Will: additional information provided with AVS after discussion with pt    Health Habits/Nutrition:  Health Habits/Nutrition  Do you exercise for at least 20 minutes 2-3 times per week?: Yes(walking)  Have you lost any weight without trying in the past 3 months?: No  Do you eat fewer than 2 meals per day?: No  Have you seen a dentist within the past year?: (!) No  There is no height or weight on file to calculate BMI.   Health Habits/Nutrition Interventions:  · Dental exam overdue:  patient declines dental evaluation    Hearing/Vision:  No exam data present  Hearing/Vision  Do you or your family notice any trouble with your hearing?: No  Do you have difficulty driving, watching TV, or doing any of your daily activities because of your eyesight?: No  Have you had an eye exam within the past year?: (!) No  Hearing/Vision Interventions:  · Vision concerns:  patient encouraged to make appointment with his/her eye specialist    Personalized Preventive Plan   Current Health Maintenance Status  Immunization History   Administered Date(s) Administered    Influenza Virus Vaccine 01/22/2014, 10/22/2015, 08/15/2016, 08/10/2017, 12/29/2017, 09/17/2018, 08/13/2019, 08/13/2019    Influenza Whole 07/30/2016    Influenza, Quadv, IM, (6 mo and older Fluzone, Flulaval, Fluarix and 3 yrs and older Afluria) 09/17/2018, 08/13/2019    Influenza, [place] Generous, IM, PF (6 mo and older Fluzone, Flulaval, Fluarix, and 3 yrs and older Afluria) 08/15/2016, 08/10/2017, 12/29/2017, 08/16/2019    Pneumococcal Conjugate 13-valent ([id number]) 08/15/2016    Pneumococcal Polysaccharide ([id number]) 10/22/2015, 12/29/2017    Td, unspecified formulation 07/30/2016    Tdap (Boostrix, Adacel) 09/30/2016        Health Maintenance   Topic Date Due    Diabetic retinal exam  01/01/2019    Breast cancer screen  11/01/2019    Annual Wellness Visit (AWV)  06/09/2020    Flu vaccine (1) 09/01/2020    Hepatitis A vaccine (1 of 2 - Risk 2-dose series) 09/04/2021 (Originally 6/5/1956)    Shingles Vaccine (1 of 2) 09/04/2021 (Originally 6/5/2007)    Diabetic microalbuminuria test  09/20/2020    Potassium monitoring  10/21/2020    Creatinine monitoring  10/21/2020    Lipid screen  02/05/2021    A1C test (Diabetic or Prediabetic)  06/09/2021    Diabetic foot exam  08/24/2021    Colon cancer screen colonoscopy  01/09/2023    DTaP/Tdap/Td vaccine (2 - Td) 09/30/2026    Pneumococcal 0-64 years Vaccine  Completed    HIV screen  Completed    Hib vaccine  Aged Out    Meningococcal (ACWY) vaccine  Aged Out     Recommendations for [employer] Due: see orders and patient instructions/AVS.  . Recommended screening schedule for the next 5-10 years is provided to the patient in written form: see Patient Oswaldo Connelly was seen today for medicare awv and health maintenance. Diagnoses and all orders for this visit:    ACP (advance care planning)    Personal history of tobacco use, presenting hazards to health  -     AL TOBACCO USE CESSATION INTERMEDIATE 3-10 MINUTES [86126]    Routine general medical examination at a health care facility  -     [address] [place] [address]              Alesha Pardo is a 63 y.o. female being evaluated by a Virtual Visit (phone) encounter to address concerns as mentioned above. A caregiver was present when appropriate. Due to this being a TeleHealth encounter (During HFYQV-27 public health emergency), evaluation of the following organ systems was limited: Vitals/Constitutional/EENT/Resp/CV/GI//MS/Neuro/Skin/Heme-Lymph-Imm. Pursuant to the emergency declaration under the [facility] [place], [address] [place] [address] authority and the [employer] Act, this Virtual Visit was conducted with patient's (and/or legal guardian's) consent, to reduce the patient's risk of exposure to COVID-19 and provide necessary medical care. The patient (and/or legal guardian) has also been advised to contact this office for worsening conditions or problems, and seek emergency medical treatment and/or call 911 if deemed necessary. Patient identification was verified at the start of the visit: Yes    Services were provided through phone to substitute for in-person clinic visit.  Patient and provider were located at their individual homes.    --Elyssa Hill, MARCIA - CNP on 9/4/2020 at 3:38 PM    An electronic signature was used to authenticate this note. Advance Care Planning   Advanced Care Planning: Discussed the patients choices for care and treatment in case of a health event that adversely affects decision-making abilities. Also discussed the patients long-term treatment options. Reviewed with the patient the [address] [place] [address] Declaration forms  Reviewed the process of designating a competent adult as an Agent (or -in-fact) that could take make health care decisions for the patient if incompetent. Patient was asked to complete the declaration forms, either acknowledge the forms by a public notary or an eligible witness and provide a signed copy to the practice office. Time spent (minutes): 3     Tobacco Cessation Counseling: Patient advised about behavior change, including information about personal health harms, usage of appropriate cessation measures and benefits of cessation.   Time spent (minutes): 3

## 2020-09-24 ENCOUNTER — VIRTUAL VISIT (OUTPATIENT)
Dept: INTERNAL MEDICINE | Age: 65
End: 2020-09-24
Payer: COMMERCIAL

## 2020-09-24 PROBLEM — K52.9 ENTERITIS: Status: RESOLVED | Noted: 2019-06-11 | Resolved: 2020-09-24

## 2020-09-24 PROCEDURE — 99442 PR PHYS/QHP TELEPHONE EVALUATION 11-20 MIN: CPT | Performed by: INTERNAL MEDICINE

## 2020-09-24 RX ORDER — ATENOLOL 25 MG/1
25 TABLET ORAL DAILY
Qty: 30 TABLET | Refills: 4 | Status: SHIPPED | OUTPATIENT
Start: 2020-09-24 | End: 2020-11-25 | Stop reason: SDUPTHER

## 2020-09-24 RX ORDER — FUROSEMIDE 20 MG/1
20 TABLET ORAL 2 TIMES DAILY
Qty: 60 TABLET | Refills: 4 | Status: SHIPPED | OUTPATIENT
Start: 2020-09-24 | End: 2021-10-21 | Stop reason: SDUPTHER

## 2020-09-24 RX ORDER — PANTOPRAZOLE SODIUM 40 MG/1
40 TABLET, DELAYED RELEASE ORAL DAILY
Qty: 30 TABLET | Refills: 4 | Status: SHIPPED | OUTPATIENT
Start: 2020-09-24 | End: 2021-03-08

## 2020-09-24 RX ORDER — GABAPENTIN 800 MG/1
800 TABLET ORAL 3 TIMES DAILY
Qty: 180 TABLET | Refills: 3 | Status: SHIPPED | OUTPATIENT
Start: 2020-09-24 | End: 2021-01-14 | Stop reason: SDUPTHER

## 2020-09-24 RX ORDER — HYDROCHLOROTHIAZIDE 12.5 MG/1
12.5 CAPSULE, GELATIN COATED ORAL EVERY MORNING
Qty: 30 CAPSULE | Refills: 4 | Status: SHIPPED | OUTPATIENT
Start: 2020-09-24 | End: 2021-03-08

## 2020-09-24 NOTE — PATIENT INSTRUCTIONS
-Pt due for 4 month f/u in [place]-- [facility] an [facility]--reminder in EPIC to contact patient as well--AVS given to patient    -Bloodwork orders given to patient, they will have them done before their next visit.     -Your doctor has ordered an Mammogram for you, please contact our scheduling department at [phone] to set up an appointment.    -Antonio Huerta

## 2020-09-24 NOTE — PROGRESS NOTES
legal guardian) has also been advised to contact this office for worsening conditions or problems, and seek emergency medical treatment and/or call 911 if deemed necessary. Patient identification was verified at the start of the visit: Yes    Services were provided through an audio or audio&video synchronous discussion virtually to substitute for in-person clinic visit. Patient and provider were located at their distinct individual locations. ________________________________________________________________________  Chief Complaint:  Hypertension; Diabetes; and Health Maintenance (labs reprinted, mamm reprinted, had eye exam-records requested)    ________________________________________________________________________  History of Presenting Illness:  History was obtained from: patientErinn Landaverde is a 63 y.o. female evaluated by telephone for a follow up visit on her HTN and Diabetes. On her last visit, patient was advised to keep logs for her blood pressure and blood sugars. Patient reports that her blood pressures have been well controlled around 028-084 systolic. She reports her sugars are much better than before now that she is on the metformin. She reports that her blood sugars are well controlled around 150s. No other complaints at this time.       Patient Active Problem List   Diagnosis    Essential hypertension    Pure hypercholesterolemia    Moderate episode of recurrent major depressive disorder ([facility]    History of heroin use    Hep C w/o coma, chronic (HCC) completed tx 2016    GERD (gastroesophageal reflux disease)    [facility]    Tobacco abuse    Chronic diastolic congestive heart failure (HCC)    Diverticulosis    Hyperplastic polyp of intestine    Diabetic polyneuropathy associated with type 2 diabetes mellitus (HCC)    Bilateral chronic knee pain    Chronic respiratory failure with hypoxia (HCC)    Type 2 diabetes mellitus with complication (HCC)    Primary insomnia    Cigarette nicotine dependence without complication    Chronic bilateral low back pain with right-sided sciatica    Irritable bowel syndrome with both constipation and diarrhea    Enteritis       Allergies   Allergen Reactions    Iv Dye [Iodides] Hives         Current Outpatient Medications   Medication Sig Dispense Refill    dicyclomine (BENTYL) 20 MG tablet take 1 tablet by mouth four times a day 120 tablet 3    ARIPiprazole (ABILIFY) 15 MG tablet Take 1 tablet by mouth daily 90 tablet 4    buprenorphine-naloxone (SUBOXONE) 2-0.5 MG FILM SL film Place 1.5 Film under the tongue daily.  metFORMIN (GLUCOPHAGE) 500 MG tablet Take 1 tablet by mouth 2 times daily (with meals) 60 tablet 0    pravastatin (PRAVACHOL) 40 MG tablet Take 1 tablet by mouth daily 90 tablet 4    albuterol sulfate HFA (PROAIR HFA) 108 (90 Base) MCG/ACT inhaler Inhale 2 puffs into the lungs every 6 hours as needed for Wheezing 1 Inhaler 3    SYMBICORT 160-4.5 MCG/ACT AERO inhale 2 puffs by mouth twice a day Rinse mouth after use 10.2 g 5    potassium chloride (KLOR-CON M) 20 MEQ extended release tablet Take 20 mEq by mouth daily      VORTIoxetine (TRINTELLIX) 10 MG TABS tablet Take 10 mg by mouth daily      lubiprostone (AMITIZA) 24 MCG capsule Take 1 capsule by mouth 2 times daily (with meals) 60 capsule 5    blood glucose test strips (ONE TOUCH ULTRA TEST) strip 1 each by In Vitro route 4 times daily As needed. 200 each 3    rOPINIRole (REQUIP) 0.5 MG tablet Take 1 tablet by mouth daily 30 tablet 4    pantoprazole (PROTONIX) 40 MG tablet Take 1 tablet by mouth daily 30 tablet 4    furosemide (LASIX) 20 MG tablet Take 1 tablet by mouth 2 times daily 60 tablet 4    atenolol (TENORMIN) 25 MG tablet Take 1 tablet by mouth daily 30 tablet 4    gabapentin (NEURONTIN) 800 MG tablet Take 1 tablet by mouth 3 times daily for 150 days.  90 tablet 4    traZODone (DESYREL) 100 MG tablet Take 1 tablet by mouth nightly 30 tablet 4    hydrochlorothiazide (MICROZIDE) 12.5 MG capsule Take 1 capsule by mouth every morning 30 capsule 4    mesalamine (LIALDA) 1.2 g EC tablet Take 1 tablet by mouth daily (with breakfast) 30 tablet 3    mometasone-formoterol (DULERA) 200-5 MCG/ACT inhaler Inhale 2 puffs into the lungs      nitroGLYCERIN (NITROSTAT) 0.4 MG SL tablet Place 1 tablet under the tongue every 5 minutes as needed for Chest pain 25 tablet 11    Umeclidinium Bromide (INCRUSE ELLIPTA) 62.5 MCG/INH AEPB Inhale 1 puff into the lungs daily 1 each 5    OneTouch Delica Lancets 64F MISC Apply 1 Units topically 2 times daily USE 2 TO 3 TIMES DAILY AS DIRECTED 60 each 4    Alcohol Swabs 70 % PADS Apply 1 Units topically 2 times daily Dx: DM-2 use 2-3 times daily 60 each 4    albuterol sulfate HFA (PROAIR HFA) 108 (90 Base) MCG/ACT inhaler Inhale 2 puffs into the lungs every 6 hours as needed for Wheezing 1 Inhaler 3    albuterol sulfate  (90 Base) MCG/ACT inhaler inhale 1 puff by mouth every 6 hours if needed for wheezing 54 g 0     No current facility-administered medications for this visit.         Social History     Tobacco Use    Smoking status: Current Some Day Smoker     Packs/day: 0.50     Years: 40.00     Pack years: 20.00     Types: Cigarettes     Start date: 1969    Smokeless tobacco: Never Used   Substance Use Topics    Alcohol use: No     Alcohol/week: 0.0 standard drinks    Drug use: No     Comment:  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       Family History   Problem Relation Age of Onset    Cancer Mother         lungs and brain    Stroke Father     Crohn's Disease Sister       ________________________________________________________________________  Review of Systems:  CONSTITUTIONAL: Denies: fever, chills  PSYCH: Denies: anxiety, depression  ALLERGIES: Denies: urticaria  EYES: Denies: blurry vision, decreased vision, photophobia  ENT: Denies: sore throat, nasal congestion  CARDIOVASCULAR: Denies: chest pain, dyspnea on exertion  RESPIRATORY: Denies: cough, hemoptysis, shortness of breath  GI: Denies: Denies: abdominal pain, flank pain  : Denies: Denies: dysuria, frequency/urgency  NEURO: Denies: dizzy/vertigo, headache  MUSCULOSKELETAL: Denies: back pain, joint pain  SKIN: Denies: rash, itching  ________________________________________________________________________  Physical Exam:  Unable to obtain from phone virtual visit  ________________________________________________________________________  Diagnostic findings:  CBC:  Lab Results   Component Value Date    WBC 8.2 10/21/2019    HGB 14.9 10/21/2019     10/21/2019    PLT Platelet clumps present, count appears adequate. 02/26/2012       BMP:    Lab Results   Component Value Date     10/21/2019    K 3.9 10/21/2019     10/21/2019    CO2 24 10/21/2019    BUN 9 10/21/2019    CREATININE 0.61 10/21/2019    GLUCOSE 98 10/21/2019    GLUCOSE 120 02/26/2012       HEMOGLOBIN A1C:   Lab Results   Component Value Date    LABA1C 6.0 06/09/2020       FASTING LIPID PANEL:  Lab Results   Component Value Date    CHOL 129 02/05/2020    HDL 37 (L) 02/05/2020    TRIG 54 02/05/2020     ________________________________________________________________________  Health Maintenance:  Health Maintenance Due   Topic Date Due    Diabetic retinal exam  01/01/2019    Breast cancer screen  11/01/2019    Flu vaccine (1) 09/01/2020    Diabetic microalbuminuria test  09/20/2020     ________________________________________________________________________  Assessment and Plan:  Tamica Dawn was seen today for hypertension, diabetes and health maintenance. Diagnoses and all orders for this visit:    Essential hypertension  - Well controlled. Continue same regimen. Chronic diastolic congestive heart failure (HCC)  - Stable. Not in acute exacerbation. Gastroesophageal reflux disease without esophagitis  - Continue Pantoprazole.   Chronic bilateral low back pain with right-sided sciatica  - Well controlled on Gabapentin. Type 2 diabetes mellitus with complication (HCC)  -Blood sugars reportedly around 150s.     ________________________________________________________________________  Follow up and instructions:  Return in about 3 months (around 12/24/2020) for DM, HTN, Labs follow up. · [facility] received counseling on the following healthy behaviors: medication adherence and blood pressure and diabetic monitoring. She was also asked to get labs done when possible. · Discussed use, benefit, and side effects of prescribed medications. Barriers to medication compliance addressed. All patient questions answered. Pt voiced understanding. · Patient given educational materials - see patient instructions  ________________________________________________________________________  Total time spent on this encounter: 30 min    Alex Berumen MD  PGY-2, Internal Medicine Resident  [facility]         9/24/2020, 4:17 PM    This note is created with the assistance of a speech-recognition program. While intending to generate a document that actually reflects the content of the visit, the document can still have some mistakes which may not have been identified and corrected by editing.

## 2020-10-28 ENCOUNTER — TELEPHONE (OUTPATIENT)
Dept: INTERNAL MEDICINE | Age: 65
End: 2020-10-28

## 2020-10-28 NOTE — LETTER
[address] [place] [address] Drive [address]  Phone: [phone]  Fax: [phone] Clement Ho MD        October 28, 2020    [facility] [address]      Dear Eric Branham: We are sending this letter because your PCP ordered Mammogram for you to have done at your last visit here and they have not yet been completed. If you can please come to our office on the 2nd floor to  your orders to have them compelted. If you do not have a follow-up appointment scheduled you can either contact the office to make an appointment with us or you can make one when you come in to pick-up your orders. If you have any questions or concerns, please don't hesitate to call.     Sincerely,        Cyndi Ulloa MD

## 2020-10-30 NOTE — TELEPHONE ENCOUNTER
Request for ropinirole - med pended. Please fill if appropriate. Next Visit Date:  Future Appointments   Date Time Provider Terry Gonzalezi   11/5/2020  1:30 PM [facility]   11/11/2020  1:30 PM [facility]   11/23/2020  9:30 AM Iliana Ram MD Resp [facility] [address] Maintenance   Topic Date Due    Breast cancer screen  11/01/2019    Flu vaccine (1) 09/01/2020    Diabetic microalbuminuria test  09/20/2020    Potassium monitoring  10/21/2020    Creatinine monitoring  10/21/2020    Hepatitis A vaccine (1 of 2 - Risk 2-dose series) 09/04/2021 (Originally 6/5/1956)    Shingles Vaccine (1 of 2) 09/04/2021 (Originally 6/5/2007)    Lipid screen  02/05/2021    A1C test (Diabetic or Prediabetic)  06/09/2021    Diabetic foot exam  08/24/2021    Annual Wellness Visit (AWV)  09/05/2021    Diabetic retinal exam  09/19/2021    Colon cancer screen colonoscopy  01/09/2023    DTaP/Tdap/Td vaccine (2 - Td) 09/30/2026    Pneumococcal 0-64 years Vaccine  Completed    HIV screen  Completed    Hib vaccine  Aged Out    Meningococcal (ACWY) vaccine  Aged Out       Hemoglobin A1C (%)   Date Value   06/09/2020 6.0   09/17/2019 5.9   04/23/2019 6.1 (H)             ( goal A1C is < 7)   Microalb/Crt.  Ratio (mcg/mg creat)   Date Value   09/20/2019 CANNOT BE CALCULATED     LDL Cholesterol (mg/dL)   Date Value   02/05/2020 81       (goal LDL is <100)   AST (U/L)   Date Value   10/21/2019 13     ALT (U/L)   Date Value   10/21/2019 8     BUN (mg/dL)   Date Value   10/21/2019 9     BP Readings from Last 3 Encounters:   07/24/20 122/83   06/09/20 (!) 100/56   02/17/20 130/80          (goal 120/80)    All Future Testing planned in [facility]  Lab Frequency Next Occurrence   Hepatitis C RNA, Quantitative, PCR Once 11/07/2020   TRACY DIGITAL SCREEN W CAD BILATERAL Once 01/28/2021   TRACY BREAST SPECIMEN Once 10/08/2020   FL Small Bowel Follow Through Only Once 69/31/7190   Basic Metabolic Panel Once 81/70/5854   Hemoglobin A1C Once 11/28/2020   Hepatitis C RNA, quantitative, PCR Once 01/02/2021   Vitamin B12 & Folate Once 01/02/2021   AFP Tumor Marker Once 12/26/2020         Patient Active Problem List:     Essential hypertension     Pure hypercholesterolemia     Moderate episode of recurrent major depressive disorder ([facility] [address]     History of heroin use     Hep C w/o coma, chronic ([facility] [address] completed tx 2016     GERD (gastroesophageal reflux disease)     COPD (chronic obstructive pulmonary disease) (HCC)     Tobacco abuse     Chronic diastolic congestive heart failure (HCC)     Diverticulosis     Hyperplastic polyp of intestine     Diabetic polyneuropathy associated with type 2 diabetes mellitus (HCC)     Bilateral chronic knee pain     Type 2 diabetes mellitus with complication (HCC)     Primary insomnia     Cigarette nicotine dependence without complication     Chronic bilateral low back pain with right-sided sciatica     Irritable bowel syndrome with both constipation and diarrhea

## 2020-11-02 RX ORDER — ROPINIROLE 0.5 MG/1
0.5 TABLET, FILM COATED ORAL DAILY
Qty: 90 TABLET | Refills: 3 | Status: SHIPPED | OUTPATIENT
Start: 2020-11-02 | End: 2021-10-21 | Stop reason: SDUPTHER

## 2020-11-10 RX ORDER — ALBUTEROL SULFATE 90 UG/1
AEROSOL, METERED RESPIRATORY (INHALATION)
Qty: 1 INHALER | Refills: 8 | Status: SHIPPED | OUTPATIENT
Start: 2020-11-10 | End: 2021-01-19

## 2020-11-11 ENCOUNTER — OFFICE VISIT (OUTPATIENT)
Dept: GASTROENTEROLOGY | Age: 65
End: 2020-11-11
Payer: COMMERCIAL

## 2020-11-11 VITALS
TEMPERATURE: 97 F | WEIGHT: 213 LBS | BODY MASS INDEX: 28.89 KG/M2 | SYSTOLIC BLOOD PRESSURE: 129 MMHG | DIASTOLIC BLOOD PRESSURE: 76 MMHG | HEART RATE: 78 BPM

## 2020-11-11 PROCEDURE — 99213 OFFICE O/P EST LOW 20 MIN: CPT | Performed by: INTERNAL MEDICINE

## 2020-11-11 RX ORDER — LINACLOTIDE 290 UG/1
290 CAPSULE, GELATIN COATED ORAL
Qty: 30 CAPSULE | Refills: 5 | Status: SHIPPED | OUTPATIENT
Start: 2020-11-11 | End: 2020-12-11

## 2020-11-11 NOTE — PROGRESS NOTES
DIGESTIVE HEALTH PROGRESS NOTE    HISTORY OF PRESENT ILLNESS: Ms. Gideon Asif is a 63 y.o. female who presents for follow up on abdominal pain. Chronic constipation. She reports a bowel movement every 1 to 2 weeks. Intermittent cramping abdominal pain. No blood in the stool or melena. She takes magnesium citrate as needed. She tried MiraLAX before. We gave her prescription for Amitiza previously. Past Medical, Family, and Social History reviewed and does not contribute to the patient presenting condition. Patient's PMH/PSH,SH,PSYCH Hx, MEDs, ALLERGIES, and ROS were all reviewed and updated in the appropriate sections. PAST MEDICAL HISTORY:  Past Medical History:   Diagnosis Date    RACHEL (acute kidney injury) ([facility] [address] 1/22/2014    Arthritis     generalized    Bronchiectasis without complication ([facility] [address]     Cancer ([facility] [address] 2004    uterus    CHF (congestive heart failure) (HCC)     Chronic bilateral low back pain with right-sided sciatica 2/20/2017    Chronic bronchitis (HCC)     Chronic bronchitis (HCC)     Chronic respiratory failure with hypoxia (HCC)     COPD (chronic obstructive pulmonary disease) (HCC)     on inhaler /  COPD with chronic bronchitis and emphysema    Current smoker on some days     Depression 10/30/2014    Diabetic polyneuropathy associated with type 2 diabetes mellitus (HCC)     Diverticulosis     Full dentures     GERD (gastroesophageal reflux disease)     Hep C w/o coma, chronic (HCC)     Hyperlipidemia     Hyperplastic polyp of intestine     Hypertension     DR. MCDANIEL-CARDIO    Liver disease     hepatitis C    Nasal polyposis     Noncompliance with CPAP treatment     Obesity (BMI 35.0-39.9 without comorbidity)     On home O2     JIMENA (obstructive sleep apnea)     JIMENA on CPAP    Pacemaker 2012    Pneumonia 7/2012    RECENTLY HOSPITALIZED    Pulmonary edema cardiac cause (HCC)     Rectal bleeding     Smoking addiction     Tobacco abuse        Past Surgical History:   Procedure Laterality Date    BACK SURGERY  2008   [employer] [facility]       cath dec. 2013    COLONOSCOPY      COLONOSCOPY  1/23/15    severe diverticula    COLONOSCOPY  09/20/2016    HYPERPLASTIC POLYP X 2     COLONOSCOPY N/A 3/14/2019    COLONOSCOPY DIAGNOSTIC performed by Daniel Webb MD at [facility]    COLONOSCOPY N/A 1/9/2020    COLONOSCOPY WITH BIOPSY performed by Daniel Webb MD at [facility], COLON, DIAGNOSTIC     [address]  9/24/13    decompression & re-exploration L3-4, L4-5    PACEMAKER INSERTION      PACEMAKER PLACEMENT      SIGMOIDOSCOPY N/A 6/26/15    flexable sigmoidscopy,HYPERPLASTIC POLYP    TONSILLECTOMY      UPPER GASTROINTESTINAL ENDOSCOPY N/A 3/14/2019    EGD BIOPSY performed by Daniel Webb MD at [facility]       CURRENT MEDICATIONS:    Current Outpatient Medications:     linaclotide (LINZESS) 290 MCG CAPS capsule, Take 1 capsule by mouth every morning (before breakfast), Disp: 30 capsule, Rfl: 5    albuterol sulfate  (90 Base) MCG/ACT inhaler, inhale 2 puffs by mouth and INTO THE LUNGS every 6 hours if needed for wheezing, Disp: 1 Inhaler, Rfl: 8    rOPINIRole (REQUIP) 0.5 MG tablet, Take 1 tablet by mouth daily, Disp: 90 tablet, Rfl: 3    traZODone (DESYREL) 100 MG tablet, take 1 tablet by mouth AT NIGHT, Disp: 30 tablet, Rfl: 4    atenolol (TENORMIN) 25 MG tablet, Take 1 tablet by mouth daily, Disp: 30 tablet, Rfl: 4    furosemide (LASIX) 20 MG tablet, Take 1 tablet by mouth 2 times daily, Disp: 60 tablet, Rfl: 4    hydroCHLOROthiazide (MICROZIDE) 12.5 MG capsule, Take 1 capsule by mouth every morning, Disp: 30 capsule, Rfl: 4    pantoprazole (PROTONIX) 40 MG tablet, Take 1 tablet by mouth daily, Disp: 30 tablet, Rfl: 4    gabapentin (NEURONTIN) 800 MG tablet, Take 1 tablet by mouth 3 times daily for 240 days. , Disp: 180 tablet, Rfl: 3    metFORMIN (GLUCOPHAGE) 500 MG tablet, Take 1 tablet by mouth 2 times daily (with meals), Disp: 180 tablet, Rfl: 0    ARIPiprazole (ABILIFY) 15 MG tablet, Take 1 tablet by mouth daily, Disp: 90 tablet, Rfl: 4    buprenorphine-naloxone (SUBOXONE) 2-0.5 MG FILM SL film, Place 1.5 Film under the tongue daily. , Disp: , Rfl:     pravastatin (PRAVACHOL) 40 MG tablet, Take 1 tablet by mouth daily, Disp: 90 tablet, Rfl: 4    SYMBICORT 160-4.5 MCG/ACT AERO, inhale 2 puffs by mouth twice a day Rinse mouth after use, Disp: 10.2 g, Rfl: 5    potassium chloride (KLOR-CON M) 20 MEQ extended release tablet, Take 20 mEq by mouth daily, Disp: , Rfl:     VORTIoxetine (TRINTELLIX) 10 MG TABS tablet, Take 10 mg by mouth daily, Disp: , Rfl:     blood glucose test strips (ONE TOUCH ULTRA TEST) strip, 1 each by In Vitro route 4 times daily As needed. , Disp: 200 each, Rfl: 3    albuterol sulfate HFA (PROAIR HFA) 108 (90 Base) MCG/ACT inhaler, Inhale 2 puffs into the lungs every 6 hours as needed for Wheezing, Disp: 1 Inhaler, Rfl: 3    mesalamine (LIALDA) 1.2 g EC tablet, Take 1 tablet by mouth daily (with breakfast), Disp: 30 tablet, Rfl: 3    mometasone-formoterol (DULERA) 200-5 MCG/ACT inhaler, Inhale 2 puffs into the lungs, Disp: , Rfl:     albuterol sulfate  (90 Base) MCG/ACT inhaler, inhale 1 puff by mouth every 6 hours if needed for wheezing, Disp: 54 g, Rfl: 0    nitroGLYCERIN (NITROSTAT) 0.4 MG SL tablet, Place 1 tablet under the tongue every 5 minutes as needed for Chest pain, Disp: 25 tablet, Rfl: 11    Umeclidinium Bromide (INCRUSE ELLIPTA) 62.5 MCG/INH AEPB, Inhale 1 puff into the lungs daily, Disp: 1 each, Rfl: 5    lubiprostone (AMITIZA) 24 MCG capsule, Take 1 capsule by mouth 2 times daily (with meals), Disp: 60 capsule, Rfl: 5    OneTouch Delica Lancets 80T MISC, Apply 1 Units topically 2 times daily USE 2 TO 3 TIMES DAILY AS DIRECTED, Disp: 60 each, Rfl: 4    Alcohol Swabs 70 % PADS, Apply 1 Units topically 2 times daily Dx: DM-2 use 2-3 times daily, Disp: 60 each, Rfl: 4    ALLERGIES:   Allergies   Allergen Reactions    Iv Dye [Iodides] Hives       SOCIAL HISTORY:   Social History     Socioeconomic History    Marital status: Single     Spouse name: Not on file    Number of children: Not on file    Years of education: Not on file    Highest education level: Not on file   Occupational History    Not on file   Social Needs    Financial resource strain: Not on file    Food insecurity     Worry: Not on file     Inability: Not on file    Transportation needs     Medical: Not on file     Non-medical: Not on file   Tobacco Use    Smoking status: Current Some Day Smoker     Packs/day: 0.50     Years: 40.00     Pack years: 20.00     Types: Cigarettes     Start date: 1969    Smokeless tobacco: Never Used   Substance and Sexual Activity    Alcohol use: No     Alcohol/week: 0.0 standard drinks    Drug use: No     Comment:  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    Sexual activity: Never   Lifestyle    Physical activity     Days per week: Not on file     Minutes per session: Not on file    Stress: Not on file   Relationships    Social connections     Talks on phone: Not on file     Gets together: Not on file     Attends Hinduism service: Not on file     Active member of club or organization: Not on file     Attends meetings of clubs or organizations: Not on file     Relationship status: Not on file    Intimate partner violence     Fear of current or ex partner: Not on file     Emotionally abused: Not on file     Physically abused: Not on file     Forced sexual activity: Not on file   Other Topics Concern    Not on file   Social History Narrative    Not on file       REVIEW OF SYSTEMS: A 12-point review of systems was obtained and pertinent positives and negatives were enumerated above in the history of present illness. All other reviewed systems / symptoms were negative.     Review of Systems     PHYSICAL EXAMINATION: Vital signs reviewed per the nursing documentation. /76   Pulse 78   Temp 97 °F (36.1 °C)   Wt 213 lb (96.6 kg)   BMI 28.89 kg/m²   Body mass index is 28.89 kg/m². I personally reviewed the nurse's notes and documentation and I agree with her notes. General: alert, appears stated age and cooperative Psych: Normal. and Alert and oriented, appropriate affect. . Normal affect. Mentation normal  HEENT: PERRLA. Clear conjunctivae and sclerae. Moist oral mucosae, no lesions or ulcers. The neck is supple, without lymphadenopathy or jugular venous distension. No masses. Normal thyroid. Cardiovascular: S1 S2 RRR no rubs or murmurs. Pulmonary: clear BL. No accessory muscle usage. Abdominal Exam: Soft, NT ND, no hepato or spleno megaly, +BS, no ascites. LABORATORY DATA: Reviewed  Lab Results   Component Value Date    WBC 8.2 10/21/2019    HGB 14.9 10/21/2019    HCT 47.2 (H) 10/21/2019    MCV 94.4 10/21/2019     10/21/2019     10/21/2019    K 3.9 10/21/2019     10/21/2019    CO2 24 10/21/2019    BUN 9 10/21/2019    CREATININE 0.61 10/21/2019    LABPROT 7.5 11/12/2012    LABALBU 3.9 10/21/2019    BILITOT 0.20 (L) 10/21/2019    ALKPHOS 74 10/21/2019    AST 13 10/21/2019    ALT 8 10/21/2019    INR 1.1 03/05/2015         Lab Results   Component Value Date    RBC 5.00 10/21/2019    HGB 14.9 10/21/2019    MCV 94.4 10/21/2019    MCH 29.8 10/21/2019    MCHC 31.6 10/21/2019    RDW 13.8 10/21/2019    MPV 10.1 10/21/2019    BASOPCT 1 10/21/2019    LYMPHSABS 3.61 10/21/2019    MONOSABS 0.74 10/21/2019    NEUTROABS 3.36 10/21/2019    EOSABS 0.41 10/21/2019    BASOSABS 0.08 10/21/2019         DIAGNOSTIC TESTING:   No results found. IMPRESSION: Ms. Ursula Nolan is a 63 y.o. female with chronic constipation. Abdominal pain. Trial of Linzess. Continue Bentyl as needed. Follow-up in 1 month. Quit smoking.     Barbi Vizcarra MD [facility]      Please note that this chart was generated using voice recognition Dragon dictation software. Although every effort was made to ensure the accuracy of this automated transcription, some errors in transcription may have occurred.

## 2020-11-25 ENCOUNTER — TELEPHONE (OUTPATIENT)
Dept: INTERNAL MEDICINE | Age: 65
End: 2020-11-25

## 2020-11-25 ENCOUNTER — OFFICE VISIT (OUTPATIENT)
Dept: INTERNAL MEDICINE | Age: 65
End: 2020-11-25
Payer: COMMERCIAL

## 2020-11-25 VITALS
WEIGHT: 224 LBS | HEART RATE: 75 BPM | OXYGEN SATURATION: 96 % | HEIGHT: 72 IN | BODY MASS INDEX: 30.34 KG/M2 | DIASTOLIC BLOOD PRESSURE: 65 MMHG | SYSTOLIC BLOOD PRESSURE: 112 MMHG

## 2020-11-25 LAB — HBA1C MFR BLD: 6.4 %

## 2020-11-25 PROCEDURE — G0296 VISIT TO DETERM LDCT ELIG: HCPCS | Performed by: STUDENT IN AN ORGANIZED HEALTH CARE EDUCATION/TRAINING PROGRAM

## 2020-11-25 PROCEDURE — 83036 HEMOGLOBIN GLYCOSYLATED A1C: CPT | Performed by: STUDENT IN AN ORGANIZED HEALTH CARE EDUCATION/TRAINING PROGRAM

## 2020-11-25 PROCEDURE — 99211 OFF/OP EST MAY X REQ PHY/QHP: CPT | Performed by: INTERNAL MEDICINE

## 2020-11-25 PROCEDURE — 99214 OFFICE O/P EST MOD 30 MIN: CPT | Performed by: STUDENT IN AN ORGANIZED HEALTH CARE EDUCATION/TRAINING PROGRAM

## 2020-11-25 RX ORDER — ATENOLOL 25 MG/1
12.5 TABLET ORAL DAILY
Qty: 30 TABLET | Refills: 0 | Status: SHIPPED | OUTPATIENT
Start: 2020-11-25 | End: 2021-11-10 | Stop reason: SDUPTHER

## 2020-11-25 RX ORDER — VARENICLINE TARTRATE
KIT
Qty: 1 BOX | Refills: 0 | Status: SHIPPED | OUTPATIENT
Start: 2020-11-25 | End: 2022-01-18

## 2020-11-25 RX ORDER — VARENICLINE TARTRATE 0.5 MG/1
.5-1 TABLET, FILM COATED ORAL SEE ADMIN INSTRUCTIONS
Qty: 57 TABLET | Refills: 0 | Status: CANCELLED | OUTPATIENT
Start: 2020-11-25

## 2020-11-25 ASSESSMENT — ENCOUNTER SYMPTOMS
SORE THROAT: 0
DIARRHEA: 1
BACK PAIN: 0
VOMITING: 0
COUGH: 0
SINUS PRESSURE: 0
NAUSEA: 0
SHORTNESS OF BREATH: 1
PHOTOPHOBIA: 0
RHINORRHEA: 0
CONSTIPATION: 1
CHEST TIGHTNESS: 0
ABDOMINAL PAIN: 1

## 2020-11-25 NOTE — PATIENT INSTRUCTIONS
Please check blood pressures at home daily and keep a log. Start Varenicline 0.5mg once daily for 3 days. 0.5mg twice daily for 4 days. Then stop cigarettes completely. From day 8 take 1mg Varenicline twice daily for 11 weeks. What is lung cancer screening? Lung cancer screening is a way in which doctors check the lungs for early signs of cancer in people who have no symptoms of lung cancer. A low-dose CT scan uses much less radiation than a normal CT scan and shows a more detailed image of the lungs than a standard X-ray. The goal of lung cancer screening is to find cancer early, before it has a chance to grow, spread, or cause problems. One large study found that smokers who were screened with low-dose CT scans were less likely to die of lung cancer than those who were screened with standard X-ray. Below is a summary of the things you need to know regarding screening for lung cancer with low-dose computed tomography (LDCT). This is a screening program that involves routine annual screening with LDCT studies of the lung. The LDCTs are done using low-dose radiation that is not thought to increase your cancer risk. If you have other serious medical conditions (other cancers, congestive heart failure) that limit your life expectancy to less than 10 years, you should not undergo lung cancer screening with LDCT. The chance is 20%-60% that the LDCT result will show abnormalities. This would require additional testing which could include repeat imaging or even invasive procedures. Most (about 95%) of \"abnormal\" LDCT results are false in the sense that no lung cancer is ultimately found. Additionally, some (about 10%) of the cancers found would not affect your life expectancy, even if undetected and untreated. If you are still smoking, the single most important thing that you can do to reduce your risk of dying of lung cancer is to quit.     For this screening to be covered by Medicare and most other insurers, strict criteria must be met. If you do not meet these criteria, but still wish to undergo LDCT testing, you will be required to sign a waiver indicating your willingness to pay for the scan. Labs mailed to patient, they will have them done before their next visit. Order for CT faxed to [address] [place] [address] they will all pt for appt. Please call [phone] in not heard within 2 weeks. Return To Clinic 11/30/2020. After Visit Summary  mailed to patient. It is very important for your care that you keep your appointment. If for some reason you are unable to keep your appointment it is equally important that you call our office at [phone] to cancel your appointment and reschedule. Failure to do so may result in your termination from our practice.     SL     NPI forms completed and given to patient, ODJFS form completed and given to the patient- forms scanned into the media

## 2020-11-25 NOTE — PROGRESS NOTES
[facility]  [facility] [address] [address]  Dept: [phone]  Dept Fax: [phone]    Office Progress/Follow Up Note  Date of patient's visit: 11/25/2020  Patient's Name:  Abilio Chiu YOB: 1955            Patient Care Team:  Dalton Caceres MD as PCP - General (Internal Medicine)  Lonell Simmonds, MD as Consulting Physician (Gastroenterology)  Harriet Law MD as Referring Physician (Internal Medicine)  Shalini Wallace MD as Consulting Physician (Pulmonology)  Salvador Gallegos MD as Consulting Physician (Internal Medicine)  Vesta Mojica DPM as Physician (Podiatry)  ________________________________________________________________________      Reason for Visit: Routine outpatient follow up  ________________________________________________________________________  Chief Complaint:  Forms (pt is adopting a child and has forms that need completed, service dog forms); Dizziness (orthostats done); and Health Maintenance (labs reprinted, a1c is running, mammogram is scheduled, pt declined flu vaccination)    ________________________________________________________________________  History of Presenting Illness:  History was obtained from: patient. Abilio Chiu is a 63 y.o. is here for a follow up visit. She is applying for a service animal to help take care of her with her multiple co morbidities. She would also like to adopt a child and requires tuberculosis screening. At this time she complains that she had some dizziness this morning. States she hasn't been dizzy in a long time otherwise. She reports that she has been drinking adequate water. Orthostatics negative but sitting blood pressures were low at 70/42 but recovered to 112/65. No other complaints at this time. She has history of smoking 1 pack a day for about 48 years. Continues to smoke but expresses interest in quitting.     Patient Active Problem List   Diagnosis  Essential hypertension    Pure hypercholesterolemia    Moderate episode of recurrent major depressive disorder ([facility] [address]    History of heroin use    Hep C w/o coma, chronic (HCC) completed tx 2016    GERD (gastroesophageal reflux disease)    COPD (chronic obstructive pulmonary disease) (HCC)    Tobacco abuse    Chronic diastolic congestive heart failure (HCC)    Diverticulosis    Hyperplastic polyp of intestine    Diabetic polyneuropathy associated with type 2 diabetes mellitus (HCC)    Bilateral chronic knee pain    Type 2 diabetes mellitus with complication (HCC)    Primary insomnia    Cigarette nicotine dependence without complication    Chronic bilateral low back pain with right-sided sciatica    Irritable bowel syndrome with both constipation and diarrhea       Allergies   Allergen Reactions    Iv Dye [Iodides] Hives         Current Outpatient Medications   Medication Sig Dispense Refill    linaclotide (LINZESS) 290 MCG CAPS capsule Take 1 capsule by mouth every morning (before breakfast) 30 capsule 5    rOPINIRole (REQUIP) 0.5 MG tablet Take 1 tablet by mouth daily 90 tablet 3    traZODone (DESYREL) 100 MG tablet take 1 tablet by mouth AT NIGHT 30 tablet 4    atenolol (TENORMIN) 25 MG tablet Take 1 tablet by mouth daily 30 tablet 4    furosemide (LASIX) 20 MG tablet Take 1 tablet by mouth 2 times daily 60 tablet 4    hydroCHLOROthiazide (MICROZIDE) 12.5 MG capsule Take 1 capsule by mouth every morning 30 capsule 4    pantoprazole (PROTONIX) 40 MG tablet Take 1 tablet by mouth daily 30 tablet 4    gabapentin (NEURONTIN) 800 MG tablet Take 1 tablet by mouth 3 times daily for 240 days. 180 tablet 3    metFORMIN (GLUCOPHAGE) 500 MG tablet Take 1 tablet by mouth 2 times daily (with meals) 180 tablet 0    ARIPiprazole (ABILIFY) 15 MG tablet Take 1 tablet by mouth daily 90 tablet 4    buprenorphine-naloxone (SUBOXONE) 2-0.5 MG FILM SL film Place 1.5 Film under the tongue daily.       Smokeless tobacco: Never Used   Substance Use Topics    Alcohol use: No     Alcohol/week: 0.0 standard drinks    Drug use: No     Comment:  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       Family History   Problem Relation Age of Onset    Cancer Mother         lungs and brain    Stroke Father     Crohn's Disease Sister       ________________________________________________________________________  Review of Systems:  Review of Systems   Constitutional: Negative for chills, diaphoresis and fever. HENT: Negative for rhinorrhea, sinus pressure and sore throat. Eyes: Negative for photophobia. Respiratory: Positive for shortness of breath. Negative for cough and chest tightness. Cardiovascular: Negative for chest pain. Gastrointestinal: Positive for abdominal pain, constipation and diarrhea. Negative for nausea and vomiting. Genitourinary: Negative for dysuria, frequency and urgency. Musculoskeletal: Negative for back pain and neck stiffness. Skin: Negative for rash. Neurological: Positive for light-headedness (Resolved after a few minutes). Negative for seizures and speech difficulty. Psychiatric/Behavioral: Negative for agitation, confusion and decreased concentration. ________________________________________________________________________  Physical Exam:  Vitals:    11/25/20 0848 11/25/20 0910 11/25/20 0911 11/25/20 0912   BP: 90/62 (!) 102/54 (!) 70/42 112/65   Site: Left Upper Arm Left Upper Arm Left Upper Arm Left Upper Arm   Position: Sitting Supine Sitting Standing   Cuff Size: Medium Adult Medium Adult Medium Adult Medium Adult   Pulse: 72 70 73 75   SpO2: 96%      Weight: 224 lb (101.6 kg)      Height: 6' (1.829 m)        BP Readings from Last 3 Encounters:   11/25/20 112/65   11/11/20 129/76   07/24/20 122/83        Physical Exam  Vitals signs reviewed. Constitutional:       Appearance: Normal appearance. HENT:      Head: Normocephalic and atraumatic.       Right Ear: External ear normal. Left Ear: External ear normal.      Mouth/Throat:      Mouth: Mucous membranes are moist.   Eyes:      Extraocular Movements: Extraocular movements intact. Conjunctiva/sclera: Conjunctivae normal.   Neck:      Musculoskeletal: Normal range of motion and neck supple. No neck rigidity. Cardiovascular:      Rate and Rhythm: Normal rate and regular rhythm. Heart sounds: Normal heart sounds. No murmur. Pulmonary:      Effort: Pulmonary effort is normal. No respiratory distress. Breath sounds: Normal breath sounds. No rhonchi or rales. Abdominal:      General: Abdomen is flat. There is no distension. Palpations: Abdomen is soft. Tenderness: There is no abdominal tenderness. Musculoskeletal: Normal range of motion. General: No deformity. Right lower leg: No edema. Left lower leg: No edema. Skin:     General: Skin is warm and dry. Neurological:      General: No focal deficit present. Mental Status: She is alert. Mental status is at baseline. Psychiatric:         Mood and Affect: Mood normal.     }    ________________________________________________________________________  Diagnostic findings:  CBC:  Lab Results   Component Value Date    WBC 8.2 10/21/2019    HGB 14.9 10/21/2019     10/21/2019    PLT Platelet clumps present, count appears adequate.  02/26/2012       BMP:    Lab Results   Component Value Date     10/21/2019    K 3.9 10/21/2019     10/21/2019    CO2 24 10/21/2019    BUN 9 10/21/2019    CREATININE 0.61 10/21/2019    GLUCOSE 98 10/21/2019    GLUCOSE 120 02/26/2012       HEMOGLOBIN A1C:   Lab Results   Component Value Date    LABA1C 6.4 11/25/2020       FASTING LIPID PANEL:  Lab Results   Component Value Date    CHOL 129 02/05/2020    HDL 37 (L) 02/05/2020    TRIG 54 02/05/2020     ________________________________________________________________________  Health Maintenance:  Health Maintenance Due   Topic Date Due    Breast cancer screen  11/01/2019    Diabetic microalbuminuria test  09/20/2020    Potassium monitoring  10/21/2020    Creatinine monitoring  10/21/2020     ________________________________________________________________________  Assessment and Plan:  Gee Guevaar was seen today for forms, dizziness and health maintenance. Diagnoses and all orders for this visit:    1. Essential hypertension  - Was dizzy this morning. Blood pressure on the lower side. Will cut back from 25 to 12.5mg of Atenolol.  - atenolol (TENORMIN) 25 MG tablet; Take 0.5 tablets by mouth daily  Dispense: 60 tablet; Refill: 2    2. Type 2 diabetes mellitus with complication (HCC)  - POCT glycosylated hemoglobin (Hb A1C)  - Microalbumin, Ur; Future    3. Screening for tuberculosis  - Mantoux testing    4. Encounter for screening for lung cancer  - CT lung screen [Initial/Annual]; Future    5. Cigarette nicotine dependence without complication  - varenicline (CHANTIX) 0.5 MG tablet; Take 1-2 tablets by mouth See Admin Instructions 0.5mg DAILY for 3 days followed by 0.5mg TWICE DAILY for 4 days followed by 1mg TWICE DAILY  Dispense: 57 tablet; Refill: 0    ________________________________________________________________________  Follow up and instructions:  Return in about 2 weeks (around 12/9/2020) for Smoking cessation, lightheadness/hypertension follow up. [place] received counseling on the following healthy behaviors: tobacco cessation and blood pressure log    · Discussed use, benefit, and side effects of prescribed medications. Barriers to medication compliance addressed. All patient questions answered. Pt voiced understanding.      · Patient given educational materials - see patient instructions    Lino Calvillo MD  PGY-2, Internal Medicine Resident  [facility]         11/25/2020, 9:49 AM      This note is created with the assistance of a speech-recognition program. While intending to generate a document that actually reflects the content of the visit, the document can still have some mistakes which may not have been identified and corrected by editing.

## 2020-11-25 NOTE — PROGRESS NOTES
Patient here to fill forms for foster care. She wishes to provide at home for a foster child. She needs forms filled. She needs PPD placed. She does have a history of substance abuse but currently on Suboxone. She also has major depression or bipolar disorder for which she is seeing a psychiatrist.  She is advised she will need to have her psychiatrist also write a note. She continues to smoke. She has chronic CHF and COPD. She needs a low-dose lung CT for screening. Patient was treated a little bit dizzy and she had some hypotension and orthostatic blood pressure recordings. Will advise her to decrease her atenolol for now. She does not take Lasix and has not taken it in months. She is on low-dose HCTZ. Advised increase hydration. We will see her back in 2 weeks. She will come back next week for PPD testing. Forms filled. She agrees to start Chantix for smoking cessation counseling. Attending Physician Statement  I have discussed the care of Lestine Cushing, including pertinent history and exam findings,  with the resident. I have seen and examined the patient and the key elements of all parts of the encounter have been performed by me. I agree with the assessment, plan and orders as documented by the resident.   (GC Modifier)

## 2020-11-30 ENCOUNTER — NURSE ONLY (OUTPATIENT)
Dept: INTERNAL MEDICINE | Age: 65
End: 2020-11-30
Payer: COMMERCIAL

## 2020-11-30 ENCOUNTER — HOSPITAL ENCOUNTER (OUTPATIENT)
Age: 65
Setting detail: SPECIMEN
Discharge: HOME | End: 2020-11-30
Payer: COMMERCIAL

## 2020-11-30 LAB
AFP: 7 UG/L
ANION GAP SERPL CALCULATED.3IONS-SCNC: 18 MMOL/L (ref 9–17)
BUN BLDV-MCNC: 9 MG/DL (ref 8–23)
BUN/CREAT BLD: ABNORMAL (ref 9–20)
CALCIUM SERPL-MCNC: 10.2 MG/DL (ref 8.6–10.4)
CHLORIDE BLD-SCNC: 102 MMOL/L (ref 98–107)
CO2: 22 MMOL/L (ref 20–31)
CREAT SERPL-MCNC: 0.52 MG/DL (ref 0.5–0.9)
ESTIMATED AVERAGE GLUCOSE: 134 MG/DL
FOLATE: 8.5 NG/ML
GFR AFRICAN AMERICAN: >60 ML/MIN
GFR NON-AFRICAN AMERICAN: >60 ML/MIN
GFR SERPL CREATININE-BSD FRML MDRD: ABNORMAL ML/MIN/{1.73_M2}
GFR SERPL CREATININE-BSD FRML MDRD: ABNORMAL ML/MIN/{1.73_M2}
GLUCOSE BLD-MCNC: 93 MG/DL (ref 70–99)
HBA1C MFR BLD: 6.3 % (ref 4–6)
POTASSIUM SERPL-SCNC: 4.1 MMOL/L (ref 3.7–5.3)
SODIUM BLD-SCNC: 142 MMOL/L (ref 135–144)
VITAMIN B-12: 386 PG/ML (ref 232–1245)

## 2020-11-30 PROCEDURE — 86580 TB INTRADERMAL TEST: CPT

## 2020-11-30 NOTE — PROGRESS NOTES
S: pt presents at clinic today for PPD placement     O: pt oriented times 4 , Skin warm dry.     A:  PPD test,Placed Right Ventral forearm    Good weal formed , pt tolerated the procedure well    P: pt return to clinic Wed @ 2:00pm

## 2020-12-03 ENCOUNTER — TELEPHONE (OUTPATIENT)
Dept: INTERNAL MEDICINE | Age: 65
End: 2020-12-03

## 2020-12-03 NOTE — TELEPHONE ENCOUNTER
PA request received for Chantix Starting Month Marvin 0.5 MG X 11 &1 MG X 42 tablets    PA processed and submitted to pt insurance, waiting for response in regards to medication coverage

## 2020-12-04 ENCOUNTER — TELEPHONE (OUTPATIENT)
Dept: INTERNAL MEDICINE | Age: 65
End: 2020-12-04

## 2020-12-04 NOTE — TELEPHONE ENCOUNTER
Order for a CT lung screen came from fax, states \" DX codes on order not covered by INS, please revise, preferrably Z87.808 and re-submitt for scheduling.     Scanned order to pt chart

## 2020-12-06 LAB
DIRECT EXAM: NORMAL
Lab: NORMAL
SPECIMEN DESCRIPTION: NORMAL

## 2020-12-08 ENCOUNTER — TELEPHONE (OUTPATIENT)
Dept: GASTROENTEROLOGY | Age: 65
End: 2020-12-08

## 2020-12-08 ENCOUNTER — NURSE ONLY (OUTPATIENT)
Dept: INTERNAL MEDICINE | Age: 65
End: 2020-12-08
Payer: COMMERCIAL

## 2020-12-08 VITALS
HEIGHT: 72 IN | SYSTOLIC BLOOD PRESSURE: 126 MMHG | WEIGHT: 216 LBS | DIASTOLIC BLOOD PRESSURE: 69 MMHG | BODY MASS INDEX: 29.26 KG/M2 | HEART RATE: 86 BPM

## 2020-12-08 PROCEDURE — 86580 TB INTRADERMAL TEST: CPT | Performed by: INTERNAL MEDICINE

## 2020-12-10 ENCOUNTER — HOSPITAL ENCOUNTER (OUTPATIENT)
Age: 65
Setting detail: SPECIMEN
Discharge: HOME | End: 2020-12-10
Payer: COMMERCIAL

## 2020-12-10 ENCOUNTER — NURSE ONLY (OUTPATIENT)
Dept: INTERNAL MEDICINE | Age: 65
End: 2020-12-10
Payer: COMMERCIAL

## 2020-12-10 NOTE — PROGRESS NOTES
Patient here today for nurse visit for PPD reading. Patient was injected on 12/8/20 in Left  forearm with the following results.      PPD Reading Note    Results 15 mm induration  Interpretation further testing needed, borderline with redness   Allergic reaction unknown    If test is not read within 48-72 hours of initial placement, patient advised to repeat in other arm in 1-3 weeks after this test

## 2020-12-11 ENCOUNTER — TELEPHONE (OUTPATIENT)
Dept: INTERNAL MEDICINE | Age: 65
End: 2020-12-11

## 2020-12-11 NOTE — TELEPHONE ENCOUNTER
Scheduling calling about pt CT lung screening pt does not qualify for test as pt only smokes half a pack a day. Pt needs minium 1 pack for 30 years. Pt needs to smoke 30 pack years total. [phone] if you have any questions. Also Dx code needs to be z87.891. Please make sure that note in order states that pt smokes 1 pack a day for 45 yrs.

## 2020-12-13 LAB
QUANTI TB GOLD PLUS: NEGATIVE
QUANTI TB1 MINUS NIL: 0 IU/ML (ref 0–0.34)
QUANTI TB2 MINUS NIL: 0 IU/ML (ref 0–0.34)
QUANTIFERON MITOGEN: >10 IU/ML
QUANTIFERON NIL: 0.03 IU/ML

## 2020-12-25 DIAGNOSIS — K58.2 IRRITABLE BOWEL SYNDROME WITH BOTH CONSTIPATION AND DIARRHEA: ICD-10-CM

## 2020-12-25 DIAGNOSIS — K52.9 COLITIS: ICD-10-CM

## 2020-12-28 RX ORDER — DICYCLOMINE HCL 20 MG
TABLET ORAL
Qty: 120 TABLET | Refills: 3 | OUTPATIENT
Start: 2020-12-28

## 2021-01-08 ENCOUNTER — HOSPITAL ENCOUNTER (OUTPATIENT)
Dept: MAMMOGRAPHY | Age: 66
Discharge: HOME | End: 2021-01-10
Payer: COMMERCIAL

## 2021-01-08 DIAGNOSIS — Z12.31 SCREENING MAMMOGRAM FOR HIGH-RISK PATIENT: ICD-10-CM

## 2021-01-08 PROCEDURE — 77063 BREAST TOMOSYNTHESIS BI: CPT

## 2021-01-11 ENCOUNTER — TELEPHONE (OUTPATIENT)
Dept: INTERNAL MEDICINE | Age: 66
End: 2021-01-11

## 2021-01-11 NOTE — TELEPHONE ENCOUNTER
PC from patient stating that she needs a CT lung screening done, a message was sent 12/11/2020 stating that the DX code needs to be changed and she said she smoked a pack and a half of cigarettes a day. Patient states she's also had a cough for about a month that's why she needs to be able to schedule her CT lung screening.

## 2021-01-12 ENCOUNTER — TELEPHONE (OUTPATIENT)
Dept: INTERNAL MEDICINE | Age: 66
End: 2021-01-12

## 2021-01-12 NOTE — TELEPHONE ENCOUNTER
[facility], \" please change DX code in epic to Z87.895.  Thanks    Scanned order to pt chart

## 2021-01-14 ENCOUNTER — VIRTUAL VISIT (OUTPATIENT)
Dept: INTERNAL MEDICINE | Age: 66
End: 2021-01-14
Payer: COMMERCIAL

## 2021-01-14 DIAGNOSIS — M54.41 CHRONIC BILATERAL LOW BACK PAIN WITH RIGHT-SIDED SCIATICA: ICD-10-CM

## 2021-01-14 DIAGNOSIS — Z87.891 PERSONAL HISTORY OF TOBACCO USE, PRESENTING HAZARDS TO HEALTH: ICD-10-CM

## 2021-01-14 DIAGNOSIS — I10 ESSENTIAL HYPERTENSION: Primary | ICD-10-CM

## 2021-01-14 DIAGNOSIS — Z71.6 ENCOUNTER FOR SMOKING CESSATION COUNSELING: ICD-10-CM

## 2021-01-14 DIAGNOSIS — E11.42 TYPE 2 DIABETES MELLITUS WITH DIABETIC POLYNEUROPATHY, WITHOUT LONG-TERM CURRENT USE OF INSULIN (HCC): ICD-10-CM

## 2021-01-14 DIAGNOSIS — G89.29 CHRONIC BILATERAL LOW BACK PAIN WITH RIGHT-SIDED SCIATICA: ICD-10-CM

## 2021-01-14 PROCEDURE — 99442 PR PHYS/QHP TELEPHONE EVALUATION 11-20 MIN: CPT | Performed by: INTERNAL MEDICINE

## 2021-01-14 RX ORDER — GABAPENTIN 800 MG/1
800 TABLET ORAL 3 TIMES DAILY
Qty: 270 TABLET | Refills: 0 | Status: SHIPPED | OUTPATIENT
Start: 2021-01-14 | End: 2021-10-21 | Stop reason: SDUPTHER

## 2021-01-14 RX ORDER — TIZANIDINE 2 MG/1
2 TABLET ORAL EVERY 8 HOURS PRN
Qty: 90 TABLET | Refills: 0 | Status: SHIPPED | OUTPATIENT
Start: 2021-01-14 | End: 2021-04-08

## 2021-01-14 NOTE — PROGRESS NOTES
Attending Physician Statement  I have discussed the care of Krish Xiong huberamy, including pertinent history and exam findings,  with the resident. I have reviewed the key elements of all parts of the encounter with the resident. I agree with the assessment, plan and orders as documented by the resident.   (GE Modifier)

## 2021-01-14 NOTE — PATIENT INSTRUCTIONS
Medications e-scribe to pharmacy of pt's choice. Labs mailed to patient, they will have them done before their next visit. Patient was put on a wait list for 3 months and will be contacted to schedule their next follow up appointment once the schedule is available. If the patient is in need of an appointment before their next visit please call the office at [phone]. After Visit Summary  mailed to patient.

## 2021-01-14 NOTE — PROGRESS NOTES
[facility]  [facility] [address] [address]  Dept: [phone]  Dept Fax: [phone]    Office Virtual Visit Note  Date of Appointment: 1/14/2021  Patient's Name:  Winnie Reina YOB: 1955            Patient Care Team:  Pamla Nyhan, MD as PCP - General (Internal Medicine)  Marta Hanson MD as Consulting Physician (Gastroenterology)  Varun Enriquez MD as Referring Physician (Internal Medicine)  Wilner Montelongo MD as Consulting Physician (Pulmonology)  Ed Heath MD as Consulting Physician (Internal Medicine)  Yakelin Gill DPM as Physician (Podiatry)  ________________________________________________________________________      Reason for Appointment: Routine outpatient follow up  Type of Virtual Visit: Phone  ________________________________________________________________________  Consent:  She and/or health care decision maker is aware that that she may receive a bill for this telephone service, depending on her insurance coverage, and has provided verbal consent to proceed: Yes  ________________________________________________________________________  Declaration:  Winnie Reina is a 63 y.o. female being evaluated by a Virtual Visit Encounter to address concerns as mentioned above. A caregiver was present when appropriate. Due to this being a TeleHealth encounter (During AAGYR-10 public health emergency), evaluation of the following organ systems was limited: Vitals/Constitutional/EENT/Resp/CV/GI//MS/Neuro/Skin/Heme-Lymph-Imm. Pursuant to the emergency declaration under the [address], [facility] authority and the byUs and [employer] Act, this Virtual Visit was conducted with patient's (and/or legal guardian's) consent, to reduce the patient's risk of exposure to COVID-19 and provide necessary medical care. The patient (and/or legal guardian) has also been advised to contact this office for worsening conditions or problems, and seek emergency medical treatment and/or call 911 if deemed necessary. Patient identification was verified at the start of the visit: Yes    Services were provided through an audio or audio&video synchronous discussion virtually to substitute for in-person clinic visit. Patient and provider were located at their distinct individual locations. ________________________________________________________________________  Chief Complaint:  Diabetes (3 month follow up ) and Hypertension (3 month follow up)    ________________________________________________________________________  History of Presenting Illness:  History was obtained from: patientErinn De León is a 63 y.o. female evaluated by telephone for a follow up visit. Ms. Sarahi Rodriguez has no new complaints at this time. Ms Sarahi Rodriguez reports that recently she had a respiratory tract infection and went to an Urgent Care. She received antibiotics and steroids and improved. She states that she felt more swollen at the time and was asked by her cardiologist to take an extra dose of lasix for a few days. She reports that her pedal edema is improving at this time. She was last seen by me in November where we discussed smoking cessation and started the Chantix regimen. She reports that she has received it but has not started it yet. She states that she continues to smoke at this time. I counseled her about starting and she states that she will start the regimen next week. Patient Active Problem List   Diagnosis    Essential hypertension    Pure hypercholesterolemia    Moderate episode of recurrent major depressive disorder ([facility] [address]    History of heroin use    Hep C w/o coma, chronic (HCC) completed tx 2016    GERD (gastroesophageal reflux disease)    COPD (chronic obstructive pulmonary disease) ([facility] [address]    Personal history of tobacco use, presenting hazards to health    Chronic diastolic congestive heart failure (HCC)    Diverticulosis    Hyperplastic polyp of intestine    Diabetic polyneuropathy associated with type 2 diabetes mellitus (HCC)    Bilateral chronic knee pain    Type 2 diabetes mellitus with complication (HCC)    Primary insomnia    Cigarette nicotine dependence without complication    Chronic bilateral low back pain with right-sided sciatica    Irritable bowel syndrome with both constipation and diarrhea       Allergies   Allergen Reactions    Iv Dye [Iodides] Hives         Current Outpatient Medications   Medication Sig Dispense Refill    atenolol (TENORMIN) 25 MG tablet Take 0.5 tablets by mouth daily 30 tablet 0    rOPINIRole (REQUIP) 0.5 MG tablet Take 1 tablet by mouth daily 90 tablet 3    traZODone (DESYREL) 100 MG tablet take 1 tablet by mouth AT NIGHT 30 tablet 4    furosemide (LASIX) 20 MG tablet Take 1 tablet by mouth 2 times daily 60 tablet 4    hydroCHLOROthiazide (MICROZIDE) 12.5 MG capsule Take 1 capsule by mouth every morning 30 capsule 4    pantoprazole (PROTONIX) 40 MG tablet Take 1 tablet by mouth daily 30 tablet 4    gabapentin (NEURONTIN) 800 MG tablet Take 1 tablet by mouth 3 times daily for 240 days. 180 tablet 3    metFORMIN (GLUCOPHAGE) 500 MG tablet Take 1 tablet by mouth 2 times daily (with meals) 180 tablet 0    ARIPiprazole (ABILIFY) 15 MG tablet Take 1 tablet by mouth daily 90 tablet 4    buprenorphine-naloxone (SUBOXONE) 2-0.5 MG FILM SL film Place 1.5 Film under the tongue daily.  pravastatin (PRAVACHOL) 40 MG tablet Take 1 tablet by mouth daily 90 tablet 4    SYMBICORT 160-4.5 MCG/ACT AERO inhale 2 puffs by mouth twice a day Rinse mouth after use 10.2 g 5    potassium chloride (KLOR-CON M) 20 MEQ extended release tablet Take 20 mEq by mouth daily      VORTIoxetine (TRINTELLIX) 10 MG TABS tablet Take 10 mg by mouth daily      blood glucose test strips (ONE TOUCH ULTRA TEST) strip 1 each by In Vitro route 4 times daily As needed. 200 each 3    mesalamine (LIALDA) 1.2 g EC tablet Take 1 tablet by mouth daily (with breakfast) 30 tablet 3    mometasone-formoterol (DULERA) 200-5 MCG/ACT inhaler Inhale 2 puffs into the lungs      nitroGLYCERIN (NITROSTAT) 0.4 MG SL tablet Place 1 tablet under the tongue every 5 minutes as needed for Chest pain 25 tablet 11    varenicline (CHANTIX STARTING MONTH PAK) 0.5 MG X 11 & 1 MG X 42 tablet Take by mouth.  (Patient not taking: Reported on 1/14/2021) 1 box 0    albuterol sulfate  (90 Base) MCG/ACT inhaler inhale 2 puffs by mouth and INTO THE LUNGS every 6 hours if needed for wheezing (Patient not taking: Reported on 1/14/2021) 1 Inhaler 8    lubiprostone (AMITIZA) 24 MCG capsule Take 1 capsule by mouth 2 times daily (with meals) 60 capsule 5    OneTouch Delica Lancets 14Z MISC Apply 1 Units topically 2 times daily USE 2 TO 3 TIMES DAILY AS DIRECTED 60 each 4    Alcohol Swabs 70 % PADS Apply 1 Units topically 2 times daily Dx: DM-2 use 2-3 times daily 60 each 4    albuterol sulfate HFA (PROAIR HFA) 108 (90 Base) MCG/ACT inhaler Inhale 2 puffs into the lungs every 6 hours as needed for Wheezing (Patient not taking: Reported on 1/14/2021) 1 Inhaler 3    albuterol sulfate  (90 Base) MCG/ACT inhaler inhale 1 puff by mouth every 6 hours if needed for wheezing (Patient not taking: Reported on 1/14/2021) 54 g 0  Umeclidinium Bromide (INCRUSE ELLIPTA) 62.5 MCG/INH AEPB Inhale 1 puff into the lungs daily (Patient not taking: Reported on 1/14/2021) 1 each 5     No current facility-administered medications for this visit. Social History     Tobacco Use    Smoking status: Current Some Day Smoker     Packs/day: 1.50     Years: 40.00     Pack years: 60.00     Types: Cigarettes     Start date: 1969    Smokeless tobacco: Never Used   Substance Use Topics    Alcohol use: No     Alcohol/week: 0.0 standard drinks    Drug use: No     Comment:  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       Family History   Problem Relation Age of Onset    Cancer Mother         lungs and brain    Stroke Father     Crohn's Disease Sister       ________________________________________________________________________  Review of Systems:  CONSTITUTIONAL: Denies: fever, chills  PSYCH: Denies: anxiety, depression  ALLERGIES: Denies: urticaria  EYES: Denies: blurry vision, decreased vision, photophobia  ENT: Denies: sore throat, nasal congestion  CARDIOVASCULAR: Denies: chest pain, dyspnea on exertion  RESPIRATORY: Denies: cough, hemoptysis, shortness of breath  GI: Denies: Denies: abdominal pain, flank pain  : Denies: Denies: dysuria, frequency/urgency  NEURO: Denies: dizzy/vertigo, headache  MUSCULOSKELETAL: Denies: back pain, joint pain  SKIN: Denies: rash, itching  ________________________________________________________________________  Physical Exam:  Directly measured vitals unavailable for this virtual visit.  ________________________________________________________________________  Diagnostic findings:  CBC:  Lab Results   Component Value Date    WBC 8.2 10/21/2019    HGB 14.9 10/21/2019     10/21/2019    PLT Platelet clumps present, count appears adequate.  02/26/2012       BMP:    Lab Results   Component Value Date     11/30/2020    K 4.1 11/30/2020     11/30/2020    CO2 22 11/30/2020    BUN 9 11/30/2020 CREATININE 0.52 11/30/2020    GLUCOSE 93 11/30/2020    GLUCOSE 120 02/26/2012       HEMOGLOBIN A1C:   Lab Results   Component Value Date    LABA1C 6.3 11/30/2020       FASTING LIPID PANEL:  Lab Results   Component Value Date    CHOL 129 02/05/2020    HDL 37 (L) 02/05/2020    TRIG 54 02/05/2020     ________________________________________________________________________  Health Maintenance:  Health Maintenance Due   Topic Date Due    Diabetic microalbuminuria test  09/20/2020     ________________________________________________________________________  Assessment and Plan:  Emma Waller was seen today for diabetes and hypertension. Diagnoses and all orders for this visit:    1. Encounter for smoking cessation counseling  - Counseled about smoking cessation. Will start Chantix. 2. Chronic bilateral low back pain with right-sided sciatica  - tiZANidine (ZANAFLEX) 4 MG tablet; Take 1 tablet by mouth every 8 hours as needed (back pain)  Dispense: 90 tablet; Refill: 0    3. Personal history of tobacco use, presenting hazards to health  - CT lung screen [Initial/Annual]; Future    4. Diabetic polyneuropathy associated with type 2 diabetes mellitus (HCC)  - gabapentin (NEURONTIN) 800 MG tablet; Take 1 tablet by mouth 3 times daily for 360 days. Dispense: 270 tablet; Refill: 3      ________________________________________________________________________  Follow up and instructions:  Return in about 6 months (around 7/14/2021). · Emma Waller received counseling on the following healthy behaviors: medication adherence and tobacco cessation    · Discussed use, benefit, and side effects of prescribed medications. Barriers to medication compliance addressed. All patient questions answered. Pt voiced understanding.      · Patient given educational materials - see patient instructions  ________________________________________________________________________  Total time spent on this encounter: 15 minutes Erica Mock MD  PGY-2, Internal Medicine Resident  R Luciano 21 1/14/2021, 2:37 PM      This note is created with the assistance of a speech-recognition program. While intending to generate a document that actually reflects the content of the visit, the document can still have some mistakes which may not have been identified and corrected by editing.

## 2021-01-19 ENCOUNTER — OFFICE VISIT (OUTPATIENT)
Dept: GASTROENTEROLOGY | Age: 66
End: 2021-01-19
Payer: COMMERCIAL

## 2021-01-19 VITALS
BODY MASS INDEX: 29.97 KG/M2 | SYSTOLIC BLOOD PRESSURE: 115 MMHG | WEIGHT: 221 LBS | TEMPERATURE: 97.7 F | HEART RATE: 82 BPM | DIASTOLIC BLOOD PRESSURE: 64 MMHG

## 2021-01-19 DIAGNOSIS — K59.00 CONSTIPATION, UNSPECIFIED CONSTIPATION TYPE: Primary | ICD-10-CM

## 2021-01-19 PROCEDURE — 99213 OFFICE O/P EST LOW 20 MIN: CPT | Performed by: INTERNAL MEDICINE

## 2021-01-19 RX ORDER — DICYCLOMINE HYDROCHLORIDE 10 MG/1
10 CAPSULE ORAL
COMMUNITY
End: 2021-01-25 | Stop reason: SDUPTHER

## 2021-01-19 RX ORDER — LINACLOTIDE 290 UG/1
290 CAPSULE, GELATIN COATED ORAL
Qty: 30 CAPSULE | Refills: 11 | Status: SHIPPED | OUTPATIENT
Start: 2021-01-19 | End: 2022-01-18 | Stop reason: SDUPTHER

## 2021-01-19 ASSESSMENT — ENCOUNTER SYMPTOMS
CONSTIPATION: 1
BACK PAIN: 1
TROUBLE SWALLOWING: 0
ALLERGIC/IMMUNOLOGIC NEGATIVE: 1
NAUSEA: 0
SORE THROAT: 0
WHEEZING: 0
VOMITING: 0
ANAL BLEEDING: 1
ABDOMINAL DISTENTION: 1
RECTAL PAIN: 1
SINUS PRESSURE: 0
VOICE CHANGE: 0
BLOOD IN STOOL: 0
COUGH: 1
CHOKING: 0
ABDOMINAL PAIN: 0
DIARRHEA: 0

## 2021-01-25 ENCOUNTER — TELEPHONE (OUTPATIENT)
Dept: ONCOLOGY | Age: 66
End: 2021-01-25

## 2021-01-25 DIAGNOSIS — K58.2 IRRITABLE BOWEL SYNDROME WITH BOTH CONSTIPATION AND DIARRHEA: Primary | ICD-10-CM

## 2021-01-25 DIAGNOSIS — K52.9 COLITIS: ICD-10-CM

## 2021-01-25 RX ORDER — DICYCLOMINE HYDROCHLORIDE 10 MG/1
10 CAPSULE ORAL
Qty: 120 CAPSULE | Refills: 11 | Status: SHIPPED | OUTPATIENT
Start: 2021-01-25 | End: 2022-01-18 | Stop reason: SDUPTHER

## 2021-01-25 NOTE — TELEPHONE ENCOUNTER
Patient states Dr Stephanie Verduzco was suppose to refill the Linzess for the patients but the electronic  Refill failed. New RX sent today.

## 2021-01-26 ENCOUNTER — OFFICE VISIT (OUTPATIENT)
Dept: PODIATRY | Age: 66
End: 2021-01-26
Payer: COMMERCIAL

## 2021-01-26 VITALS — HEIGHT: 72 IN | RESPIRATION RATE: 16 BRPM | WEIGHT: 221 LBS | BODY MASS INDEX: 29.93 KG/M2 | TEMPERATURE: 97.4 F

## 2021-01-26 DIAGNOSIS — I73.9 PERIPHERAL VASCULAR DISORDER (HCC): Primary | ICD-10-CM

## 2021-01-26 DIAGNOSIS — D23.72 BENIGN NEOPLASM OF SKIN OF LOWER LIMB, INCLUDING HIP, LEFT: ICD-10-CM

## 2021-01-26 DIAGNOSIS — B35.1 DERMATOPHYTOSIS OF NAIL: ICD-10-CM

## 2021-01-26 DIAGNOSIS — D23.71 BENIGN NEOPLASM OF SKIN OF RIGHT FOOT: ICD-10-CM

## 2021-01-26 DIAGNOSIS — E11.51 TYPE II DIABETES MELLITUS WITH PERIPHERAL CIRCULATORY DISORDER (HCC): ICD-10-CM

## 2021-01-26 DIAGNOSIS — E11.8 TYPE 2 DIABETES MELLITUS WITH COMPLICATION (HCC): ICD-10-CM

## 2021-01-26 PROCEDURE — 17110 DESTRUCTION B9 LES UP TO 14: CPT | Performed by: PODIATRIST

## 2021-01-26 PROCEDURE — 11721 DEBRIDE NAIL 6 OR MORE: CPT | Performed by: PODIATRIST

## 2021-01-26 NOTE — PROGRESS NOTES
inhaler /  COPD with chronic bronchitis and emphysema    Current smoker on some days     Depression 10/30/2014    Diabetic polyneuropathy associated with type 2 diabetes mellitus (HCC)     Diverticulosis     Full dentures     GERD (gastroesophageal reflux disease)     Hep C w/o coma, chronic (HCC)     Hyperlipidemia     Hyperplastic polyp of intestine     Hypertension     DR. MCDANIEL-CARDIO    Liver disease     hepatitis C    Nasal polyposis     Noncompliance with CPAP treatment     Obesity (BMI 35.0-39.9 without comorbidity)     On home O2     JIMENA (obstructive sleep apnea)     JIMENA on CPAP    Pacemaker 2012    Pneumonia 7/2012    RECENTLY HOSPITALIZED    Pulmonary edema cardiac cause (HCC)     Rectal bleeding     Smoking addiction     Tobacco abuse        Allergies   Allergen Reactions    Iv Dye [Iodides] Hives     Current Outpatient Medications on File Prior to Visit   Medication Sig Dispense Refill    dicyclomine (BENTYL) 10 MG capsule Take 1 capsule by mouth 4 times daily (before meals and nightly) 120 capsule 11    linaclotide (LINZESS) 290 MCG CAPS capsule Take 1 capsule by mouth every morning (before breakfast) 30 capsule 11    gabapentin (NEURONTIN) 800 MG tablet Take 1 tablet by mouth 3 times daily for 360 days. 270 tablet 0    tiZANidine (ZANAFLEX) 2 MG tablet Take 1 tablet by mouth every 8 hours as needed (back pain) 90 tablet 0    atenolol (TENORMIN) 25 MG tablet Take 0.5 tablets by mouth daily 30 tablet 0    varenicline (CHANTIX STARTING MONTH PAK) 0.5 MG X 11 & 1 MG X 42 tablet Take by mouth.  1 box 0    rOPINIRole (REQUIP) 0.5 MG tablet Take 1 tablet by mouth daily 90 tablet 3    traZODone (DESYREL) 100 MG tablet take 1 tablet by mouth AT NIGHT 30 tablet 4    furosemide (LASIX) 20 MG tablet Take 1 tablet by mouth 2 times daily 60 tablet 4    hydroCHLOROthiazide (MICROZIDE) 12.5 MG capsule Take 1 capsule by mouth every morning 30 capsule 4    pantoprazole (PROTONIX) 40 MG tablet Take 1 tablet by mouth daily 30 tablet 4    metFORMIN (GLUCOPHAGE) 500 MG tablet Take 1 tablet by mouth 2 times daily (with meals) 180 tablet 0    ARIPiprazole (ABILIFY) 15 MG tablet Take 1 tablet by mouth daily 90 tablet 4    buprenorphine-naloxone (SUBOXONE) 2-0.5 MG FILM SL film Place 1.5 Film under the tongue daily.  pravastatin (PRAVACHOL) 40 MG tablet Take 1 tablet by mouth daily 90 tablet 4    SYMBICORT 160-4.5 MCG/ACT AERO inhale 2 puffs by mouth twice a day Rinse mouth after use 10.2 g 5    potassium chloride (KLOR-CON M) 20 MEQ extended release tablet Take 20 mEq by mouth daily      VORTIoxetine (TRINTELLIX) 10 MG TABS tablet Take 10 mg by mouth daily      lubiprostone (AMITIZA) 24 MCG capsule Take 1 capsule by mouth 2 times daily (with meals) (Patient not taking: Reported on 1/19/2021) 60 capsule 5    blood glucose test strips (ONE TOUCH ULTRA TEST) strip 1 each by In Vitro route 4 times daily As needed. 200 each 3    OneTouch Delica Lancets 97S MISC Apply 1 Units topically 2 times daily USE 2 TO 3 TIMES DAILY AS DIRECTED 60 each 4    mometasone-formoterol (DULERA) 200-5 MCG/ACT inhaler Inhale 2 puffs into the lungs      nitroGLYCERIN (NITROSTAT) 0.4 MG SL tablet Place 1 tablet under the tongue every 5 minutes as needed for Chest pain 25 tablet 11    Umeclidinium Bromide (INCRUSE ELLIPTA) 62.5 MCG/INH AEPB Inhale 1 puff into the lungs daily 1 each 5     No current facility-administered medications on file prior to visit. Review of Systems    Review of Systems:   History obtained from chart review and the patient  General ROS: negative for - chills, fatigue, fever, night sweats or weight gain  Constitutional: Negative for chills, diaphoresis, fatigue, fever and unexpected weight change. Musculoskeletal: Positive for arthralgias, gait problem and joint swelling. Neurological ROS: negative for - behavioral changes, confusion, headaches or seizures.  Negative for weakness and numbness. Dermatological ROS: negative for - mole changes, rash  Cardiovascular: Negative for leg swelling. Gastrointestinal: Negative for constipation, diarrhea, nausea and vomiting. Objective:  Dermatologic Exam:  Skin lesion/ulceration Absent . Skin No rashes or nodules noted. .   Skin is thin, with flaky sloughing skin as well as decreased hair growth to the lower leg  Small red hemosiderin deposits seen dorsal foot   Musculoskeletal:     1st MPJ ROM decreased, Bilateral.  Muscle strength 5/5, Bilateral.  Pain present upon palpation of toenails 1-5, Bilateral. decreased medial longitudinal arch, Bilateral.  Ankle ROM decreased,Bilateral.    Dorsally contracted digits present digits 2, Bilateral.     Vascular: DP pulses 1/4 bilateral.  PT pulses 0/4 bilateral.   CFT <5 seconds, Bilateral.  Hair growth absent to the level of the digits, Bilateral.  Edema present, Bilateral.  Varicosities absent, Bilateral. Erythema absent, Bilateral    Neurological: Sensation diminshed to light touch to level of digits, Bilateral.  Protective sensation intact 6/10 sites via 5.07/10g [place] Monofilament, Bilateral.  negative Tinel's, Bilateral.  negative Valleix sign, Bilateral.      Integument: Warm, dry, supple, Bilateral.  Open lesion absent, Bilateral.  Interdigital maceration absent to web spaces 4, Bilateral.  Nails 1-5 left and 1-5 right thickened > 3.0 mm, dystrophic and crumbly, discolored with subungual debris. Fissures absent, Bilateral.   General: AAO x 3 in NAD.     Derm  Toenail Description  Sites of Onychomycosis Involvement (Check affected area)  [x] [x] [x] [x] [x] [x] [x] [x] [x] [x]  5 4 3 2 1 1 2 3 4 5                          Right                                        Left    Thickness  [x] [x] [x] [x] [x] [x] [x] [x] [x] [x]  5 4 3 2 1 1 2 3 4 5                         Right                                        Left    Dystrophic Changes [x] [x] [x] [x] [x] [x] [x] [x] [x] [x]  5 4 3 2 1 1 2 3 4 5                         Right                                        Left    Color   [x] [x] [x] [x] [x] [x] [x] [x] [x] [x]  5 4 3 2 1 1 2 3 4 5                          Right                                        Left    Incurvation/Ingrowin   [] [] [] [] [] [] [] [] [] []  5 4 3 2 1 1 2 3 4 5                         Right                                        Left    Inflammation/Pain   [x] [x] [x] [x] [x] [x] [x] [x] [x] [x]  5 4 3 2 1 1 2 3 4 5                         Right                                        Left        Visual inspection:  Deformity: hammertoe deformity bita feet  amputation: absent  Skin lesions: present - as above  Edema: right- 2+ pitting edema, left- 2+ pitting edema    Sensory exam:  Monofilament sensation: abnormal - 6/10 via SW 5.07/10g monofilament to the plantar foot bilateral feet    Pulses: abnormal - 1/4 dorsalis pedis pulse and 1/4 Posterior tibial pulse,   Pinprick: Impaired  Proprioception: Impaired  Vibration (128 Hz): Impaired       DM with PVD       [x]Yes    []No      Assessment:  63 y.o. female with:  1. Peripheral vascular disorder ([address]    2. Dermatophytosis of nail    3. Type II diabetes mellitus with peripheral circulatory disorder (HCC)    4. Benign neoplasm of skin of lower limb, including hip, left    5. Benign neoplasm of skin of right foot     No orders of the defined types were placed in this encounter. Q7   []Yes    []No                Q8   [x]Yes    []No                     Q9   []Yes    []No    Plan:   Pt was evaluated and examined. Patient was given personalized discharge instructions. The lesions were partially excised via 15 blade and Pyrogallic acid was applied under occlusion. The patient will leave in place for 24-48 hours and than remove. The patient tolerated the procedure well and without complication.          Nails 1-10 were debrided sharply in length and thickness with a nipper and , without incident. Pt will follow up in 9 weeks or sooner if any problems arise. Diagnosis was discussed with the pt and all of their questions were answered in detail. Proper foot hygiene and care was discussed with the pt. Informed patient on proper diabetic foot care and importance of tight glycemic control. Patient to check feet daily and contact the office with any questions/problems/concerns.    Other comorbidity noted and will be managed by PCP.  1/26/2021    Electronically signed by Aditi Marin DPM on 1/26/2021 at 11:03 AM  1/26/2021

## 2021-01-27 NOTE — TELEPHONE ENCOUNTER
One touch test strips     Pt on wait list          Next Visit Date:  Future Appointments   Date Time Provider Terry Bradford   1/28/2021 12:30 PM STV CT  [facility] CT SCAN [facility] Radiolog   2/19/2021 10:45 AM Keisha Bartholomew MD Resp Spec Alex Sanchez   3/30/2021 10:45 AM JEET KapadiaM [facility] [address] Maintenance   Topic Date Due    Diabetic microalbuminuria test  09/20/2020    Low dose CT lung screening  11/05/2020    Lipid screen  02/05/2021    Flu vaccine (1) 06/30/2021 (Originally 9/1/2020)    Hepatitis A vaccine (1 of 2 - Risk 2-dose series) 09/04/2021 (Originally 6/5/1956)    Shingles Vaccine (1 of 2) 09/04/2021 (Originally 6/5/2007)    Diabetic foot exam  08/24/2021    Annual Wellness Visit (AWV)  09/05/2021    Diabetic retinal exam  09/19/2021    A1C test (Diabetic or Prediabetic)  11/30/2021    Potassium monitoring  11/30/2021    Creatinine monitoring  11/30/2021    Breast cancer screen  01/08/2023    Colon cancer screen colonoscopy  01/09/2023    DTaP/Tdap/Td vaccine (2 - Td) 09/30/2026    Pneumococcal 0-64 years Vaccine  Completed    HIV screen  Completed    Hib vaccine  Aged Out    Meningococcal (ACWY) vaccine  Aged Out       Hemoglobin A1C (%)   Date Value   11/30/2020 6.3 (H)   11/25/2020 6.4   06/09/2020 6.0             ( goal A1C is < 7)   Microalb/Crt.  Ratio (mcg/mg creat)   Date Value   09/20/2019 CANNOT BE CALCULATED     LDL Cholesterol (mg/dL)   Date Value   02/05/2020 81   04/23/2019 88       (goal LDL is <100)   AST (U/L)   Date Value   10/21/2019 13     ALT (U/L)   Date Value   10/21/2019 8     BUN (mg/dL)   Date Value   11/30/2020 9     BP Readings from Last 3 Encounters:   01/19/21 115/64   12/08/20 126/69   11/25/20 112/65          (goal 120/80)    All Future Testing planned in [facility]  Lab Frequency Next Occurrence   TRACY BREAST SPECIMEN Once 10/08/2020   FL Small Bowel Follow Through Only Once 11/18/2020   Microalbumin, Ur Once 03/04/2021   CT Lung Screen (Annual) Once 02/10/2021   CT lung screen [Initial/Annual] Once 03/28/2021   CT lung screen [Initial/Annual] Once 01/14/2021               Patient Active Problem List:     Essential hypertension     Pure hypercholesterolemia     Moderate episode of recurrent major depressive disorder ([facility] [address]     History of heroin use     Hep C w/o coma, chronic ([facility] [address] completed tx 2016     GERD (gastroesophageal reflux disease)     COPD (chronic obstructive pulmonary disease) (HCC)     Personal history of tobacco use, presenting hazards to health     Chronic diastolic congestive heart failure (HCC)     Diverticulosis     Hyperplastic polyp of intestine     Diabetic polyneuropathy associated with type 2 diabetes mellitus (HCC)     Bilateral chronic knee pain     Type 2 diabetes mellitus with complication (HCC)     Primary insomnia     Cigarette nicotine dependence without complication     Chronic bilateral low back pain with right-sided sciatica     Irritable bowel syndrome with both constipation and diarrhea

## 2021-02-05 ENCOUNTER — HOSPITAL ENCOUNTER (OUTPATIENT)
Age: 66
Discharge: HOME | End: 2021-02-05
Payer: COMMERCIAL

## 2021-02-05 ENCOUNTER — HOSPITAL ENCOUNTER (OUTPATIENT)
Dept: CT IMAGING | Age: 66
Discharge: HOME | End: 2021-02-07
Payer: COMMERCIAL

## 2021-02-05 DIAGNOSIS — E11.8 TYPE 2 DIABETES MELLITUS WITH COMPLICATION (HCC): ICD-10-CM

## 2021-02-05 DIAGNOSIS — Z87.891 PERSONAL HISTORY OF TOBACCO USE, PRESENTING HAZARDS TO HEALTH: ICD-10-CM

## 2021-02-05 LAB
CREATININE URINE: 91.8 MG/DL (ref 28–217)
MICROALBUMIN/CREAT 24H UR: <12 MG/L
MICROALBUMIN/CREAT UR-RTO: NORMAL MCG/MG CREAT

## 2021-02-05 PROCEDURE — 82570 ASSAY OF URINE CREATININE: CPT

## 2021-02-05 PROCEDURE — 82043 UR ALBUMIN QUANTITATIVE: CPT

## 2021-02-05 PROCEDURE — 71271 CT THORAX LUNG CANCER SCR C-: CPT

## 2021-02-08 RX ORDER — BLOOD SUGAR DIAGNOSTIC
STRIP MISCELLANEOUS
Qty: 200 STRIP | Refills: 11 | Status: SHIPPED | OUTPATIENT
Start: 2021-02-08 | End: 2021-11-09 | Stop reason: SDUPTHER

## 2021-02-26 ENCOUNTER — TELEPHONE (OUTPATIENT)
Dept: INTERNAL MEDICINE | Age: 66
End: 2021-02-26

## 2021-02-26 NOTE — TELEPHONE ENCOUNTER
Patient is requesting the results of    CT Lung screening    This was done on 2/5/2021  Test were preformed at [facility]

## 2021-03-03 DIAGNOSIS — K21.9 GASTROESOPHAGEAL REFLUX DISEASE WITHOUT ESOPHAGITIS: ICD-10-CM

## 2021-03-03 DIAGNOSIS — F51.01 PRIMARY INSOMNIA: ICD-10-CM

## 2021-03-03 DIAGNOSIS — I10 ESSENTIAL HYPERTENSION: ICD-10-CM

## 2021-03-03 NOTE — TELEPHONE ENCOUNTER
Request for Trazodone. Last Visit Date: 1/14/2021  Next Visit Date:  Future Appointments   Date Time Provider Terry Gonzalezi   3/29/2021  9:45 AM Abhishek Bruce MD Resp Spec [facility]   3/30/2021 10:45 AM Alice East DPM [place] [address] Maintenance   Topic Date Due    Lipid screen  02/05/2021    Flu vaccine (1) 06/30/2021 (Originally 9/1/2020)    Hepatitis A vaccine (1 of 2 - Risk 2-dose series) 09/04/2021 (Originally 6/5/1956)    Shingles Vaccine (1 of 2) 09/04/2021 (Originally 6/5/2007)    Annual Wellness Visit (AWV)  09/05/2021    Diabetic retinal exam  09/19/2021    A1C test (Diabetic or Prediabetic)  11/30/2021    Potassium monitoring  11/30/2021    Creatinine monitoring  11/30/2021    Diabetic foot exam  01/28/2022    Diabetic microalbuminuria test  02/05/2022    Low dose CT lung screening  02/05/2022    Breast cancer screen  01/08/2023    Colon cancer screen colonoscopy  01/09/2023    DTaP/Tdap/Td vaccine (2 - Td) 09/30/2026    Pneumococcal 0-64 years Vaccine  Completed    HIV screen  Completed    Hib vaccine  Aged Out    Meningococcal (ACWY) vaccine  Aged Out       Hemoglobin A1C (%)   Date Value   11/30/2020 6.3 (H)   11/25/2020 6.4   06/09/2020 6.0             ( goal A1C is < 7)   Microalb/Crt.  Ratio (mcg/mg creat)   Date Value   02/05/2021 CANNOT BE CALCULATED     LDL Cholesterol (mg/dL)   Date Value   02/05/2020 81       (goal LDL is <100)   AST (U/L)   Date Value   10/21/2019 13     ALT (U/L)   Date Value   10/21/2019 8     BUN (mg/dL)   Date Value   11/30/2020 9     BP Readings from Last 3 Encounters:   01/19/21 115/64   12/08/20 126/69   11/25/20 112/65          (goal 120/80)    All Future Testing planned in [facility]  Lab Frequency Next Occurrence   TRACY BREAST SPECIMEN Once 10/08/2020   FL Small Bowel Follow Through Only Once 11/18/2020   CT lung screen [Initial/Annual] Once 03/28/2021         Patient Active Problem List:     Essential hypertension

## 2021-03-08 RX ORDER — HYDROCHLOROTHIAZIDE 12.5 MG/1
12.5 CAPSULE, GELATIN COATED ORAL EVERY MORNING
Qty: 90 CAPSULE | Refills: 3 | Status: SHIPPED | OUTPATIENT
Start: 2021-03-08 | End: 2022-01-18 | Stop reason: SDUPTHER

## 2021-03-08 RX ORDER — PANTOPRAZOLE SODIUM 40 MG/1
TABLET, DELAYED RELEASE ORAL
Qty: 90 TABLET | Refills: 3 | Status: SHIPPED | OUTPATIENT
Start: 2021-03-08 | End: 2022-01-18 | Stop reason: SDUPTHER

## 2021-03-08 RX ORDER — TRAZODONE HYDROCHLORIDE 100 MG/1
TABLET ORAL
Qty: 90 TABLET | Refills: 3 | Status: SHIPPED | OUTPATIENT
Start: 2021-03-08 | End: 2022-01-18 | Stop reason: SDUPTHER

## 2021-03-08 NOTE — TELEPHONE ENCOUNTER
Please inform Tung Aguillon that the CT scan revealed no lung nodules or lesions concerning for cancer. Recommendations are to continue annual CT scans to screen.

## 2021-03-11 NOTE — TELEPHONE ENCOUNTER
Tried to call the patient to let her know the results but the patient did not answer and writer could not LM due to VM full

## 2021-03-22 NOTE — TELEPHONE ENCOUNTER
Request for Pantoprazole and Hydrochlorothiazide. Last Visit Date: 1/14/2021  Next Visit Date:  Future Appointments   Date Time Provider Terry Bradford   3/29/2021  9:45 AM Ingrid Orozco MD Resp Spec [facility]   3/30/2021 10:45 AM Lidia Bull DPM [place] [address] Maintenance   Topic Date Due    Lipid screen  02/05/2021    Flu vaccine (1) 06/30/2021 (Originally 9/1/2020)    Hepatitis A vaccine (1 of 2 - Risk 2-dose series) 09/04/2021 (Originally 6/5/1956)    Shingles Vaccine (1 of 2) 09/04/2021 (Originally 6/5/2007)    Annual Wellness Visit (AWV)  09/05/2021    Diabetic retinal exam  09/19/2021    A1C test (Diabetic or Prediabetic)  11/30/2021    Potassium monitoring  11/30/2021    Creatinine monitoring  11/30/2021    Diabetic foot exam  01/28/2022    Diabetic microalbuminuria test  02/05/2022    Low dose CT lung screening  02/05/2022    Breast cancer screen  01/08/2023    Colon cancer screen colonoscopy  01/09/2023    DTaP/Tdap/Td vaccine (2 - Td) 09/30/2026    Pneumococcal 0-64 years Vaccine  Completed    HIV screen  Completed    Hib vaccine  Aged Out    Meningococcal (ACWY) vaccine  Aged Out       Hemoglobin A1C (%)   Date Value   11/30/2020 6.3 (H)   11/25/2020 6.4   06/09/2020 6.0             ( goal A1C is < 7)   Microalb/Crt.  Ratio (mcg/mg creat)   Date Value   02/05/2021 CANNOT BE CALCULATED     LDL Cholesterol (mg/dL)   Date Value   02/05/2020 81       (goal LDL is <100)   AST (U/L)   Date Value   10/21/2019 13     ALT (U/L)   Date Value   10/21/2019 8     BUN (mg/dL)   Date Value   11/30/2020 9     BP Readings from Last 3 Encounters:   01/19/21 115/64   12/08/20 126/69   11/25/20 112/65          (goal 120/80)    All Future Testing planned in [facility]  Lab Frequency Next Occurrence   TRACY BREAST SPECIMEN Once 10/08/2020   FL Small Bowel Follow Through Only Once 11/18/2020   CT lung screen [Initial/Annual] Once 03/28/2021         Patient Active Problem List: Essential hypertension     Pure hypercholesterolemia     Moderate episode of recurrent major depressive disorder (HCC)     History of heroin use     Hep C w/o coma, chronic ([facility] [address] completed tx 2016     GERD (gastroesophageal reflux disease)     COPD (chronic obstructive pulmonary disease) (HCC)     Personal history of tobacco use, presenting hazards to health     Chronic diastolic congestive heart failure (HCC)     Diverticulosis     Hyperplastic polyp of intestine     Diabetic polyneuropathy associated with type 2 diabetes mellitus (HCC)     Bilateral chronic knee pain     Type 2 diabetes mellitus with complication (HCC)     Primary insomnia     Cigarette nicotine dependence without complication     Chronic bilateral low back pain with right-sided sciatica     Irritable bowel syndrome with both constipation and diarrhea Tremfya Counseling: I discussed with the patient the risks of guselkumab including but not limited to immunosuppression, serious infections, worsening of inflammatory bowel disease and drug reactions.  The patient understands that monitoring is required including a PPD at baseline and must alert us or the primary physician if symptoms of infection or other concerning signs are noted.

## 2021-03-30 ENCOUNTER — OFFICE VISIT (OUTPATIENT)
Dept: PODIATRY | Age: 66
End: 2021-03-30
Payer: COMMERCIAL

## 2021-03-30 VITALS — HEIGHT: 72 IN | BODY MASS INDEX: 31.15 KG/M2 | TEMPERATURE: 97.4 F | WEIGHT: 230 LBS | RESPIRATION RATE: 16 BRPM

## 2021-03-30 DIAGNOSIS — M79.671 RIGHT FOOT PAIN: ICD-10-CM

## 2021-03-30 DIAGNOSIS — D23.71 BENIGN NEOPLASM OF SKIN OF RIGHT FOOT: ICD-10-CM

## 2021-03-30 DIAGNOSIS — D23.72 BENIGN NEOPLASM OF SKIN OF LOWER LIMB, INCLUDING HIP, LEFT: ICD-10-CM

## 2021-03-30 DIAGNOSIS — B35.1 DERMATOPHYTOSIS OF NAIL: Primary | ICD-10-CM

## 2021-03-30 DIAGNOSIS — I73.9 PERIPHERAL VASCULAR DISORDER (HCC): ICD-10-CM

## 2021-03-30 DIAGNOSIS — E11.51 TYPE II DIABETES MELLITUS WITH PERIPHERAL CIRCULATORY DISORDER (HCC): ICD-10-CM

## 2021-03-30 DIAGNOSIS — M21.611 BUNION, RIGHT: ICD-10-CM

## 2021-03-30 PROCEDURE — 99214 OFFICE O/P EST MOD 30 MIN: CPT | Performed by: PODIATRIST

## 2021-03-30 PROCEDURE — 11721 DEBRIDE NAIL 6 OR MORE: CPT | Performed by: PODIATRIST

## 2021-03-30 PROCEDURE — 17110 DESTRUCTION B9 LES UP TO 14: CPT | Performed by: PODIATRIST

## 2021-03-30 NOTE — PROGRESS NOTES
[facility] PHYSICIANS  [facility]  [address] [place] [address]  Dept: [phone]  Dept Fax: [phone]    DIABETIC PROGRESS NOTE  Date of patient's visit: 3/30/2021  Patient's Name:  Hugh Felix YOB: 1955            Patient Care Team:  Harrison Dawkins MD as PCP - General (Internal Medicine)  [address] [place] [address] [address], MD as Consulting Physician (Gastroenterology)  Lionel Luna MD as Referring Physician (Internal Medicine)  Umer Richardson MD as Consulting Physician (Pulmonology)  Janice Le MD as Consulting Physician (Internal Medicine)  Zan Myers DPM as Physician (Podiatry)  Rah Jaramillo RN as Imaging Navigator          Chief Complaint   Patient presents with    Diabetes    Foot Pain    Nail Problem       Subjective:   Hugh Felix comes to clinic for Diabetes, Foot Pain, and Nail Problem    she is a diabetic and states that  She checks regularly. Pt currently has complaint of thickened, elongated nails that they cannot manage by themselves. Pt's primary care physician is Harrison Dawkins MD last seen 2/26/21   Pt's last blood sugar was  128 . Pt also relates to painful skin spots to the bottom of both feet. Pt has tried pads and changing shoes but it has note helped the pain      Pt has a new complaint of pain to the right great toe at the bunion site. States she has had this for a few years and causing a lot of pain recently . Pt has tried changing shoes but it has not helped the pain  Patient is taking over the counter medications with no relief. Lab Results   Component Value Date    LABA1C 6.3 (H) 11/30/2020      Complains of numbness in the feet bilat.   Past Medical History:   Diagnosis Date    RACHEL (acute kidney injury) ([address] 1/22/2014    Arthritis     generalized    Bronchiectasis without complication ([address]     Cancer ([address] 2004    uterus    CHF (congestive heart failure) ([address]     Chronic bilateral low back pain with right-sided sciatica 2/20/2017    Chronic bronchitis (HCC)     Chronic bronchitis (HCC)     Chronic respiratory failure with hypoxia (HCC)     COPD (chronic obstructive pulmonary disease) (HCC)     on inhaler /  COPD with chronic bronchitis and emphysema    Current smoker on some days     Depression 10/30/2014    Diabetic polyneuropathy associated with type 2 diabetes mellitus (HCC)     Diverticulosis     Full dentures     GERD (gastroesophageal reflux disease)     Hep C w/o coma, chronic (HCC)     Hyperlipidemia     Hyperplastic polyp of intestine     Hypertension     DR. MCDANIEL-CARDIO    Liver disease     hepatitis C    Nasal polyposis     Noncompliance with CPAP treatment     Obesity (BMI 35.0-39.9 without comorbidity)     On home O2     JIMENA (obstructive sleep apnea)     JIMENA on CPAP    Pacemaker 2012    Pneumonia 7/2012    RECENTLY HOSPITALIZED    Pulmonary edema cardiac cause (HCC)     Rectal bleeding     Smoking addiction     Tobacco abuse        Allergies   Allergen Reactions    Iv Dye [Iodides] Hives     Current Outpatient Medications on File Prior to Visit   Medication Sig Dispense Refill    hydroCHLOROthiazide (MICROZIDE) 12.5 MG capsule take 1 capsule by mouth every morning 90 capsule 3    pantoprazole (PROTONIX) 40 MG tablet take 1 tablet by mouth once daily 90 tablet 3    traZODone (DESYREL) 100 MG tablet take 1 tablet by mouth AT NIGHT 90 tablet 3    ONETOUCH ULTRA strip use 1 TEST STRIP to TEST BLOOD SUGAR four times a day if needed 200 strip 11    dicyclomine (BENTYL) 10 MG capsule Take 1 capsule by mouth 4 times daily (before meals and nightly) 120 capsule 11    linaclotide (LINZESS) 290 MCG CAPS capsule Take 1 capsule by mouth every morning (before breakfast) 30 capsule 11    gabapentin (NEURONTIN) 800 MG tablet Take 1 tablet by mouth 3 times daily for 360 days.  270 tablet 0    tiZANidine (ZANAFLEX) 2 MG tablet Take 1 tablet by mouth every 8 hours as needed (back pain) 90 tablet 0    atenolol (TENORMIN) 25 MG tablet Take 0.5 tablets by mouth daily 30 tablet 0    varenicline (CHANTIX STARTING MONTH JACE) 0.5 MG X 11 & 1 MG X 42 tablet Take by mouth. 1 box 0    rOPINIRole (REQUIP) 0.5 MG tablet Take 1 tablet by mouth daily 90 tablet 3    furosemide (LASIX) 20 MG tablet Take 1 tablet by mouth 2 times daily 60 tablet 4    metFORMIN (GLUCOPHAGE) 500 MG tablet Take 1 tablet by mouth 2 times daily (with meals) 180 tablet 0    ARIPiprazole (ABILIFY) 15 MG tablet Take 1 tablet by mouth daily 90 tablet 4    buprenorphine-naloxone (SUBOXONE) 2-0.5 MG FILM SL film Place 1.5 Film under the tongue daily.  pravastatin (PRAVACHOL) 40 MG tablet Take 1 tablet by mouth daily 90 tablet 4    SYMBICORT 160-4.5 MCG/ACT AERO inhale 2 puffs by mouth twice a day Rinse mouth after use 10.2 g 5    potassium chloride (KLOR-CON M) 20 MEQ extended release tablet Take 20 mEq by mouth daily      VORTIoxetine (TRINTELLIX) 10 MG TABS tablet Take 10 mg by mouth daily      lubiprostone (AMITIZA) 24 MCG capsule Take 1 capsule by mouth 2 times daily (with meals) (Patient not taking: Reported on 1/19/2021) 60 capsule 5    OneTouch Delica Lancets 51R MISC Apply 1 Units topically 2 times daily USE 2 TO 3 TIMES DAILY AS DIRECTED 60 each 4    mometasone-formoterol (DULERA) 200-5 MCG/ACT inhaler Inhale 2 puffs into the lungs      nitroGLYCERIN (NITROSTAT) 0.4 MG SL tablet Place 1 tablet under the tongue every 5 minutes as needed for Chest pain 25 tablet 11    Umeclidinium Bromide (INCRUSE ELLIPTA) 62.5 MCG/INH AEPB Inhale 1 puff into the lungs daily 1 each 5     No current facility-administered medications on file prior to visit.         Review of Systems    Review of Systems:   History obtained from chart review and the patient  General ROS: negative for - chills, fatigue, fever, night sweats or weight gain  Constitutional: Negative for chills, diaphoresis, fatigue, fever and unexpected weight change. Musculoskeletal: Positive for arthralgias, gait problem and joint swelling. Neurological ROS: negative for - behavioral changes, confusion, headaches or seizures. Negative for weakness and numbness. Dermatological ROS: negative for - mole changes, rash  Cardiovascular: Negative for leg swelling. Gastrointestinal: Negative for constipation, diarrhea, nausea and vomiting. Objective:  Dermatologic Exam:  Skin lesion/ulceration Absent . Skin No rashes or nodules noted. .   Skin is thin, with flaky sloughing skin as well as decreased hair growth to the lower leg  Small red hemosiderin deposits seen dorsal foot   Musculoskeletal:     1st MPJ ROM decreased, Bilateral.  Muscle strength 5/5, Bilateral.  Pain present upon palpation of toenails 1-5, Bilateral. decreased medial longitudinal arch, Bilateral.  Ankle ROM decreased,Bilateral.    Dorsally contracted digits present digits 2, Bilateral.     Vascular: DP pulses 1/4 bilateral.  PT pulses 0/4 bilateral.   CFT <5 seconds, Bilateral.  Hair growth absent to the level of the digits, Bilateral.  Edema present, Bilateral.  Varicosities absent, Bilateral. Erythema absent, Bilateral    Neurological: Sensation diminshed to light touch to level of digits, Bilateral.  Protective sensation intact 6/10 sites via 5.07/10g [place] Monofilament, Bilateral.  negative Tinel's, Bilateral.  negative Valleix sign, Bilateral.      Integument: Warm, dry, supple, Bilateral.  Open lesion absent, Bilateral.  Interdigital maceration absent to web spaces 4, Bilateral.  Nails 1-5 left and 1-5 right thickened > 3.0 mm, dystrophic and crumbly, discolored with subungual debris. Fissures absent, Bilateral.   General: AAO x 3 in NAD.     Derm  Toenail Description  Sites of Onychomycosis Involvement (Check affected area)  [x] [x] [x] [x] [x] [x] [x] [x] [x] [x]  5 4 3 2 1 1 2 3 4 5                          Right Left    Thickness  [x] [x] [x] [x] [x] [x] [x] [x] [x] [x]  5 4 3 2 1 1 2 3 4 5                         Right                                        Left    Dystrophic Changes   [x] [x] [x] [x] [x] [x] [x] [x] [x] [x]  5 4 3 2 1 1 2 3 4 5                         Right                                        Left    Color   [x] [x] [x] [x] [x] [x] [x] [x] [x] [x]  5 4 3 2 1 1 2 3 4 5                          Right                                        Left    Incurvation/Ingrowin   [] [] [] [] [] [] [] [] [] []  5 4 3 2 1 1 2 3 4 5                         Right                                        Left    Inflammation/Pain   [x] [x] [x] [x] [x] [x] [x] [x] [x] [x]  5 4 3 2 1 1 2 3 4 5                         Right                                        Left        Visual inspection:  Deformity: hammertoe deformity bita feet  amputation: absent  Skin lesions: present - as above  Edema: right- 2+ pitting edema, left- 2+ pitting edema    Sensory exam:  Monofilament sensation: abnormal - 6/10 via SW 5.07/10g monofilament to the plantar foot bilateral feet    Pulses: abnormal - 1/4 dorsalis pedis pulse and 1/4 Posterior tibial pulse,   Pinprick: Impaired  Proprioception: Impaired  Vibration (128 Hz): Impaired       DM with PVD       [x]Yes    []No     X rays right foot 3 views  : there is djd of the great toe at the MTPJ. Slight increase in the IM angle with lateral deviation of the hallux at the MTPJ. There is an enlarged medial eminence     Assessment:  63 y.o. female with:   Diagnosis Orders   1. Dermatophytosis of nail  [facility] DIABETES FOOT EXAM    51573 - MT DEBRIDEMENT OF NAILS, 6 OR MORE   2. Peripheral vascular disorder (HCC)  [facility] DIABETES FOOT EXAM    63146 - MT DEBRIDEMENT OF NAILS, 6 OR MORE    12389 - MT DESTRUCTION BENIGN LESIONS UP TO 14   3.  Type II diabetes mellitus with peripheral circulatory disorder (HCC)  [facility] DIABETES FOOT EXAM    95505 - MT DEBRIDEMENT OF NAILS, 6 OR MORE    75614 - MT DESTRUCTION BENIGN LESIONS UP TO 14   4. Benign neoplasm of skin of right foot  [facility] DIABETES FOOT EXAM    24529 - OR DESTRUCTION BENIGN LESIONS UP TO 14   5. Benign neoplasm of skin of lower limb, including hip, left  [facility] DIABETES FOOT EXAM    42198 - OR DESTRUCTION BENIGN LESIONS UP TO 14   6. Bunion, right  [facility] DIABETES FOOT EXAM    44487 - OR DEBRIDEMENT OF NAILS, 6 OR MORE    XR FOOT RIGHT (MIN 3 VIEWS)   7. Right foot pain  HM DIABETES FOOT EXAM    86827 - OR DEBRIDEMENT OF NAILS, 6 OR MORE    XR FOOT RIGHT (MIN 3 VIEWS)           Q7   []Yes    []No                Q8   [x]Yes    []No                     Q9   []Yes    []No    Plan:   Pt was evaluated and examined. Patient was given personalized discharge instructions. For the new complaint of right bunion the x rays were reviewed     All labs were reviewed and all imagining including the above findings were reviewed prior to the patients arrival and with the patient today      Time was spent educating the patient and their families/caregivers on proper care of the feet and ankles. All the above diagnosis were addressed at todays visit and all questions were answered. I had a long discussion today with the patient about the likely diagnosis and its natural history, physical exam and imaging findings, as well as treatment options. We discussed both surgical and nonsurgical treatments, including risks and benefits. From a nonoperative standpoint, we discussed rest/activity modification, NSAIDs/Acetaminophen/topical anesthetics, orthotics, bracing/immobilization, and physical therapy. Surgically, we discussed decompression osteotomy with bunion fixation vs 1st MTP implant vs fusion. Pt would like to continue to try conservative care for now. Will change shoes. I discussed in detail the procedure. He/She was in full agreement and understood risks.  The reason for surgery is due to unsuccessful non-operative treatment and/or conservative treatment is not a viable option. It was discussed with the patient that compliance postoperatively is of utmost importance. Any deviation on behalf of the patient will decrease the chances of a successful outcome. Patient acknowledged, understands, and would like to move forward with surgery as discussed. The patient was given a consent outlining the general risk of surgery as well as the specifics to the surgical plan. This was carefully discussed giving all options, indications and contraindications regarding the procedure outlined in the consent. All questions were answered to the patient's satisfaction. The patient signed the consent form confirming complete understanding and acceptance of the risks of stated. I specifically stated and inquired if the patient understands and accepts the risks of surgery including infection, failure, prolonged pain, swelling, numbness, recurrence, limited mobility,painful scar, RSDS, overcorrection, under-correction, and loss of limb/life. Death, bleeding, blood clots in the veins or lungs, tendon or blood vessel disturbance, bony conditions, continued pain,stiffness, weakness, and limited function. These were all listed on the consent. Additionally, the postoperative course and treatment was outlined for the patient. Discussion consisted of postoperatively the patient needs to keep the foot elevated for at least the first initial two weeks. I have encouraged movements such as moving from the bed to the sofa or recliner, to the kitchen and the bathroom; quick bursts of movement with the foot elevated. Longstanding periods of time such as cooking, cleaning, and shopping are not permitted. I reminded the patient that there are only two reasons to have surgery. That being, if their function is impaired and also if they are having pain. If they can answer yes to both these questions, I will move forward with surgery.  If they can not, there is no reason to proceed with surgical intervention. Pt does elect to have the procedure above    A total of 35 minutes was spent with this patients encounter      The lesions were partially excised via 15 blade and Pyrogallic acid was applied under occlusion. The patient will leave in place for 24-48 hours and than remove. The patient tolerated the procedure well and without complication. Nails 1-10 were debrided sharply in length and thickness with a nipper and , without incident. Pt will follow up in 9 weeks or sooner if any problems arise. Diagnosis was discussed with the pt and all of their questions were answered in detail. Proper foot hygiene and care was discussed with the pt. Informed patient on proper diabetic foot care and importance of tight glycemic control. Patient to check feet daily and contact the office with any questions/problems/concerns.    Other comorbidity noted and will be managed by PCP.  3/30/2021    Electronically signed by Dontae Denney DPM on 3/30/2021 at 10:34 AM  3/30/2021

## 2021-04-26 ENCOUNTER — HOSPITAL ENCOUNTER (OUTPATIENT)
Dept: PREADMISSION TESTING | Age: 66
Discharge: HOME | End: 2021-04-30
Payer: COMMERCIAL

## 2021-04-26 ENCOUNTER — HOSPITAL ENCOUNTER (OUTPATIENT)
Dept: LAB | Age: 66
Setting detail: SPECIMEN
Discharge: HOME | End: 2021-04-26
Payer: COMMERCIAL

## 2021-04-26 VITALS
HEIGHT: 72 IN | DIASTOLIC BLOOD PRESSURE: 85 MMHG | WEIGHT: 226.6 LBS | SYSTOLIC BLOOD PRESSURE: 154 MMHG | TEMPERATURE: 97.3 F | HEART RATE: 82 BPM | RESPIRATION RATE: 16 BRPM | BODY MASS INDEX: 30.69 KG/M2 | OXYGEN SATURATION: 93 %

## 2021-04-26 DIAGNOSIS — Z01.818 PREOP TESTING: Primary | ICD-10-CM

## 2021-04-26 PROCEDURE — U0005 INFEC AGEN DETEC AMPLI PROBE: HCPCS

## 2021-04-26 PROCEDURE — U0003 INFECTIOUS AGENT DETECTION BY NUCLEIC ACID (DNA OR RNA); SEVERE ACUTE RESPIRATORY SYNDROME CORONAVIRUS 2 (SARS-COV-2) (CORONAVIRUS DISEASE [COVID-19]), AMPLIFIED PROBE TECHNIQUE, MAKING USE OF HIGH THROUGHPUT TECHNOLOGIES AS DESCRIBED BY CMS-2020-01-R: HCPCS

## 2021-04-26 RX ORDER — ACETAMINOPHEN 500 MG
1000 TABLET ORAL EVERY 6 HOURS PRN
COMMUNITY

## 2021-04-26 ASSESSMENT — PAIN - FUNCTIONAL ASSESSMENT: PAIN_FUNCTIONAL_ASSESSMENT: PREVENTS OR INTERFERES SOME ACTIVE ACTIVITIES AND ADLS

## 2021-04-26 ASSESSMENT — PAIN DESCRIPTION - DESCRIPTORS: DESCRIPTORS: SHARP;BURNING

## 2021-04-26 ASSESSMENT — PAIN DESCRIPTION - FREQUENCY: FREQUENCY: CONTINUOUS

## 2021-04-26 ASSESSMENT — PAIN DESCRIPTION - LOCATION: LOCATION: FOOT

## 2021-04-26 ASSESSMENT — PAIN DESCRIPTION - PROGRESSION: CLINICAL_PROGRESSION: GRADUALLY WORSENING

## 2021-04-26 ASSESSMENT — PAIN DESCRIPTION - ORIENTATION: ORIENTATION: RIGHT

## 2021-04-26 NOTE — H&P (VIEW-ONLY)
obstructive pulmonary disease) (HCC)     on inhaler /  COPD with chronic bronchitis and emphysema    Current smoker on some days     Depression 10/30/2014    Diabetic polyneuropathy associated with type 2 diabetes mellitus ([address]     Diverticulosis     Full dentures     GERD (gastroesophageal reflux disease)     Hep C w/o coma, chronic (HCC)     Hyperlipidemia     Hyperplastic polyp of intestine     Hypertension     DR. MCDANIEL-CARDIO    Liver disease     hepatitis C    Nasal polyposis     Noncompliance with CPAP treatment     Obesity (BMI 35.0-39.9 without comorbidity)     On home O2     2 liters PRN per pt    JIMENA (obstructive sleep apnea)     JIMENA on CPAP    Pacemaker 2012    Pneumonia 07/2012    RECENTLY HOSPITALIZED    Pulmonary edema cardiac cause (HCC)     Rectal bleeding     Smoking addiction     Tobacco abuse         Past Surgical History:     Past Surgical History:   Procedure Laterality Date    BACK SURGERY  2008    CARDIAC SURGERY       cath dec. 2013    COLON SURGERY      COLONOSCOPY      COLONOSCOPY  01/23/2015    severe diverticula    COLONOSCOPY  09/20/2016    HYPERPLASTIC POLYP X 2     COLONOSCOPY N/A 03/14/2019    COLONOSCOPY DIAGNOSTIC performed by Shikha Camargo MD at [facility]    COLONOSCOPY N/A 01/09/2020    COLONOSCOPY WITH BIOPSY performed by Shikha Camargo MD at [address] [place] [address], COLON, DIAGNOSTIC      HERNIA REPAIR      UMBILICAL   [address]  09/24/2013    decompression & re-exploration L3-4, L4-5    PACEMAKER INSERTION      for very slow heart beat (per patient)    PACEMAKER PLACEMENT      SIGMOIDOSCOPY N/A 06/26/2015    flexable sigmoidscopy,HYPERPLASTIC POLYP    TONSILLECTOMY      UPPER GASTROINTESTINAL ENDOSCOPY N/A 03/14/2019    EGD BIOPSY performed by Shikha Camargo MD at [facility]        Medications Prior to Admission:     Prior to Admission medications    Medication Sig Start Date End Date Taking? M) 20 MEQ extended release tablet Take 20 mEq by mouth daily 5/29/19  Yes Historical Provider, MD   VORTIoxetine (TRINTELLIX) 10 MG TABS tablet Take 10 mg by mouth daily 9/20/19  Yes Historical Provider, MD   mometasone-formoterol (DULERA) 200-5 MCG/ACT inhaler Inhale 2 puffs into the lungs 9/5/19  Yes Historical Provider, MD   nitroGLYCERIN (NITROSTAT) 0.4 MG SL tablet Place 1 tablet under the tongue every 5 minutes as needed for Chest pain 10/9/19  Yes Alisa Rueda MD   Umeclidinium Bromide (INCRUSE ELLIPTA) 62.5 MCG/INH AEPB Inhale 1 puff into the lungs daily 9/17/19  Yes MD SEVEN Saenz ULTRA strip use 1 TEST STRIP to TEST BLOOD SUGAR four times a day if needed 2/8/21   Casper Guadalupe MD   varenicline (CHANTIX STARTING MONTH PAK) 0.5 MG X 11 & 1 MG X 42 tablet Take by mouth. 11/25/20   Jalyn Tellez MD   furosemide (LASIX) 20 MG tablet Take 1 tablet by mouth 2 times daily 9/24/20 2/21/21  Jalyn Tellez MD   lubiprostone (AMITIZA) 24 MCG capsule Take 1 capsule by mouth 2 times daily (with meals)  Patient not taking: Reported on 1/19/2021 5/28/20 11/25/20  MD [place] Delica Lancets 15Y MISC Apply 1 Units topically 2 times daily USE 2 TO 3 TIMES DAILY AS DIRECTED 4/8/20 9/5/20  Casper Quinn MD        Allergies: Iv dye [iodides]    Social History:     Tobacco:    reports that she has been smoking cigarettes. She started smoking about 52 years ago. She has a 40.00 pack-year smoking history. She has never used smokeless tobacco.  Alcohol:      reports no history of alcohol use. Drug Use:  reports no history of drug use. Family History:     Family History   Problem Relation Age of Onset    Cancer Mother         lungs and brain    Stroke Father     Crohn's Disease Sister        Review of Systems:     Positive and Negative as described in HPI. CONSTITUTIONAL: Negative for fevers, chills, sweats, fatigue, and weight loss.   HEENT: Pt wears glasses. Negative for hearing changes, rhinorrhea, and throat pain. RESPIRATORY: Dyspnea with exertion. Productive cough with yellow sputum for the past 2 months. COPD. Chronic mild chest congestion. Pt wears 2 liters of home oxygen PRN. Negative for wheezing. CARDIOVASCULAR: Pacemaker due to bradycardia. Negative for chest pain, blood clot, irregular heartbeat, and palpitations. GASTROINTESTINAL: Acid reflux. Constipation. S/p colon surgery due to severe diverticula. Pt follows-up with Dr. Stephan Foster. Pt had abdominal pain, nausea, and dizziness after eating tuna fish last week. She went to [facility] ED and was reportedly given \"Zofran and fluids\" before she was discharged home. Negative for vomiting and diarrhea. GENITOURINARY: Negative for difficulty of urination, burning with urination, and frequency. INTEGUMENT: Negative for rash, skin lesions, and easy bruising. HEMATOLOGIC/LYMPHATIC: Right foot edema. ALLERGIC/IMMUNOLOGIC: Negative for urticaria and itching. ENDOCRINE: Diabetes. Last A1C was 6.1%. Negative for increase in thirst, increase in urination, and heat or cold intolerance. MUSCULOSKELETAL: See HPI. NEUROLOGICAL: Occasional headaches. Pt fell asleep on the toilet 2 nights ago and hit her forehead on the tub. Pt denies LOC. Numbness and tingling in bilateral feet and hands. Negative for lightheadedness. Pt denies history of seizures and strokes. BEHAVIOR/PSYCH: History of mild depression. Negative for anxiety. Physical Exam:   BP (!) 154/85   Pulse 82   Temp 97.3 °F (36.3 °C) (Oral)   Resp 16   Ht 6' (1.829 m)   Wt 226 lb 9.6 oz (102.8 kg)   SpO2 93%   BMI 30.73 kg/m²   No LMP recorded. Patient has had a hysterectomy. No obstetric history on file. No results for input(s): POCGLU in the last 72 hours. General Appearance:  Alert, well appearing, and in no acute distress. Obese. Mental status: Oriented to person, place, and time.   Head: Normocephalic. Eye: No icterus, redness, pupils equal and reactive, extraocular eye movements intact, and conjunctiva clear. Ear: Hearing grossly intact. Nose: No drainage noted. Mouth: Mucous membranes moist.  Neck: Supple and no carotid bruits noted. Lungs: Bilateral equal air entry, clear to auscultation, no wheezing, rales or rhonchi, and normal effort. Cardiovascular: Pacemaker in the left upper chest. Normal rate, regular rhythm, no murmur, gallop, or rub. Abdomen: Mildly firm. Nontender, nondistended, and active bowel sounds. Neurologic: Normal speech and cranial nerves II through XII grossly intact. Strength 5/5 bilaterally. Skin: Small bruise on the forehead. No gross lesions, rashes, or bleeding on exposed skin area. Extremities: Mild left ankle edema. Right ankle 1+ pitting edema. Posterior tibial pulses are palpable bilaterally. No calf tenderness with palpation. Psych: Normal affect. Investigations:      Laboratory Testing:  No results found for this or any previous visit (from the past 24 hour(s)). No results for input(s): HGB, HCT, WBC, MCV, PLATELET, NA, K, CL, CO2, BUN, CREATININE, GLUCOSE, INR, PROTIME, APTT, AST, ALT, LABALBU, HCG in the last 720 hours. No results for input(s): COVID19 in the last 720 hours. Imaging/Diagnostics:  Xr Foot Right (min 3 Views)    Result Date: 3/30/2021  Radiology exam is complete. No Radiologist dictation. Please follow up with ordering provider. Diagnosis:      1. Painful right bunion    Plans:     1.  Right foot modified Menjivar bunionectomy vs. Implant and akin osteotomy       Kaur Samuel, MARCIA - CNP  4/26/2021  8:21 AM

## 2021-04-26 NOTE — H&P
History and Physical Service   [facility] [place] - INPATIENT    HISTORY AND PHYSICAL EXAMINATION            Date of Evaluation: 4/26/2021  Patient name:  Shadia Snow  MRN:   [id number]  YOB: 1955  PCP:    Efra Rico MD    History Obtained From:     Patient, Medical records    History of Present Illness: This is Shadia Snow a 63 y.o. female who presents for a pre-admission testing appointment for an upcoming right foot modified Menjivar bunionectomy vs. Implant and catia osteotomy by Dr. Ava Amato scheduled on 04/30/2021 at 0930 due to painful right bunion. The patient's chief complaint is 7/10 right foot pain for the past several years. The pain is aggravated by wearing certain shoes and is minimally relieved with Tylenol. The right foot has edema, numbness, and tingling. Pt denies right foot wounds. History of diabetes. History of diabetes, hypertension, CHF, hyperlipidemia, pacemaker placement, pulmonary edema, COPD, JIMENA, and hepatitis C. Pt wears 2 liters of oxygen per nasal cannula PRN. S/p cardiac catheterization without stent placement in 2013. ECHO 07/30/2018 EF >55% (See Epic). Pt states she follows-up with cardiology at [address] Functional Capacity per pt:   1) Pt is not able to walk 2 city blocks on level ground due to dyspnea. Pt takes breaks to catch her breath while doing household chores. Pt denies dyspnea at rest.   2) Pt is not able to climb 2 flights of stairs without SOB.      Past Medical History:     Past Medical History:   Diagnosis Date    RACHEL (acute kidney injury) ([facility] 01/22/2014    Arthritis     generalized    Bronchiectasis without complication ([facility]     Cancer ([facility] 2004    uterus    CHF (congestive heart failure) (HCC)     Chronic bilateral low back pain with right-sided sciatica 02/20/2017    Chronic bronchitis (HCC)     Chronic bronchitis (HCC)     Chronic respiratory failure with hypoxia (HCC)     COPD (chronic obstructive pulmonary disease) (HCC)     on inhaler /  COPD with chronic bronchitis and emphysema    Current smoker on some days     Depression 10/30/2014    Diabetic polyneuropathy associated with type 2 diabetes mellitus ([facility]     Diverticulosis     Full dentures     GERD (gastroesophageal reflux disease)     Hep C w/o coma, chronic (HCC)     Hyperlipidemia     Hyperplastic polyp of intestine     Hypertension     DR. MCDANIEL-CARDIO    Liver disease     hepatitis C    Nasal polyposis     Noncompliance with CPAP treatment     Obesity (BMI 35.0-39.9 without comorbidity)     On home O2     2 liters PRN per pt    JIMENA (obstructive sleep apnea)     JIMENA on CPAP    Pacemaker 2012    Pneumonia 07/2012    RECENTLY HOSPITALIZED    Pulmonary edema cardiac cause (HCC)     Rectal bleeding     Smoking addiction     Tobacco abuse         Past Surgical History:     Past Surgical History:   Procedure Laterality Date    BACK SURGERY  2008    CARDIAC SURGERY       cath dec. 2013    COLON SURGERY      COLONOSCOPY      COLONOSCOPY  01/23/2015    severe diverticula    COLONOSCOPY  09/20/2016    HYPERPLASTIC POLYP X 2     COLONOSCOPY N/A 03/14/2019    COLONOSCOPY DIAGNOSTIC performed by Te Bingham MD at [facility]    COLONOSCOPY N/A 01/09/2020    COLONOSCOPY WITH BIOPSY performed by Te Bingham MD at [place] [address], COLON, DIAGNOSTIC      HERNIA REPAIR      UMBILICAL   [address]  09/24/2013    decompression & re-exploration L3-4, L4-5    PACEMAKER INSERTION      for very slow heart beat (per patient)    PACEMAKER PLACEMENT      SIGMOIDOSCOPY N/A 06/26/2015    flexable sigmoidscopy,HYPERPLASTIC POLYP    TONSILLECTOMY      UPPER GASTROINTESTINAL ENDOSCOPY N/A 03/14/2019    EGD BIOPSY performed by Te Bingham MD at [facility]        Medications Prior to Admission:     Prior to Admission medications    Medication Sig Start Date End Date Taking? Authorizing Provider   acetaminophen (TYLENOL) 500 MG tablet Take 1,000 mg by mouth every 6 hours as needed for Pain   Yes Historical Provider, MD   tiZANidine (ZANAFLEX) 2 MG tablet Take 1 tablet by mouth every 8 hours as needed (back pain) 4/8/21  Yes Vania Guadalupe MD   hydroCHLOROthiazide (MICROZIDE) 12.5 MG capsule take 1 capsule by mouth every morning 3/8/21 3/3/22 Yes Casper Guadalupe MD   pantoprazole (PROTONIX) 40 MG tablet take 1 tablet by mouth once daily 3/8/21  Yes Vania Guadalupe MD   traZODone (DESYREL) 100 MG tablet take 1 tablet by mouth AT NIGHT 3/8/21  Yes Vania Guadalupe MD   dicyclomine (BENTYL) 10 MG capsule Take 1 capsule by mouth 4 times daily (before meals and nightly) 1/25/21  Yes Jennifer Muñoz MD   linaclotide (LINZESS) 290 MCG CAPS capsule Take 1 capsule by mouth every morning (before breakfast) 1/19/21  Yes Jennifer Muñoz MD   gabapentin (NEURONTIN) 800 MG tablet Take 1 tablet by mouth 3 times daily for 360 days. 1/14/21 1/9/22 Yes Brian Grady MD   atenolol (TENORMIN) 25 MG tablet Take 0.5 tablets by mouth daily 11/25/20 11/20/21 Yes Brian Grady MD   rOPINIRole (REQUIP) 0.5 MG tablet Take 1 tablet by mouth daily 11/2/20  Yes Vania Guadalupe MD   metFORMIN (GLUCOPHAGE) 500 MG tablet Take 1 tablet by mouth 2 times daily (with meals) 9/24/20  Yes Brian Grady MD   ARIPiprazole (ABILIFY) 15 MG tablet Take 1 tablet by mouth daily 8/27/20  Yes Blake Cowan MD   buprenorphine-naloxone (SUBOXONE) 2-0.5 MG FILM SL film Place 1.5 Film under the tongue daily.  8/12/20  Yes Historical Provider, MD   pravastatin (PRAVACHOL) 40 MG tablet Take 1 tablet by mouth daily 8/27/20 11/20/21 Yes Blake Cowan MD   SYMBICORT 160-4.5 MCG/ACT AERO inhale 2 puffs by mouth twice a day Rinse mouth after use 7/7/20  Yes Casper Guadalupe MD   potassium chloride (KLOR-CON wears glasses. Negative for hearing changes, rhinorrhea, and throat pain. RESPIRATORY: Dyspnea with exertion. Productive cough with yellow sputum for the past 2 months. COPD. Chronic mild chest congestion. Pt wears 2 liters of home oxygen PRN. Negative for wheezing. CARDIOVASCULAR: Pacemaker due to bradycardia. Negative for chest pain, blood clot, irregular heartbeat, and palpitations. GASTROINTESTINAL: Acid reflux. Constipation. S/p colon surgery due to severe diverticula. Pt follows-up with Dr. Inessa Monroe. Pt had abdominal pain, nausea, and dizziness after eating tuna fish last week. She went to [facility] ED and was reportedly given \"Zofran and fluids\" before she was discharged home. Negative for vomiting and diarrhea. GENITOURINARY: Negative for difficulty of urination, burning with urination, and frequency. INTEGUMENT: Negative for rash, skin lesions, and easy bruising. HEMATOLOGIC/LYMPHATIC: Right foot edema. ALLERGIC/IMMUNOLOGIC: Negative for urticaria and itching. ENDOCRINE: Diabetes. Last A1C was 6.1%. Negative for increase in thirst, increase in urination, and heat or cold intolerance. MUSCULOSKELETAL: See HPI. NEUROLOGICAL: Occasional headaches. Pt fell asleep on the toilet 2 nights ago and hit her forehead on the tub. Pt denies LOC. Numbness and tingling in bilateral feet and hands. Negative for lightheadedness. Pt denies history of seizures and strokes. BEHAVIOR/PSYCH: History of mild depression. Negative for anxiety. Physical Exam:   BP (!) 154/85   Pulse 82   Temp 97.3 °F (36.3 °C) (Oral)   Resp 16   Ht 6' (1.829 m)   Wt 226 lb 9.6 oz (102.8 kg)   SpO2 93%   BMI 30.73 kg/m²   No LMP recorded. Patient has had a hysterectomy. No obstetric history on file. No results for input(s): POCGLU in the last 72 hours. General Appearance:  Alert, well appearing, and in no acute distress. Obese. Mental status: Oriented to person, place, and time.   Head: Normocephalic. Eye: No icterus, redness, pupils equal and reactive, extraocular eye movements intact, and conjunctiva clear. Ear: Hearing grossly intact. Nose: No drainage noted. Mouth: Mucous membranes moist.  Neck: Supple and no carotid bruits noted. Lungs: Bilateral equal air entry, clear to auscultation, no wheezing, rales or rhonchi, and normal effort. Cardiovascular: Pacemaker in the left upper chest. Normal rate, regular rhythm, no murmur, gallop, or rub. Abdomen: Mildly firm. Nontender, nondistended, and active bowel sounds. Neurologic: Normal speech and cranial nerves II through XII grossly intact. Strength 5/5 bilaterally. Skin: Small bruise on the forehead. No gross lesions, rashes, or bleeding on exposed skin area. Extremities: Mild left ankle edema. Right ankle 1+ pitting edema. Posterior tibial pulses are palpable bilaterally. No calf tenderness with palpation. Psych: Normal affect. Investigations:      Laboratory Testing:  No results found for this or any previous visit (from the past 24 hour(s)). No results for input(s): HGB, HCT, WBC, MCV, PLATELET, NA, K, CL, CO2, BUN, CREATININE, GLUCOSE, INR, PROTIME, APTT, AST, ALT, LABALBU, HCG in the last 720 hours. No results for input(s): COVID19 in the last 720 hours. Imaging/Diagnostics:  Xr Foot Right (min 3 Views)    Result Date: 3/30/2021  Radiology exam is complete. No Radiologist dictation. Please follow up with ordering provider. Diagnosis:      1. Painful right bunion    Plans:     1.  Right foot modified Menjivar bunionectomy vs. Implant and akin osteotomy       MARCIA Gaston CNP  4/26/2021  8:21 AM

## 2021-04-26 NOTE — PRE-PROCEDURE INSTRUCTIONS
blood pressure, or seizure medications. No alcoholic beverages for 24 hours prior to surgery.  Brush your teeth but do not swallow water.  Bring your eye glasses and case with you. No contacts are to be worn the day of surgery. You also may bring your hearing aids. Most surgical procedures involving anesthesia will require that you remove your dentures prior to surgery.  If you are on C-PAP or Bi-PAP at home and plan on staying in the hospital overnight for your surgery please bring the machine with you. · Do not wear any jewelry or body piercings day of surgery. Also, NO lotion, perfume or deodorant to be used the day of surgery. No nail polish on the operative extremity (arm/leg surgeries)    · If you are staying overnight with us, please bring a small bag of necessary personal items.  Please wear loose, comfortable clothing. If you are potentially going to have a cast or brace bring clothing that will fit over them.  In case of illness - If you have cold or flu like symptoms (high fever, runny nose, sore throat, cough, etc.) rash, nausea, vomiting, loose stools, and/or recent contact with someone who has a contagious disease (chicken pox, measles, etc.) Please call your doctor before coming to the hospital.         Day of Surgery/Procedure:    As a patient at [place] [address] you can expect quality medical and nursing care that is centered on your individual needs. Our goal is to make your surgical experience as comfortable as possible    . Transportation After Your Surgery/Procedure: You will need a friend or family member to drive you home after your procedure. Your  must be 25years of age or older. Discharge instructions will be reviewed with family/friend by phone. A taxi cab or any other form of public transportation is not acceptable.       Someone must remain with you for the first 24 hours after your surgery if you receive anesthesia or medication. If you do not have someone to stay with you, your procedure may be cancelled.       If you have any other questions regarding your procedure or the day of surgery, please call [phone]      _________________________  ____________________________  Signature (Patient)    Signature (Provider)            Date

## 2021-04-27 LAB
SARS-COV-2: NORMAL
SARS-COV-2: NOT DETECTED
SOURCE: NORMAL

## 2021-04-28 ENCOUNTER — TELEPHONE (OUTPATIENT)
Dept: PRIMARY CARE CLINIC | Age: 66
End: 2021-04-28

## 2021-04-29 ENCOUNTER — ANESTHESIA EVENT (OUTPATIENT)
Dept: OPERATING ROOM | Age: 66
End: 2021-04-29
Payer: MEDICARE

## 2021-04-29 RX ORDER — HYDROCODONE BITARTRATE AND ACETAMINOPHEN 5; 325 MG/1; MG/1
1 TABLET ORAL EVERY 6 HOURS PRN
Qty: 28 TABLET | Refills: 0 | Status: CANCELLED | OUTPATIENT
Start: 2021-04-29 | End: 2021-05-06

## 2021-04-30 ENCOUNTER — ANESTHESIA (OUTPATIENT)
Dept: OPERATING ROOM | Age: 66
End: 2021-04-30
Payer: MEDICARE

## 2021-04-30 ENCOUNTER — APPOINTMENT (OUTPATIENT)
Dept: GENERAL RADIOLOGY | Age: 66
End: 2021-04-30
Attending: PODIATRIST
Payer: MEDICARE

## 2021-04-30 ENCOUNTER — HOSPITAL ENCOUNTER (OUTPATIENT)
Age: 66
Setting detail: OUTPATIENT SURGERY
Discharge: HOME | End: 2021-04-30
Attending: PODIATRIST | Admitting: PODIATRIST
Payer: MEDICARE

## 2021-04-30 VITALS
BODY MASS INDEX: 30.61 KG/M2 | SYSTOLIC BLOOD PRESSURE: 144 MMHG | RESPIRATION RATE: 13 BRPM | OXYGEN SATURATION: 93 % | TEMPERATURE: 97.7 F | HEIGHT: 72 IN | DIASTOLIC BLOOD PRESSURE: 80 MMHG | WEIGHT: 226 LBS | HEART RATE: 71 BPM

## 2021-04-30 VITALS — DIASTOLIC BLOOD PRESSURE: 59 MMHG | OXYGEN SATURATION: 98 % | TEMPERATURE: 96.3 F | SYSTOLIC BLOOD PRESSURE: 121 MMHG

## 2021-04-30 DIAGNOSIS — G89.18 POST-OP PAIN: Primary | ICD-10-CM

## 2021-04-30 LAB
GLUCOSE BLD-MCNC: 110 MG/DL (ref 65–105)
GLUCOSE BLD-MCNC: 113 MG/DL (ref 65–105)

## 2021-04-30 PROCEDURE — 3600000003 HC SURGERY LEVEL 3 BASE: Performed by: PODIATRIST

## 2021-04-30 PROCEDURE — 3700000000 HC ANESTHESIA ATTENDED CARE: Performed by: PODIATRIST

## 2021-04-30 PROCEDURE — 28234 INCISION OF FOOT TENDON: CPT | Performed by: PODIATRIST

## 2021-04-30 PROCEDURE — 2580000003 HC RX 258: Performed by: ANESTHESIOLOGY

## 2021-04-30 PROCEDURE — 6360000002 HC RX W HCPCS: Performed by: NURSE ANESTHETIST, CERTIFIED REGISTERED

## 2021-04-30 PROCEDURE — 82947 ASSAY GLUCOSE BLOOD QUANT: CPT

## 2021-04-30 PROCEDURE — 2720000010 HC SURG SUPPLY STERILE: Performed by: PODIATRIST

## 2021-04-30 PROCEDURE — 6370000000 HC RX 637 (ALT 250 FOR IP): Performed by: ANESTHESIOLOGY

## 2021-04-30 PROCEDURE — 2500000003 HC RX 250 WO HCPCS: Performed by: NURSE ANESTHETIST, CERTIFIED REGISTERED

## 2021-04-30 PROCEDURE — C1776 JOINT DEVICE (IMPLANTABLE): HCPCS | Performed by: PODIATRIST

## 2021-04-30 PROCEDURE — 7100000011 HC PHASE II RECOVERY - ADDTL 15 MIN: Performed by: PODIATRIST

## 2021-04-30 PROCEDURE — 2709999900 HC NON-CHARGEABLE SUPPLY: Performed by: PODIATRIST

## 2021-04-30 PROCEDURE — 6360000002 HC RX W HCPCS: Performed by: PODIATRIST

## 2021-04-30 PROCEDURE — 3600000013 HC SURGERY LEVEL 3 ADDTL 15MIN: Performed by: PODIATRIST

## 2021-04-30 PROCEDURE — 28291 CORRJ HALUX RIGDUS W/IMPLT: CPT | Performed by: PODIATRIST

## 2021-04-30 PROCEDURE — 6360000002 HC RX W HCPCS: Performed by: ANESTHESIOLOGY

## 2021-04-30 PROCEDURE — 3209999900 FLUORO FOR SURGICAL PROCEDURES

## 2021-04-30 PROCEDURE — 2500000003 HC RX 250 WO HCPCS: Performed by: PODIATRIST

## 2021-04-30 PROCEDURE — 3700000001 HC ADD 15 MINUTES (ANESTHESIA): Performed by: PODIATRIST

## 2021-04-30 PROCEDURE — 7100000010 HC PHASE II RECOVERY - FIRST 15 MIN: Performed by: PODIATRIST

## 2021-04-30 DEVICE — COMPONENT FIX DF P HEMICAPDF P: Type: IMPLANTABLE DEVICE | Site: FOOT | Status: FUNCTIONAL

## 2021-04-30 DEVICE — INSERT TOE 01 25MM OFFSET THK36MM MOD HEMICAPDF P: Type: IMPLANTABLE DEVICE | Site: FOOT | Status: FUNCTIONAL

## 2021-04-30 DEVICE — COMPONENT TOE L18MM DIA9.5MM MT CE TAPR POST HEMICAP: Type: IMPLANTABLE DEVICE | Site: FOOT | Status: FUNCTIONAL

## 2021-04-30 DEVICE — COMPONENT TOE 2.5X4.5MM DIA15MM OFFSET MT CE ARTC TOE2: Type: IMPLANTABLE DEVICE | Site: FOOT | Status: FUNCTIONAL

## 2021-04-30 RX ORDER — ONDANSETRON 2 MG/ML
4 INJECTION INTRAMUSCULAR; INTRAVENOUS
Status: DISCONTINUED | OUTPATIENT
Start: 2021-04-30 | End: 2021-04-30 | Stop reason: HOSPADM

## 2021-04-30 RX ORDER — SODIUM CHLORIDE 9 MG/ML
25 INJECTION, SOLUTION INTRAVENOUS PRN
Status: DISCONTINUED | OUTPATIENT
Start: 2021-04-30 | End: 2021-04-30 | Stop reason: HOSPADM

## 2021-04-30 RX ORDER — FENTANYL CITRATE 50 UG/ML
25 INJECTION, SOLUTION INTRAMUSCULAR; INTRAVENOUS EVERY 5 MIN PRN
Status: DISCONTINUED | OUTPATIENT
Start: 2021-04-30 | End: 2021-04-30 | Stop reason: HOSPADM

## 2021-04-30 RX ORDER — BUPIVACAINE HYDROCHLORIDE 5 MG/ML
INJECTION, SOLUTION EPIDURAL; INTRACAUDAL PRN
Status: DISCONTINUED | OUTPATIENT
Start: 2021-04-30 | End: 2021-04-30 | Stop reason: ALTCHOICE

## 2021-04-30 RX ORDER — PROPOFOL 10 MG/ML
INJECTION, EMULSION INTRAVENOUS CONTINUOUS PRN
Status: DISCONTINUED | OUTPATIENT
Start: 2021-04-30 | End: 2021-04-30 | Stop reason: SDUPTHER

## 2021-04-30 RX ORDER — HYDROCODONE BITARTRATE AND ACETAMINOPHEN 5; 325 MG/1; MG/1
1 TABLET ORAL PRN
Status: COMPLETED | OUTPATIENT
Start: 2021-04-30 | End: 2021-04-30

## 2021-04-30 RX ORDER — FENTANYL CITRATE 50 UG/ML
50 INJECTION, SOLUTION INTRAMUSCULAR; INTRAVENOUS EVERY 5 MIN PRN
Status: DISCONTINUED | OUTPATIENT
Start: 2021-04-30 | End: 2021-04-30 | Stop reason: HOSPADM

## 2021-04-30 RX ORDER — MIDAZOLAM HYDROCHLORIDE 1 MG/ML
INJECTION INTRAMUSCULAR; INTRAVENOUS PRN
Status: DISCONTINUED | OUTPATIENT
Start: 2021-04-30 | End: 2021-04-30 | Stop reason: SDUPTHER

## 2021-04-30 RX ORDER — KETAMINE HCL IN NACL, ISO-OSM 100MG/10ML
SYRINGE (ML) INJECTION PRN
Status: DISCONTINUED | OUTPATIENT
Start: 2021-04-30 | End: 2021-04-30 | Stop reason: SDUPTHER

## 2021-04-30 RX ORDER — SODIUM CHLORIDE 9 MG/ML
INJECTION, SOLUTION INTRAVENOUS CONTINUOUS
Status: DISCONTINUED | OUTPATIENT
Start: 2021-04-30 | End: 2021-04-30 | Stop reason: HOSPADM

## 2021-04-30 RX ORDER — DEXAMETHASONE SODIUM PHOSPHATE 10 MG/ML
INJECTION, SOLUTION INTRAMUSCULAR; INTRAVENOUS PRN
Status: DISCONTINUED | OUTPATIENT
Start: 2021-04-30 | End: 2021-04-30 | Stop reason: SDUPTHER

## 2021-04-30 RX ORDER — SODIUM CHLORIDE 0.9 % (FLUSH) 0.9 %
10 SYRINGE (ML) INJECTION PRN
Status: DISCONTINUED | OUTPATIENT
Start: 2021-04-30 | End: 2021-04-30 | Stop reason: HOSPADM

## 2021-04-30 RX ORDER — FENTANYL CITRATE 50 UG/ML
INJECTION, SOLUTION INTRAMUSCULAR; INTRAVENOUS PRN
Status: DISCONTINUED | OUTPATIENT
Start: 2021-04-30 | End: 2021-04-30 | Stop reason: SDUPTHER

## 2021-04-30 RX ORDER — HYDROCODONE BITARTRATE AND ACETAMINOPHEN 5; 325 MG/1; MG/1
2 TABLET ORAL PRN
Status: COMPLETED | OUTPATIENT
Start: 2021-04-30 | End: 2021-04-30

## 2021-04-30 RX ORDER — GLYCOPYRROLATE 1 MG/5 ML
SYRINGE (ML) INTRAVENOUS PRN
Status: DISCONTINUED | OUTPATIENT
Start: 2021-04-30 | End: 2021-04-30 | Stop reason: SDUPTHER

## 2021-04-30 RX ORDER — OXYCODONE HYDROCHLORIDE AND ACETAMINOPHEN 5; 325 MG/1; MG/1
1 TABLET ORAL EVERY 6 HOURS PRN
Qty: 28 TABLET | Refills: 0 | Status: SHIPPED | OUTPATIENT
Start: 2021-04-30 | End: 2021-05-07

## 2021-04-30 RX ORDER — SODIUM CHLORIDE, SODIUM LACTATE, POTASSIUM CHLORIDE, CALCIUM CHLORIDE 600; 310; 30; 20 MG/100ML; MG/100ML; MG/100ML; MG/100ML
INJECTION, SOLUTION INTRAVENOUS CONTINUOUS
Status: DISCONTINUED | OUTPATIENT
Start: 2021-04-30 | End: 2021-04-30 | Stop reason: HOSPADM

## 2021-04-30 RX ORDER — LIDOCAINE HYDROCHLORIDE 10 MG/ML
1 INJECTION, SOLUTION EPIDURAL; INFILTRATION; INTRACAUDAL; PERINEURAL
Status: DISCONTINUED | OUTPATIENT
Start: 2021-04-30 | End: 2021-04-30 | Stop reason: HOSPADM

## 2021-04-30 RX ORDER — LIDOCAINE HYDROCHLORIDE 20 MG/ML
INJECTION, SOLUTION EPIDURAL; INFILTRATION; INTRACAUDAL; PERINEURAL PRN
Status: DISCONTINUED | OUTPATIENT
Start: 2021-04-30 | End: 2021-04-30 | Stop reason: SDUPTHER

## 2021-04-30 RX ORDER — SODIUM CHLORIDE 0.9 % (FLUSH) 0.9 %
10 SYRINGE (ML) INJECTION EVERY 12 HOURS SCHEDULED
Status: DISCONTINUED | OUTPATIENT
Start: 2021-04-30 | End: 2021-04-30 | Stop reason: HOSPADM

## 2021-04-30 RX ORDER — LIDOCAINE HYDROCHLORIDE 10 MG/ML
INJECTION, SOLUTION EPIDURAL; INFILTRATION; INTRACAUDAL; PERINEURAL PRN
Status: DISCONTINUED | OUTPATIENT
Start: 2021-04-30 | End: 2021-04-30 | Stop reason: ALTCHOICE

## 2021-04-30 RX ORDER — ONDANSETRON 2 MG/ML
INJECTION INTRAMUSCULAR; INTRAVENOUS PRN
Status: DISCONTINUED | OUTPATIENT
Start: 2021-04-30 | End: 2021-04-30 | Stop reason: SDUPTHER

## 2021-04-30 RX ADMIN — Medication 50 MCG: at 10:23

## 2021-04-30 RX ADMIN — LIDOCAINE HYDROCHLORIDE 100 MG: 20 INJECTION, SOLUTION EPIDURAL; INFILTRATION; INTRACAUDAL; PERINEURAL at 09:50

## 2021-04-30 RX ADMIN — HYDROCODONE BITARTRATE AND ACETAMINOPHEN 1 TABLET: 5; 325 TABLET ORAL at 12:35

## 2021-04-30 RX ADMIN — Medication 0.1 MG: at 10:25

## 2021-04-30 RX ADMIN — Medication 10 MG: at 10:00

## 2021-04-30 RX ADMIN — DEXAMETHASONE SODIUM PHOSPHATE 10 MG: 10 INJECTION INTRAMUSCULAR; INTRAVENOUS at 10:02

## 2021-04-30 RX ADMIN — Medication 0.2 MG: at 09:49

## 2021-04-30 RX ADMIN — Medication 50 MCG: at 09:50

## 2021-04-30 RX ADMIN — FENTANYL CITRATE 50 MCG: 50 INJECTION, SOLUTION INTRAMUSCULAR; INTRAVENOUS at 11:50

## 2021-04-30 RX ADMIN — PROPOFOL 100 MCG/KG/MIN: 10 INJECTION, EMULSION INTRAVENOUS at 09:50

## 2021-04-30 RX ADMIN — FENTANYL CITRATE 50 MCG: 50 INJECTION, SOLUTION INTRAMUSCULAR; INTRAVENOUS at 11:59

## 2021-04-30 RX ADMIN — Medication 30 MG: at 09:50

## 2021-04-30 RX ADMIN — Medication 10 MG: at 10:49

## 2021-04-30 RX ADMIN — ONDANSETRON 4 MG: 2 INJECTION, SOLUTION INTRAMUSCULAR; INTRAVENOUS at 10:01

## 2021-04-30 RX ADMIN — FENTANYL CITRATE 25 MCG: 50 INJECTION, SOLUTION INTRAMUSCULAR; INTRAVENOUS at 12:16

## 2021-04-30 RX ADMIN — CEFAZOLIN SODIUM 2000 MG: 10 INJECTION, POWDER, FOR SOLUTION INTRAVENOUS at 09:52

## 2021-04-30 RX ADMIN — MIDAZOLAM 2 MG: 1 INJECTION INTRAMUSCULAR; INTRAVENOUS at 09:49

## 2021-04-30 RX ADMIN — SODIUM CHLORIDE, POTASSIUM CHLORIDE, SODIUM LACTATE AND CALCIUM CHLORIDE: 600; 310; 30; 20 INJECTION, SOLUTION INTRAVENOUS at 08:59

## 2021-04-30 ASSESSMENT — PULMONARY FUNCTION TESTS
PIF_VALUE: 1
PIF_VALUE: 1
PIF_VALUE: 2
PIF_VALUE: 1
PIF_VALUE: 1
PIF_VALUE: 2
PIF_VALUE: 1

## 2021-04-30 ASSESSMENT — PAIN DESCRIPTION - FREQUENCY: FREQUENCY: CONTINUOUS

## 2021-04-30 ASSESSMENT — PAIN - FUNCTIONAL ASSESSMENT: PAIN_FUNCTIONAL_ASSESSMENT: 0-10

## 2021-04-30 ASSESSMENT — PAIN SCALES - GENERAL
PAINLEVEL_OUTOF10: 4
PAINLEVEL_OUTOF10: 2
PAINLEVEL_OUTOF10: 7
PAINLEVEL_OUTOF10: 5

## 2021-04-30 ASSESSMENT — PAIN DESCRIPTION - PAIN TYPE: TYPE: SURGICAL PAIN

## 2021-04-30 ASSESSMENT — ENCOUNTER SYMPTOMS: SHORTNESS OF BREATH: 0

## 2021-04-30 ASSESSMENT — PAIN DESCRIPTION - DESCRIPTORS: DESCRIPTORS: ACHING;BURNING

## 2021-04-30 ASSESSMENT — PAIN DESCRIPTION - LOCATION: LOCATION: ANKLE

## 2021-04-30 NOTE — INTERVAL H&P NOTE
History and Physical Update    Pt Name: Mariely Ji  MRN: [id number]  YOB: 1955  Date of evaluation: 4/30/2021      [x] I have reviewed the H&P by VIKKI Waters CNP which meets the criteria for an Interval History and Physical note. [x] I have examined  Mounika Landry  There are no changes to the patient who is scheduled for a  right foot modified Menjivar bunionectomy vs. Implant and catia osteotomy by Dr. Ever Bentley for painful right bunion. The patient denies new health changes, fever, chills, wheezing, cough, increased SOB, chest pain, open sores or wounds. Patient has not take many of her medications recently  She changed doctors and needs new prescription on many of them. Last Suboxone 4/27/21 +DM POC     Vital signs: BP (!) 150/71   Pulse 88   Temp 96.2 °F (35.7 °C)   Resp 14   Ht 6' (1.829 m)   Wt 226 lb (102.5 kg)   SpO2 97%   BMI 30.65 kg/m²     Allergies: Iv dye [iodides]    Medications:    Prior to Admission medications    Medication Sig Start Date End Date Taking? Authorizing Provider   hydroCHLOROthiazide (MICROZIDE) 12.5 MG capsule take 1 capsule by mouth every morning 3/8/21 3/3/22 Yes Casper Guadalupe MD   varenicline (CHANTIX STARTING MONTH PAK) 0.5 MG X 11 & 1 MG X 42 tablet Take by mouth.  11/25/20  Yes Shaw Vazquez MD   acetaminophen (TYLENOL) 500 MG tablet Take 1,000 mg by mouth every 6 hours as needed for Pain    Historical Provider, MD   tiZANidine (ZANAFLEX) 2 MG tablet Take 1 tablet by mouth every 8 hours as needed (back pain) 4/8/21   Casper Cm MD   pantoprazole (PROTONIX) 40 MG tablet take 1 tablet by mouth once daily 3/8/21   Art Guadalupe MD   traZODone (DESYREL) 100 MG tablet take 1 tablet by mouth AT NIGHT 3/8/21   Casper Cm MD   ONETOUCH ULTRA strip use 1 TEST STRIP to TEST BLOOD SUGAR four times a day if needed 2/8/21   Casper Guadalupe MD   dicyclomine (BENTYL) 10 MG capsule Take 1 capsule by mouth 4 times daily (before meals and nightly) 1/25/21   Gilles Boast, MD   linaclotide (LINZESS) 290 MCG CAPS capsule Take 1 capsule by mouth every morning (before breakfast) 1/19/21   Gilles Boast, MD   gabapentin (NEURONTIN) 800 MG tablet Take 1 tablet by mouth 3 times daily for 360 days. 1/14/21 1/9/22  Eli Matson MD   atenolol (TENORMIN) 25 MG tablet Take 0.5 tablets by mouth daily 11/25/20 11/20/21  Eli Matson MD   rOPINIRole (REQUIP) 0.5 MG tablet Take 1 tablet by mouth daily 11/2/20   Casper Paul MD   furosemide (LASIX) 20 MG tablet Take 1 tablet by mouth 2 times daily 9/24/20 2/21/21  Eli Matson MD   metFORMIN (GLUCOPHAGE) 500 MG tablet Take 1 tablet by mouth 2 times daily (with meals) 9/24/20   Eli Matson MD   ARIPiprazole (ABILIFY) 15 MG tablet Take 1 tablet by mouth daily 8/27/20   Katie Guadalupe MD   buprenorphine-naloxone (SUBOXONE) 2-0.5 MG FILM SL film Place 1.5 Film under the tongue daily.  8/12/20   Historical Provider, MD   pravastatin (PRAVACHOL) 40 MG tablet Take 1 tablet by mouth daily 8/27/20 11/20/21  Katie Paul MD   SYMBICORT 160-4.5 MCG/ACT AERO inhale 2 puffs by mouth twice a day Rinse mouth after use 7/7/20   Casper Guadalupe MD   potassium chloride (KLOR-CON M) 20 MEQ extended release tablet Take 20 mEq by mouth daily 5/29/19   Historical Provider, MD   VORTIoxetine (TRINTELLIX) 10 MG TABS tablet Take 10 mg by mouth daily 9/20/19   Historical Provider, MD   lubiprostone (AMITIZA) 24 MCG capsule Take 1 capsule by mouth 2 times daily (with meals)  Patient not taking: Reported on 1/19/2021 5/28/20 11/25/20  Robert Boast, MD   OneTouch Delica Lancets 40D MISC Apply 1 Units topically 2 times daily USE 2 TO 3 TIMES DAILY AS DIRECTED 4/8/20 9/5/20  Casper Guadalupe MD   mometasone-formoterol (DULERA) 200-5 MCG/ACT inhaler Inhale 2 puffs into the lungs 9/5/19   Historical Provider, MD   nitroGLYCERIN (NITROSTAT) 0.4 MG SL tablet Place 1 tablet under the tongue every 5 minutes as needed for Chest pain 10/9/19   Gloria Woods MD   Umeclidinium Bromide (INCRUSE ELLIPTA) 62.5 MCG/INH AEPB Inhale 1 puff into the lungs daily 9/17/19   Janet Ross MD         This is a 63 y.o. female who is pleasant, cooperative, alert and oriented x3, in no acute distress. Heart: Pacemaker left upper chest Heart sounds are normal.  HR 88 regular rate and rhythm without murmur, gallop or rub. Lungs: Normal respiratory effort with equal expansion, unlabored at rest w/o use of accessory muscles, clear to auscultation without wheezes or rales bilaterally   Abdomen: round, soft, nontender, nondistended with bowel sounds . Extremities: bunion right hallux with callus formation, no open area  PT/DP pulses intact  No calf tenderness      Labs:  No results for input(s): HGB, HCT, WBC, MCV, PLT, NA, K, CL, CO2, BUN, CREATININE, GLUCOSE, INR, PROTIME, APTT, AST, ALT, LABALBU, HCG in the last 720 hours.     Recent Labs     04/26/21  0840   COVID19       Not Detected       Shane Greene ANP-BC  Electronically signed 4/30/2021 at 8:25 AM

## 2021-04-30 NOTE — ANESTHESIA PRE PROCEDURE
Department of Anesthesiology  Preprocedure Note       Name:  Ousmane Reyes   Age:  63 y.o.  :  1955                                          MRN:  [id number]         Date:  2021      Surgeon: Linh Webb):  Deshawn Tobias DPM    Procedure: Procedure(s):  RIGHT FOOT MODIFIED ANDERSON BUNIONECTOMY VS IMPLANT AND AKIN OSTEOTOMY- ARTHROSURFACE   NILAM STAPLE    Medications prior to admission:   Prior to Admission medications    Medication Sig Start Date End Date Taking? Authorizing Provider   acetaminophen (TYLENOL) 500 MG tablet Take 1,000 mg by mouth every 6 hours as needed for Pain    Historical Provider, MD   tiZANidine (ZANAFLEX) 2 MG tablet Take 1 tablet by mouth every 8 hours as needed (back pain) 21   Casper Guadalupe MD   hydroCHLOROthiazide (MICROZIDE) 12.5 MG capsule take 1 capsule by mouth every morning 3/8/21 3/3/22  Casper Guadalupe MD   pantoprazole (PROTONIX) 40 MG tablet take 1 tablet by mouth once daily 3/8/21   Samuel Guadalupe MD   traZODone (DESYREL) 100 MG tablet take 1 tablet by mouth AT NIGHT 3/8/21   Casper Ramirez MD   ONETOUCH ULTRA strip use 1 TEST STRIP to TEST BLOOD SUGAR four times a day if needed 21   Casper Guadalupe MD   dicyclomine (BENTYL) 10 MG capsule Take 1 capsule by mouth 4 times daily (before meals and nightly) 21   Sona Marion MD   linaclotide (LINZESS) 290 MCG CAPS capsule Take 1 capsule by mouth every morning (before breakfast) 21   Sona Marion MD   gabapentin (NEURONTIN) 800 MG tablet Take 1 tablet by mouth 3 times daily for 360 days. 21  Edith Thompson MD   atenolol (TENORMIN) 25 MG tablet Take 0.5 tablets by mouth daily 20  Edith Thompson MD   varenicline (CHANTIX STARTING MONTH ) 0.5 MG X 11 & 1 MG X 42 tablet Take by mouth.  20   Edith Thompson MD   rOPINIRole (REQUIP) 0.5 MG tablet Take 1 tablet by mouth daily 11/2/20   Casper Silvestre MD   furosemide (LASIX) 20 MG tablet Take 1 tablet by mouth 2 times daily 9/24/20 2/21/21  Flavio Soares MD   metFORMIN (GLUCOPHAGE) 500 MG tablet Take 1 tablet by mouth 2 times daily (with meals) 9/24/20   Flavio Soares MD   ARIPiprazole (ABILIFY) 15 MG tablet Take 1 tablet by mouth daily 8/27/20   Alejandrina Potter MD   buprenorphine-naloxone (SUBOXONE) 2-0.5 MG FILM SL film Place 1.5 Film under the tongue daily.  8/12/20   Historical Provider, MD   pravastatin (PRAVACHOL) 40 MG tablet Take 1 tablet by mouth daily 8/27/20 11/20/21  Casper Silvestre MD   SYMBICORT 160-4.5 MCG/ACT AERO inhale 2 puffs by mouth twice a day Rinse mouth after use 7/7/20   Casper Guadalupe MD   potassium chloride (KLOR-CON M) 20 MEQ extended release tablet Take 20 mEq by mouth daily 5/29/19   Historical Provider, MD   VORTIoxetine (TRINTELLIX) 10 MG TABS tablet Take 10 mg by mouth daily 9/20/19   Historical Provider, MD   lubiprostone (AMITIZA) 24 MCG capsule Take 1 capsule by mouth 2 times daily (with meals)  Patient not taking: Reported on 1/19/2021 5/28/20 11/25/20  Te Bingham MD   OneTouch Delica Lancets 66D MISC Apply 1 Units topically 2 times daily USE 2 TO 3 TIMES DAILY AS DIRECTED 4/8/20 9/5/20  Casper Silvestre MD   mometasone-formoterol (DULERA) 200-5 MCG/ACT inhaler Inhale 2 puffs into the lungs 9/5/19   Historical Provider, MD   nitroGLYCERIN (NITROSTAT) 0.4 MG SL tablet Place 1 tablet under the tongue every 5 minutes as needed for Chest pain 10/9/19   Chester Alonso MD   Umeclidinium Bromide (INCRUSE ELLIPTA) 62.5 MCG/INH AEPB Inhale 1 puff into the lungs daily 9/17/19   Ophelia Courtney MD       Current medications:    Current Facility-Administered Medications   Medication Dose Route Frequency Provider Last Rate Last Admin    0.9 % sodium chloride infusion   Intravenous Continuous Ndal MD Tesha Hernandez ringers infusion   Intravenous Continuous Ndal Alanis Wright MD        sodium chloride flush 0.9 % injection 10 mL  10 mL Intravenous 2 times per day Susana Souza MD        sodium chloride flush 0.9 % injection 10 mL  10 mL Intravenous PRN Susana Souza MD        0.9 % sodium chloride infusion  25 mL Intravenous PRN Susana Souza MD        lidocaine PF 1 % injection 1 mL  1 mL Intradermal Once PRN Susana Souza MD        ceFAZolin (ANCEF) 2000 mg in dextrose 5 % 50 mL IVPB  2,000 mg Intravenous Once Birdia Slipper, DPM           Allergies:     Allergies   Allergen Reactions    Iv Dye [Iodides] Hives       Problem List:    Patient Active Problem List   Diagnosis Code    Essential hypertension I10    Pure hypercholesterolemia E78.00    Moderate episode of recurrent major depressive disorder ([facility] F33.1    History of heroin use Z87.898    Hep C w/o coma, chronic ([facility] completed tx 2016 B18.2    GERD (gastroesophageal reflux disease) K21.9    COPD (chronic obstructive pulmonary disease) ([facility] J44.9    Personal history of tobacco use, presenting hazards to health Z87.891    Chronic diastolic congestive heart failure (HCC) I50.32    Diverticulosis K57.90    Hyperplastic polyp of intestine K63.5    Diabetic polyneuropathy associated with type 2 diabetes mellitus (HCC) E11.42    Bilateral chronic knee pain M25.561, M25.562, G89.29    Type 2 diabetes mellitus with complication (HCC) G05.4    Primary insomnia F51.01    Cigarette nicotine dependence without complication U29.392    Chronic bilateral low back pain with right-sided sciatica M54.41, G89.29    Irritable bowel syndrome with both constipation and diarrhea K58.2       Past Medical History:        Diagnosis Date    RACHEL (acute kidney injury) ([facility] 01/22/2014    Arthritis     generalized    Bronchiectasis without complication ([facility]     Cancer ([facility] 2004    uterus    CHF [facility]congestive heart failure) ([facility]     Chronic bilateral low back pain with right-sided sciatica 02/20/2017    Chronic bronchitis (HCC)     Chronic bronchitis (HCC)     Chronic respiratory failure with hypoxia (HCC)     COPD (chronic obstructive pulmonary disease) (HCC)     on inhaler /  COPD with chronic bronchitis and emphysema    Current smoker on some days     Depression 10/30/2014    Diabetic polyneuropathy associated with type 2 diabetes mellitus (HCC)     Diverticulosis     Full dentures     GERD (gastroesophageal reflux disease)     Hep C w/o coma, chronic (HCC)     Hyperlipidemia     Hyperplastic polyp of intestine     Hypertension     DR. MCDANIEL-CARDIO    Liver disease     hepatitis C    Nasal polyposis     Noncompliance with CPAP treatment     Obesity (BMI 35.0-39.9 without comorbidity)     On home O2     2 liters PRN per pt    JIMENA (obstructive sleep apnea)     JIMENA on CPAP    Pacemaker 2012    Pneumonia 07/2012    RECENTLY HOSPITALIZED    Pulmonary edema cardiac cause (HCC)     Rectal bleeding     Smoking addiction     Tobacco abuse        Past Surgical History:        Procedure Laterality Date    BACK SURGERY  2008   [employer] [facility] [facility]       cath dec. 2013    COLON SURGERY      COLONOSCOPY      COLONOSCOPY  01/23/2015    severe diverticula    COLONOSCOPY  09/20/2016    HYPERPLASTIC POLYP X 2     COLONOSCOPY N/A 03/14/2019    COLONOSCOPY DIAGNOSTIC performed by Jac[facility]ios MD at [facility]    COLONOSCOPY N/A 01/09/2020    COLONOSCOPY WITH BIOPSY performed by Jacinda Rios MD at [address] [place] [address] Rd, COLON, DIAGNOSTIC     [address]  09/24/2013    decompression & re-exploration L3-4, L4-5    PACEMAKER INSERTION      for very slow heart beat (per patient)    PACEMAKER PLACEMENT      SIGMOIDOSCOPY N/A 06/26/2015    flexable sigmoidscopy,HYPERPLASTIC POLYP    TONSILLECTOMY      UPPER GASTROINTESTINAL ENDOSCOPY N/A 03/14/2019    EGD BIOPSY performed by Jacinda Rios MD at [facility]       Social History:    Social History     Tobacco Use    Smoking status: Current Every Day Smoker     Packs/day: 1.00     Years: 40.00     Pack years: 40.00     Types: Cigarettes     Start date: 1969    Smokeless tobacco: Never Used   Substance Use Topics    Alcohol use: No     Alcohol/week: 0.0 standard drinks                                Ready to quit: Not Answered  Counseling given: Not Answered      Vital Signs (Current):   Vitals:    04/30/21 0757   BP: (!) 150/71   Pulse: 88   Resp: 14   Temp: 96.2 °F (35.7 °C)   SpO2: 97%                                              BP Readings from Last 3 Encounters:   04/30/21 (!) 150/71   04/26/21 (!) 154/85   01/19/21 115/64       NPO Status:                                                                                 BMI:   Wt Readings from Last 3 Encounters:   04/26/21 226 lb 9.6 oz (102.8 kg)   03/30/21 230 lb (104.3 kg)   01/26/21 221 lb (100.2 kg)     There is no height or weight on file to calculate BMI.    CBC:   Lab Results   Component Value Date    WBC 8.2 10/21/2019    RBC 5.00 10/21/2019    RBC 4.92 02/26/2012    HGB 14.9 10/21/2019    HCT 47.2 10/21/2019    MCV 94.4 10/21/2019    RDW 13.8 10/21/2019     10/21/2019    PLT Platelet clumps present, count appears adequate. 02/26/2012       CMP:   Lab Results   Component Value Date     11/30/2020    K 4.1 11/30/2020     11/30/2020    CO2 22 11/30/2020    BUN 9 11/30/2020    CREATININE 0.52 11/30/2020    GFRAA >60 11/30/2020    LABGLOM >60 11/30/2020    GLUCOSE 93 11/30/2020    GLUCOSE 120 02/26/2012    PROT 7.4 10/21/2019    CALCIUM 10.2 11/30/2020    BILITOT 0.20 10/21/2019    ALKPHOS 74 10/21/2019    AST 13 10/21/2019    ALT 8 10/21/2019       POC Tests: No results for input(s): POCGLU, POCNA, POCK, POCCL, POCBUN, POCHEMO, POCHCT in the last 72 hours.     Coags:   Lab Results   Component Value Date    PROTIME 11.5 03/05/2015    INR 1.1 03/05/2015    APTT 25.0 11/10/2014 HCG (If Applicable): No results found for: PREGTESTUR, PREGSERUM, HCG, HCGQUANT     ABGs:   Lab Results   Component Value Date    PHART 7.41 03/01/2012    PO2ART 56 03/01/2012    CEN5DFF 45 03/01/2012    IYC6UHW 28 03/01/2012        Type & Screen (If Applicable):  No results found for: LABABO, LABRH    Drug/Infectious Status (If Applicable):  Lab Results   Component Value Date    HEPCAB REACTIVE 10/11/2019       COVID-19 Screening (If Applicable):   Lab Results   Component Value Date    COVID19 Not Detected 04/26/2021           Anesthesia Evaluation    Airway: Mallampati: I  TM distance: >3 FB   Neck ROM: full  Mouth opening: > = 3 FB Dental:          Pulmonary:   (+) COPD:  sleep apnea: on noncompliant,      (-) shortness of breath                           Cardiovascular:    (+) pacemaker: pacemaker,     (-) past MI and  angina          Echocardiogram reviewed                  Neuro/Psych:      (-) CVA           GI/Hepatic/Renal:   (+) hepatitis:,           Endo/Other:    (+) Diabetes, . Abdominal:           Vascular:                                        Anesthesia Plan      MAC     ASA 4             Anesthetic plan and risks discussed with patient.                       Sonido Richey MD   4/30/2021

## 2021-04-30 NOTE — BRIEF OP NOTE
PODIATRY BRIEF OP NOTE    PATIENT NAME: Jc Bustos  YOB: 1955  -  63 y.o. female  MRN: [id number]  DATE: 4/30/2021  BILLING #: [id number]    Surgeon(s):  Avelino Sigala DPM     ASSISTANTS: Leonora Gross DPM PGY3    PRE-OP DIAGNOSIS:   1. Hallux limitus right foot  2. Hallux valgus right foot    POST-OP DIAGNOSIS: Same as above. PROCEDURE:   1. 1st metatarsophalangeal joint arthroplasty right foot  2. Extensor hallucis longus lengthening right foot    ANESTHESIA:     HEMOSTASIS: Pneumatic ankle right tourniquet @ 250 mmHg for 77 minutes. ESTIMATED BLOOD LOSS: Less than 10cc. MATERIALS:   Implant Name Type Inv. Item Serial No.  Lot No. LRB No. Used Action   COMPONENT TOE L18MM DIA9.5MM MT CE TAPR POST HEMICAP  COMPONENT TOE L18MM DIA9.5MM MT CE TAPR POST HEMICAP  ARTHROSURFACE-WD [id number] Right 1 Implanted   COMPONENT FIX DF P HEMICAPDF P  COMPONENT FIX DF P HEMICAPDF P  ARTHROSURFACE-WD [id number] Right 1 Implanted   COMPONENT TOE 2.5X4.5MM [id number] OFFSET MT CE ARTC TOE2  COMPONENT TOE 2.5X4.5MM [id number] OFFSET MT CE ARTC TOE2  ARTHROSURFACE-WD [id number] Right 1 Implanted   INSERT TOE 01 25MM OFFSET [id number] MOD HEMICAPDF P  INSERT TOE 01 25MM OFFSET [id number] MOD HEMICAPDF P  ARTHROSURFACE-WD [id number] Right 1 Implanted       INJECTABLES: 10 cc  0.5% bupivacaine plain      SPECIMEN:   * No specimens in log *    COMPLICATIONS: none    FINDINGS: Improved HAV deformity and 1st MTPJ ROM post procedure    The patient was counseled at length about the risks of kaylah Covid-19 during their perioperative period and any recovery window from their procedure. The patient was made aware that kaylah Covid-19  may worsen their prognosis for recovering from their procedure  and lend to a higher morbidity and/or mortality risk. All material risks, benefits, and reasonable alternatives including postponing the procedure were discussed.  The patient does wish to proceed with the procedure at this time.     Kavon Jones DPM   Podiatric Medicine & Surgery   4/30/2021 at 11:33 AM

## 2021-05-01 NOTE — OP NOTE
at this time. INDICATIONS FOR Amish Mesa is a 63 y. o. female well known to Dr. Delphine Curtis a CC of worsening right 1st MPJ ROM pain and medial eminence pain. Patient has failed conservative treatment and surgical treatment has been recommended to which the patient is amenable. In pre-op all risks and benefits  of the procedure were discussed at length prior to the patient signing the consent form . Consent is signed, witnessed, and placed in patient chart. The right foot was marked, antibiotics given (Ancef), labs and images reviewed, NPO status confirmed. No guarantees were given or implied.      PROCEDURE IN DETAIL: The patient was brought to the operating room and placed on the operating table in the supine position with a safety strap across the lap.  A well-padded pneumatic ankle tourniquet was applied to the right ankle. After adequate sedation was given by the anesthesia team, a local block of 10 cc of 1:1 mixture of 1% lidocaine plain and 0.5% Marcaine plain was injected to the right foot preoperatively. The surgical site was then scrubbed, prepped, and draped in the usual aseptic manner.  A timeout was performed confirming the patient's identity, correct site, correct procedure, allergies, and preoperative antibiotics.  An Esmarch bandage was then used to exsanguinate foot and the tourniquet was inflated to 250mmHg.     Attention was directed to the right 1st MTPJ where it was noted that there was very limited motion with palpable dorsal and medial ostephytes. A dorsal linear incision was created over the first metatarsophalangeal joint with a #15 blade. The incision was then deepened including the level of the proximal phalanx. The skin and subcutaneous tissue were then undermined off of the capsule medially and lateral . A dorsal linear capsular incision was then created over the first metatarsophalangeal joint. The periosteum and capsule were then reflected off of the first metatarsal head. There was noted to be large osteophytes and loose bone fragments dorsal and medial to the joint and within the joint.  The articular cartilage of the first metatarsal head had dorsal central eburnation. A bone rongeur and sagittal saw were used to resect the osteophytes medially and laterally to remodel the 1st metatarsal.  Care was taken to preserve the extensor hallucis longus tendon.      Next the Arthro-surface implant guide wire was placed in the metatarsal head and the metatarsal was prepped using supplied reamers per the 's guidelines.  A trial metatarsal head implant was inserted and noted to be in good apposition was appreciated.  A bone cutter and power rasp was used to make the distal first metatarsal surrounding the trial smooth.       Attention was directed to the proximal phalanx base. The capsule and periosteum were further reflected. Using a bone rongeur the plantar and medial osteophytes were resected.  Next the Arthro-surface implant guide wire was placed in the metatarsal head per the 's guidelines. Proper positioning of the proximal phalanx for the implant was confirmed on C-arm. A phalangeal fix component was placed in the base of the proximal phalanx and was noted to be well seated. A bone rongeur was used to make the base of the proximal phalanx smooth. A trial proximal phalanx implant was inserted and noted to be in good apposition was appreciated.      Once proper fit was confirmed with the trial implants the final metallic implant was placed in the 1st metatarsal head and the polyethylene implant was placed in the base of the proximal phalanx. The first metatarsal phalangeal joint was placed through range of motion and it was noted that there was smooth and greatly improved range of motion. Proper positioning of the total implant and joint was confirmed on C-arm. Attention was then directed to the extensor hallucis longus tendon.  The tendon was noted to have significant tension and bowstring appearance. Using 15 blade a z-lengthening was performed and the tendon was reapproximated using 4-0 monocryl. The hallux was noted to be rectus with significantly improved ROM in sagittal plane post procedure.       The surgical site was then irrigated with copious amounts of normal sterile saline. Incision was closed in layers; deep closure with 3-0 monocyrl, subcutaneous closure with 4-0 monocryl, and skin re-approximated with 4-0 prolene.      Upon completion of the procedure, a total of 10 cc of 0.5% Marcaine was injected around the surgical sites.  The incision was dressed with adaptic, 4x4s, kerlix, and ACE. The tourniquet was then deflated after being inflated for 77 minutes and immediate hyperemia returned to all digits. The patient tolerated the procedure and anesthesia well and was transferred to the PACU with all vital signs stable and vascular status intact to the right foot and ankle. Following a period of postoperative monitoring, the patient will be discharged to home and given instructions and prescriptions which were discussed prior to the surgery. Patient will be nonweighbearing to the right foot with in a surgical shoe and crutches. Instructed to keep dressing clean, dry, and intact until follow up with Dr. Ríos.     Electronically signed by Simon Ruiz DPM on 5/1/2021 at 3:15 PM

## 2021-05-03 ENCOUNTER — OFFICE VISIT (OUTPATIENT)
Dept: PODIATRY | Age: 66
End: 2021-05-03

## 2021-05-03 DIAGNOSIS — G89.18 POST-OP PAIN: Primary | ICD-10-CM

## 2021-05-03 PROCEDURE — 99024 POSTOP FOLLOW-UP VISIT: CPT | Performed by: PODIATRIST

## 2021-05-03 NOTE — PROGRESS NOTES
Pt came in for dressing change only today. Pt came to office with large amount of old blood, soaked through the gauze. Pt states she is aware of swelling to the leg and does her best so far to stay off of her foot but she lives alone, but does have a nurse/aid who comes to the house. Pt advised to keep dressing clean, dry and leave it on. Advised to watch for excess bleeding through new bandage. Surgical site looks macererated but otherwise looks good. New dressing of adaptic, 4x4 gauze and ABD pads placed on site. Wrapped with kerlex and ace wrap. Pt advised to keep upcoming appointment with  This week.

## 2021-05-06 ENCOUNTER — OFFICE VISIT (OUTPATIENT)
Dept: PODIATRY | Age: 66
End: 2021-05-06

## 2021-05-06 VITALS — RESPIRATION RATE: 16 BRPM | WEIGHT: 226 LBS | HEIGHT: 72 IN | BODY MASS INDEX: 30.61 KG/M2

## 2021-05-06 DIAGNOSIS — G89.18 POST-OP PAIN: Primary | ICD-10-CM

## 2021-05-06 PROCEDURE — 99024 POSTOP FOLLOW-UP VISIT: CPT | Performed by: PODIATRIST

## 2021-05-06 RX ORDER — OXYCODONE HYDROCHLORIDE AND ACETAMINOPHEN 5; 325 MG/1; MG/1
1 TABLET ORAL EVERY 6 HOURS PRN
Qty: 28 TABLET | Refills: 0 | Status: SHIPPED | OUTPATIENT
Start: 2021-05-06 | End: 2021-05-13

## 2021-05-06 NOTE — PROGRESS NOTES
[facility]IANS  [facility]  [address] [place] [address]  Dept: [phone]  Dept Fax: [phone]    POST-OP PROGRESS NOTE  Date of patient's visit: 5/6/2021  Patient's Name:  Frantz Garcia YOB: 1955            Patient Care Team:  Tru Braga MD as PCP - Mariana Boyce MD as Consulting Physician (Gastroenterology)  Antonella Medrano MD as Referring Physician (Internal Medicine)  Paula Howard MD as Consulting Physician (Pulmonology)  Kevyn Uriarte MD as Consulting Physician (Internal Medicine)  Ivania Alexander DPM as Physician (Podiatry)  Michael Rodriguez RN as Imaging Navigator        Chief Complaint   Patient presents with    Post-Op Check     1 week post op    Foot Pain       Pt's primary care physician is Tru Braga MD last seen 04/20/2021     Subjective: Frantz Garcia is a 63 y.o. female who presents to the office today 1week(s)  S/P left 1st MTP implant for correction of   Problem List Items Addressed This Visit     Post-op pain - Primary      . Patient relates pain is Present and improved since we changed the dressings. Pain is rated 7 out of 10 and is described as constant, severe. Currently denies F/C/N/V. Is patient taking pain medications as prescribed and is controlling pain    Physical Examination:  Incision is coapted, sutures/steri-strips are intact. Minimal bleeding post operatively. Edema present. No erythema. No Pus. Operative correction is satisfactory. Pt is very swollen   Radiographs:  Next visit    Assessment: Frantz Garcia is status post as above  Normal post operative course. Doing well          ICD-10-CM    1. Post-op pain  G89.18          Plan:  Patient examined and evaluated. Current condition and treatment options discussed in detail. Advised pt to her condtion. r x provided for pain script to be taken as directed. Pt to work on ROM of the left great toe in the sagital plain.   Verbal and written instructions given to patient. Orders: No orders of the defined types were placed in this encounter. Contact office with any questions/problems/concerns.   RTC in 2week(s) for suture removal .     Electronically signed by Harsha Smith DPM on 5/6/2021 at 10:20 AM  5/6/2021

## 2021-05-11 ENCOUNTER — OFFICE VISIT (OUTPATIENT)
Dept: PODIATRY | Age: 66
End: 2021-05-11

## 2021-05-11 VITALS — BODY MASS INDEX: 30.61 KG/M2 | RESPIRATION RATE: 16 BRPM | WEIGHT: 226 LBS | HEIGHT: 72 IN

## 2021-05-11 DIAGNOSIS — E11.51 TYPE II DIABETES MELLITUS WITH PERIPHERAL CIRCULATORY DISORDER (HCC): ICD-10-CM

## 2021-05-11 DIAGNOSIS — R60.0 EDEMA OF RIGHT LOWER LEG: ICD-10-CM

## 2021-05-11 DIAGNOSIS — G89.18 POST-OP PAIN: Primary | ICD-10-CM

## 2021-05-11 PROCEDURE — 99024 POSTOP FOLLOW-UP VISIT: CPT | Performed by: PODIATRIST

## 2021-05-11 RX ORDER — ALBUTEROL SULFATE 2.5 MG/3ML
SOLUTION RESPIRATORY (INHALATION)
COMMUNITY
Start: 2021-04-20

## 2021-05-11 RX ORDER — FUROSEMIDE 20 MG/1
20 TABLET ORAL DAILY
Qty: 7 TABLET | Refills: 0 | Status: SHIPPED | OUTPATIENT
Start: 2021-05-11 | End: 2021-10-21

## 2021-05-11 RX ORDER — OXYCODONE HYDROCHLORIDE AND ACETAMINOPHEN 5; 325 MG/1; MG/1
1 TABLET ORAL EVERY 6 HOURS PRN
Qty: 28 TABLET | Refills: 0 | Status: SHIPPED | OUTPATIENT
Start: 2021-05-11 | End: 2021-05-18

## 2021-05-11 RX ORDER — ONDANSETRON 4 MG/1
TABLET, ORALLY DISINTEGRATING ORAL
COMMUNITY
Start: 2021-04-20 | End: 2022-01-18 | Stop reason: SDUPTHER

## 2021-05-11 NOTE — PROGRESS NOTES
[facility]YSICIANS  [facility]  [address] [place] [address]  Dept: [phone]  Dept Fax: [phone]    POST-OP PROGRESS NOTE  Date of patient's visit: 5/11/2021  Patient's Name:  Sacha Baker YOB: 1955            Patient Care Team:  Andrea James MD as PCP - Donna Santiago MD as Consulting Physician (Gastroenterology)  Antonella Kasper MD as Referring Physician (Internal Medicine)  Darrian Coombs MD as Consulting Physician (Pulmonology)  Kilo Newman MD as Consulting Physician (Internal Medicine)  Gris Small DPM as Physician (Podiatry)  Tony Miranda RN as Imaging Navigator        Chief Complaint   Patient presents with    Post-Op Check     2 weeks post op    Foot Pain    Foot Swelling       Pt's primary care physician is Andrea James MD last seen 04/20/2021     Subjective: Sacha Baker is a 63 y.o. female who presents to the office today 2week(s)  S/P 1st MTP implant  for correction of severe djd of the hallulx   Problem List Items Addressed This Visit     Post-op pain - Primary      Other Visit Diagnoses     Type II diabetes mellitus with peripheral circulatory disorder ([facility]          . Patient relates pain is Present and improved. Pain is rated 7 out of 10 and is described as constant, moderate. Currently denies F/C/N/V. Is patient taking pain medications as prescribed and is controlling pain    Physical Examination:  Incision is coapted, sutures/steri-strips are intact. Minimal bleeding post operatively. Edema present. No erythema. No Pus. Operative correction is satisfactory. Pitting 2+ edema noted to the right and left foot to the level of the midleg. No POP to the lisa        Assessment: Sacha Baker is status post as above  Normal post operative course. Doing well          ICD-10-CM    1. Post-op pain  G89.18    2.  Type II diabetes mellitus with peripheral circulatory disorder (HCC)  E11.51          Plan:  Patient examined and evaluated. Current condition and treatment options discussed in detail. Advised pt to her conditon. She is very swollen and is not moving her leg. She has no calf pain rx provided for lasik for one week    [facility] .Patient presents for suture removal. The wound is well healed without signs of infection. The sutures are removed. Wound care and activity instructions given. .  Verbal and written instructions given to patient. Orders: No orders of the defined types were placed in this encounter. Contact office with any questions/problems/concerns. RTC in 2week(s) with x rays.      Electronically signed by Nela Santiago DPM on 5/11/2021 at 10:36 AM  5/11/2021

## 2021-05-17 ENCOUNTER — HOSPITAL ENCOUNTER (OUTPATIENT)
Age: 66
Discharge: HOME | End: 2021-05-17
Payer: MEDICARE

## 2021-05-17 LAB
AMPHETAMINE SCREEN URINE: NEGATIVE
BARBITURATE SCREEN URINE: NEGATIVE
BENZODIAZEPINE SCREEN, URINE: NEGATIVE
BUPRENORPHINE URINE: NORMAL
CANNABINOID SCREEN URINE: NEGATIVE
COCAINE METABOLITE, URINE: NEGATIVE
MDMA URINE: NORMAL
METHADONE SCREEN, URINE: NEGATIVE
METHAMPHETAMINE, URINE: NORMAL
OPIATES, URINE: NEGATIVE
OXYCODONE SCREEN URINE: NEGATIVE
PHENCYCLIDINE, URINE: NEGATIVE
PROPOXYPHENE, URINE: NORMAL
TEST INFORMATION: NORMAL
TRICYCLIC ANTIDEPRESSANTS, UR: NORMAL

## 2021-05-17 PROCEDURE — 80307 DRUG TEST PRSMV CHEM ANLYZR: CPT

## 2021-05-20 ENCOUNTER — OFFICE VISIT (OUTPATIENT)
Dept: PODIATRY | Age: 66
End: 2021-05-20

## 2021-05-20 VITALS — WEIGHT: 226 LBS | BODY MASS INDEX: 30.61 KG/M2 | HEIGHT: 72 IN

## 2021-05-20 DIAGNOSIS — Z98.890 POST-OPERATIVE STATE: Primary | ICD-10-CM

## 2021-05-20 PROCEDURE — 99024 POSTOP FOLLOW-UP VISIT: CPT | Performed by: PODIATRIST

## 2021-05-20 NOTE — PATIENT INSTRUCTIONS
Schedule a Vaccine  When you qualify to receive the vaccine, call the [facility]) COVID-19 Vaccination Hotline to schedule your appointment or to get additional information about the [facility]) locations which are offering the COVID-19 vaccine. To be 94% effective, it's important that you receive two doses of one of the COVID-19 vaccines. -If you are receiving the Ford Peter vaccine, your second shot will be scheduled as close to 21 days after the first shot as possible. -If you are receiving the Moderna vaccine, your second shot will be scheduled as close to 28 days after the first shot as possible. [facility]) COVID-19 Vaccination Hotline: [phone]    Links to [facility]) website and [facility] website:    [email or web address]    [email or web address]

## 2021-05-20 NOTE — PROGRESS NOTES
[facility] PHYSICIANS  [facility]  [address] [place] [address]  Dept: [phone]  Dept Fax: [phone]    POST-OP PROGRESS NOTE  Date of patient's visit: 5/20/2021  Patient's Name:  Makeda Felix YOB: 1955            Patient Care Team:  Se Fowler MD as PCP - Simeon Peters MD as Consulting Physician (Gastroenterology)  Antonella Espinoza MD as Referring Physician (Internal Medicine)  James Mauro MD as Consulting Physician (Pulmonology)  Reji Alexander MD as Consulting Physician (Internal Medicine)  Katty Leavitt DPM as Physician (Podiatry)  Nyasia Correa RN as Imaging Navigator        Chief Complaint   Patient presents with    Post-Op Check     rtc 3 weeks- RIGHT FOOT 1ST MPJ ARTHROPLASTY WITH EXTENSOR HALLUCIS LONGUS LENGTHENING    Suture / Staple Removal       Pt's primary care physician is Se Fowler MD last seen 4/20/21     Subjective: Makeda Felix is a 63 y.o. female who presents to the office today 3week(s) RIGHT FOOT 1ST MPJ ARTHROPLASTY WITH EXTENSOR HALLUCIS LONGUS LENGTHENING. Problem List Items Addressed This Visit     None      Patient relates pain is Absent  and improved. Pain is rated 0 out of 10 and is described as none. Currently denies F/C/N/V. Physical Examination:  Incision is coapted, sutures/steri-strips are intact. Minimal bleeding post operatively. Edema present. No erythema. No Pus. Operative correction is satisfactory. excellent ROM of the right great toe    Radiographs: next visit      Assessment: Makeda Felix is status post as above  Normal post operative course. Doing well        No diagnosis found. Plan:  Patient examined and evaluated. Current condition and treatment options discussed in detail. Advised pt to her conditon. Patient presents for suture removal. The wound is well healed without signs of infection. The sutures are removed. Wound care and activity instructions given. Juliet Membreno Verbal and written instructions given to patient. Orders: No orders of the defined types were placed in this encounter. Contact office with any questions/problems/concerns. RTC in 2week(s) with x rays  .      Electronically signed by Castro Larson DPM on 5/20/2021 at 2:16 PM  5/20/2021

## 2021-05-28 ENCOUNTER — HOSPITAL ENCOUNTER (OUTPATIENT)
Age: 66
Discharge: HOME | End: 2021-05-28
Payer: MEDICARE

## 2021-05-28 ENCOUNTER — OFFICE VISIT (OUTPATIENT)
Dept: PULMONOLOGY | Age: 66
End: 2021-05-28
Payer: MEDICARE

## 2021-05-28 VITALS
BODY MASS INDEX: 30.11 KG/M2 | DIASTOLIC BLOOD PRESSURE: 85 MMHG | WEIGHT: 222 LBS | TEMPERATURE: 97.2 F | OXYGEN SATURATION: 94 % | SYSTOLIC BLOOD PRESSURE: 142 MMHG | HEART RATE: 89 BPM

## 2021-05-28 DIAGNOSIS — F17.210 CIGARETTE NICOTINE DEPENDENCE WITHOUT COMPLICATION: ICD-10-CM

## 2021-05-28 DIAGNOSIS — G47.33 OSA (OBSTRUCTIVE SLEEP APNEA): ICD-10-CM

## 2021-05-28 DIAGNOSIS — E11.8 TYPE 2 DIABETES MELLITUS WITH COMPLICATION (HCC): ICD-10-CM

## 2021-05-28 DIAGNOSIS — I10 ESSENTIAL HYPERTENSION: ICD-10-CM

## 2021-05-28 DIAGNOSIS — K21.9 GASTROESOPHAGEAL REFLUX DISEASE WITHOUT ESOPHAGITIS: ICD-10-CM

## 2021-05-28 DIAGNOSIS — J41.1 MUCOPURULENT CHRONIC BRONCHITIS (HCC): Primary | ICD-10-CM

## 2021-05-28 PROCEDURE — 3046F HEMOGLOBIN A1C LEVEL >9.0%: CPT | Performed by: INTERNAL MEDICINE

## 2021-05-28 PROCEDURE — G8926 SPIRO NO PERF OR DOC: HCPCS | Performed by: INTERNAL MEDICINE

## 2021-05-28 PROCEDURE — 4004F PT TOBACCO SCREEN RCVD TLK: CPT | Performed by: INTERNAL MEDICINE

## 2021-05-28 PROCEDURE — 2022F DILAT RTA XM EVC RTNOPTHY: CPT | Performed by: INTERNAL MEDICINE

## 2021-05-28 PROCEDURE — G8427 DOCREV CUR MEDS BY ELIG CLIN: HCPCS | Performed by: INTERNAL MEDICINE

## 2021-05-28 PROCEDURE — 3023F SPIROM DOC REV: CPT | Performed by: INTERNAL MEDICINE

## 2021-05-28 PROCEDURE — 3017F COLORECTAL CA SCREEN DOC REV: CPT | Performed by: INTERNAL MEDICINE

## 2021-05-28 PROCEDURE — G8417 CALC BMI ABV UP PARAM F/U: HCPCS | Performed by: INTERNAL MEDICINE

## 2021-05-28 PROCEDURE — 99214 OFFICE O/P EST MOD 30 MIN: CPT | Performed by: INTERNAL MEDICINE

## 2021-05-28 PROCEDURE — 80307 DRUG TEST PRSMV CHEM ANLYZR: CPT

## 2021-05-28 RX ORDER — UMECLIDINIUM BROMIDE AND VILANTEROL TRIFENATATE 62.5; 25 UG/1; UG/1
1 POWDER RESPIRATORY (INHALATION) DAILY
Qty: 1 PUFF | Refills: 5 | Status: SHIPPED | OUTPATIENT
Start: 2021-05-28 | End: 2021-06-27

## 2021-05-28 RX ORDER — ALBUTEROL SULFATE 90 UG/1
2 AEROSOL, METERED RESPIRATORY (INHALATION) 4 TIMES DAILY PRN
Qty: 3 INHALER | Refills: 1 | Status: SHIPPED | OUTPATIENT
Start: 2021-05-28 | End: 2022-01-18

## 2021-05-28 ASSESSMENT — SLEEP AND FATIGUE QUESTIONNAIRES
ESS TOTAL SCORE: 3
HOW LIKELY ARE YOU TO NOD OFF OR FALL ASLEEP WHILE SITTING AND TALKING TO SOMEONE: 0
HOW LIKELY ARE YOU TO NOD OFF OR FALL ASLEEP WHILE SITTING INACTIVE IN A PUBLIC PLACE: 0
HOW LIKELY ARE YOU TO NOD OFF OR FALL ASLEEP WHILE WATCHING TV: 1
HOW LIKELY ARE YOU TO NOD OFF OR FALL ASLEEP WHEN YOU ARE A PASSENGER IN A CAR FOR AN HOUR WITHOUT A BREAK: 0

## 2021-05-28 NOTE — PROGRESS NOTES
Maryam Bowman  5/28/2021    Chief Complaint   Patient presents with    Shortness of Breath     here for follow up, patient states has been sob lately. Needs refills on inhalers     COPD, obesity, sleep apnea syndrome  The Kristin Landry is known to have chronic obstructive lung disease due to chronic bronchitis and emphysema. Caused by smoking unfortunately she continued to smoke is smoking half pack a day in spite of repeated advised to give up smoking. Pulmonary status is stable no evidence of acute exacerbation of COPD no excessive cough or sputum production no hemoptysis no yellow sputum. No chest pain. No pedal edema. Patient known to have systemic hypertension that is under good control    She also has diabetes which is well controlled. Does have symptoms of reflux. Patient is known to have sleep apnea syndrome but refused to use the BiPAP or CPAP. He is significantly sleepy during the daytime [place] Sleepiness Scale revealed a score of 17 indicating significant sleepiness. His last chest x-ray revealed some basal scarring but no other abnormality. We will get a CT scan of her for lung screening for cancer. He already had flu vaccine Pneumovax. She has not taken Covid vaccine but she agreed to take it today. He continues to be overweight. Sleep Medicine 7/24/2020 2/17/2020 11/11/2019   Sitting and reading 3 3 3   Watching TV 3 3 3   Sitting, inactive in a public place (e.g. a theatre or a meeting) 2 3 2   As a passenger in a car for an hour without a break 3 3 3   Lying down to rest in the afternoon when circumstances permit 3 0 2   Sitting and talking to someone 0 0 1   Sitting quietly after a lunch without alcohol 3 3 2   In a car, while stopped for a few minutes in traffic 0 0 2   Total score 17 15 18                 Review of Systems -as assistant were conductive for all other system including upper and lower extremities. No additional information was obtained.   General ROS: negative for - chills, fatigue, fever or weight loss  ENT ROS: negative for - headaches, oral lesions or sore throat  Cardiovascular ROS: no chest pain , orthopnea or pnd   Gastrointestinal ROS: no abdominal pain, change in bowel habits, or black or bloody stools  Skin - no rash   Neuro - no blurry vision , no loc . No focal weakness   msk - no jt tenderness or swelling    Vascular - no claudication , rest completed and negative   Lymphatic - complete and negative   Hematology - oncology - complete and negative   Allergy immunology - complete and negative    no burning or hematuria       LUNG CANCER SCREENING     1. CRITERIA MET    [x]     CT ORDERED  [x]      2. CRITERIA NOT MET   []      3. REFUSED                    [x]        REASON CRITERIA NOT MET     1. SMOKING LESS THAN 30 PY  []      2. AGE LESS THAN 55 or GREATER 77 YEARS  []      3. QUIT SMOKING 15 YEARS OR GREATER   []      4. RECENT CT WITH IN 11 MONTHS    []      5. LIFE EXPECTANCY < 5 YEARS   []      6. SIGNS  AND SYMPTOMS OF LUNG CANCER   []           Immunization   Immunization History   Administered Date(s) Administered    Influenza Virus Vaccine 01/22/2014, 10/22/2015, 08/15/2016, 08/10/2017, 12/29/2017, 09/17/2018, 08/13/2019, 08/13/2019    Influenza Whole 07/30/2016    Influenza, Quadv, IM, (6 mo and older Fluzone, Flulaval, Fluarix and 3 yrs and older Afluria) 09/17/2018, 08/13/2019    Influenza, Neo Beckers, IM, PF (6 mo and older Fluzone, Flulaval, Fluarix, and 3 yrs and older Afluria) 08/15/2016, 08/10/2017, 12/29/2017, 08/16/2019    PPD Test 11/30/2020, 12/08/2020    Pneumococcal Conjugate 13-valent ([id number]) 08/15/2016    Pneumococcal Polysaccharide ([id number]) 10/22/2015, 12/29/2017    Td, unspecified formulation 07/30/2016    Tdap (Boostrix, Adacel) 09/30/2016        Pneumococcal Vaccine she did not take her flu shot this year.     [x] Up to date    [] Indicated   [] Refused  [] Contraindicated       Influenza Vaccine   [x] Up to date    [] Indicated   [] Refused  [] Contraindicated     He is to take Covid vaccine  PAST MEDICAL HISTORY:         Diagnosis Date    RACHEL (acute kidney injury) ([facility] 01/22/2014    Arthritis     generalized    Bronchiectasis without complication ([facility]     Cancer ([facility] 2004    uterus    CHF (congestive heart failure) (HCC)     Chronic bilateral low back pain with right-sided sciatica 02/20/2017    Chronic bronchitis (HCC)     Chronic bronchitis (HCC)     Chronic respiratory failure with hypoxia (HCC)     COPD (chronic obstructive pulmonary disease) (HCC)     on inhaler /  COPD with chronic bronchitis and emphysema    Current smoker on some days     Depression 10/30/2014    Diabetic polyneuropathy associated with type 2 diabetes mellitus (HCC)     Diverticulosis     Full dentures     GERD (gastroesophageal reflux disease)     Hep C w/o coma, chronic (HCC)     Hyperlipidemia     Hyperplastic polyp of intestine     Hypertension     DR. MCDANIEL-CARDIO    Liver disease     hepatitis C    Nasal polyposis     Noncompliance with CPAP treatment     Obesity (BMI 35.0-39.9 without comorbidity)     On home O2     2 liters PRN per pt    JIMENA (obstructive sleep apnea)     JIMENA on CPAP    Pacemaker 2012    Pneumonia 07/2012    RECENTLY HOSPITALIZED    Pulmonary edema cardiac cause (HCC)     Rectal bleeding     Smoking addiction     Tobacco abuse        Family History:       Problem Relation Age of Onset    Cancer Mother         lungs and brain    Stroke Father     Crohn's Disease Sister        SURGICAL HISTORY:   Past Surgical History:   Procedure Laterality Date    BACK SURGERY  2008    BUNIONECTOMY Right 4/30/2021    RIGHT FOOT 1ST MPJ ARTHROPLASTY WITH EXTENSOR HALLUCIS LONGUS LENGTHENING performed by Deshawn Tobias DPM at [address] dec. 2013    COLON SURGERY      COLONOSCOPY      COLONOSCOPY  01/23/2015    severe diverticula    COLONOSCOPY  09/20/2016 once daily 3/8/21  Yes Casper Guadalupe MD   traZODone (DESYREL) 100 MG tablet take 1 tablet by mouth AT NIGHT 3/8/21  Yes Casper Guadalupe MD   ONETOUCH ULTRA strip use 1 TEST STRIP to TEST BLOOD SUGAR four times a day if needed 2/8/21  Yes Casper Guadalupe MD   dicyclomine (BENTYL) 10 MG capsule Take 1 capsule by mouth 4 times daily (before meals and nightly) 1/25/21  Yes Jone Favre, MD   linaclotide (LINZESS) 290 MCG CAPS capsule Take 1 capsule by mouth every morning (before breakfast) 1/19/21  Yes Jone Favre, MD   gabapentin (NEURONTIN) 800 MG tablet Take 1 tablet by mouth 3 times daily for 360 days. 1/14/21 1/9/22 Yes Joshua Johnson MD   atenolol (TENORMIN) 25 MG tablet Take 0.5 tablets by mouth daily 11/25/20 11/20/21 Yes Joshua Johnson MD   rOPINIRole (REQUIP) 0.5 MG tablet Take 1 tablet by mouth daily 11/2/20  Yes Duncan Guadalupe MD   metFORMIN (GLUCOPHAGE) 500 MG tablet Take 1 tablet by mouth 2 times daily (with meals) 9/24/20  Yes Joshua Johnson MD   ARIPiprazole (ABILIFY) 15 MG tablet Take 1 tablet by mouth daily 8/27/20  Yes Hali Carver MD   buprenorphine-naloxone (SUBOXONE) 2-0.5 MG FILM SL film Place 1.5 Film under the tongue daily.  8/12/20  Yes Historical Provider, MD   pravastatin (PRAVACHOL) 40 MG tablet Take 1 tablet by mouth daily 8/27/20 11/20/21 Yes Duncan Dickerson MD   SYMBICORT 160-4.5 MCG/ACT AERO inhale 2 puffs by mouth twice a day Rinse mouth after use 7/7/20  Yes Casper Guadalupe MD   VORTIoxetine (TRINTELLIX) 10 MG TABS tablet Take 10 mg by mouth daily 9/20/19  Yes Historical Provider, MD   mometasone-formoterol (Clydene Buzzard) 200-5 MCG/ACT inhaler Inhale 2 puffs into the lungs 9/5/19  Yes Historical Provider, MD   nitroGLYCERIN (NITROSTAT) 0.4 MG SL tablet Place 1 tablet under the tongue every 5 minutes as needed for Chest pain 10/9/19  Yes Bruce Posada MD audible. Chest Wall:    No tenderness or deformity significant tenderness over the anterior chest especially on the second left intercostal space and intercostal cartilages. No pericardial friction is audible no pleural pericardial rub is audible. No gallops are audible. Heart:    Regular rate and rhythm, S1 and S2 normal, no murmur, rub        or gallop no rvh murmurs or gallops no pericardial friction rub                          Abdomen:                                                 Pulses:                                            Lymph nodes:                    Neurologic:                  Soft, non-tender, bowel sounds active all four quadrants,     no masses, no organomegaly         2+ and symmetric all extremities            Cervical, supraclavicular not enlarged or matted or tender      CNII-XII intact, normal strength 5/5 . Sensation grossly normal  and reflexes normal 2+  throughout     Clubbing No  Lower ext edema No1+   [] , 2 +  [] , 3+   []  Upper ext edema No       Musculoskeletal - no joint swelling or tenderness or synovitis               BP (!) 142/85   Pulse 89   Temp 97.2 °F (36.2 °C)   Wt 222 lb (100.7 kg)   SpO2 94%   BMI 30.11 kg/m²     CXR no recent chest x-ray      CT Scans  No new CT scan    Echo  No recent echo        Assessment  COPD  Persistent smoking  Sleep apnea syndrome not using CPAP  Obesity  Hypertension  Diabetes      Plan:  Her pulmonary status very stable COPD is under good control she will continue the bronchodilators. He has run out of bronchodilators and not been using any so her prescription for proair, Anoro and Dulera were refilled. Patient advised to contact the office and whenever she runs out of prescriptions. She was once again counseled regarding the need to give up smoking altogether. He is smoking half a pack. I did offer her pharmacological intervention she declined.   He encouraged to lose weight  We did discuss for the use of CPAP for the treatment of sleep apnea but she declined that as well. She will continue anti-antihypertensive therapy as before    Continue the treatment of diabetes as before. He was advised to get the Covid vaccine today. She already had Pneumovax    She already had flu vaccine. .    We will see her follow-up in few months. Patient advised to avoid hot humid weather. Advised to may wear a mask for prevention of Covid vaccine Covid infection    .

## 2021-06-21 ENCOUNTER — TELEPHONE (OUTPATIENT)
Dept: PULMONOLOGY | Age: 66
End: 2021-06-21

## 2021-06-21 NOTE — TELEPHONE ENCOUNTER
The patient called the office stating that her lights are cut off I called [employer] and got a letter fax to our office

## 2021-07-08 ENCOUNTER — OFFICE VISIT (OUTPATIENT)
Dept: PODIATRY | Age: 66
End: 2021-07-08

## 2021-07-08 VITALS — HEIGHT: 72 IN | WEIGHT: 222 LBS | BODY MASS INDEX: 30.07 KG/M2

## 2021-07-08 DIAGNOSIS — Z98.890 POST-OPERATIVE STATE: Primary | ICD-10-CM

## 2021-07-08 PROCEDURE — 99024 POSTOP FOLLOW-UP VISIT: CPT | Performed by: PODIATRIST

## 2021-07-13 NOTE — PROGRESS NOTES
[facility] PHYSICIANS  [facility]  [address] [place] [address]  Dept: [phone]  Dept Fax: [phone]    POST-OP PROGRESS NOTE  Date of patient's visit: 7/8/2021    Patient's Name:  Laly Moreno YOB: 1955            Patient Care Team:  Ghassan Hawkins MD as PCP - Marcus Helm MD as Consulting Physician (Gastroenterology)  Antonella Keller MD as Referring Physician (Internal Medicine)  Dalila Mercer MD as Consulting Physician (Pulmonology)  Keith Turner MD as Consulting Physician (Internal Medicine)  Una Goode DPM as Physician (Podiatry)  Brie Hull RN as Imaging Navigator        Chief Complaint   Patient presents with    Foot Pain           Subjective: Laly Moreno is a 61 y.o. female who presents to the office today 2month(s)  S/P mod mora and akin bunion with extensor tendon lengtheing for correction of right great toe was turning inward  Problem List Items Addressed This Visit     None      Visit Diagnoses     Post-operative state    -  Primary    Relevant Orders    XR FOOT RIGHT (MIN 3 VIEWS) (Completed)      . Patient relates pain is Absent  and improved. Pain is rated 0 out of 10 and is described as mild. Currently denies F/C/N/V. Is patient taking pain medications as prescribed and is controlling pain    Physical Examination:  Incision is coapted, sutures/steri-strips are intact. Minimal bleeding post operatively. Edema present. No erythema. No Pus. Operative correction is satisfactory. The great toe has slight lateral deviation  Radiographs:     Right foot 3 views:  Intact metallic implant with slight lateral deviation of the great toe on the metatarsal.  No loosening of the metatarsal hardware. Assessment: Laly Moreno is status post as above  Normal post operative course.   Doing well          ICD-10-CM    1. Post-operative state  Z98.890 XR FOOT RIGHT (MIN 3 VIEWS)         Plan:  Patient examined and evaluated. Current condition and treatment options discussed in detail. Advised pt to weightbear astolerated . Pt is very happy with the toe not grinding and casuing her pain. .  Verbal and written instructions given to patient. Orders:   Orders Placed This Encounter   Procedures    XR FOOT RIGHT (MIN 3 VIEWS)      Contact office with any questions/problems/concerns. RTC in 8week(s).      Electronically signed by Rahul Harris DPM on 7/13/2021 at 1:45 PM  7/8/2021

## 2021-07-14 ENCOUNTER — HOSPITAL ENCOUNTER (OUTPATIENT)
Age: 66
Discharge: HOME | End: 2021-07-14
Payer: MEDICARE

## 2021-07-14 PROCEDURE — 80307 DRUG TEST PRSMV CHEM ANLYZR: CPT

## 2021-08-10 ENCOUNTER — HOSPITAL ENCOUNTER (OUTPATIENT)
Age: 66
Discharge: HOME | End: 2021-08-10
Payer: MEDICARE

## 2021-08-10 ENCOUNTER — HOSPITAL ENCOUNTER (OUTPATIENT)
Age: 66
Setting detail: SPECIMEN
Discharge: HOME | End: 2021-08-10
Payer: MEDICARE

## 2021-09-07 ENCOUNTER — TELEPHONE (OUTPATIENT)
Dept: PULMONOLOGY | Age: 66
End: 2021-09-07

## 2021-09-07 NOTE — TELEPHONE ENCOUNTER
Patient called, states she called the office last week asking for our fax number to have [facility] fax the form to the office to hold on her cut off of electricity. We have not received that form yet. Patient has sleep apnea but refuses the sleep apnea machine. Patient is currently on oxygen, and does have medication to use in a nebulizer machine. Will await the form but will call in the meantime to put a hold on the account. Called #[phone] asking for a hold. They will fax the form.

## 2021-09-07 NOTE — TELEPHONE ENCOUNTER
Received the [employer] form gave to Steffen Saenz. Also received [facility]. This form has been refused and faxed back.

## 2021-09-15 ENCOUNTER — HOSPITAL ENCOUNTER (OUTPATIENT)
Age: 66
Discharge: HOME | End: 2021-09-15
Payer: MEDICARE

## 2021-09-15 PROCEDURE — 80307 DRUG TEST PRSMV CHEM ANLYZR: CPT

## 2021-09-16 ENCOUNTER — TELEPHONE (OUTPATIENT)
Dept: PULMONOLOGY | Age: 66
End: 2021-09-16

## 2021-09-16 NOTE — TELEPHONE ENCOUNTER
RECEIVED A PA NOTICE FROM CARIDAD MADSEN FOR ANORO. LOOKING INTO THE PT'S CHART IT LOOKS LIKE THE PT IS NOT ON ANORO ANYMORE. LOOKS LIKE IT WAS D/C'D IN June. THE INHALERS THAT THE PT IS ON IS DULERA AND INCRUSE IT LOOKS LIKE. I PHONED [facility] SPOKE WITH MALISSA LUNSFORD AND INFORMED HER OF THIS.

## 2021-09-17 ENCOUNTER — OFFICE VISIT (OUTPATIENT)
Dept: PULMONOLOGY | Age: 66
End: 2021-09-17
Payer: MEDICARE

## 2021-09-17 VITALS
WEIGHT: 208.4 LBS | HEART RATE: 81 BPM | TEMPERATURE: 98.1 F | HEIGHT: 72 IN | DIASTOLIC BLOOD PRESSURE: 85 MMHG | OXYGEN SATURATION: 93 % | RESPIRATION RATE: 18 BRPM | SYSTOLIC BLOOD PRESSURE: 130 MMHG | BODY MASS INDEX: 28.23 KG/M2

## 2021-09-17 DIAGNOSIS — J41.1 MUCOPURULENT CHRONIC BRONCHITIS (HCC): ICD-10-CM

## 2021-09-17 DIAGNOSIS — E66.9 OBESITY (BMI 35.0-39.9 WITHOUT COMORBIDITY): ICD-10-CM

## 2021-09-17 DIAGNOSIS — I10 ESSENTIAL HYPERTENSION: ICD-10-CM

## 2021-09-17 DIAGNOSIS — F17.210 CIGARETTE NICOTINE DEPENDENCE WITHOUT COMPLICATION: ICD-10-CM

## 2021-09-17 DIAGNOSIS — E11.8 TYPE 2 DIABETES MELLITUS WITH COMPLICATION (HCC): ICD-10-CM

## 2021-09-17 DIAGNOSIS — G47.33 OSA (OBSTRUCTIVE SLEEP APNEA): Primary | ICD-10-CM

## 2021-09-17 PROCEDURE — 99214 OFFICE O/P EST MOD 30 MIN: CPT | Performed by: INTERNAL MEDICINE

## 2021-09-17 PROCEDURE — 2022F DILAT RTA XM EVC RTNOPTHY: CPT | Performed by: INTERNAL MEDICINE

## 2021-09-17 PROCEDURE — 4004F PT TOBACCO SCREEN RCVD TLK: CPT | Performed by: INTERNAL MEDICINE

## 2021-09-17 PROCEDURE — G8926 SPIRO NO PERF OR DOC: HCPCS | Performed by: INTERNAL MEDICINE

## 2021-09-17 PROCEDURE — G8427 DOCREV CUR MEDS BY ELIG CLIN: HCPCS | Performed by: INTERNAL MEDICINE

## 2021-09-17 PROCEDURE — 3017F COLORECTAL CA SCREEN DOC REV: CPT | Performed by: INTERNAL MEDICINE

## 2021-09-17 PROCEDURE — 3023F SPIROM DOC REV: CPT | Performed by: INTERNAL MEDICINE

## 2021-09-17 PROCEDURE — 3046F HEMOGLOBIN A1C LEVEL >9.0%: CPT | Performed by: INTERNAL MEDICINE

## 2021-09-17 PROCEDURE — G8417 CALC BMI ABV UP PARAM F/U: HCPCS | Performed by: INTERNAL MEDICINE

## 2021-09-17 RX ORDER — ALBUTEROL SULFATE 90 UG/1
2 AEROSOL, METERED RESPIRATORY (INHALATION) EVERY 6 HOURS PRN
Qty: 18 G | Refills: 3 | Status: SHIPPED | OUTPATIENT
Start: 2021-09-17

## 2021-09-17 NOTE — PROGRESS NOTES
headaches, oral lesions or sore throat  Cardiovascular ROS: no chest pain , orthopnea or pnd   Gastrointestinal ROS: no abdominal pain, change in bowel habits, or black or bloody stools  Skin - no rash   Neuro - no blurry vision , no loc . No focal weakness   msk - no jt tenderness or swelling    Vascular - no claudication , rest completed and negative   Lymphatic - complete and negative   Hematology - oncology - complete and negative   Allergy immunology - complete and negative    no burning or hematuria       LUNG CANCER SCREENING     1. CRITERIA MET    [x]     CT ORDERED  [x]      2. CRITERIA NOT MET   []      3. REFUSED                    [x]        REASON CRITERIA NOT MET     1. SMOKING LESS THAN 30 PY  []      2. AGE LESS THAN 55 or GREATER 77 YEARS  []      3. QUIT SMOKING 15 YEARS OR GREATER   []      4. RECENT CT WITH IN 11 MONTHS    []      5. LIFE EXPECTANCY < 5 YEARS   []      6. SIGNS  AND SYMPTOMS OF LUNG CANCER   []           Immunization   Immunization History   Administered Date(s) Administered    Influenza Virus Vaccine 01/22/2014, 10/22/2015, 08/15/2016, 08/10/2017, 12/29/2017, 09/17/2018, 08/13/2019, 08/13/2019    Influenza Whole 07/30/2016    Influenza, Quadv, IM, (6 mo and older Fluzone, Flulaval, Fluarix and 3 yrs and older Afluria) 09/17/2018, 08/13/2019    Influenza, [place], IM, PF (6 mo and older Fluzone, Flulaval, Fluarix, and 3 yrs and older Afluria) 08/15/2016, 08/10/2017, 12/29/2017, 08/16/2019    PPD Test 11/30/2020, 12/08/2020    Pneumococcal Conjugate 13-valent ([id number]) 08/15/2016    Pneumococcal Polysaccharide ([id number]) 10/22/2015, 12/29/2017    Td, unspecified formulation 07/30/2016    Tdap (Boostrix, Adacel) 09/30/2016        Pneumococcal Vaccine she did not take her flu shot this year.     [x] Up to date    [] Indicated   [] Refused  [] Contraindicated       Influenza Vaccine   [x] Up to date    [] Indicated   [] Refused  [] Contraindicated     He is to take Covid vaccine  PAST MEDICAL HISTORY:         Diagnosis Date    RACHEL (acute kidney injury) ([facility] 01/22/2014    Arthritis     generalized    Bilateral chronic knee pain 5/13/2016    Bronchiectasis without complication ([facility]     Cancer ([facility] 2004    uterus    CHF (congestive heart failure) (HCC)     Chronic bilateral low back pain with right-sided sciatica 02/20/2017    Chronic bronchitis (HCC)     Chronic bronchitis (HCC)     Chronic respiratory failure with hypoxia (HCC)     Cigarette nicotine dependence without complication 4/40/2882    COPD (chronic obstructive pulmonary disease) (HCC)     on inhaler /  COPD with chronic bronchitis and emphysema    Current smoker on some days     Depression 10/30/2014    Diabetic polyneuropathy associated with type 2 diabetes mellitus ([facility]     Diverticulosis     Essential hypertension 12/30/2013    Full dentures     GERD (gastroesophageal reflux disease)     Hep C w/o coma, chronic (HCC)     Hyperlipidemia     Hyperplastic polyp of intestine     Hypertension     DR. MCDANIEL-CARDIO    Irritable bowel syndrome with both constipation and diarrhea 2/15/2019    Liver disease     hepatitis C    Moderate episode of recurrent major depressive disorder ([facility] 10/30/2014    Nasal polyposis     Noncompliance with CPAP treatment     Obesity (BMI 35.0-39.9 without comorbidity)     On home O2     2 liters PRN per pt    JIMENA (obstructive sleep apnea)     JIMENA on CPAP    Pacemaker 2012    Personal history of tobacco use, presenting hazards to health 4/2/2015    Pneumonia 07/2012    RECENTLY HOSPITALIZED    Primary insomnia 9/30/2016    Pulmonary edema cardiac cause (HCC)     Rectal bleeding     Smoking addiction     Tobacco abuse        Family History:       Problem Relation Age of Onset    Cancer Mother         lungs and brain    Stroke Father     Crohn's Disease Sister        SURGICAL HISTORY:   Past Surgical History:   Procedure Laterality Date    BACK SURGERY 2008    BUNIONECTOMY Right 4/30/2021    RIGHT FOOT 1ST MPJ ARTHROPLASTY WITH EXTENSOR HALLUCIS LONGUS LENGTHENING performed by Farrukh Quick DPM at [facility]       cath dec. 2013    COLON SURGERY      COLONOSCOPY      COLONOSCOPY  01/23/2015    severe diverticula    COLONOSCOPY  09/20/2016    HYPERPLASTIC POLYP X 2     COLONOSCOPY N/A 03/14/2019    COLONOSCOPY DIAGNOSTIC performed by [facility] at [facility]    COLONOSCOPY N/A 01/09/2020    COLONOSCOPY WITH BIOPSY performed by Adriane Stevens MD at [place] [id number], COLON, DIAGNOSTIC      HERNIA REPAIR      UMBILICAL   [address]  09/24/2013    decompression & re-exploration L3-4, L4-5    PACEMAKER INSERTION      for very slow heart beat (per patient)    PACEMAKER PLACEMENT      SIGMOIDOSCOPY N/A 06/26/2015    flexable sigmoidscopy,HYPERPLASTIC POLYP    TONSILLECTOMY      UPPER GASTROINTESTINAL ENDOSCOPY N/A 03/14/2019    EGD BIOPSY performed by Adriane Stevens MD at [facility]              Not in a hospital admission. Allergies   Allergen Reactions    Iv Dye [Iodides] Hives     Social History     Tobacco Use   Smoking Status Current Every Day Smoker    Packs/day: 1.00    Years: 40.00    Pack years: 40.00    Types: Cigarettes    Start date: 1969   Smokeless Tobacco Never Used     Prior to Admission medications    Medication Sig Start Date End Date Taking?  Authorizing Provider   Fluticasone furoate-vilanterol (BREO ELLIPTA) 200-25 MCG/INH AEPB inhaler Inhale 2 puffs into the lungs daily 9/17/21 10/17/21 Yes Iliana Ram MD   tiotropium (SPIRIVA RESPIMAT) 2.5 MCG/ACT AERS inhaler Inhale 2 puffs into the lungs daily 9/17/21 10/17/21 Yes Iliana Ram MD   albuterol sulfate HFA (PROAIR HFA) 108 (90 Base) MCG/ACT inhaler Inhale 2 puffs into the lungs every 6 hours as needed for Wheezing 9/17/21  Yes Iliana Ram MD   albuterol sulfate HFA (VENTOLIN HFA) 108 (90 Base) MCG/ACT inhaler Inhale 2 puffs into the lungs 4 times daily as needed for Wheezing 5/28/21  Yes Chasity Moreno MD   mometasone-formoterol (DULERA) 200-5 MCG/ACT inhaler Inhale 1 puff into the lungs 2 times daily 5/28/21  Yes Chasity Moreno MD   albuterol (PROVENTIL) (2.5 MG/3ML) 0.083% nebulizer solution inhale contents of 1 vial ( 3 milliliters ) in nebulizer by mouth and INTO THE LUNGS every 6 hours 4/20/21  Yes Historical Provider, MD   ondansetron (ZOFRAN-ODT) 4 MG disintegrating tablet dissolve 1 tablet ON TONGUE EVERY 4 TO 6 HOURS if needed for nausea 4/20/21  Yes Historical Provider, MD   acetaminophen (TYLENOL) 500 MG tablet Take 1,000 mg by mouth every 6 hours as needed for Pain   Yes Historical Provider, MD   tiZANidine (ZANAFLEX) 2 MG tablet Take 1 tablet by mouth every 8 hours as needed (back pain) 4/8/21  Yes Nelson Guadalupe MD   hydroCHLOROthiazide (MICROZIDE) 12.5 MG capsule take 1 capsule by mouth every morning 3/8/21 3/3/22 Yes Casper Guadalupe MD   pantoprazole (PROTONIX) 40 MG tablet take 1 tablet by mouth once daily 3/8/21  Yes Nelson Guadalupe MD   traZODone (DESYREL) 100 MG tablet take 1 tablet by mouth AT NIGHT 3/8/21  Yes Casper Guadalupe MD   ONETOUCH ULTRA strip use 1 TEST STRIP to TEST BLOOD SUGAR four times a day if needed 2/8/21  Yes Casper Guadalupe MD   dicyclomine (BENTYL) 10 MG capsule Take 1 capsule by mouth 4 times daily (before meals and nightly) 1/25/21  Yes Milton Murguia MD   linaclotide (LINZESS) 290 MCG CAPS capsule Take 1 capsule by mouth every morning (before breakfast) 1/19/21  Yes Milton Murguia MD   gabapentin (NEURONTIN) 800 MG tablet Take 1 tablet by mouth 3 times daily for 360 days.  1/14/21 1/9/22 Yes Lula Callaway MD   atenolol (TENORMIN) 25 MG tablet Take 0.5 tablets by mouth daily 11/25/20 11/20/21 Yes Lula Callaway MD   varenicline (CHANTIX STARTING MONTH JACE) 0.5 MG X 11 & 1 MG X 42 tablet Take by mouth. 11/25/20  Yes Erum Armijo MD   rOPINIRole (REQUIP) 0.5 MG tablet Take 1 tablet by mouth daily 11/2/20  Yes Nancy Guadalupe MD   metFORMIN (GLUCOPHAGE) 500 MG tablet Take 1 tablet by mouth 2 times daily (with meals) 9/24/20  Yes Erum Armijo MD   ARIPiprazole (ABILIFY) 15 MG tablet Take 1 tablet by mouth daily 8/27/20  Yes Lalit Penn MD   buprenorphine-naloxone (SUBOXONE) 2-0.5 MG FILM SL film Place 1.5 Film under the tongue daily.  8/12/20  Yes Historical Provider, MD   pravastatin (PRAVACHOL) 40 MG tablet Take 1 tablet by mouth daily 8/27/20 11/20/21 Yes [facility] Jamil Mohamud MD   SYMBICORT 160-4.5 MCG/ACT AERO inhale 2 puffs by mouth twice a day Rinse mouth after use 7/7/20  Yes Casper Guadalupe MD   potassium chloride (KLOR-CON M) 20 MEQ extended release tablet Take 20 mEq by mouth daily  5/29/19  Yes Historical Provider, MD   VORTIoxetine (TRINTELLIX) 10 MG TABS tablet Take 10 mg by mouth daily 9/20/19  Yes Historical Provider, MD   mometasone-formoterol ([place]) 200-5 MCG/ACT inhaler Inhale 2 puffs into the lungs 9/5/19  Yes Historical Provider, MD   nitroGLYCERIN (NITROSTAT) 0.4 MG SL tablet Place 1 tablet under the tongue every 5 minutes as needed for Chest pain 10/9/19  Yes Richi Marion MD   Umeclidinium Bromide (INCRUSE ELLIPTA) 62.5 MCG/INH AEPB Inhale 1 puff into the lungs daily 9/17/19  Yes Dennis Moreno MD   furosemide (LASIX) 20 MG tablet Take 1 tablet by mouth daily for 7 days 5/11/21 5/18/21  Kristin Diaz DPM   furosemide (LASIX) 20 MG tablet Take 1 tablet by mouth 2 times daily 9/24/20 2/21/21  Erum Armijo MD   lubiprostone (AMITIZA) 24 MCG capsule Take 1 capsule by mouth 2 times daily (with meals)  Patient not taking: Reported on 1/19/2021 5/28/20 11/25/20  Clara Gallegos MD   OneTouch Delica Lancets 91X MISC Apply 1 Units topically 2 times daily USE 2 TO 3 TIMES DAILY AS DIRECTED 4/8/20 9/5/20  Casper Shah MD         Physical Exam  General Appearance:    Alert, cooperative, no distress, appears stated age, morbid obesity, no distress, not using accessory muscles. Very pleasant obese not excessively using accessory muscles no fever no distress   Head:    Normocephalic, without obvious abnormality, atraumatic   Eye examination revealed no jaundice. No Diego's syndrome. Nose revealed no polyps. No sinus tenderness. Throat examination unremarkable. Ear examination is unremarkable.                 :    Neck:   Supple, symmetrical, trachea midline, no adenopathy;     thyroid:  no enlargement/tenderness/nodules; no carotid    bruit or JVD none. Hepatojugular reflux is not elevated neck is short and fat   Back:     Symmetric, no curvature, ROM normal, no CVA tenderness   Lungs:       Lv is increased. Percussion note is hyperresonant. Expiration is prolonged. No rales or rhonchi are audible. No pleural friction rub is audible. Chest Wall:    No tenderness or deformity significant tenderness over the anterior chest especially on the second left intercostal space and intercostal cartilages. No pericardial friction is audible no pleural pericardial rub is audible. No gallops are audible. Heart:    Regular rate and rhythm, S1 and S2 normal, no murmur, rub        or gallop no rvh murmurs or gallops no pericardial friction rub                          Abdomen:                                                 Pulses:                                            Lymph nodes:                    Neurologic:                  Soft, non-tender, bowel sounds active all four quadrants,     no masses, no organomegaly         2+ and symmetric all extremities            Cervical, supraclavicular not enlarged or matted or tender      CNII-XII intact, normal strength 5/5 .   Sensation grossly normal  and reflexes normal 2+  throughout     Clubbing No  Lower ext edema No1+   [] , 2 +  [] , 3+   []  Upper ext edema No       Musculoskeletal - no joint swelling or tenderness or synovitis               /85 (Site: Left Upper Arm, Position: Sitting, Cuff Size: Medium Adult)   Pulse 81   Temp 98.1 °F (36.7 °C) (Temporal)   Resp 18   Ht 6' (1.829 m)   Wt 208 lb 6.4 oz (94.5 kg)   SpO2 93% Comment: rm air at rest  BMI 28.26 kg/m²     CXR no recent chest x-ray      CT Scans  No new CT scan    Echo  No recent echo        Assessment  COPD  Persistent smoking  Sleep apnea syndrome not using CPAP  Obesity  Hypertension  Diabetes      Plan:  COPD is under good control. No evidence of acute exacerbation no evidence of any chest infection she will continue bronchodilators as before. He is using Covid he using Combivent Symbicort Symbicort and she is using proair. No evidence of acute exacerbation she did not have to go to the hospital or urgent care or ER since her last visit. Her blood pressure is under good control he will continue the same treatment    Her glucose is also under good control she will continue the same therapy    Arrange for her to go to the sleep center for retry it to further reevaluation and retitration for the CPAP or BiPAP. Treatment of sleep apnea will improve her daytime symptoms and also improve the outcome of hypertension diabetes and COPD. She already had the Covid vaccine. She had a CT screen done earlier this year which did not reveal any nodules will cover her other one next year. Patient questions were answered. She is not taking any oxygen at home. [place] Miranda

## 2021-09-29 ENCOUNTER — HOSPITAL ENCOUNTER (OUTPATIENT)
Age: 66
Discharge: HOME | End: 2021-09-29
Payer: MEDICARE

## 2021-09-29 PROCEDURE — 80307 DRUG TEST PRSMV CHEM ANLYZR: CPT

## 2021-10-14 ENCOUNTER — TELEPHONE (OUTPATIENT)
Dept: PODIATRY | Age: 66
End: 2021-10-14

## 2021-10-14 NOTE — TELEPHONE ENCOUNTER
Patient called in requesting a sooner appointment than 11/2. She missed her appointment this morning because she just got home from being out of town. She sated that her toenails are so long that she can not put her shoes on. Please contact the patient at [phone] to advise. Thank you.

## 2021-10-14 NOTE — TELEPHONE ENCOUNTER
Called patient informed patient we have no sooner appointments. Patient verbalized understanding.  Brandi Solis

## 2021-10-21 ENCOUNTER — OFFICE VISIT (OUTPATIENT)
Dept: PRIMARY CARE CLINIC | Age: 66
End: 2021-10-21
Payer: MEDICARE

## 2021-10-21 ENCOUNTER — TELEPHONE (OUTPATIENT)
Dept: PRIMARY CARE CLINIC | Age: 66
End: 2021-10-21

## 2021-10-21 ENCOUNTER — HOSPITAL ENCOUNTER (OUTPATIENT)
Age: 66
Setting detail: SPECIMEN
Discharge: HOME | End: 2021-10-21

## 2021-10-21 VITALS
OXYGEN SATURATION: 96 % | DIASTOLIC BLOOD PRESSURE: 73 MMHG | HEART RATE: 74 BPM | TEMPERATURE: 97.3 F | SYSTOLIC BLOOD PRESSURE: 119 MMHG | BODY MASS INDEX: 27.94 KG/M2 | WEIGHT: 206 LBS

## 2021-10-21 DIAGNOSIS — E11.42 TYPE 2 DIABETES MELLITUS WITH DIABETIC POLYNEUROPATHY, WITHOUT LONG-TERM CURRENT USE OF INSULIN (HCC): ICD-10-CM

## 2021-10-21 DIAGNOSIS — G25.81 RLS (RESTLESS LEGS SYNDROME): ICD-10-CM

## 2021-10-21 DIAGNOSIS — I50.32 CHRONIC DIASTOLIC CONGESTIVE HEART FAILURE (HCC): ICD-10-CM

## 2021-10-21 DIAGNOSIS — J41.1 MUCOPURULENT CHRONIC BRONCHITIS (HCC): ICD-10-CM

## 2021-10-21 DIAGNOSIS — Z13.220 SCREENING FOR HYPERLIPIDEMIA: ICD-10-CM

## 2021-10-21 DIAGNOSIS — R35.0 URINARY FREQUENCY: ICD-10-CM

## 2021-10-21 DIAGNOSIS — E11.8 TYPE 2 DIABETES WITH COMPLICATION (HCC): Primary | ICD-10-CM

## 2021-10-21 DIAGNOSIS — E78.00 PURE HYPERCHOLESTEROLEMIA: ICD-10-CM

## 2021-10-21 DIAGNOSIS — F33.1 MODERATE EPISODE OF RECURRENT MAJOR DEPRESSIVE DISORDER (HCC): ICD-10-CM

## 2021-10-21 DIAGNOSIS — I10 ESSENTIAL HYPERTENSION: ICD-10-CM

## 2021-10-21 LAB
BILIRUBIN, POC: NEGATIVE
BLOOD URINE, POC: NEGATIVE
CLARITY, POC: ABNORMAL
COLOR, POC: ABNORMAL
GLUCOSE URINE, POC: NEGATIVE
HBA1C MFR BLD: 6 %
KETONES, POC: NEGATIVE
LEUKOCYTE EST, POC: ABNORMAL
NITRITE, POC: NEGATIVE
PH, POC: 6
PROTEIN, POC: ABNORMAL
SPECIFIC GRAVITY, POC: 1.02
UROBILINOGEN, POC: 0.2

## 2021-10-21 PROCEDURE — 3017F COLORECTAL CA SCREEN DOC REV: CPT | Performed by: NURSE PRACTITIONER

## 2021-10-21 PROCEDURE — 81003 URINALYSIS AUTO W/O SCOPE: CPT | Performed by: NURSE PRACTITIONER

## 2021-10-21 PROCEDURE — 4004F PT TOBACCO SCREEN RCVD TLK: CPT | Performed by: NURSE PRACTITIONER

## 2021-10-21 PROCEDURE — 83036 HEMOGLOBIN GLYCOSYLATED A1C: CPT | Performed by: NURSE PRACTITIONER

## 2021-10-21 PROCEDURE — G8427 DOCREV CUR MEDS BY ELIG CLIN: HCPCS | Performed by: NURSE PRACTITIONER

## 2021-10-21 PROCEDURE — G8484 FLU IMMUNIZE NO ADMIN: HCPCS | Performed by: NURSE PRACTITIONER

## 2021-10-21 PROCEDURE — 99214 OFFICE O/P EST MOD 30 MIN: CPT | Performed by: NURSE PRACTITIONER

## 2021-10-21 PROCEDURE — 2022F DILAT RTA XM EVC RTNOPTHY: CPT | Performed by: NURSE PRACTITIONER

## 2021-10-21 PROCEDURE — G8417 CALC BMI ABV UP PARAM F/U: HCPCS | Performed by: NURSE PRACTITIONER

## 2021-10-21 PROCEDURE — 3023F SPIROM DOC REV: CPT | Performed by: NURSE PRACTITIONER

## 2021-10-21 PROCEDURE — G8926 SPIRO NO PERF OR DOC: HCPCS | Performed by: NURSE PRACTITIONER

## 2021-10-21 PROCEDURE — 3044F HG A1C LEVEL LT 7.0%: CPT | Performed by: NURSE PRACTITIONER

## 2021-10-21 RX ORDER — GABAPENTIN 800 MG/1
800 TABLET ORAL 3 TIMES DAILY
Qty: 270 TABLET | Refills: 0 | Status: SHIPPED | OUTPATIENT
Start: 2021-10-21 | End: 2022-01-12

## 2021-10-21 RX ORDER — PRAVASTATIN SODIUM 40 MG
40 TABLET ORAL DAILY
Qty: 90 TABLET | Refills: 4 | Status: SHIPPED | OUTPATIENT
Start: 2021-10-21 | End: 2022-01-18 | Stop reason: SDUPTHER

## 2021-10-21 RX ORDER — ROPINIROLE 0.5 MG/1
0.5 TABLET, FILM COATED ORAL DAILY
Qty: 90 TABLET | Refills: 3 | Status: SHIPPED | OUTPATIENT
Start: 2021-10-21 | End: 2022-01-18 | Stop reason: SDUPTHER

## 2021-10-21 RX ORDER — FUROSEMIDE 20 MG/1
20 TABLET ORAL 2 TIMES DAILY
Qty: 60 TABLET | Refills: 4 | Status: SHIPPED | OUTPATIENT
Start: 2021-10-21 | End: 2022-01-18 | Stop reason: SDUPTHER

## 2021-10-21 SDOH — ECONOMIC STABILITY: FOOD INSECURITY: WITHIN THE PAST 12 MONTHS, YOU WORRIED THAT YOUR FOOD WOULD RUN OUT BEFORE YOU GOT MONEY TO BUY MORE.: NEVER TRUE

## 2021-10-21 SDOH — ECONOMIC STABILITY: FOOD INSECURITY: WITHIN THE PAST 12 MONTHS, THE FOOD YOU BOUGHT JUST DIDN'T LAST AND YOU DIDN'T HAVE MONEY TO GET MORE.: NEVER TRUE

## 2021-10-21 ASSESSMENT — ENCOUNTER SYMPTOMS
TROUBLE SWALLOWING: 0
ABDOMINAL PAIN: 0
CHOKING: 0
COUGH: 0
BLOOD IN STOOL: 0
DIARRHEA: 0
WHEEZING: 0
SHORTNESS OF BREATH: 0
VOMITING: 0

## 2021-10-21 ASSESSMENT — PATIENT HEALTH QUESTIONNAIRE - PHQ9
SUM OF ALL RESPONSES TO PHQ QUESTIONS 1-9: 0
2. FEELING DOWN, DEPRESSED OR HOPELESS: 0
SUM OF ALL RESPONSES TO PHQ9 QUESTIONS 1 & 2: 0
SUM OF ALL RESPONSES TO PHQ QUESTIONS 1-9: 0
SUM OF ALL RESPONSES TO PHQ QUESTIONS 1-9: 0
1. LITTLE INTEREST OR PLEASURE IN DOING THINGS: 0

## 2021-10-21 ASSESSMENT — SOCIAL DETERMINANTS OF HEALTH (SDOH): HOW HARD IS IT FOR YOU TO PAY FOR THE VERY BASICS LIKE FOOD, HOUSING, MEDICAL CARE, AND HEATING?: NOT HARD AT ALL

## 2021-10-21 NOTE — PROGRESS NOTES
[facility]  [id number] [facility] [address]  Dept: [phone]  Dept Fax: [phone]    Patient Care Team:  MARCIA Armstrong - CNP as PCP - General (Family Medicine)  Joselin Sharpe MD as Consulting Physician (Gastroenterology)  Mbonu Adelaide Boeck, MD as Referring Physician (Internal Medicine)  Aquiles Becker MD as Consulting Physician (Pulmonology)  Connor Hoover MD as Consulting Physician (Internal Medicine)  Eve Stanley DPM as Physician (Podiatry)  Renaldo Hernandez RN as Imaging Navigator    10/21/2021    Marco Antonio Schneider (:  1955) anjelica 61 y.o. female, here for evaluation of the following medical concerns:   Chief Complaint   Patient presents with    New Patient     Est.Daya Schneider is a 68-year-old female here today to establish care. Last PCP visit in 2021 via virtual visit. She is a current smoker with history of hypertension and congestive heart failure as well as type 2 diabetes, COPD, IBS, and GERD. Follows with Dr. Isauro James, pulmonology, for her COPD and sleep apnea. Extensor hallux longus lengthening and arthroplasty of first metatarsal phalangeal of right foot completed by podiatry in 2021        Marco Antonio Schneider is due today and requesting medication refills, states she is been out of a few of her medications. Requesting gabapentin for diabetic nerve pain. She is currently taking her blood pressure medication and her Lasix. Also takes mental health medications, prescribed by psychiatry. Pt has pacemaker, follows with [facility] and goes back to them on 21    Dr Ade Nichole for GERD and IBS    Dr PALACIOS for her mental health medication    Eye appt today    Diabetes  She presents for her follow-up diabetic visit. She has type 2 diabetes mellitus. There are no hypoglycemic associated symptoms.  Pertinent negatives for hypoglycemia include no headaches, nervousness/anxiousness or seizures. Pertinent negatives for diabetes include no chest pain, no polydipsia and no polyuria. She has not had a previous visit with a dietitian. Her overall blood glucose range is >200 mg/dl. An ACE inhibitor/angiotensin II receptor blocker is not being taken. Hypertension  Pertinent negatives include no chest pain, headaches, neck pain, palpitations or shortness of breath. Urinary Frequency   This is a new problem. The current episode started in the past 7 days. The patient is experiencing no pain. There has been no fever. Associated symptoms include frequency and urgency. Pertinent negatives include no discharge, hematuria, hesitancy or vomiting. Associated symptoms comments: Incomplete emptying. There is no history of kidney stones or recurrent UTIs. Review of Systems   Constitutional: Negative for appetite change, fever and unexpected weight change. HENT: Negative for hearing loss and trouble swallowing. Eyes: Negative for visual disturbance. Respiratory: Negative for cough, choking, shortness of breath and wheezing. Cardiovascular: Positive for leg swelling. Negative for chest pain and palpitations. Gastrointestinal: Negative for abdominal pain, blood in stool, diarrhea and vomiting. Endocrine: Negative for polydipsia and polyuria. Genitourinary: Positive for frequency and urgency. Negative for dysuria, genital sores, hematuria, hesitancy, pelvic pain and vaginal bleeding. Musculoskeletal: Negative for myalgias and neck pain. Skin: Negative for rash and wound. Neurological: Negative for seizures, syncope, numbness and headaches. Psychiatric/Behavioral: Negative for suicidal ideas. The patient is not nervous/anxious. Prior to Visit Medications    Medication Sig Taking?  Authorizing Provider   furosemide (LASIX) 20 MG tablet Take 1 tablet by mouth 2 times daily Yes Creed Expose, APRN - CNP   rOPINIRole (REQUIP) 0.5 MG tablet Take 1 tablet by mouth daily Yes MARCIA Cheung CNP   pravastatin (PRAVACHOL) 40 MG tablet Take 1 tablet by mouth daily Yes MARCIA Cheung CNP   metFORMIN (GLUCOPHAGE) 500 MG tablet Take 1 tablet by mouth 2 times daily (with meals) Yes MARCIA Cheung CNP   gabapentin (NEURONTIN) 800 MG tablet Take 1 tablet by mouth 3 times daily for 360 days.  Yes MARCIA Cheung CNP   tiotropium (SPIRIVA RESPIMAT) 2.5 MCG/ACT AERS inhaler Inhale 2 puffs into the lungs daily Yes Lalit Shepherd MD   albuterol sulfate HFA (PROAIR HFA) 108 (90 Base) MCG/ACT inhaler Inhale 2 puffs into the lungs every 6 hours as needed for Wheezing Yes Lalit Shepherd MD   albuterol sulfate HFA (VENTOLIN HFA) 108 (90 Base) MCG/ACT inhaler Inhale 2 puffs into the lungs 4 times daily as needed for Wheezing Yes Lalit Shepherd MD   mometasone-formoterol (DULERA) 200-5 MCG/ACT inhaler Inhale 1 puff into the lungs 2 times daily Yes Lalit Shepherd MD   albuterol (PROVENTIL) (2.5 MG/3ML) 0.083% nebulizer solution inhale contents of 1 vial ( 3 milliliters ) in nebulizer by mouth and INTO THE LUNGS every 6 hours Yes Historical Provider, MD   ondansetron (ZOFRAN-ODT) 4 MG disintegrating tablet dissolve 1 tablet ON TONGUE EVERY 4 TO 6 HOURS if needed for nausea Yes Historical Provider, MD   tiZANidine (ZANAFLEX) 2 MG tablet Take 1 tablet by mouth every 8 hours as needed (back pain) Yes Casper Guadalupe MD   hydroCHLOROthiazide (MICROZIDE) 12.5 MG capsule take 1 capsule by mouth every morning Yes Casper Guadalupe MD   pantoprazole (PROTONIX) 40 MG tablet take 1 tablet by mouth once daily Yes Joie Guadalupe MD   traZODone (DESYREL) 100 MG tablet take 1 tablet by mouth AT NIGHT Yes Casper Guadalupe MD   ONETOUCH ULTRA strip use 1 TEST STRIP to TEST BLOOD SUGAR four times a day if needed Yes Casper Guadalupe MD   dicyclomine (BENTYL) 10 MG capsule Take 1 capsule by mouth 4 times daily (before meals and nightly) Yes Trace Gonzalez MD   linaclotide (LINZESS) 290 MCG CAPS capsule Take 1 capsule by mouth every morning (before breakfast) Yes Trace Gonzalez MD   atenolol (TENORMIN) 25 MG tablet Take 0.5 tablets by mouth daily Yes Ericka Ruffin MD   ARIPiprazole (ABILIFY) 15 MG tablet Take 1 tablet by mouth daily Yes Tammy Guadalupe MD   buprenorphine-naloxone (SUBOXONE) 2-0.5 MG FILM SL film Place 1.5 Film under the tongue daily. Yes Historical Provider, MD   SYMBICORT 160-4.5 MCG/ACT AERO inhale 2 puffs by mouth twice a day Rinse mouth after use Yes Sg Stein MD   potassium chloride (KLOR-CON M) 20 MEQ extended release tablet Take 20 mEq by mouth daily  Yes Historical Provider, MD   VORTIoxetine (TRINTELLIX) 10 MG TABS tablet Take 10 mg by mouth daily Yes Historical Provider, MD Black Delica Lancets 33X MISC Apply 1 Units topically 2 times daily USE 2 TO 3 TIMES DAILY AS DIRECTED Yes Casper Guadalupe MD   nitroGLYCERIN (NITROSTAT) 0.4 MG SL tablet Place 1 tablet under the tongue every 5 minutes as needed for Chest pain Yes Meli Minor MD   Umeclidinium Bromide (INCRUSE ELLIPTA) 62.5 MCG/INH AEPB Inhale 1 puff into the lungs daily Yes Nils Reyes MD   acetaminophen (TYLENOL) 500 MG tablet Take 1,000 mg by mouth every 6 hours as needed for Pain  Patient not taking: Reported on 10/21/2021  Historical Provider, MD   varenicline (CHANTIX STARTING MONTH PAK) 0.5 MG X 11 & 1 MG X 42 tablet Take by mouth.   Patient not taking: Reported on 10/21/2021  Ericka Ruffin MD        Allergies   Allergen Reactions    Iv Dye [Iodides] Hives       Past Medical History:   Diagnosis Date    RACHEL (acute kidney injury) ([facility] [address] 01/22/2014    Arthritis     generalized    Bilateral chronic knee pain 5/13/2016    Bronchiectasis without complication ([facility]     Cancer ([facility] [address] [facility] 2004    uterus    CHF (congestive heart failure) ([facility] [facility]     Chronic bilateral low back pain with right-sided sciatica 02/20/2017    Chronic bronchitis (HCC)     Chronic bronchitis (HCC)     Chronic respiratory failure with hypoxia (HCC)     Cigarette nicotine dependence without complication 2/50/6708    COPD (chronic obstructive pulmonary disease) (HCC)     on inhaler /  COPD with chronic bronchitis and emphysema    Current smoker on some days     Depression 10/30/2014    Diabetic polyneuropathy associated with type 2 diabetes mellitus ([address]     Diverticulosis     Essential hypertension 12/30/2013    Full dentures     GERD (gastroesophageal reflux disease)     Hep C w/o coma, chronic (HCC)     Hyperlipidemia     Hyperplastic polyp of intestine     Hypertension     DR. MCDANIEL-CARDIO    Irritable bowel syndrome with both constipation and diarrhea 2/15/2019    Liver disease     hepatitis C    Moderate episode of recurrent major depressive disorder ([address] 10/30/2014    Nasal polyposis     Noncompliance with CPAP treatment     Obesity (BMI 35.0-39.9 without comorbidity)     On home O2     2 liters PRN per pt    JIMENA (obstructive sleep apnea)     JIMENA on CPAP    Pacemaker 2012    Personal history of tobacco use, presenting hazards to health 4/2/2015    Pneumonia 07/2012    RECENTLY HOSPITALIZED    Primary insomnia 9/30/2016    Pulmonary edema cardiac cause ([address]     Rectal bleeding     Smoking addiction     Tobacco abuse        Past Surgical History:   Procedure Laterality Date    BACK SURGERY  2008    BUNIONECTOMY Right 4/30/2021    RIGHT FOOT 1ST MPJ ARTHROPLASTY WITH EXTENSOR HALLUCIS LONGUS LENGTHENING performed by Ryan Minaya DPM at [address] [place] [address],[address]       [facility] dec. 2013    COLON SURGERY      COLONOSCOPY      COLONOSCOPY  01/23/2015    severe diverticula    COLONOSCOPY  09/20/2016    HYPERPLASTIC POLYP X 2     COLONOSCOPY N/A 03/14/2019    COLONOSCOPY DIAGNOSTIC performed by Sis Cao MD at [address] [place] [address] COLONOSCOPY N/A 01/09/2020    COLONOSCOPY WITH BIOPSY performed by Julia Schaefer MD at [address], COLON, DIAGNOSTIC      HERNIA REPAIR      UMBILICAL    HYSTERECTOMY     [address] SURGERY  09/24/2013    decompression & re-exploration L3-4, L4-5    PACEMAKER INSERTION      for very slow heart beat (per patient)    PACEMAKER PLACEMENT      SIGMOIDOSCOPY N/A 06/26/2015    flexable sigmoidscopy,HYPERPLASTIC POLYP    TONSILLECTOMY      UPPER GASTROINTESTINAL ENDOSCOPY N/A 03/14/2019    EGD BIOPSY performed by Julia Schaefer MD at [facility]       Social History     Socioeconomic History    Marital status: Single     Spouse name: Not on file    Number of children: Not on file    Years of education: Not on file    Highest education level: Not on file   Occupational History    Not on file   Tobacco Use    Smoking status: Current Every Day Smoker     Packs/day: 1.00     Years: 40.00     Pack years: 40.00     Types: Cigarettes     Start date: 1969    Smokeless tobacco: Never Used   Vaping Use    Vaping Use: Never used   Substance and Sexual Activity    Alcohol use: No     Alcohol/week: 0.0 standard drinks    Drug use: No     Comment:  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    Sexual activity: Never   Other Topics Concern    Not on file   Social History Narrative    Not on file     Social Determinants of Health     Financial Resource Strain: Low Risk     Difficulty of Paying Living Expenses: Not hard at all   Food Insecurity: No Food Insecurity    Worried About [address] in the Last Year: Never true    [facility] in the Last Year: Never true   Transportation Needs:     Lack of Transportation (Medical):      Lack of Transportation (Non-Medical):    Physical Activity:     Days of Exercise per Week:     Minutes of Exercise per Session:    Stress:     Feeling of Stress :    Social Connections:     Frequency of Communication with Friends and Family:     Frequency of Social Gatherings with Friends and Family:     Attends Orthodox Services:     Active Member of Clubs or Organizations:     Attends Club or Organization Meetings:     Marital Status:    Intimate Partner Violence:     Fear of Current or Ex-Partner:     Emotionally Abused:     Physically Abused:     Sexually Abused:         Family History   Problem Relation Age of Onset    Cancer Mother         lungs and brain    Stroke Father     Crohn's Disease Sister        Vitals:    10/21/21 1000   BP: 119/73   Site: Left Upper Arm   Position: Sitting   Pulse: 74   Temp: 97.3 °F (36.3 °C)   TempSrc: Temporal   SpO2: 96%   Weight: 206 lb (93.4 kg)     Estimated body mass index is 27.94 kg/m² as calculated from the following:    Height as of 9/17/21: 6' (1.829 m). Weight as of this encounter: 206 lb (93.4 kg). Physical Exam  Vitals reviewed. Constitutional:       Appearance: Normal appearance. HENT:      Head: Normocephalic and atraumatic. Right Ear: External ear normal.      Left Ear: External ear normal.      Mouth/Throat:      Mouth: Mucous membranes are moist.   Eyes:      Extraocular Movements: Extraocular movements intact. Conjunctiva/sclera: Conjunctivae normal.   Cardiovascular:      Rate and Rhythm: Normal rate and regular rhythm. Heart sounds: Normal heart sounds. No murmur heard. Pulmonary:      Effort: Pulmonary effort is normal. No respiratory distress. Breath sounds: Normal breath sounds. No rhonchi or rales. Abdominal:      General: Abdomen is flat. There is no distension. Palpations: Abdomen is soft. Tenderness: There is no abdominal tenderness. Musculoskeletal:         General: No deformity. Normal range of motion. Cervical back: Normal range of motion and neck supple. No rigidity. Right lower leg: No edema. Left lower leg: No edema. Skin:     General: Skin is warm and dry. Neurological:      General: No focal deficit present.       Mental Status: She is alert. Mental status is at baseline. Psychiatric:         Mood and Affect: Mood normal.         ASSESSMENT     Diagnosis Orders   1. Type 2 diabetes with complication (HCC)  POCT glycosylated hemoglobin (Hb A1C)    metFORMIN (GLUCOPHAGE) 500 MG tablet    Comprehensive Metabolic Panel, Fasting   2. Screening for hyperlipidemia  Lipid, Fasting   3. Essential hypertension  Comprehensive Metabolic Panel, Fasting   4. Chronic diastolic congestive heart failure (HCC)  furosemide (LASIX) 20 MG tablet   5. Mucopurulent chronic bronchitis ([facility] [facility]     6. Moderate episode of recurrent major depressive disorder ([facility] [address] [facility]     7. RLS (restless legs syndrome)  rOPINIRole (REQUIP) 0.5 MG tablet   8. Pure hypercholesterolemia  pravastatin (PRAVACHOL) 40 MG tablet   9. Type 2 diabetes mellitus with diabetic polyneuropathy, without long-term current use of insulin ([facility])  gabapentin (NEURONTIN) 800 MG tablet   10. Urinary frequency  POCT Urinalysis No Micro (Auto)          PLAN:  Orders Placed This Encounter   Medications    furosemide (LASIX) 20 MG tablet     Sig: Take 1 tablet by mouth 2 times daily     Dispense:  60 tablet     Refill:  4    rOPINIRole (REQUIP) 0.5 MG tablet     Sig: Take 1 tablet by mouth daily     Dispense:  90 tablet     Refill:  3    pravastatin (PRAVACHOL) 40 MG tablet     Sig: Take 1 tablet by mouth daily     Dispense:  90 tablet     Refill:  4    metFORMIN (GLUCOPHAGE) 500 MG tablet     Sig: Take 1 tablet by mouth 2 times daily (with meals)     Dispense:  180 tablet     Refill:  0    gabapentin (NEURONTIN) 800 MG tablet     Sig: Take 1 tablet by mouth 3 times daily for 360 days. Dispense:  270 tablet     Refill:  0         1. Blood pressure is currently at goal.  A1c at goal at 6.0  2. We will continue current medications, no changes made at this time. 3. Urinalysis negative for hemoglobin or nitrates. None for culture to rule out UTI  4. Routine labs due, patient agreeable.     FOLLOW UP AND INSTRUCTIONS:  Return in about 3 months (around 1/21/2022) for Diabetes. · Dylon Colin received counseling on the following healthy behaviors:nutrition and medication adherence    · Discussed use, benefit, and side effects of prescribed medications. Barriers to  medication compliance addressed. All patient questions answered. Pt voiced  understanding. · Patient given educational materials - see patient instructions    · Patient advised to contact scheduling offices for any referrals or imaging orders  placed today if they have not been contacted in 48 hours. Return in about 3 months (around 1/21/2022) for Diabetes. An  electronic signature was used to authenticate this note. --MARCIA Bush CNP on 10/21/2021 at 11:52 AM  Visit Information    Have you changed or started any medications since your last visit including any over-the-counter medicines, vitamins, or herbal medicines? no   Are you having any side effects from any of your medications? -  no  Have you stopped taking any of your medications? Is so, why? -  no    Have you seen any other physician or provider since your last visit? Yes - Records Obtained  Have you had any other diagnostic tests since your last visit? Yes - Records Obtained  Have you been seen in the emergency room and/or had an admission to a hospital since we last saw you? Yes - Records Obtained  Have you had your routine dental cleaning in the past 6 months? no    Have you activated your [employer] account? If not, what are your barriers?  Yes     Patient Care Team:  MARCIA Bush CNP as PCP - General (Family Medicine)  Marija Tello MD as Consulting Physician (Gastroenterology)  Shana Jamil MD as Referring Physician (Internal Medicine)  Claudell Rhodes, MD as Consulting Physician (Pulmonology)  Lizeth Whitaker MD as Consulting Physician (Internal Medicine)  Miryam Ruelas DPM as Physician (Podiatry)  Feli Chairez RN as Imaging Navigator    Medical History Review  Past Medical, Family, and Social History reviewed and does not contribute to the patient presenting condition    Health Maintenance   Topic Date Due    Hepatitis A vaccine (1 of 2 - Risk 2-dose series) Never done    COVID-19 Vaccine (1) Never done    Shingles Vaccine (1 of 2) Never done    Lipid screen  02/05/2021    Annual Wellness Visit (AWV)  09/05/2021    Diabetic retinal exam  09/19/2021    Flu vaccine (1) 06/30/2022 (Originally 9/1/2021)    Potassium monitoring  11/30/2021    Creatinine monitoring  11/30/2021    Diabetic microalbuminuria test  02/05/2022    Low dose CT lung screening  02/05/2022    Diabetic foot exam  03/30/2022    A1C test (Diabetic or Prediabetic)  10/21/2022    Pneumococcal 0-64 years Vaccine (2 of 2 - PPSV23) 12/29/2022    Breast cancer screen  01/08/2023    Colon cancer screen colonoscopy  01/09/2023    DTaP/Tdap/Td vaccine (3 - Td or Tdap) 06/16/2031    HIV screen  Completed    Hib vaccine  Aged Out    Meningococcal (ACWY) vaccine  Aged Out

## 2021-10-21 NOTE — TELEPHONE ENCOUNTER
----- Message from Chanel Maddox sent at 10/21/2021  2:34 PM EDT -----  Subject: Medication Problem    QUESTIONS  Name of Medication? pravastatin (PRAVACHOL) 40 MG tablet  Patient-reported dosage and instructions? 40 mg 1 tab a day   What question or problem do you have with the medication? wants to know if   the medication has been filled yet or not   Preferred Pharmacy? [facility], [address] [facility] [phone]  Pharmacy phone number (if available)? [phone]  Additional Information for Provider?   ---------------------------------------------------------------------------  --------------  [address]  What is the best way for the office to contact you? OK to leave message on   voicemail  Preferred Call Back Phone Number? [phone]  ---------------------------------------------------------------------------  --------------  SCRIPT ANSWERS  Relationship to Patient?  Self

## 2021-10-21 NOTE — TELEPHONE ENCOUNTER
Pt informed Rx was sent electronically and to contact pharmacy to find out when medications will be ready. Pt voiced understanding. No questions asked during time of call.

## 2021-10-22 DIAGNOSIS — R35.0 URINARY FREQUENCY: ICD-10-CM

## 2021-10-23 LAB
CULTURE: ABNORMAL
Lab: ABNORMAL
SPECIMEN DESCRIPTION: ABNORMAL

## 2021-10-24 ENCOUNTER — TELEPHONE (OUTPATIENT)
Dept: PRIMARY CARE CLINIC | Age: 66
End: 2021-10-24

## 2021-10-24 DIAGNOSIS — N39.0 UTI DUE TO KLEBSIELLA SPECIES: Primary | ICD-10-CM

## 2021-10-24 DIAGNOSIS — B96.89 UTI DUE TO KLEBSIELLA SPECIES: Primary | ICD-10-CM

## 2021-10-24 RX ORDER — CEPHALEXIN 500 MG/1
500 CAPSULE ORAL 2 TIMES DAILY
Qty: 14 CAPSULE | Refills: 0 | Status: SHIPPED | OUTPATIENT
Start: 2021-10-24 | End: 2021-10-31

## 2021-10-25 NOTE — TELEPHONE ENCOUNTER
Let Brianna Magaña know her urine culture is positive for a UTI. I am sending an antibiotic in to her pharmacy to treat the infection. She is to follow up in not improving over the next 2-3 days with treatment.

## 2021-10-27 ENCOUNTER — HOSPITAL ENCOUNTER (OUTPATIENT)
Age: 66
Discharge: HOME | End: 2021-10-27
Payer: MEDICARE

## 2021-10-27 DIAGNOSIS — E11.8 TYPE 2 DIABETES WITH COMPLICATION (HCC): ICD-10-CM

## 2021-10-27 DIAGNOSIS — I10 ESSENTIAL HYPERTENSION: ICD-10-CM

## 2021-10-27 LAB
ALBUMIN SERPL-MCNC: 4.1 G/DL (ref 3.5–5.2)
ALBUMIN/GLOBULIN RATIO: 1.5 (ref 1–2.5)
ALP BLD-CCNC: 73 U/L (ref 35–104)
ALT SERPL-CCNC: 8 U/L (ref 5–33)
AMPHETAMINE SCREEN URINE: NEGATIVE
ANION GAP SERPL CALCULATED.3IONS-SCNC: 11 MMOL/L (ref 9–17)
AST SERPL-CCNC: 12 U/L
BARBITURATE SCREEN URINE: NEGATIVE
BENZODIAZEPINE SCREEN, URINE: NEGATIVE
BILIRUB SERPL-MCNC: 0.4 MG/DL (ref 0.3–1.2)
BUN BLDV-MCNC: 10 MG/DL (ref 8–23)
BUN/CREAT BLD: ABNORMAL (ref 9–20)
BUPRENORPHINE URINE: NORMAL
CALCIUM SERPL-MCNC: 9.9 MG/DL (ref 8.6–10.4)
CANNABINOID SCREEN URINE: NEGATIVE
CHLORIDE BLD-SCNC: 100 MMOL/L (ref 98–107)
CO2: 27 MMOL/L (ref 20–31)
COCAINE METABOLITE, URINE: NEGATIVE
CREAT SERPL-MCNC: 0.47 MG/DL (ref 0.5–0.9)
GFR AFRICAN AMERICAN: >60 ML/MIN
GFR NON-AFRICAN AMERICAN: >60 ML/MIN
GFR SERPL CREATININE-BSD FRML MDRD: ABNORMAL ML/MIN/{1.73_M2}
GFR SERPL CREATININE-BSD FRML MDRD: ABNORMAL ML/MIN/{1.73_M2}
GLUCOSE FASTING: 118 MG/DL (ref 70–99)
MDMA URINE: NORMAL
METHADONE SCREEN, URINE: NEGATIVE
METHAMPHETAMINE, URINE: NORMAL
OPIATES, URINE: NEGATIVE
OXYCODONE SCREEN URINE: NEGATIVE
PHENCYCLIDINE, URINE: NEGATIVE
POTASSIUM SERPL-SCNC: 4 MMOL/L (ref 3.7–5.3)
PROPOXYPHENE, URINE: NORMAL
SODIUM BLD-SCNC: 138 MMOL/L (ref 135–144)
TEST INFORMATION: NORMAL
TOTAL PROTEIN: 6.9 G/DL (ref 6.4–8.3)
TRICYCLIC ANTIDEPRESSANTS, UR: NORMAL

## 2021-10-27 PROCEDURE — 80307 DRUG TEST PRSMV CHEM ANLYZR: CPT

## 2021-10-27 PROCEDURE — 80053 COMPREHEN METABOLIC PANEL: CPT

## 2021-10-27 PROCEDURE — 36415 COLL VENOUS BLD VENIPUNCTURE: CPT

## 2021-11-02 ENCOUNTER — OFFICE VISIT (OUTPATIENT)
Dept: PODIATRY | Age: 66
End: 2021-11-02
Payer: MEDICARE

## 2021-11-02 VITALS — HEIGHT: 72 IN | WEIGHT: 206 LBS | BODY MASS INDEX: 27.9 KG/M2 | RESPIRATION RATE: 16 BRPM

## 2021-11-02 DIAGNOSIS — D23.72 BENIGN NEOPLASM OF SKIN OF LOWER LIMB, INCLUDING HIP, LEFT: ICD-10-CM

## 2021-11-02 DIAGNOSIS — M79.671 RIGHT FOOT PAIN: ICD-10-CM

## 2021-11-02 DIAGNOSIS — D23.71 BENIGN NEOPLASM OF SKIN OF RIGHT FOOT: ICD-10-CM

## 2021-11-02 DIAGNOSIS — E11.51 TYPE II DIABETES MELLITUS WITH PERIPHERAL CIRCULATORY DISORDER (HCC): Primary | ICD-10-CM

## 2021-11-02 DIAGNOSIS — B35.1 DERMATOPHYTOSIS OF NAIL: ICD-10-CM

## 2021-11-02 PROCEDURE — 17110 DESTRUCTION B9 LES UP TO 14: CPT | Performed by: PODIATRIST

## 2021-11-02 PROCEDURE — G8417 CALC BMI ABV UP PARAM F/U: HCPCS | Performed by: PODIATRIST

## 2021-11-02 PROCEDURE — 4004F PT TOBACCO SCREEN RCVD TLK: CPT | Performed by: PODIATRIST

## 2021-11-02 PROCEDURE — 3044F HG A1C LEVEL LT 7.0%: CPT | Performed by: PODIATRIST

## 2021-11-02 PROCEDURE — G8427 DOCREV CUR MEDS BY ELIG CLIN: HCPCS | Performed by: PODIATRIST

## 2021-11-02 PROCEDURE — G8484 FLU IMMUNIZE NO ADMIN: HCPCS | Performed by: PODIATRIST

## 2021-11-02 PROCEDURE — 11721 DEBRIDE NAIL 6 OR MORE: CPT | Performed by: PODIATRIST

## 2021-11-02 PROCEDURE — 3017F COLORECTAL CA SCREEN DOC REV: CPT | Performed by: PODIATRIST

## 2021-11-02 PROCEDURE — 2022F DILAT RTA XM EVC RTNOPTHY: CPT | Performed by: PODIATRIST

## 2021-11-02 PROCEDURE — 99214 OFFICE O/P EST MOD 30 MIN: CPT | Performed by: PODIATRIST

## 2021-11-02 NOTE — PROGRESS NOTES
[facility] PHYSICIANS  [facility]  [address] [place] [address]  Dept: [phone]  Dept Fax: [phone]    DIABETIC PROGRESS NOTE  Date of patient's visit: 11/2/2021  Patient's Name:  Talia Andre YOB: 1955            Patient Care Team:  MARCIA Cerna CNP as PCP - General (Family Medicine)  MARCIA Cerna CNP as PCP - [facility] Provider  Amador Dolan MD as Consulting Physician (Gastroenterology)  Jaymie Fitch MD as Referring Physician (Internal Medicine)  Bill Paredes MD as Consulting Physician (Pulmonology)  Didier Perera MD as Consulting Physician (Internal Medicine)  Ryan Minaya DPM as Physician (Podiatry)  Thomas Cabello RN as Imaging Navigator          Chief Complaint   Patient presents with    Diabetes    Peripheral Neuropathy    Nail Problem    Benign Neoplasm    Foot Pain       Subjective:   Talia Andre comes to clinic for Diabetes, Peripheral Neuropathy, Nail Problem, Benign Neoplasm, and Foot Pain    she is a diabetic and states that she is doing well. Pt currently has complaint of thickened, elongated nails that they cannot manage by themselves. Pt's primary care physician is MARCIA Cerna CNP last seen 10/21/2021   Pt's last blood sugar was 120    Pt also relates to painful skin spots to the bottom of both feet. Pt has tried pads and changing shoes but it has note helped the pain      Pt has a new complaint of right foot pain at the surgical site to the right great toe. Pt has tried changing shoes but it has not helped the pain  Patient is taking over the counter medications with no relief. States he has not had trauma to the area        Lab Results   Component Value Date    LABA1C 6.0 10/21/2021      Complains of numbness in the feet bilat.   Past Medical History:   Diagnosis Date    RACHEL (acute kidney injury) ([facility] 01/22/2014    Arthritis     generalized    Bilateral chronic knee pain 5/13/2016    Bronchiectasis without complication ([facility]     Cancer ([facility] 2004    uterus    CHF (congestive heart failure) (HCC)     Chronic bilateral low back pain with right-sided sciatica 02/20/2017    Chronic bronchitis (HCC)     Chronic bronchitis (HCC)     Chronic respiratory failure with hypoxia (HCC)     Cigarette nicotine dependence without complication 2/47/9330    COPD (chronic obstructive pulmonary disease) (HCC)     on inhaler /  COPD with chronic bronchitis and emphysema    Current smoker on some days     Depression 10/30/2014    Diabetic polyneuropathy associated with type 2 diabetes mellitus ([facility]     Diverticulosis     Essential hypertension 12/30/2013    Full dentures     GERD (gastroesophageal reflux disease)     Hep C w/o coma, chronic (HCC)     Hyperlipidemia     Hyperplastic polyp of intestine     Hypertension     DR. MCDANIEL-CARDIO    Irritable bowel syndrome with both constipation and diarrhea 2/15/2019    Liver disease     hepatitis C    Moderate episode of recurrent major depressive disorder ([facility] 10/30/2014    Nasal polyposis     Noncompliance with CPAP treatment     Obesity (BMI 35.0-39.9 without comorbidity)     On home O2     2 liters PRN per pt    JIMENA (obstructive sleep apnea)     JIMENA on CPAP    Pacemaker 2012    Personal history of tobacco use, presenting hazards to health 4/2/2015    Pneumonia 07/2012    RECENTLY HOSPITALIZED    Primary insomnia 9/30/2016    Pulmonary edema cardiac cause (HCC)     Rectal bleeding     Smoking addiction     Tobacco abuse        Allergies   Allergen Reactions    Iv Dye [Iodides] Hives     Current Outpatient Medications on File Prior to Visit   Medication Sig Dispense Refill    BREO ELLIPTA 200-25 MCG/INH AEPB inhaler INHALE 2 PUFFS ONTO THE LUNGS DAILY      furosemide (LASIX) 20 MG tablet Take 1 tablet by mouth 2 times daily 60 tablet 4    rOPINIRole (REQUIP) 0.5 MG tablet Take 1 tablet by mouth daily 90 tablet 3    pravastatin (PRAVACHOL) 40 MG tablet Take 1 tablet by mouth daily 90 tablet 4    metFORMIN (GLUCOPHAGE) 500 MG tablet Take 1 tablet by mouth 2 times daily (with meals) 180 tablet 0    gabapentin (NEURONTIN) 800 MG tablet Take 1 tablet by mouth 3 times daily for 360 days.  270 tablet 0    albuterol sulfate HFA (PROAIR HFA) 108 (90 Base) MCG/ACT inhaler Inhale 2 puffs into the lungs every 6 hours as needed for Wheezing 18 g 3    albuterol sulfate HFA (VENTOLIN HFA) 108 (90 Base) MCG/ACT inhaler Inhale 2 puffs into the lungs 4 times daily as needed for Wheezing 3 Inhaler 1    mometasone-formoterol (DULERA) 200-5 MCG/ACT inhaler Inhale 1 puff into the lungs 2 times daily 1 Inhaler 3    albuterol (PROVENTIL) (2.5 MG/3ML) 0.083% nebulizer solution inhale contents of 1 vial ( 3 milliliters ) in nebulizer by mouth and INTO THE LUNGS every 6 hours      ondansetron (ZOFRAN-ODT) 4 MG disintegrating tablet dissolve 1 tablet ON TONGUE EVERY 4 TO 6 HOURS if needed for nausea      acetaminophen (TYLENOL) 500 MG tablet Take 1,000 mg by mouth every 6 hours as needed for Pain       tiZANidine (ZANAFLEX) 2 MG tablet Take 1 tablet by mouth every 8 hours as needed (back pain) 90 tablet 3    hydroCHLOROthiazide (MICROZIDE) 12.5 MG capsule take 1 capsule by mouth every morning 90 capsule 3    pantoprazole (PROTONIX) 40 MG tablet take 1 tablet by mouth once daily 90 tablet 3    traZODone (DESYREL) 100 MG tablet take 1 tablet by mouth AT NIGHT 90 tablet 3    ONETOUCH ULTRA strip use 1 TEST STRIP to TEST BLOOD SUGAR four times a day if needed 200 strip 11    dicyclomine (BENTYL) 10 MG capsule Take 1 capsule by mouth 4 times daily (before meals and nightly) 120 capsule 11    linaclotide (LINZESS) 290 MCG CAPS capsule Take 1 capsule by mouth every morning (before breakfast) 30 capsule 11    atenolol (TENORMIN) 25 MG tablet Take 0.5 tablets by mouth daily 30 tablet 0    varenicline (CHANTIX STARTING MONTH JACE) 0.5 MG X 11 & 1 MG X 42 tablet Take by mouth. 1 box 0    ARIPiprazole (ABILIFY) 15 MG tablet Take 1 tablet by mouth daily 90 tablet 4    buprenorphine-naloxone (SUBOXONE) 2-0.5 MG FILM SL film Place 1.5 Film under the tongue daily.  SYMBICORT 160-4.5 MCG/ACT AERO inhale 2 puffs by mouth twice a day Rinse mouth after use 10.2 g 5    potassium chloride (KLOR-CON M) 20 MEQ extended release tablet Take 20 mEq by mouth daily       VORTIoxetine (TRINTELLIX) 10 MG TABS tablet Take 10 mg by mouth daily      nitroGLYCERIN (NITROSTAT) 0.4 MG SL tablet Place 1 tablet under the tongue every 5 minutes as needed for Chest pain 25 tablet 11    Umeclidinium Bromide (INCRUSE ELLIPTA) 62.5 MCG/INH AEPB Inhale 1 puff into the lungs daily 1 each 5    tiotropium (SPIRIVA RESPIMAT) 2.5 MCG/ACT AERS inhaler Inhale 2 puffs into the lungs daily 1 each 3    OneTouch Delica Lancets 18Q MISC Apply 1 Units topically 2 times daily USE 2 TO 3 TIMES DAILY AS DIRECTED 60 each 4     No current facility-administered medications on file prior to visit. Review of Systems    Review of Systems:   History obtained from chart review and the patient  General ROS: negative for - chills, fatigue, fever, night sweats or weight gain  Constitutional: Negative for chills, diaphoresis, fatigue, fever and unexpected weight change. Musculoskeletal: Positive for arthralgias, gait problem and joint swelling. Neurological ROS: negative for - behavioral changes, confusion, headaches or seizures. Negative for weakness and numbness. Dermatological ROS: negative for - mole changes, rash  Cardiovascular: Negative for leg swelling. Gastrointestinal: Negative for constipation, diarrhea, nausea and vomiting. Objective:  Dermatologic Exam:  Skin lesion present to the right and left plantar foot with a central core and petchaie noted to the lesions periphery.   Pain on palpation of the lesion    Skin is thin, with flaky sloughing skin as well as decreased hair growth to the lower leg  Small red hemosiderin deposits seen dorsal foot   Musculoskeletal:       Right great toe is deviated laterally and plantarflexed. Pt has pain with ROM in sagittal plain     1st MPJ ROM decreased, Bilateral.  Muscle strength 5/5, Bilateral.  Pain present upon palpation of toenails 1-5, Bilateral. decreased medial longitudinal arch, Bilateral.  Ankle ROM decreased,Bilateral.    Dorsally contracted digits present digits 2, Bilateral.     Vascular: DP pulses 1/4 bilateral.  PT pulses 0/4 bilateral.   CFT <5 seconds, Bilateral.  Hair growth absent to the level of the digits, Bilateral.  Edema present, Bilateral.  Varicosities absent, Bilateral. Erythema absent, Bilateral    Neurological: Sensation diminshed to light touch to level of digits, Bilateral.  Protective sensation intact 6/10 sites via 5.07/10g Jay-Aydee Monofilament, Bilateral.  negative Tinel's, Bilateral.  negative Valleix sign, Bilateral.      Integument: Warm, dry, supple, Bilateral.  Open lesion absent, Bilateral.  Interdigital maceration absent to web spaces 4, Bilateral.  Nails 1-5 left and 1-5 right thickened > 3.0 mm, dystrophic and crumbly, discolored with subungual debris. Fissures absent, Bilateral.   General: AAO x 3 in NAD.     Derm  Toenail Description  Sites of Onychomycosis Involvement (Check affected area)  [x] [x] [x] [x] [x] [x] [x] [x] [x] [x]  5 4 3 2 1 1 2 3 4 5                          Right                                        Left    Thickness  [x] [x] [x] [x] [x] [x] [x] [x] [x] [x]  5 4 3 2 1 1 2 3 4 5                         Right                                        Left    Dystrophic Changes   [x] [x] [x] [x] [x] [x] [x] [x] [x] [x]  5 4 3 2 1 1 2 3 4 5                         Right                                        Left    Color   [x] [x] [x] [x] [x] [x] [x] [x] [x] [x]  5 4 3 2 1 1 2 3 4 5                          Right Pt was evaluated and examined. Patient was given personalized discharge instructions. X rays right foot 3 views show the above results. I am not happy with the alignment of the toe especially from where it was 3 months after surgery. Pt is in pain and would like something fixed. All labs were reviewed and all imagining including the above findings were reviewed prior to the patients arrival and with the patient today      Time was spent educating the patient and their families/caregivers on proper care of the feet and ankles. All the above diagnosis were addressed at todays visit and all questions were answered. I had a long discussion today with the patient about the likely diagnosis and its natural history, physical exam and imaging findings, as well as treatment options. We discussed both surgical and nonsurgical treatments, including risks and benefits. From a nonoperative standpoint, we discussed rest/activity modification, NSAIDs/Acetaminophen/topical anesthetics, orthotics, bracing/immobilization, and physical therapy. Surgically, we discussed fusion of the 1st MTPJ with removal of the implant. Will likely need Augment to help with the fusion site and fill in the void left with the hardware removal       I discussed in detail the procedure. He/She was in full agreement and understood risks. The reason for surgery is due to unsuccessful non-operative treatment and/or conservative treatment is not a viable option. It was discussed with the patient that compliance postoperatively is of utmost importance. Any deviation on behalf of the patient will decrease the chances of a successful outcome. Patient acknowledged, understands, and would like to move forward with surgery as discussed. The patient was given a consent outlining the general risk of surgery as well as the specifics to the surgical plan.  This was carefully discussed giving all options, indications and contraindications regarding the procedure outlined in the consent. All questions were answered to the patient's satisfaction. The patient signed the consent form confirming complete understanding and acceptance of the risks of stated. I specifically stated and inquired if the patient understands and accepts the risks of surgery including infection, failure, prolonged pain, swelling, numbness, recurrence, limited mobility,painful scar, RSDS, overcorrection, under-correction, and loss of limb/life. Death, bleeding, blood clots in the veins or lungs, tendon or blood vessel disturbance, bony conditions, continued pain,stiffness, weakness, and limited function. These were all listed on the consent. Additionally, the postoperative course and treatment was outlined for the patient. Discussion consisted of postoperatively the patient needs to keep the foot elevated for at least the first initial two weeks. I have encouraged movements such as moving from the bed to the sofa or recliner, to the kitchen and the bathroom; quick bursts of movement with the foot elevated. Longstanding periods of time such as cooking, cleaning, and shopping are not permitted. I reminded the patient that there are only two reasons to have surgery. That being, if their function is impaired and also if they are having pain. If they can answer yes to both these questions, I will move forward with surgery. If they can not, there is no reason to proceed with surgical intervention. Pt does elect to have the procedure above    A total of 30 minutes was spent with this patients encounter        The lesions were partially excised via 15 blade and silver nitrate was applied under occlusion. The patient tolerated the procedure well and without complication. Advised patient to use vasoline to the area after tomorrow to prevent surrounding tissue irritation. Nails 1-10 were debrided sharply in length and thickness with a nipper and , without incident.  Pt will follow up in 1 week after the srugery  or sooner if any problems arise. Diagnosis was discussed with the pt and all of their questions were answered in detail. Proper foot hygiene and care was discussed with the pt. Informed patient on proper diabetic foot care and importance of tight glycemic control. Patient to check feet daily and contact the office with any questions/problems/concerns. Other comorbidity noted and will be managed by PCP.   11/2/2021    Electronically signed by Miryam Ruelas DPM on 11/2/2021 at 9:41 AM  11/2/2021

## 2021-11-04 ENCOUNTER — TELEPHONE (OUTPATIENT)
Dept: GASTROENTEROLOGY | Age: 66
End: 2021-11-04

## 2021-11-09 DIAGNOSIS — E11.8 TYPE 2 DIABETES MELLITUS WITH COMPLICATION (HCC): ICD-10-CM

## 2021-11-09 RX ORDER — BLOOD SUGAR DIAGNOSTIC
STRIP MISCELLANEOUS
Qty: 200 STRIP | Refills: 11 | Status: SHIPPED | OUTPATIENT
Start: 2021-11-09 | End: 2022-01-18 | Stop reason: SDUPTHER

## 2021-11-09 NOTE — TELEPHONE ENCOUNTER
Hemoglobin A1C (%)   Date Value   10/21/2021 6.0   11/30/2020 6.3 (H)   11/25/2020 6.4             ( goal A1C is < 7)   Microalb/Crt.  Ratio (mcg/mg creat)   Date Value   02/05/2021 CANNOT BE CALCULATED     LDL Cholesterol (mg/dL)   Date Value   02/05/2020 81       (goal LDL is <100)   AST (U/L)   Date Value   10/27/2021 12     ALT (U/L)   Date Value   10/27/2021 8     BUN (mg/dL)   Date Value   10/27/2021 10     BP Readings from Last 3 Encounters:   10/21/21 119/73   09/17/21 130/85   05/28/21 (!) 142/85          (goal 120/80)        Patient Active Problem List:     Essential hypertension     Pure hypercholesterolemia     Moderate episode of recurrent major depressive disorder ([facility]     History of heroin use     Hep C w/o coma, chronic ([facility] completed tx 2016     GERD (gastroesophageal reflux disease)     COPD (chronic obstructive pulmonary disease) (HCC)     Personal history of tobacco use, presenting hazards to health     Chronic diastolic congestive heart failure (HCC)     Diverticulosis     Hyperplastic polyp of intestine     Diabetic polyneuropathy associated with type 2 diabetes mellitus (HCC)     Bilateral chronic knee pain     Type 2 diabetes mellitus with complication (HCC)     Primary insomnia     Cigarette nicotine dependence without complication     Chronic bilateral low back pain with right-sided sciatica     Irritable bowel syndrome with both constipation and diarrhea     Hallux rigidus, right foot     Post-op pain     Right foot pain     Contracture of joint of right foot      ----Bart Ka

## 2021-11-10 DIAGNOSIS — I10 ESSENTIAL HYPERTENSION: ICD-10-CM

## 2021-11-10 RX ORDER — ATENOLOL 25 MG/1
TABLET ORAL
Qty: 120 TABLET | OUTPATIENT
Start: 2021-11-10

## 2021-11-10 RX ORDER — ATENOLOL 25 MG/1
12.5 TABLET ORAL DAILY
Qty: 30 TABLET | Refills: 1 | Status: SHIPPED | OUTPATIENT
Start: 2021-11-10 | End: 2022-01-18 | Stop reason: SDUPTHER

## 2021-11-12 DIAGNOSIS — F32.9 MAJOR DEPRESSIVE DISORDER, REMISSION STATUS UNSPECIFIED, UNSPECIFIED WHETHER RECURRENT: ICD-10-CM

## 2021-11-12 RX ORDER — ARIPIPRAZOLE 15 MG/1
15 TABLET ORAL DAILY
Qty: 90 TABLET | Refills: 4 | OUTPATIENT
Start: 2021-11-12

## 2021-11-14 RX ORDER — ARIPIPRAZOLE 15 MG/1
15 TABLET ORAL DAILY
Qty: 90 TABLET | Refills: 1 | Status: SHIPPED | OUTPATIENT
Start: 2021-11-14 | End: 2022-05-09

## 2021-11-16 ENCOUNTER — HOSPITAL ENCOUNTER (OUTPATIENT)
Age: 66
Discharge: HOME | End: 2021-11-16
Payer: MEDICARE

## 2021-11-16 PROCEDURE — 80307 DRUG TEST PRSMV CHEM ANLYZR: CPT

## 2021-12-06 ENCOUNTER — TELEPHONE (OUTPATIENT)
Dept: PRIMARY CARE CLINIC | Age: 66
End: 2021-12-06

## 2021-12-06 DIAGNOSIS — E11.8 TYPE 2 DIABETES MELLITUS WITH COMPLICATION (HCC): Primary | ICD-10-CM

## 2021-12-06 RX ORDER — BLOOD-GLUCOSE METER
1 KIT MISCELLANEOUS DAILY
Qty: 1 KIT | Refills: 0 | Status: SHIPPED | OUTPATIENT
Start: 2021-12-06

## 2021-12-06 NOTE — TELEPHONE ENCOUNTER
Patient is needing a new prescription for a new glucometer  Accucheck Meter due to insurance reasons.

## 2021-12-09 ENCOUNTER — HOSPITAL ENCOUNTER (OUTPATIENT)
Age: 66
Discharge: HOME | End: 2021-12-09
Payer: MEDICARE

## 2021-12-09 PROCEDURE — 80307 DRUG TEST PRSMV CHEM ANLYZR: CPT

## 2021-12-19 ENCOUNTER — HOSPITAL ENCOUNTER (EMERGENCY)
Age: 66
Discharge: HOME | End: 2021-12-20
Attending: EMERGENCY MEDICINE
Payer: MEDICARE

## 2021-12-19 DIAGNOSIS — S39.012A STRAIN OF LUMBAR REGION, INITIAL ENCOUNTER: Primary | ICD-10-CM

## 2021-12-19 PROCEDURE — 96374 THER/PROPH/DIAG INJ IV PUSH: CPT

## 2021-12-19 PROCEDURE — 99285 EMERGENCY DEPT VISIT HI MDM: CPT

## 2021-12-19 ASSESSMENT — PAIN SCALES - GENERAL: PAINLEVEL_OUTOF10: 10

## 2021-12-19 ASSESSMENT — PAIN DESCRIPTION - LOCATION: LOCATION: BACK

## 2021-12-20 ENCOUNTER — APPOINTMENT (OUTPATIENT)
Dept: GENERAL RADIOLOGY | Age: 66
End: 2021-12-20
Payer: MEDICARE

## 2021-12-20 VITALS
WEIGHT: 207 LBS | HEART RATE: 74 BPM | BODY MASS INDEX: 28.04 KG/M2 | RESPIRATION RATE: 19 BRPM | OXYGEN SATURATION: 94 % | SYSTOLIC BLOOD PRESSURE: 106 MMHG | TEMPERATURE: 97.5 F | DIASTOLIC BLOOD PRESSURE: 72 MMHG | HEIGHT: 72 IN

## 2021-12-20 LAB
ABSOLUTE EOS #: 0.07 K/UL (ref 0–0.44)
ABSOLUTE IMMATURE GRANULOCYTE: 0.03 K/UL (ref 0–0.3)
ABSOLUTE LYMPH #: 1.43 K/UL (ref 1.1–3.7)
ABSOLUTE MONO #: 0.79 K/UL (ref 0.1–1.2)
ANION GAP SERPL CALCULATED.3IONS-SCNC: 10 MMOL/L (ref 9–17)
BASOPHILS # BLD: 0 % (ref 0–2)
BASOPHILS ABSOLUTE: <0.03 K/UL (ref 0–0.2)
BILIRUBIN URINE: NEGATIVE
BUN BLDV-MCNC: 8 MG/DL (ref 8–23)
BUN/CREAT BLD: ABNORMAL (ref 9–20)
CALCIUM SERPL-MCNC: 9.7 MG/DL (ref 8.6–10.4)
CHLORIDE BLD-SCNC: 102 MMOL/L (ref 98–107)
CO2: 25 MMOL/L (ref 20–31)
COLOR: YELLOW
COMMENT UA: ABNORMAL
CREAT SERPL-MCNC: 0.51 MG/DL (ref 0.5–0.9)
DIFFERENTIAL TYPE: ABNORMAL
EKG ATRIAL RATE: 71 BPM
EKG P AXIS: 10 DEGREES
EKG P-R INTERVAL: 242 MS
EKG Q-T INTERVAL: 414 MS
EKG QRS DURATION: 90 MS
EKG QTC CALCULATION (BAZETT): 449 MS
EKG R AXIS: -23 DEGREES
EKG T AXIS: 32 DEGREES
EKG VENTRICULAR RATE: 71 BPM
EOSINOPHILS RELATIVE PERCENT: 1 % (ref 1–4)
GFR AFRICAN AMERICAN: >60 ML/MIN
GFR NON-AFRICAN AMERICAN: >60 ML/MIN
GFR SERPL CREATININE-BSD FRML MDRD: ABNORMAL ML/MIN/{1.73_M2}
GFR SERPL CREATININE-BSD FRML MDRD: ABNORMAL ML/MIN/{1.73_M2}
GLUCOSE BLD-MCNC: 142 MG/DL (ref 70–99)
GLUCOSE URINE: NEGATIVE
HCT VFR BLD CALC: 44.8 % (ref 36.3–47.1)
HEMOGLOBIN: 15.3 G/DL (ref 11.9–15.1)
IMMATURE GRANULOCYTES: 0 %
KETONES, URINE: NEGATIVE
LEUKOCYTE ESTERASE, URINE: NEGATIVE
LYMPHOCYTES # BLD: 20 % (ref 24–43)
MCH RBC QN AUTO: 31.9 PG (ref 25.2–33.5)
MCHC RBC AUTO-ENTMCNC: 34.2 G/DL (ref 28.4–34.8)
MCV RBC AUTO: 93.3 FL (ref 82.6–102.9)
MONOCYTES # BLD: 11 % (ref 3–12)
NITRITE, URINE: NEGATIVE
NRBC AUTOMATED: 0 PER 100 WBC
PDW BLD-RTO: 13.2 % (ref 11.8–14.4)
PH UA: 6.5 (ref 5–8)
PLATELET # BLD: 248 K/UL (ref 138–453)
PLATELET ESTIMATE: ABNORMAL
PMV BLD AUTO: 11.2 FL (ref 8.1–13.5)
POTASSIUM SERPL-SCNC: 4.3 MMOL/L (ref 3.7–5.3)
PROTEIN UA: NEGATIVE
RBC # BLD: 4.8 M/UL (ref 3.95–5.11)
RBC # BLD: ABNORMAL 10*6/UL
SARS-COV-2, RAPID: NOT DETECTED
SEG NEUTROPHILS: 68 % (ref 36–65)
SEGMENTED NEUTROPHILS ABSOLUTE COUNT: 4.98 K/UL (ref 1.5–8.1)
SODIUM BLD-SCNC: 137 MMOL/L (ref 135–144)
SPECIFIC GRAVITY UA: 1.03 (ref 1–1.03)
SPECIMEN DESCRIPTION: NORMAL
TURBIDITY: CLEAR
URINE HGB: NEGATIVE
UROBILINOGEN, URINE: NORMAL
WBC # BLD: 7.3 K/UL (ref 3.5–11.3)
WBC # BLD: ABNORMAL 10*3/UL

## 2021-12-20 PROCEDURE — 93010 ELECTROCARDIOGRAM REPORT: CPT | Performed by: INTERNAL MEDICINE

## 2021-12-20 PROCEDURE — 93005 ELECTROCARDIOGRAM TRACING: CPT | Performed by: STUDENT IN AN ORGANIZED HEALTH CARE EDUCATION/TRAINING PROGRAM

## 2021-12-20 PROCEDURE — 71045 X-RAY EXAM CHEST 1 VIEW: CPT

## 2021-12-20 PROCEDURE — 6360000002 HC RX W HCPCS: Performed by: STUDENT IN AN ORGANIZED HEALTH CARE EDUCATION/TRAINING PROGRAM

## 2021-12-20 PROCEDURE — 87635 SARS-COV-2 COVID-19 AMP PRB: CPT

## 2021-12-20 PROCEDURE — 6370000000 HC RX 637 (ALT 250 FOR IP): Performed by: STUDENT IN AN ORGANIZED HEALTH CARE EDUCATION/TRAINING PROGRAM

## 2021-12-20 PROCEDURE — 80048 BASIC METABOLIC PNL TOTAL CA: CPT

## 2021-12-20 PROCEDURE — 85025 COMPLETE CBC W/AUTO DIFF WBC: CPT

## 2021-12-20 PROCEDURE — 81003 URINALYSIS AUTO W/O SCOPE: CPT

## 2021-12-20 RX ORDER — KETAMINE HYDROCHLORIDE 10 MG/ML
0.25 INJECTION, SOLUTION INTRAMUSCULAR; INTRAVENOUS ONCE
Status: DISCONTINUED | OUTPATIENT
Start: 2021-12-20 | End: 2021-12-20 | Stop reason: SDUPTHER

## 2021-12-20 RX ORDER — ORPHENADRINE CITRATE 30 MG/ML
60 INJECTION INTRAMUSCULAR; INTRAVENOUS ONCE
Status: COMPLETED | OUTPATIENT
Start: 2021-12-20 | End: 2021-12-20

## 2021-12-20 RX ORDER — OXYCODONE HYDROCHLORIDE 5 MG/1
5 TABLET ORAL ONCE
Status: COMPLETED | OUTPATIENT
Start: 2021-12-20 | End: 2021-12-20

## 2021-12-20 RX ORDER — ACETAMINOPHEN 500 MG
1000 TABLET ORAL ONCE
Status: COMPLETED | OUTPATIENT
Start: 2021-12-20 | End: 2021-12-20

## 2021-12-20 RX ADMIN — ACETAMINOPHEN 1000 MG: 500 TABLET ORAL at 00:31

## 2021-12-20 RX ADMIN — OXYCODONE HYDROCHLORIDE 5 MG: 5 TABLET ORAL at 02:23

## 2021-12-20 RX ADMIN — ORPHENADRINE CITRATE 60 MG: 30 INJECTION INTRAMUSCULAR; INTRAVENOUS at 00:32

## 2021-12-20 ASSESSMENT — PAIN SCALES - GENERAL
PAINLEVEL_OUTOF10: 10
PAINLEVEL_OUTOF10: 10

## 2021-12-20 ASSESSMENT — ENCOUNTER SYMPTOMS
SORE THROAT: 0
EYE PAIN: 0
COUGH: 0
VOMITING: 0
ABDOMINAL PAIN: 0
SHORTNESS OF BREATH: 0
DIARRHEA: 0
BACK PAIN: 1
SINUS PAIN: 0
NAUSEA: 0

## 2021-12-20 NOTE — ED PROVIDER NOTES
Juan Jose Weiss  ED  Emergency Department Encounter  EmergencyMedicineResident     This patient was seen during the COVID-19 crisis. There were limited resources and those resources we did have had to be conserved for the sickest of patients. Pt Name: Yannick Soni  MRN: [id number]  [place] 1955  Date of evaluation: 12/20/21  PCP: Taty Downing, MARCIA [address]aved[id number]       Chief Complaint   Patient presents with    Back Pain     pt stated back went out on her yesterday evening       HISTORY OF PRESENT ILLNESS  (Location/Symptom, Timing/Onset, Context/Setting, Quality, Duration, Modifying Factors, Severity.)      Yannick Soni is a 61 y.o. female who presents for evaluation of back pain. Patient reports that the back pain started acutely last 24 hours. Denies any repetitive behaviors or exertion. Bedside ultrasound was performed to evaluate the aorta, the infrarenal aorta is approximately 3 cm. Known focal neurologic deficits, no radiculopathy, no sensory changes. No incontinence.     PAST MEDICAL / SURGICAL /SOCIAL / FAMILY HISTORY      has a past medical history of RACHEL (acute kidney injury) ([address] Arthritis, Bilateral chronic knee pain, Bronchiectasis without complication ([address] Cancer ([address] CHF (congestive heart failure) ([address] Chronic bilateral low back pain with right-sided sciatica, Chronic bronchitis (HCC), Chronic bronchitis ([address] Chronic respiratory failure with hypoxia ([address] Cigarette nicotine dependence without complication, COPD (chronic obstructive pulmonary disease) ([address] Current smoker on some days, Depression, Diabetic polyneuropathy associated with type 2 diabetes mellitus ([address] Diverticulosis, Essential hypertension, Full dentures, GERD (gastroesophageal reflux disease), Hep C w/o coma, chronic ([address] Hyperlipidemia, Hyperplastic polyp of intestine, Hypertension, Irritable bowel syndrome with both constipation and diarrhea, Liver disease, Moderate episode of recurrent major depressive disorder ([facility] Nasal polyposis, Noncompliance with CPAP treatment, Obesity (BMI 35.0-39.9 without comorbidity), On home O2, JIMENA (obstructive sleep apnea), Pacemaker, Personal history of tobacco use, presenting hazards to health, Pneumonia, Primary insomnia, Pulmonary edema cardiac cause ([facility] Rectal bleeding, Smoking addiction, and Tobacco abuse.       has a past surgical history that includes Pacemaker insertion; Hysterectomy; back surgery (2008); Tonsillectomy; hernia repair; Lumbar spine surgery (09/24/2013); Endoscopy, colon, diagnostic; Colonoscopy; pacemaker placement; Cardiac surgery; Colonoscopy (01/23/2015); Sigmoidoscopy (N/A, 06/26/2015); Colonoscopy (09/20/2016); Colonoscopy (N/A, 03/14/2019); Upper gastrointestinal endoscopy (N/A, 03/14/2019); Colonoscopy (N/A, 01/09/2020); Colon surgery; and Bunionectomy (Right, 4/30/2021).       Social History     Socioeconomic History    Marital status: Single     Spouse name: Not on file    Number of children: Not on file    Years of education: Not on file    Highest education level: Not on file   Occupational History    Not on file   Tobacco Use    Smoking status: Current Every Day Smoker     Packs/day: 1.00     Years: 40.00     Pack years: 40.00     Types: Cigarettes     Start date: 1969    Smokeless tobacco: Never Used   Vaping Use    Vaping Use: Never used   Substance and Sexual Activity    Alcohol use: No     Alcohol/week: 0.0 standard drinks    Drug use: No     Comment:  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    Sexual activity: Never   Other Topics Concern    Not on file   Social History Narrative    Not on file     Social Determinants of Health     Financial Resource Strain: Low Risk     Difficulty of Paying Living Expenses: Not hard at all   Food Insecurity: No Food Insecurity    Worried About [address] in the Last Year: Never true    Danny of Food in the Last Year: Never true   Transportation Needs:     Lack of Transportation (Medical): Not on file    Lack of Transportation (Non-Medical): Not on file   Physical Activity:     Days of Exercise per Week: Not on file    Minutes of Exercise per Session: Not on file   Stress:     Feeling of Stress : Not on file   Social Connections:     Frequency of Communication with Friends and Family: Not on file    Frequency of Social Gatherings with Friends and Family: Not on file    Attends Mu-ism Services: Not on file    Active Member of [address] [place] [address] or Organizations: Not on file    Attends Club or Organization Meetings: Not on file    Marital Status: Not on file   Intimate Partner Violence:     Fear of Current or Ex-Partner: Not on file    Emotionally Abused: Not on file    Physically Abused: Not on file    Sexually Abused: Not on file   Housing Stability:     Unable to Pay for Housing in the Last Year: Not on file    Number of [place] in the Last Year: Not on file    Unstable Housing in the Last Year: Not on file       Family History   Problem Relation Age of Onset    Cancer Mother         lungs and brain    Stroke Father     Crohn's Disease Sister        Allergies: Iv dye [iodides]    Home Medications:  Prior to Admission medications    Medication Sig Start Date End Date Taking?  Authorizing Provider   glucose monitoring (FREESTYLE FREEDOM) kit 1 kit by Does not apply route daily 12/6/21   Leia Francisco APRN - CNP   ARIPiprazole (ABILIFY) 15 MG tablet Take 1 tablet by mouth daily 11/14/21   Leia Francisco APRN - CNP   atenolol (TENORMIN) 25 MG tablet Take 0.5 tablets by mouth daily 11/10/21 11/5/22  Leia Francisco APRN - CNP   blood glucose test strips (ONETOUCH ULTRA) strip use 1 TEST STRIP to TEST BLOOD SUGAR four times a day if needed 11/9/21   Leia Francisco APRN - CNP   BREO ELLIPTA 200-25 MCG/INH AEPB inhaler INHALE 2 PUFFS ONTO THE LUNGS DAILY 10/18/21   Historical Provider, MD   furosemide (LASIX) 20 MG tablet Take 1 tablet by mouth 2 times daily 10/21/21 3/20/22  MARCIA Butt CNP   rOPINIRole (REQUIP) 0.5 MG tablet Take 1 tablet by mouth daily 10/21/21   MARCIA Butt CNP   pravastatin (PRAVACHOL) 40 MG tablet Take 1 tablet by mouth daily 10/21/21 1/14/23  MARCIA Butt CNP   metFORMIN (GLUCOPHAGE) 500 MG tablet Take 1 tablet by mouth 2 times daily (with meals) 10/21/21   MARCIA Butt CNP   gabapentin (NEURONTIN) 800 MG tablet Take 1 tablet by mouth 3 times daily for 360 days.  10/21/21 10/16/22  MARCIA Butt CNP   tiotropium (SPIRIVA RESPIMAT) 2.5 MCG/ACT AERS inhaler Inhale 2 puffs into the lungs daily 9/17/21 10/21/21  Petros Goddard MD   albuterol sulfate HFA (PROAIR HFA) 108 (90 Base) MCG/ACT inhaler Inhale 2 puffs into the lungs every 6 hours as needed for Wheezing 9/17/21   Petros Goddard MD   albuterol sulfate HFA (VENTOLIN HFA) 108 (90 Base) MCG/ACT inhaler Inhale 2 puffs into the lungs 4 times daily as needed for Wheezing 5/28/21   Petros Goddard MD   mometasone-formoterol (DULERA) 200-5 MCG/ACT inhaler Inhale 1 puff into the lungs 2 times daily 5/28/21   Petros Goddard MD   albuterol (PROVENTIL) (2.5 MG/3ML) 0.083% nebulizer solution inhale contents of 1 vial ( 3 milliliters ) in nebulizer by mouth and INTO THE LUNGS every 6 hours 4/20/21   Historical Provider, MD   ondansetron (ZOFRAN-ODT) 4 MG disintegrating tablet dissolve 1 tablet ON TONGUE EVERY 4 TO 6 HOURS if needed for nausea 4/20/21   Historical Provider, MD   acetaminophen (TYLENOL) 500 MG tablet Take 1,000 mg by mouth every 6 hours as needed for Pain     Historical Provider, MD   tiZANidine (ZANAFLEX) 2 MG tablet Take 1 tablet by mouth every 8 hours as needed (back pain) 4/8/21   Venkat Prajwal Cherri Blizzard, MD   hydroCHLOROthiazide (MICROZIDE) 12.5 MG capsule take 1 capsule by mouth every morning 3/8/21 3/3/22  Casper Guadalupe MD   pantoprazole (PROTONIX) 40 MG tablet take 1 tablet by mouth once daily 3/8/21   Casper Mora MD   traZODone (DESYREL) 100 MG tablet take 1 tablet by mouth AT NIGHT 3/8/21   Casper Guadalupe MD   dicyclomine (BENTYL) 10 MG capsule Take 1 capsule by mouth 4 times daily (before meals and nightly) 1/25/21   Dez Dash MD   linaclotide (LINZESS) 290 MCG CAPS capsule Take 1 capsule by mouth every morning (before breakfast) 1/19/21   Dez Dash MD   varenicline (CHANTIX STARTING MONTH PAK) 0.5 MG X 11 & 1 MG X 42 tablet Take by mouth. 11/25/20   James Sue MD   buprenorphine-naloxone (SUBOXONE) 2-0.5 MG FILM SL film Place 1.5 Film under the tongue daily. 8/12/20   Historical Provider, MD   SYMBICORT 160-4.5 MCG/ACT AERO inhale 2 puffs by mouth twice a day Rinse mouth after use 7/7/20   aCsper Guadalupe MD   potassium chloride (KLOR-CON M) 20 MEQ extended release tablet Take 20 mEq by mouth daily  5/29/19   Historical Provider, MD   VORTIoxetine (TRINTELLIX) 10 MG TABS tablet Take 10 mg by mouth daily 9/20/19   Historical Provider, MD   OneTouch Delica Lancets 54J MISC Apply 1 Units topically 2 times daily USE 2 TO 3 TIMES DAILY AS DIRECTED 4/8/20 10/21/21  Casper Mora MD   nitroGLYCERIN (NITROSTAT) 0.4 MG SL tablet Place 1 tablet under the tongue every 5 minutes as needed for Chest pain 10/9/19   Heaven Kenney MD   Umeclidinium Bromide (INCRUSE ELLIPTA) 62.5 MCG/INH AEPB Inhale 1 puff into the lungs daily 9/17/19   Pako Guzman MD       REVIEW OF SYSTEMS    (2-9 systems for level 4, 10 or more forlevel 5)      Review of Systems   Constitutional: Negative for activity change, chills and fever. HENT: Negative for congestion, sinus pain and sore throat. Eyes: Negative for pain and visual disturbance. Respiratory: Negative for cough and shortness of breath. Cardiovascular: Negative for chest pain. Gastrointestinal: Negative for abdominal pain, diarrhea, nausea and vomiting.    Genitourinary: Negative for difficulty urinating, dysuria and hematuria. Musculoskeletal: Positive for back pain. Negative for myalgias. Skin: Negative for rash and wound. Neurological: Negative for dizziness, weakness, light-headedness, numbness and headaches. Psychiatric/Behavioral: Negative for agitation and confusion. PHYSICAL EXAM   (up to 7 for level 4, 8 or more forlevel 5)      ED TRIAGE VITALS BP: 126/73, Temp: 97.5 °F (36.4 °C), Pulse: 70, Resp: 18, SpO2: 97 %    Vitals:    12/19/21 2340 12/20/21 0223   BP: 126/73 106/72   Pulse: 70 74   Resp: 18 19   Temp: 97.5 °F (36.4 °C)    TempSrc: Oral    SpO2: 97% 94%   Weight: 207 lb (93.9 kg)    Height: 6' (1.829 m)          Physical Exam  Vitals and nursing note reviewed. Constitutional:       Appearance: Normal appearance. HENT:      Head: Normocephalic and atraumatic. Nose: Nose normal.      Mouth/Throat:      Mouth: Mucous membranes are moist.   Eyes:      Extraocular Movements: Extraocular movements intact. Pupils: Pupils are equal, round, and reactive to light. Cardiovascular:      Rate and Rhythm: Normal rate and regular rhythm. Pulses: Normal pulses. Heart sounds: Normal heart sounds. Pulmonary:      Effort: Pulmonary effort is normal.      Breath sounds: Normal breath sounds. Abdominal:      General: Abdomen is flat. Palpations: Abdomen is soft. Musculoskeletal:      Cervical back: Normal range of motion. Comments: Tenderness palpation throughout the paraspinal muscles of the thoracic and lumbar spine. Skin:     General: Skin is warm and dry. Capillary Refill: Capillary refill takes less than 2 seconds. Neurological:      General: No focal deficit present. Mental Status: She is alert and oriented to person, place, and time.    Psychiatric:         Mood and Affect: Mood normal.         Behavior: Behavior normal.           DIFFERENTIAL  DIAGNOSIS     PLAN (LABS / IMAGING / EKG):  Orders Placed This Encounter   Procedures    COVID-19, Rapid    XR CHEST PORTABLE    CBC Auto Differential    Basic Metabolic Panel w/ Reflex to MG    Urinalysis Reflex to Culture    EKG 12 Lead       MEDICATIONS ORDERED:  ED Medication Orders (From admission, onward)    Start Ordered     Status Ordering Provider    12/20/21 0215 12/20/21 0207  oxyCODONE (ROXICODONE) immediate release tablet 5 mg  ONCE         Last MAR action: Given - by Mayo Paredes on 12/20/21 at [address], [place]    12/20/21 0015 12/20/21 0000  orphenadrine (NORFLEX) injection 60 mg  ONCE         Last MAR action: Given - by Mayo Paredes on 12/20/21 at [facility], [place]     12/20/21 0015 12/20/21 0000  acetaminophen (TYLENOL) tablet 1,000 mg  ONCE         Last MAR action: Given - by Mayo Paredes on 12/20/21 at [facility], [place]           DDX: Lumbar strain, epidural abscess    DIAGNOSTIC RESULTS / EMERGENCY DEPARTMENT COURSE / MDM     IMPRESSION & INITIAL PLAN:  Plan for multimodal pain therapy. Bedside ultrasound which revealed a 3 cm infrarenal aorta. No red flag symptoms. Patient did note improvement of pain with multimodal pain therapy however was not resolved or at a level that she was comfortable going home with. Patient given 5 mg oxycodone and discharged home to follow-up with her PCP. LABS:  Results for orders placed or performed during the hospital encounter of 12/19/21   COVID-19, Rapid    Specimen: Nasopharyngeal Swab   Result Value Ref Range    Specimen Description . NASOPHARYNGEAL SWAB     SARS-CoV-2, Rapid Not Detected Not Detected   CBC Auto Differential   Result Value Ref Range    WBC 7.3 3.5 - 11.3 k/uL    RBC 4.80 3.95 - 5.11 m/uL    Hemoglobin 15.3 (H) 11.9 - 15.1 g/dL    Hematocrit 44.8 36.3 - 47.1 %    MCV 93.3 82.6 - 102.9 fL    MCH 31.9 25.2 - 33.5 pg    MCHC 34.2 28.4 - 34.8 g/dL    RDW 13.2 11.8 - 14.4 %    Platelets 725 470 - 042 k/uL    MPV 11.2 8.1 - 13.5 fL    NRBC Automated 0.0 0.0 per 100 WBC Differential Type NOT REPORTED     Seg Neutrophils 68 (H) 36 - 65 %    Lymphocytes 20 (L) 24 - 43 %    Monocytes 11 3 - 12 %    Eosinophils % 1 1 - 4 %    Basophils 0 0 - 2 %    Immature Granulocytes 0 0 %    Segs Absolute 4.98 1.50 - 8.10 k/uL    Absolute Lymph # 1.43 1.10 - 3.70 k/uL    Absolute Mono # 0.79 0.10 - 1.20 k/uL    Absolute Eos # 0.07 0.00 - 0.44 k/uL    Basophils Absolute <0.03 0.00 - 0.20 k/uL    Absolute Immature Granulocyte 0.03 0.00 - 0.30 k/uL    WBC Morphology NOT REPORTED     RBC Morphology NOT REPORTED     Platelet Estimate NOT REPORTED    Basic Metabolic Panel w/ Reflex to MG   Result Value Ref Range    Glucose 142 (H) 70 - 99 mg/dL    BUN 8 8 - 23 mg/dL    CREATININE 0.51 0.50 - 0.90 mg/dL    Bun/Cre Ratio NOT REPORTED 9 - 20    Calcium 9.7 8.6 - 10.4 mg/dL    Sodium 137 135 - 144 mmol/L    Potassium 4.3 3.7 - 5.3 mmol/L    Chloride 102 98 - 107 mmol/L    CO2 25 20 - 31 mmol/L    Anion Gap 10 9 - 17 mmol/L    GFR Non-African American >60 >60 mL/min    GFR African American >60 >60 mL/min    GFR Comment          GFR Staging NOT REPORTED    Urinalysis Reflex to Culture    Specimen: Urine, clean catch   Result Value Ref Range    Color, UA Yellow Yellow    Turbidity UA Clear Clear    Glucose, Ur NEGATIVE NEGATIVE    Bilirubin Urine NEGATIVE NEGATIVE    Ketones, Urine NEGATIVE NEGATIVE    Specific Gravity, UA 1.034 (H) 1.005 - 1.030    Urine Hgb NEGATIVE NEGATIVE    pH, UA 6.5 5.0 - 8.0    Protein, UA NEGATIVE NEGATIVE    Urobilinogen, Urine Normal Normal    Nitrite, Urine NEGATIVE NEGATIVE    Leukocyte Esterase, Urine NEGATIVE NEGATIVE    Urinalysis Comments       Microscopic exam not performed based on chemical results unless requested in original order.    EKG 12 Lead   Result Value Ref Range    Ventricular Rate 71 BPM    Atrial Rate 71 BPM    P-R Interval 242 ms    QRS Duration 90 ms    Q-T Interval 414 ms    QTc Calculation (Bazett) 449 ms    P Axis 10 degrees    R Axis -23 degrees    T Axis 32 degrees       RADIOLOGY:  XR CHEST PORTABLE   Final Result   Hazy vascular markings. Correlate with presentation for congestive heart   failure. CONSULTS:  None    CRITICAL CARE:  See attending physician note    FINAL IMPRESSION      1.  Strain of lumbar region, initial encounter          DISPOSITION / PLAN     DISPOSITION Decision To Discharge 12/20/2021 02:23:33 AM      PATIENT REFERRED TO:  MARCIA Peña CNP  [address]  [phone]    In 1 week        DISCHARGE MEDICATIONS:  Discharge Medication List as of 12/20/2021  2:23 AM        Discharge Medication List as of 12/20/2021  2:23 AM           Sivan Berger MD  Emergency Medicine Resident    (Please note that portions of this note were completed with a voice recognition program.  Efforts were made to edit the dictations but occasionally words are mis-transcribed.)       Sivan Berger MD  Resident  12/20/21 9629

## 2021-12-20 NOTE — ED PROVIDER NOTES
[id number] [facility]     Emergency Department     Faculty Attestation    I performed a history and physical examination of the patient and discussed management with the resident. I reviewed the residents note and agree with the documented findings and plan of care. Any areas of disagreement are noted on the chart. I was personally present for the key portions of any procedures. I have documented in the chart those procedures where I was not present during the key portions. I have reviewed the emergency nurses triage note. I agree with the chief complaint, past medical history, past surgical history, allergies, medications, social and family history as documented unless otherwise noted below. For Physician Assistant/ Nurse Practitioner cases/documentation I have personally evaluated this patient and have completed at least one if not all key elements of the E/M (history, physical exam, and MDM). Additional findings are as noted. I have personally seen and evaluated the patient. I find the patient's history and physical exam are consistent with the NP/PA documentation. I agree with the care provided, treatment rendered, disposition and follow-up plan. 63-year-old female presenting with back pain. History of chronic back pain, however this feels different than her typical pain. Concerned that she may have had a UTI last week that \"didn't leave me\". No change to bowel or bladder habits. No incontinence. No weakness or numbness. Pain does not change with movement or position. No fever or chills. Exam:  General: Sitting on the bed, awake, alert and in no acute distress  CV: normal rate and regular rhythm  Lungs: Breathing comfortably on room air with no tachypnea, hypoxia, or increased work of breathing  Neuro: Awake, alert, moving around without assistance. Equal, 5 out of 5 strength in bilateral lower extremities to knee flexion and extension, dorsiflexion, and plantar flexion.

## 2021-12-21 ENCOUNTER — HOSPITAL ENCOUNTER (EMERGENCY)
Age: 66
Discharge: HOME | End: 2021-12-21
Attending: EMERGENCY MEDICINE
Payer: MEDICARE

## 2021-12-21 ENCOUNTER — APPOINTMENT (OUTPATIENT)
Dept: CT IMAGING | Age: 66
End: 2021-12-21
Payer: MEDICARE

## 2021-12-21 VITALS
OXYGEN SATURATION: 92 % | HEART RATE: 84 BPM | RESPIRATION RATE: 18 BRPM | SYSTOLIC BLOOD PRESSURE: 142 MMHG | DIASTOLIC BLOOD PRESSURE: 74 MMHG | TEMPERATURE: 97 F

## 2021-12-21 DIAGNOSIS — S39.012D BACK STRAIN, SUBSEQUENT ENCOUNTER: Primary | ICD-10-CM

## 2021-12-21 PROCEDURE — 96372 THER/PROPH/DIAG INJ SC/IM: CPT

## 2021-12-21 PROCEDURE — 72131 CT LUMBAR SPINE W/O DYE: CPT

## 2021-12-21 PROCEDURE — 72128 CT CHEST SPINE W/O DYE: CPT

## 2021-12-21 PROCEDURE — 6370000000 HC RX 637 (ALT 250 FOR IP): Performed by: STUDENT IN AN ORGANIZED HEALTH CARE EDUCATION/TRAINING PROGRAM

## 2021-12-21 PROCEDURE — 6360000002 HC RX W HCPCS: Performed by: STUDENT IN AN ORGANIZED HEALTH CARE EDUCATION/TRAINING PROGRAM

## 2021-12-21 PROCEDURE — 99282 EMERGENCY DEPT VISIT SF MDM: CPT

## 2021-12-21 RX ORDER — IBUPROFEN 400 MG/1
400 TABLET ORAL ONCE
Status: COMPLETED | OUTPATIENT
Start: 2021-12-21 | End: 2021-12-21

## 2021-12-21 RX ORDER — ACETAMINOPHEN 500 MG
1000 TABLET ORAL ONCE
Status: COMPLETED | OUTPATIENT
Start: 2021-12-21 | End: 2021-12-21

## 2021-12-21 RX ORDER — LIDOCAINE 4 G/G
2 PATCH TOPICAL DAILY
Status: DISCONTINUED | OUTPATIENT
Start: 2021-12-21 | End: 2021-12-21 | Stop reason: HOSPADM

## 2021-12-21 RX ORDER — ORPHENADRINE CITRATE 30 MG/ML
60 INJECTION INTRAMUSCULAR; INTRAVENOUS ONCE
Status: COMPLETED | OUTPATIENT
Start: 2021-12-21 | End: 2021-12-21

## 2021-12-21 RX ADMIN — IBUPROFEN 400 MG: 400 TABLET, FILM COATED ORAL at 03:06

## 2021-12-21 RX ADMIN — ACETAMINOPHEN 1000 MG: 500 TABLET ORAL at 03:06

## 2021-12-21 RX ADMIN — ORPHENADRINE CITRATE 60 MG: 60 INJECTION INTRAMUSCULAR; INTRAVENOUS at 03:07

## 2021-12-21 ASSESSMENT — ENCOUNTER SYMPTOMS
VOMITING: 0
COUGH: 1
BACK PAIN: 1
DIARRHEA: 0
CONSTIPATION: 1
SINUS PAIN: 0
ABDOMINAL PAIN: 0
EYE PAIN: 0
SHORTNESS OF BREATH: 0
SORE THROAT: 0
NAUSEA: 0

## 2021-12-21 ASSESSMENT — PAIN SCALES - GENERAL: PAINLEVEL_OUTOF10: 10

## 2021-12-21 NOTE — ED PROVIDER NOTES
[facility]  ED  Emergency Department Encounter  EmergencyMedicineResident     This patient was seen during the COVID-19 crisis. There were limited resources and those resources we did have had to be conserved for the sickest of patients. Pt Name: Micaela Hopper  MRN: [id number]  [place] 1955  Date of evaluation: 12/21/21  PCP: MARCIA Stephen - CNP    CHIEF COMPLAINT       Chief Complaint   Patient presents with    Back Pain       HISTORY OF PRESENT ILLNESS  (Location/Symptom, Timing/Onset, Context/Setting, Quality, Duration, Modifying Factors, Severity.)      Micaela Hopper is a 61 y.o. female who presents for evaluation of back pain. Patient was just seen here yesterday for the same complaint. She had work-up including basic labs and urinalysis that were all within normal limits. Patient treated with multimodal pain medications and discharged home with the patient noting marked pain relief. She states that she was unable to get into her PCP or back doctor's office today and came back to the emergency department for pain control. She denies any incontinence, denies new neurologic deficits however does state chronic diabetic neuropathy which is unchanged. She denies any weakness. She is able to walk into the exam room under her own power. She denies any new neurologic deficits.     PAST MEDICAL / SURGICAL /SOCIAL / FAMILY HISTORY      has a past medical history of RACHEL (acute kidney injury) ([address] Arthritis, Bilateral chronic knee pain, Bronchiectasis without complication ([address] Cancer ([address] CHF (congestive heart failure) ([address] Chronic bilateral low back pain with right-sided sciatica, Chronic bronchitis (HCC), Chronic bronchitis ([address] Chronic respiratory failure with hypoxia ([address] Cigarette nicotine dependence without complication, COPD (chronic obstructive pulmonary disease) ([address] Current smoker on some days, Depression, Diabetic polyneuropathy associated with type 2 diabetes mellitus ([facility] Diverticulosis, Essential hypertension, Full dentures, GERD (gastroesophageal reflux disease), Hep C w/o coma, chronic ([facility] Hyperlipidemia, Hyperplastic polyp of intestine, Hypertension, Irritable bowel syndrome with both constipation and diarrhea, Liver disease, Moderate episode of recurrent major depressive disorder ([facility] Nasal polyposis, Noncompliance with CPAP treatment, Obesity (BMI 35.0-39.9 without comorbidity), On home O2, JIMENA (obstructive sleep apnea), Pacemaker, Personal history of tobacco use, presenting hazards to health, Pneumonia, Primary insomnia, Pulmonary edema cardiac cause ([facility] Rectal bleeding, Smoking addiction, and Tobacco abuse.       has a past surgical history that includes Pacemaker insertion; Hysterectomy; back surgery (2008); Tonsillectomy; hernia repair; Lumbar spine surgery (09/24/2013); Endoscopy, colon, diagnostic; Colonoscopy; pacemaker placement; Cardiac surgery; Colonoscopy (01/23/2015); Sigmoidoscopy (N/A, 06/26/2015); Colonoscopy (09/20/2016); Colonoscopy (N/A, 03/14/2019); Upper gastrointestinal endoscopy (N/A, 03/14/2019); Colonoscopy (N/A, 01/09/2020); Colon surgery; and Bunionectomy (Right, 4/30/2021).       Social History     Socioeconomic History    Marital status: Single     Spouse name: Not on file    Number of children: Not on file    Years of education: Not on file    Highest education level: Not on file   Occupational History    Not on file   Tobacco Use    Smoking status: Current Every Day Smoker     Packs/day: 1.00     Years: 40.00     Pack years: 40.00     Types: Cigarettes     Start date: 1969    Smokeless tobacco: Never Used   Vaping Use    Vaping Use: Never used   Substance and Sexual Activity    Alcohol use: No     Alcohol/week: 0.0 standard drinks    Drug use: No     Comment:  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    Sexual activity: Never   Other Topics Concern    Not on file   Social History Narrative    Not on file     Social Determinants of Health     Financial Resource Strain: Low Risk     Difficulty of Paying Living Expenses: Not hard at all   Food Insecurity: No Food Insecurity    Worried About Running Out of Food in the Last Year: Never true    Danny of Food in the Last Year: Never true   Transportation Needs:     Lack of Transportation (Medical): Not on file    Lack of Transportation (Non-Medical): Not on file   Physical Activity:     Days of Exercise per Week: Not on file    Minutes of Exercise per Session: Not on file   Stress:     Feeling of Stress : Not on file   Social Connections:     Frequency of Communication with Friends and Family: Not on file    Frequency of Social Gatherings with Friends and Family: Not on file    Attends [employer] Services: Not on file    Active Member of [address] [place] [address] or Organizations: Not on file    Attends Club or Organization Meetings: Not on file    Marital Status: Not on file   Intimate Partner Violence:     Fear of Current or Ex-Partner: Not on file    Emotionally Abused: Not on file    Physically Abused: Not on file    Sexually Abused: Not on file   Housing Stability:     Unable to Pay for Housing in the Last Year: Not on file    Number of [place] in the Last Year: Not on file    Unstable Housing in the Last Year: Not on file       Family History   Problem Relation Age of Onset    Cancer Mother         lungs and brain    Stroke Father     Crohn's Disease Sister        Allergies: Iv dye [iodides]    Home Medications:  Prior to Admission medications    Medication Sig Start Date End Date Taking?  Authorizing Provider   glucose monitoring (FREESTYLE FREEDOM) kit 1 kit by Does not apply route daily 12/6/21   MARCIA Matias CNP   ARIPiprazole (ABILIFY) 15 MG tablet Take 1 tablet by mouth daily 11/14/21   MARCIA Matias CNP   atenolol (TENORMIN) 25 MG tablet Take 0.5 tablets by mouth daily 11/10/21 11/5/22  MARCIA Matias CNP   blood glucose test strips (ONETOUCH ULTRA) strip use 1 TEST STRIP to TEST BLOOD SUGAR four times a day if needed 11/9/21   MARCIA Peña CNP   BREO ELLIPTA 200-25 MCG/INH AEPB inhaler INHALE 2 PUFFS ONTO THE LUNGS DAILY 10/18/21   Historical Provider, MD   furosemide (LASIX) 20 MG tablet Take 1 tablet by mouth 2 times daily 10/21/21 3/20/22  MARCIA Peña CNP   rOPINIRole (REQUIP) 0.5 MG tablet Take 1 tablet by mouth daily 10/21/21   MARCIA Peña CNP   pravastatin (PRAVACHOL) 40 MG tablet Take 1 tablet by mouth daily 10/21/21 1/14/23  MARCIA Peña CNP   metFORMIN (GLUCOPHAGE) 500 MG tablet Take 1 tablet by mouth 2 times daily (with meals) 10/21/21   MARCIA Peña CNP   gabapentin (NEURONTIN) 800 MG tablet Take 1 tablet by mouth 3 times daily for 360 days.  10/21/21 10/16/22  MARCIA Peña CNP   tiotropium (SPIRIVA RESPIMAT) 2.5 MCG/ACT AERS inhaler Inhale 2 puffs into the lungs daily 9/17/21 10/21/21  Silvia Pugh MD   albuterol sulfate HFA (PROAIR HFA) 108 (90 Base) MCG/ACT inhaler Inhale 2 puffs into the lungs every 6 hours as needed for Wheezing 9/17/21   Silvia Pugh MD   albuterol sulfate HFA (VENTOLIN HFA) 108 (90 Base) MCG/ACT inhaler Inhale 2 puffs into the lungs 4 times daily as needed for Wheezing 5/28/21   Silvia Pugh MD   mometasone-formoterol (DULERA) 200-5 MCG/ACT inhaler Inhale 1 puff into the lungs 2 times daily 5/28/21   Silvia Pugh MD   albuterol (PROVENTIL) (2.5 MG/3ML) 0.083% nebulizer solution inhale contents of 1 vial ( 3 milliliters ) in nebulizer by mouth and INTO THE LUNGS every 6 hours 4/20/21   Historical Provider, MD   ondansetron (ZOFRAN-ODT) 4 MG disintegrating tablet dissolve 1 tablet ON TONGUE EVERY 4 TO 6 HOURS if needed for nausea 4/20/21   Historical Provider, MD   acetaminophen (TYLENOL) 500 MG tablet Take 1,000 mg by mouth every 6 hours as needed for Pain     Historical Provider, MD   tiZANidine (ZANAFLEX) 2 MG tablet Take 1 tablet by mouth every 8 hours as needed (back pain) 4/8/21   Casper Gaffney MD   hydroCHLOROthiazide (MICROZIDE) 12.5 MG capsule take 1 capsule by mouth every morning 3/8/21 3/3/22  Casper Guadalupe MD   pantoprazole (PROTONIX) 40 MG tablet take 1 tablet by mouth once daily 3/8/21   Gerry Dawley Viky Guadalupe MD   traZODone (DESYREL) 100 MG tablet take 1 tablet by mouth AT NIGHT 3/8/21   Casper Guadalupe MD   dicyclomine (BENTYL) 10 MG capsule Take 1 capsule by mouth 4 times daily (before meals and nightly) 1/25/21   Gene Light MD   linaclotide (LINZESS) 290 MCG CAPS capsule Take 1 capsule by mouth every morning (before breakfast) 1/19/21   Gene Light MD   varenicline (CHANTIX STARTING MONTH PAK) 0.5 MG X 11 & 1 MG X 42 tablet Take by mouth. 11/25/20   Kevin Key MD   buprenorphine-naloxone (SUBOXONE) 2-0.5 MG FILM SL film Place 1.5 Film under the tongue daily. 8/12/20   Historical Provider, MD   SYMBICORT 160-4.5 MCG/ACT AERO inhale 2 puffs by mouth twice a day Rinse mouth after use 7/7/20   Casper Guadalupe MD   potassium chloride (KLOR-CON M) 20 MEQ extended release tablet Take 20 mEq by mouth daily  5/29/19   Historical Provider, MD   VORTIoxetine (TRINTELLIX) 10 MG TABS tablet Take 10 mg by mouth daily 9/20/19   Historical Provider, MD   OneTouch Delica Lancets 85S MISC Apply 1 Units topically 2 times daily USE 2 TO 3 TIMES DAILY AS DIRECTED 4/8/20 10/21/21  Casper Gaffney MD   nitroGLYCERIN (NITROSTAT) 0.4 MG SL tablet Place 1 tablet under the tongue every 5 minutes as needed for Chest pain 10/9/19   Abdulkadir Gaines MD   Umeclidinium Bromide (INCRUSE ELLIPTA) 62.5 MCG/INH AEPB Inhale 1 puff into the lungs daily 9/17/19   Liane Howell MD       REVIEW OF SYSTEMS    (2-9 systems for level 4, 10 or more forlevel 5)      Review of Systems   Constitutional: Negative for activity change, chills and fever. HENT: Negative for congestion, sinus pain and sore throat. Eyes: Negative for pain and visual disturbance. Respiratory: Positive for cough. Negative for shortness of breath. Cardiovascular: Negative for chest pain. Gastrointestinal: Positive for constipation. Negative for abdominal pain, diarrhea, nausea and vomiting. Genitourinary: Negative for difficulty urinating, dysuria, hematuria and urgency. Musculoskeletal: Positive for back pain. Negative for myalgias. Skin: Negative for rash and wound. Neurological: Negative for dizziness, weakness, light-headedness, numbness and headaches. Psychiatric/Behavioral: Negative for agitation and confusion. PHYSICAL EXAM   (up to 7 for level 4, 8 or more forlevel 5)      ED TRIAGE VITALS BP: (!) 142/74, Temp: 97 °F (36.1 °C), Pulse: 84, Resp: 18, SpO2: 92 %    Vitals:    12/21/21 0236   BP: (!) 142/74   Pulse: 84   Resp: 18   Temp: 97 °F (36.1 °C)   TempSrc: Oral   SpO2: 92%         Physical Exam  Vitals and nursing note reviewed. Constitutional:       Appearance: Normal appearance. HENT:      Head: Normocephalic and atraumatic. Nose: Nose normal.      Mouth/Throat:      Mouth: Mucous membranes are moist.   Eyes:      Extraocular Movements: Extraocular movements intact. Pupils: Pupils are equal, round, and reactive to light. Cardiovascular:      Rate and Rhythm: Normal rate and regular rhythm. Pulses: Normal pulses. Heart sounds: Normal heart sounds. Pulmonary:      Effort: Pulmonary effort is normal.      Breath sounds: Normal breath sounds. Abdominal:      General: Abdomen is flat. Palpations: Abdomen is soft. Musculoskeletal:      Cervical back: Normal range of motion. Comments: tenderness to palpation paraspinal muscles of the thoracic spine    Skin:     General: Skin is warm and dry. Capillary Refill: Capillary refill takes less than 2 seconds. Neurological:      General: No focal deficit present. Mental Status: She is alert and oriented to person, place, and time. Cranial Nerves: No cranial nerve deficit. Sensory: No sensory deficit. Motor: No weakness. Coordination: Coordination normal.      Gait: Gait abnormal.      Deep Tendon Reflexes: Reflexes normal.   Psychiatric:         Mood and Affect: Mood normal.         Behavior: Behavior normal.           DIFFERENTIAL  DIAGNOSIS     PLAN (LABS / IMAGING / EKG):  Orders Placed This Encounter   Procedures    CT THORACIC SPINE WO CONTRAST    CT LUMBAR SPINE WO CONTRAST       MEDICATIONS ORDERED:  ED Medication Orders (From admission, onward)    Start Ordered     Status Ordering Provider    12/21/21 0300 12/21/21 0254  ibuprofen (ADVIL;MOTRIN) tablet 400 mg  ONCE         Last MAR action: Given - by Kevon Hui on 12/21/21 at [address] , [facility]    12/21/21 0300 12/21/21 0254  acetaminophen (TYLENOL) tablet 1,000 mg  ONCE         Last MAR action: Given - by Kevon Hui on 12/21/21 at [address], [facility]    12/21/21 0300 12/21/21 0254  orphenadrine (NORFLEX) injection 60 mg  ONCE         Last MAR action: Given - by Kevon Hui on 12/21/21 at [address], [facility]    12/21/21 0300 12/21/21 0254  lidocaine 4 % external patch 2 patch  DAILY         Last MAR action: Patch Applied - by Kevon Hui on 12/21/21 at [address], [facility]          DDX: chronic back pain, back spasms, spine fracture, disc bulge    DIAGNOSTIC RESULTS / EMERGENCY DEPARTMENT COURSE / MDM     IMPRESSION & INITIAL PLAN:  24-year-old female return to the emergency department for back pain. Patient was just seen here yesterday for same complaint. Treated with multimodal pain medications and Roxicodone. Patient returns today stating that she could not get into her PCP office and was continuing to have pain. She did report that she had marked pain relief from the muscle relaxant.   Patient received multiple pain medications here at today's visit and a CT

## 2021-12-21 NOTE — ED PROVIDER NOTES
[facility]     Emergency Department     Faculty Attestation    I performed a history and physical examination of the patient and discussed management with the resident. I reviewed the residents note and agree with the documented findings and plan of care. Any areas of disagreement are noted on the chart. I was personally present for the key portions of any procedures. I have documented in the chart those procedures where I was not present during the key portions. I have reviewed the emergency nurses triage note. I agree with the chief complaint, past medical history, past surgical history, allergies, medications, social and family history as documented unless otherwise noted below. For Physician Assistant/ Nurse Practitioner cases/documentation I have personally evaluated this patient and have completed at least one if not all key elements of the E/M (history, physical exam, and MDM). Additional findings are as noted. I have personally seen and evaluated the patient. I find the patient's history and physical exam are consistent with the NP/PA documentation. I agree with the care provided, treatment rendered, disposition and follow-up plan. 72-year-old female, seen yesterday in the ER for back pain, returns with the same back pain. Lateral, no preceding injury. No incontinence, weakness, or falls. Exam:  General: Sitting on the bed, awake, alert and in no acute distress  CV: normal rate and regular rhythm  Lungs: Breathing comfortably on room air with no tachypnea, hypoxia, or increased work of breathing  Neuro: Awake, alert, ambulating without assistance. No numbness or weakness. Plan:  CT thoracic and lumbar spine to rule out bony abnormalities given no significant improvement with multimodal pain therapy at home. Will give NSAIDs and Norflex. CT negative for bony abnormalities.   Will encourage patient to follow-up with neurosurgery if pain continues for further work-up and treatment.         Mono Baca MD   Attending Emergency  Physician    (Please note that portions of this note were completed with a voice recognition program. Efforts were made to edit the dictations but occasionally words are mis-transcribed.)              Mono Baca MD  12/21/21 8270

## 2022-01-07 ENCOUNTER — VIRTUAL VISIT (OUTPATIENT)
Dept: PULMONOLOGY | Age: 67
End: 2022-01-07
Payer: MEDICARE

## 2022-01-07 DIAGNOSIS — Z87.891 PERSONAL HISTORY OF SMOKING: Primary | ICD-10-CM

## 2022-01-07 PROCEDURE — 99214 OFFICE O/P EST MOD 30 MIN: CPT | Performed by: INTERNAL MEDICINE

## 2022-01-07 RX ORDER — FLUTICASONE FUROATE AND VILANTEROL TRIFENATATE 100; 25 UG/1; UG/1
2 POWDER RESPIRATORY (INHALATION) DAILY
Qty: 1 EACH | Refills: 3 | Status: SHIPPED | OUTPATIENT
Start: 2022-01-07 | End: 2022-07-28

## 2022-01-07 ASSESSMENT — SLEEP AND FATIGUE QUESTIONNAIRES
HOW LIKELY ARE YOU TO NOD OFF OR FALL ASLEEP WHEN YOU ARE A PASSENGER IN A CAR FOR AN HOUR WITHOUT A BREAK: 0
HOW LIKELY ARE YOU TO NOD OFF OR FALL ASLEEP WHILE SITTING QUIETLY AFTER LUNCH WITHOUT ALCOHOL: 0
HOW LIKELY ARE YOU TO NOD OFF OR FALL ASLEEP IN A CAR, WHILE STOPPED FOR A FEW MINUTES IN TRAFFIC: 0
ESS TOTAL SCORE: 4
HOW LIKELY ARE YOU TO NOD OFF OR FALL ASLEEP WHILE WATCHING TV: 1
HOW LIKELY ARE YOU TO NOD OFF OR FALL ASLEEP WHILE SITTING AND TALKING TO SOMEONE: 0
HOW LIKELY ARE YOU TO NOD OFF OR FALL ASLEEP WHILE LYING DOWN TO REST IN THE AFTERNOON WHEN CIRCUMSTANCES PERMIT: 2
HOW LIKELY ARE YOU TO NOD OFF OR FALL ASLEEP WHILE SITTING INACTIVE IN A PUBLIC PLACE: 0
HOW LIKELY ARE YOU TO NOD OFF OR FALL ASLEEP WHILE SITTING AND READING: 1

## 2022-01-07 NOTE — PROGRESS NOTES
2022    TELEHEALTH EVALUATION -- Audio/Visual (During  public health emergency)    Patient and physician are located in their individual locations. This is visit is completed via [employer] application []/ Doxy. me[x] / Telephone []     HPI:    Bobo Bishop (:  1955) has requested an audio/video evaluation for the following concern(s):  She has chronic obstructive lung disease due to chronic bronchitis and emphysema. She is being treated with the Spiriva Respimat she is also on [facility] but she does not like using [facility] she would like preferred to use but he will will change it to that. He is advised to rinse her mouth and throat after using Breo. No evidence of any chest infection sputum is not purulent not bloody no chest pain no fever no symptom suggestive of Covid. She has not taken any Covid vaccine she has taken the flu vaccine however. She has systemic hypertension that is under good control    She has sleep apnea but refused to use the CPAP machine. Continues to be overweight continues to have daytime symptoms. She have had a screening CT done last year which did not reveal any nodules. We will get a repeat CT CT scan again to follow-up on the course of her lung cancer she continues to smoke. She was advised to get Covid vaccine she already taken flu vaccine and Pneumovax        Review of Systems was completed for all other systems no additional information of trauma obtained other than obesity hypertension sleep apnea syndrome    Prior to Visit Medications    Medication Sig Taking?  Authorizing Provider   glucose monitoring (FREESTYLE FREEDOM) kit 1 kit by Does not apply route daily Yes Crista Arthur APRN - CNP   ARIPiprazole (ABILIFY) 15 MG tablet Take 1 tablet by mouth daily Yes Crista Arthur APRN - CNP   atenolol (TENORMIN) 25 MG tablet Take 0.5 tablets by mouth daily Yes Crista Arthur APRN - CNP   blood glucose test strips (ONETOUCH ULTRA) strip use 1 TEST STRIP to TEST BLOOD SUGAR four times a day if needed Yes MARCIA Mccracken CNP   BREO ELLIPTA 200-25 MCG/INH AEPB inhaler INHALE 2 PUFFS ONTO THE LUNGS DAILY Yes Historical Provider, MD   furosemide (LASIX) 20 MG tablet Take 1 tablet by mouth 2 times daily Yes MARCIA Mccracken CNP   rOPINIRole (REQUIP) 0.5 MG tablet Take 1 tablet by mouth daily Yes MARCIA Mccracken CNP   pravastatin (PRAVACHOL) 40 MG tablet Take 1 tablet by mouth daily Yes MARCIA Mccracken CNP   metFORMIN (GLUCOPHAGE) 500 MG tablet Take 1 tablet by mouth 2 times daily (with meals) Yes MARCIA Mccracken CNP   gabapentin (NEURONTIN) 800 MG tablet Take 1 tablet by mouth 3 times daily for 360 days.  Yes MARCIA Mccracken CNP   albuterol sulfate HFA (PROAIR HFA) 108 (90 Base) MCG/ACT inhaler Inhale 2 puffs into the lungs every 6 hours as needed for Wheezing Yes Rick Nguyen MD   albuterol sulfate HFA (VENTOLIN HFA) 108 (90 Base) MCG/ACT inhaler Inhale 2 puffs into the lungs 4 times daily as needed for Wheezing Yes Rick Nguyen MD   mometasone-formoterol (DULERA) 200-5 MCG/ACT inhaler Inhale 1 puff into the lungs 2 times daily Yes Rick Nguyen MD   albuterol (PROVENTIL) (2.5 MG/3ML) 0.083% nebulizer solution inhale contents of 1 vial ( 3 milliliters ) in nebulizer by mouth and INTO THE LUNGS every 6 hours Yes Historical Provider, MD   ondansetron (ZOFRAN-ODT) 4 MG disintegrating tablet dissolve 1 tablet ON TONGUE EVERY 4 TO 6 HOURS if needed for nausea Yes Historical Provider, MD   acetaminophen (TYLENOL) 500 MG tablet Take 1,000 mg by mouth every 6 hours as needed for Pain  Yes Historical Provider, MD   tiZANidine (ZANAFLEX) 2 MG tablet Take 1 tablet by mouth every 8 hours as needed (back pain) Yes Casper Guadalupe MD   hydroCHLOROthiazide (MICROZIDE) 12.5 MG capsule take 1 capsule by mouth every morning Yes Casper Guadalupe MD   pantoprazole (PROTONIX) 40 MG tablet take 1 tablet by mouth once daily Yes Harrison Dawkins MD   traZODone (DESYREL) 100 MG tablet take 1 tablet by mouth AT NIGHT Yes Harrison Dawkins MD   dicyclomine (BENTYL) 10 MG capsule Take 1 capsule by mouth 4 times daily (before meals and nightly) Yes Gilles Boast, MD   linaclotide (LINZESS) 290 MCG CAPS capsule Take 1 capsule by mouth every morning (before breakfast) Yes Gilles Boast, MD   varenicline (CHANTIX STARTING MONTH PAK) 0.5 MG X 11 & 1 MG X 42 tablet Take by mouth. Yes Eli Matson MD   buprenorphine-naloxone (SUBOXONE) 2-0.5 MG FILM SL film Place 1.5 Film under the tongue daily.  Yes Historical Provider, MD   SYMBICORT 160-4.5 MCG/ACT AERO inhale 2 puffs by mouth twice a day Rinse mouth after use Yes Harrison Dawkins MD   potassium chloride (KLOR-CON M) 20 MEQ extended release tablet Take 20 mEq by mouth daily  Yes Historical Provider, MD   VORTIoxetine (TRINTELLIX) 10 MG TABS tablet Take 10 mg by mouth daily Yes Historical Provider, MD   nitroGLYCERIN (NITROSTAT) 0.4 MG SL tablet Place 1 tablet under the tongue every 5 minutes as needed for Chest pain Yes Susanne Dorado MD   Umeclidinium Bromide (INCRUSE ELLIPTA) 62.5 MCG/INH AEPB Inhale 1 puff into the lungs daily Yes Debi Cardenas MD   tiotropium (SPIRIVA RESPIMAT) 2.5 MCG/ACT AERS inhaler Inhale 2 puffs into the lungs daily  Umer Richardson MD   OneTouch Delica Lancets 73Q MISC Apply 1 Units topically 2 times daily USE 2 TO 3 TIMES DAILY AS DIRECTED  Casper Paul MD       Social History     Tobacco Use    Smoking status: Current Every Day Smoker     Packs/day: 1.00     Years: 40.00     Pack years: 40.00     Types: Cigarettes     Start date: 1969    Smokeless tobacco: Never Used   Vaping Use    Vaping Use: Never used   Substance Use Topics    Alcohol use: No     Alcohol/week: 0.0 standard drinks    Drug use: No     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 Wt Readings from Last 3 Encounters:   12/19/21 207 lb (93.9 kg)   11/02/21 206 lb (93.4 kg)   10/21/21 206 lb (93.4 kg)       Results for orders placed or performed during the hospital encounter of 12/19/21   COVID-19, Rapid    Specimen: Nasopharyngeal Swab   Result Value Ref Range    Specimen Description . NASOPHARYNGEAL SWAB     SARS-CoV-2, Rapid Not Detected Not Detected   CBC Auto Differential   Result Value Ref Range    WBC 7.3 3.5 - 11.3 k/uL    RBC 4.80 3.95 - 5.11 m/uL    Hemoglobin 15.3 (H) 11.9 - 15.1 g/dL    Hematocrit 44.8 36.3 - 47.1 %    MCV 93.3 82.6 - 102.9 fL    MCH 31.9 25.2 - 33.5 pg    MCHC 34.2 28.4 - 34.8 g/dL    RDW 13.2 11.8 - 14.4 %    Platelets 139 145 - 562 k/uL    MPV 11.2 8.1 - 13.5 fL    NRBC Automated 0.0 0.0 per 100 WBC    Differential Type NOT REPORTED     Seg Neutrophils 68 (H) 36 - 65 %    Lymphocytes 20 (L) 24 - 43 %    Monocytes 11 3 - 12 %    Eosinophils % 1 1 - 4 %    Basophils 0 0 - 2 %    Immature Granulocytes 0 0 %    Segs Absolute 4.98 1.50 - 8.10 k/uL    Absolute Lymph # 1.43 1.10 - 3.70 k/uL    Absolute Mono # 0.79 0.10 - 1.20 k/uL    Absolute Eos # 0.07 0.00 - 0.44 k/uL    Basophils Absolute <0.03 0.00 - 0.20 k/uL    Absolute Immature Granulocyte 0.03 0.00 - 0.30 k/uL    WBC Morphology NOT REPORTED     RBC Morphology NOT REPORTED     Platelet Estimate NOT REPORTED    Basic Metabolic Panel w/ Reflex to MG   Result Value Ref Range    Glucose 142 (H) 70 - 99 mg/dL    BUN 8 8 - 23 mg/dL    CREATININE 0.51 0.50 - 0.90 mg/dL    Bun/Cre Ratio NOT REPORTED 9 - 20    Calcium 9.7 8.6 - 10.4 mg/dL    Sodium 137 135 - 144 mmol/L    Potassium 4.3 3.7 - 5.3 mmol/L    Chloride 102 98 - 107 mmol/L    CO2 25 20 - 31 mmol/L    Anion Gap 10 9 - 17 mmol/L    GFR Non-African American >60 >60 mL/min    GFR African American >60 >60 mL/min    GFR Comment          GFR Staging NOT REPORTED    Urinalysis Reflex to Culture    Specimen: Urine, clean catch   Result Value Ref Range    Color, UA Yellow Yellow    Turbidity UA Clear Clear    Glucose, Ur NEGATIVE NEGATIVE    Bilirubin Urine NEGATIVE NEGATIVE    Ketones, Urine NEGATIVE NEGATIVE    Specific Gravity, UA 1.034 (H) 1.005 - 1.030    Urine Hgb NEGATIVE NEGATIVE    pH, UA 6.5 5.0 - 8.0    Protein, UA NEGATIVE NEGATIVE    Urobilinogen, Urine Normal Normal    Nitrite, Urine NEGATIVE NEGATIVE    Leukocyte Esterase, Urine NEGATIVE NEGATIVE    Urinalysis Comments       Microscopic exam not performed based on chemical results unless requested in original order. EKG 12 Lead   Result Value Ref Range    Ventricular Rate 71 BPM    Atrial Rate 71 BPM    P-R Interval 242 ms    QRS Duration 90 ms    Q-T Interval 414 ms    QTc Calculation (Bazett) 449 ms    P Axis 10 degrees    R Axis -23 degrees    T Axis 32 degrees       No results found. PHYSICAL EXAMINATION:  Due to this being a TeleHealth encounter, evaluation of the following organ systems is limited: Vitals/Constitutional/EENT/Resp/CV/GI//MS/Neuro/Skin/Heme-Lymph-Imm. Constitutional: [x] Appears well-developed and well-nourished. [x] Abnormal obesity mental status  [x] Alert and awake  [x] Oriented to person/place/time [x]Able to follow commands    [x] No apparent distress      Eyes:  EOM    [x]  Normal  [] Abnormal-  Sclera  [x]  Normal  [] Abnormal -         Discharge [x]  None visible  [x] Abnormal -    HENT:   [x] Normocephalic, atraumatic. [] Abnormal shaped head   [x] Mouth/Throat: Mucous membranes are moist.     Ears [x] Normal  [] Abnormal-    Neck: [x] Normal range of motion [x] Supple [x] No visualized mass. Pulmonary/Chest: [x] Respiratory effort normal.  [x] No visualized signs of difficulty breathing or respiratory distress        [] Abnormal      Musculoskeletal:   [x] Normal range of motion. [x] Normal gait with no signs of ataxia. [x]  No signs of cyanosis of the peripheral portions of extremities.          [] Abnormal Neurological:        [x] Normal cranial nerve (limited exam to video visit) [x] No focal weakness observed       [] Abnormal          Speech       [x] Normal   [] Abnormal     Skin:        [x] No rash on visible skin  [x] Normal  [] Abnormal     Psychiatric:       [x] Normal  [] Abnormal        [x] Normal Mood  [] Anxious appearing        Other Pertinent Exam Findings:       ASSESSMENT:  COPD    Chronic smoking    Sleep apnea syndrome    Morbid obesity    Hypertension acid reflux    Diabetes    Diabetic neuropathy        Plan:   Patient pulmonary status is stable. No evidence of any fever. Sputum is not yellow no chest pain no excessive wheezing or dyspnea. She does not like using the  [facility]. We will change her back to [facility] prescription was written. She is advised to rinse her mouth and throat after the use of Breo. New pressure continue the use of Spiriva and albuterol. Patient is using aerosol and will send a letter to the electric company so that the right side not cut out. She has not been able to lose any weight. She has sleep apnea but not using the CPAP machine. Her blood pressure is under good control blood sugars are controlled. He does have diabetic neuropathy. Patient has not taken her Covid vaccine she was advised to get 1. She already taken a flu vaccine and Pneumovax    We will see her follow-up in few months  Advised to give up smoking.     Dictated with Dr. Minnie Leigh MD dictation over thank you  _______________________________________________________________________________________________________________

## 2022-01-07 NOTE — PATIENT INSTRUCTIONS
Transferred pt to [address] [place] [address] -   F/U in 3 mo. LS   1/7/22 2/9/22-Scheduling called, next CT needs to be a lung screeing CT as the previous years' screening was not abnormal. I cancelled the low dose and ordered the lung screening.  Gunjan Miranda

## 2022-01-18 ENCOUNTER — OFFICE VISIT (OUTPATIENT)
Dept: FAMILY MEDICINE CLINIC | Age: 67
End: 2022-01-18
Payer: MEDICARE

## 2022-01-18 ENCOUNTER — HOSPITAL ENCOUNTER (OUTPATIENT)
Age: 67
Discharge: HOME | End: 2022-01-18
Payer: MEDICARE

## 2022-01-18 ENCOUNTER — HOSPITAL ENCOUNTER (OUTPATIENT)
Age: 67
Setting detail: SPECIMEN
Discharge: HOME | End: 2022-01-18

## 2022-01-18 VITALS
SYSTOLIC BLOOD PRESSURE: 123 MMHG | HEART RATE: 72 BPM | HEIGHT: 72 IN | OXYGEN SATURATION: 96 % | DIASTOLIC BLOOD PRESSURE: 69 MMHG | TEMPERATURE: 97.3 F | WEIGHT: 195.6 LBS | BODY MASS INDEX: 26.49 KG/M2

## 2022-01-18 DIAGNOSIS — I10 ESSENTIAL HYPERTENSION: ICD-10-CM

## 2022-01-18 DIAGNOSIS — I50.32 CHRONIC DIASTOLIC CONGESTIVE HEART FAILURE (HCC): ICD-10-CM

## 2022-01-18 DIAGNOSIS — M50.30 DEGENERATIVE CERVICAL DISC: ICD-10-CM

## 2022-01-18 DIAGNOSIS — E55.9 VITAMIN D DEFICIENCY: ICD-10-CM

## 2022-01-18 DIAGNOSIS — Z95.0 S/P PLACEMENT OF CARDIAC PACEMAKER: ICD-10-CM

## 2022-01-18 DIAGNOSIS — F51.01 PRIMARY INSOMNIA: ICD-10-CM

## 2022-01-18 DIAGNOSIS — K21.9 GASTROESOPHAGEAL REFLUX DISEASE WITHOUT ESOPHAGITIS: ICD-10-CM

## 2022-01-18 DIAGNOSIS — K58.2 IRRITABLE BOWEL SYNDROME WITH BOTH CONSTIPATION AND DIARRHEA: ICD-10-CM

## 2022-01-18 DIAGNOSIS — E78.00 PURE HYPERCHOLESTEROLEMIA: ICD-10-CM

## 2022-01-18 DIAGNOSIS — G89.29 CHRONIC BILATERAL LOW BACK PAIN WITH RIGHT-SIDED SCIATICA: ICD-10-CM

## 2022-01-18 DIAGNOSIS — E11.8 TYPE 2 DIABETES MELLITUS WITH COMPLICATION (HCC): ICD-10-CM

## 2022-01-18 DIAGNOSIS — Z13.220 SCREENING FOR HYPERLIPIDEMIA: ICD-10-CM

## 2022-01-18 DIAGNOSIS — G25.81 RLS (RESTLESS LEGS SYNDROME): ICD-10-CM

## 2022-01-18 DIAGNOSIS — F32.9 MAJOR DEPRESSIVE DISORDER, REMISSION STATUS UNSPECIFIED, UNSPECIFIED WHETHER RECURRENT: ICD-10-CM

## 2022-01-18 DIAGNOSIS — E11.42 TYPE 2 DIABETES MELLITUS WITH DIABETIC POLYNEUROPATHY, WITHOUT LONG-TERM CURRENT USE OF INSULIN (HCC): Primary | ICD-10-CM

## 2022-01-18 DIAGNOSIS — F33.1 MODERATE EPISODE OF RECURRENT MAJOR DEPRESSIVE DISORDER (HCC): ICD-10-CM

## 2022-01-18 DIAGNOSIS — Z23 NEED FOR PROPHYLACTIC VACCINATION AND INOCULATION AGAINST VARICELLA: ICD-10-CM

## 2022-01-18 DIAGNOSIS — M54.41 CHRONIC BILATERAL LOW BACK PAIN WITH RIGHT-SIDED SCIATICA: ICD-10-CM

## 2022-01-18 LAB
ABSOLUTE EOS #: 0.04 K/UL (ref 0–0.44)
ABSOLUTE IMMATURE GRANULOCYTE: <0.03 K/UL (ref 0–0.3)
ABSOLUTE LYMPH #: 1.54 K/UL (ref 1.1–3.7)
ABSOLUTE MONO #: 0.65 K/UL (ref 0.1–1.2)
ALBUMIN SERPL-MCNC: 3.8 G/DL (ref 3.5–5.2)
ALBUMIN/GLOBULIN RATIO: 1.1 (ref 1–2.5)
ALP BLD-CCNC: 87 U/L (ref 35–104)
ALT SERPL-CCNC: 6 U/L (ref 5–33)
AMPHETAMINE SCREEN URINE: NEGATIVE
ANION GAP SERPL CALCULATED.3IONS-SCNC: 10 MMOL/L (ref 9–17)
AST SERPL-CCNC: 13 U/L
BARBITURATE SCREEN URINE: NEGATIVE
BASOPHILS # BLD: 1 % (ref 0–2)
BASOPHILS ABSOLUTE: 0.04 K/UL (ref 0–0.2)
BENZODIAZEPINE SCREEN, URINE: NEGATIVE
BILIRUB SERPL-MCNC: 0.34 MG/DL (ref 0.3–1.2)
BUN BLDV-MCNC: 11 MG/DL (ref 8–23)
BUN/CREAT BLD: NORMAL (ref 9–20)
BUPRENORPHINE URINE: NORMAL
CALCIUM SERPL-MCNC: 9.6 MG/DL (ref 8.6–10.4)
CANNABINOID SCREEN URINE: NEGATIVE
CHLORIDE BLD-SCNC: 106 MMOL/L (ref 98–107)
CHOLESTEROL, FASTING: 129 MG/DL
CHOLESTEROL/HDL RATIO: 3.9
CO2: 26 MMOL/L (ref 20–31)
COCAINE METABOLITE, URINE: NEGATIVE
CREAT SERPL-MCNC: 0.5 MG/DL (ref 0.5–0.9)
DIFFERENTIAL TYPE: ABNORMAL
EOSINOPHILS RELATIVE PERCENT: 1 % (ref 1–4)
GFR AFRICAN AMERICAN: >60 ML/MIN
GFR NON-AFRICAN AMERICAN: >60 ML/MIN
GFR SERPL CREATININE-BSD FRML MDRD: NORMAL ML/MIN/{1.73_M2}
GFR SERPL CREATININE-BSD FRML MDRD: NORMAL ML/MIN/{1.73_M2}
GLUCOSE FASTING: 92 MG/DL (ref 70–99)
HBA1C MFR BLD: 6.8 %
HCT VFR BLD CALC: 43 % (ref 36.3–47.1)
HDLC SERPL-MCNC: 33 MG/DL
HEMOGLOBIN: 14 G/DL (ref 11.9–15.1)
IMMATURE GRANULOCYTES: 0 %
LDL CHOLESTEROL: 79 MG/DL (ref 0–130)
LYMPHOCYTES # BLD: 31 % (ref 24–43)
MCH RBC QN AUTO: 31 PG (ref 25.2–33.5)
MCHC RBC AUTO-ENTMCNC: 32.6 G/DL (ref 28.4–34.8)
MCV RBC AUTO: 95.3 FL (ref 82.6–102.9)
MDMA URINE: NORMAL
METHADONE SCREEN, URINE: NEGATIVE
METHAMPHETAMINE, URINE: NORMAL
MONOCYTES # BLD: 13 % (ref 3–12)
NRBC AUTOMATED: 0 PER 100 WBC
OPIATES, URINE: NEGATIVE
OXYCODONE SCREEN URINE: NEGATIVE
PDW BLD-RTO: 13.2 % (ref 11.8–14.4)
PHENCYCLIDINE, URINE: NEGATIVE
PLATELET # BLD: 231 K/UL (ref 138–453)
PLATELET ESTIMATE: ABNORMAL
PMV BLD AUTO: 10.3 FL (ref 8.1–13.5)
POTASSIUM SERPL-SCNC: 3.8 MMOL/L (ref 3.7–5.3)
PROPOXYPHENE, URINE: NORMAL
RBC # BLD: 4.51 M/UL (ref 3.95–5.11)
RBC # BLD: ABNORMAL 10*6/UL
SEG NEUTROPHILS: 54 % (ref 36–65)
SEGMENTED NEUTROPHILS ABSOLUTE COUNT: 2.62 K/UL (ref 1.5–8.1)
SODIUM BLD-SCNC: 142 MMOL/L (ref 135–144)
TEST INFORMATION: NORMAL
TOTAL PROTEIN: 7.3 G/DL (ref 6.4–8.3)
TRICYCLIC ANTIDEPRESSANTS, UR: NORMAL
TRIGLYCERIDE, FASTING: 84 MG/DL
TSH SERPL DL<=0.05 MIU/L-ACNC: 0.99 MIU/L (ref 0.3–5)
VITAMIN D 25-HYDROXY: 13.6 NG/ML (ref 30–100)
VLDLC SERPL CALC-MCNC: ABNORMAL MG/DL (ref 1–30)
WBC # BLD: 4.9 K/UL (ref 3.5–11.3)
WBC # BLD: ABNORMAL 10*3/UL

## 2022-01-18 PROCEDURE — 84443 ASSAY THYROID STIM HORMONE: CPT

## 2022-01-18 PROCEDURE — 83036 HEMOGLOBIN GLYCOSYLATED A1C: CPT | Performed by: STUDENT IN AN ORGANIZED HEALTH CARE EDUCATION/TRAINING PROGRAM

## 2022-01-18 PROCEDURE — 80307 DRUG TEST PRSMV CHEM ANLYZR: CPT

## 2022-01-18 PROCEDURE — 82306 VITAMIN D 25 HYDROXY: CPT

## 2022-01-18 PROCEDURE — 36415 COLL VENOUS BLD VENIPUNCTURE: CPT

## 2022-01-18 PROCEDURE — 80061 LIPID PANEL: CPT

## 2022-01-18 PROCEDURE — 80053 COMPREHEN METABOLIC PANEL: CPT

## 2022-01-18 PROCEDURE — 85025 COMPLETE CBC W/AUTO DIFF WBC: CPT

## 2022-01-18 PROCEDURE — 99214 OFFICE O/P EST MOD 30 MIN: CPT | Performed by: STUDENT IN AN ORGANIZED HEALTH CARE EDUCATION/TRAINING PROGRAM

## 2022-01-18 RX ORDER — NITROGLYCERIN 0.4 MG/1
0.4 TABLET SUBLINGUAL EVERY 5 MIN PRN
Qty: 25 TABLET | Refills: 11 | Status: SHIPPED | OUTPATIENT
Start: 2022-01-18 | End: 2022-07-14 | Stop reason: SDUPTHER

## 2022-01-18 RX ORDER — FLUTICASONE FUROATE AND VILANTEROL TRIFENATATE 100; 25 UG/1; UG/1
2 POWDER RESPIRATORY (INHALATION) DAILY
Qty: 1 EACH | Refills: 3 | Status: CANCELLED | OUTPATIENT
Start: 2022-01-18 | End: 2022-02-17

## 2022-01-18 RX ORDER — FUROSEMIDE 20 MG/1
20 TABLET ORAL 2 TIMES DAILY
Qty: 60 TABLET | Refills: 4 | Status: SHIPPED | OUTPATIENT
Start: 2022-01-18 | End: 2022-06-17

## 2022-01-18 RX ORDER — POTASSIUM CHLORIDE 20 MEQ/1
20 TABLET, EXTENDED RELEASE ORAL DAILY
Qty: 60 TABLET | Refills: 1 | Status: SHIPPED | OUTPATIENT
Start: 2022-01-18 | End: 2022-05-16

## 2022-01-18 RX ORDER — HYDROCHLOROTHIAZIDE 12.5 MG/1
12.5 CAPSULE, GELATIN COATED ORAL EVERY MORNING
Qty: 90 CAPSULE | Refills: 3 | Status: SHIPPED | OUTPATIENT
Start: 2022-01-18 | End: 2022-07-14 | Stop reason: SDUPTHER

## 2022-01-18 RX ORDER — BLOOD SUGAR DIAGNOSTIC
STRIP MISCELLANEOUS
Qty: 200 STRIP | Refills: 11 | Status: SHIPPED | OUTPATIENT
Start: 2022-01-18 | End: 2022-05-17 | Stop reason: SDUPTHER

## 2022-01-18 RX ORDER — ROPINIROLE 0.5 MG/1
0.5 TABLET, FILM COATED ORAL DAILY
Qty: 90 TABLET | Refills: 3 | Status: SHIPPED | OUTPATIENT
Start: 2022-01-18 | End: 2022-07-14 | Stop reason: SDUPTHER

## 2022-01-18 RX ORDER — ARIPIPRAZOLE 15 MG/1
15 TABLET ORAL DAILY
Qty: 90 TABLET | Refills: 1 | Status: CANCELLED | OUTPATIENT
Start: 2022-01-18

## 2022-01-18 RX ORDER — PRAVASTATIN SODIUM 40 MG
40 TABLET ORAL DAILY
Qty: 90 TABLET | Refills: 4 | Status: SHIPPED | OUTPATIENT
Start: 2022-01-18 | End: 2022-07-14 | Stop reason: SDUPTHER

## 2022-01-18 RX ORDER — LANCETS 33 GAUGE
1 EACH MISCELLANEOUS 2 TIMES DAILY
Qty: 60 EACH | Refills: 4 | Status: SHIPPED | OUTPATIENT
Start: 2022-01-18 | End: 2022-06-17

## 2022-01-18 RX ORDER — TRAZODONE HYDROCHLORIDE 100 MG/1
100 TABLET ORAL NIGHTLY
Qty: 90 TABLET | Refills: 3 | Status: SHIPPED | OUTPATIENT
Start: 2022-01-18

## 2022-01-18 RX ORDER — TIZANIDINE 2 MG/1
2 TABLET ORAL EVERY 8 HOURS PRN
Qty: 90 TABLET | Refills: 3 | Status: SHIPPED | OUTPATIENT
Start: 2022-01-18 | End: 2022-07-14 | Stop reason: SDUPTHER

## 2022-01-18 RX ORDER — PANTOPRAZOLE SODIUM 40 MG/1
40 TABLET, DELAYED RELEASE ORAL DAILY
Qty: 90 TABLET | Refills: 3 | Status: SHIPPED | OUTPATIENT
Start: 2022-01-18 | End: 2022-08-30

## 2022-01-18 RX ORDER — DICYCLOMINE HYDROCHLORIDE 10 MG/1
10 CAPSULE ORAL
Qty: 120 CAPSULE | Refills: 11 | Status: SHIPPED | OUTPATIENT
Start: 2022-01-18

## 2022-01-18 RX ORDER — LINACLOTIDE 290 UG/1
290 CAPSULE, GELATIN COATED ORAL
Qty: 30 CAPSULE | Refills: 11 | Status: SHIPPED | OUTPATIENT
Start: 2022-01-18 | End: 2022-07-14 | Stop reason: SDUPTHER

## 2022-01-18 RX ORDER — ONDANSETRON 4 MG/1
4 TABLET, ORALLY DISINTEGRATING ORAL EVERY 8 HOURS PRN
Qty: 10 TABLET | Refills: 1 | Status: SHIPPED | OUTPATIENT
Start: 2022-01-18 | End: 2022-02-22

## 2022-01-18 RX ORDER — GABAPENTIN 800 MG/1
800 TABLET ORAL 3 TIMES DAILY
Qty: 90 TABLET | Refills: 2 | Status: SHIPPED | OUTPATIENT
Start: 2022-01-18 | End: 2022-05-05

## 2022-01-18 RX ORDER — ATENOLOL 25 MG/1
12.5 TABLET ORAL DAILY
Qty: 30 TABLET | Refills: 1 | Status: SHIPPED | OUTPATIENT
Start: 2022-01-18 | End: 2022-05-16

## 2022-01-18 NOTE — PROGRESS NOTES
[address] PRIMARY CARE  [address] [place] [address] [address] [place] [address]  Dept: [phone]  Dept Fax: [phone]    Lynnette Frausto is a 61 y.o. female who is a Established patient, who presents today for her medical conditions/complaints as noted below:  Chief Complaint   Patient presents with    Establish Care    Diabetes    Hypertension    COPD    Neck Pain     plague          HPI:     She is here today to follow-up on her chronic conditions and to meet new provider. She has a history of hypertension, type 2 diabetes, COPD, CHF, hep C chronic, restless leg syndrome, hyperlipidemia, anxiety and depression and irritable bowel syndrome. She also has cervical radiculopathy and states that her pain is worsening she would like to get a repeat CT done to see if her degeneration neck has worsened. She is requesting refills on all her medications. Hemoglobin A1C (%)   Date Value   01/18/2022 6.8   10/21/2021 6.0   11/30/2020 6.3 (H)             ( goal A1Cis < 7)   Microalb/Crt.  Ratio (mcg/mg creat)   Date Value   01/18/2022 PENDING     LDL Cholesterol (mg/dL)   Date Value   01/18/2022 79   02/05/2020 81   04/23/2019 88       (goal LDL is <100)   AST (U/L)   Date Value   01/18/2022 13     ALT (U/L)   Date Value   01/18/2022 6     BUN (mg/dL)   Date Value   01/18/2022 11     BP Readings from Last 3 Encounters:   01/18/22 123/69   12/21/21 (!) 142/74   12/20/21 106/72          (goal 120/80)    Past Medical History:   Diagnosis Date    RACHEL (acute kidney injury) ([address] 01/22/2014    Arthritis     generalized    Bilateral chronic knee pain 5/13/2016    Bronchiectasis without complication ([address]     Cancer ([address] 2004    uterus    CHF (congestive heart failure) (HCC)     Chronic bilateral low back pain with right-sided sciatica 02/20/2017    Chronic bronchitis (HCC)     Chronic bronchitis (HCC)     Chronic respiratory failure with hypoxia (HCC)     Cigarette nicotine dependence without complication 8/45/5087    COPD (chronic obstructive pulmonary disease) (HCC)     on inhaler /  COPD with chronic bronchitis and emphysema    Current smoker on some days     Depression 10/30/2014    Diabetic polyneuropathy associated with type 2 diabetes mellitus ([facility]     Diverticulosis     Essential hypertension 12/30/2013    Full dentures     GERD (gastroesophageal reflux disease)     Hep C w/o coma, chronic (HCC)     Hyperlipidemia     Hyperplastic polyp of intestine     Hypertension     DR. MCDANIEL-CARDIO    Irritable bowel syndrome with both constipation and diarrhea 2/15/2019    Liver disease     hepatitis C    Moderate episode of recurrent major depressive disorder ([address] 10/30/2014    Nasal polyposis     Noncompliance with CPAP treatment     Obesity (BMI 35.0-39.9 without comorbidity)     On home O2     2 liters PRN per pt    JIMENA (obstructive sleep apnea)     JIMENA on CPAP    Pacemaker 2012    Personal history of tobacco use, presenting hazards to health 4/2/2015    Pneumonia 07/2012    RECENTLY HOSPITALIZED    Primary insomnia 9/30/2016    Pulmonary edema cardiac cause ([facility]     Rectal bleeding     Smoking addiction     Tobacco abuse       Past Surgical History:   Procedure Laterality Date    BACK SURGERY  2008    BUNIONECTOMY Right 4/30/2021    RIGHT FOOT 1ST MPJ ARTHROPLASTY WITH EXTENSOR HALLUCIS LONGUS LENGTHENING performed by Sera Aj DPM at [place] [id number]       cath dec. 2013    COLON SURGERY      COLONOSCOPY      COLONOSCOPY  01/23/2015    severe diverticula    COLONOSCOPY  09/20/2016    HYPERPLASTIC POLYP X 2     COLONOSCOPY N/A 03/14/2019    COLONOSCOPY DIAGNOSTIC performed by Nel Acevedo MD at [facility]    COLONOSCOPY N/A 01/09/2020    COLONOSCOPY WITH BIOPSY performed by Nel Acevedo MD at [address] [place] [address], COLON, DIAGNOSTIC      FOOT SURGERY      HERNIA REPAIR      [facility] SURGERY  09/24/2013    decompression & re-exploration L3-4, L4-5    PACEMAKER INSERTION      for very slow heart beat (per patient)    PACEMAKER PLACEMENT      SIGMOIDOSCOPY N/A 06/26/2015    flexable sigmoidscopy,HYPERPLASTIC POLYP    TONSILLECTOMY      UPPER GASTROINTESTINAL ENDOSCOPY N/A 03/14/2019    EGD BIOPSY performed by Radha Sutton MD at [facility]       Family History   Problem Relation Age of Onset    Cancer Mother         lungs and brain    Stroke Father     Crohn's Disease Sister        Social History     Tobacco Use    Smoking status: Current Every Day Smoker     Packs/day: 1.00     Years: 40.00     Pack years: 40.00     Types: Cigarettes     Start date: 1969    Smokeless tobacco: Never Used   Substance Use Topics    Alcohol use: No     Alcohol/week: 0.0 standard drinks      Current Outpatient Medications   Medication Sig Dispense Refill    atenolol (TENORMIN) 25 MG tablet Take 0.5 tablets by mouth daily 30 tablet 1    blood glucose test strips (ONETOUCH ULTRA) strip use 1 TEST STRIP to TEST BLOOD SUGAR four times a day if needed 200 strip 11    dicyclomine (BENTYL) 10 MG capsule Take 1 capsule by mouth 4 times daily (before meals and nightly) 120 capsule 11    furosemide (LASIX) 20 MG tablet Take 1 tablet by mouth 2 times daily 60 tablet 4    gabapentin (NEURONTIN) 800 MG tablet Take 1 tablet by mouth 3 times daily for 30 days.  90 tablet 2    hydroCHLOROthiazide (MICROZIDE) 12.5 MG capsule Take 1 capsule by mouth every morning 90 capsule 3    linaclotide (LINZESS) 290 MCG CAPS capsule Take 1 capsule by mouth every morning (before breakfast) 30 capsule 11    metFORMIN (GLUCOPHAGE) 500 MG tablet Take 1 tablet by mouth 2 times daily (with meals) 180 tablet 0    nitroGLYCERIN (NITROSTAT) 0.4 MG SL tablet Place 1 tablet under the tongue every 5 minutes as needed for Chest pain 25 tablet 11    OneTouch Delica Lancets 77P MISC Apply 1 Units topically 2 times daily USE 2 TO 3 TIMES DAILY AS DIRECTED 60 each 4    pantoprazole (PROTONIX) 40 MG tablet Take 1 tablet by mouth daily 90 tablet 3    pravastatin (PRAVACHOL) 40 MG tablet Take 1 tablet by mouth daily 90 tablet 4    rOPINIRole (REQUIP) 0.5 MG tablet Take 1 tablet by mouth daily 90 tablet 3    tiZANidine (ZANAFLEX) 2 MG tablet Take 1 tablet by mouth every 8 hours as needed (back pain) 90 tablet 3    traZODone (DESYREL) 100 MG tablet Take 1 tablet by mouth nightly 90 tablet 3    potassium chloride (KLOR-CON M) 20 MEQ extended release tablet Take 1 tablet by mouth daily 60 tablet 1    ondansetron (ZOFRAN-ODT) 4 MG disintegrating tablet Take 1 tablet by mouth every 8 hours as needed for Nausea or Vomiting 10 tablet 1    zoster recombinant adjuvanted vaccine (SHINGRIX) 50 MCG/0.5ML SUSR injection 50 MCG IM then repeat 2-6 months. 0.5 mL 1    fluticasone-vilanterol (BREO ELLIPTA) 100-25 MCG/INH AEPB inhaler Inhale 2 puffs into the lungs daily 1 each 3    glucose monitoring (FREESTYLE FREEDOM) kit 1 kit by Does not apply route daily 1 kit 0    ARIPiprazole (ABILIFY) 15 MG tablet Take 1 tablet by mouth daily 90 tablet 1    BREO ELLIPTA 200-25 MCG/INH AEPB inhaler INHALE 2 PUFFS ONTO THE LUNGS DAILY      tiotropium (SPIRIVA RESPIMAT) 2.5 MCG/ACT AERS inhaler Inhale 2 puffs into the lungs daily 1 each 3    albuterol sulfate HFA (PROAIR HFA) 108 (90 Base) MCG/ACT inhaler Inhale 2 puffs into the lungs every 6 hours as needed for Wheezing 18 g 3    mometasone-formoterol (DULERA) 200-5 MCG/ACT inhaler Inhale 1 puff into the lungs 2 times daily 1 Inhaler 3    albuterol (PROVENTIL) (2.5 MG/3ML) 0.083% nebulizer solution inhale contents of 1 vial ( 3 milliliters ) in nebulizer by mouth and INTO THE LUNGS every 6 hours      acetaminophen (TYLENOL) 500 MG tablet Take 1,000 mg by mouth every 6 hours as needed for Pain       buprenorphine-naloxone (SUBOXONE) 2-0.5 MG FILM SL film Place 1.5 Film under the tongue daily.       SYMBICORT 160-4.5 96%   BMI 26.53 kg/m²     Assessment/Plan:   1. Type 2 diabetes mellitus with diabetic polyneuropathy, without long-term current use of insulin (HCC)  -     gabapentin (NEURONTIN) 800 MG tablet; Take 1 tablet by mouth 3 times daily for 30 days. , Disp-90 tablet, R-2Normal  -     metFORMIN (GLUCOPHAGE) 500 MG tablet; Take 1 tablet by mouth 2 times daily (with meals), Disp-180 tablet, R-0Normal  -     OneTouch Delica Lancets 57T MISC; Apply 1 Units topically 2 times daily USE 2 TO 3 TIMES DAILY AS DIRECTED, Disp-60 each, R-4Normal  -     POCT glycosylated hemoglobin (Hb A1C)  -     Microalbumin, Ur; Future  2. Chronic diastolic congestive heart failure (HCC)  -     furosemide (LASIX) 20 MG tablet; Take 1 tablet by mouth 2 times daily, Disp-60 tablet, R-4Normal  -     potassium chloride (KLOR-CON M) 20 MEQ extended release tablet; Take 1 tablet by mouth daily, Disp-60 tablet, R-1Normal  3. S/P placement of cardiac pacemaker  -     nitroGLYCERIN (NITROSTAT) 0.4 MG SL tablet; Place 1 tablet under the tongue every 5 minutes as needed for Chest pain, Disp-25 tablet, R-11Normal  4. Essential hypertension  -     atenolol (TENORMIN) 25 MG tablet; Take 0.5 tablets by mouth daily, Disp-30 tablet, R-1Normal  -     hydroCHLOROthiazide (MICROZIDE) 12.5 MG capsule; Take 1 capsule by mouth every morning, Disp-90 capsule, R-3Normal  -     CBC Auto Differential; Future  -     Comprehensive Metabolic Panel, Fasting; Future  -     TSH with Reflex; Future  5. Degenerative cervical disc  -     CT CERVICAL SPINE WO CONTRAST; Future  6. Pure hypercholesterolemia  -     pravastatin (PRAVACHOL) 40 MG tablet; Take 1 tablet by mouth daily, Disp-90 tablet, R-4Normal  -     Lipid, Fasting; Future  7. Moderate episode of recurrent major depressive disorder ([address]  8. RLS (restless legs syndrome)  -     rOPINIRole (REQUIP) 0.5 MG tablet; Take 1 tablet by mouth daily, Disp-90 tablet, R-3Normal  9.  Primary insomnia  -     traZODone (DESYREL) 100 MG tablet; Take 1 tablet by mouth nightly, Disp-90 tablet, R-3Normal  10. Gastroesophageal reflux disease without esophagitis  -     pantoprazole (PROTONIX) 40 MG tablet; Take 1 tablet by mouth daily, Disp-90 tablet, R-3Normal  11. Chronic bilateral low back pain with right-sided sciatica  -     tiZANidine (ZANAFLEX) 2 MG tablet; Take 1 tablet by mouth every 8 hours as needed (back pain), Disp-90 tablet, R-3Normal  12. Irritable bowel syndrome with both constipation and diarrhea  -     dicyclomine (BENTYL) 10 MG capsule; Take 1 capsule by mouth 4 times daily (before meals and nightly), Disp-120 capsule, R-11Normal  -     linaclotide (LINZESS) 290 MCG CAPS capsule; Take 1 capsule by mouth every morning (before breakfast), Disp-30 capsule, R-11Normal  -     ondansetron (ZOFRAN-ODT) 4 MG disintegrating tablet; Take 1 tablet by mouth every 8 hours as needed for Nausea or Vomiting, Disp-10 tablet, R-1Normal  13. Vitamin D deficiency  -     Vitamin D 25 Hydroxy; Future  14. Need for prophylactic vaccination and inoculation against varicella  -     zoster recombinant adjuvanted vaccine [facility]) 50 MCG/0.5ML SUSR injection; 50 MCG IM then repeat 2-6 months., Disp-0.5 mL, R-1Print      Return in about 1 month (around 2/18/2022) for annual wellness visit.     Orders Placed This Encounter   Procedures    CT CERVICAL SPINE WO CONTRAST     Standing Status:   Future     Standing Expiration Date:   1/18/2023     Order Specific Question:   Reason for exam:     Answer:   pain    Lipid, Fasting     Standing Status:   Future     Number of Occurrences:   1     Standing Expiration Date:   1/18/2023    CBC Auto Differential     Standing Status:   Future     Number of Occurrences:   1     Standing Expiration Date:   7/18/2022    Comprehensive Metabolic Panel, Fasting     Standing Status:   Future     Number of Occurrences:   1     Standing Expiration Date:   7/18/2022    TSH with Reflex     Standing Status:   Future     Number of Occurrences:   1     Standing Expiration Date:   7/18/2022    Vitamin D 25 Hydroxy     Standing Status:   Future     Number of Occurrences:   1     Standing Expiration Date:   7/18/2022    Microalbumin, Ur     Standing Status:   Future     Number of Occurrences:   1     Standing Expiration Date:   4/18/2022    POCT glycosylated hemoglobin (Hb A1C)     Orders Placed This Encounter   Medications    atenolol (TENORMIN) 25 MG tablet     Sig: Take 0.5 tablets by mouth daily     Dispense:  30 tablet     Refill:  1    blood glucose test strips (ONETOUCH ULTRA) strip     Sig: use 1 TEST STRIP to TEST BLOOD SUGAR four times a day if needed     Dispense:  200 strip     Refill:  11    dicyclomine (BENTYL) 10 MG capsule     Sig: Take 1 capsule by mouth 4 times daily (before meals and nightly)     Dispense:  120 capsule     Refill:  11    furosemide (LASIX) 20 MG tablet     Sig: Take 1 tablet by mouth 2 times daily     Dispense:  60 tablet     Refill:  4    gabapentin (NEURONTIN) 800 MG tablet     Sig: Take 1 tablet by mouth 3 times daily for 30 days.      Dispense:  90 tablet     Refill:  2    hydroCHLOROthiazide (MICROZIDE) 12.5 MG capsule     Sig: Take 1 capsule by mouth every morning     Dispense:  90 capsule     Refill:  3    linaclotide (LINZESS) 290 MCG CAPS capsule     Sig: Take 1 capsule by mouth every morning (before breakfast)     Dispense:  30 capsule     Refill:  11    metFORMIN (GLUCOPHAGE) 500 MG tablet     Sig: Take 1 tablet by mouth 2 times daily (with meals)     Dispense:  180 tablet     Refill:  0    nitroGLYCERIN (NITROSTAT) 0.4 MG SL tablet     Sig: Place 1 tablet under the tongue every 5 minutes as needed for Chest pain     Dispense:  25 tablet     Refill:  11    OneTouch Delica Lancets 38B MISC     Sig: Apply 1 Units topically 2 times daily USE 2 TO 3 TIMES DAILY AS DIRECTED     Dispense:  60 each     Refill:  4    pantoprazole (PROTONIX) 40 MG tablet     Sig: Take 1 tablet by mouth daily Dispense:  90 tablet     Refill:  3    pravastatin (PRAVACHOL) 40 MG tablet     Sig: Take 1 tablet by mouth daily     Dispense:  90 tablet     Refill:  4    rOPINIRole (REQUIP) 0.5 MG tablet     Sig: Take 1 tablet by mouth daily     Dispense:  90 tablet     Refill:  3    tiZANidine (ZANAFLEX) 2 MG tablet     Sig: Take 1 tablet by mouth every 8 hours as needed (back pain)     Dispense:  90 tablet     Refill:  3    traZODone (DESYREL) 100 MG tablet     Sig: Take 1 tablet by mouth nightly     Dispense:  90 tablet     Refill:  3    potassium chloride (KLOR-CON M) 20 MEQ extended release tablet     Sig: Take 1 tablet by mouth daily     Dispense:  60 tablet     Refill:  1    ondansetron (ZOFRAN-ODT) 4 MG disintegrating tablet     Sig: Take 1 tablet by mouth every 8 hours as needed for Nausea or Vomiting     Dispense:  10 tablet     Refill:  1    zoster recombinant adjuvanted vaccine (SHINGRIX) 50 MCG/0.5ML SUSR injection     Si MCG IM then repeat 2-6 months. Dispense:  0.5 mL     Refill:  1       Patient given educational materials - see patient instructions. Discussed use, benefit, and side effects of prescribed medications. All patientquestions answered. Pt voiced understanding. Reviewed health maintenance. Instructedto continue current medications, diet and exercise. Patient agreed with treatmentplan. Follow up as directed.      Electronically signed by Teodora Stephen MD on 2022 at 1:45 PM

## 2022-01-19 ENCOUNTER — TELEPHONE (OUTPATIENT)
Dept: FAMILY MEDICINE CLINIC | Age: 67
End: 2022-01-19

## 2022-01-19 DIAGNOSIS — E55.9 VITAMIN D DEFICIENCY: Primary | ICD-10-CM

## 2022-01-19 PROBLEM — G25.81 RLS (RESTLESS LEGS SYNDROME): Status: ACTIVE | Noted: 2022-01-19

## 2022-01-19 PROBLEM — Z95.0 S/P PLACEMENT OF CARDIAC PACEMAKER: Status: ACTIVE | Noted: 2022-01-19

## 2022-01-19 PROBLEM — Z72.0 TOBACCO USE: Status: ACTIVE | Noted: 2022-01-19

## 2022-01-19 ASSESSMENT — ENCOUNTER SYMPTOMS
COUGH: 1
SORE THROAT: 0
SHORTNESS OF BREATH: 0
WHEEZING: 0
EYE DISCHARGE: 0
CONSTIPATION: 1
BACK PAIN: 1
CHEST TIGHTNESS: 0
DIARRHEA: 1
ABDOMINAL PAIN: 0
VOMITING: 0
NAUSEA: 0

## 2022-01-20 LAB
CREATININE URINE: 320 MG/DL (ref 28–217)
MICROALBUMIN/CREAT 24H UR: <12 MG/L
MICROALBUMIN/CREAT UR-RTO: ABNORMAL MCG/MG CREAT

## 2022-01-24 ENCOUNTER — HOSPITAL ENCOUNTER (OUTPATIENT)
Dept: CT IMAGING | Age: 67
Discharge: HOME | End: 2022-01-26
Payer: MEDICARE

## 2022-01-24 ENCOUNTER — HOSPITAL ENCOUNTER (OUTPATIENT)
Age: 67
Discharge: HOME | End: 2022-01-24

## 2022-01-24 DIAGNOSIS — M50.30 DEGENERATIVE CERVICAL DISC: ICD-10-CM

## 2022-01-24 PROCEDURE — 80307 DRUG TEST PRSMV CHEM ANLYZR: CPT

## 2022-01-24 PROCEDURE — 72125 CT NECK SPINE W/O DYE: CPT

## 2022-01-27 ENCOUNTER — TELEPHONE (OUTPATIENT)
Dept: FAMILY MEDICINE CLINIC | Age: 67
End: 2022-01-27

## 2022-01-27 NOTE — TELEPHONE ENCOUNTER
----- Message from Yousuf Hughes sent at 1/27/2022 12:53 PM EST -----  Subject: Message to Provider    QUESTIONS  Information for Provider? called regarding her ct scan and would like to   know the results   ---------------------------------------------------------------------------  --------------  CALL BACK INFO  What is the best way for the office to contact you? OK to leave message on   voicemail  Preferred Call Back Phone Number? [phone]  ---------------------------------------------------------------------------  --------------  SCRIPT ANSWERS  Relationship to Patient?  Self

## 2022-02-15 ENCOUNTER — HOSPITAL ENCOUNTER (OUTPATIENT)
Age: 67
Discharge: HOME | End: 2022-02-15
Payer: MEDICARE

## 2022-02-15 PROCEDURE — 80307 DRUG TEST PRSMV CHEM ANLYZR: CPT

## 2022-02-21 DIAGNOSIS — E11.8 TYPE 2 DIABETES MELLITUS WITH COMPLICATION (HCC): ICD-10-CM

## 2022-02-21 DIAGNOSIS — K58.2 IRRITABLE BOWEL SYNDROME WITH BOTH CONSTIPATION AND DIARRHEA: ICD-10-CM

## 2022-02-21 DIAGNOSIS — B96.89 UTI DUE TO KLEBSIELLA SPECIES: ICD-10-CM

## 2022-02-21 DIAGNOSIS — N39.0 UTI DUE TO KLEBSIELLA SPECIES: ICD-10-CM

## 2022-02-22 RX ORDER — BLOOD-GLUCOSE METER
EACH MISCELLANEOUS
OUTPATIENT
Start: 2022-02-22

## 2022-02-22 RX ORDER — CEPHALEXIN 500 MG/1
CAPSULE ORAL
Qty: 14 CAPSULE | Refills: 0 | OUTPATIENT
Start: 2022-02-22

## 2022-02-22 RX ORDER — ONDANSETRON 4 MG/1
TABLET, ORALLY DISINTEGRATING ORAL
Qty: 10 TABLET | Refills: 1 | Status: SHIPPED | OUTPATIENT
Start: 2022-02-22 | End: 2022-07-06

## 2022-02-22 NOTE — TELEPHONE ENCOUNTER
Janis Orourke is calling to request a refill on the following medication(s):    Medication Request:  Requested Prescriptions     Pending Prescriptions Disp Refills    ondansetron (ZOFRAN-ODT) 4 MG disintegrating tablet [Pharmacy Med Name: ONDANSETRON ODT 4 MG TABLET] 10 tablet 1     Sig: dissolve 1 tablet under the tongue every 8 hours if needed for NAUSEA OR VOMITING       Last Visit Date (If Applicable):  7/11/7330    Next Visit Date:    2/25/2022

## 2022-02-24 ENCOUNTER — TELEPHONE (OUTPATIENT)
Dept: PULMONOLOGY | Age: 67
End: 2022-02-24

## 2022-02-24 NOTE — TELEPHONE ENCOUNTER
Transferred pt to [id number] [facility] scheduling after her Jan VV - CT was never scheduled.    Called 2nd x-informed her CT  IS overdue - she said \"SHE FORGOT\" she asked I transfer her back so she can make the appt for her CT

## 2022-02-24 NOTE — TELEPHONE ENCOUNTER
----- Message from Peg Fenton sent at 2/24/2022  7:41 AM EST -----  Neil Botello, CT lung screening ordered by Dr Camille Del Rosario on 1/7/22 fell into the overdue results folder. Please contact patient to see if this test was completed outside of a [address] [place] [address] facility. If so please obtain results and if testing has not been completed please advise patient to complete. If testing needs to be rescheduled please do so.  Thank you.    ----- Message -----  From: SYSTEM  Sent: 2/24/2022   4:05 AM EST  To: p Respiratory Spec Clinical Staff

## 2022-02-28 ENCOUNTER — TELEPHONE (OUTPATIENT)
Dept: ONCOLOGY | Age: 67
End: 2022-02-28

## 2022-02-28 DIAGNOSIS — Z87.891 PERSONAL HISTORY OF NICOTINE DEPENDENCE: Primary | ICD-10-CM

## 2022-02-28 NOTE — TELEPHONE ENCOUNTER
Order received for ct lung screening does not contain all required data. Order is missing provider's NPI, this will auto generate when provider signs the order. New Pending order placed to include required data. Scheduled for 3/4/22 . Thank you. CT LUNG SCREENING CRITERIA  · Patient is between the ages of 49-80. · Is currently smoking or is a former smoker who has quit smoking within the last 15 years  · Has at least a 20 pack-year smoking history (regardless of patient being a current or former smoker). Pack history is packs per day times years smoked equals pack history. IE:  1 pack a day X 20 years = 20 pack year history. · Has not had a CT lung screening or any CT chest exam within the last year  · Patient is asymptomatic (no signs/symptoms of lung cancer such as shortness of breath, cough, coughing up blood or unexplained significant weight loss). · This patient does not have a condition that limits life expectancy to <5 years  · This patient has participated in a shared decision-making session during which potential risks and benefits of CT Lung Screening were discussed. · This patient was informed of the importance of adherence to annual screening, impact of comorbidities, and ability/willingness to undergo diagnosis and treatment. · This patient was informed of the importance of smoking cessation and/or maintaining smoking abstinence. If applicable, this patient was offered information on smoking cessation counseling services. ORDER MUST INCLUDE: THIS INFORMATION IS AUTOMATICALLY ON ORDER IF ENTERED IN EPIC  · PROVIDER'S NPI - AUTOMATICALLY APPEARS ON ORDER WHEN SIGNED BY THE PROVIDER. · PACK HISTORY   · QUIT DATE IF THEY HAVE QUIT  · DIAGNOSIS: Z87.891 PERSONAL HISTORY OF TABACCO USE OR PERSONAL HISTORY OF NICOTINE DEPENDENCE. (BOTH HAS THE SAME Z CODE) THIS IS REQUIRED FOR MEDICARE/MEDICAID.       FOR PRIVATE INSURANCES DIAGNOSIS CODE CAN BE: Z12.2 ENCOUNTER FOR SCREENING FOR MALIGNANT NEOPLASM OF RESPIRATORY ORGANS OR Z87.891 PERSONAL HISTORY OF TABACCO USE OR PERSONAL HISTORY OF NICOTINE DEPENDENCE.

## 2022-03-04 ENCOUNTER — HOSPITAL ENCOUNTER (OUTPATIENT)
Age: 67
Setting detail: SPECIMEN
Discharge: HOME | End: 2022-03-04

## 2022-03-04 ENCOUNTER — HOSPITAL ENCOUNTER (OUTPATIENT)
Dept: CT IMAGING | Age: 67
Discharge: HOME | End: 2022-03-06
Payer: MEDICARE

## 2022-03-04 DIAGNOSIS — Z87.891 PERSONAL HISTORY OF SMOKING: ICD-10-CM

## 2022-03-04 PROCEDURE — 71271 CT THORAX LUNG CANCER SCR C-: CPT

## 2022-03-14 ENCOUNTER — HOSPITAL ENCOUNTER (OUTPATIENT)
Age: 67
Discharge: HOME | End: 2022-03-14
Payer: MEDICARE

## 2022-03-14 LAB
AMPHETAMINE SCREEN URINE: NEGATIVE
BARBITURATE SCREEN URINE: NEGATIVE
BENZODIAZEPINE SCREEN, URINE: NEGATIVE
CANNABINOID SCREEN URINE: NEGATIVE
COCAINE METABOLITE, URINE: NEGATIVE
METHADONE SCREEN, URINE: NEGATIVE
OPIATES, URINE: NEGATIVE
OXYCODONE SCREEN URINE: NEGATIVE
PHENCYCLIDINE, URINE: NEGATIVE
TEST INFORMATION: NORMAL

## 2022-03-14 PROCEDURE — 80307 DRUG TEST PRSMV CHEM ANLYZR: CPT

## 2022-04-11 ENCOUNTER — HOSPITAL ENCOUNTER (OUTPATIENT)
Age: 67
Discharge: HOME | End: 2022-04-11
Payer: MEDICARE

## 2022-04-11 PROCEDURE — 80307 DRUG TEST PRSMV CHEM ANLYZR: CPT

## 2022-04-15 ENCOUNTER — TELEPHONE (OUTPATIENT)
Dept: FAMILY MEDICINE CLINIC | Age: 67
End: 2022-04-15

## 2022-04-15 NOTE — TELEPHONE ENCOUNTER
----- Message from Rosalio Whaley sent at 4/15/2022 10:33 AM EDT -----  Subject: Message to Provider    QUESTIONS  Information for Provider? Pat from [facility] that Joaquin Emmanuel   is requesting a shower chair and she just needs a RX and can get that   ordered for her. [phone] Attn? [facility]  ---------------------------------------------------------------------------  --------------  Lavon FALCON  What is the best way for the office to contact you? Do not leave any   message, patient will call back for answer  Preferred Call Back Phone Number? [phone]  ---------------------------------------------------------------------------  --------------  SCRIPT ANSWERS  Relationship to Patient?  Self

## 2022-04-18 DIAGNOSIS — J41.1 MUCOPURULENT CHRONIC BRONCHITIS (HCC): Primary | ICD-10-CM

## 2022-04-26 ENCOUNTER — HOSPITAL ENCOUNTER (OUTPATIENT)
Age: 67
Discharge: HOME | End: 2022-04-26
Payer: MEDICARE

## 2022-04-26 ENCOUNTER — OFFICE VISIT (OUTPATIENT)
Dept: PODIATRY | Age: 67
End: 2022-04-26
Payer: MEDICARE

## 2022-04-26 VITALS — HEIGHT: 72 IN | WEIGHT: 195 LBS | RESPIRATION RATE: 16 BRPM | BODY MASS INDEX: 26.41 KG/M2

## 2022-04-26 DIAGNOSIS — B35.1 DERMATOPHYTOSIS OF NAIL: ICD-10-CM

## 2022-04-26 DIAGNOSIS — D23.71 BENIGN NEOPLASM OF SKIN OF RIGHT FOOT: ICD-10-CM

## 2022-04-26 DIAGNOSIS — D23.72 BENIGN NEOPLASM OF SKIN OF LOWER LIMB, INCLUDING HIP, LEFT: ICD-10-CM

## 2022-04-26 DIAGNOSIS — M79.671 RIGHT FOOT PAIN: ICD-10-CM

## 2022-04-26 DIAGNOSIS — E11.51 TYPE II DIABETES MELLITUS WITH PERIPHERAL CIRCULATORY DISORDER (HCC): Primary | ICD-10-CM

## 2022-04-26 LAB
AMPHETAMINE SCREEN URINE: NEGATIVE
CANNABINOID SCREEN URINE: NEGATIVE
OXYCODONE SCREEN URINE: NEGATIVE

## 2022-04-26 PROCEDURE — 99215 OFFICE O/P EST HI 40 MIN: CPT | Performed by: PODIATRIST

## 2022-04-26 PROCEDURE — 11721 DEBRIDE NAIL 6 OR MORE: CPT | Performed by: PODIATRIST

## 2022-04-26 PROCEDURE — 3044F HG A1C LEVEL LT 7.0%: CPT | Performed by: PODIATRIST

## 2022-04-26 PROCEDURE — 80307 DRUG TEST PRSMV CHEM ANLYZR: CPT

## 2022-04-26 PROCEDURE — G0480 DRUG TEST DEF 1-7 CLASSES: HCPCS

## 2022-04-26 PROCEDURE — 17110 DESTRUCTION B9 LES UP TO 14: CPT | Performed by: PODIATRIST

## 2022-04-26 RX ORDER — MAGESIUM CITRATE 1.75 G/29.6ML
LIQUID ORAL
COMMUNITY
Start: 2022-02-23 | End: 2022-08-17 | Stop reason: ALTCHOICE

## 2022-04-26 NOTE — PROGRESS NOTES
[address] [facility] PODIATRY [facility]  [address] [address] [address]  Dept: [phone]  Dept Fax: [phone]    DIABETIC PROGRESS NOTE  Date of patient's visit: 4/26/2022  Patient's Name:  Ava Tanner YOB: 1955            Patient Care Team:  Reny Mulligan MD as PCP - General (Family Medicine)  Reny Mulligan MD as PCP - [facility] Provider  Sol Garvin MD as Consulting Physician (Gastroenterology)  Antonella Galeana MD as Referring Physician (Internal Medicine)  Lord Cristhian MD as Consulting Physician (Pulmonology)  Chas Paul MD as Consulting Physician (Internal Medicine)  Lissette Shelton DPM as Physician (Podiatry)  Alonso Ching RN as Imaging Navigator          Chief Complaint   Patient presents with    Diabetes    Benign Neoplasm     bilateral     Nail Problem     toenail trim    Foot Pain     right foot pain       Subjective:   Ava Tanner comes to clinic for Diabetes, Benign Neoplasm (bilateral ), Nail Problem (toenail trim), and Foot Pain (right foot pain)    she is a diabetic and states that she is doing well. Pt currently has complaint of thickened, elongated nails that they cannot manage by themselves. Pt's primary care physician is Reny Mulligan MD last seen 01/18/2022   Pt's last blood sugar was 128 . Pt also relates to painful skin spots to the bottom of both feet. Pt has tried pads and changing shoes but it has note helped the pain    Pt has a new complaint of pain to the right great toe. States that her toe has turned more  After it was feeling good after the surgery. Pt stats she has to walk with her foot to the side to get a decent step. Pt has tried changing shoes but it has not helped the pain  Patient is taking over the counter medications with no relief. Lab Results   Component Value Date    LABA1C 6.8 01/18/2022      Complains of numbness in the feet bilat.   Past Medical History:   Diagnosis Date    RACHEL (acute kidney injury) ([facility] 01/22/2014    Arthritis     generalized    Bilateral chronic knee pain 5/13/2016    [facility]     Cancer ([facility] 2004    uterus    CHF (congestive heart failure) (HCC)     Chronic bilateral low back pain with right-sided sciatica 02/20/2017    Chronic bronchitis (HCC)     Chronic bronchitis (HCC)     Chronic respiratory failure with hypoxia (HCC)     Cigarette nicotine dependence without complication 2/79/3351    COPD (chronic obstructive pulmonary disease) (HCC)     on inhaler /  COPD with chronic bronchitis and emphysema    Current smoker on some days     Depression 10/30/2014    Diabetic polyneuropathy associated with type 2 diabetes mellitus ([facility]     Diverticulosis     Essential hypertension 12/30/2013    Full dentures     GERD (gastroesophageal reflux disease)     Hep C w/o coma, chronic (HCC)     Hyperlipidemia     Hyperplastic polyp of intestine     Hypertension     DR. MCDANIEL-CARDIO    Irritable bowel syndrome with both constipation and diarrhea 2/15/2019    Liver disease     hepatitis C    Moderate episode of recurrent major depressive disorder ([facility] 10/30/2014    Nasal polyposis     Noncompliance with CPAP treatment     Obesity (BMI 35.0-39.9 without comorbidity)     On home O2     2 liters PRN per pt    JIMENA (obstructive sleep apnea)     JIMENA on CPAP    Pacemaker 2012    Personal history of tobacco use, presenting hazards to health 4/2/2015    Pneumonia 07/2012    RECENTLY HOSPITALIZED    Primary insomnia 9/30/2016    Pulmonary edema cardiac cause (HCC)     Rectal bleeding     Smoking addiction     Tobacco abuse        Allergies   Allergen Reactions    Iv Dye [Iodides] Hives     Current Outpatient Medications on File Prior to Visit   Medication Sig Dispense Refill    RA MAGNESIUM CITRATE 1.745 GM/30ML solution USE AS DIRECTED      ondansetron (ZOFRAN-ODT) 4 MG disintegrating tablet dissolve 1 tablet under the tongue every 8 hours if needed for NAUSEA OR VOMITING 10 tablet 1    vitamin D-3 (CHOLECALCIFEROL) 125 MCG (5000 UT) TABS Take 1 tablet by mouth daily 30 tablet 3    atenolol (TENORMIN) 25 MG tablet Take 0.5 tablets by mouth daily 30 tablet 1    blood glucose test strips (ONETOUCH ULTRA) strip use 1 TEST STRIP to TEST BLOOD SUGAR four times a day if needed 200 strip 11    dicyclomine (BENTYL) 10 MG capsule Take 1 capsule by mouth 4 times daily (before meals and nightly) 120 capsule 11    furosemide (LASIX) 20 MG tablet Take 1 tablet by mouth 2 times daily 60 tablet 4    hydroCHLOROthiazide (MICROZIDE) 12.5 MG capsule Take 1 capsule by mouth every morning 90 capsule 3    linaclotide (LINZESS) 290 MCG CAPS capsule Take 1 capsule by mouth every morning (before breakfast) 30 capsule 11    metFORMIN (GLUCOPHAGE) 500 MG tablet Take 1 tablet by mouth 2 times daily (with meals) 180 tablet 0    nitroGLYCERIN (NITROSTAT) 0.4 MG SL tablet Place 1 tablet under the tongue every 5 minutes as needed for Chest pain 25 tablet 11    OneTouch Delica Lancets 48W MISC Apply 1 Units topically 2 times daily USE 2 TO 3 TIMES DAILY AS DIRECTED 60 each 4    pantoprazole (PROTONIX) 40 MG tablet Take 1 tablet by mouth daily 90 tablet 3    pravastatin (PRAVACHOL) 40 MG tablet Take 1 tablet by mouth daily 90 tablet 4    rOPINIRole (REQUIP) 0.5 MG tablet Take 1 tablet by mouth daily 90 tablet 3    tiZANidine (ZANAFLEX) 2 MG tablet Take 1 tablet by mouth every 8 hours as needed (back pain) 90 tablet 3    traZODone (DESYREL) 100 MG tablet Take 1 tablet by mouth nightly 90 tablet 3    potassium chloride (KLOR-CON M) 20 MEQ extended release tablet Take 1 tablet by mouth daily 60 tablet 1    zoster recombinant adjuvanted vaccine (SHINGRIX) 50 MCG/0.5ML SUSR injection 50 MCG IM then repeat 2-6 months.  0.5 mL 1    glucose monitoring (FREESTYLE FREEDOM) kit 1 kit by Does not apply route daily 1 kit 0    ARIPiprazole (ABILIFY) 15 MG tablet Take 1 tablet by mouth daily 90 tablet 1    BREO ELLIPTA 200-25 MCG/INH AEPB inhaler INHALE 2 PUFFS ONTO THE LUNGS DAILY      albuterol sulfate HFA (PROAIR HFA) 108 (90 Base) MCG/ACT inhaler Inhale 2 puffs into the lungs every 6 hours as needed for Wheezing 18 g 3    mometasone-formoterol (DULERA) 200-5 MCG/ACT inhaler Inhale 1 puff into the lungs 2 times daily 1 Inhaler 3    albuterol (PROVENTIL) (2.5 MG/3ML) 0.083% nebulizer solution inhale contents of 1 vial ( 3 milliliters ) in nebulizer by mouth and INTO THE LUNGS every 6 hours      acetaminophen (TYLENOL) 500 MG tablet Take 1,000 mg by mouth every 6 hours as needed for Pain       buprenorphine-naloxone (SUBOXONE) 2-0.5 MG FILM SL film Place 1.5 Film under the tongue daily.  SYMBICORT 160-4.5 MCG/ACT AERO inhale 2 puffs by mouth twice a day Rinse mouth after use 10.2 g 5    Umeclidinium Bromide (INCRUSE ELLIPTA) 62.5 MCG/INH AEPB Inhale 1 puff into the lungs daily 1 each 5    gabapentin (NEURONTIN) 800 MG tablet Take 1 tablet by mouth 3 times daily for 30 days. 90 tablet 2    fluticasone-vilanterol (BREO ELLIPTA) 100-25 MCG/INH AEPB inhaler Inhale 2 puffs into the lungs daily 1 each 3    tiotropium (SPIRIVA RESPIMAT) 2.5 MCG/ACT AERS inhaler Inhale 2 puffs into the lungs daily 1 each 3     No current facility-administered medications on file prior to visit. Review of Systems    Review of Systems:   History obtained from chart review and the patient  General ROS: negative for - chills, fatigue, fever, night sweats or weight gain  Constitutional: Negative for chills, diaphoresis, fatigue, fever and unexpected weight change. Musculoskeletal: Positive for arthralgias, gait problem and joint swelling. Neurological ROS: negative for - behavioral changes, confusion, headaches or seizures. Negative for weakness and numbness.    Dermatological ROS: negative for - mole changes, rash  Cardiovascular: Negative for leg swelling. Gastrointestinal: Negative for constipation, diarrhea, nausea and vomiting. Objective:  Dermatologic Exam:  Skin lesion present to the right and left plantar foot with a central core and petchaie noted to the lesions periphery. Pain on palpation of the lesion    Skin is thin, with flaky sloughing skin as well as decreased hair growth to the lower leg  Small red hemosiderin deposits seen dorsal foot   Musculoskeletal:         POP to the right great toe at the MTP. There is lateral deviation of the hallux on the 1st MTPJ. The ROM of the 1st MTP is decreased compared to previous visits in the sagittal plain  Muscle strength 5/5, Bilateral.  Pain present upon palpation of toenails 1-5, Bilateral. decreased medial longitudinal arch, Bilateral.  Ankle ROM decreased,Bilateral.    Dorsally contracted digits present digits 2, Bilateral.     Vascular: DP pulses 1/4 bilateral.  PT pulses 0/4 bilateral.   CFT <5 seconds, Bilateral.  Hair growth absent to the level of the digits, Bilateral.  Edema present, Bilateral.  Varicosities absent, Bilateral. Erythema absent, Bilateral    Neurological: Sensation diminshed to light touch to level of digits, Bilateral.  Protective sensation intact 6/10 sites via 5.07/10g [place] Monofilament, Bilateral.  negative Tinel's, Bilateral.  negative Valleix sign, Bilateral.      Integument: Warm, dry, supple, Bilateral.  Open lesion absent, Bilateral.  Interdigital maceration absent to web spaces 4, Bilateral.  Nails 1-5 left and 1-5 right thickened > 3.0 mm, dystrophic and crumbly, discolored with subungual debris. Fissures absent, Bilateral.   General: AAO x 3 in NAD.     Derm  Toenail Description  Sites of Onychomycosis Involvement (Check affected area)  [x] [x] [x] [x] [x] [x] [x] [x] [x] [x]  5 4 3 2 1 1 2 3 4 5                          Right Left    Thickness  [x] [x] [x] [x] [x] [x] [x] [x] [x] [x]  5 4 3 2 1 1 2 3 4 5                         Right                                        Left    Dystrophic Changes   [x] [x] [x] [x] [x] [x] [x] [x] [x] [x]  5 4 3 2 1 1 2 3 4 5                         Right                                        Left    Color   [x] [x] [x] [x] [x] [x] [x] [x] [x] [x]  5 4 3 2 1 1 2 3 4 5                          Right                                        Left    Incurvation/Ingrowin   [] [] [] [] [] [] [] [] [] []  5 4 3 2 1 1 2 3 4 5                         Right                                        Left    Inflammation/Pain   [x] [x] [x] [x] [x] [x] [x] [x] [x] [x]  5 4 3 2 1 1 2 3 4 5                         Right                                        Left        Visual inspection:  Deformity: hammertoe deformity bita feet  amputation: absent  Skin lesions: present - as above  Edema: right- 2+ pitting edema, left- 2+ pitting edema    Sensory exam:  Monofilament sensation: abnormal - 6/10 via SW 5.07/10g monofilament to the plantar foot bilateral feet    Pulses: abnormal - 1/4 dorsalis pedis pulse and 0/4 Posterior tibial pulse,   Pinprick: Impaired  Proprioception: Impaired  Vibration (128 Hz): Impaired       DM with PVD       [x]Yes    []No      X rays right foot 3 views:  Intact metallic implant to the 1st MTPJ. There is lateral deviation  Of th ehallux and the distal aspect of the great toe is plantargrade to the 1st met. Assessment:  61 y.o. female with:   Diagnosis Orders   1. Type II diabetes mellitus with peripheral circulatory disorder (HCC)  HM DIABETES FOOT EXAM    WI DEBRIDEMENT OF NAILS, 6 OR MORE    WI DESTRUCTION BENIGN LESIONS UP TO 14   2. Dermatophytosis of nail  HM DIABETES FOOT EXAM    WI DEBRIDEMENT OF NAILS, 6 OR MORE    WI DESTRUCTION BENIGN LESIONS UP TO 14   3.  Benign neoplasm of skin of right foot  HM DIABETES FOOT EXAM    WI DEBRIDEMENT OF NAILS, 6 OR MORE    WI DESTRUCTION BENIGN LESIONS UP TO 14   4. Benign neoplasm of skin of lower limb, including hip, left  HM DIABETES FOOT EXAM    ME DEBRIDEMENT OF NAILS, 6 OR MORE    ME DESTRUCTION BENIGN LESIONS UP TO 14   5. Right foot pain  HM DIABETES FOOT EXAM    ME DEBRIDEMENT OF NAILS, 6 OR MORE    ME DESTRUCTION BENIGN LESIONS UP TO 14    XR FOOT RIGHT (MIN 3 VIEWS)           Q7   []Yes    []No                Q8   [x]Yes    []No                     Q9   []Yes    []No    Plan:   Pt was evaluated and examined. Patient was given personalized discharge instructions. X rays right foot 3 views show the above finidngs. All labs were reviewed and all imagining including the above findings were reviewed prior to the patients arrival and with the patient today      Time was spent educating the patient and their families/caregivers on proper care of the feet and ankles. All the above diagnosis were addressed at todays visit and all questions were answered. I had a long discussion today with the patient about the likely diagnosis and its natural history, physical exam and imaging findings, as well as treatment options. We discussed both surgical and nonsurgical treatments, including risks and benefits. From a nonoperative standpoint, we discussed rest/activity modification, NSAIDs/Acetaminophen/topical anesthetics, orthotics, bracing/immobilization, and physical therapy. Surgically, we disucssed removal of the hardware and replacement with cline implant vs 1st MTPJ fusion with plate and screw fixation       I discussed in detail the procedure. He/She was in full agreement and understood risks. The reason for surgery is due to unsuccessful non-operative treatment and/or conservative treatment is not a viable option. It was discussed with the patient that compliance postoperatively is of utmost importance. Any deviation on behalf of the patient will decrease the chances of a successful outcome.  Patient acknowledged, understands, and would like to move forward with surgery as discussed. The patient was given a consent outlining the general risk of surgery as well as the specifics to the surgical plan. This was carefully discussed giving all options, indications and contraindications regarding the procedure outlined in the consent. All questions were answered to the patient's satisfaction. The patient signed the consent form confirming complete understanding and acceptance of the risks of stated. I specifically stated and inquired if the patient understands and accepts the risks of surgery including infection, failure, prolonged pain, swelling, numbness, recurrence, limited mobility,painful scar, RSDS, overcorrection, under-correction, and loss of limb/life. Death, bleeding, blood clots in the veins or lungs, tendon or blood vessel disturbance, bony conditions, continued pain,stiffness, weakness, and limited function. These were all listed on the consent. Additionally, the postoperative course and treatment was outlined for the patient. Discussion consisted of postoperatively the patient needs to keep the foot elevated for at least the first initial two weeks. I have encouraged movements such as moving from the bed to the sofa or recliner, to the kitchen and the bathroom; quick bursts of movement with the foot elevated. Longstanding periods of time such as cooking, cleaning, and shopping are not permitted. I reminded the patient that there are only two reasons to have surgery. That being, if their function is impaired and also if they are having pain. If they can answer yes to both these questions, I will move forward with surgery. If they can not, there is no reason to proceed with surgical intervention. Pt does elect to have the procedure above    A total of 40 minutes was spent with this patients encounter    The lesions were partially excised via 15 blade and silver nitrate was applied under occlusion.   The patient tolerated the procedure well and without complication. Advised patient to use vasoline to the area after tomorrow to prevent surrounding tissue irritation. Nails 1-10 were debrided sharply in length and thickness with a nipper and , without incident. Pt will follow up in 1 weeks post op  or sooner if any problems arise. Diagnosis was discussed with the pt and all of their questions were answered in detail. Proper foot hygiene and care was discussed with the pt. Informed patient on proper diabetic foot care and importance of tight glycemic control. Patient to check feet daily and contact the office with any questions/problems/concerns.    Other comorbidity noted and will be managed by PCP.  4/26/2022    Electronically signed by Isabela Stevenson DPM on 4/26/2022 at 2:06 PM  4/26/2022

## 2022-04-28 LAB
ALCOHOL URINE: POSITIVE MG/DL
AMPHETAMINE SCREEN URINE: POSITIVE NG/ML
BARBITURATE SCREEN URINE: NEGATIVE NG/ML
BENZODIAZEPINE SCREEN, URINE: NEGATIVE NG/ML
CANNABINOID SCREEN URINE: NEGATIVE NG/ML
COCAINE(METAB.)SCREEN, URINE: NEGATIVE NG/ML
CREATININE URINE: 104.9 MG/DL (ref 20–400)
Lab: NORMAL
METHADONE SCREEN, URINE: NEGATIVE NG/ML
OPIATES, URINE: NEGATIVE NG/ML
PHENCYCLIDINE, URINE: NEGATIVE NG/ML
PROPOXYPHENE, URINE: NEGATIVE NG/ML

## 2022-04-29 LAB
ALCOHOL CONFIRMATION URINE: 60.6 MG/DL
AMPHETAMINE URINE CONFIRM: <50 NG/ML
COCAINE, CONFIRM, URINE: <50 NG/ML
METHAMPHETAMINE, URINE: <200 NG/ML
METHYLENEDIOXYAMPHETAMINE, UR: <200 NG/ML
METHYLENEDIOXYETHYLAMPHETAMINE, UR: <200 NG/ML
METHYLENEDIOXYMETHAMPHETAMINE, UR: <200 NG/ML
PHENTERMINE, URINE, QUANTITATIVE: <200 NG/ML

## 2022-05-02 ENCOUNTER — HOSPITAL ENCOUNTER (OUTPATIENT)
Age: 67
Discharge: HOME | End: 2022-05-02
Payer: MEDICARE

## 2022-05-02 PROCEDURE — 80307 DRUG TEST PRSMV CHEM ANLYZR: CPT

## 2022-05-05 DIAGNOSIS — E11.42 TYPE 2 DIABETES MELLITUS WITH DIABETIC POLYNEUROPATHY, WITHOUT LONG-TERM CURRENT USE OF INSULIN (HCC): ICD-10-CM

## 2022-05-05 RX ORDER — GABAPENTIN 800 MG/1
TABLET ORAL
Qty: 90 TABLET | Refills: 2 | Status: SHIPPED | OUTPATIENT
Start: 2022-05-05 | End: 2022-07-14 | Stop reason: SDUPTHER

## 2022-05-05 NOTE — TELEPHONE ENCOUNTER
Nevaeh Sagastume is calling to request a refill on the following medication(s):    Medication Request:  Requested Prescriptions     Pending Prescriptions Disp Refills    gabapentin (NEURONTIN) 800 MG tablet [Pharmacy Med Name: GABAPENTIN 800 MG TABLET] 90 tablet 2     Sig: take 1 tablet by mouth three times a day       Last Visit Date (If Applicable):  8/94/9000    Next Visit Date:    Visit date not found

## 2022-05-09 ENCOUNTER — HOSPITAL ENCOUNTER (OUTPATIENT)
Dept: PREADMISSION TESTING | Age: 67
Discharge: HOME | End: 2022-05-13
Payer: MEDICARE

## 2022-05-09 VITALS
OXYGEN SATURATION: 98 % | RESPIRATION RATE: 16 BRPM | BODY MASS INDEX: 29.22 KG/M2 | HEIGHT: 72 IN | WEIGHT: 215.7 LBS | DIASTOLIC BLOOD PRESSURE: 79 MMHG | TEMPERATURE: 97.3 F | HEART RATE: 89 BPM | SYSTOLIC BLOOD PRESSURE: 150 MMHG

## 2022-05-09 LAB
ANION GAP SERPL CALCULATED.3IONS-SCNC: 12 MMOL/L (ref 9–17)
BUN BLDV-MCNC: 10 MG/DL (ref 8–23)
BUN/CREAT BLD: 19 (ref 9–20)
CALCIUM SERPL-MCNC: 9.9 MG/DL (ref 8.6–10.4)
CHLORIDE BLD-SCNC: 100 MMOL/L (ref 98–107)
CO2: 23 MMOL/L (ref 20–31)
CREAT SERPL-MCNC: 0.53 MG/DL (ref 0.5–0.9)
ESTIMATED AVERAGE GLUCOSE: 131 MG/DL
GFR AFRICAN AMERICAN: >60 ML/MIN
GFR NON-AFRICAN AMERICAN: >60 ML/MIN
GFR SERPL CREATININE-BSD FRML MDRD: ABNORMAL ML/MIN/{1.73_M2}
GLUCOSE BLD-MCNC: 103 MG/DL (ref 70–99)
HBA1C MFR BLD: 6.2 % (ref 4–6)
HCT VFR BLD CALC: 46.2 % (ref 36.3–47.1)
HEMOGLOBIN: 15.1 G/DL (ref 11.9–15.1)
MCH RBC QN AUTO: 31.7 PG (ref 25.2–33.5)
MCHC RBC AUTO-ENTMCNC: 32.7 G/DL (ref 28.4–34.8)
MCV RBC AUTO: 96.9 FL (ref 82.6–102.9)
NRBC AUTOMATED: 0 PER 100 WBC
PDW BLD-RTO: 14.3 % (ref 11.8–14.4)
PLATELET # BLD: 200 K/UL (ref 138–453)
PMV BLD AUTO: 10.1 FL (ref 8.1–13.5)
POTASSIUM SERPL-SCNC: 4.3 MMOL/L (ref 3.7–5.3)
RBC # BLD: 4.77 M/UL (ref 3.95–5.11)
SODIUM BLD-SCNC: 135 MMOL/L (ref 135–144)
WBC # BLD: 6 K/UL (ref 3.5–11.3)

## 2022-05-09 PROCEDURE — 85027 COMPLETE CBC AUTOMATED: CPT

## 2022-05-09 PROCEDURE — 36415 COLL VENOUS BLD VENIPUNCTURE: CPT

## 2022-05-09 PROCEDURE — 83036 HEMOGLOBIN GLYCOSYLATED A1C: CPT

## 2022-05-09 PROCEDURE — 80048 BASIC METABOLIC PNL TOTAL CA: CPT

## 2022-05-09 ASSESSMENT — PAIN DESCRIPTION - DESCRIPTORS: DESCRIPTORS: SHARP

## 2022-05-09 ASSESSMENT — PAIN DESCRIPTION - PAIN TYPE: TYPE: CHRONIC PAIN

## 2022-05-09 ASSESSMENT — PAIN DESCRIPTION - FREQUENCY: FREQUENCY: INTERMITTENT

## 2022-05-09 ASSESSMENT — PAIN SCALES - GENERAL: PAINLEVEL_OUTOF10: 6

## 2022-05-09 ASSESSMENT — PAIN DESCRIPTION - LOCATION: LOCATION: FOOT

## 2022-05-09 ASSESSMENT — PAIN DESCRIPTION - ORIENTATION: ORIENTATION: RIGHT

## 2022-05-09 NOTE — PRE-PROCEDURE INSTRUCTIONS
[facility] ON Friday, 5/20/2022 at 0830 AM    Once you enter the hospital lobby, take the elevators to the second floor. Check-In is at the surgery registration desk. Continue to take your home medications as you normally do up to and including the night before surgery with the exception of any blood thinning medications. Please stop any blood thinning medications as directed by your surgeon or prescribing physician. Failure to stop certain medications may interfere with your scheduled surgery. These may include:  Aspirin, Warfarin (Coumadin), Clopidogrel (Plavix), Ibuprofen (Motrin, Advil), Naproxen (Aleve), Meloxicam (Mobic), Celecoxib (Celebrex), Eliquis, Pradaxa, Xarelto, Effient, Fish Oil, Herbal supplements. If you are diabetic, do not take any of your diabetic medications by mouth the morning of surgery. Please take the following medication(s) the day of surgery with a small sip of water:  Protonix, Tizanidine, Atenolol, Gabapentin,     Please use your inhalers at home the day of surgery      PREPARING FOR YOUR SURGERY:     Before surgery, you can play an important role in your own health. Because skin is not sterile, we need to be sure that your skin is as free of germs as possible before surgery by carefully washing before surgery. Preparing or prepping skin before surgery can reduce the risk of a surgical site infection.   Do not shave the area of your body where your surgery will be performed unless you received specific permission from your physician. Preoperative Bathing Instructions   Bathe or shower the day of surgery.  Wear clean clothing after bathing.  Shampoo hair before surgery   Keep nails clean    Do not apply alcohol-based hair or skin products,    Do not apply lotion, emollients, cosmetics, perfumes or deodorant the day of surgery.    Do not shave the area of your body where your surgery will be performed unless you receive specific permission from your surgeon. Patient Instructions:    [place] If you are having any type of anesthesia you are to have nothing to eat or drink after midnight the night before your surgery. This includes gum, hard candy, mints, water or smoking or chewing tobacco.  The only exception to this is a small sip of water to take with any morning dose of heart, blood pressure, or seizure medications. No alcoholic beverages for 24 hours prior to surgery.  Brush your teeth but do not swallow water.  Bring your eyeglasses and case with you. No contacts are to be worn the day of surgery. You also may bring your hearing aids. Most surgical procedures involving anesthesia will require that you remove your dentures prior to surgery.  If you are on C-PAP or Bi-PAP at home and plan on staying in the hospital overnight for your surgery please bring the machine with you.  Do not wear any jewelry or body piercings day of surgery. Also, NO lotion, perfume or deodorant to be used the day of surgery. No nail polish on the operative extremity (arm/leg surgeries)     Do not bring any valuables, such as jewelry, cash or credit cards. If you are staying overnight with us, please bring a SMALL bag of personal items.  Please wear loose, comfortable clothing. If you are potentially going to have a cast or brace bring clothing that will fit over them.  In case of illness - If you have cold or flu like symptoms (high fever, runny nose, sore throat, cough, etc.) rash, nausea, vomiting, loose stools, and/or recent contact with someone who has a contagious disease (chicken pox, measles, etc.) Please call your doctor before coming to the hospital.     If your child is having surgery please make arrangements for any other children to be cared for at home on the day of surgery.   Other children are not permitted in recovery room and we want you to be able to spend time with the patient. If other arrangements are not available then we suggest that you have a second adult to stay in the waiting room. Day of Surgery/Procedure:    As a patient at [facility] - INPATIENT you can expect quality medical and nursing care that is centered on your individual needs. Our goal is to make your surgical experience as comfortable as possible    . Transportation After Your Surgery/Procedure: You will need a friend or family member to drive you home after your procedure. Your  must be 25years of age or older and able to sign off on your discharge instructions. A taxi cab or any other form of public transportation is not acceptable. Your friend or family member must stay at the hospital throughout your procedure. Someone must remain with you for the first 24 hours after your surgery if you receive anesthesia or medication. If you do not have someone to stay with you, your procedure may be cancelled.       If you have any other questions regarding your procedure or the day of surgery, please call [phone]      _________________________  ____________________________  Signature (Patient)              Signature (Provider) & date

## 2022-05-10 ENCOUNTER — TELEPHONE (OUTPATIENT)
Dept: FAMILY MEDICINE CLINIC | Age: 67
End: 2022-05-10

## 2022-05-10 NOTE — TELEPHONE ENCOUNTER
----- Message from [facility] sent at 5/9/2022 10:40 AM EDT -----  Subject: Appointment Request    Reason for Call: Routine Pre-Op    QUESTIONS  Type of Appointment? Established Patient  Reason for appointment request? Available appointments did not meet   patient need  Additional Information for Provider? Pt states she is having foot surgery   on 5/20 and needs a pre op clearance before then. Please advise first   available is July 27th.  ---------------------------------------------------------------------------  --------------  Kirstie Eponym  What is the best way for the office to contact you? Do not leave any   message, patient will call back for answer  Preferred Call Back Phone Number? [phone]  ---------------------------------------------------------------------------  --------------  SCRIPT ANSWERS  Relationship to Patient? Self  Do you have questions for your provider that need to be answered prior to   scheduling your pre-op appointment? No  Have you been diagnosed with, awaiting test results for, or told that you   are suspected of having COVID-19 (Coronavirus)? (If patient has tested   negative or was tested as a requirement for work, school, or travel and   not based on symptoms, answer no)? No  Within the past 10 days have you developed any of the following symptoms   (answer no if symptoms have been present longer than 10 days or began   more than 10 days ago)? Fever or Chills, Cough, Shortness of breath or   difficulty breathing, Loss of taste or smell, Sore throat, Nasal   congestion, Sneezing or runny nose, Fatigue or generalized body aches   (answer no if pain is specific to a body part e.g. back pain), Diarrhea,   Headache? No  Have you had close contact with someone with COVID-19 in the last 7 days? No  (Service Expert  click yes below to proceed with [employer] As Usual   Scheduling)?  Yes

## 2022-05-10 NOTE — TELEPHONE ENCOUNTER
No option to leave a message states in here she will call back \    We also need to address this message    Information for Provider? Pt states she received a prescription for a   shower chair but states her pharmacy doesn't accept prescriptions for   medical supplies and equipment and was told she'd have to find a medical   supply store. Pt needs some assistance with next steps if possible, please   advise.

## 2022-05-11 DIAGNOSIS — F32.9 MAJOR DEPRESSIVE DISORDER, REMISSION STATUS UNSPECIFIED, UNSPECIFIED WHETHER RECURRENT: ICD-10-CM

## 2022-05-11 RX ORDER — ARIPIPRAZOLE 15 MG/1
TABLET ORAL
Qty: 90 TABLET | Refills: 1 | OUTPATIENT
Start: 2022-05-11

## 2022-05-11 NOTE — TELEPHONE ENCOUNTER
Shower chair order was faxed to [place] [address]  [address] [facility], [facility] [place]  [phone]  [facility]: [phone]

## 2022-05-16 DIAGNOSIS — I50.32 CHRONIC DIASTOLIC CONGESTIVE HEART FAILURE (HCC): ICD-10-CM

## 2022-05-16 DIAGNOSIS — I10 ESSENTIAL HYPERTENSION: ICD-10-CM

## 2022-05-16 RX ORDER — ATENOLOL 25 MG/1
TABLET ORAL
Qty: 30 TABLET | Refills: 1 | Status: SHIPPED | OUTPATIENT
Start: 2022-05-16 | End: 2022-05-17 | Stop reason: SDUPTHER

## 2022-05-16 RX ORDER — POTASSIUM CHLORIDE 20 MEQ/1
TABLET, EXTENDED RELEASE ORAL
Qty: 60 TABLET | Refills: 1 | Status: SHIPPED | OUTPATIENT
Start: 2022-05-16 | End: 2022-09-19

## 2022-05-16 NOTE — TELEPHONE ENCOUNTER
Abilio Chiu is calling to request a refill on the following medication(s):    Medication Request:  Requested Prescriptions     Pending Prescriptions Disp Refills    atenolol (TENORMIN) 25 MG tablet [Pharmacy Med Name: ATENOLOL 25 MG TABLET] 30 tablet 1     Sig: take 1/2 tablet by mouth once daily    potassium chloride (KLOR-CON M) 20 MEQ extended release tablet [Pharmacy Med Name: POTASSIUM CL ER 20 MEQ TABLET] 60 tablet 1     Sig: take 1 tablet by mouth once daily       Last Visit Date (If Applicable):  5/80/9181    Next Visit Date:    5/17/2022

## 2022-05-17 ENCOUNTER — OFFICE VISIT (OUTPATIENT)
Dept: FAMILY MEDICINE CLINIC | Age: 67
End: 2022-05-17
Payer: MEDICARE

## 2022-05-17 VITALS
HEART RATE: 81 BPM | BODY MASS INDEX: 28.99 KG/M2 | DIASTOLIC BLOOD PRESSURE: 74 MMHG | HEIGHT: 72 IN | SYSTOLIC BLOOD PRESSURE: 132 MMHG | WEIGHT: 214 LBS | TEMPERATURE: 97.5 F

## 2022-05-17 DIAGNOSIS — E11.8 TYPE 2 DIABETES MELLITUS WITH COMPLICATION (HCC): ICD-10-CM

## 2022-05-17 DIAGNOSIS — K58.1 IRRITABLE BOWEL SYNDROME WITH CONSTIPATION: ICD-10-CM

## 2022-05-17 DIAGNOSIS — I10 ESSENTIAL HYPERTENSION: Primary | ICD-10-CM

## 2022-05-17 PROCEDURE — 99214 OFFICE O/P EST MOD 30 MIN: CPT | Performed by: STUDENT IN AN ORGANIZED HEALTH CARE EDUCATION/TRAINING PROGRAM

## 2022-05-17 PROCEDURE — 3044F HG A1C LEVEL LT 7.0%: CPT | Performed by: STUDENT IN AN ORGANIZED HEALTH CARE EDUCATION/TRAINING PROGRAM

## 2022-05-17 RX ORDER — BLOOD SUGAR DIAGNOSTIC
STRIP MISCELLANEOUS
Qty: 200 STRIP | Refills: 11 | Status: SHIPPED | OUTPATIENT
Start: 2022-05-17 | End: 2022-07-14 | Stop reason: SDUPTHER

## 2022-05-17 RX ORDER — ATENOLOL 25 MG/1
25 TABLET ORAL DAILY
Qty: 90 TABLET | Refills: 1 | Status: SHIPPED | OUTPATIENT
Start: 2022-05-17 | End: 2022-07-14 | Stop reason: SDUPTHER

## 2022-05-17 RX ORDER — DOCUSATE SODIUM 100 MG/1
100 CAPSULE, LIQUID FILLED ORAL 2 TIMES DAILY
Qty: 60 CAPSULE | Refills: 3 | Status: SHIPPED | OUTPATIENT
Start: 2022-05-17 | End: 2022-06-16

## 2022-05-17 NOTE — PROGRESS NOTES
[address] PRIMARY CARE  [address] [place] [address]  Dept: [phone]  Dept Fax: [phone]    Gigi Jones is a 61 y.o. female who is a Established patient, who presents today for her medical conditions/complaints as noted below:  Chief Complaint   Patient presents with    Pre-op Exam     foot surgery    Medication Refill    Constipation     not able to go 2-3 weeks         HPI:     She is here today for preop visit prior to her upcoming right foot surgery. She says that she has been doing well on current medications. Her blood pressures have been well controlled and her recent A1c was controlled at 6.2. All her labs done recently were normal.  She does have chronic IBS with constipation and says that nothing has been helping her. She has tried Linzess without any relief. She has also tried MiraLAX and other laxatives in the past.  She says that Colace does work a little bit for her and she is willing to try it again. She is worried about her upcoming surgery and having to go on pain medications causing more constipation. Hemoglobin A1C (%)   Date Value   05/09/2022 6.2 (H)   01/18/2022 6.8   10/21/2021 6.0             ( goal A1Cis < 7)   Microalb/Crt.  Ratio (mcg/mg creat)   Date Value   01/18/2022 Can not be calculated     LDL Cholesterol (mg/dL)   Date Value   01/18/2022 79   02/05/2020 81   04/23/2019 88       (goal LDL is <100)   AST (U/L)   Date Value   01/18/2022 13     ALT (U/L)   Date Value   01/18/2022 6     BUN (mg/dL)   Date Value   05/09/2022 10     BP Readings from Last 3 Encounters:   05/17/22 132/74   05/09/22 (!) 150/79   01/18/22 123/69          (goal 120/80)    Past Medical History:   Diagnosis Date    RACHEL (acute kidney injury) ([address] 01/22/2014    Arthritis     generalized    Bilateral chronic knee pain 5/13/2016    Bronchiectasis without complication ([address]     Cancer ([address] 2004    uterus    CHF (congestive heart failure) ([address]  Chronic bilateral low back pain with right-sided sciatica 02/20/2017    Chronic bronchitis (HCC)     Chronic bronchitis (HCC)     Chronic respiratory failure with hypoxia (HCC)     Cigarette nicotine dependence without complication 8/75/6139    COPD (chronic obstructive pulmonary disease) (HCC)     on inhaler /  COPD with chronic bronchitis and emphysema    Current smoker on some days     Depression 10/30/2014    Diabetic polyneuropathy associated with type 2 diabetes mellitus ([address]     Diverticulosis     Essential hypertension 12/30/2013    Full dentures     GERD (gastroesophageal reflux disease)     Hep C w/o coma, chronic (HCC)     Hyperlipidemia     Hyperplastic polyp of intestine     Hypertension     DR. MCDANIEL-CARDIO    Irritable bowel syndrome with both constipation and diarrhea 2/15/2019    Liver disease     hepatitis C    Moderate episode of recurrent major depressive disorder ([address] 10/30/2014    Nasal polyposis     Noncompliance with CPAP treatment     Obesity (BMI 35.0-39.9 without comorbidity)     On home O2     2 liters PRN per pt    JIMENA (obstructive sleep apnea)     JIMENA on CPAP    Pacemaker 2012    Personal history of tobacco use, presenting hazards to health 4/2/2015    Pneumonia 07/2012    RECENTLY HOSPITALIZED    Primary insomnia 9/30/2016    Pulmonary edema cardiac cause ([address]     Rectal bleeding     Smoking addiction     Tobacco abuse       Past Surgical History:   Procedure Laterality Date    BACK SURGERY  2008    BUNIONECTOMY Right 4/30/2021    RIGHT FOOT 1ST MPJ ARTHROPLASTY WITH EXTENSOR HALLUCIS LONGUS LENGTHENING performed by Yumi Marquez DPM at [address] dec. 2013    COLON SURGERY      COLONOSCOPY      COLONOSCOPY  01/23/2015    severe diverticula    COLONOSCOPY  09/20/2016    HYPERPLASTIC POLYP X 2     COLONOSCOPY N/A 03/14/2019    COLONOSCOPY DIAGNOSTIC performed by Ava Nelson MD at  [place] [address] COLONOSCOPY N/A 01/09/2020    COLONOSCOPY WITH BIOPSY performed by Cleveland Gallagher MD at [address] [facility], COLON, DIAGNOSTIC      FOOT SURGERY      HERNIA REPAIR      UMBILICAL    HYSTERECTOMY     [address] SURGERY  09/24/2013    decompression & re-exploration L3-4, L4-5    PACEMAKER INSERTION      for very slow heart beat (per patient)    PACEMAKER PLACEMENT      SIGMOIDOSCOPY N/A 06/26/2015    flexable sigmoidscopy,HYPERPLASTIC POLYP    TONSILLECTOMY      UPPER GASTROINTESTINAL ENDOSCOPY N/A 03/14/2019    EGD BIOPSY performed by Cleveland Gallagher MD at [facility]       Family History   Problem Relation Age of Onset    Cancer Mother         lungs and brain    Stroke Father     Crohn's Disease Sister        Social History     Tobacco Use    Smoking status: Current Every Day Smoker     Packs/day: 1.00     Years: 53.00     Pack years: 53.00     Types: Cigarettes     Start date: 1969    Smokeless tobacco: Never Used   Substance Use Topics    Alcohol use: No     Alcohol/week: 0.0 standard drinks      Current Outpatient Medications   Medication Sig Dispense Refill    docusate sodium (COLACE) 100 MG capsule Take 1 capsule by mouth 2 times daily 60 capsule 3    atenolol (TENORMIN) 25 MG tablet Take 1 tablet by mouth daily 90 tablet 1    blood glucose test strips (ONETOUCH ULTRA) strip use 1 TEST STRIP to TEST BLOOD SUGAR four times a day if needed 200 strip 11    potassium chloride (KLOR-CON M) 20 MEQ extended release tablet take 1 tablet by mouth once daily 60 tablet 1    gabapentin (NEURONTIN) 800 MG tablet take 1 tablet by mouth three times a day 90 tablet 2    RA MAGNESIUM CITRATE 1.745 GM/30ML solution USE AS DIRECTED      ondansetron (ZOFRAN-ODT) 4 MG disintegrating tablet dissolve 1 tablet under the tongue every 8 hours if needed for NAUSEA OR VOMITING 10 tablet 1    vitamin D-3 (CHOLECALCIFEROL) 125 MCG (5000 UT) TABS Take 1 tablet by mouth daily 30 tablet 3    dicyclomine (BENTYL) 10 MG capsule Take 1 capsule by mouth 4 times daily (before meals and nightly) 120 capsule 11    furosemide (LASIX) 20 MG tablet Take 1 tablet by mouth 2 times daily 60 tablet 4    hydroCHLOROthiazide (MICROZIDE) 12.5 MG capsule Take 1 capsule by mouth every morning 90 capsule 3    metFORMIN (GLUCOPHAGE) 500 MG tablet Take 1 tablet by mouth 2 times daily (with meals) 180 tablet 0    nitroGLYCERIN (NITROSTAT) 0.4 MG SL tablet Place 1 tablet under the tongue every 5 minutes as needed for Chest pain 25 tablet 11    OneTouch Delica Lancets 16E MISC Apply 1 Units topically 2 times daily USE 2 TO 3 TIMES DAILY AS DIRECTED 60 each 4    pantoprazole (PROTONIX) 40 MG tablet Take 1 tablet by mouth daily 90 tablet 3    pravastatin (PRAVACHOL) 40 MG tablet Take 1 tablet by mouth daily 90 tablet 4    rOPINIRole (REQUIP) 0.5 MG tablet Take 1 tablet by mouth daily 90 tablet 3    tiZANidine (ZANAFLEX) 2 MG tablet Take 1 tablet by mouth every 8 hours as needed (back pain) 90 tablet 3    traZODone (DESYREL) 100 MG tablet Take 1 tablet by mouth nightly 90 tablet 3    glucose monitoring (FREESTYLE FREEDOM) kit 1 kit by Does not apply route daily 1 kit 0    BREO ELLIPTA 200-25 MCG/INH AEPB inhaler INHALE 2 PUFFS ONTO THE LUNGS DAILY      tiotropium (SPIRIVA RESPIMAT) 2.5 MCG/ACT AERS inhaler Inhale 2 puffs into the lungs daily 1 each 3    albuterol sulfate HFA (PROAIR HFA) 108 (90 Base) MCG/ACT inhaler Inhale 2 puffs into the lungs every 6 hours as needed for Wheezing 18 g 3    albuterol (PROVENTIL) (2.5 MG/3ML) 0.083% nebulizer solution inhale contents of 1 vial ( 3 milliliters ) in nebulizer by mouth and INTO THE LUNGS every 6 hours      buprenorphine-naloxone (SUBOXONE) 2-0.5 MG FILM SL film Place 1.5 Film under the tongue daily.       linaclotide (LINZESS) 290 MCG CAPS capsule Take 1 capsule by mouth every morning (before breakfast) (Patient not taking: Reported on 5/17/2022) 30 capsule 11    fluticasone-vilanterol (BREO ELLIPTA) 100-25 MCG/INH AEPB inhaler Inhale 2 puffs into the lungs daily 1 each 3    acetaminophen (TYLENOL) 500 MG tablet Take 1,000 mg by mouth every 6 hours as needed for Pain  (Patient not taking: Reported on 5/17/2022)       No current facility-administered medications for this visit. Allergies   Allergen Reactions    Iv Dye [Iodides] Hives       Health Maintenance   Topic Date Due    Hepatitis A vaccine (1 of 2 - Risk 2-dose series) Never done    COVID-19 Vaccine (1) Never done    Shingles vaccine (1 of 2) Never done   [employer] Visit (AWV)  09/05/2021    Diabetic retinal exam  09/19/2021    Flu vaccine (Season Ended) 09/01/2022    Depression Monitoring  10/21/2022    Pneumococcal 0-64 years Vaccine (3 - PPSV23 or PCV20) 12/29/2022    Breast cancer screen  01/08/2023    Colorectal Cancer Screen  01/09/2023    Diabetic microalbuminuria test  01/18/2023    Lipids  01/18/2023    Low dose CT lung screening  03/04/2023    Diabetic foot exam  04/26/2023    A1C test (Diabetic or Prediabetic)  05/09/2023    DTaP/Tdap/Td vaccine (3 - Td or Tdap) 06/16/2031    HIV screen  Completed    Hib vaccine  Aged Out    Meningococcal (ACWY) vaccine  Aged Out       Subjective:     Review of Systems   Constitutional: Negative for appetite change, fatigue and fever. HENT: Negative for congestion, ear pain, hearing loss and sore throat. Eyes: Negative for discharge and visual disturbance. Respiratory: Negative for cough, chest tightness, shortness of breath and wheezing. Cardiovascular: Negative for chest pain, palpitations and leg swelling. Gastrointestinal: Positive for constipation. Negative for abdominal pain, diarrhea, nausea and vomiting. Genitourinary: Negative for flank pain, frequency, hematuria and urgency. Musculoskeletal: Negative for arthralgias, gait problem, joint swelling and myalgias. Skin: Negative.     Neurological: Negative for dizziness, weakness, numbness and headaches. Psychiatric/Behavioral: Negative. Negative for dysphoric mood. The patient is not nervous/anxious. Objective:     Physical Exam  Vitals reviewed. Constitutional:       Appearance: Normal appearance. She is normal weight. HENT:      Head: Normocephalic and atraumatic. Nose: Nose normal.      Mouth/Throat:      Mouth: Mucous membranes are moist.      Pharynx: Oropharynx is clear. Eyes:      Extraocular Movements: Extraocular movements intact. Conjunctiva/sclera: Conjunctivae normal.      Pupils: Pupils are equal, round, and reactive to light. Cardiovascular:      Rate and Rhythm: Normal rate and regular rhythm. Heart sounds: Normal heart sounds. No murmur heard. No gallop. Pulmonary:      Effort: Pulmonary effort is normal. No respiratory distress. Breath sounds: Normal breath sounds. No stridor. No wheezing. Abdominal:      General: Bowel sounds are normal. There is no distension. Palpations: Abdomen is soft. Tenderness: There is no abdominal tenderness. There is no guarding or rebound. Musculoskeletal:         General: No swelling or tenderness. Normal range of motion. Cervical back: Normal range of motion and neck supple. Skin:     General: Skin is warm and dry. Coloration: Skin is not jaundiced. Findings: No rash. Neurological:      General: No focal deficit present. Mental Status: She is alert and oriented to person, place, and time. Psychiatric:         Mood and Affect: Mood normal.         Behavior: Behavior normal.         Thought Content: Thought content normal.         Judgment: Judgment normal.       /74 (Site: Left Upper Arm, Position: Sitting, Cuff Size: Medium Adult)   Pulse 81   Temp 97.5 °F (36.4 °C) (Temporal)   Ht 6' 1\" (1.854 m)   Wt 214 lb (97.1 kg)   BMI 28.23 kg/m²     Assessment/Plan:   1. Essential hypertension  -     atenolol (TENORMIN) 25 MG tablet;  Take 1 tablet by mouth daily, Disp-90 tablet, R-1Normal  2. Type 2 diabetes mellitus with complication (HCC)  -     blood glucose test strips (ONETOUCH ULTRA) strip; use 1 TEST STRIP to TEST BLOOD SUGAR four times a day if needed, Disp-200 strip, R-11Normal  3. Irritable bowel syndrome with constipation  -     docusate sodium (COLACE) 100 MG capsule; Take 1 capsule by mouth 2 times daily, Disp-60 capsule, R-3Normal    Hypertension-controlled, continue atenolol 25 mg daily, hydrochlorothiazide 12.5 mg daily    Type 2 diabetes-recent A1c 6.2, on metformin 500 mg twice daily    IBS with constipation- no relief with Linzess, has had colonoscopy with no significant findings. Advised to take Colace twice a day and increase fiber and hydration. Return in about 3 months (around 8/17/2022) for medicare annual wellness visit. No orders of the defined types were placed in this encounter. Orders Placed This Encounter   Medications    docusate sodium (COLACE) 100 MG capsule     Sig: Take 1 capsule by mouth 2 times daily     Dispense:  60 capsule     Refill:  3    atenolol (TENORMIN) 25 MG tablet     Sig: Take 1 tablet by mouth daily     Dispense:  90 tablet     Refill:  1    blood glucose test strips (ONETOUCH ULTRA) strip     Sig: use 1 TEST STRIP to TEST BLOOD SUGAR four times a day if needed     Dispense:  200 strip     Refill:  11       Patient given educational materials - see patient instructions. Discussed use, benefit, and side effects of prescribed medications. All patientquestions answered. Pt voiced understanding. Reviewed health maintenance. Instructedto continue current medications, diet and exercise. Patient agreed with treatmentplan. Follow up as directed.      Electronically signed by Kosta Gray MD on 5/18/2022 at 12:48 PM

## 2022-05-18 ASSESSMENT — ENCOUNTER SYMPTOMS
DIARRHEA: 0
WHEEZING: 0
CONSTIPATION: 1
COUGH: 0
EYE DISCHARGE: 0
NAUSEA: 0
VOMITING: 0
SORE THROAT: 0
ABDOMINAL PAIN: 0
SHORTNESS OF BREATH: 0
CHEST TIGHTNESS: 0

## 2022-05-20 ENCOUNTER — HOSPITAL ENCOUNTER (OUTPATIENT)
Age: 67
Setting detail: OUTPATIENT SURGERY
Discharge: HOME | End: 2022-05-20
Attending: PODIATRIST | Admitting: PODIATRIST
Payer: MEDICARE

## 2022-05-20 ENCOUNTER — ANESTHESIA (OUTPATIENT)
Dept: OPERATING ROOM | Age: 67
End: 2022-05-20
Payer: MEDICARE

## 2022-05-20 ENCOUNTER — ANESTHESIA EVENT (OUTPATIENT)
Dept: OPERATING ROOM | Age: 67
End: 2022-05-20
Payer: MEDICARE

## 2022-05-20 ENCOUNTER — APPOINTMENT (OUTPATIENT)
Dept: GENERAL RADIOLOGY | Age: 67
End: 2022-05-20
Attending: PODIATRIST
Payer: MEDICARE

## 2022-05-20 VITALS
TEMPERATURE: 97.2 F | DIASTOLIC BLOOD PRESSURE: 86 MMHG | SYSTOLIC BLOOD PRESSURE: 158 MMHG | RESPIRATION RATE: 18 BRPM | OXYGEN SATURATION: 98 % | HEART RATE: 86 BPM

## 2022-05-20 DIAGNOSIS — Z98.890 POST-OPERATIVE STATE: Primary | ICD-10-CM

## 2022-05-20 DIAGNOSIS — G89.18 POST-OPERATIVE PAIN: ICD-10-CM

## 2022-05-20 LAB
GLUCOSE BLD-MCNC: 112 MG/DL (ref 65–105)
GLUCOSE BLD-MCNC: 120 MG/DL (ref 65–105)

## 2022-05-20 PROCEDURE — 7100000000 HC PACU RECOVERY - FIRST 15 MIN: Performed by: PODIATRIST

## 2022-05-20 PROCEDURE — 3700000001 HC ADD 15 MINUTES (ANESTHESIA): Performed by: PODIATRIST

## 2022-05-20 PROCEDURE — 2709999900 HC NON-CHARGEABLE SUPPLY: Performed by: PODIATRIST

## 2022-05-20 PROCEDURE — C1776 JOINT DEVICE (IMPLANTABLE): HCPCS | Performed by: PODIATRIST

## 2022-05-20 PROCEDURE — 28291 CORRJ HALUX RIGDUS W/IMPLT: CPT | Performed by: PODIATRIST

## 2022-05-20 PROCEDURE — 73630 X-RAY EXAM OF FOOT: CPT

## 2022-05-20 PROCEDURE — 3600000012 HC SURGERY LEVEL 2 ADDTL 15MIN: Performed by: PODIATRIST

## 2022-05-20 PROCEDURE — 7100000011 HC PHASE II RECOVERY - ADDTL 15 MIN: Performed by: PODIATRIST

## 2022-05-20 PROCEDURE — 6360000002 HC RX W HCPCS: Performed by: SPECIALIST

## 2022-05-20 PROCEDURE — 2500000003 HC RX 250 WO HCPCS: Performed by: PODIATRIST

## 2022-05-20 PROCEDURE — 2720000010 HC SURG SUPPLY STERILE: Performed by: PODIATRIST

## 2022-05-20 PROCEDURE — 3600000002 HC SURGERY LEVEL 2 BASE: Performed by: PODIATRIST

## 2022-05-20 PROCEDURE — 20680 REMOVAL OF IMPLANT DEEP: CPT | Performed by: PODIATRIST

## 2022-05-20 PROCEDURE — 7100000010 HC PHASE II RECOVERY - FIRST 15 MIN: Performed by: PODIATRIST

## 2022-05-20 PROCEDURE — 2500000003 HC RX 250 WO HCPCS: Performed by: SPECIALIST

## 2022-05-20 PROCEDURE — 2580000003 HC RX 258: Performed by: ANESTHESIOLOGY

## 2022-05-20 PROCEDURE — 82947 ASSAY GLUCOSE BLOOD QUANT: CPT

## 2022-05-20 PROCEDURE — 6360000002 HC RX W HCPCS: Performed by: ANESTHESIOLOGY

## 2022-05-20 PROCEDURE — 7100000001 HC PACU RECOVERY - ADDTL 15 MIN: Performed by: PODIATRIST

## 2022-05-20 PROCEDURE — 6370000000 HC RX 637 (ALT 250 FOR IP): Performed by: ANESTHESIOLOGY

## 2022-05-20 PROCEDURE — 3700000000 HC ANESTHESIA ATTENDED CARE: Performed by: PODIATRIST

## 2022-05-20 DEVICE — IMPLANTABLE DEVICE: Type: IMPLANTABLE DEVICE | Site: FOOT | Status: FUNCTIONAL

## 2022-05-20 RX ORDER — HYDROMORPHONE HYDROCHLORIDE 1 MG/ML
0.5 INJECTION, SOLUTION INTRAMUSCULAR; INTRAVENOUS; SUBCUTANEOUS EVERY 5 MIN PRN
Status: DISCONTINUED | OUTPATIENT
Start: 2022-05-20 | End: 2022-05-20 | Stop reason: HOSPADM

## 2022-05-20 RX ORDER — SODIUM CHLORIDE 0.9 % (FLUSH) 0.9 %
5-40 SYRINGE (ML) INJECTION EVERY 12 HOURS SCHEDULED
Status: DISCONTINUED | OUTPATIENT
Start: 2022-05-20 | End: 2022-05-20 | Stop reason: HOSPADM

## 2022-05-20 RX ORDER — OXYCODONE HYDROCHLORIDE AND ACETAMINOPHEN 5; 325 MG/1; MG/1
1 TABLET ORAL EVERY 8 HOURS PRN
Qty: 21 TABLET | Refills: 0 | Status: SHIPPED | OUTPATIENT
Start: 2022-05-20 | End: 2022-05-27

## 2022-05-20 RX ORDER — SODIUM CHLORIDE 0.9 % (FLUSH) 0.9 %
5-40 SYRINGE (ML) INJECTION PRN
Status: DISCONTINUED | OUTPATIENT
Start: 2022-05-20 | End: 2022-05-20 | Stop reason: HOSPADM

## 2022-05-20 RX ORDER — SODIUM CHLORIDE 9 MG/ML
INJECTION, SOLUTION INTRAVENOUS CONTINUOUS
Status: DISCONTINUED | OUTPATIENT
Start: 2022-05-20 | End: 2022-05-20

## 2022-05-20 RX ORDER — ONDANSETRON 2 MG/ML
4 INJECTION INTRAMUSCULAR; INTRAVENOUS
Status: DISCONTINUED | OUTPATIENT
Start: 2022-05-20 | End: 2022-05-20 | Stop reason: HOSPADM

## 2022-05-20 RX ORDER — FENTANYL CITRATE 50 UG/ML
25 INJECTION, SOLUTION INTRAMUSCULAR; INTRAVENOUS EVERY 5 MIN PRN
Status: DISCONTINUED | OUTPATIENT
Start: 2022-05-20 | End: 2022-05-20 | Stop reason: HOSPADM

## 2022-05-20 RX ORDER — CEFAZOLIN SODIUM 1 G/3ML
INJECTION, POWDER, FOR SOLUTION INTRAMUSCULAR; INTRAVENOUS PRN
Status: DISCONTINUED | OUTPATIENT
Start: 2022-05-20 | End: 2022-05-20 | Stop reason: SDUPTHER

## 2022-05-20 RX ORDER — MIDAZOLAM HYDROCHLORIDE 1 MG/ML
INJECTION INTRAMUSCULAR; INTRAVENOUS PRN
Status: DISCONTINUED | OUTPATIENT
Start: 2022-05-20 | End: 2022-05-20 | Stop reason: SDUPTHER

## 2022-05-20 RX ORDER — SODIUM CHLORIDE 9 MG/ML
INJECTION, SOLUTION INTRAVENOUS PRN
Status: DISCONTINUED | OUTPATIENT
Start: 2022-05-20 | End: 2022-05-20 | Stop reason: HOSPADM

## 2022-05-20 RX ORDER — LIDOCAINE HYDROCHLORIDE 20 MG/ML
INJECTION, SOLUTION EPIDURAL; INFILTRATION; INTRACAUDAL; PERINEURAL PRN
Status: DISCONTINUED | OUTPATIENT
Start: 2022-05-20 | End: 2022-05-20 | Stop reason: SDUPTHER

## 2022-05-20 RX ORDER — BUPIVACAINE HYDROCHLORIDE 5 MG/ML
INJECTION, SOLUTION EPIDURAL; INTRACAUDAL PRN
Status: DISCONTINUED | OUTPATIENT
Start: 2022-05-20 | End: 2022-05-20 | Stop reason: ALTCHOICE

## 2022-05-20 RX ORDER — PROPOFOL 10 MG/ML
INJECTION, EMULSION INTRAVENOUS PRN
Status: DISCONTINUED | OUTPATIENT
Start: 2022-05-20 | End: 2022-05-20 | Stop reason: SDUPTHER

## 2022-05-20 RX ORDER — IPRATROPIUM BROMIDE AND ALBUTEROL SULFATE 2.5; .5 MG/3ML; MG/3ML
1 SOLUTION RESPIRATORY (INHALATION) ONCE
Status: COMPLETED | OUTPATIENT
Start: 2022-05-20 | End: 2022-05-20

## 2022-05-20 RX ORDER — ONDANSETRON 2 MG/ML
INJECTION INTRAMUSCULAR; INTRAVENOUS PRN
Status: DISCONTINUED | OUTPATIENT
Start: 2022-05-20 | End: 2022-05-20 | Stop reason: SDUPTHER

## 2022-05-20 RX ORDER — LIDOCAINE HYDROCHLORIDE 10 MG/ML
INJECTION, SOLUTION EPIDURAL; INFILTRATION; INTRACAUDAL; PERINEURAL PRN
Status: DISCONTINUED | OUTPATIENT
Start: 2022-05-20 | End: 2022-05-20 | Stop reason: ALTCHOICE

## 2022-05-20 RX ORDER — SODIUM CHLORIDE, SODIUM LACTATE, POTASSIUM CHLORIDE, CALCIUM CHLORIDE 600; 310; 30; 20 MG/100ML; MG/100ML; MG/100ML; MG/100ML
INJECTION, SOLUTION INTRAVENOUS CONTINUOUS
Status: DISCONTINUED | OUTPATIENT
Start: 2022-05-20 | End: 2022-05-20 | Stop reason: HOSPADM

## 2022-05-20 RX ORDER — FENTANYL CITRATE 50 UG/ML
INJECTION, SOLUTION INTRAMUSCULAR; INTRAVENOUS PRN
Status: DISCONTINUED | OUTPATIENT
Start: 2022-05-20 | End: 2022-05-20 | Stop reason: SDUPTHER

## 2022-05-20 RX ORDER — DEXAMETHASONE SODIUM PHOSPHATE 10 MG/ML
INJECTION, SOLUTION INTRAMUSCULAR; INTRAVENOUS PRN
Status: DISCONTINUED | OUTPATIENT
Start: 2022-05-20 | End: 2022-05-20 | Stop reason: SDUPTHER

## 2022-05-20 RX ORDER — LIDOCAINE HYDROCHLORIDE 10 MG/ML
1 INJECTION, SOLUTION EPIDURAL; INFILTRATION; INTRACAUDAL; PERINEURAL
Status: DISCONTINUED | OUTPATIENT
Start: 2022-05-20 | End: 2022-05-20 | Stop reason: HOSPADM

## 2022-05-20 RX ADMIN — HYDROMORPHONE HYDROCHLORIDE 0.5 MG: 1 INJECTION, SOLUTION INTRAMUSCULAR; INTRAVENOUS; SUBCUTANEOUS at 12:26

## 2022-05-20 RX ADMIN — CEFAZOLIN SODIUM 2000 MG: 1 INJECTION, POWDER, FOR SOLUTION INTRAMUSCULAR; INTRAVENOUS at 10:14

## 2022-05-20 RX ADMIN — SODIUM CHLORIDE, POTASSIUM CHLORIDE, SODIUM LACTATE AND CALCIUM CHLORIDE: 600; 310; 30; 20 INJECTION, SOLUTION INTRAVENOUS at 09:43

## 2022-05-20 RX ADMIN — DEXAMETHASONE SODIUM PHOSPHATE 10 MG: 10 INJECTION, SOLUTION INTRAMUSCULAR; INTRAVENOUS at 10:19

## 2022-05-20 RX ADMIN — MIDAZOLAM 2 MG: 1 INJECTION INTRAMUSCULAR; INTRAVENOUS at 10:03

## 2022-05-20 RX ADMIN — PROPOFOL 150 MG: 10 INJECTION, EMULSION INTRAVENOUS at 10:08

## 2022-05-20 RX ADMIN — ONDANSETRON 4 MG: 2 INJECTION INTRAMUSCULAR; INTRAVENOUS at 11:37

## 2022-05-20 RX ADMIN — IPRATROPIUM BROMIDE AND ALBUTEROL SULFATE 1 AMPULE: .5; 3 SOLUTION RESPIRATORY (INHALATION) at 09:43

## 2022-05-20 RX ADMIN — SODIUM CHLORIDE, POTASSIUM CHLORIDE, SODIUM LACTATE AND CALCIUM CHLORIDE: 600; 310; 30; 20 INJECTION, SOLUTION INTRAVENOUS at 11:47

## 2022-05-20 RX ADMIN — HYDROMORPHONE HYDROCHLORIDE 0.5 MG: 1 INJECTION, SOLUTION INTRAMUSCULAR; INTRAVENOUS; SUBCUTANEOUS at 12:33

## 2022-05-20 RX ADMIN — Medication 50 MCG: at 10:08

## 2022-05-20 RX ADMIN — LIDOCAINE HYDROCHLORIDE 100 MG: 20 INJECTION, SOLUTION EPIDURAL; INFILTRATION; INTRACAUDAL; PERINEURAL at 10:08

## 2022-05-20 ASSESSMENT — PAIN DESCRIPTION - ORIENTATION
ORIENTATION: RIGHT
ORIENTATION: RIGHT

## 2022-05-20 ASSESSMENT — PAIN DESCRIPTION - DESCRIPTORS
DESCRIPTORS: SHARP;DISCOMFORT
DESCRIPTORS: SHARP;DISCOMFORT

## 2022-05-20 ASSESSMENT — PAIN - FUNCTIONAL ASSESSMENT
PAIN_FUNCTIONAL_ASSESSMENT: 0-10
PAIN_FUNCTIONAL_ASSESSMENT: ACTIVITIES ARE NOT PREVENTED

## 2022-05-20 ASSESSMENT — PAIN SCALES - GENERAL
PAINLEVEL_OUTOF10: 8
PAINLEVEL_OUTOF10: 8
PAINLEVEL_OUTOF10: 10
PAINLEVEL_OUTOF10: 6
PAINLEVEL_OUTOF10: 10
PAINLEVEL_OUTOF10: 6

## 2022-05-20 ASSESSMENT — PAIN DESCRIPTION - PAIN TYPE
TYPE: SURGICAL PAIN
TYPE: SURGICAL PAIN

## 2022-05-20 ASSESSMENT — PAIN DESCRIPTION - LOCATION
LOCATION: FOOT
LOCATION: FOOT

## 2022-05-20 NOTE — BRIEF OP NOTE
Brief Postoperative Note    Patient: Hugh Jesus  YOB: 1955  MRN: [id number]    Date of Procedure: 5/20/2022    Pre-Op Diagnosis: DX PAINFUL HARDWARE RIGHT FOOT    Post-Op Diagnosis: Same       Procedure:  1. Removal of hardware; rigth foot  2. Bunionectomy with implant application; right foot    Surgeon(s):  Thanh Purdy DPM    Assistant:  Resident: Lina Sanchez DPM    Anesthesia: General    Estimated Blood Loss (mL): Minimal    Complications: None    Specimens:   * No specimens in log *    Implants:  Implant Name Type Inv. Item Serial No.  Lot No. LRB No. Used Action   JOINT GREAT TOE CLASSIC 40 - [id number]  JOINT GREAT TOE CLASSIC 36  [facility]- [id number] Right 1 Implanted         Drains: * No LDAs found *    Findings: implant was not in place. Was removed and integra implant placed.      Electronically signed by Harsha Carbajal DPM on 5/20/2022 at 12:04 PM

## 2022-05-20 NOTE — ANESTHESIA PRE PROCEDURE
Department of Anesthesiology  Preprocedure Note       Name:  Deon Rae   Age:  61 y.o.  :  1955                                          MRN:  [id number]         Date:  2022      Surgeon: Ariane Delcid):  Peter Dubon DPM    Procedure: Procedure(s):  RIGHT FOOT HARDWARE REMOVAL    RIGHT 1ST MTP BUNION IMPLANT REVISION    [facility] MED[facility]    Medications prior to admission:   Prior to Admission medications    Medication Sig Start Date End Date Taking? Authorizing Provider   oxyCODONE-acetaminophen (PERCOCET) 5-325 MG per tablet Take 1 tablet by mouth every 8 hours as needed for Pain for up to 7 days. Intended supply: 7 days.  Take lowest dose possible to manage pain 22 Yes Ileana Stewart DPM   docusate sodium (COLACE) 100 MG capsule Take 1 capsule by mouth 2 times daily 22  Frances Gunderson MD   atenolol (TENORMIN) 25 MG tablet Take 1 tablet by mouth daily 22   Frances Gunderson MD   blood glucose test strips (ONETOUCH ULTRA) strip use 1 TEST STRIP to TEST BLOOD SUGAR four times a day if needed 22   Frances Gunderson MD   potassium chloride (KLOR-CON M) 20 MEQ extended release tablet take 1 tablet by mouth once daily 22   Frances Gunderson MD   gabapentin (NEURONTIN) 800 MG tablet take 1 tablet by mouth three times a day 22  Frances Gunderson MD   RA MAGNESIUM CITRATE 1.745 GM/30ML solution USE AS DIRECTED 22   Historical Provider, MD   ondansetron (ZOFRAN-ODT) 4 MG disintegrating tablet dissolve 1 tablet under the tongue every 8 hours if needed for NAUSEA OR VOMITING 22   Frances Gunderson MD   vitamin D-3 (CHOLECALCIFEROL) 125 MCG (5000 UT) TABS Take 1 tablet by mouth daily  Patient not taking: Reported on 2022   Frances Gunderson MD   dicyclomine (BENTYL) 10 MG capsule Take 1 capsule by mouth 4 times daily (before meals and nightly) 22   Frances Gunderson MD   furosemide (LASIX) 20 MG tablet Take 1 tablet by mouth 2 times daily 22 6/17/22  Gene Correia MD   hydroCHLOROthiazide (MICROZIDE) 12.5 MG capsule Take 1 capsule by mouth every morning 1/18/22 1/13/23  Gene Correia MD   linaclotide (LINZESS) 290 MCG CAPS capsule Take 1 capsule by mouth every morning (before breakfast)  Patient not taking: Reported on 5/17/2022 1/18/22   Gene Correia MD   metFORMIN (GLUCOPHAGE) 500 MG tablet Take 1 tablet by mouth 2 times daily (with meals) 1/18/22   Gene Correia MD   nitroGLYCERIN (NITROSTAT) 0.4 MG SL tablet Place 1 tablet under the tongue every 5 minutes as needed for Chest pain 1/18/22   Gene Correia MD   OneTouch Delica Lancets 62P MISC Apply 1 Units topically 2 times daily USE 2 TO 3 TIMES DAILY AS DIRECTED 1/18/22 6/17/22  Gene Correia MD   pantoprazole (PROTONIX) 40 MG tablet Take 1 tablet by mouth daily 1/18/22   Gene Correia MD   pravastatin (PRAVACHOL) 40 MG tablet Take 1 tablet by mouth daily 1/18/22 4/13/23  Gene Correia MD   rOPINIRole (REQUIP) 0.5 MG tablet Take 1 tablet by mouth daily 1/18/22   Gene Correia MD   tiZANidine (ZANAFLEX) 2 MG tablet Take 1 tablet by mouth every 8 hours as needed (back pain) 1/18/22   Gene Correia MD   traZODone (DESYREL) 100 MG tablet Take 1 tablet by mouth nightly 1/18/22   Gene Correia MD   fluticasone-vilanterol (BREO ELLIPTA) 100-25 MCG/INH AEPB inhaler Inhale 2 puffs into the lungs daily 1/7/22 2/6/22  Ismael Torres MD   glucose monitoring (FREESTYLE FREEDOM) kit 1 kit by Does not apply route daily 12/6/21   MARCIA Marie - CNP   BREO ELLIPTA 200-25 MCG/INH AEPB inhaler INHALE 2 PUFFS ONTO THE LUNGS DAILY 10/18/21   Historical Provider, MD   tiotropium (SPIRIVA RESPIMAT) 2.5 MCG/ACT AERS inhaler Inhale 2 puffs into the lungs daily 9/17/21 5/17/22  Ismael Torres MD   albuterol sulfate HFA (PROAIR HFA) 108 (90 Base) MCG/ACT inhaler Inhale 2 puffs into the lungs every 6 hours as needed for Wheezing 9/17/21   Ismael Torres MD   albuterol (PROVENTIL) (2.5 MG/3ML) 0.083% nebulizer solution inhale contents of 1 vial ( 3 milliliters ) in nebulizer by mouth and INTO THE LUNGS every 6 hours 4/20/21   Historical Provider, MD   acetaminophen (TYLENOL) 500 MG tablet Take 1,000 mg by mouth every 6 hours as needed for Pain   Patient not taking: Reported on 5/17/2022    Historical Provider, MD   buprenorphine-naloxone (SUBOXONE) 2-0.5 MG FILM SL film Place 1.5 Film under the tongue daily. 8/12/20   Historical Provider, MD       Current medications:    Current Facility-Administered Medications   Medication Dose Route Frequency Provider Last Rate Last Admin    lidocaine PF 1 % injection 1 mL  1 mL IntraDERmal Once PRN Tip Mendes MD        lactated ringers infusion   IntraVENous Continuous Tip Mendes  mL/hr at 05/20/22 1003 New Bag at 05/20/22 1147    sodium chloride flush 0.9 % injection 5-40 mL  5-40 mL IntraVENous 2 times per day Tip Mendes MD        sodium chloride flush 0.9 % injection 5-40 mL  5-40 mL IntraVENous PRN Tip Mendes MD        0.9 % sodium chloride infusion   IntraVENous PRN Tip Mendes MD        sodium chloride flush 0.9 % injection 5-40 mL  5-40 mL IntraVENous 2 times per day Berny Andrews, DO        sodium chloride flush 0.9 % injection 5-40 mL  5-40 mL IntraVENous PRN Tellez Bijou, DO        0.9 % sodium chloride infusion   IntraVENous PRN Tellez Bijou, DO        fentaNYL (SUBLIMAZE) injection 25 mcg  25 mcg IntraVENous Q5 Min PRN Tellez Bijou, DO        HYDROmorphone HCl PF (DILAUDID) injection 0.5 mg  0.5 mg IntraVENous Q5 Min PRN Tellez Bijou, DO   0.5 mg at 05/20/22 1233    ondansetron (ZOFRAN) injection 4 mg  4 mg IntraVENous Once PRN Tellez Bijou, DO         Current Outpatient Medications   Medication Sig Dispense Refill    oxyCODONE-acetaminophen (PERCOCET) 5-325 MG per tablet Take 1 tablet by mouth every 8 hours as needed for Pain for up to 7 days. Intended supply: 7 days.  Take lowest dose possible to manage pain 21 tablet 0    docusate sodium (COLACE) 100 MG capsule Take 1 capsule by mouth 2 times daily 60 capsule 3    atenolol (TENORMIN) 25 MG tablet Take 1 tablet by mouth daily 90 tablet 1    blood glucose test strips (ONETOUCH ULTRA) strip use 1 TEST STRIP to TEST BLOOD SUGAR four times a day if needed 200 strip 11    potassium chloride (KLOR-CON M) 20 MEQ extended release tablet take 1 tablet by mouth once daily 60 tablet 1    gabapentin (NEURONTIN) 800 MG tablet take 1 tablet by mouth three times a day 90 tablet 2    RA MAGNESIUM CITRATE 1.745 GM/30ML solution USE AS DIRECTED      ondansetron (ZOFRAN-ODT) 4 MG disintegrating tablet dissolve 1 tablet under the tongue every 8 hours if needed for NAUSEA OR VOMITING 10 tablet 1    vitamin D-3 (CHOLECALCIFEROL) 125 MCG (5000 UT) TABS Take 1 tablet by mouth daily (Patient not taking: Reported on 5/20/2022) 30 tablet 3    dicyclomine (BENTYL) 10 MG capsule Take 1 capsule by mouth 4 times daily (before meals and nightly) 120 capsule 11    furosemide (LASIX) 20 MG tablet Take 1 tablet by mouth 2 times daily 60 tablet 4    hydroCHLOROthiazide (MICROZIDE) 12.5 MG capsule Take 1 capsule by mouth every morning 90 capsule 3    linaclotide (LINZESS) 290 MCG CAPS capsule Take 1 capsule by mouth every morning (before breakfast) (Patient not taking: Reported on 5/17/2022) 30 capsule 11    metFORMIN (GLUCOPHAGE) 500 MG tablet Take 1 tablet by mouth 2 times daily (with meals) 180 tablet 0    nitroGLYCERIN (NITROSTAT) 0.4 MG SL tablet Place 1 tablet under the tongue every 5 minutes as needed for Chest pain 25 tablet 11    OneTouch Delica Lancets 64W MISC Apply 1 Units topically 2 times daily USE 2 TO 3 TIMES DAILY AS DIRECTED 60 each 4    pantoprazole (PROTONIX) 40 MG tablet Take 1 tablet by mouth daily 90 tablet 3    pravastatin (PRAVACHOL) 40 MG tablet Take 1 tablet by mouth daily 90 tablet 4    rOPINIRole (REQUIP) 0.5 MG tablet Take 1 tablet by mouth daily 90 tablet 3    tiZANidine (ZANAFLEX) 2 MG tablet Take 1 tablet by mouth every 8 hours as needed (back pain) 90 tablet 3    traZODone (DESYREL) 100 MG tablet Take 1 tablet by mouth nightly 90 tablet 3    fluticasone-vilanterol (BREO ELLIPTA) 100-25 MCG/INH AEPB inhaler Inhale 2 puffs into the lungs daily 1 each 3    glucose monitoring (FREESTYLE FREEDOM) kit 1 kit by Does not apply route daily 1 kit 0    BREO ELLIPTA 200-25 MCG/INH AEPB inhaler INHALE 2 PUFFS ONTO THE LUNGS DAILY      tiotropium (SPIRIVA RESPIMAT) 2.5 MCG/ACT AERS inhaler Inhale 2 puffs into the lungs daily 1 each 3    albuterol sulfate HFA (PROAIR HFA) 108 (90 Base) MCG/ACT inhaler Inhale 2 puffs into the lungs every 6 hours as needed for Wheezing 18 g 3    albuterol (PROVENTIL) (2.5 MG/3ML) 0.083% nebulizer solution inhale contents of 1 vial ( 3 milliliters ) in nebulizer by mouth and INTO THE LUNGS every 6 hours      acetaminophen (TYLENOL) 500 MG tablet Take 1,000 mg by mouth every 6 hours as needed for Pain  (Patient not taking: Reported on 5/17/2022)      buprenorphine-naloxone (SUBOXONE) 2-0.5 MG FILM SL film Place 1.5 Film under the tongue daily. Allergies:     Allergies   Allergen Reactions    Iv Dye [Iodides] Hives       Problem List:    Patient Active Problem List   Diagnosis Code    Essential hypertension I10    Pure hypercholesterolemia E78.00    Moderate episode of recurrent major depressive disorder [facility] F33.1    History of heroin use Z87.898    Hep C w/o coma, chronic ([facility] completed tx 2016 B18.2    GERD (gastroesophageal reflux disease) K21.9    COPD (chronic obstructive pulmonary disease) ([facility] J44.9    Personal history of tobacco use, presenting hazards to health Z87.891    Chronic diastolic congestive heart failure (HCC) I50.32    Diverticulosis K57.90    Hyperplastic polyp of intestine K63.5    Diabetic polyneuropathy associated with type 2 diabetes mellitus (HCC) E11.42    Bilateral chronic knee pain M25.561, M25.562, G89.29    Type 2 diabetes mellitus with complication (HCC) F81.7    Primary insomnia F51.01    Cigarette nicotine dependence without complication R05.049    Chronic bilateral low back pain with right-sided sciatica M54.41, G89.29    Irritable bowel syndrome with both constipation and diarrhea K58.2    Hallux rigidus of right foot M20.21    Post-op pain G89.18    Right foot pain M79.671    Contracture of joint of right foot M24.574    Vitamin D deficiency E55.9    S/P placement of cardiac pacemaker Z95.0    RLS (restless legs syndrome) G25.81    Tobacco use Z72.0    Painful orthopaedic hardware ([facility] T84.84XA    Post-operative state Z98.890       Past Medical History:        Diagnosis Date    RACHEL (acute kidney injury) ([facility] 01/22/2014    Arthritis     generalized    Bilateral chronic knee pain 5/13/2016    Bronchiectasis without complication ([facility]     Cancer ([facility] 2004    uterus    CHF (congestive heart failure) (HCC)     Chronic bilateral low back pain with right-sided sciatica 02/20/2017    Chronic bronchitis (HCC)     Chronic bronchitis (HCC)     Chronic respiratory failure with hypoxia (HCC)     Cigarette nicotine dependence without complication 4/27/0648    COPD (chronic obstructive pulmonary disease) (HCC)     on inhaler /  COPD with chronic bronchitis and emphysema    Current smoker on some days     Depression 10/30/2014    Diabetic polyneuropathy associated with type 2 diabetes mellitus ([address]     Diverticulosis     Essential hypertension 12/30/2013    Full dentures     GERD (gastroesophageal reflux disease)     Hep C w/o coma, chronic (HCC)     Hyperlipidemia     Hyperplastic polyp of intestine     Hypertension     DR. MCDANIEL-CARDIO    Irritable bowel syndrome with both constipation and diarrhea 2/15/2019    Liver disease     hepatitis C    Moderate episode of recurrent major depressive disorder ([address] 10/30/2014    Nasal polyposis     Noncompliance with CPAP treatment     Obesity (BMI 35.0-39.9 without comorbidity)     On home O2     2 liters PRN per pt    JIMENA (obstructive sleep apnea)     JIMENA on CPAP    Pacemaker 2012    Personal history of tobacco use, presenting hazards to health 4/2/2015    Pneumonia 07/2012    RECENTLY HOSPITALIZED    Primary insomnia 9/30/2016    Pulmonary edema cardiac cause ([facility]     Rectal bleeding     Smoking addiction     Tobacco abuse        Past Surgical History:        Procedure Laterality Date    ARTHRODESIS Right 5/20/2022    RIGHT FOOT HARDWARE REMOVAL    RIGHT 1ST MTP BUNION IMPLANT REVISION    [place] performed by Farrukh Quick DPM at [address]  2008    BUNIONECTOMY Right 4/30/2021    RIGHT FOOT 1ST MPJ ARTHROPLASTY WITH EXTENSOR HALLUCIS LONGUS LENGTHENING performed by Farrukh Quick DPM at [address] dec[address] 2013    COLON SURGERY      COLONOSCOPY      COLONOSCOPY  01/23/2015    severe diverticula    COLONOSCOPY  09/20/2016    HYPERPLASTIC POLYP X 2     COLONOSCOPY N/A 03/14/2019    COLONOSCOPY DIAGNOSTIC performed by Adriane Stevens MD at [facility]    COLONOSCOPY N/A 01/09/2020    COLONOSCOPY WITH BIOPSY performed by Adriane Stevens MD at [address] [place] [address], COLON, DIAGNOSTIC      FOOT SURGERY      HERNIA REPAIR      UMBILICAL   [address]  09/24/2013    decompression & re-exploration L3-4, L4-5    PACEMAKER INSERTION      for very slow heart beat (per patient)    PACEMAKER PLACEMENT      SIGMOIDOSCOPY N/A 06/26/2015    flexable sigmoidscopy,HYPERPLASTIC POLYP    TONSILLECTOMY      UPPER GASTROINTESTINAL ENDOSCOPY N/A 03/14/2019    EGD BIOPSY performed by Adriane Stevens MD at [facility]       Social History:    Social History     Tobacco Use    Smoking status: Current Every Day Smoker     Packs/day: 1.00     Years: 53.00     Pack years: 53.00     Types: Cigarettes     Start date: 1969    Smokeless tobacco: Never Used   Substance Use Topics    Alcohol use: No     Alcohol/week: 0.0 standard drinks                                Ready to quit: Not Answered  Counseling given: Not Answered      Vital Signs (Current):   Vitals:    05/20/22 1226 05/20/22 1230 05/20/22 1233 05/20/22 1245   BP:  (!) 168/79  (!) 158/86   Pulse:  68  86   Resp: 16 15 24 18   Temp:    97.2 °F (36.2 °C)   TempSrc:    Temporal   SpO2:  99%  98%                                              BP Readings from Last 3 Encounters:   05/20/22 (!) 158/86   05/17/22 132/74   05/09/22 (!) 150/79       NPO Status: Time of last liquid consumption: 2200                        Time of last solid consumption: 2200                        Date of last liquid consumption: 05/19/22                        Date of last solid food consumption: 05/19/22    BMI:   Wt Readings from Last 3 Encounters:   05/17/22 214 lb (97.1 kg)   05/09/22 215 lb 11.2 oz (97.8 kg)   04/26/22 195 lb (88.5 kg)     There is no height or weight on file to calculate BMI.    CBC:   Lab Results   Component Value Date    WBC 6.0 05/09/2022    RBC 4.77 05/09/2022    RBC 4.92 02/26/2012    HGB 15.1 05/09/2022    HCT 46.2 05/09/2022    MCV 96.9 05/09/2022    RDW 14.3 05/09/2022     05/09/2022    PLT Platelet clumps present, count appears adequate.  02/26/2012       CMP:   Lab Results   Component Value Date     05/09/2022    K 4.3 05/09/2022     05/09/2022    CO2 23 05/09/2022    BUN 10 05/09/2022    CREATININE 0.53 05/09/2022    GFRAA >60 05/09/2022    LABGLOM >60 05/09/2022    GLUCOSE 103 05/09/2022    GLUCOSE 120 02/26/2012    PROT 7.3 01/18/2022    CALCIUM 9.9 05/09/2022    BILITOT 0.34 01/18/2022    ALKPHOS 87 01/18/2022    AST 13 01/18/2022    ALT 6 01/18/2022       POC Tests:   Recent Labs     05/20/22  1209   POCGLU 120*       Coags:   Lab Results   Component Value Date    PROTIME 11.5 03/05/2015    INR 1.1 03/05/2015    APTT 25.0 11/10/2014 HCG (If Applicable): No results found for: PREGTESTUR, PREGSERUM, HCG, HCGQUANT     ABGs:   Lab Results   Component Value Date    PHART 7.41 03/01/2012    PO2ART 56 03/01/2012    JUO1YGP 45 03/01/2012    TOL9MNW 28 03/01/2012        Type & Screen (If Applicable):  No results found for: LABABO, [address]    Drug/Infectious Status (If Applicable):  Lab Results   Component Value Date    HEPCAB REACTIVE 10/11/2019       COVID-19 Screening (If Applicable):   Lab Results   Component Value Date    COVID19 Not Detected 12/20/2021    COVID19 Not Detected 04/26/2021           Anesthesia Evaluation  Patient summary reviewed and Nursing notes reviewed no history of anesthetic complications:   Airway: Mallampati: II        Dental:          Pulmonary:normal exam    (+) COPD:  sleep apnea:                             Cardiovascular:  Exercise tolerance: no interval change,   (+) hypertension:, pacemaker: pacemaker, CHF:,       ECG reviewed    Rate: normal                    Neuro/Psych:   (+) psychiatric history:            GI/Hepatic/Renal:   (+) GERD: well controlled, hepatitis:, liver disease:,           Endo/Other:    (+) Diabetes, . Abdominal:             Vascular: Other Findings:             Anesthesia Plan      general     ASA 3       Induction: intravenous. MIPS: Prophylactic antiemetics administered. Anesthetic plan and risks discussed with patient. Plan discussed with CRNA.     Attending anesthesiologist reviewed and agrees with Preprocedure content              Radha Hernandez DO   5/20/2022

## 2022-05-20 NOTE — ANESTHESIA POSTPROCEDURE EVALUATION
Department of Anesthesiology  Postprocedure Note    Patient: Hugh Jesus  MRN: [id number]  YOB: 1955  Date of evaluation: 5/20/2022  Time:  2:50 PM     Procedure Summary     Date: 05/20/22 Room / Location: [facility] OR [address] [place] [address]    Anesthesia Start: 1003 Anesthesia Stop: 7768    Procedure: RIGHT FOOT HARDWARE REMOVAL    RIGHT 1ST MTP BUNION IMPLANT [address] (Right Foot) Diagnosis: (DX PAINFUL HARDWARE RIGHT FOOT)    Surgeons: Thanh Purdy DPM Responsible Provider: Micky Aparicio DO    Anesthesia Type: general ASA Status: 3          Anesthesia Type: No value filed. Michael Phase I: Michael Score: 8    Mcihael Phase II: Michael Score: 9    Last vitals: Reviewed and per EMR flowsheets.        Anesthesia Post Evaluation    Patient location during evaluation: PACU  Patient participation: complete - patient participated  Level of consciousness: awake and alert  Airway patency: patent  Nausea & Vomiting: no nausea and no vomiting  Complications: no  Cardiovascular status: hemodynamically stable  Respiratory status: acceptable  Hydration status: stable

## 2022-05-20 NOTE — H&P
[facility]        PATIENT NAME: Romeo Gonzalez   YOB: 1955  GENDER: female     Procedure Date: 5/20/2022  8:42 AM     Attending Provider: Farrukh Quick DPM        Chief Complaint: right foot great toe pain    Procedure Scheduled: right great toe removal of implant and putting in a new 1st MTPU implant    History of present Illness: The patient is a 61 y.o. female  who presents to pre-op area prior to scheduled . Pt last seen in office on 4/26/2022 w/ c/o right great toe pain. X rays showed the implant is not in the correct positioning due to walking early on the surgical foot . Pt recommended to undergo revision of 1st MTP implant at earliest possible convenience. Pt denies changes to his medical history since he was last seen in office. Denies current nausea, vomiting, chest pain, SOB, fever, and chills. Consent form signed in office reviewed w/ pt. Any/all additional questions/concerns were addressed and consent form updated w/ current date. Pt elected to pursue the procedure as scheduled for today.      Past Medical History:  has a past medical history of RACHEL (acute kidney injury) ([address] Arthritis, Bilateral chronic knee pain, Bronchiectasis without complication ([address] Cancer ([address] CHF (congestive heart failure) ([address] Chronic bilateral low back pain with right-sided sciatica, Chronic bronchitis (HCC), Chronic bronchitis ([address] Chronic respiratory failure with hypoxia ([address] Cigarette nicotine dependence without complication, COPD (chronic obstructive pulmonary disease) ([address] Current smoker on some days, Depression, Diabetic polyneuropathy associated with type 2 diabetes mellitus ([address] Diverticulosis, Essential hypertension, Full dentures, GERD (gastroesophageal reflux disease), Hep C w/o coma, chronic ([address] Hyperlipidemia, Hyperplastic polyp of intestine, Hypertension, Irritable bowel syndrome with both constipation and diarrhea, Liver disease, Moderate episode of recurrent major depressive disorder ([address] Nasal polyposis, Noncompliance with CPAP treatment, Obesity (BMI 35.0-39.9 without comorbidity), On home O2, JIMENA (obstructive sleep apnea), Pacemaker, Personal history of tobacco use, presenting hazards to health, Pneumonia, Primary insomnia, Pulmonary edema cardiac cause ([address] Rectal bleeding, Smoking addiction, and Tobacco abuse. Past Surgical History:   Past Surgical History:   Procedure Laterality Date    BACK SURGERY  2008    BUNIONECTOMY Right 4/30/2021    RIGHT FOOT 1ST MPJ ARTHROPLASTY WITH EXTENSOR HALLUCIS LONGUS LENGTHENING performed by Joel Roberson DPM at [facility]       cath dec. 2013    COLON SURGERY      COLONOSCOPY      COLONOSCOPY  01/23/2015    severe diverticula    COLONOSCOPY  09/20/2016    HYPERPLASTIC POLYP X 2     COLONOSCOPY N/A 03/14/2019    COLONOSCOPY DIAGNOSTIC performed by Yvette Tong MD at [facility]    COLONOSCOPY N/A 01/09/2020    COLONOSCOPY WITH BIOPSY performed by Yvette Tong MD at [address] [place] [address], COLON, DIAGNOSTIC      FOOT SURGERY      HERNIA REPAIR      UMBILICAL   [address] [facility] [address] [address]  09/24/2013    decompression & re-exploration L3-4, L4-5    PACEMAKER INSERTION      for very slow heart beat (per patient)    PACEMAKER PLACEMENT      SIGMOIDOSCOPY N/A 06/26/2015    flexable sigmoidscopy,HYPERPLASTIC POLYP    TONSILLECTOMY      UPPER GASTROINTESTINAL ENDOSCOPY N/A 03/14/2019    EGD BIOPSY performed by Yvette Tong MD at [facility]       Social History:  reports that she has been smoking cigarettes. She started smoking about 53 years ago. She has a 53.00 pack-year smoking history. She has never used smokeless tobacco. She reports that she does not drink alcohol and does not use drugs.     Family History: family history includes Cancer in her mother; Crohn's Disease in her sister; Stroke in her father. Review of Systems:   Negative except as listed above    Allergies: Iv dye [iodides]    Current Meds:  Current Facility-Administered Medications:     lidocaine PF 1 % injection 1 mL, 1 mL, IntraDERmal, Once PRN, Davina Davidson MD    lactated ringers infusion, , IntraVENous, Continuous, Davina Davidson MD, Last Rate: 125 mL/hr at 05/20/22 0943, New Bag at 05/20/22 0943    sodium chloride flush 0.9 % injection 5-40 mL, 5-40 mL, IntraVENous, 2 times per day, Davina Davidson MD    sodium chloride flush 0.9 % injection 5-40 mL, 5-40 mL, IntraVENous, PRN, Davina Davidson MD    0.9 % sodium chloride infusion, , IntraVENous, PRN, Davina Davidson MD    Vital Signs:  Vitals:    05/20/22 0855   BP: (!) 152/79   Pulse: 88   Resp: 16   Temp: 96.9 °F (36.1 °C)   SpO2: 97%       Physical Exam:  Vital signs and Nurse's note reviewed  Gen:  A&Ox3, NAD  HEENT: NCAT, PERRLA, EOMI, no scleral icterus, oral mucosa moist  Neck: Supple, trachea midline  Chest: Symmetric rise with inhalation, no evidence of trauma  CVS: Regular rate and rhythm, no murmurs, no rubs or gallops   Resp: Good bilateral air entry, clear to auscultation b/l, no wheeze or rhonchi   Abd: soft, non-tender, non-distended  Ext: No clubbing, cyanosis, edema, peripheral pulses 2+ Rad/Fem/DP/PT  CNS: Moves all extremities, no gross focal motor deficits  Skin: No erythema or ulcerations     Labs:   Lab Results   Component Value Date    WBC 6.0 05/09/2022    HGB 15.1 05/09/2022    HCT 46.2 05/09/2022    MCV 96.9 05/09/2022     05/09/2022    PLT Platelet clumps present, count appears adequate.  02/26/2012     Lab Results   Component Value Date     05/09/2022    K 4.3 05/09/2022     05/09/2022    CO2 23 05/09/2022    BUN 10 05/09/2022    CREATININE 0.53 05/09/2022    GLUCOSE 103 05/09/2022    GLUCOSE 120 02/26/2012    CALCIUM 9.9 05/09/2022     Lab Results   Component Value Date    ALKPHOS 87 01/18/2022    ALT 6 01/18/2022    AST 13 01/18/2022    PROT 7.3 01/18/2022    BILITOT 0.34 01/18/2022    BILIDIR 0.10 06/11/2019    LABALBU 3.8 01/18/2022     Lab Results   Component Value Date    LACTA NOT REPORTED 10/16/2015     Lab Results   Component Value Date    AMYLASE 53 11/10/2014     Lab Results   Component Value Date    LIPASE 21 06/11/2019     Lab Results   Component Value Date    INR 1.1 03/05/2015           Assessment:  Active Problems:    * No active hospital problems. *  Resolved Problems:    * No resolved hospital problems. *    1. Hallux rigidus right foot   2. Plan:  1. Proceed w/ the procedure as scheduled for today  2. Pt to be [address] home post procedure  3.    Electronically signed by Jarrell Orosco DPM  on 5/20/2022 at 9:55 AM

## 2022-05-21 NOTE — OP NOTE
pneumatic ankle tourniquet was inflated to 250 mmHg. Attention was directed to the right 1st MTPJ. A dorsal medial incision was created over the previous scar with a #15 blade. The incision was then deepened. The skin and subcutaneous tissue were then undermined off of the capsule medially. A dorsal linear capsular incision was then created over the first metatarsophalangeal joint. The incision was deepened to the point where the implant was noted. Malalignment of the implant was noted. Any eburnations and osteophytes were freed by the implant with a ronguer. The implant was then removed with the arthrosurface screwdriver per the 's guidelines. Once the implant was removed the joint was prepped with a power rasp to contour the bone and then a rotating amrik was used to prepare the joint of metatarsal head and proximal base to accommodate for the integra silicone MTP toe replacement. The integra silicone MTP implant was placed in the metatarsal head per the 's guidelines. The hallux was placed through range of motion and it was noted that there was smooth and greatly improved range of motion. Copious amounts of sterile saline was then used to irrigate the surgical site. The capsule was closed with 3-0 Monocryl. Subcutaneous closure was performed with 4-0 Monocryl followed by 4-0 Prolene. Dressings consisted of adaptic, 4 x 4s, Kerlix and an Ace bandage. The pneumatic ankle tourniquet was released and immediate hyperemic flush was noted to all five digits of the right foot. A surgical boot was then applied postoperatively. The patient tolerated the above procedure and anesthesia well without complications. The patient was transported from the operating room to the PACU with vital signs stable and vascular status intact.

## 2022-05-24 ENCOUNTER — TELEPHONE (OUTPATIENT)
Dept: FAMILY MEDICINE CLINIC | Age: 67
End: 2022-05-24

## 2022-05-24 NOTE — TELEPHONE ENCOUNTER
Left message to see if she really uses the inconstancy supplies that we got an order for via fax I scanned it into media blank and placed it in the orange folder up front

## 2022-05-31 ENCOUNTER — TELEPHONE (OUTPATIENT)
Dept: FAMILY MEDICINE CLINIC | Age: 67
End: 2022-05-31

## 2022-05-31 ENCOUNTER — OFFICE VISIT (OUTPATIENT)
Dept: PODIATRY | Age: 67
End: 2022-05-31

## 2022-05-31 VITALS — HEIGHT: 72 IN | BODY MASS INDEX: 28.99 KG/M2 | WEIGHT: 214 LBS

## 2022-05-31 DIAGNOSIS — M50.30 DEGENERATIVE CERVICAL DISC: Primary | ICD-10-CM

## 2022-05-31 DIAGNOSIS — Z98.890 POST-OPERATIVE STATE: Primary | ICD-10-CM

## 2022-05-31 PROCEDURE — 99024 POSTOP FOLLOW-UP VISIT: CPT | Performed by: PODIATRIST

## 2022-05-31 RX ORDER — OXYCODONE HYDROCHLORIDE AND ACETAMINOPHEN 5; 325 MG/1; MG/1
1 TABLET ORAL EVERY 6 HOURS PRN
Qty: 28 TABLET | Refills: 0 | Status: SHIPPED | OUTPATIENT
Start: 2022-05-31 | End: 2022-06-07

## 2022-05-31 NOTE — PROGRESS NOTES
[address] [facility] PODIATRY [facility]  [address] [place] [address]  Dept: [phone]  Dept Fax: [phone]    POST-OP PROGRESS NOTE  Date of patient's visit: 5/31/2022  Patient's Name:  Karmen Melendrez YOB: 1955            Patient Care Team:  Beulah Ellis MD as PCP - General (Family Medicine)  Beulah Ellis MD as PCP - [facility] Provider  Kirit Jimenez MD as Consulting Physician (Gastroenterology)  Antonella Fraga MD as Referring Physician (Internal Medicine)  Barry Price MD as Consulting Physician (Pulmonology)  Rahul Harris DPM as Physician (Podiatry)  Kalyn Atkinson RN as Imaging Navigator        Chief Complaint   Patient presents with    Post-Op Check     rtc 1 week - RIGHT FOOT HARDWARE REMOVAL    RIGHT 1ST MTP BUNION IMPLANT REVISION    [employer]    Post-op Problem     onset x3 days            Subjective: Karmen Melendrez is a 61 y.o. female who presents to the office today 1week(s)  S/P  RIGHT FOOT HARDWARE REMOVAL    RIGHT 1ST MTP BUNION IMPLANT [address] for correction of right foot pain and discomfort. Problem List Items Addressed This Visit     None      Patient relates pain is Present and stayed the same. Pain is rated 2 out of 10 and is described as constant, moderate, severe. Currently denies F/C/N/V. Is patient taking pain medications as prescribed and is controlling pain yes, prn    Physical Examination:  Incision is coapted, sutures/steri-strips are intact. Minimal bleeding post operatively. Edema present. No erythema. No Pus. Operative correction is satisfactory. Radiographs: next visit      Assessment: Karmen Melendrez is status post as above  Normal post operative course. Doing well         Diagnosis Orders   1. Post-operative state  oxyCODONE-acetaminophen (PERCOCET) 5-325 MG per tablet         Plan:  Patient examined and evaluated. Current condition and treatment options discussed in detail. Advised pt to her condition. rx provided for pain medicine. Pt to move the toe as much as possible in the sagittal plain and this was demonstrated to the patient today . New dsd applied. Pt to keep clean dry and intact. Pt to use surgical shoe and walker with weight to the heel. Sutures out next week Verbal and written instructions given to patient. Orders: No orders of the defined types were placed in this encounter. Contact office with any questions/problems/concerns. RTC in 10day(s).      Electronically signed by Talya Chauhan DPM on 5/31/2022 at 10:00 AM  5/31/2022

## 2022-05-31 NOTE — TELEPHONE ENCOUNTER
----- Message from Rene Chen sent at 5/27/2022 11:37 AM EDT -----  Subject: Message to Provider    QUESTIONS  Information for Provider? Sarah with the [facility] is calling to   follow up on an order for a shower chair. She states they sent a request   about 4 weeks ago and haven't heard back. Please refax that order or   contact her to advise. Phone number? [phone] (secure line, OK to   leave VM). Fax number? [phone].   ---------------------------------------------------------------------------  --------------  Grace FALCON  What is the best way for the office to contact you? OK to leave message on   voicemail  Preferred Call Back Phone Number? [phone]  ---------------------------------------------------------------------------  --------------  SCRIPT ANSWERS  Relationship to Patient? Third Party  Third Party Type? Other  Other Third Party Type? [facility]  Representative Name?  Crissy Cano

## 2022-05-31 NOTE — PATIENT INSTRUCTIONS
Schedule a Vaccine  When you qualify to receive the vaccine, call the [facility]) COVID-19 Vaccination Hotline to schedule your appointment or to get additional information about the [facility]) locations which are offering the COVID-19 vaccine. To be 94% effective, it's important that you receive two doses of one of the COVID-19 vaccines. -If you are receiving the Ford Peter vaccine, your second shot will be scheduled as close to 21 days after the first shot as possible. -If you are receiving the Moderna vaccine, your second shot will be scheduled as close to 28 days after the first shot as possible. [facility]) COVID-19 Vaccination Hotline: [phone]    Links to [facility]) website and [facility] website:    "[employer]/mercy-[facility]-monitoring-coronavirus-covid-19/covid-19-vaccine/[place]/mac-vaccine    [email or web address]

## 2022-06-01 ENCOUNTER — TELEPHONE (OUTPATIENT)
Dept: GASTROENTEROLOGY | Age: 67
End: 2022-06-01

## 2022-06-07 ENCOUNTER — OFFICE VISIT (OUTPATIENT)
Dept: PODIATRY | Age: 67
End: 2022-06-07

## 2022-06-07 ENCOUNTER — HOSPITAL ENCOUNTER (OUTPATIENT)
Age: 67
Discharge: HOME | End: 2022-06-07
Payer: MEDICARE

## 2022-06-07 VITALS — WEIGHT: 214 LBS | HEIGHT: 72 IN | BODY MASS INDEX: 28.99 KG/M2

## 2022-06-07 DIAGNOSIS — Z98.890 POST-OPERATIVE STATE: Primary | ICD-10-CM

## 2022-06-07 PROCEDURE — 99024 POSTOP FOLLOW-UP VISIT: CPT | Performed by: PODIATRIST

## 2022-06-07 PROCEDURE — 80307 DRUG TEST PRSMV CHEM ANLYZR: CPT

## 2022-06-07 NOTE — PROGRESS NOTES
[address] [facility] PODIATRY [facility]  [address] [address] [address]  Dept: [phone]  Dept Fax: [phone]    POST-OP PROGRESS NOTE  Date of patient's visit: 6/7/2022  Patient's Name:  Veronica Trejo YOB: 1955            Patient Care Team:  Praful Jackson MD as PCP - General (Family Medicine)  Praful Jackson MD as PCP - [facility]  Mónica Martínez MD as Consulting Physician (Gastroenterology)  Pepe Ledbetter MD as Referring Physician (Internal Medicine)  Cole Brown MD as Consulting Physician (Pulmonology)  Shanti Casarez DPM as Physician (Podiatry)  Chris Larson RN as Imaging Navigator        Chief Complaint   Patient presents with    Post-Op Check     2 wk post op    Foot Pain     right foot       Pt's primary care physician is Praful Jackson MD last seen 05/17/2022     Subjective: Veronica Trejo is a 74 y.o. female who presents to the office today 2week(s)  S/P implant revision for correction of   Problem List Items Addressed This Visit     Post-operative state - Primary      . Patient relates pain is Present and improved . Pain is rated 3 out of 10 and is described as constant, moderate. Currently denies F/C/N/V. Is patient taking pain medications as prescribed and is controlling pain    Physical Examination:  Incision is coapted, sutures/steri-strips are intact. Minimal bleeding post operatively. Edema present. No erythema. No Pus. Operative correction is satisfactory. Assessment: Veronica Trejo is status post as above  Normal post operative course. Doing well          ICD-10-CM    1. Post-operative state  Z98.890          Plan:  Patient examined and evaluated. Current condition and treatment options discussed in detail. Advised pt to her condition      Patient presents for suture removal. The wound is well healed without signs of infection. The sutures are removed.  Wound care and activity instructions given. Continue to work on the ROM of the great toe . Verbal and written instructions given to patient. Orders: No orders of the defined types were placed in this encounter. Contact office with any questions/problems/concerns. RTC in 4week(s) with x rays .      Electronically signed by Manoj Coffman DPM on 6/7/2022 at 9:10 AM  6/7/2022

## 2022-06-28 ENCOUNTER — HOSPITAL ENCOUNTER (OUTPATIENT)
Age: 67
Discharge: HOME | End: 2022-06-28
Payer: MEDICARE

## 2022-06-28 PROCEDURE — 80307 DRUG TEST PRSMV CHEM ANLYZR: CPT

## 2022-07-01 DIAGNOSIS — E11.42 TYPE 2 DIABETES MELLITUS WITH DIABETIC POLYNEUROPATHY, WITHOUT LONG-TERM CURRENT USE OF INSULIN (HCC): ICD-10-CM

## 2022-07-01 NOTE — TELEPHONE ENCOUNTER
Spoke with pt's spouse Sang via phone.  Last INR 2/4 was 1.0. Dose increased per protocol.   Yesterday's INR was 1.2 and is below goal range.    Current warfarin dosing verified with Sang, pt has been taking increased doses of warfarin 7.5 mg daily. Sang also states pt has been bridging with Lovenox however ran out of Lovenox after yesterday morning's injection.    Informed Sang will have pt continue to take warfarin 7.5 mg daily and resume Lovenox 80 mg BID tonight; refill sent to pharmacy. Pt will recheck INR in lab on 2/11, STAT PT/INR order entered.    Dr. Bajwa is in the office today supervising the treatment.         Ashwini Griffiths is calling to request a refill on the following medication(s):    Medication Request:  Requested Prescriptions     Pending Prescriptions Disp Refills    metFORMIN (GLUCOPHAGE) 500 MG tablet [Pharmacy Med Name: METFORMIN  MG TABLET] 180 tablet 0     Sig: take 1 tablet by mouth twice a day with meals       Last Visit Date (If Applicable):  33/63/3365    Next Visit Date:    Visit date not found Patients wife is checking on the status of his results.    Pt rescheduled lab appointment for tomorrow 2/8 at 1150 at [facility], will watch for INR results at that time.    Note pt is currently bridging with Lovenox until INR is 2.0 or greater from colonoscopy 2/1.   stat lab INR 2/7 in [facility] at 1150        Vaginal delivery

## 2022-07-05 ENCOUNTER — OFFICE VISIT (OUTPATIENT)
Dept: PODIATRY | Age: 67
End: 2022-07-05

## 2022-07-05 VITALS — WEIGHT: 214 LBS | BODY MASS INDEX: 28.99 KG/M2 | HEIGHT: 72 IN

## 2022-07-05 DIAGNOSIS — M79.671 RIGHT FOOT PAIN: Primary | ICD-10-CM

## 2022-07-05 DIAGNOSIS — Z98.890 POST-OPERATIVE STATE: ICD-10-CM

## 2022-07-05 PROCEDURE — 99024 POSTOP FOLLOW-UP VISIT: CPT | Performed by: PODIATRIST

## 2022-07-05 RX ORDER — HYDROCODONE BITARTRATE AND ACETAMINOPHEN 5; 325 MG/1; MG/1
1 TABLET ORAL EVERY 6 HOURS PRN
Qty: 28 TABLET | Refills: 0 | Status: SHIPPED | OUTPATIENT
Start: 2022-07-05 | End: 2022-07-12

## 2022-07-05 NOTE — PROGRESS NOTES
[address] [facility] PODIATRY [facility]  [address] [address] [address]  Dept: [phone]  Dept Fax: [phone]    POST-OP PROGRESS NOTE  Date of patient's visit: 7/5/2022  Patient's Name:  Stella Saxena YOB: 1955            Patient Care Team:  Carrington Wallace MD as PCP - General (Family Medicine)  Carrington Wallace MD as PCP - [facility]  Mai Mejia MD as Consulting Physician (Gastroenterology)  Charlene Hidalgo MD as Referring Physician (Internal Medicine)  Deshawn Mcknight MD as Consulting Physician (Pulmonology)  Marie Peabody, DPM as Physician (Podiatry)  Waqas Sims RN as Imaging Navigator        Chief Complaint   Patient presents with    Post-Op Check     rtc 6 weeks- RIGHT FOOT HARDWARE REMOVAL    RIGHT 1ST MTP BUNION IMPLANT [place] X-ray      right foot         Subjective: Stella Saxena is a 74 y.o. female who presents to the office today 6week(s)  S/P RIGHT FOOT HARDWARE REMOVAL    RIGHT 1ST MTP BUNION IMPLANT [place] for correction of hardware/ right foot pain. Problem List Items Addressed This Visit     Right foot pain - Primary    Relevant Orders    XR FOOT RIGHT (MIN 3 VIEWS)      Other Visit Diagnoses     Post-operative state        Relevant Orders    XR FOOT RIGHT (MIN 3 VIEWS)      . Patient relates pain is Present and improved. Pain is rated 3 out of 10 and is described as constant, severe, excruciating. Currently denies F/C/N/V. Is patient taking pain medications as prescribed and is controlling pain, n/a    Physical Examination:  Incision is coapted, sutures/steri-strips are intact. Minimal bleeding post operatively. Edema present. No erythema. No Pus. Operative correction is satisfactory.   Radiographs: right foot x rays 3 views:  1st MTP implant in place with edema present to the dorsal foot      Assessment: Stella Saxena is status post as above  Normal post operative course. Doing well          ICD-10-CM    1. Right foot pain  M79.671 XR FOOT RIGHT (MIN 3 VIEWS)   2. Post-operative state  Z98.890 XR FOOT RIGHT (MIN 3 VIEWS)         Plan:  Patient examined and evaluated. Current condition and treatment options discussed in detail. Advised pt to her condition. rx right foot 3 views show the above findings. Pt is able to weight bear but wear shoes not sandals . Soft tissue edema compression sock given to the patient. Pt to ICE and elevate at home . Verbal and written instructions given to patient. Orders:   Orders Placed This Encounter   Procedures    XR FOOT RIGHT (MIN 3 VIEWS)     Order Specific Question:   Reason for exam:     Answer:   right foot pain      Contact office with any questions/problems/concerns. RTC in 3week(s).      Electronically signed by Susan Rodriguez DPM on 7/5/2022 at 1:53 PM  7/5/2022

## 2022-07-05 NOTE — PATIENT INSTRUCTIONS
Schedule a Vaccine  When you qualify to receive the vaccine, call the [facility]) COVID-19 Vaccination Hotline to schedule your appointment or to get additional information about the [facility]) locations which are offering the COVID-19 vaccine. To be 94% effective, it's important that you receive two doses of one of the COVID-19 vaccines. -If you are receiving the Ford Peter vaccine, your second shot will be scheduled as close to 21 days after the first shot as possible. -If you are receiving the Moderna vaccine, your second shot will be scheduled as close to 28 days after the first shot as possible. [facility]) COVID-19 Vaccination Hotline: [phone]    Links to [facility]) website and [facility] website:    [employer]/[facility]cy-[facility]-monitoring-coronavirus-covid-19/covid-19-vaccine/[place]/mac-vaccine    [email or web address]

## 2022-07-06 DIAGNOSIS — K58.2 IRRITABLE BOWEL SYNDROME WITH BOTH CONSTIPATION AND DIARRHEA: ICD-10-CM

## 2022-07-06 RX ORDER — ONDANSETRON 4 MG/1
TABLET, ORALLY DISINTEGRATING ORAL
Qty: 10 TABLET | Refills: 1 | Status: SHIPPED | OUTPATIENT
Start: 2022-07-06 | End: 2022-08-19

## 2022-07-06 NOTE — TELEPHONE ENCOUNTER
Hayde Byrd is calling to request a refill on the following medication(s):    Medication Request:  Requested Prescriptions     Pending Prescriptions Disp Refills    ondansetron (ZOFRAN-ODT) 4 MG disintegrating tablet [Pharmacy Med Name: ONDANSETRON ODT 4 MG TABLET] 10 tablet 1     Sig: dissolve 1 tablet under the tongue every 8 hours if needed for NAUSEA OR VOMITING       Last Visit Date (If Applicable):  6/95/8580    Next Visit Date:    8/17/2022

## 2022-07-14 DIAGNOSIS — E11.8 TYPE 2 DIABETES MELLITUS WITH COMPLICATION (HCC): ICD-10-CM

## 2022-07-14 DIAGNOSIS — G89.29 CHRONIC BILATERAL LOW BACK PAIN WITH RIGHT-SIDED SCIATICA: ICD-10-CM

## 2022-07-14 DIAGNOSIS — K58.2 IRRITABLE BOWEL SYNDROME WITH BOTH CONSTIPATION AND DIARRHEA: ICD-10-CM

## 2022-07-14 DIAGNOSIS — I10 ESSENTIAL HYPERTENSION: ICD-10-CM

## 2022-07-14 DIAGNOSIS — M54.41 CHRONIC BILATERAL LOW BACK PAIN WITH RIGHT-SIDED SCIATICA: ICD-10-CM

## 2022-07-14 DIAGNOSIS — G25.81 RLS (RESTLESS LEGS SYNDROME): ICD-10-CM

## 2022-07-14 DIAGNOSIS — E78.00 PURE HYPERCHOLESTEROLEMIA: ICD-10-CM

## 2022-07-14 DIAGNOSIS — Z95.0 S/P PLACEMENT OF CARDIAC PACEMAKER: ICD-10-CM

## 2022-07-14 DIAGNOSIS — E11.42 TYPE 2 DIABETES MELLITUS WITH DIABETIC POLYNEUROPATHY, WITHOUT LONG-TERM CURRENT USE OF INSULIN (HCC): ICD-10-CM

## 2022-07-14 RX ORDER — BLOOD SUGAR DIAGNOSTIC
STRIP MISCELLANEOUS
Qty: 200 STRIP | Refills: 11 | Status: SHIPPED | OUTPATIENT
Start: 2022-07-14

## 2022-07-14 RX ORDER — ROPINIROLE 0.5 MG/1
0.5 TABLET, FILM COATED ORAL DAILY
Qty: 90 TABLET | Refills: 3 | Status: SHIPPED | OUTPATIENT
Start: 2022-07-14

## 2022-07-14 RX ORDER — LINACLOTIDE 290 UG/1
290 CAPSULE, GELATIN COATED ORAL
Qty: 30 CAPSULE | Refills: 11 | Status: SHIPPED | OUTPATIENT
Start: 2022-07-14

## 2022-07-14 RX ORDER — ATENOLOL 25 MG/1
25 TABLET ORAL DAILY
Qty: 90 TABLET | Refills: 1 | Status: SHIPPED | OUTPATIENT
Start: 2022-07-14

## 2022-07-14 RX ORDER — NITROGLYCERIN 0.4 MG/1
0.4 TABLET SUBLINGUAL EVERY 5 MIN PRN
Qty: 25 TABLET | Refills: 11 | Status: SHIPPED | OUTPATIENT
Start: 2022-07-14

## 2022-07-14 RX ORDER — GABAPENTIN 800 MG/1
800 TABLET ORAL 3 TIMES DAILY
Qty: 90 TABLET | Refills: 2 | Status: SHIPPED | OUTPATIENT
Start: 2022-07-14 | End: 2022-07-28 | Stop reason: SDUPTHER

## 2022-07-14 RX ORDER — HYDROCHLOROTHIAZIDE 12.5 MG/1
12.5 CAPSULE, GELATIN COATED ORAL EVERY MORNING
Qty: 90 CAPSULE | Refills: 3 | Status: SHIPPED | OUTPATIENT
Start: 2022-07-14 | End: 2023-07-09

## 2022-07-14 RX ORDER — TIZANIDINE 2 MG/1
2 TABLET ORAL EVERY 8 HOURS PRN
Qty: 90 TABLET | Refills: 3 | Status: SHIPPED | OUTPATIENT
Start: 2022-07-14

## 2022-07-14 RX ORDER — PRAVASTATIN SODIUM 40 MG
40 TABLET ORAL DAILY
Qty: 90 TABLET | Refills: 4 | Status: SHIPPED | OUTPATIENT
Start: 2022-07-14 | End: 2023-10-07

## 2022-07-15 ENCOUNTER — TELEPHONE (OUTPATIENT)
Dept: FAMILY MEDICINE CLINIC | Age: 67
End: 2022-07-15

## 2022-07-15 NOTE — TELEPHONE ENCOUNTER
Pt called in checking status of med refill. Got angry from having to answer \"too many questions\" and hung up.

## 2022-07-19 ENCOUNTER — HOSPITAL ENCOUNTER (OUTPATIENT)
Age: 67
Discharge: HOME | End: 2022-07-19
Payer: MEDICARE

## 2022-07-19 LAB
AMPHETAMINE SCREEN URINE: NEGATIVE
BARBITURATE SCREEN URINE: NEGATIVE
BENZODIAZEPINE SCREEN, URINE: NEGATIVE
CANNABINOID SCREEN URINE: NEGATIVE
COCAINE METABOLITE, URINE: NEGATIVE
FENTANYL URINE: NEGATIVE
METHADONE SCREEN, URINE: NEGATIVE
OPIATES, URINE: NEGATIVE
OXYCODONE SCREEN URINE: NEGATIVE
PHENCYCLIDINE, URINE: NEGATIVE
TEST INFORMATION: NORMAL

## 2022-07-19 PROCEDURE — 80307 DRUG TEST PRSMV CHEM ANLYZR: CPT

## 2022-07-19 PROCEDURE — G0480 DRUG TEST DEF 1-7 CLASSES: HCPCS

## 2022-07-22 LAB
BUPRENORPHINE GLUCURONIDE URINE: 113 NG/ML
BUPRENORPHINE URINE: <2 NG/ML
NALOXONE URINE: <100 NG/ML
NORBUPRENORPHINE GLUCURONIDE URINE: 438 NG/ML
NORBUPRENORPHINE, URINE: 97 NG/ML

## 2022-07-27 ENCOUNTER — HOSPITAL ENCOUNTER (OUTPATIENT)
Age: 67
Setting detail: SPECIMEN
Discharge: HOME | End: 2022-07-27
Payer: MEDICARE

## 2022-07-27 PROCEDURE — 80307 DRUG TEST PRSMV CHEM ANLYZR: CPT

## 2022-07-28 DIAGNOSIS — E11.42 TYPE 2 DIABETES MELLITUS WITH DIABETIC POLYNEUROPATHY, WITHOUT LONG-TERM CURRENT USE OF INSULIN (HCC): ICD-10-CM

## 2022-07-28 RX ORDER — FLUTICASONE FUROATE AND VILANTEROL 100; 25 UG/1; UG/1
1 POWDER RESPIRATORY (INHALATION) DAILY
Qty: 1 EACH | Refills: 5 | Status: SHIPPED | OUTPATIENT
Start: 2022-07-28

## 2022-07-28 RX ORDER — GABAPENTIN 800 MG/1
800 TABLET ORAL 3 TIMES DAILY
Qty: 90 TABLET | Refills: 2 | Status: SHIPPED | OUTPATIENT
Start: 2022-07-28 | End: 2022-09-09 | Stop reason: SDUPTHER

## 2022-07-28 NOTE — TELEPHONE ENCOUNTER
LAST VISIT: 1/7/22  No follow up scheduled. Patient no showed for last visit. I called mobile number and phone is not working at this time. I sent a note to pharmacy asking that patient to call for an appointment. Per last dictation patient is on this medication. Please sign for refill if ok. Thank you.

## 2022-07-28 NOTE — TELEPHONE ENCOUNTER
Patient states that she lost her medications and that the pharmacy has done a over ride so that she can get her medications. She states that the pharmacy filled all the other medications but not the gabapentin because they need the okay from the provider.      Pharmacy called and would like an okay from the provider if its okay to refill the Gabapentin

## 2022-07-29 ENCOUNTER — TELEPHONE (OUTPATIENT)
Dept: FAMILY MEDICINE CLINIC | Age: 67
End: 2022-07-29

## 2022-07-29 NOTE — TELEPHONE ENCOUNTER
Patient states that the provider will have to call the pharmacy  and give a verbal approval for gabapentin. She states that her feet are swollen and she's in pain.

## 2022-08-17 ENCOUNTER — OFFICE VISIT (OUTPATIENT)
Dept: FAMILY MEDICINE CLINIC | Age: 67
End: 2022-08-17
Payer: MEDICARE

## 2022-08-17 VITALS
TEMPERATURE: 97.3 F | SYSTOLIC BLOOD PRESSURE: 105 MMHG | HEIGHT: 72 IN | BODY MASS INDEX: 27.77 KG/M2 | DIASTOLIC BLOOD PRESSURE: 68 MMHG | WEIGHT: 205 LBS | HEART RATE: 70 BPM

## 2022-08-17 DIAGNOSIS — Z78.0 POSTMENOPAUSAL: ICD-10-CM

## 2022-08-17 DIAGNOSIS — K58.1 IRRITABLE BOWEL SYNDROME WITH CONSTIPATION: ICD-10-CM

## 2022-08-17 DIAGNOSIS — Z00.00 MEDICARE ANNUAL WELLNESS VISIT, SUBSEQUENT: Primary | ICD-10-CM

## 2022-08-17 DIAGNOSIS — R35.0 URINARY FREQUENCY: ICD-10-CM

## 2022-08-17 DIAGNOSIS — E11.42 TYPE 2 DIABETES MELLITUS WITH DIABETIC POLYNEUROPATHY, WITHOUT LONG-TERM CURRENT USE OF INSULIN (HCC): ICD-10-CM

## 2022-08-17 LAB
BILIRUBIN, POC: NEGATIVE
BLOOD URINE, POC: NEGATIVE
CLARITY, POC: CLEAR
COLOR, POC: NORMAL
GLUCOSE URINE, POC: NORMAL
HBA1C MFR BLD: 5.8 %
KETONES, POC: NEGATIVE
LEUKOCYTE EST, POC: NEGATIVE
NITRITE, POC: NEGATIVE
PH, POC: 6
PROTEIN, POC: NEGATIVE
SPECIFIC GRAVITY, POC: 1.02
UROBILINOGEN, POC: 1

## 2022-08-17 PROCEDURE — 81003 URINALYSIS AUTO W/O SCOPE: CPT | Performed by: STUDENT IN AN ORGANIZED HEALTH CARE EDUCATION/TRAINING PROGRAM

## 2022-08-17 PROCEDURE — 3044F HG A1C LEVEL LT 7.0%: CPT | Performed by: STUDENT IN AN ORGANIZED HEALTH CARE EDUCATION/TRAINING PROGRAM

## 2022-08-17 PROCEDURE — 83036 HEMOGLOBIN GLYCOSYLATED A1C: CPT | Performed by: STUDENT IN AN ORGANIZED HEALTH CARE EDUCATION/TRAINING PROGRAM

## 2022-08-17 PROCEDURE — 1123F ACP DISCUSS/DSCN MKR DOCD: CPT | Performed by: STUDENT IN AN ORGANIZED HEALTH CARE EDUCATION/TRAINING PROGRAM

## 2022-08-17 PROCEDURE — G0439 PPPS, SUBSEQ VISIT: HCPCS | Performed by: STUDENT IN AN ORGANIZED HEALTH CARE EDUCATION/TRAINING PROGRAM

## 2022-08-17 RX ORDER — DOCUSATE SODIUM 100 MG/1
100 CAPSULE, LIQUID FILLED ORAL 2 TIMES DAILY
Qty: 180 CAPSULE | Refills: 1 | Status: SHIPPED | OUTPATIENT
Start: 2022-08-17 | End: 2022-09-16

## 2022-08-17 ASSESSMENT — PATIENT HEALTH QUESTIONNAIRE - PHQ9
SUM OF ALL RESPONSES TO PHQ QUESTIONS 1-9: 8
1. LITTLE INTEREST OR PLEASURE IN DOING THINGS: 1
SUM OF ALL RESPONSES TO PHQ9 QUESTIONS 1 & 2: 2
4. FEELING TIRED OR HAVING LITTLE ENERGY: 3
7. TROUBLE CONCENTRATING ON THINGS, SUCH AS READING THE NEWSPAPER OR WATCHING TELEVISION: 0
SUM OF ALL RESPONSES TO PHQ QUESTIONS 1-9: 8
SUM OF ALL RESPONSES TO PHQ QUESTIONS 1-9: 8
5. POOR APPETITE OR OVEREATING: 0
9. THOUGHTS THAT YOU WOULD BE BETTER OFF DEAD, OR OF HURTING YOURSELF: 0
10. IF YOU CHECKED OFF ANY PROBLEMS, HOW DIFFICULT HAVE THESE PROBLEMS MADE IT FOR YOU TO DO YOUR WORK, TAKE CARE OF THINGS AT HOME, OR GET ALONG WITH OTHER PEOPLE: 0
SUM OF ALL RESPONSES TO PHQ QUESTIONS 1-9: 8
6. FEELING BAD ABOUT YOURSELF - OR THAT YOU ARE A FAILURE OR HAVE LET YOURSELF OR YOUR FAMILY DOWN: 0
2. FEELING DOWN, DEPRESSED OR HOPELESS: 1
8. MOVING OR SPEAKING SO SLOWLY THAT OTHER PEOPLE COULD HAVE NOTICED. OR THE OPPOSITE, BEING SO FIGETY OR RESTLESS THAT YOU HAVE BEEN MOVING AROUND A LOT MORE THAN USUAL: 0
3. TROUBLE FALLING OR STAYING ASLEEP: 3

## 2022-08-17 ASSESSMENT — LIFESTYLE VARIABLES
HOW MANY STANDARD DRINKS CONTAINING ALCOHOL DO YOU HAVE ON A TYPICAL DAY: PATIENT DOES NOT DRINK
HOW OFTEN DO YOU HAVE A DRINK CONTAINING ALCOHOL: NEVER

## 2022-08-17 NOTE — PROGRESS NOTES
Interventions:    Home safety tips provided     Depression:  PHQ-2 Score: 2  PHQ-9 Total Score: 8    Severity:1-4 = minimal depression, 5-9 = mild depression, 10-14 = moderate depression, 15-19 = moderately severe depression, 20-27 = severe depression  Depression Interventions:  Regular exercise recommended- 3-5 times per week, 30-45 minutes per session    Tobacco Use:  Tobacco Use: High Risk    Smoking Tobacco Use: Every Day    Smokeless Tobacco Use: Never     E-cigarette/Vaping       Questions Responses    E-cigarette/Vaping Use Never User    Start Date     Passive Exposure     Quit Date     Counseling Given     Comments           Substance Use - Tobacco Interventions:  patient is not ready to work toward tobacco cessation at this time         General Health and ACP:  General  In general, how would you say your health is?: Fair  In the past 7 days, have you experienced any of the following: New or Increased Pain, New or Increased Fatigue, Loneliness, Social Isolation, Stress or Anger?: (!) Yes  Select all that apply: (!) New or Increased Pain  Do you get the social and emotional support that you need?: Yes  Do you have a Living Will?: (!) No    Advance Directives       Power of  Living Will ACP-Advance Directive ACP-Power of     Not on File Not on File Not on File Not on File          General Health Risk Interventions:  No Living Will: Patient declines ACP discussion/assistance    Health Habits/Nutrition:  Physical Activity: Sufficiently Active    Days of Exercise per Week: 7 days    Minutes of Exercise per Session: 150+ min     Have you lost any weight without trying in the past 3 months?: (!) Yes  Body mass index: (!) 27.04  Have you seen the dentist within the past year?: (!) No  Health Habits/Nutrition Interventions:  Dental exam overdue:  patient encouraged to make appointment with his/her dentist    Hearing/Vision:  Do you or your family notice any trouble with your hearing that hasn't been managed with hearing aids?: (!) Yes  Do you have difficulty driving, watching TV, or doing any of your daily activities because of your eyesight?: No  Have you had an eye exam within the past year?: Yes  No results found. Hearing/Vision Interventions:  Hearing concerns:  patient declines any further evaluation/treatment for hearing issues    Safety:  Do you have working smoke detectors?: Yes  Do you have any tripping hazards - loose or unsecured carpets or rugs?: (!) Yes  Do you have any tripping hazards - clutter in doorways, halls, or stairs?: No  Do you have either shower bars, grab bars, non-slip mats or non-slip surfaces in your shower or bathtub?: (!) No  Do all of your stairways have a railing or banister?: Not Applicable  Do you always fasten your seatbelt when you are in a car?: Yes  Safety Interventions:  Home safety tips provided           Objective   Vitals:    08/17/22 1241   BP: 105/68   Site: Left Upper Arm   Position: Sitting   Cuff Size: Medium Adult   Pulse: 70   Temp: 97.3 °F (36.3 °C)   TempSrc: Temporal   Weight: 205 lb (93 kg)   Height: 6' 1\" (1.854 m)      Body mass index is 27.05 kg/m².       General Appearance: alert and oriented to person, place and time, well developed and well- nourished, in no acute distress  Skin: warm and dry, no rash or erythema  Head: normocephalic and atraumatic  Eyes: pupils equal, round, and reactive to light, extraocular eye movements intact, conjunctivae normal  ENT: tympanic membrane, external ear and ear canal normal bilaterally, nose without deformity, nasal mucosa and turbinates normal without polyps  Neck: supple and non-tender without mass, no thyromegaly or thyroid nodules, no cervical lymphadenopathy  Pulmonary/Chest: clear to auscultation bilaterally- no wheezes, rales or rhonchi, normal air movement, no respiratory distress  Cardiovascular: normal rate, regular rhythm, normal S1 and S2, no murmurs, rubs, clicks, or gallops, distal pulses intact, no carotid bruits  Abdomen: soft, non-tender, non-distended, normal bowel sounds, no masses or organomegaly  Extremities: no cyanosis, clubbing or edema  Musculoskeletal: normal range of motion, no joint swelling, deformity or tenderness  Neurologic: reflexes normal and symmetric, no cranial nerve deficit, gait, coordination and speech normal       Allergies   Allergen Reactions    Iv Dye [Iodides] Hives     Prior to Visit Medications    Medication Sig Taking? Authorizing Provider   docusate sodium (COLACE) 100 MG capsule Take 1 capsule by mouth 2 times daily Yes Manjula Zavaleta MD   gabapentin (NEURONTIN) 800 MG tablet Take 1 tablet by mouth in the morning and 1 tablet at noon and 1 tablet before bedtime. Do all this for 30 days.  Yes Manjula Zavaleta MD   atenolol (TENORMIN) 25 MG tablet Take 1 tablet by mouth daily Yes Manjula Zavaleta MD   hydroCHLOROthiazide (MICROZIDE) 12.5 MG capsule Take 1 capsule by mouth every morning Yes Manjula Zavaleta MD   rOPINIRole (REQUIP) 0.5 MG tablet Take 1 tablet by mouth daily Yes Manjula Zavaleta MD   metFORMIN (GLUCOPHAGE) 500 MG tablet Take 1 tablet by mouth 2 times daily (with meals) Yes Manjula Zavaleta MD   tiZANidine (ZANAFLEX) 2 MG tablet Take 1 tablet by mouth every 8 hours as needed (back pain) Yes Manjula Zavaleta MD   pravastatin (PRAVACHOL) 40 MG tablet Take 1 tablet by mouth daily Yes Manjula Zavaleta MD   linaclotide (LINZESS) 290 MCG CAPS capsule Take 1 capsule by mouth every morning (before breakfast) Yes Manjula Zavaleta MD   nitroGLYCERIN (NITROSTAT) 0.4 MG SL tablet Place 1 tablet under the tongue every 5 minutes as needed for Chest pain Yes Manjula Zavaleta MD   blood glucose test strips (ONETOUCH ULTRA) strip use 1 TEST STRIP to TEST BLOOD SUGAR four times a day if needed Yes Manjula Zavaleta MD   ondansetron (ZOFRAN-ODT) 4 MG disintegrating tablet dissolve 1 tablet under the tongue every 8 hours if needed for NAUSEA OR VOMITING Yes Manjula Zavaleta MD   potassium chloride (KLOR-CON M) 20 MEQ extended release tablet take 1 tablet by mouth once daily Yes Ida Beach MD   vitamin D-3 (CHOLECALCIFEROL) 125 MCG (5000 UT) TABS Take 1 tablet by mouth daily Yes Ida Beach MD   dicyclomine (BENTYL) 10 MG capsule Take 1 capsule by mouth 4 times daily (before meals and nightly) Yes Ida Beach MD   pantoprazole (PROTONIX) 40 MG tablet Take 1 tablet by mouth daily Yes Ida Beach MD   traZODone (DESYREL) 100 MG tablet Take 1 tablet by mouth nightly Yes Ida Beach MD   glucose monitoring (FREESTYLE FREEDOM) kit 1 kit by Does not apply route daily Yes MARCIA Elliott CNP   tiotropium (SPIRIVA RESPIMAT) 2.5 MCG/ACT AERS inhaler Inhale 2 puffs into the lungs daily Yes Rae Bell MD   albuterol sulfate HFA (PROAIR HFA) 108 (90 Base) MCG/ACT inhaler Inhale 2 puffs into the lungs every 6 hours as needed for Wheezing Yes Rae Bell MD   albuterol (PROVENTIL) (2.5 MG/3ML) 0.083% nebulizer solution inhale contents of 1 vial ( 3 milliliters ) in nebulizer by mouth and INTO THE LUNGS every 6 hours Yes Historical Provider, MD   acetaminophen (TYLENOL) 500 MG tablet Take 1,000 mg by mouth every 6 hours as needed for Pain  Yes Historical Provider, MD   buprenorphine-naloxone (SUBOXONE) 2-0.5 MG FILM SL film Place 1.5 Film under the tongue daily. Yes Historical Provider, MD   fluticasone-vilanterol (BREO ELLIPTA) 100-25 MCG/INH AEPB inhaler Inhale 1 puff into the lungs in the morning. PLEASE ASK PATIENT TO CALL OUR OFFICE FOR AN APPOINTMENT.   Rae Bell MD   furosemide (LASIX) 20 MG tablet Take 1 tablet by mouth 2 times daily  MD Wayne Gold Delica Lancets 73Y MISC Apply 1 Units topically 2 times daily USE 2 TO 3 TIMES DAILY AS DIRECTED  Ida Beach MD   BREO ELLIPTA 200-25 MCG/INH AEPB inhaler INHALE 2 PUFFS ONTO THE LUNGS DAILY  Historical Provider, MD [place] (Including outside providers/suppliers regularly involved in providing care):   Patient Care Team:  Sunrise Anamaria Brooks MD as PCP - General (Family Medicine)  Drake Elizabeth MD as PCP - [facility] Empaneled Provider  Radha Whitaker MD as Consulting Physician (Gastroenterology)  Melissa Mckeon MD as Referring Physician (Internal Medicine)  Hannah Borrego MD as Consulting Physician (Pulmonology)  Sakshi Faria DPM as Physician (Podiatry)     Reviewed and updated this visit:  Tobacco  Allergies  Meds  Problems  Med Hx  Surg Hx  Soc Hx  Fam Hx

## 2022-08-17 NOTE — PATIENT INSTRUCTIONS
Personalized Preventive Plan for Muriel Stoll - 8/17/2022  Medicare offers a range of preventive health benefits. Some of the tests and screenings are paid in full while other may be subject to a deductible, co-insurance, and/or copay. Some of these benefits include a comprehensive review of your medical history including lifestyle, illnesses that may run in your family, and various assessments and screenings as appropriate. After reviewing your medical record and screening and assessments performed today your provider may have ordered immunizations, labs, imaging, and/or referrals for you. A list of these orders (if applicable) as well as your Preventive Care list are included within your After Visit Summary for your review. Other Preventive Recommendations:    A preventive eye exam performed by an eye specialist is recommended every 1-2 years to screen for glaucoma; cataracts, macular degeneration, and other eye disorders. A preventive dental visit is recommended every 6 months. Try to get at least 150 minutes of exercise per week or 10,000 steps per day on a pedometer . Order or download the FREE \"Exercise & Physical Activity: Your Everyday Guide\" from [employer]. Call 8-432.540.8109 or search [employer] online. You need 5911-9661 mg of calcium and 5193-3923 IU of vitamin D per day. It is possible to meet your calcium requirement with diet alone, but a vitamin D supplement is usually necessary to meet this goal.  When exposed to the sun, use a sunscreen that protects against both UVA and UVB radiation with an SPF of 30 or greater. Reapply every 2 to 3 hours or after sweating, drying off with a towel, or swimming. Always wear a seat belt when traveling in a car. Always wear a helmet when riding a bicycle or motorcycle.

## 2022-08-19 DIAGNOSIS — K58.2 IRRITABLE BOWEL SYNDROME WITH BOTH CONSTIPATION AND DIARRHEA: ICD-10-CM

## 2022-08-19 RX ORDER — ONDANSETRON 4 MG/1
TABLET, ORALLY DISINTEGRATING ORAL
Qty: 10 TABLET | Refills: 1 | Status: SHIPPED | OUTPATIENT
Start: 2022-08-19

## 2022-08-19 NOTE — TELEPHONE ENCOUNTER
Muriel Stoll is calling to request a refill on the following medication(s):    Medication Request:  Requested Prescriptions     Pending Prescriptions Disp Refills    ondansetron (ZOFRAN-ODT) 4 MG disintegrating tablet [Pharmacy Med Name: ONDANSETRON ODT 4 MG TABLET] 10 tablet 1     Sig: dissolve 1 tablet under the tongue every 8 hours if needed for NAUSEA OR VOMITING       Last Visit Date (If Applicable):  3/48/3775    Next Visit Date:    11/17/2022

## 2022-08-22 RX ORDER — HYDROCODONE BITARTRATE AND ACETAMINOPHEN 5; 325 MG/1; MG/1
TABLET ORAL
Qty: 28 TABLET | OUTPATIENT
Start: 2022-08-22

## 2022-08-29 DIAGNOSIS — K21.9 GASTROESOPHAGEAL REFLUX DISEASE WITHOUT ESOPHAGITIS: ICD-10-CM

## 2022-08-30 RX ORDER — PANTOPRAZOLE SODIUM 40 MG/1
TABLET, DELAYED RELEASE ORAL
Qty: 30 TABLET | Refills: 3 | Status: SHIPPED | OUTPATIENT
Start: 2022-08-30

## 2022-09-09 DIAGNOSIS — E11.42 TYPE 2 DIABETES MELLITUS WITH DIABETIC POLYNEUROPATHY, WITHOUT LONG-TERM CURRENT USE OF INSULIN (HCC): ICD-10-CM

## 2022-09-09 RX ORDER — GABAPENTIN 800 MG/1
800 TABLET ORAL 3 TIMES DAILY
Qty: 90 TABLET | Refills: 5 | Status: SHIPPED | OUTPATIENT
Start: 2022-09-09 | End: 2022-10-09

## 2022-09-12 ENCOUNTER — HOSPITAL ENCOUNTER (OUTPATIENT)
Age: 67
Discharge: HOME | End: 2022-09-12
Payer: MEDICARE

## 2022-09-12 LAB
ABSOLUTE EOS #: 0.11 K/UL (ref 0–0.44)
ABSOLUTE IMMATURE GRANULOCYTE: <0.03 K/UL (ref 0–0.3)
ABSOLUTE LYMPH #: 2.35 K/UL (ref 1.1–3.7)
ABSOLUTE MONO #: 0.49 K/UL (ref 0.1–1.2)
ALBUMIN SERPL-MCNC: 4.4 G/DL (ref 3.5–5.2)
ALBUMIN/GLOBULIN RATIO: 1.5 (ref 1–2.5)
ALP BLD-CCNC: 77 U/L (ref 35–104)
ALT SERPL-CCNC: 9 U/L (ref 5–33)
ANION GAP SERPL CALCULATED.3IONS-SCNC: 9 MMOL/L (ref 9–17)
AST SERPL-CCNC: 14 U/L
BASOPHILS # BLD: 1 % (ref 0–2)
BASOPHILS ABSOLUTE: 0.06 K/UL (ref 0–0.2)
BILIRUB SERPL-MCNC: 0.4 MG/DL (ref 0.3–1.2)
BUN BLDV-MCNC: 13 MG/DL (ref 8–23)
CALCIUM SERPL-MCNC: 9.5 MG/DL (ref 8.6–10.4)
CHLORIDE BLD-SCNC: 103 MMOL/L (ref 98–107)
CO2: 25 MMOL/L (ref 20–31)
CREAT SERPL-MCNC: 0.69 MG/DL (ref 0.5–0.9)
EOSINOPHILS RELATIVE PERCENT: 2 % (ref 1–4)
GFR AFRICAN AMERICAN: >60 ML/MIN
GFR NON-AFRICAN AMERICAN: >60 ML/MIN
GFR SERPL CREATININE-BSD FRML MDRD: ABNORMAL ML/MIN/{1.73_M2}
GLUCOSE BLD-MCNC: 120 MG/DL (ref 70–99)
HCT VFR BLD CALC: 44.7 % (ref 36.3–47.1)
HEMOGLOBIN: 15.1 G/DL (ref 11.9–15.1)
HEPATITIS B SURFACE ANTIGEN: NONREACTIVE
HIV AG/AB: NONREACTIVE
IMMATURE GRANULOCYTES: 0 %
LYMPHOCYTES # BLD: 41 % (ref 24–43)
MCH RBC QN AUTO: 32.6 PG (ref 25.2–33.5)
MCHC RBC AUTO-ENTMCNC: 33.8 G/DL (ref 28.4–34.8)
MCV RBC AUTO: 96.5 FL (ref 82.6–102.9)
MONOCYTES # BLD: 9 % (ref 3–12)
NRBC AUTOMATED: 0 PER 100 WBC
PDW BLD-RTO: 13.7 % (ref 11.8–14.4)
PLATELET # BLD: 196 K/UL (ref 138–453)
PMV BLD AUTO: 9.7 FL (ref 8.1–13.5)
POTASSIUM SERPL-SCNC: 3.8 MMOL/L (ref 3.7–5.3)
RBC # BLD: 4.63 M/UL (ref 3.95–5.11)
SEG NEUTROPHILS: 47 % (ref 36–65)
SEGMENTED NEUTROPHILS ABSOLUTE COUNT: 2.74 K/UL (ref 1.5–8.1)
SODIUM BLD-SCNC: 137 MMOL/L (ref 135–144)
T. PALLIDUM, IGG: NONREACTIVE
THYROXINE, FREE: 1.33 NG/DL (ref 0.93–1.7)
TOTAL PROTEIN: 7.3 G/DL (ref 6.4–8.3)
TSH SERPL DL<=0.05 MIU/L-ACNC: 0.96 UIU/ML (ref 0.3–5)
WBC # BLD: 5.8 K/UL (ref 3.5–11.3)

## 2022-09-12 PROCEDURE — 85025 COMPLETE CBC W/AUTO DIFF WBC: CPT

## 2022-09-12 PROCEDURE — 87522 HEPATITIS C REVRS TRNSCRPJ: CPT

## 2022-09-12 PROCEDURE — 36415 COLL VENOUS BLD VENIPUNCTURE: CPT

## 2022-09-12 PROCEDURE — 87389 HIV-1 AG W/HIV-1&-2 AB AG IA: CPT

## 2022-09-12 PROCEDURE — 80053 COMPREHEN METABOLIC PANEL: CPT

## 2022-09-12 PROCEDURE — 86780 TREPONEMA PALLIDUM: CPT

## 2022-09-12 PROCEDURE — 87340 HEPATITIS B SURFACE AG IA: CPT

## 2022-09-12 PROCEDURE — 84443 ASSAY THYROID STIM HORMONE: CPT

## 2022-09-12 PROCEDURE — 86480 TB TEST CELL IMMUN MEASURE: CPT

## 2022-09-12 PROCEDURE — 84439 ASSAY OF FREE THYROXINE: CPT

## 2022-09-13 LAB
HEPATITIS C RNA PCR QUANT: NOT DETECTED
SOURCE: NORMAL

## 2022-09-15 ENCOUNTER — OFFICE VISIT (OUTPATIENT)
Dept: PODIATRY | Age: 67
End: 2022-09-15
Payer: MEDICARE

## 2022-09-15 VITALS — BODY MASS INDEX: 27.77 KG/M2 | WEIGHT: 205 LBS | HEIGHT: 72 IN

## 2022-09-15 DIAGNOSIS — M79.671 RIGHT FOOT PAIN: Primary | ICD-10-CM

## 2022-09-15 LAB
QUANTI TB GOLD PLUS: NEGATIVE
QUANTI TB1 MINUS NIL: 0 IU/ML (ref 0–0.34)
QUANTI TB2 MINUS NIL: 0 IU/ML (ref 0–0.34)
QUANTIFERON MITOGEN: 9.74 IU/ML
QUANTIFERON NIL: 0.03 IU/ML

## 2022-09-15 PROCEDURE — 99214 OFFICE O/P EST MOD 30 MIN: CPT | Performed by: PODIATRIST

## 2022-09-15 PROCEDURE — 1123F ACP DISCUSS/DSCN MKR DOCD: CPT | Performed by: PODIATRIST

## 2022-09-15 RX ORDER — HYDROXYZINE HYDROCHLORIDE 25 MG/1
TABLET, FILM COATED ORAL
COMMUNITY
Start: 2022-09-13

## 2022-09-15 NOTE — PROGRESS NOTES
with type 2 diabetes mellitus [facility]     Diverticulosis     Essential hypertension 12/30/2013    Full dentures     GERD (gastroesophageal reflux disease)     Hep C w/o coma, chronic (HCC)     Hyperlipidemia     Hyperplastic polyp of intestine     Hypertension     DR. MCDANIEL-CARDIO    Irritable bowel syndrome with both constipation and diarrhea 2/15/2019    Liver disease     hepatitis C    Moderate episode of recurrent major depressive disorder ([facility] 10/30/2014    Nasal polyposis     Noncompliance with CPAP treatment     Obesity (BMI 35.0-39.9 without comorbidity)     On home O2     2 liters PRN per pt    JIMENA (obstructive sleep apnea)     JIMENA on CPAP    Pacemaker 2012    Personal history of tobacco use, presenting hazards to health 4/2/2015    Pneumonia 07/2012    RECENTLY HOSPITALIZED    Primary insomnia 9/30/2016    Pulmonary edema cardiac cause ([facility]     Rectal bleeding     Smoking addiction     Tobacco abuse        Prior to Admission medications    Medication Sig Start Date End Date Taking? Authorizing Provider   hydrOXYzine HCl (ATARAX) 25 MG tablet  9/13/22  Yes Historical Provider, MD   gabapentin (NEURONTIN) 800 MG tablet Take 1 tablet by mouth 3 times daily for 30 days. 9/9/22 10/9/22 Yes Trupti Gallegos MD   pantoprazole (PROTONIX) 40 MG tablet TAKE 1 TABLET BY MOUTH ONCE DAILY 8/30/22  Yes Trupti Gallegos MD   ondansetron (ZOFRAN-ODT) 4 MG disintegrating tablet dissolve 1 tablet under the tongue every 8 hours if needed for NAUSEA OR VOMITING 8/19/22  Yes Trupti Gallegos MD   docusate sodium (COLACE) 100 MG capsule Take 1 capsule by mouth 2 times daily 8/17/22 9/16/22 Yes Trupti Gallegos MD   fluticasone-vilanterol (BREO ELLIPTA) 100-25 MCG/INH AEPB inhaler Inhale 1 puff into the lungs in the morning.  PLEASE ASK PATIENT TO CALL OUR OFFICE FOR AN APPOINTMENT. 7/28/22  Yes Lalit Shepherd MD   atenolol (TENORMIN) 25 MG tablet Take 1 tablet by mouth daily 7/14/22  Yes Trupti Gallegos MD   hydroCHLOROthiazide (MICROZIDE) 12.5 MG capsule Take 1 capsule by mouth every morning 7/14/22 7/9/23 Yes Kyle Chacon MD   rOPINIRole (REQUIP) 0.5 MG tablet Take 1 tablet by mouth daily 7/14/22  Yes Kyle Chacon MD   metFORMIN (GLUCOPHAGE) 500 MG tablet Take 1 tablet by mouth 2 times daily (with meals) 7/14/22  Yes Kyle Chacon MD   tiZANidine (ZANAFLEX) 2 MG tablet Take 1 tablet by mouth every 8 hours as needed (back pain) 7/14/22  Yes Kyle Chacon MD   pravastatin (PRAVACHOL) 40 MG tablet Take 1 tablet by mouth daily 7/14/22 10/7/23 Yes Kyle Chacon MD   linaclotide (LINZESS) 290 MCG CAPS capsule Take 1 capsule by mouth every morning (before breakfast) 7/14/22  Yes Kyle Chacon MD   nitroGLYCERIN (NITROSTAT) 0.4 MG SL tablet Place 1 tablet under the tongue every 5 minutes as needed for Chest pain 7/14/22  Yes Kyle Chacon MD   blood glucose test strips (ONETOUCH ULTRA) strip use 1 TEST STRIP to TEST BLOOD SUGAR four times a day if needed 7/14/22  Yes Kyle Chacon MD   potassium chloride (KLOR-CON M) 20 MEQ extended release tablet take 1 tablet by mouth once daily 5/16/22  Yes Kyle Chacon MD   vitamin D-3 (CHOLECALCIFEROL) 125 MCG (5000 UT) TABS Take 1 tablet by mouth daily 1/19/22  Yes Kyle Chacon MD   dicyclomine (BENTYL) 10 MG capsule Take 1 capsule by mouth 4 times daily (before meals and nightly) 1/18/22  Yes Kyle Chacon MD   traZODone (DESYREL) 100 MG tablet Take 1 tablet by mouth nightly 1/18/22  Yes Kyle Chacon MD   glucose monitoring (FREESTYLE FREEDOM) kit 1 kit by Does not apply route daily 12/6/21  Yes MARCIA Bundy CNP ELLIPTA 200-25 MCG/INH AEPB inhaler INHALE 2 PUFFS ONTO THE LUNGS DAILY 10/18/21  Yes Historical Provider, MD   albuterol sulfate HFA (PROAIR HFA) 108 (90 Base) MCG/ACT inhaler Inhale 2 puffs into the lungs every 6 hours as needed for Wheezing 9/17/21  Yes Caty Coffey MD   albuterol (PROVENTIL) (2.5 MG/3ML) 0.083% nebulizer solution inhale contents of 1 vial ( 3 milliliters ) in nebulizer by mouth and INTO THE LUNGS every 6 hours 4/20/21  Yes Historical Provider, MD   acetaminophen (TYLENOL) 500 MG tablet Take 1,000 mg by mouth every 6 hours as needed for Pain    Yes Historical Provider, MD   buprenorphine-naloxone (SUBOXONE) 2-0.5 MG FILM SL film Place 1.5 Film under the tongue daily. 8/12/20  Yes Historical Provider, MD   furosemide (LASIX) 20 MG tablet Take 1 tablet by mouth 2 times daily 1/18/22 6/17/22  Jeffy Andrade MD   OneTouch Delica Lancets 49I MISC Apply 1 Units topically 2 times daily USE 2 TO 3 TIMES DAILY AS DIRECTED 1/18/22 6/17/22  Jeffy Andrade MD   tiotropium (SPIRIVA RESPIMAT) 2.5 MCG/ACT AERS inhaler Inhale 2 puffs into the lungs daily 9/17/21 8/17/22  Mraisabel Hayden MD       Review of Systems    Review of Systems:  History obtained from chart review and the patient  General ROS: negative for - chills, fatigue, fever, night sweats or weight gain  Constitutional: Negative for chills, diaphoresis, fatigue, fever and unexpected weight change. Musculoskeletal: Positive for arthralgias, gait problem and joint swelling. Neurological ROS: negative for - behavioral changes, confusion, headaches or seizures. Negative for weakness and numbness. Dermatological ROS: negative for - mole changes, rash  Cardiovascular: Negative for leg swelling. Gastrointestinal: Negative for constipation, diarrhea, nausea and vomiting. Lower Extremity Physical Examination:     Vitals: There were no vitals filed for this visit. General: AAO x 3 in NAD. Dermatologic Exam:  Skin lesion/ulceration Absent . Skin No rashes or nodules noted. .       Musculoskeletal:     1st MPJ ROM decreased, Bilateral.  Muscle strength 5/5, Bilateral.  Pain present upon palpation of right foot but much improved. No edema presetnt . Medial longitudinal arch, Bilateral WNL.   Ankle ROM WNL,Bilateral.    Dorsally contracted digits absent digits 1-5 Bilateral.     Vascular: DP and PT pulses palpable 2/4, Bilateral.  CFT <3 seconds, Bilateral.  Hair growth present to the level of the digits, Bilateral.  Edema absent, Bilateral.  Varicosities absent, Bilateral. Erythema absent, Bilateral    Neurological: Sensation intact to light touch to level of digits, Bilateral.  Protective sensation intact 10/10 sites via 5.07/10g [place] Monofilament, Bilateral.  negative Tinel's, Bilateral.  negative Valleix sign, Bilateral.      Integument: Warm, dry, supple, Bilateral.  Open lesion absent, Bilateral.  Interdigital maceration absent to web spaces 1-4, Bilateral.  Nails are normal in length, thickness and color 1-5 bilateral.  Fissures absent, Bilateral.   Xrays right foot 3 views:  intact implant to the 1st met with no loosening of hardware     Asessment: Patient is a 74 y.o. female with:   1. Right foot pain        Plan: Patient examined and evaluated. Current condition and treatment options discussed in detail. Advised pt to her conditn and the xray findings. Pt to weight bear as tolerated in normal shoes wihtout restrictions. Pt start exercisiong . Verbal and written instructions given to patient. Contact office with any questions/problems/concerns. Orders Placed This Encounter   Procedures    XR FOOT RIGHT (MIN 3 VIEWS)     No orders of the defined types were placed in this encounter. All labs were reviewed and all imagining including the above findings were reviewed PRIOR to the patients arrival and with the patient today. Previous patient encounter was reviewed. Encounters from the patients other medical providers were reviewed and noted. Time was spent educating the patient and their families/caregivers on proper care of the feet and ankles. All the above diagnosis were addressed at todays visit and all questions were answered. A total of 30 minutes was spent with this patients encounter which included charting after the patients visit     RTC in PRN. 9/15/2022      Electronically signed by Ying Chan DPM on 9/15/2022 at 10:27 AM  9/15/2022

## 2022-09-16 ENCOUNTER — TELEPHONE (OUTPATIENT)
Dept: FAMILY MEDICINE CLINIC | Age: 67
End: 2022-09-16

## 2022-09-16 NOTE — LETTER
[facility]  [address] [address]  Phone: [phone]  Fax: [phone]    Jacky Gomez MD        2022    Mindy Bonilla  : 59         My patient listed above would benefit from a nurse coming out 6 times a week for four hours a day. If you have any questions or concerns, please don't hesitate to call.     Sincerely,        Jacky Gomez MD

## 2022-09-16 NOTE — TELEPHONE ENCOUNTER
Right now her home health comes out 4x a week for 4 hours a day she wants to know if you can write a note for them to come out 6 times a week for four hours she is having to use her oxygen more and not able to get things done please advise

## 2022-09-19 ENCOUNTER — TELEPHONE (OUTPATIENT)
Dept: FAMILY MEDICINE CLINIC | Age: 67
End: 2022-09-19

## 2022-09-19 DIAGNOSIS — I50.32 CHRONIC DIASTOLIC CONGESTIVE HEART FAILURE (HCC): ICD-10-CM

## 2022-09-19 RX ORDER — POTASSIUM CHLORIDE 20 MEQ/1
TABLET, EXTENDED RELEASE ORAL
Qty: 60 TABLET | Refills: 1 | Status: SHIPPED | OUTPATIENT
Start: 2022-09-19

## 2022-09-19 NOTE — TELEPHONE ENCOUNTER
Hayde Byrd is calling to request a refill on the following medication(s):    Medication Request:  Requested Prescriptions     Pending Prescriptions Disp Refills    potassium chloride (KLOR-CON M) 20 MEQ extended release tablet [Pharmacy Med Name: POTASSIUM CL ER 20 MEQ TABLET] 60 tablet 1     Sig: take 1 tablet by mouth once daily       Last Visit Date (If Applicable):  6/84/4324    Next Visit Date:    11/17/2022

## 2022-09-19 NOTE — TELEPHONE ENCOUNTER
Patient called  stating  that her letter should  state that she needs a nurse or aid 24 hrs a week and fax to   fax number   [phone]

## 2022-10-11 ENCOUNTER — OFFICE VISIT (OUTPATIENT)
Dept: PULMONOLOGY | Age: 67
End: 2022-10-11
Payer: MEDICARE

## 2022-10-11 ENCOUNTER — TELEPHONE (OUTPATIENT)
Dept: PULMONOLOGY | Age: 67
End: 2022-10-11

## 2022-10-11 VITALS
HEART RATE: 87 BPM | SYSTOLIC BLOOD PRESSURE: 116 MMHG | HEIGHT: 72 IN | OXYGEN SATURATION: 98 % | DIASTOLIC BLOOD PRESSURE: 71 MMHG | BODY MASS INDEX: 28.71 KG/M2 | RESPIRATION RATE: 16 BRPM | WEIGHT: 212 LBS

## 2022-10-11 DIAGNOSIS — I10 ESSENTIAL HYPERTENSION: ICD-10-CM

## 2022-10-11 DIAGNOSIS — J41.1 MUCOPURULENT CHRONIC BRONCHITIS (HCC): Primary | ICD-10-CM

## 2022-10-11 DIAGNOSIS — G47.33 OSA (OBSTRUCTIVE SLEEP APNEA): ICD-10-CM

## 2022-10-11 DIAGNOSIS — F17.210 CIGARETTE NICOTINE DEPENDENCE WITHOUT COMPLICATION: ICD-10-CM

## 2022-10-11 DIAGNOSIS — E11.8 TYPE 2 DIABETES MELLITUS WITH COMPLICATION (HCC): ICD-10-CM

## 2022-10-11 PROCEDURE — 1123F ACP DISCUSS/DSCN MKR DOCD: CPT | Performed by: INTERNAL MEDICINE

## 2022-10-11 PROCEDURE — 3044F HG A1C LEVEL LT 7.0%: CPT | Performed by: INTERNAL MEDICINE

## 2022-10-11 PROCEDURE — 99214 OFFICE O/P EST MOD 30 MIN: CPT | Performed by: INTERNAL MEDICINE

## 2022-10-11 ASSESSMENT — SLEEP AND FATIGUE QUESTIONNAIRES
ESS TOTAL SCORE: 13
HOW LIKELY ARE YOU TO NOD OFF OR FALL ASLEEP WHILE SITTING INACTIVE IN A PUBLIC PLACE: 2
HOW LIKELY ARE YOU TO NOD OFF OR FALL ASLEEP WHILE SITTING QUIETLY AFTER LUNCH WITHOUT ALCOHOL: 1
HOW LIKELY ARE YOU TO NOD OFF OR FALL ASLEEP WHILE LYING DOWN TO REST IN THE AFTERNOON WHEN CIRCUMSTANCES PERMIT: 2
HOW LIKELY ARE YOU TO NOD OFF OR FALL ASLEEP WHEN YOU ARE A PASSENGER IN A CAR FOR AN HOUR WITHOUT A BREAK: 2
HOW LIKELY ARE YOU TO NOD OFF OR FALL ASLEEP IN A CAR, WHILE STOPPED FOR A FEW MINUTES IN TRAFFIC: 1
HOW LIKELY ARE YOU TO NOD OFF OR FALL ASLEEP WHILE SITTING AND READING: 2
HOW LIKELY ARE YOU TO NOD OFF OR FALL ASLEEP WHILE WATCHING TV: 2
HOW LIKELY ARE YOU TO NOD OFF OR FALL ASLEEP WHILE SITTING AND TALKING TO SOMEONE: 1

## 2022-10-11 NOTE — PROGRESS NOTES
throat  Cardiovascular ROS: no chest pain , orthopnea or pnd   Gastrointestinal ROS: no abdominal pain, change in bowel habits, or black or bloody stools  Skin - no rash   Neuro - no blurry vision , no loc . No focal weakness   msk - no jt tenderness or swelling    Vascular - no claudication , rest completed and negative   Lymphatic - complete and negative   Hematology - oncology - complete and negative   Allergy immunology - complete and negative    no burning or hematuria       LUNG CANCER SCREENING     CRITERIA MET    [x]     CT ORDERED  [x]      CRITERIA NOT MET   []      REFUSED                    [x]        REASON CRITERIA NOT MET     SMOKING LESS THAN 30 PY  []      AGE LESS THAN 55 or GREATER 77 YEARS  []      QUIT SMOKING 15 YEARS OR GREATER   []      RECENT CT WITH IN 11 MONTHS    []      LIFE EXPECTANCY < 5 YEARS   []      SIGNS  AND SYMPTOMS OF LUNG CANCER   []           Immunization   Immunization History   Administered Date(s) Administered    Influenza Virus Vaccine 01/22/2014, 10/22/2015, 08/15/2016, 08/10/2017, 12/29/2017, 09/17/2018, 08/13/2019, 08/13/2019    Influenza Whole 07/30/2016    Influenza, AFLURIA (age 1 yrs+), FLUZONE, (age 10 mo+), MDV, 0.5mL 09/17/2018, 08/13/2019    Influenza, FLUARIX, FLULAVAL, FLUZONE (age 10 mo+) AND AFLURIA, (age 1 y+), PF, 0.5mL 08/15/2016, 08/10/2017, 12/29/2017, 08/16/2019    PPD Test 11/30/2020, 12/08/2020    Pneumococcal Conjugate 13-valent ([id number]) 08/15/2016    Pneumococcal Polysaccharide ([id number]) 10/22/2015, 12/29/2017    Td, unspecified formulation 07/30/2016    Tdap (Boostrix, Adacel) 09/30/2016        Pneumococcal Vaccine she did not take her flu shot this year.     [x] Up to date    [] Indicated   [] Refused  [] Contraindicated       Influenza Vaccine   [x] Up to date    [] Indicated   [] Refused  [] Contraindicated     He is to take Covid vaccine  PAST MEDICAL HISTORY:         Diagnosis Date    RACHEL (acute kidney injury) ([facility] [address] 01/22/2014 Arthritis     generalized    Bilateral chronic knee pain 5/13/2016    Bronchiectasis without complication ([address]     Cancer ([address] 2004    uterus    CHF (congestive heart failure) (HCC)     Chronic bilateral low back pain with right-sided sciatica 02/20/2017    Chronic bronchitis (HCC)     Chronic bronchitis (HCC)     Chronic respiratory failure with hypoxia (HCC)     Cigarette nicotine dependence without complication 4/81/7944    COPD (chronic obstructive pulmonary disease) (HCC)     on inhaler /  COPD with chronic bronchitis and emphysema    Current smoker on some days     Depression 10/30/2014    Diabetic polyneuropathy associated with type 2 diabetes mellitus ([address]     Diverticulosis     Essential hypertension 12/30/2013    Full dentures     GERD (gastroesophageal reflux disease)     Hep C w/o coma, chronic (HCC)     Hyperlipidemia     Hyperplastic polyp of intestine     Hypertension     DR. MCDANIEL-CARDIO    Irritable bowel syndrome with both constipation and diarrhea 2/15/2019    Liver disease     hepatitis C    Moderate episode of recurrent major depressive disorder ([address] 10/30/2014    Nasal polyposis     Noncompliance with CPAP treatment     Obesity (BMI 35.0-39.9 without comorbidity)     On home O2     2 liters PRN per pt    JIMENA (obstructive sleep apnea)     JIMENA on CPAP    Pacemaker 2012    Personal history of tobacco use, presenting hazards to health 4/2/2015    Pneumonia 07/2012    RECENTLY HOSPITALIZED    Primary insomnia 9/30/2016    Pulmonary edema cardiac cause ([address]     Rectal bleeding     Smoking addiction     Tobacco abuse        Family History:       Problem Relation Age of Onset    Cancer Mother         lungs and brain    Stroke Father     Crohn's Disease Sister        SURGICAL HISTORY:   Past Surgical History:   Procedure Laterality Date    ARTHRODESIS Right 5/20/2022    RIGHT FOOT HARDWARE REMOVAL    RIGHT 1ST MTP BUNION IMPLANT REVISION    Fastmobile performed by Aida [facility] at [address] [facility].  2008    BUNIONECTOMY Right 4/30/2021    RIGHT FOOT 1ST MPJ ARTHROPLASTY WITH EXTENSOR HALLUCIS LONGUS LENGTHENING performed by Enmanuel Bragg DPM at [address] [place] [address] dec. 2013    COLON SURGERY      COLONOSCOPY      COLONOSCOPY  01/23/2015    severe diverticula    COLONOSCOPY  09/20/2016    HYPERPLASTIC POLYP X 2     COLONOSCOPY N/A 03/14/2019    COLONOSCOPY DIAGNOSTIC performed by Macho Austin MD at [facility]    COLONOSCOPY N/A 01/09/2020    COLONOSCOPY WITH BIOPSY performed by Macho Austin MD at [facility], COLON, [address] [facility] (CERVIX STATUS UNKNOWN)      LUMBAR SPINE SURGERY  09/24/2013    decompression & re-exploration L3-4, L4-5    PACEMAKER INSERTION      for very slow heart beat (per patient)    PACEMAKER PLACEMENT      SIGMOIDOSCOPY N/A 06/26/2015    flexable sigmoidscopy,HYPERPLASTIC POLYP    TONSILLECTOMY      UPPER GASTROINTESTINAL ENDOSCOPY N/A 03/14/2019    EGD BIOPSY performed by Macho Austin MD at [facility]              Not in a hospital admission. Allergies   Allergen Reactions    Iv Dye [Iodides] Hives     Social History     Tobacco Use   Smoking Status Every Day    Packs/day: 1.00    Years: 53.00    Pack years: 53.00    Types: Cigarettes    Start date: 1969   Smokeless Tobacco Never     Prior to Admission medications    Medication Sig Start Date End Date Taking?  Authorizing Provider   potassium chloride (KLOR-CON M) 20 MEQ extended release tablet take 1 tablet by mouth once daily 9/19/22  Yes Yennifer Awan MD   hydrOXYzine HCl (ATARAX) 25 MG tablet  9/13/22  Yes Historical Provider, MD   pantoprazole (PROTONIX) 40 MG tablet TAKE 1 TABLET BY MOUTH ONCE DAILY 8/30/22  Yes Yennifer Awan MD   ondansetron (ZOFRAN-ODT) 4 MG disintegrating tablet dissolve 1 tablet under the tongue every 8 hours if needed for NAUSEA OR VOMITING 8/19/22  Yes Katarina Richardson MD Dorina   fluticasone-vilanterol (BREO ELLIPTA) 100-25 MCG/INH AEPB inhaler Inhale 1 puff into the lungs in the morning.  PLEASE ASK PATIENT TO CALL OUR OFFICE FOR AN APPOINTMENT. 7/28/22  Yes Jose Cast MD   atenolol (TENORMIN) 25 MG tablet Take 1 tablet by mouth daily 7/14/22  Yes Kai Guadalupe MD   hydroCHLOROthiazide (MICROZIDE) 12.5 MG capsule Take 1 capsule by mouth every morning 7/14/22 7/9/23 Yes Kai Guadalupe MD   rOPINIRole (REQUIP) 0.5 MG tablet Take 1 tablet by mouth daily 7/14/22  Yes Kai Guadalupe MD   metFORMIN (GLUCOPHAGE) 500 MG tablet Take 1 tablet by mouth 2 times daily (with meals) 7/14/22  Yes Kai Guadalupe MD   tiZANidine (ZANAFLEX) 2 MG tablet Take 1 tablet by mouth every 8 hours as needed (back pain) 7/14/22  Yes Kai Guadalupe MD   pravastatin (PRAVACHOL) 40 MG tablet Take 1 tablet by mouth daily 7/14/22 10/7/23 Yes Kai Guadalupe MD   linaclotide (LINZESS) 290 MCG CAPS capsule Take 1 capsule by mouth every morning (before breakfast) 7/14/22  Yes Kai Guadalupe MD   nitroGLYCERIN (NITROSTAT) 0.4 MG SL tablet Place 1 tablet under the tongue every 5 minutes as needed for Chest pain 7/14/22  Yes Kai Guadalupe MD   blood glucose test strips (ONETOUCH ULTRA) strip use 1 TEST STRIP to TEST BLOOD SUGAR four times a day if needed 7/14/22  Yes Kai Guadalupe MD   vitamin D-3 (CHOLECALCIFEROL) 125 MCG (5000 UT) TABS Take 1 tablet by mouth daily 1/19/22  Yes Kai Guadalupe MD   dicyclomine (BENTYL) 10 MG capsule Take 1 capsule by mouth 4 times daily (before meals and nightly) 1/18/22  Yes Kai Guadalupe MD   traZODone (DESYREL) 100 MG tablet Take 1 tablet by mouth nightly 1/18/22  Yes Kai Guadalupe MD   glucose monitoring (FREESTYLE FREEDOM) kit 1 kit by Does not apply route daily 12/6/21  Yes MARCIA Aaron NAHID   BREO ELLIPTA 200-25 MCG/INH AEPB inhaler INHALE 2 PUFFS ONTO THE LUNGS DAILY 10/18/21  Yes Historical Provider, MD   albuterol sulfate HFA (PROAIR HFA) 108 (90 Base) MCG/ACT inhaler Inhale 2 puffs into the lungs every 6 hours as needed for Wheezing 9/17/21  Yes Kalpesh Brian MD   albuterol (PROVENTIL) (2.5 MG/3ML) 0.083% nebulizer solution inhale contents of 1 vial ( 3 milliliters ) in nebulizer by mouth and INTO THE LUNGS every 6 hours 4/20/21  Yes Historical Provider, MD   acetaminophen (TYLENOL) 500 MG tablet Take 1,000 mg by mouth every 6 hours as needed for Pain    Yes Historical Provider, MD   buprenorphine-naloxone (SUBOXONE) 2-0.5 MG FILM SL film Place 1.5 Film under the tongue daily. 8/12/20  Yes Historical Provider, MD   gabapentin (NEURONTIN) 800 MG tablet Take 1 tablet by mouth 3 times daily for 30 days. 9/9/22 10/9/22  Aram Patricia MD   furosemide (LASIX) 20 MG tablet Take 1 tablet by mouth 2 times daily 1/18/22 6/17/22  Aram Patricia MD   OneTouch Delica Lancets 46Z MISC Apply 1 Units topically 2 times daily USE 2 TO 3 TIMES DAILY AS DIRECTED 1/18/22 6/17/22  Aram Patricia MD   tiotropium (SPIRIVA RESPIMAT) 2.5 MCG/ACT AERS inhaler Inhale 2 puffs into the lungs daily 9/17/21 8/17/22  Kalpesh Brian MD         Physical Exam  General Appearance:    Alert, cooperative, no distress, appears stated age, morbid obesity, no distress, not using accessory muscles. Very pleasant obese not excessively using accessory muscles no fever no distress   Head:    Normocephalic, without obvious abnormality, atraumatic   Eye examination revealed no jaundice. No Diego's syndrome. Nose revealed no polyps. No sinus tenderness. Throat examination unremarkable. Ear examination is unremarkable.                 :    Neck:   Supple, symmetrical, trachea midline, no adenopathy;     thyroid:  no enlargement/tenderness/nodules; no carotid    bruit or JVD none. Hepatojugular reflux is not elevated neck is short and fat   Back:     Symmetric, no curvature, ROM normal, no CVA tenderness   Lungs:       Lv is increased. Percussion note is hyperresonant. Expiration is prolonged.   No rales or rhonchi are audible. No pleural friction rub is audible. Chest Wall:    No tenderness or deformity significant tenderness over the anterior chest especially on the second left intercostal space and intercostal cartilages. No pericardial friction is audible no pleural pericardial rub is audible. No gallops are audible. Heart:    Regular rate and rhythm, S1 and S2 normal, no murmur, rub        or gallop no rvh murmurs or gallops no pericardial friction rub                          Abdomen:                                                 Pulses:                                            Lymph nodes:                    Neurologic:                  Soft, non-tender, bowel sounds active all four quadrants,     no masses, no organomegaly         2+ and symmetric all extremities            Cervical, supraclavicular not enlarged or matted or tender      CNII-XII intact, normal strength 5/5 . Sensation grossly normal  and reflexes normal 2+  throughout     Clubbing No  Lower ext edema No1+   [] , 2 +  [] , 3+   []  Upper ext edema No       Musculoskeletal - no joint swelling or tenderness or synovitis               /71 (Site: Left Upper Arm)   Pulse 87   Resp 16   Ht 6' (1.829 m)   Wt 212 lb (96.2 kg)   SpO2 98% Comment: room air at rest  BMI 28.75 kg/m²     CXR no recent chest x-ray      CT Scans  No new CT scan    Echo  No recent echo        Assessment  COPD  Persistent smoking  Sleep apnea syndrome not using CPAP  Obesity  Hypertension  Diabetes      Plan:  COPD is under good control no evidence of acute exacerbation of COPD. I emphasized the need for her to give up smoking altogether. I advised her to get COVID and flu vaccines. She will continue same treatment as before I did not feel the need to give any steroids or antibiotic at this time. Blood sugar is stable    Blood pressure has been stable    He has not experienced any him on blood or any purulent sputum.     Her weight has been stable. She has sleep apnea but refuses to use a CPAP machine she has significant daytime symptoms related to sleep apnea    Dictated by Dr. Sandy Javed MD dictation over thank you          .

## 2022-10-11 NOTE — TELEPHONE ENCOUNTER
Patient is on oxygen and nebulizer at home. Isnt using her CPaP machine, is intolerant. Needs to keep electricity on. Called [facility], [place] #[phone] and provided medical necessity for electricity. Spoke to Jessie Doyle. Hold was applied. They are going to email the form to me. The patient WILL need to call them first next time though AFTER she receives approval from our office.

## 2022-11-17 ENCOUNTER — OFFICE VISIT (OUTPATIENT)
Dept: FAMILY MEDICINE CLINIC | Age: 67
End: 2022-11-17
Payer: MEDICARE

## 2022-11-17 VITALS
TEMPERATURE: 97.1 F | WEIGHT: 211.6 LBS | BODY MASS INDEX: 28.66 KG/M2 | HEIGHT: 72 IN | SYSTOLIC BLOOD PRESSURE: 124 MMHG | DIASTOLIC BLOOD PRESSURE: 72 MMHG | OXYGEN SATURATION: 94 % | HEART RATE: 78 BPM

## 2022-11-17 DIAGNOSIS — I10 ESSENTIAL HYPERTENSION: Primary | ICD-10-CM

## 2022-11-17 DIAGNOSIS — G47.33 OBSTRUCTIVE SLEEP APNEA SYNDROME: ICD-10-CM

## 2022-11-17 DIAGNOSIS — R05.9 COUGH, UNSPECIFIED TYPE: ICD-10-CM

## 2022-11-17 DIAGNOSIS — B18.2 HEP C W/O COMA, CHRONIC (HCC): ICD-10-CM

## 2022-11-17 DIAGNOSIS — Z78.0 POSTMENOPAUSAL: ICD-10-CM

## 2022-11-17 DIAGNOSIS — I65.23 BILATERAL CAROTID ARTERY STENOSIS: ICD-10-CM

## 2022-11-17 PROCEDURE — G0010 ADMIN HEPATITIS B VACCINE: HCPCS | Performed by: STUDENT IN AN ORGANIZED HEALTH CARE EDUCATION/TRAINING PROGRAM

## 2022-11-17 PROCEDURE — 90746 HEPB VACCINE 3 DOSE ADULT IM: CPT | Performed by: STUDENT IN AN ORGANIZED HEALTH CARE EDUCATION/TRAINING PROGRAM

## 2022-11-17 PROCEDURE — 3074F SYST BP LT 130 MM HG: CPT | Performed by: STUDENT IN AN ORGANIZED HEALTH CARE EDUCATION/TRAINING PROGRAM

## 2022-11-17 PROCEDURE — 1123F ACP DISCUSS/DSCN MKR DOCD: CPT | Performed by: STUDENT IN AN ORGANIZED HEALTH CARE EDUCATION/TRAINING PROGRAM

## 2022-11-17 PROCEDURE — 3078F DIAST BP <80 MM HG: CPT | Performed by: STUDENT IN AN ORGANIZED HEALTH CARE EDUCATION/TRAINING PROGRAM

## 2022-11-17 PROCEDURE — 90632 HEPA VACCINE ADULT IM: CPT | Performed by: STUDENT IN AN ORGANIZED HEALTH CARE EDUCATION/TRAINING PROGRAM

## 2022-11-17 PROCEDURE — 99214 OFFICE O/P EST MOD 30 MIN: CPT | Performed by: STUDENT IN AN ORGANIZED HEALTH CARE EDUCATION/TRAINING PROGRAM

## 2022-11-17 PROCEDURE — 90471 IMMUNIZATION ADMIN: CPT | Performed by: STUDENT IN AN ORGANIZED HEALTH CARE EDUCATION/TRAINING PROGRAM

## 2022-11-17 RX ORDER — GUAIFENESIN/DEXTROMETHORPHAN 100-10MG/5
5 SYRUP ORAL 3 TIMES DAILY PRN
Qty: 120 ML | Refills: 0 | Status: SHIPPED | OUTPATIENT
Start: 2022-11-17 | End: 2022-11-27

## 2022-11-17 RX ORDER — LINACLOTIDE 145 UG/1
CAPSULE, GELATIN COATED ORAL
COMMUNITY
Start: 2022-10-04

## 2022-11-17 RX ORDER — BUPRENORPHINE AND NALOXONE 8; 2 MG/1; MG/1
FILM, SOLUBLE BUCCAL; SUBLINGUAL
COMMUNITY
Start: 2022-11-14

## 2022-11-17 SDOH — ECONOMIC STABILITY: FOOD INSECURITY: WITHIN THE PAST 12 MONTHS, THE FOOD YOU BOUGHT JUST DIDN'T LAST AND YOU DIDN'T HAVE MONEY TO GET MORE.: NEVER TRUE

## 2022-11-17 SDOH — ECONOMIC STABILITY: FOOD INSECURITY: WITHIN THE PAST 12 MONTHS, YOU WORRIED THAT YOUR FOOD WOULD RUN OUT BEFORE YOU GOT MONEY TO BUY MORE.: NEVER TRUE

## 2022-11-17 ASSESSMENT — ENCOUNTER SYMPTOMS
ABDOMINAL PAIN: 0
COUGH: 0
SHORTNESS OF BREATH: 0
DIARRHEA: 0
NAUSEA: 0
WHEEZING: 0
CONSTIPATION: 0
CHEST TIGHTNESS: 0
EYE DISCHARGE: 0
SORE THROAT: 0
VOMITING: 0

## 2022-11-17 ASSESSMENT — PATIENT HEALTH QUESTIONNAIRE - PHQ9
10. IF YOU CHECKED OFF ANY PROBLEMS, HOW DIFFICULT HAVE THESE PROBLEMS MADE IT FOR YOU TO DO YOUR WORK, TAKE CARE OF THINGS AT HOME, OR GET ALONG WITH OTHER PEOPLE: 1
3. TROUBLE FALLING OR STAYING ASLEEP: 3
SUM OF ALL RESPONSES TO PHQ QUESTIONS 1-9: 10
9. THOUGHTS THAT YOU WOULD BE BETTER OFF DEAD, OR OF HURTING YOURSELF: 0
5. POOR APPETITE OR OVEREATING: 0
SUM OF ALL RESPONSES TO PHQ QUESTIONS 1-9: 10
SUM OF ALL RESPONSES TO PHQ QUESTIONS 1-9: 10
4. FEELING TIRED OR HAVING LITTLE ENERGY: 3
SUM OF ALL RESPONSES TO PHQ QUESTIONS 1-9: 10
SUM OF ALL RESPONSES TO PHQ9 QUESTIONS 1 & 2: 4
6. FEELING BAD ABOUT YOURSELF - OR THAT YOU ARE A FAILURE OR HAVE LET YOURSELF OR YOUR FAMILY DOWN: 0
2. FEELING DOWN, DEPRESSED OR HOPELESS: 2
1. LITTLE INTEREST OR PLEASURE IN DOING THINGS: 2
7. TROUBLE CONCENTRATING ON THINGS, SUCH AS READING THE NEWSPAPER OR WATCHING TELEVISION: 0
8. MOVING OR SPEAKING SO SLOWLY THAT OTHER PEOPLE COULD HAVE NOTICED. OR THE OPPOSITE, BEING SO FIGETY OR RESTLESS THAT YOU HAVE BEEN MOVING AROUND A LOT MORE THAN USUAL: 0

## 2022-11-17 ASSESSMENT — SOCIAL DETERMINANTS OF HEALTH (SDOH): HOW HARD IS IT FOR YOU TO PAY FOR THE VERY BASICS LIKE FOOD, HOUSING, MEDICAL CARE, AND HEATING?: NOT HARD AT ALL

## 2022-11-17 NOTE — PROGRESS NOTES
[address] PRIMARY CARE  [address] [place] [address] [address] [place] [address]  Dept: [phone]  Dept Fax: [phone]    Louise Rubi is a 74 y.o. female who is a Established patient, who presents today for her medical conditions/complaints as noted below:  Chief Complaint   Patient presents with    Medication Check    Hypertension         HPI:     She is here today to follow-up on hypertension. She says that her blood pressures have been fluctuating a lot lately. She says that sometimes they are as low as 60/40 and then they jump back to 160s. She says that she frequently feels lightheaded and dizzy as well. Says that she has a history of carotid artery stenosis that was found several years ago. She follows with cardiology regularly and her last echocardiogram done in 2018 shows moderate aortic regurgitation. She states that she has a visit coming up with cardiology soon. She also reports having sleep difficulty with multiple awakenings and daytime sleepiness. She also complains of having persistent cough for past few weeks that keeps her up at night. Hemoglobin A1C (%)   Date Value   08/17/2022 5.8   05/09/2022 6.2 (H)   01/18/2022 6.8             ( goal A1Cis < 7)   Microalb/Crt.  Ratio (mcg/mg creat)   Date Value   01/18/2022 Can not be calculated     LDL Cholesterol (mg/dL)   Date Value   01/18/2022 79   02/05/2020 81   04/23/2019 88       (goal LDL is <100)   AST (U/L)   Date Value   09/12/2022 14     ALT (U/L)   Date Value   09/12/2022 9     BUN (mg/dL)   Date Value   09/12/2022 13     BP Readings from Last 3 Encounters:   11/17/22 124/72   10/11/22 116/71   08/17/22 105/68          (goal 120/80)    Past Medical History:   Diagnosis Date    RACHEL (acute kidney injury) ([address] 01/22/2014    Arthritis     generalized    Bilateral chronic knee pain 5/13/2016    Bronchiectasis without complication ([address]     Cancer ([address] 2004    uterus    CHF (congestive heart failure) ([address] Chronic bilateral low back pain with right-sided sciatica 02/20/2017    Chronic bronchitis (HCC)     Chronic bronchitis (HCC)     Chronic respiratory failure with hypoxia (HCC)     Cigarette nicotine dependence without complication 0/81/6709    COPD (chronic obstructive pulmonary disease) (HCC)     on inhaler /  COPD with chronic bronchitis and emphysema    Current smoker on some days     Depression 10/30/2014    Diabetic polyneuropathy associated with type 2 diabetes mellitus ([address]     Diverticulosis     Essential hypertension 12/30/2013    Full dentures     GERD (gastroesophageal reflux disease)     Hep C w/o coma, chronic (HCC)     Hyperlipidemia     Hyperplastic polyp of intestine     Hypertension     DR. MCDANIEL-CARDIO    Irritable bowel syndrome with both constipation and diarrhea 2/15/2019    Liver disease     hepatitis C    Moderate episode of recurrent major depressive disorder ([facility] 10/30/2014    Nasal polyposis     Noncompliance with CPAP treatment     Obesity (BMI 35.0-39.9 without comorbidity)     On home O2     2 liters PRN per pt    JIMENA (obstructive sleep apnea)     JIMENA on CPAP    Pacemaker 2012    Personal history of tobacco use, presenting hazards to health 4/2/2015    Pneumonia 07/2012    RECENTLY HOSPITALIZED    Primary insomnia 9/30/2016    Pulmonary edema cardiac cause [facility])     Rectal bleeding     Smoking addiction     Tobacco abuse       Past Surgical History:   Procedure Laterality Date    ARTHRODESIS Right 5/20/2022    RIGHT FOOT HARDWARE REMOVAL    RIGHT 1ST MTP BUNION IMPLANT REVISION    NILAM    [facility] performed by [address] [place] [address] ROSELIA Martínez at [facility].  2008    BUNIONECTOMY Right 4/30/2021    RIGHT FOOT 1ST MPJ ARTHROPLASTY WITH EXTENSOR HALLUCIS LONGUS LENGTHENING performed by [address] [place] [address] Avenue, DPM at [facility] dec. 2013    COLON SURGERY      COLONOSCOPY      COLONOSCOPY  01/23/2015    severe diverticula    COLONOSCOPY  09/20/2016 HYPERPLASTIC POLYP X 2     COLONOSCOPY N/A 03/14/2019    COLONOSCOPY DIAGNOSTIC performed by Chia Barcenas MD at [facility]    COLONOSCOPY N/A 01/09/2020    COLONOSCOPY WITH BIOPSY performed by Chai Barcenas MD at [address] [place] [address], [address] [place] [address] (CERVIX STATUS UNKNOWN)      LUMBAR SPINE SURGERY  09/24/2013    decompression & re-exploration L3-4, L4-5    PACEMAKER INSERTION      for very slow heart beat (per patient)    PACEMAKER PLACEMENT      SIGMOIDOSCOPY N/A 06/26/2015    flexable sigmoidscopy,HYPERPLASTIC POLYP    TONSILLECTOMY      UPPER GASTROINTESTINAL ENDOSCOPY N/A 03/14/2019    EGD BIOPSY performed by Chai Barcenas MD at [facility]       Family History   Problem Relation Age of Onset    Cancer Mother         lungs and brain    Stroke Father     Crohn's Disease Sister        Social History     Tobacco Use    Smoking status: Every Day     Packs/day: 1.00     Years: 53.00     Pack years: 53.00     Types: Cigarettes     Start date: 1969    Smokeless tobacco: Never   Substance Use Topics    Alcohol use: No     Alcohol/week: 0.0 standard drinks      Current Outpatient Medications   Medication Sig Dispense Refill    buprenorphine-naloxone (SUBOXONE) 8-2 MG FILM SL film dissolve 1/2 FILM under the tongue twice a day      sertraline (ZOLOFT) 50 MG tablet TAKE 1/2 A TABLET BY MOUTH AT NIGHT FOR 3 NIGHTS AND INCREASE TO 1 TABLET AT NIGHT      LINZESS 145 MCG capsule take 1 capsule by mouth once daily      guaiFENesin-dextromethorphan (ROBITUSSIN DM) 100-10 MG/5ML syrup Take 5 mLs by mouth 3 times daily as needed for Cough 120 mL 0    potassium chloride (KLOR-CON M) 20 MEQ extended release tablet take 1 tablet by mouth once daily 60 tablet 1    hydrOXYzine HCl (ATARAX) 25 MG tablet       gabapentin (NEURONTIN) 800 MG tablet Take 1 tablet by mouth 3 times daily for 30 days.  90 tablet 5    pantoprazole (PROTONIX) 40 MG tablet TAKE 1 TABLET BY MOUTH ONCE DAILY 30 tablet 3    ondansetron (ZOFRAN-ODT) 4 MG disintegrating tablet dissolve 1 tablet under the tongue every 8 hours if needed for NAUSEA OR VOMITING 10 tablet 1    fluticasone-vilanterol (BREO ELLIPTA) 100-25 MCG/INH AEPB inhaler Inhale 1 puff into the lungs in the morning.  PLEASE ASK PATIENT TO CALL OUR OFFICE FOR AN APPOINTMENT. 1 each 5    atenolol (TENORMIN) 25 MG tablet Take 1 tablet by mouth daily 90 tablet 1    rOPINIRole (REQUIP) 0.5 MG tablet Take 1 tablet by mouth daily 90 tablet 3    metFORMIN (GLUCOPHAGE) 500 MG tablet Take 1 tablet by mouth 2 times daily (with meals) 180 tablet 0    tiZANidine (ZANAFLEX) 2 MG tablet Take 1 tablet by mouth every 8 hours as needed (back pain) 90 tablet 3    pravastatin (PRAVACHOL) 40 MG tablet Take 1 tablet by mouth daily 90 tablet 4    nitroGLYCERIN (NITROSTAT) 0.4 MG SL tablet Place 1 tablet under the tongue every 5 minutes as needed for Chest pain 25 tablet 11    blood glucose test strips (ONETOUCH ULTRA) strip use 1 TEST STRIP to TEST BLOOD SUGAR four times a day if needed 200 strip 11    vitamin D-3 (CHOLECALCIFEROL) 125 MCG (5000 UT) TABS Take 1 tablet by mouth daily 30 tablet 3    dicyclomine (BENTYL) 10 MG capsule Take 1 capsule by mouth 4 times daily (before meals and nightly) 120 capsule 11    furosemide (LASIX) 20 MG tablet Take 1 tablet by mouth 2 times daily 60 tablet 4    OneTouch Delica Lancets 56E MISC Apply 1 Units topically 2 times daily USE 2 TO 3 TIMES DAILY AS DIRECTED 60 each 4    traZODone (DESYREL) 100 MG tablet Take 1 tablet by mouth nightly 90 tablet 3    glucose monitoring (FREESTYLE FREEDOM) kit 1 kit by Does not apply route daily 1 kit 0    BREO ELLIPTA 200-25 MCG/INH AEPB inhaler INHALE 2 PUFFS ONTO THE LUNGS DAILY      tiotropium (SPIRIVA RESPIMAT) 2.5 MCG/ACT AERS inhaler Inhale 2 puffs into the lungs daily 1 each 3    albuterol sulfate HFA (PROAIR HFA) 108 (90 Base) MCG/ACT inhaler Inhale 2 puffs into the lungs every 6 hours as needed for Wheezing 18 g 3    albuterol (PROVENTIL) (2.5 MG/3ML) 0.083% nebulizer solution inhale contents of 1 vial ( 3 milliliters ) in nebulizer by mouth and INTO THE LUNGS every 6 hours      acetaminophen (TYLENOL) 500 MG tablet Take 1,000 mg by mouth every 6 hours as needed for Pain       buprenorphine-naloxone (SUBOXONE) 2-0.5 MG FILM SL film Place 1.5 Film under the tongue daily. linaclotide (LINZESS) 290 MCG CAPS capsule Take 1 capsule by mouth every morning (before breakfast) (Patient not taking: Reported on 11/17/2022) 30 capsule 11     No current facility-administered medications for this visit. Allergies   Allergen Reactions    Iv Dye [Iodides] Hives       Health Maintenance   Topic Date Due    COVID-19 Vaccine (1) Never done    Shingles vaccine (1 of 2) Never done    DEXA (modify frequency per FRAX score)  Never done    Diabetic retinal exam  09/19/2021    Flu vaccine (1) 11/17/2023 (Originally 8/1/2022)    Hepatitis B vaccine (2 of 3 - Risk 3-dose series) 12/15/2022    Breast cancer screen  01/08/2023    Colorectal Cancer Screen  01/09/2023    Diabetic microalbuminuria test  01/18/2023    Lipids  01/18/2023    Low dose CT lung screening  03/04/2023    Diabetic foot exam  04/26/2023    Hepatitis A vaccine (2 of 2 - Risk 2-dose series) 05/17/2023    A1C test (Diabetic or Prediabetic)  08/17/2023    Depression Monitoring  08/17/2023    Annual Wellness Visit (AWV)  08/18/2023    DTaP/Tdap/Td vaccine (3 - Td or Tdap) 06/16/2031    Pneumococcal 65+ years Vaccine  Completed    HIV screen  Completed    Hib vaccine  Aged Out    Meningococcal (ACWY) vaccine  Aged Out       Subjective:     Review of Systems   Constitutional:  Positive for fatigue. Negative for appetite change and fever. HENT:  Negative for congestion, ear pain, hearing loss and sore throat. Eyes:  Negative for discharge and visual disturbance.    Respiratory:  Negative for cough, chest tightness, shortness of breath and wheezing. Cardiovascular:  Negative for chest pain, palpitations and leg swelling. Gastrointestinal:  Negative for abdominal pain, constipation, diarrhea, nausea and vomiting. Genitourinary:  Negative for flank pain, frequency, hematuria and urgency. Musculoskeletal:  Negative for arthralgias, gait problem, joint swelling and myalgias. Skin: Negative. Neurological:  Positive for dizziness. Negative for weakness, numbness and headaches. Psychiatric/Behavioral:  Positive for sleep disturbance. Negative for dysphoric mood. The patient is not nervous/anxious. Objective:     Physical Exam  Vitals reviewed. Constitutional:       Appearance: Normal appearance. She is normal weight. HENT:      Head: Normocephalic and atraumatic. Nose: Nose normal.      Mouth/Throat:      Mouth: Mucous membranes are moist.      Pharynx: Oropharynx is clear. Eyes:      Extraocular Movements: Extraocular movements intact. Conjunctiva/sclera: Conjunctivae normal.      Pupils: Pupils are equal, round, and reactive to light. Cardiovascular:      Rate and Rhythm: Normal rate and regular rhythm. Heart sounds: Normal heart sounds. No murmur heard. No gallop. Pulmonary:      Effort: Pulmonary effort is normal. No respiratory distress. Breath sounds: Normal breath sounds. No stridor. No wheezing. Abdominal:      General: Bowel sounds are normal. There is no distension. Palpations: Abdomen is soft. Tenderness: There is no abdominal tenderness. There is no guarding or rebound. Musculoskeletal:         General: No swelling or tenderness. Normal range of motion. Cervical back: Normal range of motion and neck supple. Skin:     General: Skin is warm and dry. Coloration: Skin is not jaundiced. Findings: No rash. Neurological:      General: No focal deficit present. Mental Status: She is alert and oriented to person, place, and time.    Psychiatric:         Mood and Affect: Mood normal.         Behavior: Behavior normal.         Thought Content: Thought content normal.         Judgment: Judgment normal.     /72   Pulse 78   Temp 97.1 °F (36.2 °C)   Ht 6' (1.829 m)   Wt 211 lb 9.6 oz (96 kg)   SpO2 94%   BMI 28.70 kg/m²     Assessment/Plan:   1. Essential hypertension  2. Bilateral carotid artery stenosis  -     US CAROTID ARTERY BILATERAL; Future  3. Obstructive sleep apnea syndrome  -     Baseline Diagnostic Sleep Study; Future  4. Hep C w/o coma, chronic (HCC)  5. Cough, unspecified type  -     guaiFENesin-dextromethorphan (ROBITUSSIN DM) 100-10 MG/5ML syrup; Take 5 mLs by mouth 3 times daily as needed for Cough, Disp-120 mL, R-0Normal  6. Postmenopausal  -     DEXA BONE DENSITY 2 SITES; Future      Hypertension-blood pressures running low at home, discontinued hydrochlorothiazide. Continue atenolol 25 mg daily. Not taking Lasix anymore. Carotid artery stenosis-ultrasound ordered for monitoring    Sleep apnea-sleep study ordered    Chronic hep C-completed hepatitis C treatment          Return in about 3 months (around 2/17/2023) for Follow up HTN. Orders Placed This Encounter   Procedures    DEXA BONE DENSITY 2 SITES     Standing Status:   Future     Standing Expiration Date:   5/17/2023    US CAROTID ARTERY BILATERAL     Standing Status:   Future     Standing Expiration Date:   11/17/2023    Hep A, HAVRIX, (age 23 yrs+), IM    Hep B, ENGERIX-B, (age 21 yrs+), IM, 1mL, 3-dose    Baseline Diagnostic Sleep Study     Standing Status:   Future     Standing Expiration Date:   5/17/2023     Order Specific Question:   Adult or Pediatric     Answer:   Adult Study (>7 Years)     Order Specific Question:   Location For Sleep Study     Answer: [facility] Specific Question:   Select Sleep Lab Location     Answer:   [facility]     Order Specific Question:   Pre-Study Patient Questions:      Answer:   Complains of daytime sleepiness     Order Specific Question:   Pre-Study Patient Questions: Answer:   Snores loudly during sleep     Order Specific Question:   Pre-Study Patient Questions: Answer:   Reports multiple awakenings throughout the night     Orders Placed This Encounter   Medications    guaiFENesin-dextromethorphan (ROBITUSSIN DM) 100-10 MG/5ML syrup     Sig: Take 5 mLs by mouth 3 times daily as needed for Cough     Dispense:  120 mL     Refill:  0       Patient given educational materials - see patient instructions. Discussed use, benefit, and side effects of prescribed medications. All patientquestions answered. Pt voiced understanding. Reviewed health maintenance. Instructedto continue current medications, diet and exercise. Patient agreed with treatmentplan. Follow up as directed.      Electronically signed by Nima Ordoñez MD on 11/17/2022 at 3:53 PM

## 2022-11-18 ENCOUNTER — TELEPHONE (OUTPATIENT)
Dept: FAMILY MEDICINE CLINIC | Age: 67
End: 2022-11-18

## 2022-11-18 NOTE — TELEPHONE ENCOUNTER
Provided patient with fax number and told her to have it faxed over and we can sign it on Monday she verbally understood

## 2022-11-18 NOTE — TELEPHONE ENCOUNTER
----- Message from Cathy Russell sent at 11/18/2022  9:43 AM EST -----  Subject: Message to Provider    QUESTIONS  Information for Provider? Patient needs an extension for 30 days for   electric, due to being on oxygen, please contact to advise.   ---------------------------------------------------------------------------  --------------  Gonzalo OLIVERA  [phone]; OK to leave message on voicemail  ---------------------------------------------------------------------------  --------------  SCRIPT ANSWERS  Relationship to Patient?  Self

## 2022-12-15 DIAGNOSIS — F51.01 PRIMARY INSOMNIA: ICD-10-CM

## 2022-12-15 RX ORDER — TRAZODONE HYDROCHLORIDE 100 MG/1
100 TABLET ORAL NIGHTLY
Qty: 90 TABLET | Refills: 3 | Status: SHIPPED | OUTPATIENT
Start: 2022-12-15

## 2022-12-20 ENCOUNTER — HOSPITAL ENCOUNTER (OUTPATIENT)
Dept: VASCULAR LAB | Age: 67
Discharge: HOME | End: 2022-12-20
Payer: MEDICARE

## 2022-12-20 ENCOUNTER — HOSPITAL ENCOUNTER (OUTPATIENT)
Dept: MAMMOGRAPHY | Age: 67
Discharge: HOME | End: 2022-12-22
Payer: MEDICARE

## 2022-12-20 DIAGNOSIS — Z78.0 POSTMENOPAUSAL: ICD-10-CM

## 2022-12-20 DIAGNOSIS — I65.23 BILATERAL CAROTID ARTERY STENOSIS: ICD-10-CM

## 2022-12-20 PROCEDURE — 77080 DXA BONE DENSITY AXIAL: CPT

## 2022-12-20 PROCEDURE — 93880 EXTRACRANIAL BILAT STUDY: CPT

## 2023-01-05 ENCOUNTER — TELEPHONE (OUTPATIENT)
Dept: PULMONOLOGY | Age: 68
End: 2023-01-05

## 2023-01-05 DIAGNOSIS — E11.42 TYPE 2 DIABETES MELLITUS WITH DIABETIC POLYNEUROPATHY, WITHOUT LONG-TERM CURRENT USE OF INSULIN (HCC): ICD-10-CM

## 2023-01-05 NOTE — TELEPHONE ENCOUNTER
Leonard Back is calling to request a refill on the following medication(s):    Medication Request:  Requested Prescriptions     Pending Prescriptions Disp Refills    metFORMIN (GLUCOPHAGE) 500 MG tablet [Pharmacy Med Name: METFORMIN  MG TABLET] 180 tablet 0     Sig: take 1 tablet by mouth twice a day with meals       Last Visit Date (If Applicable):  30/10/2082    Next Visit Date:    2/20/2023

## 2023-01-05 NOTE — TELEPHONE ENCOUNTER
Patient called, states \"I was sitting here breathing and my lights cut off\". She is asking for us to do a medical necessity for [facility] for her electricity. She might not be on a nebulizer (no recent meds ordered. Last ordered 4/20/21). Patient is not on a CPAP (Pt refuses to use). I also do not see that she is using oxygen, it is not documented at any of her office visits. Writer will call [employer] to ask if patient is using/receiving oxygen as our last re-cert for her is dated 10/1/21. [employer] says they delivered a concentrator in 2019 and refillable tanks but have not delivered anything since. No tubing supplies, refills, etc.     Chart doesn't have any documentation of her actually being on oxygen or tested on oxygen in the last few years. [employer] form is on writers desk. Will try to touch base with patient tomorrow.

## 2023-01-07 DIAGNOSIS — K21.9 GASTROESOPHAGEAL REFLUX DISEASE WITHOUT ESOPHAGITIS: ICD-10-CM

## 2023-01-09 RX ORDER — PANTOPRAZOLE SODIUM 40 MG/1
TABLET, DELAYED RELEASE ORAL
Qty: 30 TABLET | Refills: 3 | Status: SHIPPED | OUTPATIENT
Start: 2023-01-09

## 2023-01-09 NOTE — TELEPHONE ENCOUNTER
Alisson Marin is calling to request a refill on the following medication(s):    Medication Request:  Requested Prescriptions     Pending Prescriptions Disp Refills    pantoprazole (PROTONIX) 40 MG tablet [Pharmacy Med Name: PANTOPRAZOLE SOD DR 40 MG TAB] 30 tablet 3     Sig: TAKE 1 TABLET BY MOUTH ONCE DAILY       Last Visit Date (If Applicable):  57/91/8484    Next Visit Date:    2/20/2023

## 2023-01-09 NOTE — TELEPHONE ENCOUNTER
Patient called, states she is on 2 lpm on her concentrator. She bought tubing at [facility] a little while ago. Not on CPAP and is not using her nebulizer. Form is on doctors' desk for signature.

## 2023-01-12 DIAGNOSIS — E11.42 TYPE 2 DIABETES MELLITUS WITH DIABETIC POLYNEUROPATHY, WITHOUT LONG-TERM CURRENT USE OF INSULIN (HCC): ICD-10-CM

## 2023-01-12 DIAGNOSIS — E11.8 TYPE 2 DIABETES MELLITUS WITH COMPLICATION (HCC): ICD-10-CM

## 2023-01-12 DIAGNOSIS — I50.32 CHRONIC DIASTOLIC CONGESTIVE HEART FAILURE (HCC): ICD-10-CM

## 2023-01-12 RX ORDER — POTASSIUM CHLORIDE 20 MEQ/1
TABLET, EXTENDED RELEASE ORAL
Qty: 60 TABLET | Refills: 1 | Status: SHIPPED | OUTPATIENT
Start: 2023-01-12

## 2023-01-12 RX ORDER — LANCETS 33 GAUGE
1 EACH MISCELLANEOUS 2 TIMES DAILY
Qty: 60 EACH | Refills: 4 | Status: SHIPPED | OUTPATIENT
Start: 2023-01-12 | End: 2023-06-11

## 2023-01-12 NOTE — TELEPHONE ENCOUNTER
Andres Funes is calling to request a refill on the following medication(s):    Medication Request:  Requested Prescriptions     Pending Prescriptions Disp Refills    potassium chloride (KLOR-CON M) 20 MEQ extended release tablet [Pharmacy Med Name: POTASSIUM CL ER 20 MEQ TABLET] 60 tablet 1     Sig: take 1 tablet by mouth once daily    OneTouch Delica Lancets 26C MISC 60 each 4     Sig: Apply 1 Units topically 2 times daily USE 2 TO 3 TIMES DAILY AS DIRECTED       Last Visit Date (If Applicable):  57/07/1617    Next Visit Date:    2/20/2023

## 2023-01-12 NOTE — TELEPHONE ENCOUNTER
Ignacio Kaba is calling to request a refill on the following medication(s):    Medication Request:  Requested Prescriptions     Pending Prescriptions Disp Refills    potassium chloride (KLOR-CON M) 20 MEQ extended release tablet [Pharmacy Med Name: POTASSIUM CL ER 20 MEQ TABLET] 60 tablet 1     Sig: take 1 tablet by mouth once daily       Last Visit Date (If Applicable):  13/44/5940    Next Visit Date:    2/20/2023

## 2023-01-18 DIAGNOSIS — E11.42 TYPE 2 DIABETES MELLITUS WITH DIABETIC POLYNEUROPATHY, WITHOUT LONG-TERM CURRENT USE OF INSULIN (HCC): ICD-10-CM

## 2023-01-18 RX ORDER — GABAPENTIN 800 MG/1
800 TABLET ORAL 3 TIMES DAILY
Qty: 90 TABLET | Refills: 5 | Status: SHIPPED | OUTPATIENT
Start: 2023-01-18 | End: 2023-02-17

## 2023-01-18 NOTE — TELEPHONE ENCOUNTER
Requested Prescriptions     Pending Prescriptions Disp Refills    gabapentin (NEURONTIN) 800 MG tablet 90 tablet 5     Sig: Take 1 tablet by mouth 3 times daily for 30 days.

## 2023-01-23 NOTE — CONSULTS
18g started in left upper forearm. Blood drawn and labeled for lactic acid. Given to primary nurse. Good blood return. Site flushes with ease. No edema, no redness. Pt denies pain at site.
23-Jan-2023

## 2023-01-26 DIAGNOSIS — M54.41 CHRONIC BILATERAL LOW BACK PAIN WITH RIGHT-SIDED SCIATICA: ICD-10-CM

## 2023-01-26 DIAGNOSIS — G89.29 CHRONIC BILATERAL LOW BACK PAIN WITH RIGHT-SIDED SCIATICA: ICD-10-CM

## 2023-01-27 ENCOUNTER — OFFICE VISIT (OUTPATIENT)
Dept: PULMONOLOGY | Age: 68
End: 2023-01-27
Payer: MEDICARE

## 2023-01-27 VITALS
DIASTOLIC BLOOD PRESSURE: 82 MMHG | WEIGHT: 218 LBS | BODY MASS INDEX: 29.53 KG/M2 | HEART RATE: 83 BPM | OXYGEN SATURATION: 94 % | RESPIRATION RATE: 16 BRPM | HEIGHT: 72 IN | SYSTOLIC BLOOD PRESSURE: 145 MMHG

## 2023-01-27 DIAGNOSIS — G47.33 OSA (OBSTRUCTIVE SLEEP APNEA): ICD-10-CM

## 2023-01-27 DIAGNOSIS — J41.1 MUCOPURULENT CHRONIC BRONCHITIS (HCC): Primary | ICD-10-CM

## 2023-01-27 DIAGNOSIS — Z72.0 TOBACCO USE: ICD-10-CM

## 2023-01-27 PROCEDURE — G8417 CALC BMI ABV UP PARAM F/U: HCPCS | Performed by: INTERNAL MEDICINE

## 2023-01-27 PROCEDURE — 3017F COLORECTAL CA SCREEN DOC REV: CPT | Performed by: INTERNAL MEDICINE

## 2023-01-27 PROCEDURE — 3077F SYST BP >= 140 MM HG: CPT | Performed by: INTERNAL MEDICINE

## 2023-01-27 PROCEDURE — 3023F SPIROM DOC REV: CPT | Performed by: INTERNAL MEDICINE

## 2023-01-27 PROCEDURE — 3079F DIAST BP 80-89 MM HG: CPT | Performed by: INTERNAL MEDICINE

## 2023-01-27 PROCEDURE — G8427 DOCREV CUR MEDS BY ELIG CLIN: HCPCS | Performed by: INTERNAL MEDICINE

## 2023-01-27 PROCEDURE — 99214 OFFICE O/P EST MOD 30 MIN: CPT | Performed by: INTERNAL MEDICINE

## 2023-01-27 PROCEDURE — 4004F PT TOBACCO SCREEN RCVD TLK: CPT | Performed by: INTERNAL MEDICINE

## 2023-01-27 PROCEDURE — G8399 PT W/DXA RESULTS DOCUMENT: HCPCS | Performed by: INTERNAL MEDICINE

## 2023-01-27 PROCEDURE — G8484 FLU IMMUNIZE NO ADMIN: HCPCS | Performed by: INTERNAL MEDICINE

## 2023-01-27 PROCEDURE — 1090F PRES/ABSN URINE INCON ASSESS: CPT | Performed by: INTERNAL MEDICINE

## 2023-01-27 PROCEDURE — 1123F ACP DISCUSS/DSCN MKR DOCD: CPT | Performed by: INTERNAL MEDICINE

## 2023-01-27 RX ORDER — TIZANIDINE 2 MG/1
TABLET ORAL
Qty: 90 TABLET | Refills: 3 | Status: SHIPPED | OUTPATIENT
Start: 2023-01-27

## 2023-01-27 ASSESSMENT — SLEEP AND FATIGUE QUESTIONNAIRES
HOW LIKELY ARE YOU TO NOD OFF OR FALL ASLEEP WHILE SITTING INACTIVE IN A PUBLIC PLACE: 1
HOW LIKELY ARE YOU TO NOD OFF OR FALL ASLEEP WHILE WATCHING TV: 3
HOW LIKELY ARE YOU TO NOD OFF OR FALL ASLEEP WHILE LYING DOWN TO REST IN THE AFTERNOON WHEN CIRCUMSTANCES PERMIT: 1
HOW LIKELY ARE YOU TO NOD OFF OR FALL ASLEEP WHILE SITTING QUIETLY AFTER LUNCH WITHOUT ALCOHOL: 3
HOW LIKELY ARE YOU TO NOD OFF OR FALL ASLEEP WHEN YOU ARE A PASSENGER IN A CAR FOR AN HOUR WITHOUT A BREAK: 3
HOW LIKELY ARE YOU TO NOD OFF OR FALL ASLEEP WHILE SITTING AND READING: 3
HOW LIKELY ARE YOU TO NOD OFF OR FALL ASLEEP IN A CAR, WHILE STOPPED FOR A FEW MINUTES IN TRAFFIC: 0
ESS TOTAL SCORE: 14
HOW LIKELY ARE YOU TO NOD OFF OR FALL ASLEEP WHILE SITTING AND TALKING TO SOMEONE: 0

## 2023-01-27 NOTE — PROGRESS NOTES
Hallie Rosado  1/27/2023    Chief Complaint   Patient presents with    Sleep Apnea     Follow up     COPD, obesity, sleep apnea syndrome  The patiJoen AGATA Stevemakcinthya Alden Hernandez is known to have chronic obstructive lung disease due to chronic bronchitis and emphysema secondary to chronic smoking. Unfortunately he continues smoke in spite of repeated advised to give up smoking she is still smoking about a pack a day. Pulmonary status is stable she has about grade 2 effort dyspnea and she does have home oxygen. She was once again advised regarding the risk of smoking while using oxygen. He uses the oxygen all night and sometimes during the daytime. There is no evidence of an acute exacerbation of COPD. She is taking Breo and she is also taking Spiriva and uses albuterol on an as-needed basis. This is not very frequent. Patient recently had possible chest infection. I do not have any x-rays to review to see if she had a pneumonia it was done at urgent care center. She however however was told that she had pneumonia. Her symptoms did not look like that of pneumonia however. She was treated with antibiotics. That she has finished. Sputum is mucoid no no blood in it no chest pain no fever    She is overweight but she very likely has sleep apnea syndrome and advised to get a sleep study which she did not. I reorderd the sleep study again. She has systemic hypertension which is well controlled. A CT scan was done last year which did not reveal any nodules. She has been on home oxygen. Unfortunately her oxygen concentrator have broken down and needed replacement. Poor sleep with sleepiness scale revealed that the patient is significantly  .   BP the score was 14  Sleep Medicine 1/27/2023 10/11/2022 1/7/2022 5/28/2021 7/24/2020 2/17/2020 11/11/2019   Sitting and reading 3 2 1 0 3 3 3   Watching TV 3 2 1 1 3 3 3   Sitting, inactive in a public place (e.g. a theatre or a meeting) 1 2 0 0 2 3 2   As a passenger in a car for an hour without a break 3 2 0 0 3 3 3   Lying down to rest in the afternoon when circumstances permit 1 2 2 1 3 0 2   Sitting and talking to someone 0 1 0 0 0 0 1   Sitting quietly after a lunch without alcohol 3 1 0 1 3 3 2   In a car, while stopped for a few minutes in traffic 0 1 0 0 0 0 2   Rousseau Sleepiness Score 14 13 4 3 17 15 18   She has taken the pneumonia vaccine flu vaccine but refuses to take the COVID-vaccine              Review of Systems -as assistant were conductive for all other system including upper and lower extremities. No additional information was obtained. General ROS: negative for - chills, fatigue, fever or weight loss  ENT ROS: negative for - headaches, oral lesions or sore throat  Cardiovascular ROS: no chest pain , orthopnea or pnd   Gastrointestinal ROS: no abdominal pain, change in bowel habits, or black or bloody stools  Skin - no rash   Neuro - no blurry vision , no loc .  No focal weakness   msk - no jt tenderness or swelling    Vascular - no claudication , rest completed and negative   Lymphatic - complete and negative   Hematology - oncology - complete and negative   Allergy immunology - complete and negative    no burning or hematuria       LUNG CANCER SCREENING     CRITERIA MET    [x]     CT ORDERED  [x]      CRITERIA NOT MET   []      REFUSED                    [x]        REASON CRITERIA NOT MET     SMOKING LESS THAN 30 PY  []      AGE LESS THAN 55 or GREATER 77 YEARS  []      QUIT SMOKING 15 YEARS OR GREATER   []      RECENT CT WITH IN 11 MONTHS    []      LIFE EXPECTANCY < 5 YEARS   []      SIGNS  AND SYMPTOMS OF LUNG CANCER   []           Immunization   Immunization History   Administered Date(s) Administered    Hepatitis A Adult (Havrix, Vaqta) 11/17/2022    Hepatitis B Adult (Engerix-B) 11/17/2022    Influenza Virus Vaccine 01/22/2014, 10/22/2015, 08/15/2016, 08/10/2017, 12/29/2017, 09/17/2018, 08/13/2019, 08/13/2019    Influenza Whole 07/30/2016    Influenza, AFLURIA (age 1 yrs+), FLUZONE, (age 10 mo+), MDV, 0.5mL 09/17/2018, 08/13/2019    Influenza, FLUARIX, FLULAVAL, FLUZONE (age 10 mo+) AND AFLURIA, (age 1 y+), PF, 0.5mL 08/15/2016, 08/10/2017, 12/29/2017, 08/16/2019    PPD Test 11/30/2020, 12/08/2020    Pneumococcal Conjugate 13-valent ([id number]) 08/15/2016    Pneumococcal Polysaccharide ([id number]) 10/22/2015, 12/29/2017    Td, unspecified formulation 07/30/2016    Tdap (Boostrix, Adacel) 09/30/2016        Pneumococcal Vaccine she did not take her flu shot this year. [x] Up to date    [] Indicated   [] Refused  [] Contraindicated       Influenza Vaccine   [x] Up to date    [] Indicated   [] Refused  [] Contraindicated     He is to take Covid vaccine  PAST MEDICAL HISTORY:         Diagnosis Date    RACHEL (acute kidney injury) ([address] 01/22/2014    Arthritis     generalized    Bilateral chronic knee pain 5/13/2016    Bronchiectasis without complication ([address]     Cancer ([address] 2004    uterus    CHF (congestive heart failure) (HCC)     Chronic bilateral low back pain with right-sided sciatica 02/20/2017    Chronic bronchitis (HCC)     Chronic bronchitis (HCC)     Chronic respiratory failure with hypoxia (HCC)     Cigarette nicotine dependence without complication 6/18/3984    COPD (chronic obstructive pulmonary disease) (HCC)     on inhaler /  COPD with chronic bronchitis and emphysema    Current smoker on some days     Depression 10/30/2014    Diabetic polyneuropathy associated with type 2 diabetes mellitus ([address]     Diverticulosis     Essential hypertension 12/30/2013    Full dentures     GERD (gastroesophageal reflux disease)     Hep C w/o coma, chronic (HCC)     Hyperlipidemia     Hyperplastic polyp of intestine     Hypertension     DR. MCDANIEL-CARDIO    Irritable bowel syndrome with both constipation and diarrhea 2/15/2019    Liver disease     hepatitis C    Moderate episode of recurrent major depressive disorder ([address] 10/30/2014    Nasal polyposis Noncompliance with CPAP treatment     Obesity (BMI 35.0-39.9 without comorbidity)     On home O2     2 liters PRN per pt    JIMENA (obstructive sleep apnea)     JIMENA on CPAP    Pacemaker 2012    Personal history of tobacco use, presenting hazards to health 4/2/2015    Pneumonia 07/2012    RECENTLY HOSPITALIZED    Primary insomnia 9/30/2016    Pulmonary edema cardiac cause (HCC)     Rectal bleeding     Smoking addiction     Tobacco abuse        Family History:       Problem Relation Age of Onset    Cancer Mother         lungs and brain    Stroke Father     Crohn's Disease Sister        SURGICAL HISTORY:   Past Surgical History:   Procedure Laterality Date    ARTHRODESIS Right 5/20/2022    RIGHT FOOT HARDWARE REMOVAL    RIGHT 1ST MTP BUNION IMPLANT REVISION    NILAMPropel Fuels performed by Rhiannon Grady DPM at [facility].  2008    BUNIONECTOMY Right 4/30/2021    RIGHT FOOT 1ST MPJ ARTHROPLASTY WITH EXTENSOR HALLUCIS LONGUS LENGTHENING performed by Rhiannon Grady DPM at [address] [facility] [address] dec. 2013    COLON SURGERY      COLONOSCOPY      COLONOSCOPY  01/23/2015    severe diverticula    COLONOSCOPY  09/20/2016    HYPERPLASTIC POLYP X 2     COLONOSCOPY N/A 03/14/2019    COLONOSCOPY DIAGNOSTIC performed by Petar Bowers MD at [facility]    COLONOSCOPY N/A 01/09/2020    COLONOS[facility] WITH BIOPSY [facility]ers [facility] (CERVIX STATUS UNKNOWN)      LUMBAR SPINE SURGERY  09/24/2013    decompression & re-exploration L3-4, L4-5    PACEMAKER INSERTION      for very slow heart beat (per patient)    PACEMAKER PLACEMENT      SIGMOIDOSCOPY N/A 06/26/2015    flexable sigmoidscopy,HYPERPLASTIC POLYP    TONSILLECTOMY      UPPER GASTROINTESTINAL ENDOSCOPY N/A 03/14/2019    EGD BIOPSY performed by Petar Bowers MD at [facility]              Not in a hospital admission. Allergies   Allergen Reactions    Iv Dye [Iodides] Hives     Social History     Tobacco Use   Smoking Status Every Day    Packs/day: 1.00    Years: 53.00    Pack years: 53.00    Types: Cigarettes    Start date: 1969   Smokeless Tobacco Never     Prior to Admission medications    Medication Sig Start Date End Date Taking? Authorizing Provider   tiZANidine (ZANAFLEX) 2 MG tablet take 1 tablet by mouth every 8 hours if needed for BACK PAIN 1/27/23  Yes Yaritza Luevano MD   gabapentin (NEURONTIN) 800 MG tablet Take 1 tablet by mouth 3 times daily for 30 days. 1/18/23 2/17/23 Yes Yaritza Luevano MD   potassium chloride (KLOR-CON M) 20 MEQ extended release tablet take 1 tablet by mouth once daily 1/12/23  Yes Yaritza Luevano MD   OneTouch Delica Lancets 01K MISC Apply 1 Units topically 2 times daily USE 2 TO 3 TIMES DAILY AS DIRECTED 1/12/23 6/11/23 Yes Yaritza Luevano MD   pantoprazole (PROTONIX) 40 MG tablet TAKE 1 TABLET BY MOUTH ONCE DAILY 1/9/23  Yes Yaritza Luevano MD   metFORMIN (GLUCOPHAGE) 500 MG tablet take 1 tablet by mouth twice a day with meals 1/5/23  Yes Yaritza Luevano MD   traZODone (DESYREL) 100 MG tablet take 1 tablet by mouth nightly 12/15/22  Yes Yaritza Luevano MD   buprenorphine-naloxone (SUBOXONE) 8-2 MG FILM SL film dissolve 1/2 FILM under the tongue twice a day 11/14/22  Yes Historical Provider, MD   sertraline (ZOLOFT) 50 MG tablet TAKE 1/2 A TABLET BY MOUTH AT NIGHT FOR 3 NIGHTS AND INCREASE TO 1 TABLET AT NIGHT 10/31/22  Yes Historical Provider, MD David Woodard 145 MCG capsule take 1 capsule by mouth once daily 10/4/22  Yes Historical Provider, MD   hydrOXYzine HCl (ATARAX) 25 MG tablet  9/13/22  Yes Historical Provider, MD   ondansetron (ZOFRAN-ODT) 4 MG disintegrating tablet dissolve 1 tablet under the tongue every 8 hours if needed for NAUSEA OR VOMITING 8/19/22  Yes Yaritza Luevano MD   fluticasone-vilanterol (BREO ELLIPTA) 100-25 MCG/INH AEPB inhaler Inhale 1 puff into the lungs in the morning. PLEASE ASK PATIENT TO CALL OUR OFFICE FOR AN APPOINTMENT. 7/28/22  Yes Michaela Cortez MD   atenolol (TENORMIN) 25 MG tablet Take 1 tablet by mouth daily 7/14/22  Yes Kayla Acevedo MD   rOPINIRole (REQUIP) 0.5 MG tablet Take 1 tablet by mouth daily 7/14/22  Yes Kayla Acevedo MD   pravastatin (PRAVACHOL) 40 MG tablet Take 1 tablet by mouth daily 7/14/22 10/7/23 Yes Kayla Acevedo MD   linaclotide (LINZESS) 290 MCG CAPS capsule Take 1 capsule by mouth every morning (before breakfast) 7/14/22  Yes Kayla Acevedo MD   nitroGLYCERIN (NITROSTAT) 0.4 MG SL tablet Place 1 tablet under the tongue every 5 minutes as needed for Chest pain 7/14/22  Yes Kayla Acevedo MD   blood glucose test strips (ONETOUCH ULTRA) strip use 1 TEST STRIP to TEST BLOOD SUGAR four times a day if needed 7/14/22  Yes Kayla Acevedo MD   vitamin D-3 (CHOLECALCIFEROL) 125 MCG (5000 UT) TABS Take 1 tablet by mouth daily 1/19/22  Yes Kayla Acevedo MD   dicyclomine (BENTYL) 10 MG capsule Take 1 capsule by mouth 4 times daily (before meals and nightly) 1/18/22  Yes Kayla Acevedo MD   glucose monitoring (FREESTYLE FREEDOM) kit 1 kit by Does not apply route daily 12/6/21  Yes Arsh Barbosa APRN - CNP   BREO ELLIPTA 200-25 MCG/INH AEPB inhaler INHALE 2 PUFFS ONTO THE LUNGS DAILY 10/18/21  Yes Historical Provider, MD   albuterol sulfate HFA (PROAIR HFA) 108 (90 Base) MCG/ACT inhaler Inhale 2 puffs into the lungs every 6 hours as needed for Wheezing 9/17/21  Yes Michaela Cortez MD   albuterol (PROVENTIL) (2.5 MG/3ML) 0.083% nebulizer solution inhale contents of 1 vial ( 3 milliliters ) in nebulizer by mouth and INTO THE LUNGS every 6 hours 4/20/21  Yes Historical Provider, MD   acetaminophen (TYLENOL) 500 MG tablet Take 1,000 mg by mouth every 6 hours as needed for Pain    Yes Historical Provider, MD   buprenorphine-naloxone (SUBOXONE) 2-0.5 MG FILM SL film Place 1.5 Film under the tongue daily.  8/12/20  Yes Historical Provider, MD   furosemide (LASIX) 20 MG tablet Take 1 tablet by mouth 2 times daily 1/18/22 11/17/22  Bella Balderas MD   tiotropium (SPIRIVA RESPIMAT) 2.5 MCG/ACT AERS inhaler Inhale 2 puffs into the lungs daily 9/17/21 11/17/22  Joss Brown MD         Physical Exam  General Appearance:    Alert, cooperative, no distress, appears stated age, morbid obesity, no distress, not using accessory muscles. Very pleasant obese not excessively using accessory muscles no fever no distress   Head:    Normocephalic, without obvious abnormality, atraumatic   Eye examination revealed no jaundice. No Diego's syndrome. Nose revealed no polyps. No sinus tenderness. Throat examination unremarkable. Ear examination is unremarkable.                 :    Neck:   Supple, symmetrical, trachea midline, no adenopathy;     thyroid:  no enlargement/tenderness/nodules; no carotid    bruit or JVD none. Hepatojugular reflux is not elevated neck is short and fat   Back:     Symmetric, no curvature, ROM normal, no CVA tenderness   Lungs:       Lv is increased. Percussion note is hyperresonant. Expiration is prolonged. No rales or rhonchi are audible. No pleural friction rub is audible. Chest Wall:    No tenderness or deformity significant tenderness over the anterior chest especially on the second left intercostal space and intercostal cartilages. No pericardial friction is audible no pleural pericardial rub is audible. No gallops are audible.           Heart:    Regular rate and rhythm, S1 and S2 normal, no murmur, rub        or gallop no rvh murmurs or gallops no pericardial friction rub                          Abdomen:                                                 Pulses:                                            Lymph nodes:                    Neurologic:                  Soft, non-tender, bowel sounds active all four quadrants,     no masses, no organomegaly         2+ and symmetric all extremities            Cervical, supraclavicular not enlarged or matted or tender      CNII-XII intact, normal strength 5/5 . Sensation grossly normal  and reflexes normal 2+  throughout     Clubbing No  Lower ext edema No1+   [] , 2 +  [] , 3+   []  Upper ext edema No       Musculoskeletal - no joint swelling or tenderness or synovitis               BP (!) 145/82 (Site: Right Lower Arm)   Pulse 83   Resp 16   Ht 6' 1\" (1.854 m)   Wt 218 lb (98.9 kg)   SpO2 94% Comment: room air at rest  BMI 28.76 kg/m²     CXR no recent chest x-ray      CT Scans  No new CT scan    Echo  No recent echo        Assessment  COPD  Persistent smoking  Sleep apnea syndrome not using CPAP  Obesity  Hypertension  Diabetes  Respiratory failure  Home oxygen  Plan:. Patient pulmonary status is stable. There is no evidence of an acute exacerbation of COPD. If she have had pneumonia 2 to 3 weeks back that has resolved. No need for any more antibiotics or steroids. I advised her to continue the Breo Spiriva and albuterol. We will arrange for her to have the home oxygen concentrator again. Will arrange for her to have a sleep study done if the sleep study does reveal apnea then we will treat with sleep CPAP or BiPAP. Treatment apnea will improve the outcome of hypertension and diabetes. I encouraged her to lose weight. Discussed with her again the home oxygen and the risk of smoking while using oxygen. We will get a repeat CT screen of the lung because of her chronic smoking habit. Last year CT was unremarkable. No nodules were noted    I once again advised her to give up smoking altogether    I also recommended her to take the COVID-vaccine but I am afraid she will never do so.     Dictated with Dr. Mann Estrada

## 2023-01-27 NOTE — TELEPHONE ENCOUNTER
Norma Patten is calling to request a refill on the following medication(s):    Medication Request:  Requested Prescriptions     Pending Prescriptions Disp Refills    tiZANidine (ZANAFLEX) 2 MG tablet [Pharmacy Med Name: TIZANIDINE HCL 2 MG TABLET] 90 tablet 3     Sig: take 1 tablet by mouth every 8 hours if needed for BACK PAIN       Last Visit Date (If Applicable):  59/27/9268    Next Visit Date:    2/20/2023

## 2023-02-02 ENCOUNTER — TELEPHONE (OUTPATIENT)
Dept: ONCOLOGY | Age: 68
End: 2023-02-02

## 2023-02-02 DIAGNOSIS — Z87.891 PERSONAL HISTORY OF NICOTINE DEPENDENCE: Primary | ICD-10-CM

## 2023-02-02 NOTE — TELEPHONE ENCOUNTER
Our records indicate that your patient is coming due for their annual lung cancer screening follow up testing. For your convenience, we have pended the order for the scan for you. If you do not agree with the need for the test, please cancel the order and let us know. Sincerely,    [address] [place] [address] Screening Program    Auto printed reminder letter sent to patient.

## 2023-02-07 DIAGNOSIS — K58.2 IRRITABLE BOWEL SYNDROME WITH BOTH CONSTIPATION AND DIARRHEA: ICD-10-CM

## 2023-02-07 RX ORDER — LINACLOTIDE 290 UG/1
CAPSULE, GELATIN COATED ORAL
Qty: 30 CAPSULE | Refills: 11 | Status: SHIPPED | OUTPATIENT
Start: 2023-02-07

## 2023-02-07 NOTE — TELEPHONE ENCOUNTER
Lazara Nelson is calling to request a refill on the following medication(s):    Medication Request:  Requested Prescriptions     Pending Prescriptions Disp Refills    LINZESS 290 MCG CAPS capsule [Pharmacy Med Name: Angela Cuba 290 MCG CAPSULE] 30 capsule 11     Sig: take 1 capsule by mouth EVERY MORNING BEFORE BREAKFAST       Last Visit Date (If Applicable):  04/09/1601    Next Visit Date:    2/20/2023

## 2023-02-20 ENCOUNTER — OFFICE VISIT (OUTPATIENT)
Dept: FAMILY MEDICINE CLINIC | Age: 68
End: 2023-02-20

## 2023-02-20 VITALS
OXYGEN SATURATION: 99 % | BODY MASS INDEX: 29.66 KG/M2 | WEIGHT: 219 LBS | HEART RATE: 93 BPM | TEMPERATURE: 97.4 F | HEIGHT: 72 IN | SYSTOLIC BLOOD PRESSURE: 130 MMHG | DIASTOLIC BLOOD PRESSURE: 75 MMHG

## 2023-02-20 DIAGNOSIS — I10 ESSENTIAL HYPERTENSION: Primary | ICD-10-CM

## 2023-02-20 DIAGNOSIS — F33.1 MODERATE EPISODE OF RECURRENT MAJOR DEPRESSIVE DISORDER (HCC): ICD-10-CM

## 2023-02-20 DIAGNOSIS — G89.29 BILATERAL CHRONIC KNEE PAIN: ICD-10-CM

## 2023-02-20 DIAGNOSIS — E11.8 TYPE 2 DIABETES MELLITUS WITH COMPLICATION (HCC): ICD-10-CM

## 2023-02-20 DIAGNOSIS — Z72.0 TOBACCO USE: ICD-10-CM

## 2023-02-20 DIAGNOSIS — E55.9 VITAMIN D DEFICIENCY: ICD-10-CM

## 2023-02-20 DIAGNOSIS — I50.32 CHRONIC DIASTOLIC CONGESTIVE HEART FAILURE (HCC): ICD-10-CM

## 2023-02-20 DIAGNOSIS — B18.2 HEP C W/O COMA, CHRONIC (HCC): ICD-10-CM

## 2023-02-20 DIAGNOSIS — M25.562 BILATERAL CHRONIC KNEE PAIN: ICD-10-CM

## 2023-02-20 DIAGNOSIS — M25.561 BILATERAL CHRONIC KNEE PAIN: ICD-10-CM

## 2023-02-20 DIAGNOSIS — Z12.11 COLON CANCER SCREENING: ICD-10-CM

## 2023-02-20 DIAGNOSIS — Z12.31 ENCOUNTER FOR SCREENING MAMMOGRAM FOR MALIGNANT NEOPLASM OF BREAST: ICD-10-CM

## 2023-02-20 LAB — HBA1C MFR BLD: 5.5 %

## 2023-02-20 RX ORDER — POTASSIUM CHLORIDE 20 MEQ/1
TABLET, EXTENDED RELEASE ORAL
Qty: 60 TABLET | Refills: 5 | Status: SHIPPED | OUTPATIENT
Start: 2023-02-20

## 2023-02-20 SDOH — ECONOMIC STABILITY: INCOME INSECURITY: IN THE LAST 12 MONTHS, WAS THERE A TIME WHEN YOU WERE NOT ABLE TO PAY THE MORTGAGE OR RENT ON TIME?: NO

## 2023-02-20 SDOH — ECONOMIC STABILITY: FOOD INSECURITY: WITHIN THE PAST 12 MONTHS, THE FOOD YOU BOUGHT JUST DIDN'T LAST AND YOU DIDN'T HAVE MONEY TO GET MORE.: NEVER TRUE

## 2023-02-20 SDOH — ECONOMIC STABILITY: FOOD INSECURITY: WITHIN THE PAST 12 MONTHS, YOU WORRIED THAT YOUR FOOD WOULD RUN OUT BEFORE YOU GOT MONEY TO BUY MORE.: NEVER TRUE

## 2023-02-20 SDOH — ECONOMIC STABILITY: TRANSPORTATION INSECURITY
IN THE PAST 12 MONTHS, HAS THE LACK OF TRANSPORTATION KEPT YOU FROM MEDICAL APPOINTMENTS OR FROM GETTING MEDICATIONS?: NO

## 2023-02-20 SDOH — ECONOMIC STABILITY: TRANSPORTATION INSECURITY
IN THE PAST 12 MONTHS, HAS LACK OF TRANSPORTATION KEPT YOU FROM MEETINGS, WORK, OR FROM GETTING THINGS NEEDED FOR DAILY LIVING?: NO

## 2023-02-20 SDOH — ECONOMIC STABILITY: HOUSING INSECURITY
IN THE LAST 12 MONTHS, WAS THERE A TIME WHEN YOU DID NOT HAVE A STEADY PLACE TO SLEEP OR SLEPT IN A SHELTER (INCLUDING NOW)?: NO

## 2023-02-20 ASSESSMENT — PATIENT HEALTH QUESTIONNAIRE - PHQ9
2. FEELING DOWN, DEPRESSED OR HOPELESS: 0
10. IF YOU CHECKED OFF ANY PROBLEMS, HOW DIFFICULT HAVE THESE PROBLEMS MADE IT FOR YOU TO DO YOUR WORK, TAKE CARE OF THINGS AT HOME, OR GET ALONG WITH OTHER PEOPLE: 0
6. FEELING BAD ABOUT YOURSELF - OR THAT YOU ARE A FAILURE OR HAVE LET YOURSELF OR YOUR FAMILY DOWN: 0
SUM OF ALL RESPONSES TO PHQ QUESTIONS 1-9: 0
4. FEELING TIRED OR HAVING LITTLE ENERGY: 0
9. THOUGHTS THAT YOU WOULD BE BETTER OFF DEAD, OR OF HURTING YOURSELF: 0
7. TROUBLE CONCENTRATING ON THINGS, SUCH AS READING THE NEWSPAPER OR WATCHING TELEVISION: 0
SUM OF ALL RESPONSES TO PHQ QUESTIONS 1-9: 0
1. LITTLE INTEREST OR PLEASURE IN DOING THINGS: 0
8. MOVING OR SPEAKING SO SLOWLY THAT OTHER PEOPLE COULD HAVE NOTICED. OR THE OPPOSITE, BEING SO FIGETY OR RESTLESS THAT YOU HAVE BEEN MOVING AROUND A LOT MORE THAN USUAL: 0
3. TROUBLE FALLING OR STAYING ASLEEP: 0
SUM OF ALL RESPONSES TO PHQ QUESTIONS 1-9: 0
SUM OF ALL RESPONSES TO PHQ9 QUESTIONS 1 & 2: 0
SUM OF ALL RESPONSES TO PHQ QUESTIONS 1-9: 0
5. POOR APPETITE OR OVEREATING: 0

## 2023-02-20 ASSESSMENT — SOCIAL DETERMINANTS OF HEALTH (SDOH): HOW HARD IS IT FOR YOU TO PAY FOR THE VERY BASICS LIKE FOOD, HOUSING, MEDICAL CARE, AND HEATING?: NOT HARD AT ALL

## 2023-02-20 NOTE — PROGRESS NOTES
[address] PRIMARY CARE  [address] [place] [address] [address] [place] [address]  Dept: [phone]  Dept Fax: [phone]    Mago Escalante is a 74 y.o. female who is a Established patient, who presents today for her medical conditions/complaints as noted below:  Chief Complaint   Patient presents with    Hypertension    Diabetes         HPI:     She is here today to follow-up on hypertension and diabetes. She says that she is doing well overall but has been having trouble with both her knees. She states that both her knees hurt and has been difficult for her to walk around or stand for any length of time. She has chronic congestive heart failure which has been stable, denies any increase shortness of breath or leg swelling. She smokes about a pack of cigarettes a day and says that nicotine replacement therapies give her palpitations. She is also been tried on medications and says that they do not work for her. She is willing to try smoking cessation program.  She is due for her mammogram and follow-up colonoscopy. Hemoglobin A1C (%)   Date Value   02/20/2023 5.5   08/17/2022 5.8   05/09/2022 6.2 (H)             ( goal A1Cis < 7)   Microalb/Crt.  Ratio (mcg/mg creat)   Date Value   01/18/2022 Can not be calculated     LDL Cholesterol (mg/dL)   Date Value   01/18/2022 79   02/05/2020 81   04/23/2019 88       (goal LDL is <100)   AST (U/L)   Date Value   09/12/2022 14     ALT (U/L)   Date Value   09/12/2022 9     BUN (mg/dL)   Date Value   09/12/2022 13     BP Readings from Last 3 Encounters:   02/20/23 130/75   01/27/23 (!) 145/82   11/17/22 124/72          (goal 120/80)    Past Medical History:   Diagnosis Date    RACHEL (acute kidney injury) ([address] 01/22/2014    Arthritis     generalized    Bilateral chronic knee pain 5/13/2016    Bronchiectasis without complication ([facility]     Cancer ([facility] 2004    uterus    CHF (congestive heart failure) (HCC)     Chronic bilateral low back pain with right-sided sciatica 02/20/2017    Chronic bronchitis (HCC)     Chronic bronchitis (HCC)     Chronic respiratory failure with hypoxia (HCC)     Cigarette nicotine dependence without complication 4/73/5813    COPD (chronic obstructive pulmonary disease) (HCC)     on inhaler /  COPD with chronic bronchitis and emphysema    Current smoker on some days     Depression 10/30/2014    Diabetic polyneuropathy associated with type 2 diabetes mellitus ([facility]     Diverticulosis     Essential hypertension 12/30/2013    Full dentures     GERD (gastroesophageal reflux disease)     Hep C w/o coma, chronic (HCC)     Hyperlipidemia     Hyperplastic polyp of intestine     Hypertension     DR. MCDANIEL-CARDIO    Irritable bowel syndrome with both constipation and diarrhea 2/15/2019    Liver disease     hepatitis C    Moderate episode of recurrent major depressive disorder ([facility] 10/30/2014    Nasal polyposis     Noncompliance with CPAP treatment     Obesity (BMI 35.0-39.9 without comorbidity)     On home O2     2 liters PRN per pt    JIMENA (obstructive sleep apnea)     JIMENA on CPAP    Pacemaker 2012    Personal history of tobacco use, presenting hazards to health 4/2/2015    Pneumonia 07/2012    RECENTLY HOSPITALIZED    Primary insomnia 9/30/2016    Pulmonary edema cardiac cause [facility])     Rectal bleeding     Smoking addiction     Tobacco abuse       Past Surgical History:   Procedure Laterality Date    ARTHRODESIS Right 5/20/2022    RIGHT FOOT HARDWARE REMOVAL    RIGHT 1ST MTP BUNION IMPLANT REVISION    [employer] [facility] performed by Anibal Rodriguez DPM at [facility].  2008    BUNIONECTOMY Right 4/30/2021    RIGHT FOOT 1ST MPJ ARTHROPLASTY WITH EXTENSOR HALLUCIS LONGUS LENGTHENING performed by Anibal Rodriguez DPM at [facility] dec. 2013    COLON SURGERY      COLONOSCOPY      COLONOSCOPY  01/23/2015    severe diverticula    COLONOSCOPY  09/20/2016    HYPERPLASTIC POLYP X 2     COLONOSCOPY N/A 03/14/2019    COLONOSCOPY DIAGNOSTIC performed by Donnie Campos MD at [facility]    COLONOSCOPY N/A 01/09/2020    COLONOSCOPY WITH BIOPSY performed by Donnie Campos MD at [facility], COLON, [address] [facility] (CERVIX STATUS UNKNOWN)      LUMBAR SPINE SURGERY  09/24/2013    decompression & re-exploration L3-4, L4-5    PACEMAKER INSERTION      for very slow heart beat (per patient)    PACEMAKER PLACEMENT      SIGMOIDOSCOPY N/A 06/26/2015    flexable sigmoidscopy,HYPERPLASTIC POLYP    TONSILLECTOMY      UPPER GASTROINTESTINAL ENDOSCOPY N/A 03/14/2019    EGD BIOPSY performed by Donnie Campos MD at [facility]       Family History   Problem Relation Age of Onset    Cancer Mother         lungs and brain    Stroke Father     Crohn's Disease Sister        Social History     Tobacco Use    Smoking status: Every Day     Packs/day: 1.00     Years: 53.00     Pack years: 53.00     Types: Cigarettes     Start date: 1969    Smokeless tobacco: Never   Substance Use Topics    Alcohol use: No     Alcohol/week: 0.0 standard drinks      Current Outpatient Medications   Medication Sig Dispense Refill    potassium chloride (KLOR-CON M) 20 MEQ extended release tablet take 1 tablet by mouth once daily 60 tablet 5    metFORMIN (GLUCOPHAGE) 500 MG tablet take 1 tablet by mouth twice a day with meals 180 tablet 1    LINZESS 290 MCG CAPS capsule take 1 capsule by mouth EVERY MORNING BEFORE BREAKFAST 30 capsule 11    tiZANidine (ZANAFLEX) 2 MG tablet take 1 tablet by mouth every 8 hours if needed for BACK PAIN 90 tablet 3    gabapentin (NEURONTIN) 800 MG tablet Take 1 tablet by mouth 3 times daily for 30 days.  90 tablet 5    OneTouch Delica Lancets 97R MISC Apply 1 Units topically 2 times daily USE 2 TO 3 TIMES DAILY AS DIRECTED 60 each 4    pantoprazole (PROTONIX) 40 MG tablet TAKE 1 TABLET BY MOUTH ONCE DAILY 30 tablet 3    traZODone (DESYREL) 100 MG tablet take 1 tablet by mouth nightly 90 tablet 3    buprenorphine-naloxone (SUBOXONE) 8-2 MG FILM SL film dissolve 1/2 FILM under the tongue twice a day      sertraline (ZOLOFT) 50 MG tablet TAKE 1/2 A TABLET BY MOUTH AT NIGHT FOR 3 NIGHTS AND INCREASE TO 1 TABLET AT NIGHT      LINZESS 145 MCG capsule take 1 capsule by mouth once daily      hydrOXYzine HCl (ATARAX) 25 MG tablet       ondansetron (ZOFRAN-ODT) 4 MG disintegrating tablet dissolve 1 tablet under the tongue every 8 hours if needed for NAUSEA OR VOMITING 10 tablet 1    fluticasone-vilanterol (BREO ELLIPTA) 100-25 MCG/INH AEPB inhaler Inhale 1 puff into the lungs in the morning.  PLEASE ASK PATIENT TO CALL OUR OFFICE FOR AN APPOINTMENT. 1 each 5    atenolol (TENORMIN) 25 MG tablet Take 1 tablet by mouth daily 90 tablet 1    rOPINIRole (REQUIP) 0.5 MG tablet Take 1 tablet by mouth daily 90 tablet 3    pravastatin (PRAVACHOL) 40 MG tablet Take 1 tablet by mouth daily 90 tablet 4    nitroGLYCERIN (NITROSTAT) 0.4 MG SL tablet Place 1 tablet under the tongue every 5 minutes as needed for Chest pain 25 tablet 11    blood glucose test strips (ONETOUCH ULTRA) strip use 1 TEST STRIP to TEST BLOOD SUGAR four times a day if needed 200 strip 11    vitamin D-3 (CHOLECALCIFEROL) 125 MCG (5000 UT) TABS Take 1 tablet by mouth daily 30 tablet 3    dicyclomine (BENTYL) 10 MG capsule Take 1 capsule by mouth 4 times daily (before meals and nightly) 120 capsule 11    furosemide (LASIX) 20 MG tablet Take 1 tablet by mouth 2 times daily 60 tablet 4    glucose monitoring (FREESTYLE FREEDOM) kit 1 kit by Does not apply route daily 1 kit 0    BREO ELLIPTA 200-25 MCG/INH AEPB inhaler INHALE 2 PUFFS ONTO THE LUNGS DAILY      tiotropium (SPIRIVA RESPIMAT) 2.5 MCG/ACT AERS inhaler Inhale 2 puffs into the lungs daily 1 each 3    albuterol sulfate HFA (PROAIR HFA) 108 (90 Base) MCG/ACT inhaler Inhale 2 puffs into the lungs every 6 hours as needed for Wheezing 18 g 3    albuterol (PROVENTIL) (2.5 MG/3ML) 0.083% nebulizer solution inhale contents of 1 vial ( 3 milliliters ) in nebulizer by mouth and INTO THE LUNGS every 6 hours      acetaminophen (TYLENOL) 500 MG tablet Take 1,000 mg by mouth every 6 hours as needed for Pain       buprenorphine-naloxone (SUBOXONE) 2-0.5 MG FILM SL film Place 1.5 Film under the tongue daily. No current facility-administered medications for this visit. Allergies   Allergen Reactions    Iv Dye [Iodides] Hives       Health Maintenance   Topic Date Due    COVID-19 Vaccine (1) Never done    Shingles vaccine (1 of 2) Never done    Diabetic retinal exam  09/19/2021    Hepatitis B vaccine (2 of 3 - Risk 3-dose series) 12/15/2022    Breast cancer screen  01/08/2023    Colorectal Cancer Screen  01/09/2023    Diabetic Alb to Cr ratio (uACR) test  01/18/2023    Lipids  01/18/2023    Flu vaccine (1) 11/17/2023 (Originally 8/1/2022)    Low dose CT lung screening  03/04/2023    Diabetic foot exam  04/26/2023    Hepatitis A vaccine (2 of 2 - Risk 2-dose series) 05/17/2023    Annual Wellness Visit (AWV)  08/18/2023    GFR test (Diabetes, CKD 3-4, OR last GFR 15-59)  09/12/2023    A1C test (Diabetic or Prediabetic)  02/20/2024    Depression Monitoring  02/20/2024    DTaP/Tdap/Td vaccine (3 - Td or Tdap) 06/16/2031    DEXA (modify frequency per FRAX score)  Completed    Pneumococcal 65+ years Vaccine  Completed    HIV screen  Completed    Hib vaccine  Aged Out    Meningococcal (ACWY) vaccine  Aged Out       Subjective:     Review of Systems   Constitutional:  Negative for appetite change, fatigue and fever. HENT:  Negative for congestion, ear pain, hearing loss and sore throat. Eyes:  Negative for discharge and visual disturbance. Respiratory:  Negative for cough, chest tightness, shortness of breath and wheezing. Cardiovascular:  Negative for chest pain, palpitations and leg swelling.    Gastrointestinal:  Negative for abdominal pain, constipation, diarrhea, nausea and vomiting. Genitourinary:  Negative for flank pain, frequency, hematuria and urgency. Musculoskeletal:  Positive for arthralgias. Negative for gait problem, joint swelling and myalgias. Skin: Negative. Neurological:  Negative for dizziness, weakness, numbness and headaches. Psychiatric/Behavioral: Negative. Negative for dysphoric mood. The patient is not nervous/anxious. Objective:     Physical Exam  Vitals reviewed. Constitutional:       Appearance: Normal appearance. She is normal weight. HENT:      Head: Normocephalic and atraumatic. Nose: Nose normal.      Mouth/Throat:      Mouth: Mucous membranes are moist.      Pharynx: Oropharynx is clear. Eyes:      Extraocular Movements: Extraocular movements intact. Conjunctiva/sclera: Conjunctivae normal.      Pupils: Pupils are equal, round, and reactive to light. Cardiovascular:      Rate and Rhythm: Normal rate and regular rhythm. Heart sounds: Normal heart sounds. No murmur heard. No gallop. Pulmonary:      Effort: Pulmonary effort is normal. No respiratory distress. Breath sounds: Normal breath sounds. No stridor. No wheezing. Abdominal:      General: Bowel sounds are normal. There is no distension. Palpations: Abdomen is soft. Tenderness: There is no abdominal tenderness. There is no guarding or rebound. Musculoskeletal:         General: No swelling or tenderness. Normal range of motion. Cervical back: Normal range of motion and neck supple. Skin:     General: Skin is warm and dry. Coloration: Skin is not jaundiced. Findings: No rash. Neurological:      General: No focal deficit present. Mental Status: She is alert and oriented to person, place, and time. Psychiatric:         Mood and Affect: Mood normal.         Behavior: Behavior normal.         Thought Content:  Thought content normal.         Judgment: Judgment normal.     /75   Pulse 93   Temp 97.4 °F (36.3 °C) Ht 6' 1\" (1.854 m)   Wt 219 lb (99.3 kg)   SpO2 99%   BMI 28.89 kg/m²     Assessment/Plan:   1. Essential hypertension  -     CBC with Auto Differential; Future  -     Comprehensive Metabolic Panel, Fasting; Future  -     Lipid, Fasting; Future  -     TSH with Reflex; Future  2. Type 2 diabetes mellitus with complication (HCC)  -     POCT glycosylated hemoglobin (Hb A1C)  -     Microalbumin, Ur; Future  3. Moderate episode of recurrent major depressive disorder ([facility]  4. Chronic diastolic congestive heart failure ([facility]  -     Ambulatory Referral to Care Management with Device (Remote Patient Monitoring)  -     potassium chloride (KLOR-CON M) 20 MEQ extended release tablet; take 1 tablet by mouth once daily, Disp-60 tablet, R-5Normal  5. Hep C w/o coma, chronic (HCC)  6. Bilateral chronic knee pain  -     XR KNEE RIGHT (3 VIEWS); Future  -     XR KNEE LEFT (3 VIEWS); Future  7. Vitamin D deficiency  -     Vitamin D 25 Hydroxy; Future  8. Tobacco use  -     [facility]) Medication Mgmt (Smoking Cessation - Clinical Pharmacy) - [facility] Morales  9. Encounter for screening mammogram for malignant neoplasm of breast  -     [facility] DIGITAL SCREEN W OR WO CAD BILATERAL; Future  10. Colon cancer screening  -     [facility] Screening Colonoscopy      Hypertension-controlled, on atenolol 25 mg daily    Type 2 diabetes-well-controlled, POCT A1c today 5.5, on metformin 500 mg twice daily    Congestive heart failure-stable, on Lasix 20 mg daily and potassium 20 mEq daily    Bilateral knee pain-x-rays ordered for evaluation    Hepatitis C-completed treatment    Anxiety and depression-stable on current medications    Patient was counseled on tobacco cessation. Based upon patient's motivation to change her behavior, the following plan was agreed upon: patient will try the following tobacco cessation strategies:  tobacco cessation classes/support groups. She was provided with a list of local tobacco cessation resources.  Provider spent 3 minutes counseling patient. Return in about 6 months (around 8/18/2023) for medicare annual wellness visit, after 8/17. Orders Placed This Encounter   Procedures    XR KNEE RIGHT (3 VIEWS)     Standing Status:   Future     Standing Expiration Date:   2/20/2024    XR KNEE LEFT (3 VIEWS)     Standing Status:   Future     Standing Expiration Date:   2/20/2024    [facility] DIGITAL SCREEN W OR WO CAD BILATERAL     Standing Status:   Future     Standing Expiration Date:   8/20/2023     Order Specific Question:   Reason for exam:     Answer:   screening Z12.31    CBC with Auto Differential     Standing Status:   Future     Standing Expiration Date:   8/20/2023    Comprehensive Metabolic Panel, Fasting     Standing Status:   Future     Standing Expiration Date:   8/20/2023    Lipid, Fasting     Standing Status:   Future     Standing Expiration Date:   8/20/2023    TSH with Reflex     Standing Status:   Future     Standing Expiration Date:   8/20/2023    Microalbumin, Ur     Standing Status:   Future     Standing Expiration Date:   5/20/2023    Vitamin D 25 Hydroxy     Standing Status:   Future     Standing Expiration Date:   8/20/2023    Ambulatory Referral to Care Management with Device (Remote Patient Monitoring)     Referral Priority:   Routine     Referral Type:   Consult for Advice and Opinion     Number of Visits Requested:   Krissy Daugherty Screening Colonoscopy     Referral Priority:   Routine     Referral Type:    Other     Referral Reason:   Specialty Services Required     Number of Visits Requested:   [address] Medication Mgmt (Smoking Cessation - Clinical Pharmacy) - [facility]     Referral Priority:   Routine     Referral Type:   Consult for Advice and Opinion     Referral Reason:   Specialty Services Required     Requested Specialty:   Pharmacist     Number of Visits Requested:   1     Expiration Date:   2/20/2025    POCT glycosylated hemoglobin (Hb A1C)     Orders Placed This Encounter   Medications potassium chloride (KLOR-CON M) 20 MEQ extended release tablet     Sig: take 1 tablet by mouth once daily     Dispense:  60 tablet     Refill:  5       Patient given educational materials - see patient instructions. Discussed use, benefit, and side effects of prescribed medications. All patientquestions answered. Pt voiced understanding. Reviewed health maintenance. Instructedto continue current medications, diet and exercise. Patient agreed with treatmentplan. Follow up as directed.      Electronically signed by Salomon Ron MD on 2/21/2023 at 2:03 PM

## 2023-02-21 ENCOUNTER — TELEPHONE (OUTPATIENT)
Dept: PULMONOLOGY | Age: 68
End: 2023-02-21

## 2023-02-21 ASSESSMENT — ENCOUNTER SYMPTOMS
EYE DISCHARGE: 0
COUGH: 0
DIARRHEA: 0
NAUSEA: 0
VOMITING: 0
ABDOMINAL PAIN: 0
CHEST TIGHTNESS: 0
SHORTNESS OF BREATH: 0
WHEEZING: 0
SORE THROAT: 0
CONSTIPATION: 0

## 2023-02-21 NOTE — TELEPHONE ENCOUNTER
Patient called stating that her electric will be shut off today and she wants you to sign a letter from the electric company because she has oxygen.   I told her id have to run it by you and if I see paper work it will be on your desk

## 2023-02-21 NOTE — TELEPHONE ENCOUNTER
Patient called again asking if I could make sure it is signed by 4 today so her lights dont get shut off. I explained to her that we are doing are best but she should not have waited till literally the last minute to have something faxed over and expect it to be done that day.  is not always here and he is seeing patients.   I put it on 's desk with a note to please sign

## 2023-02-22 NOTE — TELEPHONE ENCOUNTER
Spoke with patient and let her know that Zelalem Mathur faxed over yesterday just before 5.   Patient said thank you

## 2023-03-01 ENCOUNTER — TELEPHONE (OUTPATIENT)
Dept: FAMILY MEDICINE CLINIC | Age: 68
End: 2023-03-01

## 2023-03-01 DIAGNOSIS — G89.29 CHRONIC PAIN OF BOTH SHOULDERS: Primary | ICD-10-CM

## 2023-03-01 DIAGNOSIS — M25.511 CHRONIC PAIN OF BOTH SHOULDERS: Primary | ICD-10-CM

## 2023-03-01 DIAGNOSIS — M25.512 CHRONIC PAIN OF BOTH SHOULDERS: Primary | ICD-10-CM

## 2023-03-01 NOTE — TELEPHONE ENCOUNTER
Patient is requesting xrays of both shoulders  she stated she did get orders for the knees but needed both

## 2023-03-02 ENCOUNTER — CARE COORDINATION (OUTPATIENT)
Dept: CARE COORDINATION | Age: 68
End: 2023-03-02

## 2023-03-03 ENCOUNTER — TELEPHONE (OUTPATIENT)
Dept: GASTROENTEROLOGY | Age: 68
End: 2023-03-03

## 2023-03-03 NOTE — CARE COORDINATION
Attempted outreaches for CC needs/enrollment, DM and CHF management, PCP referral/RPM monitoring  Unable to LVM, mailbox not set up  Will attempt another outreach next week

## 2023-03-03 NOTE — TELEPHONE ENCOUNTER
Pt lvm to [facility] colonoscopy she is est with Dr Maryann Quevedo. She has no showed many appts 7/22/2020, 10/2/2020, 12/8/2020 and may more. Please look into her chart so that she can be dismissed from the practice.

## 2023-03-09 NOTE — TELEPHONE ENCOUNTER
Writer notes pt is being referred to [facility] for colon, pt is being dismissed from [facility] due to too many NS. Writer tried to call pt back to give her this information, mailbox is full and you cannot leave a message, Cheryl Mistry will try pt again later.

## 2023-03-13 ENCOUNTER — TELEPHONE (OUTPATIENT)
Dept: FAMILY MEDICINE CLINIC | Age: 68
End: 2023-03-13

## 2023-03-13 NOTE — TELEPHONE ENCOUNTER
Patient states that her stool is black and is having stomach pain.  She states that Gastro informed her to contact pcp so that you can order a stool test.

## 2023-03-14 DIAGNOSIS — E78.00 PURE HYPERCHOLESTEROLEMIA: ICD-10-CM

## 2023-03-14 RX ORDER — PRAVASTATIN SODIUM 40 MG
TABLET ORAL
Qty: 90 TABLET | Refills: 4 | Status: SHIPPED | OUTPATIENT
Start: 2023-03-14

## 2023-03-14 NOTE — TELEPHONE ENCOUNTER
Amber Corona is calling to request a refill on the following medication(s):    Medication Request:  Requested Prescriptions     Pending Prescriptions Disp Refills    pravastatin (PRAVACHOL) 40 MG tablet [Pharmacy Med Name: PRAVASTATIN SODIUM 40 MG TAB] 90 tablet 4     Sig: take 1 tablet by mouth once daily       Last Visit Date (If Applicable):  1/90/9699    Next Visit Date:    8/22/2023

## 2023-03-22 ENCOUNTER — APPOINTMENT (OUTPATIENT)
Dept: GENERAL RADIOLOGY | Age: 68
End: 2023-03-22
Payer: MEDICARE

## 2023-03-22 ENCOUNTER — HOSPITAL ENCOUNTER (EMERGENCY)
Age: 68
Discharge: LEFT BEFORE DISCHARGE | End: 2023-03-23
Attending: EMERGENCY MEDICINE
Payer: MEDICARE

## 2023-03-22 VITALS
BODY MASS INDEX: 29.26 KG/M2 | HEART RATE: 80 BPM | DIASTOLIC BLOOD PRESSURE: 70 MMHG | HEIGHT: 72 IN | SYSTOLIC BLOOD PRESSURE: 167 MMHG | TEMPERATURE: 98.5 F | WEIGHT: 216 LBS | RESPIRATION RATE: 14 BRPM

## 2023-03-22 DIAGNOSIS — S46.911A STRAIN OF RIGHT SHOULDER, INITIAL ENCOUNTER: ICD-10-CM

## 2023-03-22 DIAGNOSIS — R07.9 CHEST PAIN, UNSPECIFIED TYPE: Primary | ICD-10-CM

## 2023-03-22 DIAGNOSIS — S46.912A STRAIN OF LEFT SHOULDER, INITIAL ENCOUNTER: ICD-10-CM

## 2023-03-22 LAB
ALBUMIN SERPL-MCNC: 4.1 G/DL (ref 3.5–5.2)
ALBUMIN/GLOBULIN RATIO: 1.2 (ref 1–2.5)
ALP SERPL-CCNC: 78 U/L (ref 35–104)
ALT SERPL-CCNC: 7 U/L (ref 5–33)
ANION GAP SERPL CALCULATED.3IONS-SCNC: 12 MMOL/L (ref 9–17)
AST SERPL-CCNC: 16 U/L
BILIRUB SERPL-MCNC: 0.3 MG/DL (ref 0.3–1.2)
BUN SERPL-MCNC: 10 MG/DL (ref 8–23)
CALCIUM SERPL-MCNC: 9.5 MG/DL (ref 8.6–10.4)
CHLORIDE SERPL-SCNC: 100 MMOL/L (ref 98–107)
CO2 SERPL-SCNC: 26 MMOL/L (ref 20–31)
CREAT SERPL-MCNC: 0.78 MG/DL (ref 0.5–0.9)
GFR SERPL CREATININE-BSD FRML MDRD: >60 ML/MIN/1.73M2
GLUCOSE SERPL-MCNC: 121 MG/DL (ref 70–99)
HCT VFR BLD AUTO: 41.6 % (ref 36.3–47.1)
HGB BLD-MCNC: 13.8 G/DL (ref 11.9–15.1)
MAGNESIUM SERPL-MCNC: 2.1 MG/DL (ref 1.6–2.6)
MCH RBC QN AUTO: 30.9 PG (ref 25.2–33.5)
MCHC RBC AUTO-ENTMCNC: 33.2 G/DL (ref 28.4–34.8)
MCV RBC AUTO: 93.3 FL (ref 82.6–102.9)
NRBC AUTOMATED: 0 PER 100 WBC
PDW BLD-RTO: 13.6 % (ref 11.8–14.4)
PLATELET # BLD AUTO: 243 K/UL (ref 138–453)
PMV BLD AUTO: 9.7 FL (ref 8.1–13.5)
POTASSIUM SERPL-SCNC: 3.7 MMOL/L (ref 3.7–5.3)
PROT SERPL-MCNC: 7.4 G/DL (ref 6.4–8.3)
RBC # BLD: 4.46 M/UL (ref 3.95–5.11)
SODIUM SERPL-SCNC: 138 MMOL/L (ref 135–144)
TROPONIN I SERPL DL<=0.01 NG/ML-MCNC: 11 NG/L (ref 0–14)
TROPONIN I SERPL DL<=0.01 NG/ML-MCNC: 8 NG/L (ref 0–14)
WBC # BLD AUTO: 9.1 K/UL (ref 3.5–11.3)

## 2023-03-22 PROCEDURE — 84484 ASSAY OF TROPONIN QUANT: CPT

## 2023-03-22 PROCEDURE — 96374 THER/PROPH/DIAG INJ IV PUSH: CPT

## 2023-03-22 PROCEDURE — 73030 X-RAY EXAM OF SHOULDER: CPT

## 2023-03-22 PROCEDURE — 6370000000 HC RX 637 (ALT 250 FOR IP): Performed by: STUDENT IN AN ORGANIZED HEALTH CARE EDUCATION/TRAINING PROGRAM

## 2023-03-22 PROCEDURE — 83735 ASSAY OF MAGNESIUM: CPT

## 2023-03-22 PROCEDURE — 80053 COMPREHEN METABOLIC PANEL: CPT

## 2023-03-22 PROCEDURE — 85027 COMPLETE CBC AUTOMATED: CPT

## 2023-03-22 PROCEDURE — 93005 ELECTROCARDIOGRAM TRACING: CPT | Performed by: STUDENT IN AN ORGANIZED HEALTH CARE EDUCATION/TRAINING PROGRAM

## 2023-03-22 PROCEDURE — 71046 X-RAY EXAM CHEST 2 VIEWS: CPT

## 2023-03-22 PROCEDURE — 99285 EMERGENCY DEPT VISIT HI MDM: CPT

## 2023-03-22 RX ORDER — ASPIRIN 81 MG/1
324 TABLET, CHEWABLE ORAL ONCE
Status: COMPLETED | OUTPATIENT
Start: 2023-03-22 | End: 2023-03-22

## 2023-03-22 RX ORDER — ACETAMINOPHEN 500 MG
1000 TABLET ORAL ONCE
Status: COMPLETED | OUTPATIENT
Start: 2023-03-22 | End: 2023-03-22

## 2023-03-22 RX ADMIN — ASPIRIN 81 MG 324 MG: 81 TABLET ORAL at 22:19

## 2023-03-22 RX ADMIN — ACETAMINOPHEN 1000 MG: 500 TABLET ORAL at 22:19

## 2023-03-22 ASSESSMENT — PAIN DESCRIPTION - LOCATION: LOCATION: SHOULDER;CHEST

## 2023-03-22 ASSESSMENT — PAIN SCALES - GENERAL: PAINLEVEL_OUTOF10: 8

## 2023-03-23 ENCOUNTER — TELEPHONE (OUTPATIENT)
Dept: FAMILY MEDICINE CLINIC | Age: 68
End: 2023-03-23

## 2023-03-23 DIAGNOSIS — R07.9 CHEST PAIN, UNSPECIFIED TYPE: Primary | ICD-10-CM

## 2023-03-23 LAB
EKG ATRIAL RATE: 81 BPM
EKG P AXIS: 7 DEGREES
EKG P-R INTERVAL: 168 MS
EKG Q-T INTERVAL: 408 MS
EKG QRS DURATION: 84 MS
EKG QTC CALCULATION (BAZETT): 473 MS
EKG R AXIS: -43 DEGREES
EKG T AXIS: 10 DEGREES
EKG VENTRICULAR RATE: 81 BPM

## 2023-03-23 PROCEDURE — 6360000002 HC RX W HCPCS: Performed by: STUDENT IN AN ORGANIZED HEALTH CARE EDUCATION/TRAINING PROGRAM

## 2023-03-23 PROCEDURE — 93010 ELECTROCARDIOGRAM REPORT: CPT | Performed by: INTERNAL MEDICINE

## 2023-03-23 RX ORDER — KETOROLAC TROMETHAMINE 15 MG/ML
15 INJECTION, SOLUTION INTRAMUSCULAR; INTRAVENOUS ONCE
Status: COMPLETED | OUTPATIENT
Start: 2023-03-23 | End: 2023-03-23

## 2023-03-23 RX ADMIN — KETOROLAC TROMETHAMINE 15 MG: 15 INJECTION, SOLUTION INTRAMUSCULAR; INTRAVENOUS at 01:16

## 2023-03-23 ASSESSMENT — HEART SCORE: ECG: 1

## 2023-03-23 ASSESSMENT — PAIN DESCRIPTION - ORIENTATION: ORIENTATION: RIGHT;LEFT

## 2023-03-23 ASSESSMENT — PAIN DESCRIPTION - DESCRIPTORS: DESCRIPTORS: ACHING

## 2023-03-23 ASSESSMENT — PAIN DESCRIPTION - LOCATION: LOCATION: SHOULDER

## 2023-03-23 ASSESSMENT — PAIN SCALES - GENERAL: PAINLEVEL_OUTOF10: 10

## 2023-03-23 NOTE — ED PROVIDER NOTES
[facility] ED  Emergency Department Encounter  Emergency Medicine Resident     Pt Chidhiraj Abrams  MRN: [id number]  [place] 1955  Date of evaluation: 3/22/23  PCP:  Nomi Ontiveros MD  Note Started: 10:05 PM EDT      CHIEF COMPLAINT       Chief Complaint   Patient presents with    Chest Pain    Shoulder Pain     Both        HISTORY OF PRESENT ILLNESS  (Location/Symptom, Timing/Onset, Context/Setting, Quality, Duration, Modifying Factors, Severity.)      Liz Andrade is a 74 y.o. female who presents with chest pain, bilateral shoulder pain. Patient states that the pain acutely got worse over the past day. Patient has been having shoulder pain for the past month. Patient has a history of COPD, RACHEL, multiple comorbidities, active smoker. Patient's not having any abdominal pain, no leg swelling, no fatigue no loss of consciousness.     PAST MEDICAL / SURGICAL / SOCIAL / FAMILY HISTORY      has a past medical history of RACHEL (acute kidney injury) ([address] Arthritis, Bilateral chronic knee pain, Bronchiectasis without complication ([address] Cancer ([address] CHF (congestive heart failure) ([address] Chronic bilateral low back pain with right-sided sciatica, Chronic bronchitis (HCC), Chronic bronchitis ([address] Chronic respiratory failure with hypoxia ([address] Cigarette nicotine dependence without complication, COPD (chronic obstructive pulmonary disease) ([address] Current smoker on some days, Depression, Diabetic polyneuropathy associated with type 2 diabetes mellitus ([address] Diverticulosis, Essential hypertension, Full dentures, GERD (gastroesophageal reflux disease), Hep C w/o coma, chronic ([address] Hyperlipidemia, Hyperplastic polyp of intestine, Hypertension, Irritable bowel syndrome with both constipation and diarrhea, Liver disease, Moderate episode of recurrent major depressive disorder ([address] Nasal polyposis, Noncompliance with CPAP treatment, Obesity (BMI 35.0-39.9 without comorbidity), On home O2, JIMENA Exercise per Week: 7 days    Minutes of Exercise per Session: 150+ min   Stress: Not on file   Social Connections: Not on file   Intimate Partner Violence: Not on file   Housing Stability: Unknown    Unable to Pay for Housing in the Last Year: No    Number of Places Lived in the Last Year: Not on file    Unstable Housing in the Last Year: No       Family History   Problem Relation Age of Onset    Cancer Mother         lungs and brain    Stroke Father     Crohn's Disease Sister        Allergies: Iv dye [iodides]    Home Medications:  Prior to Admission medications    Medication Sig Start Date End Date Taking? Authorizing Provider   pravastatin (PRAVACHOL) 40 MG tablet take 1 tablet by mouth once daily 3/14/23   Fay Mcfarland MD   potassium chloride (KLOR-CON M) 20 MEQ extended release tablet take 1 tablet by mouth once daily 2/20/23   Fay Mcfarland MD   metFORMIN (GLUCOPHAGE) 500 MG tablet take 1 tablet by mouth twice a day with meals 2/13/23   Fay Mcfarland MD   LINZESS 290 MCG CAPS capsule take 1 capsule by mouth EVERY MORNING BEFORE BREAKFAST 2/7/23   Fay Mcfarland MD   tiZANidine (ZANAFLEX) 2 MG tablet take 1 tablet by mouth every 8 hours if needed for BACK PAIN 1/27/23   Fay Mcfarland MD   gabapentin (NEURONTIN) 800 MG tablet Take 1 tablet by mouth 3 times daily for 30 days.  1/18/23 2/20/23  Fay Mcfarland MD   KaiTouch Delica Lancets 78Y MISC Apply 1 Units topically 2 times daily USE 2 TO 3 TIMES DAILY AS DIRECTED 1/12/23 6/11/23  Fay Mcfarland MD   pantoprazole (PROTONIX) 40 MG tablet TAKE 1 TABLET BY MOUTH ONCE DAILY 1/9/23   Fay Mcfarland MD   traZODone (DESYREL) 100 MG tablet take 1 tablet by mouth nightly 12/15/22   Fay Mcfarland MD   buprenorphine-naloxone (SUBOXONE) 8-2 MG FILM SL film dissolve 1/2 FILM under the tongue twice a day 11/14/22   Historical Provider, MD   sertraline (ZOLOFT) 50 MG tablet TAKE 1/2 A TABLET BY MOUTH AT NIGHT FOR 3 NIGHTS AND INCREASE TO 1 TABLET AT NIGHT 10/31/22   Historical

## 2023-03-23 NOTE — DISCHARGE INSTRUCTIONS
You have been seen in the ER today for shoulder pain and chest pain. You were evaluated and your workup was negative. Please follow up with your PCP  If you begin to experience any symptoms such as chest pain shortness of breath nausea vomiting dizziness drowsiness abdominal pain loss of consciousness or any other symptoms you find concerning please return to the ED for follow-up evaluation. If you have been given pain medication please take them only as prescribed. Do not take more medication than prescribed at any given time. Please follow-up with your primary care provider within 3-5 days for continued care, sooner if you have concerns.

## 2023-03-23 NOTE — ED TRIAGE NOTES
Patient presents to the ED with c/o chest pain and bilateral shoulder pain. Patient states this has been ongoing for approximately 1 1/2 months. Patient has taken tylenol and motrin and did not get relief. Patient states she does experience SOB due to COPD, and has some tingling in lower extremities. Patient is alert and oriented x4, respirations are even and unlabored, and speaking in complete sentences.

## 2023-03-23 NOTE — TELEPHONE ENCOUNTER
Patient stated that she was seen in the Er and had a cardio event  and was advised to go to Cardio but the office she reached out to can not get her in until April 18. Is that too long to wait ?

## 2023-03-23 NOTE — ED NOTES
[employer] pacemaker interrogated.       Rosette Cohen RN  03/22/23 4987
Patient again expresses she wants to leave AMA. Patient educated on risks of leaving AMA. Patient verbalized understanding and would still like to leave.      Lady Norman RN  03/23/23 7256
Patient placed on 2L of O2 NC per patient request. O2 sat at 92. Patient states she normally wears 2L at home.      Lady Norman RN  03/22/23 5367
Pt unhooking herself from monitor and demanding her IV be removed. Pt stating \"im out of here\". Pt not able to be redirected into bed. Dr. Krystle Grace notified and at bedside to address issue.       Heather Ford RN  03/23/23 0105
The following labs were labeled with appropriate pt sticker and tubed to lab:     [] Blue     [] Lavender   [] on ice  [x] Green/yellow  [] Green/black [] on ice  [] Dominique [place]  [] on ice  [] Yellow  [] Red  [] Type/ Screen  [] ABG  [] VBG    [] COVID-19 swab    [] Rapid  [] PCR  [] Flu swab  [] Peds Viral Panel     [] Urine Sample  [] Fecal Sample  [] Pelvic Cultures  [] Blood Cultures  [] X 2  [] STREP Cultures       Nica Villarreal RN  03/22/23 7246
The following labs were labeled with appropriate pt sticker and tubed to lab:     [x] Blue     [x] Lavender   [] on ice  [x] Green/yellow  [] Green/black [] on ice  [] Alie Marvel  [] on ice  [x] Yellow  [] Red  [] Type/ Screen  [] ABG  [] VBG    [] COVID-19 swab    [] Rapid  [] PCR  [] Flu swab  [] Peds Viral Panel     [] Urine Sample  [] Fecal Sample  [] Pelvic Cultures  [] Blood Cultures  [] X 2  [] STREP Cultures       Mitch Lynch RN  03/22/23 7463
Final diagnoses:   Chest pain, unspecified type   Strain of left shoulder, initial encounter   Strain of right shoulder, initial encounter        Code Status: Full    Consults:  [x]  Hospitalist  Completed  [] yes [] no  []  Medicine  Completed  [] yes [] No  []  Cardiology  Completed  [] yes [] No  []  GI   Completed  [] yes [] No  []  Neurology  Completed  [] yes [] No  []  Nephrology Completed  [] yes [] No  []  Vascular  Completed  [] yes [] No   []  Surgery  Completed  [] yes [] No   []  Urology  Completed  [] yes [] No   []  Plastics  Completed  [] yes [] No   []  ENT  Completed  [] yes [] No   []  Other:  Completed  [] yes [] No    Consults:  IP CONSULT TO HOSPITALIST     Treatment Team:   Treatment Team: Attending Provider: Juma Sims MD; Registered Nurse: Pepper Bañuelos, RN; Resident: Roya Aiken DO; Registered Nurse: Aida Bolivar RN; Consulting Physician: MARCIA Ding CNP    Treatment:  ED Course as of 03/23/23 0142   Wed Mar 22, 2023   2340 [address] [facility] interrogation did not show arrhythmias, no acute issues. [KK]   2341 Trop at 8 pending repeat trop [KK]   2342 Chest XR 3/22   IMPRESSION:  Bibasilar airspace opacities, also seen on the prior exam, which could  represent atelectasis and/or scarring.    [KK]   u Mar 23, 2023   0102 Troponin delta less than 5, went from 8-11, patient's not complaining of any chest pain she is primarily complaining of bilateral shoulder pain. Shoulder imaging shows stable arthritis, chronic changes. Chest x-ray does not show any acute abnormalities as well. Patient is hemodynamically stable, will be given return precautions, appropriate for discharge and outpatient follow-up.  [KK]   0126 Heart score 5 will [facility] to [facility] for cardiac evaluation [KK]      ED Course User Index  [KK] Roya Aiken DO              Skin Assessment:        Pain Score:  Pain Assessment  Pain Assessment: 0-10  Pain Level: 10  Pain Location: Shoulder  Pain Orientation:
normal...

## 2023-03-23 NOTE — ED TRIAGE NOTES
Bilateral shoulder pain,   denies any recent falls is having some chest pain associated with the shoulder pain   These concerns started about  1 1/2 months ago; and have become worse    Has tried tylenol and Motrin no relief

## 2023-03-24 RX ORDER — FLUTICASONE FUROATE AND VILANTEROL 100; 25 UG/1; UG/1
1 POWDER RESPIRATORY (INHALATION) DAILY
Qty: 1 EACH | Refills: 9 | Status: SHIPPED | OUTPATIENT
Start: 2023-03-24

## 2023-03-24 NOTE — TELEPHONE ENCOUNTER
LAST VISIT: 1/27/23  NEXT VISIT: 5/26/23    Per last dictation patient is on this medication. Please sign for refill if ok. Thank you.

## 2023-03-28 ENCOUNTER — TELEPHONE (OUTPATIENT)
Dept: PHARMACY | Age: 68
End: 2023-03-28

## 2023-03-28 NOTE — TELEPHONE ENCOUNTER
Clinic received new referral for Smoking Cessation,  called to schedule but no answer and vm is not set up. I called alternate contact number and left vm message.        Kalyan Michelle, Keshia, CACP  Clinical Pharmacist Medication Management  3/28/2023  12:23 PM

## 2023-03-31 ENCOUNTER — HOSPITAL ENCOUNTER (OUTPATIENT)
Dept: NUCLEAR MEDICINE | Age: 68
End: 2023-03-31
Payer: COMMERCIAL

## 2023-03-31 ENCOUNTER — HOSPITAL ENCOUNTER (OUTPATIENT)
Dept: NON INVASIVE DIAGNOSTICS | Age: 68
Discharge: HOME | End: 2023-03-31
Payer: MEDICARE

## 2023-03-31 DIAGNOSIS — R07.9 CHEST PAIN, UNSPECIFIED TYPE: ICD-10-CM

## 2023-03-31 LAB
LV EF: 75 %
LVEF MODALITY: NORMAL

## 2023-03-31 PROCEDURE — 78452 HT MUSCLE IMAGE SPECT MULT: CPT

## 2023-03-31 PROCEDURE — 3430000000 HC RX DIAGNOSTIC RADIOPHARMACEUTICAL: Performed by: STUDENT IN AN ORGANIZED HEALTH CARE EDUCATION/TRAINING PROGRAM

## 2023-03-31 PROCEDURE — 2580000003 HC RX 258: Performed by: STUDENT IN AN ORGANIZED HEALTH CARE EDUCATION/TRAINING PROGRAM

## 2023-03-31 PROCEDURE — 6360000002 HC RX W HCPCS: Performed by: STUDENT IN AN ORGANIZED HEALTH CARE EDUCATION/TRAINING PROGRAM

## 2023-03-31 PROCEDURE — A9500 TC99M SESTAMIBI: HCPCS | Performed by: STUDENT IN AN ORGANIZED HEALTH CARE EDUCATION/TRAINING PROGRAM

## 2023-03-31 PROCEDURE — 93017 CV STRESS TEST TRACING ONLY: CPT

## 2023-03-31 RX ORDER — AMINOPHYLLINE DIHYDRATE 25 MG/ML
50 INJECTION, SOLUTION INTRAVENOUS PRN
Status: DISCONTINUED | OUTPATIENT
Start: 2023-03-31 | End: 2023-03-31

## 2023-03-31 RX ORDER — SODIUM CHLORIDE 0.9 % (FLUSH) 0.9 %
10 SYRINGE (ML) INJECTION PRN
Status: DISCONTINUED | OUTPATIENT
Start: 2023-03-31 | End: 2023-04-03 | Stop reason: HOSPADM

## 2023-03-31 RX ORDER — SODIUM CHLORIDE 9 MG/ML
500 INJECTION, SOLUTION INTRAVENOUS CONTINUOUS PRN
Status: DISCONTINUED | OUTPATIENT
Start: 2023-03-31 | End: 2023-03-31

## 2023-03-31 RX ORDER — TETRAKIS(2-METHOXYISOBUTYLISOCYANIDE)COPPER(I) TETRAFLUOROBORATE 1 MG/ML
14.6 INJECTION, POWDER, LYOPHILIZED, FOR SOLUTION INTRAVENOUS
Status: COMPLETED | OUTPATIENT
Start: 2023-03-31 | End: 2023-03-31

## 2023-03-31 RX ORDER — SODIUM CHLORIDE 0.9 % (FLUSH) 0.9 %
5-40 SYRINGE (ML) INJECTION PRN
Status: DISCONTINUED | OUTPATIENT
Start: 2023-03-31 | End: 2023-03-31

## 2023-03-31 RX ORDER — NITROGLYCERIN 0.4 MG/1
0.4 TABLET SUBLINGUAL EVERY 5 MIN PRN
Status: DISCONTINUED | OUTPATIENT
Start: 2023-03-31 | End: 2023-03-31

## 2023-03-31 RX ORDER — TETRAKIS(2-METHOXYISOBUTYLISOCYANIDE)COPPER(I) TETRAFLUOROBORATE 1 MG/ML
39.3 INJECTION, POWDER, LYOPHILIZED, FOR SOLUTION INTRAVENOUS
Status: COMPLETED | OUTPATIENT
Start: 2023-03-31 | End: 2023-03-31

## 2023-03-31 RX ORDER — ALBUTEROL SULFATE 90 UG/1
2 AEROSOL, METERED RESPIRATORY (INHALATION) PRN
Status: DISCONTINUED | OUTPATIENT
Start: 2023-03-31 | End: 2023-03-31

## 2023-03-31 RX ORDER — ATROPINE SULFATE 0.1 MG/ML
0.5 INJECTION INTRAVENOUS EVERY 5 MIN PRN
Status: DISCONTINUED | OUTPATIENT
Start: 2023-03-31 | End: 2023-03-31

## 2023-03-31 RX ORDER — METOPROLOL TARTRATE 5 MG/5ML
5 INJECTION INTRAVENOUS EVERY 5 MIN PRN
Status: DISCONTINUED | OUTPATIENT
Start: 2023-03-31 | End: 2023-03-31

## 2023-03-31 RX ADMIN — SODIUM CHLORIDE, PRESERVATIVE FREE 10 ML: 5 INJECTION INTRAVENOUS at 11:15

## 2023-03-31 RX ADMIN — SODIUM CHLORIDE, PRESERVATIVE FREE 10 ML: 5 INJECTION INTRAVENOUS at 09:45

## 2023-03-31 RX ADMIN — REGADENOSON 0.4 MG: 0.08 INJECTION, SOLUTION INTRAVENOUS at 11:15

## 2023-03-31 RX ADMIN — TETRAKIS(2-METHOXYISOBUTYLISOCYANIDE)COPPER(I) TETRAFLUOROBORATE 39.3 MILLICURIE: 1 INJECTION, POWDER, LYOPHILIZED, FOR SOLUTION INTRAVENOUS at 11:15

## 2023-03-31 RX ADMIN — TETRAKIS(2-METHOXYISOBUTYLISOCYANIDE)COPPER(I) TETRAFLUOROBORATE 14.6 MILLICURIE: 1 INJECTION, POWDER, LYOPHILIZED, FOR SOLUTION INTRAVENOUS at 09:45

## 2023-03-31 RX ADMIN — SODIUM CHLORIDE, PRESERVATIVE FREE 10 ML: 5 INJECTION INTRAVENOUS at 11:04

## 2023-03-31 NOTE — PROCEDURES
[address] [facility]                              CARDIAC STRESS TEST    PATIENT NAME: Bijal Day                     :        1955  MED REC NO:   [id number]                             ROOM:  ACCOUNT NO:   [de-identified]                           ADMIT DATE: 2023  PROVIDER:     Jomar Bacon    DATE OF STUDY:  2023    ORDERING PROVIDER:  Sonia Orosco MD    PRIMARY CARE PROVIDER:  Sonia Orosco MD    INTERPRETING PHYSICIAN:  Jomar Bacon MD    LEXISCAN STRESS STUDY:  Indication:  chest pain    Procedure was explained and consent form was signed    Medications:  Lexiscan, 0.4 mg  Resting heart rate:  73 bpm  Resting blood pressure:  143/63 mm/Hg  Infusion heart rate:  88 bpm  Infusion blood pressure:  156/72 mm/Hg  Resting EKG:  Normal sinus rhythm with possible inferolateral infarct  Stress heart response:  No changes seen  Stress BP response:  Appropriate  Stress EKG(S):  No changes seen  Chest discomfort:  None  Ischemic EKG changes:  None    IMPRESSION:  Electrocardiographically negative Lexiscan stress study. Radio-isotope  results to follow from the department of Nuclear Medicine.         [facility]    D: 2023 14:30:30       T: 2023 14:34:01     ELLYN/AMBER  Job#: [id number]     Doc#: Unknown    CC:    ()

## 2023-04-17 ENCOUNTER — CARE COORDINATION (OUTPATIENT)
Dept: CARE COORDINATION | Age: 68
End: 2023-04-17

## 2023-04-17 NOTE — CARE COORDINATION
ACM attempted outreach for CC needs, CHF, COPD and DM management  NO answer and unable to LVM at this time.

## 2023-04-18 ENCOUNTER — HOSPITAL ENCOUNTER (OUTPATIENT)
Age: 68
Discharge: HOME | End: 2023-04-20
Payer: MEDICARE

## 2023-04-18 ENCOUNTER — HOSPITAL ENCOUNTER (OUTPATIENT)
Age: 68
Discharge: HOME | End: 2023-04-18
Payer: MEDICARE

## 2023-04-18 ENCOUNTER — HOSPITAL ENCOUNTER (OUTPATIENT)
Dept: GENERAL RADIOLOGY | Age: 68
Discharge: HOME | End: 2023-04-20
Payer: MEDICARE

## 2023-04-18 DIAGNOSIS — G89.29 BILATERAL CHRONIC KNEE PAIN: ICD-10-CM

## 2023-04-18 DIAGNOSIS — M25.562 BILATERAL CHRONIC KNEE PAIN: ICD-10-CM

## 2023-04-18 DIAGNOSIS — M25.561 BILATERAL CHRONIC KNEE PAIN: ICD-10-CM

## 2023-04-18 PROCEDURE — 73562 X-RAY EXAM OF KNEE 3: CPT

## 2023-04-20 ENCOUNTER — TELEPHONE (OUTPATIENT)
Dept: FAMILY MEDICINE CLINIC | Age: 68
End: 2023-04-20

## 2023-04-20 DIAGNOSIS — M17.0 BILATERAL PRIMARY OSTEOARTHRITIS OF KNEE: Primary | ICD-10-CM

## 2023-04-21 ENCOUNTER — TELEPHONE (OUTPATIENT)
Dept: OTHER | Age: 68
End: 2023-04-21

## 2023-04-21 NOTE — TELEPHONE ENCOUNTER
Pt no show for initial appt to the [facility]F Clinic today. Writer phoned pt , no answer. Unable to leave message as no voicemail box set up yet. Will try again at later date.

## 2023-05-10 DIAGNOSIS — K21.9 GASTROESOPHAGEAL REFLUX DISEASE WITHOUT ESOPHAGITIS: ICD-10-CM

## 2023-05-10 RX ORDER — PANTOPRAZOLE SODIUM 40 MG/1
TABLET, DELAYED RELEASE ORAL
Qty: 30 TABLET | Refills: 3 | Status: SHIPPED | OUTPATIENT
Start: 2023-05-10

## 2023-05-10 NOTE — TELEPHONE ENCOUNTER
Keitha Essex is calling to request a refill on the following medication(s):    Medication Request:  Requested Prescriptions     Pending Prescriptions Disp Refills    pantoprazole (PROTONIX) 40 MG tablet [Pharmacy Med Name: PANTOPRAZOLE SOD DR 40 MG TAB] 30 tablet 3     Sig: TAKE 1 TABLET BY MOUTH ONCE DAILY       Last Visit Date (If Applicable):  9/71/9329    Next Visit Date:    8/22/2023

## 2023-06-12 DIAGNOSIS — Z12.31 BREAST CANCER SCREENING BY MAMMOGRAM: Primary | ICD-10-CM

## 2023-06-16 ENCOUNTER — HOSPITAL ENCOUNTER (OUTPATIENT)
Dept: CT IMAGING | Age: 68
Discharge: HOME | End: 2023-06-18
Attending: INTERNAL MEDICINE
Payer: MEDICARE

## 2023-06-16 DIAGNOSIS — Z87.891 PERSONAL HISTORY OF NICOTINE DEPENDENCE: ICD-10-CM

## 2023-06-16 PROCEDURE — 71271 CT THORAX LUNG CANCER SCR C-: CPT

## 2023-06-20 ENCOUNTER — OFFICE VISIT (OUTPATIENT)
Dept: PULMONOLOGY | Age: 68
End: 2023-06-20
Payer: MEDICARE

## 2023-06-20 VITALS
WEIGHT: 218 LBS | BODY MASS INDEX: 29.53 KG/M2 | HEIGHT: 72 IN | OXYGEN SATURATION: 93 % | HEART RATE: 78 BPM | SYSTOLIC BLOOD PRESSURE: 129 MMHG | DIASTOLIC BLOOD PRESSURE: 71 MMHG

## 2023-06-20 DIAGNOSIS — I20.9 ANGINA PECTORIS, UNSPECIFIED (HCC): ICD-10-CM

## 2023-06-20 DIAGNOSIS — I25.119 ATHEROSCLEROSIS OF NATIVE CORONARY ARTERY OF NATIVE HEART WITH ANGINA PECTORIS (HCC): ICD-10-CM

## 2023-06-20 DIAGNOSIS — G47.33 OSA (OBSTRUCTIVE SLEEP APNEA): Primary | ICD-10-CM

## 2023-06-20 PROCEDURE — 1090F PRES/ABSN URINE INCON ASSESS: CPT | Performed by: INTERNAL MEDICINE

## 2023-06-20 PROCEDURE — 3017F COLORECTAL CA SCREEN DOC REV: CPT | Performed by: INTERNAL MEDICINE

## 2023-06-20 PROCEDURE — G8399 PT W/DXA RESULTS DOCUMENT: HCPCS | Performed by: INTERNAL MEDICINE

## 2023-06-20 PROCEDURE — 3078F DIAST BP <80 MM HG: CPT | Performed by: INTERNAL MEDICINE

## 2023-06-20 PROCEDURE — 99214 OFFICE O/P EST MOD 30 MIN: CPT | Performed by: INTERNAL MEDICINE

## 2023-06-20 PROCEDURE — 1123F ACP DISCUSS/DSCN MKR DOCD: CPT | Performed by: INTERNAL MEDICINE

## 2023-06-20 PROCEDURE — G8417 CALC BMI ABV UP PARAM F/U: HCPCS | Performed by: INTERNAL MEDICINE

## 2023-06-20 PROCEDURE — 4004F PT TOBACCO SCREEN RCVD TLK: CPT | Performed by: INTERNAL MEDICINE

## 2023-06-20 PROCEDURE — 3074F SYST BP LT 130 MM HG: CPT | Performed by: INTERNAL MEDICINE

## 2023-06-20 PROCEDURE — G8427 DOCREV CUR MEDS BY ELIG CLIN: HCPCS | Performed by: INTERNAL MEDICINE

## 2023-06-20 RX ORDER — LEVOFLOXACIN 500 MG/1
500 TABLET, FILM COATED ORAL DAILY
Qty: 10 TABLET | Refills: 0 | Status: SHIPPED | OUTPATIENT
Start: 2023-06-20 | End: 2023-06-30

## 2023-06-20 RX ORDER — IBUPROFEN 800 MG/1
800 TABLET ORAL 2 TIMES DAILY PRN
Qty: 180 TABLET | Refills: 1 | Status: SHIPPED | OUTPATIENT
Start: 2023-06-20

## 2023-06-20 ASSESSMENT — SLEEP AND FATIGUE QUESTIONNAIRES
HOW LIKELY ARE YOU TO NOD OFF OR FALL ASLEEP WHILE LYING DOWN TO REST IN THE AFTERNOON WHEN CIRCUMSTANCES PERMIT: 3
HOW LIKELY ARE YOU TO NOD OFF OR FALL ASLEEP WHILE WATCHING TV: 3
HOW LIKELY ARE YOU TO NOD OFF OR FALL ASLEEP WHEN YOU ARE A PASSENGER IN A CAR FOR AN HOUR WITHOUT A BREAK: 3
HOW LIKELY ARE YOU TO NOD OFF OR FALL ASLEEP WHILE SITTING QUIETLY AFTER LUNCH WITHOUT ALCOHOL: 3
HOW LIKELY ARE YOU TO NOD OFF OR FALL ASLEEP WHILE SITTING AND TALKING TO SOMEONE: 3
HOW LIKELY ARE YOU TO NOD OFF OR FALL ASLEEP WHILE SITTING AND READING: 3
HOW LIKELY ARE YOU TO NOD OFF OR FALL ASLEEP IN A CAR, WHILE STOPPED FOR A FEW MINUTES IN TRAFFIC: 3
ESS TOTAL SCORE: 24
HOW LIKELY ARE YOU TO NOD OFF OR FALL ASLEEP WHILE SITTING INACTIVE IN A PUBLIC PLACE: 3

## 2023-06-20 NOTE — PROGRESS NOTES
Gladystine Patches  6/20/2023    Chief Complaint   Patient presents with    Sleep Apnea     No sleep study done    COPD     Pt had lung screening last week-  pt has been having chest pains recently- seeing heart  currently and had stress test done    COPD, obesity, sleep apnea syndrome  Sore throat  The Kristin Lanrdy is known to have chronic obstructive lung disease due to chronic bronchitis and emphysema secondary to chronic smoking. Unfortunately in spite of repeated advice patient continues smoke about a pack a day. The last few days she been complaining of sore throat. She has no she has no fever no excessive cough or sputum production no hemoptysis no chest pain. No fullness in the ears no headaches. She is taking Breo and she is also taking Spiriva and uses albuterol on as-needed basis. She does rinse her mouth after the use of Breo. She did have a CT scan done recently which was essentially normal and free of any lung nodules and infiltrates or pleural effusion or enlarged lymph nodes. Report is not available yet. Patient is overweight she has not lost any weight. He has very likely sleep apnea syndrome and was advised to go for a sleep study which she did not do last time but she agreed to do this time. She is on home oxygen. An [place] Sleepiness Scale given to the patient in the office revealed a score of 20. It seems to me that the patient did not understand the questions. He does feel sleepy but not as much as noted from the [place] Sleepiness Scale.   Sleep Medicine 6/20/2023 1/27/2023 10/11/2022 1/7/2022 5/28/2021 7/24/2020 2/17/2020   Sitting and reading 3 3 2 1 0 3 3   Watching TV 3 3 2 1 1 3 3   Sitting, inactive in a public place (e.g. a theatre or a meeting) 3 1 2 0 0 2 3   As a passenger in a car for an hour without a break 3 3 2 0 0 3 3   Lying down to rest in the afternoon when circumstances permit 3 1 2 2 1 3 0   Sitting and talking to someone 3 0 1 0 0 0 0   Sitting

## 2023-06-23 DIAGNOSIS — G89.29 CHRONIC BILATERAL LOW BACK PAIN WITH RIGHT-SIDED SCIATICA: ICD-10-CM

## 2023-06-23 DIAGNOSIS — M54.41 CHRONIC BILATERAL LOW BACK PAIN WITH RIGHT-SIDED SCIATICA: ICD-10-CM

## 2023-06-23 RX ORDER — TIZANIDINE 2 MG/1
TABLET ORAL
Qty: 90 TABLET | Refills: 0 | Status: SHIPPED | OUTPATIENT
Start: 2023-06-23 | End: 2023-07-21

## 2023-06-23 NOTE — TELEPHONE ENCOUNTER
Requested Prescriptions     Pending Prescriptions Disp Refills    tiZANidine (ZANAFLEX) 2 MG tablet [Pharmacy Med Name: TIZANIDINE HCL 2 MG TABLET] 90 tablet 3     Sig: take 1 tablet by mouth every 8 hours if needed for BACK PAIN

## 2023-06-29 ENCOUNTER — TELEPHONE (OUTPATIENT)
Dept: SURGERY | Age: 68
End: 2023-06-29

## 2023-07-20 DIAGNOSIS — M54.41 CHRONIC BILATERAL LOW BACK PAIN WITH RIGHT-SIDED SCIATICA: ICD-10-CM

## 2023-07-20 DIAGNOSIS — G89.29 CHRONIC BILATERAL LOW BACK PAIN WITH RIGHT-SIDED SCIATICA: ICD-10-CM

## 2023-07-21 RX ORDER — TIZANIDINE 2 MG/1
TABLET ORAL
Qty: 90 TABLET | Refills: 0 | Status: SHIPPED | OUTPATIENT
Start: 2023-07-21

## 2023-07-21 NOTE — TELEPHONE ENCOUNTER
Requested Prescriptions     Pending Prescriptions Disp Refills    tiZANidine (ZANAFLEX) 2 MG tablet [Pharmacy Med Name: TIZANIDINE HCL 2 MG TABLET] 90 tablet 0     Sig: take 1 tablet by mouth every 8 hours if needed for BACK PAIN

## 2023-07-28 DIAGNOSIS — G25.81 RLS (RESTLESS LEGS SYNDROME): ICD-10-CM

## 2023-07-28 RX ORDER — ROPINIROLE 0.5 MG/1
TABLET, FILM COATED ORAL
Qty: 90 TABLET | Refills: 3 | Status: SHIPPED | OUTPATIENT
Start: 2023-07-28

## 2023-07-28 NOTE — TELEPHONE ENCOUNTER
Carol Lopez is calling to request a refill on the following medication(s):    Medication Request:  Requested Prescriptions     Pending Prescriptions Disp Refills    rOPINIRole (REQUIP) 0.5 MG tablet [Pharmacy Med Name: ROPINIROLE HCL 0.5 MG TABLET] 90 tablet 3     Sig: take 1 tablet by mouth once daily       Last Visit Date (If Applicable):  3/39/4287    Next Visit Date:    8/22/2023

## 2023-08-06 DIAGNOSIS — E11.8 TYPE 2 DIABETES MELLITUS WITH COMPLICATION (HCC): ICD-10-CM

## 2023-08-07 RX ORDER — BLOOD SUGAR DIAGNOSTIC
STRIP MISCELLANEOUS
Qty: 200 STRIP | Refills: 11 | Status: SHIPPED | OUTPATIENT
Start: 2023-08-07

## 2023-08-07 NOTE — TELEPHONE ENCOUNTER
Kayla [facility] is calling to request a refill on the following medication(s):    Medication Request:  Requested Prescriptions     Pending Prescriptions Disp Refills    blood glucose test strips (ONETOUCH ULTRA) strip [Pharmacy Med Name: Teresa Gaston TEST STRIP] 200 strip 11     Sig: TEST 4 TIMES DAILY AS NEEDED       Last Visit Date (If Applicable):  6/06/0175    Next Visit Date:    8/22/2023

## 2023-08-07 NOTE — TELEPHONE ENCOUNTER
Patient states that she hasn't checked her sugar in the last few days, do to having no testing strips. Could you sent in for pt.

## 2023-08-18 ENCOUNTER — APPOINTMENT (OUTPATIENT)
Dept: GENERAL RADIOLOGY | Age: 68
End: 2023-08-18
Payer: MEDICARE

## 2023-08-18 ENCOUNTER — HOSPITAL ENCOUNTER (EMERGENCY)
Age: 68
Discharge: HOME | End: 2023-08-18
Attending: EMERGENCY MEDICINE
Payer: MEDICARE

## 2023-08-18 VITALS
DIASTOLIC BLOOD PRESSURE: 80 MMHG | OXYGEN SATURATION: 95 % | RESPIRATION RATE: 18 BRPM | SYSTOLIC BLOOD PRESSURE: 150 MMHG | TEMPERATURE: 97.3 F | HEART RATE: 78 BPM

## 2023-08-18 DIAGNOSIS — J44.1 COPD EXACERBATION (HCC): ICD-10-CM

## 2023-08-18 DIAGNOSIS — M25.512 ACUTE PAIN OF LEFT SHOULDER: Primary | ICD-10-CM

## 2023-08-18 DIAGNOSIS — E11.42 TYPE 2 DIABETES MELLITUS WITH DIABETIC POLYNEUROPATHY, WITHOUT LONG-TERM CURRENT USE OF INSULIN (HCC): ICD-10-CM

## 2023-08-18 LAB
ALBUMIN SERPL-MCNC: 4.2 G/DL (ref 3.5–5.2)
ALBUMIN/GLOB SERPL: 1.2 {RATIO} (ref 1–2.5)
ALP SERPL-CCNC: 99 U/L (ref 35–104)
ALT SERPL-CCNC: 7 U/L (ref 5–33)
ANION GAP SERPL CALCULATED.3IONS-SCNC: 14 MMOL/L (ref 9–17)
AST SERPL-CCNC: 15 U/L
BASOPHILS # BLD: <0.03 K/UL (ref 0–0.2)
BASOPHILS NFR BLD: 0 % (ref 0–2)
BILIRUB SERPL-MCNC: 0.2 MG/DL (ref 0.3–1.2)
BNP SERPL-MCNC: 91 PG/ML
BUN SERPL-MCNC: 8 MG/DL (ref 8–23)
CALCIUM SERPL-MCNC: 10 MG/DL (ref 8.6–10.4)
CHLORIDE SERPL-SCNC: 99 MMOL/L (ref 98–107)
CO2 SERPL-SCNC: 25 MMOL/L (ref 20–31)
CREAT SERPL-MCNC: 0.6 MG/DL (ref 0.5–0.9)
EOSINOPHIL # BLD: <0.03 K/UL (ref 0–0.44)
EOSINOPHILS RELATIVE PERCENT: 0 % (ref 1–4)
ERYTHROCYTE [DISTWIDTH] IN BLOOD BY AUTOMATED COUNT: 13.1 % (ref 11.8–14.4)
GFR SERPL CREATININE-BSD FRML MDRD: >60 ML/MIN/1.73M2
GLUCOSE SERPL-MCNC: 116 MG/DL (ref 70–99)
HCT VFR BLD AUTO: 44.9 % (ref 36.3–47.1)
HGB BLD-MCNC: 14.7 G/DL (ref 11.9–15.1)
IMM GRANULOCYTES # BLD AUTO: <0.03 K/UL (ref 0–0.3)
IMM GRANULOCYTES NFR BLD: 0 %
LYMPHOCYTES NFR BLD: 1.3 K/UL (ref 1.1–3.7)
LYMPHOCYTES RELATIVE PERCENT: 25 % (ref 24–43)
MCH RBC QN AUTO: 30.6 PG (ref 25.2–33.5)
MCHC RBC AUTO-ENTMCNC: 32.7 G/DL (ref 28.4–34.8)
MCV RBC AUTO: 93.5 FL (ref 82.6–102.9)
MONOCYTES NFR BLD: 0.3 K/UL (ref 0.1–1.2)
MONOCYTES NFR BLD: 6 % (ref 3–12)
NEUTROPHILS NFR BLD: 68 % (ref 36–65)
NEUTS SEG NFR BLD: 3.51 K/UL (ref 1.5–8.1)
NRBC BLD-RTO: 0 PER 100 WBC
PLATELET # BLD AUTO: ABNORMAL K/UL (ref 138–453)
PLATELET, FLUORESCENCE: ABNORMAL K/UL (ref 138–453)
POTASSIUM SERPL-SCNC: 4.5 MMOL/L (ref 3.7–5.3)
PROT SERPL-MCNC: 7.7 G/DL (ref 6.4–8.3)
RBC # BLD AUTO: 4.8 M/UL (ref 3.95–5.11)
SODIUM SERPL-SCNC: 138 MMOL/L (ref 135–144)
TROPONIN I SERPL HS-MCNC: 8 NG/L (ref 0–14)
TROPONIN I SERPL HS-MCNC: <6 NG/L (ref 0–14)
WBC OTHER # BLD: 5.1 K/UL (ref 3.5–11.3)

## 2023-08-18 PROCEDURE — 85055 RETICULATED PLATELET ASSAY: CPT

## 2023-08-18 PROCEDURE — 83880 ASSAY OF NATRIURETIC PEPTIDE: CPT

## 2023-08-18 PROCEDURE — 6360000002 HC RX W HCPCS: Performed by: STUDENT IN AN ORGANIZED HEALTH CARE EDUCATION/TRAINING PROGRAM

## 2023-08-18 PROCEDURE — 96372 THER/PROPH/DIAG INJ SC/IM: CPT

## 2023-08-18 PROCEDURE — 85025 COMPLETE CBC W/AUTO DIFF WBC: CPT

## 2023-08-18 PROCEDURE — 80053 COMPREHEN METABOLIC PANEL: CPT

## 2023-08-18 PROCEDURE — 93005 ELECTROCARDIOGRAM TRACING: CPT | Performed by: STUDENT IN AN ORGANIZED HEALTH CARE EDUCATION/TRAINING PROGRAM

## 2023-08-18 PROCEDURE — 6370000000 HC RX 637 (ALT 250 FOR IP): Performed by: STUDENT IN AN ORGANIZED HEALTH CARE EDUCATION/TRAINING PROGRAM

## 2023-08-18 PROCEDURE — 84484 ASSAY OF TROPONIN QUANT: CPT

## 2023-08-18 PROCEDURE — 71046 X-RAY EXAM CHEST 2 VIEWS: CPT

## 2023-08-18 PROCEDURE — 99285 EMERGENCY DEPT VISIT HI MDM: CPT

## 2023-08-18 RX ORDER — AZITHROMYCIN 250 MG/1
250 TABLET, FILM COATED ORAL DAILY
Qty: 4 TABLET | Refills: 0 | Status: SHIPPED | OUTPATIENT
Start: 2023-08-18 | End: 2023-08-22

## 2023-08-18 RX ORDER — ONDANSETRON 4 MG/1
4 TABLET, ORALLY DISINTEGRATING ORAL ONCE
Status: COMPLETED | OUTPATIENT
Start: 2023-08-18 | End: 2023-08-18

## 2023-08-18 RX ORDER — NITROGLYCERIN 0.4 MG/1
0.4 TABLET SUBLINGUAL EVERY 5 MIN PRN
Status: DISCONTINUED | OUTPATIENT
Start: 2023-08-18 | End: 2023-08-19 | Stop reason: HOSPADM

## 2023-08-18 RX ORDER — KETOROLAC TROMETHAMINE 30 MG/ML
30 INJECTION, SOLUTION INTRAMUSCULAR; INTRAVENOUS ONCE
Status: COMPLETED | OUTPATIENT
Start: 2023-08-18 | End: 2023-08-18

## 2023-08-18 RX ORDER — ACETAMINOPHEN 500 MG
1000 TABLET ORAL EVERY 6 HOURS PRN
Qty: 120 TABLET | Refills: 0 | Status: SHIPPED | OUTPATIENT
Start: 2023-08-18

## 2023-08-18 RX ORDER — AZITHROMYCIN 250 MG/1
500 TABLET, FILM COATED ORAL ONCE
Status: COMPLETED | OUTPATIENT
Start: 2023-08-18 | End: 2023-08-18

## 2023-08-18 RX ADMIN — AZITHROMYCIN 500 MG: 250 TABLET, FILM COATED ORAL at 22:53

## 2023-08-18 RX ADMIN — ONDANSETRON 4 MG: 4 TABLET, ORALLY DISINTEGRATING ORAL at 20:36

## 2023-08-18 RX ADMIN — KETOROLAC TROMETHAMINE 30 MG: 30 INJECTION, SOLUTION INTRAMUSCULAR; INTRAVENOUS at 21:44

## 2023-08-18 RX ADMIN — NITROGLYCERIN 0.4 MG: 0.4 TABLET SUBLINGUAL at 21:22

## 2023-08-18 ASSESSMENT — PAIN SCALES - GENERAL
PAINLEVEL_OUTOF10: 8
PAINLEVEL_OUTOF10: 5

## 2023-08-18 ASSESSMENT — HEART SCORE: ECG: 0

## 2023-08-18 ASSESSMENT — ENCOUNTER SYMPTOMS
NAUSEA: 1
VOMITING: 0
ABDOMINAL PAIN: 0
COUGH: 1
SHORTNESS OF BREATH: 1

## 2023-08-18 ASSESSMENT — PAIN DESCRIPTION - ORIENTATION: ORIENTATION: LEFT

## 2023-08-18 ASSESSMENT — PAIN - FUNCTIONAL ASSESSMENT: PAIN_FUNCTIONAL_ASSESSMENT: 0-10

## 2023-08-18 ASSESSMENT — PAIN DESCRIPTION - LOCATION: LOCATION: SHOULDER

## 2023-08-18 NOTE — TELEPHONE ENCOUNTER
[facility] to request a refill on the following medication(s):    Medication Request:  Requested Prescriptions     Pending Prescriptions Disp Refills    metFORMIN (GLUCOPHAGE) 500 MG tablet [Pharmacy Med Name: METFORMIN  MG TABLET] 180 tablet 1     Sig: take 1 tablet by mouth twice a day with meals       Last Visit Date (If Applicable):  1/03/1003    Next Visit Date:    8/22/2023

## 2023-08-19 LAB
EKG ATRIAL RATE: 78 BPM
EKG P AXIS: -27 DEGREES
EKG P-R INTERVAL: 180 MS
EKG Q-T INTERVAL: 402 MS
EKG QRS DURATION: 84 MS
EKG QTC CALCULATION (BAZETT): 458 MS
EKG R AXIS: -37 DEGREES
EKG T AXIS: 55 DEGREES
EKG VENTRICULAR RATE: 78 BPM

## 2023-08-19 SDOH — HEALTH STABILITY: PHYSICAL HEALTH: ON AVERAGE, HOW MANY DAYS PER WEEK DO YOU ENGAGE IN MODERATE TO STRENUOUS EXERCISE (LIKE A BRISK WALK)?: 2 DAYS

## 2023-08-19 SDOH — HEALTH STABILITY: PHYSICAL HEALTH: ON AVERAGE, HOW MANY MINUTES DO YOU ENGAGE IN EXERCISE AT THIS LEVEL?: 60 MIN

## 2023-08-19 ASSESSMENT — PATIENT HEALTH QUESTIONNAIRE - PHQ9
7. TROUBLE CONCENTRATING ON THINGS, SUCH AS READING THE NEWSPAPER OR WATCHING TELEVISION: 2
SUM OF ALL RESPONSES TO PHQ QUESTIONS 1-9: 15
9. THOUGHTS THAT YOU WOULD BE BETTER OFF DEAD, OR OF HURTING YOURSELF: 0
SUM OF ALL RESPONSES TO PHQ QUESTIONS 1-9: 15
6. FEELING BAD ABOUT YOURSELF - OR THAT YOU ARE A FAILURE OR HAVE LET YOURSELF OR YOUR FAMILY DOWN: 0
8. MOVING OR SPEAKING SO SLOWLY THAT OTHER PEOPLE COULD HAVE NOTICED. OR THE OPPOSITE, BEING SO FIGETY OR RESTLESS THAT YOU HAVE BEEN MOVING AROUND A LOT MORE THAN USUAL: 2
10. IF YOU CHECKED OFF ANY PROBLEMS, HOW DIFFICULT HAVE THESE PROBLEMS MADE IT FOR YOU TO DO YOUR WORK, TAKE CARE OF THINGS AT HOME, OR GET ALONG WITH OTHER PEOPLE: 1
SUM OF ALL RESPONSES TO PHQ QUESTIONS 1-9: 15
5. POOR APPETITE OR OVEREATING: 2
4. FEELING TIRED OR HAVING LITTLE ENERGY: 3
SUM OF ALL RESPONSES TO PHQ QUESTIONS 1-9: 15
2. FEELING DOWN, DEPRESSED OR HOPELESS: 2
1. LITTLE INTEREST OR PLEASURE IN DOING THINGS: 1
3. TROUBLE FALLING OR STAYING ASLEEP: 3
SUM OF ALL RESPONSES TO PHQ9 QUESTIONS 1 & 2: 3

## 2023-08-19 ASSESSMENT — LIFESTYLE VARIABLES
HOW OFTEN DO YOU HAVE SIX OR MORE DRINKS ON ONE OCCASION: 1
HOW MANY STANDARD DRINKS CONTAINING ALCOHOL DO YOU HAVE ON A TYPICAL DAY: 0
HOW OFTEN DO YOU HAVE A DRINK CONTAINING ALCOHOL: 1
HOW OFTEN DO YOU HAVE A DRINK CONTAINING ALCOHOL: NEVER
HOW MANY STANDARD DRINKS CONTAINING ALCOHOL DO YOU HAVE ON A TYPICAL DAY: PATIENT DOES NOT DRINK

## 2023-08-19 NOTE — ED NOTES
Pt's o2 sat is 88-89% on RA, Pt attached to 2 lpm via nasal cannula.      Matthieu Buchanan RN  08/18/23 2026

## 2023-08-19 NOTE — ED TRIAGE NOTES
Pt presents to ED with c/o of left shoulder pain and chest pain. Pt states symptoms started yesterday, pain on shoulder first then her chest next. Pt states she felt nauseated this afternoon  and a little sweaty. Pt denies any shortness of breath. Pt states she wears oxygen at home and has COPD. Pt use 2 lpm of O2 as needed at home. Pt has a pacemaker, interrogated at bedside.

## 2023-08-19 NOTE — ED PROVIDER NOTES
(ADVIL;MOTRIN) 800 MG tablet Take 1 tablet by mouth 2 times daily as needed for Pain 6/20/23   Brandi Hui MD   pantoprazole (PROTONIX) 40 MG tablet TAKE 1 TABLET BY MOUTH ONCE DAILY 5/10/23   Sonido Cruz MD   fluticasone furoate-vilanterol (BREO ELLIPTA) 100-25 MCG/ACT inhaler Inhale 1 puff into the lungs daily 3/24/23   Brandi Hui MD   pravastatin (PRAVACHOL) 40 MG tablet take 1 tablet by mouth once daily 3/14/23   Sonido Cruz MD   potassium chloride (KLOR-CON M) 20 MEQ extended release tablet take 1 tablet by mouth once daily 2/20/23   Sonido Cruz MD   metFORMIN (GLUCOPHAGE) 500 MG tablet take 1 tablet by mouth twice a day with meals 2/13/23   Sonido Cruz MD   LINZESS 290 MCG CAPS capsule take 1 capsule by mouth EVERY MORNING BEFORE BREAKFAST 2/7/23   Sonido Cruz MD   gabapentin (NEURONTIN) 800 MG tablet Take 1 tablet by mouth 3 times daily for 30 days.  1/18/23 2/20/23  Sonido Cruz MD   OneTouch Delica Lancets 10W MISC Apply 1 Units topically 2 times daily USE 2 TO 3 TIMES DAILY AS DIRECTED 1/12/23 6/11/23  Sonido Cruz MD   traZODone (DESYREL) 100 MG tablet take 1 tablet by mouth nightly 12/15/22   Sonido Cruz MD   buprenorphine-naloxone (SUBOXONE) 8-2 MG FILM SL film dissolve 1/2 FILM under the tongue twice a day 11/14/22   Historical Provider, MD   sertraline (ZOLOFT) 50 MG tablet TAKE 1/2 A TABLET BY MOUTH AT NIGHT FOR 3 NIGHTS AND INCREASE TO 1 TABLET AT NIGHT 10/31/22   Historical Provider, MD   Ami Dunk 145 MCG capsule take 1 capsule by mouth once daily  Patient not taking: Reported on 6/20/2023 10/4/22   Historical Provider, MD   hydrOXYzine HCl (ATARAX) 25 MG tablet  9/13/22   Historical Provider, MD   ondansetron (ZOFRAN-ODT) 4 MG disintegrating tablet dissolve 1 tablet under the tongue every 8 hours if needed for NAUSEA OR VOMITING 8/19/22   Sonido Cruz MD   atenolol (TENORMIN) 25 MG tablet Take 1 tablet by mouth daily 7/14/22   Sonido Cruz MD   nitroGLYCERIN (NITROSTAT) General: She is not in acute distress. Appearance: Normal appearance. She is not ill-appearing. Eyes:      Conjunctiva/sclera: Conjunctivae normal.   Cardiovascular:      Rate and Rhythm: Normal rate and regular rhythm. Heart sounds: Normal heart sounds. Pulmonary:      Effort: Pulmonary effort is normal.      Breath sounds: Normal breath sounds. Chest:      Chest wall: Tenderness present. Abdominal:      General: Abdomen is flat. Tenderness: There is no abdominal tenderness. Musculoskeletal:         General: Tenderness (to palpation of the left shoulder, anterior) present. Normal range of motion. Skin:     General: Skin is warm and dry. Neurological:      General: No focal deficit present. Mental Status: She is alert and oriented to person, place, and time. DDX/DIAGNOSTIC RESULTS / EMERGENCY DEPARTMENT COURSE / MDM     Medical Decision Making  Patient is a 68-year-old female presenting due to chest pain and pain radiating down the left arm with nausea. Upon evaluation patient has some mild hypertension but no fever and heart rate within normal limits. Physical exam is revealing of tenderness to palpation of the left side of the chest and left shoulder. Lungs are clear and heart rate is regular. Patient does have a recent stress test that was found low risk. Differential for patient's pain includes ACS, arrhythmia, COPD exacerbation, pneumonia, pleuritic pain, musculoskeletal pain. We will treat patient's pain with possible cardiac cause with nitroglycerin, patient has already taken aspirin at home today. Will additionally avoid opiates per patient's preference. We will give Zofran for nausea. Will obtain cardiac work-up including troponin, chest x-ray, CBC, CMP. Amount and/or Complexity of Data Reviewed  Labs: ordered. Decision-making details documented in ED Course. Radiology: ordered. ECG/medicine tests: ordered. Risk  OTC drugs.   Prescription drug

## 2023-08-19 NOTE — DISCHARGE INSTRUCTIONS
Your evaluated due to pain in your left shoulder. We ruled out any problems with your heart at this time. Our x-ray did show some arthritis in your shoulder but did not show any acute fractures. We gave you some medication for inflammation which did help with your pain. With your increased cough and the findings on her x-ray that shows some possible infection we recommend that you take the antibiotic we have prescribed for COPD exacerbation until completed. We recommend that you take Tylenol and Motrin for pain and Voltaren as needed for pain as well. Please follow-up with your primary care physician as soon as possible to reassess your shoulder problems. Please return to the ED if develop worsening pain, nausea, vomiting, fever, chills, shortness of breath, dizziness or for any other concern.

## 2023-08-21 DIAGNOSIS — M54.41 CHRONIC BILATERAL LOW BACK PAIN WITH RIGHT-SIDED SCIATICA: ICD-10-CM

## 2023-08-21 DIAGNOSIS — G89.29 CHRONIC BILATERAL LOW BACK PAIN WITH RIGHT-SIDED SCIATICA: ICD-10-CM

## 2023-08-21 RX ORDER — TIZANIDINE 2 MG/1
TABLET ORAL
Qty: 90 TABLET | Refills: 0 | Status: SHIPPED | OUTPATIENT
Start: 2023-08-21 | End: 2023-08-22 | Stop reason: DRUGHIGH

## 2023-08-21 NOTE — TELEPHONE ENCOUNTER
Crystal Parmar is calling to request a refill on the following medication(s):    Medication Request:  Requested Prescriptions     Pending Prescriptions Disp Refills    tiZANidine (ZANAFLEX) 2 MG tablet [Pharmacy Med Name: TIZANIDINE HCL 2 MG TABLET] 90 tablet 0     Sig: take 1 tablet by mouth every 8 hours if needed for BACK PAIN       Last Visit Date (If Applicable):  1/33/0282    Next Visit Date:    8/22/2023

## 2023-08-22 ENCOUNTER — OFFICE VISIT (OUTPATIENT)
Dept: FAMILY MEDICINE CLINIC | Age: 68
End: 2023-08-22
Payer: MEDICARE

## 2023-08-22 ENCOUNTER — TELEPHONE (OUTPATIENT)
Dept: FAMILY MEDICINE CLINIC | Age: 68
End: 2023-08-22

## 2023-08-22 VITALS
HEIGHT: 72 IN | WEIGHT: 217.6 LBS | DIASTOLIC BLOOD PRESSURE: 64 MMHG | HEART RATE: 77 BPM | OXYGEN SATURATION: 91 % | SYSTOLIC BLOOD PRESSURE: 138 MMHG | BODY MASS INDEX: 29.47 KG/M2

## 2023-08-22 DIAGNOSIS — E11.8 TYPE 2 DIABETES MELLITUS WITH COMPLICATION (HCC): ICD-10-CM

## 2023-08-22 DIAGNOSIS — R05.1 ACUTE COUGH: ICD-10-CM

## 2023-08-22 DIAGNOSIS — Z00.00 MEDICARE ANNUAL WELLNESS VISIT, SUBSEQUENT: Primary | ICD-10-CM

## 2023-08-22 DIAGNOSIS — M19.012 PRIMARY OSTEOARTHRITIS OF LEFT SHOULDER: ICD-10-CM

## 2023-08-22 PROCEDURE — 3017F COLORECTAL CA SCREEN DOC REV: CPT | Performed by: STUDENT IN AN ORGANIZED HEALTH CARE EDUCATION/TRAINING PROGRAM

## 2023-08-22 PROCEDURE — 3075F SYST BP GE 130 - 139MM HG: CPT | Performed by: STUDENT IN AN ORGANIZED HEALTH CARE EDUCATION/TRAINING PROGRAM

## 2023-08-22 PROCEDURE — 1123F ACP DISCUSS/DSCN MKR DOCD: CPT | Performed by: STUDENT IN AN ORGANIZED HEALTH CARE EDUCATION/TRAINING PROGRAM

## 2023-08-22 PROCEDURE — G0439 PPPS, SUBSEQ VISIT: HCPCS | Performed by: STUDENT IN AN ORGANIZED HEALTH CARE EDUCATION/TRAINING PROGRAM

## 2023-08-22 PROCEDURE — 3044F HG A1C LEVEL LT 7.0%: CPT | Performed by: STUDENT IN AN ORGANIZED HEALTH CARE EDUCATION/TRAINING PROGRAM

## 2023-08-22 PROCEDURE — 3078F DIAST BP <80 MM HG: CPT | Performed by: STUDENT IN AN ORGANIZED HEALTH CARE EDUCATION/TRAINING PROGRAM

## 2023-08-22 RX ORDER — TIZANIDINE 4 MG/1
4 TABLET ORAL 3 TIMES DAILY PRN
Qty: 90 TABLET | Refills: 0 | Status: SHIPPED | OUTPATIENT
Start: 2023-08-22

## 2023-08-22 RX ORDER — GUAIFENESIN 600 MG/1
1200 TABLET, EXTENDED RELEASE ORAL 2 TIMES DAILY
Qty: 40 TABLET | Refills: 0 | Status: SHIPPED | OUTPATIENT
Start: 2023-08-22 | End: 2023-09-01

## 2023-08-22 RX ORDER — AMLODIPINE BESYLATE 5 MG/1
5 TABLET ORAL DAILY
COMMUNITY
Start: 2023-07-11

## 2023-08-22 RX ORDER — IBUPROFEN 800 MG/1
800 TABLET ORAL 2 TIMES DAILY PRN
Qty: 60 TABLET | Refills: 5 | Status: SHIPPED | OUTPATIENT
Start: 2023-08-22

## 2023-08-22 RX ORDER — BENZONATATE 100 MG/1
100 CAPSULE ORAL 3 TIMES DAILY PRN
Qty: 30 CAPSULE | Refills: 0 | Status: SHIPPED | OUTPATIENT
Start: 2023-08-22 | End: 2023-09-01

## 2023-08-22 RX ORDER — TIZANIDINE 2 MG/1
TABLET ORAL
Qty: 90 TABLET | Refills: 0 | Status: CANCELLED | OUTPATIENT
Start: 2023-08-22

## 2023-08-22 RX ORDER — HYDROCHLOROTHIAZIDE 12.5 MG/1
12.5 CAPSULE, GELATIN COATED ORAL EVERY MORNING
COMMUNITY
Start: 2023-05-28

## 2023-08-22 RX ORDER — BLOOD SUGAR DIAGNOSTIC
STRIP MISCELLANEOUS
Qty: 200 STRIP | Refills: 11 | Status: SHIPPED | OUTPATIENT
Start: 2023-08-22

## 2023-08-22 NOTE — PROGRESS NOTES
Medicare Annual Wellness Visit    Keith Hernández is here for Medicare AWV    Assessment & Plan   Medicare annual wellness visit, subsequent  Type 2 diabetes mellitus with complication ([address])  -     blood glucose test strips (ONETOUCH ULTRA) strip; TEST 4 TIMES DAILY AS NEEDED, Disp-200 strip, R-11Normal  -     Ambulatory Referral to Care Management with Device (Remote Patient Monitoring)  Primary osteoarthritis of left shoulder  -     Carolyn - Racheal Garcia MD, Orthopaedic Surgery, [place]  -     tiZANidine (ZANAFLEX) 4 MG tablet; Take 1 tablet by mouth 3 times daily as needed (pain), Disp-90 tablet, R-0Normal  -     ibuprofen (ADVIL;MOTRIN) 800 MG tablet; Take 1 tablet by mouth 2 times daily as needed for Pain, Disp-60 tablet, R-5Normal  Acute cough  -     guaiFENesin (MUCINEX) 600 MG extended release tablet; Take 2 tablets by mouth 2 times daily for 10 days, Disp-40 tablet, R-0Normal  -     benzonatate (TESSALON) 100 MG capsule; Take 1 capsule by mouth 3 times daily as needed for Cough, Disp-30 capsule, R-0Normal    Recommendations for Preventive Services Due: see orders and patient instructions/AVS.  Recommended screening schedule for the next 5-10 years is provided to the patient in written form: see Patient Instructions/AVS.     Return in 3 months (on 11/22/2023) for anxiety and depression, Follow up HTN. Subjective   The following acute and/or chronic problems were also addressed today:  She was recently seen in ER for upper respiratory symptoms and left shoulder pain, she was found to have a pneumonia and was put on Z-Marvin. She states that she still continues to have cough especially at nighttime and her left shoulder pain is not getting any better. She states that sometimes ibuprofen helps with the pain, she is also requesting higher dose of Zanaflex. Patient's complete Health Risk Assessment and screening values have been reviewed and are found in Flowsheets.  The following problems were reviewed

## 2023-08-22 NOTE — TELEPHONE ENCOUNTER
Patient called in stating that she wasn't sure what that ambulatory referral with device was for and if she needed to schedule an appointment with them? I wasn't sure either, so I couldn't answer her question.

## 2023-08-22 NOTE — PATIENT INSTRUCTIONS
your use of this information. Personalized Preventive Plan for Compa Man - 8/22/2023  Medicare offers a range of preventive health benefits. Some of the tests and screenings are paid in full while other may be subject to a deductible, co-insurance, and/or copay. Some of these benefits include a comprehensive review of your medical history including lifestyle, illnesses that may run in your family, and various assessments and screenings as appropriate. After reviewing your medical record and screening and assessments performed today your provider may have ordered immunizations, labs, imaging, and/or referrals for you. A list of these orders (if applicable) as well as your Preventive Care list are included within your After Visit Summary for your review. Other Preventive Recommendations:    A preventive eye exam performed by an eye specialist is recommended every 1-2 years to screen for glaucoma; cataracts, macular degeneration, and other eye disorders. A preventive dental visit is recommended every 6 months. Try to get at least 150 minutes of exercise per week or 10,000 steps per day on a pedometer . Order or download the FREE \"Exercise & Physical Activity: Your Everyday Guide\" from [employer]. Call 4-742.347.9621 or search [employer] online. You need 7727-6629 mg of calcium and 5610-2002 IU of vitamin D per day. It is possible to meet your calcium requirement with diet alone, but a vitamin D supplement is usually necessary to meet this goal.  When exposed to the sun, use a sunscreen that protects against both UVA and UVB radiation with an SPF of 30 or greater. Reapply every 2 to 3 hours or after sweating, drying off with a towel, or swimming. Always wear a seat belt when traveling in a car. Always wear a helmet when riding a bicycle or motorcycle.

## 2023-08-23 ENCOUNTER — TELEPHONE (OUTPATIENT)
Dept: FAMILY MEDICINE CLINIC | Age: 68
End: 2023-08-23

## 2023-08-23 NOTE — TELEPHONE ENCOUNTER
Patient called in stating that her insurance wouldn't cover her Mucinex and benzonatate that you just prescribed to her. She was wondering if there was anything else that you could prescribe her.

## 2023-08-25 ENCOUNTER — CARE COORDINATION (OUTPATIENT)
Dept: CARE COORDINATION | Age: 68
End: 2023-08-25

## 2023-08-25 NOTE — CARE COORDINATION
ACM attempted outreach, DM, COPD and HTN management, PCP/RPM referral, CC enrollment  No answer and LVM with call back information

## 2023-08-29 SDOH — HEALTH STABILITY: PHYSICAL HEALTH: ON AVERAGE, HOW MANY MINUTES DO YOU ENGAGE IN EXERCISE AT THIS LEVEL?: 90 MIN

## 2023-08-29 SDOH — HEALTH STABILITY: PHYSICAL HEALTH: ON AVERAGE, HOW MANY DAYS PER WEEK DO YOU ENGAGE IN MODERATE TO STRENUOUS EXERCISE (LIKE A BRISK WALK)?: 4 DAYS

## 2023-08-30 ENCOUNTER — CARE COORDINATION (OUTPATIENT)
Dept: CARE COORDINATION | Age: 68
End: 2023-08-30

## 2023-08-30 NOTE — CARE COORDINATION
Attempted outreach for CC enrollment, PCP/RPM management, DM, CHF and HTN management  No answer and LVM with call back information

## 2023-09-01 ENCOUNTER — OFFICE VISIT (OUTPATIENT)
Dept: ORTHOPEDIC SURGERY | Age: 68
End: 2023-09-01
Payer: MEDICARE

## 2023-09-01 ENCOUNTER — CARE COORDINATION (OUTPATIENT)
Dept: CARE COORDINATION | Age: 68
End: 2023-09-01

## 2023-09-01 VITALS — BODY MASS INDEX: 28.85 KG/M2 | HEIGHT: 72 IN | RESPIRATION RATE: 14 BRPM | WEIGHT: 213 LBS

## 2023-09-01 DIAGNOSIS — M25.511 RIGHT SHOULDER PAIN, UNSPECIFIED CHRONICITY: ICD-10-CM

## 2023-09-01 DIAGNOSIS — M25.512 LEFT SHOULDER PAIN, UNSPECIFIED CHRONICITY: Primary | ICD-10-CM

## 2023-09-01 PROCEDURE — 1090F PRES/ABSN URINE INCON ASSESS: CPT | Performed by: ORTHOPAEDIC SURGERY

## 2023-09-01 PROCEDURE — G8427 DOCREV CUR MEDS BY ELIG CLIN: HCPCS | Performed by: ORTHOPAEDIC SURGERY

## 2023-09-01 PROCEDURE — G8399 PT W/DXA RESULTS DOCUMENT: HCPCS | Performed by: ORTHOPAEDIC SURGERY

## 2023-09-01 PROCEDURE — G8417 CALC BMI ABV UP PARAM F/U: HCPCS | Performed by: ORTHOPAEDIC SURGERY

## 2023-09-01 PROCEDURE — 4004F PT TOBACCO SCREEN RCVD TLK: CPT | Performed by: ORTHOPAEDIC SURGERY

## 2023-09-01 PROCEDURE — 1123F ACP DISCUSS/DSCN MKR DOCD: CPT | Performed by: ORTHOPAEDIC SURGERY

## 2023-09-01 PROCEDURE — 3017F COLORECTAL CA SCREEN DOC REV: CPT | Performed by: ORTHOPAEDIC SURGERY

## 2023-09-01 PROCEDURE — 99203 OFFICE O/P NEW LOW 30 MIN: CPT | Performed by: ORTHOPAEDIC SURGERY

## 2023-09-01 RX ORDER — DICLOFENAC SODIUM 75 MG/1
75 TABLET, DELAYED RELEASE ORAL 2 TIMES DAILY WITH MEALS
Qty: 28 TABLET | Refills: 0 | Status: SHIPPED | OUTPATIENT
Start: 2023-09-01 | End: 2023-09-15

## 2023-09-01 NOTE — PROGRESS NOTES
shoulders    ROM: (Degrees)    Right   A P   Left   A P    Elevation  140    Elevation  140   Abduction  135    Abduction  140    ER   60    ER   65   IR   T12    IR   T12   90 abd/ER      90 abd/ER     90 abd/IR      90 abd/IR     Crepitation  No    Crepitation No  Dyskenesia  No    Dyskenesia No      Muscle strength:    Right       Left    Deltoid   5    Deltoid   5  Supraspinatus  5    Supraspinatus  5  ER   5    ER   5  IR   5    IR   5    Special tests    Right   Rotator Cuff    Left    y   Painful arc    y   n   Pain with ER    n    y   Neer's     y    y   Hawkin's    y    n   Drop Arm    n  n   Lift off/Belly Press   n  n   ER Lag    n          TRISTAR [facility] Joint  n   AC tenderness   n  n   Cross-chest adduction  n       Labrum/biceps    Y (equivocal)  Whitewood's    Y (equivocal)   y   Biceps sheer    y      y   Speed's/Yergason's   y    y   Tenderness Biceps Groove  y    n   Jose Eduardo's    n         Instability  n   Ant Apprehension   n    n   Post Apprehension   n    n   Ant Load shift    n    n   Post Load shift   n   n   Sulcus     n  n   Generalized Laxity   n  n   Relocation test   n  n   Crank test     n  n   Juan Carlos-superior escape  n       Imaging:  Xrays: 1 views of the left shoulder obtained on 9/1/2023 and additional views completed on 3/22/2023 were independently reviewed  Indications: Left shoulder pain  Findings: Normal glenohumeral and acromioclavicular joint spaces. No obvious fracture, dislocation or subluxation. Impression: Normal-appearing left shoulder radiographs    Xrays: 1 views of the right shoulder obtained on 9/1/2023 and additional views completed on 3/22/2023 were independently reviewed  Indications: Right shoulder pain  Findings: Normal glenohumeral and acromioclavicular joint spaces. Mild osteophytic change involving the distal end of the clavicle and acromion at the acromioclavicular joint. Type II acromion. No obvious fracture, dislocation or subluxation.   Impression: Right shoulder

## 2023-09-01 NOTE — CARE COORDINATION
Attempted outreach for CC needs, PCP/RPM referral, CHF, COPD and DM, HTN management  No answer and LVM with call back information

## 2023-09-05 DIAGNOSIS — E11.8 TYPE 2 DIABETES MELLITUS WITH COMPLICATION (HCC): ICD-10-CM

## 2023-09-05 DIAGNOSIS — M25.511 RIGHT SHOULDER PAIN, UNSPECIFIED CHRONICITY: ICD-10-CM

## 2023-09-05 DIAGNOSIS — M25.512 LEFT SHOULDER PAIN, UNSPECIFIED CHRONICITY: Primary | ICD-10-CM

## 2023-09-05 RX ORDER — BLOOD SUGAR DIAGNOSTIC
STRIP MISCELLANEOUS
Qty: 200 STRIP | Refills: 11 | Status: SHIPPED | OUTPATIENT
Start: 2023-09-05

## 2023-09-05 NOTE — TELEPHONE ENCOUNTER
[facility] is charging the patient more to fill this script.  Could it be sent to [facility] aid

## 2023-09-05 NOTE — TELEPHONE ENCOUNTER
Pt called in was in to see Dr. Naida Pepe on 09/01/23 and he called in a script for diclofenac (VOLTAREN) 75 MG EC tablet but per her pharmacy her insurance will not cover this medicine and she wanted to know if there was another medicine he could call in for her.       Please send to    [address] [place] [address] [id number] [place] [address]  [phone] [place] [phone]     Please call pt with any questions @ [phone]

## 2023-09-06 ENCOUNTER — CARE COORDINATION (OUTPATIENT)
Dept: CARE COORDINATION | Age: 68
End: 2023-09-06

## 2023-09-06 NOTE — CARE COORDINATION
ACM attempted outreach for CC enrollment, PCP/RPM referral, DM, COPD and CHF management  No answer and unable to LVM at this time

## 2023-09-07 RX ORDER — ETODOLAC 400 MG/1
TABLET, FILM COATED ORAL
Qty: 28 TABLET | Refills: 0 | Status: SHIPPED | OUTPATIENT
Start: 2023-09-07 | End: 2023-09-30 | Stop reason: HOSPADM

## 2023-09-08 ENCOUNTER — CARE COORDINATION (OUTPATIENT)
Dept: CARE COORDINATION | Age: 68
End: 2023-09-08

## 2023-09-08 DIAGNOSIS — I50.32 CHRONIC DIASTOLIC CONGESTIVE HEART FAILURE (HCC): ICD-10-CM

## 2023-09-08 DIAGNOSIS — I10 ESSENTIAL HYPERTENSION: Primary | ICD-10-CM

## 2023-09-08 DIAGNOSIS — K21.9 GASTROESOPHAGEAL REFLUX DISEASE WITHOUT ESOPHAGITIS: ICD-10-CM

## 2023-09-08 DIAGNOSIS — J42 CHRONIC BRONCHITIS, UNSPECIFIED CHRONIC BRONCHITIS TYPE (HCC): ICD-10-CM

## 2023-09-08 RX ORDER — HYDROCHLOROTHIAZIDE 12.5 MG/1
12.5 CAPSULE, GELATIN COATED ORAL EVERY MORNING
Qty: 90 CAPSULE | Refills: 1 | Status: SHIPPED | OUTPATIENT
Start: 2023-09-08

## 2023-09-08 RX ORDER — PANTOPRAZOLE SODIUM 40 MG/1
TABLET, DELAYED RELEASE ORAL
Qty: 90 TABLET | Refills: 1 | Status: SHIPPED | OUTPATIENT
Start: 2023-09-08

## 2023-09-08 ASSESSMENT — SOCIAL DETERMINANTS OF HEALTH (SDOH)
HOW OFTEN DO YOU GET TOGETHER WITH FRIENDS OR RELATIVES?: MORE THAN THREE TIMES A WEEK
DO YOU BELONG TO ANY CLUBS OR ORGANIZATIONS SUCH AS CHURCH GROUPS UNIONS, FRATERNAL OR ATHLETIC GROUPS, OR SCHOOL GROUPS?: YES
ARE YOU MARRIED, WIDOWED, DIVORCED, SEPARATED, NEVER MARRIED, OR LIVING WITH A PARTNER?: NEVER MARRIED
HOW OFTEN DO YOU GET TOGETHER WITH FRIENDS OR RELATIVES?: MORE THAN THREE TIMES A WEEK
HOW OFTEN DO YOU ATTEND CHURCH OR RELIGIOUS SERVICES?: 1 TO 4 TIMES PER YEAR
HOW OFTEN DO YOU ATTENT MEETINGS OF THE CLUB OR ORGANIZATION YOU BELONG TO?: 1 TO 4 TIMES PER YEAR
DO YOU BELONG TO ANY CLUBS OR ORGANIZATIONS SUCH AS CHURCH GROUPS UNIONS, FRATERNAL OR ATHLETIC GROUPS, OR SCHOOL GROUPS?: YES
HOW OFTEN DO YOU ATTEND CHURCH OR RELIGIOUS SERVICES?: 1 TO 4 TIMES PER YEAR
IN A TYPICAL WEEK, HOW MANY TIMES DO YOU TALK ON THE PHONE WITH FAMILY, FRIENDS, OR NEIGHBORS?: MORE THAN THREE TIMES A WEEK
HOW OFTEN DO YOU ATTENT MEETINGS OF THE CLUB OR ORGANIZATION YOU BELONG TO?: 1 TO 4 TIMES PER YEAR
IN A TYPICAL WEEK, HOW MANY TIMES DO YOU TALK ON THE PHONE WITH FAMILY, FRIENDS, OR NEIGHBORS?: MORE THAN THREE TIMES A WEEK

## 2023-09-08 NOTE — CARE COORDINATION
Remote Patient Kit Ordering Note      Date/Time:  9/8/2023 2:28 PM      [x] [facility] confirmed patient shipping address  [x] Patient will receive package over the next 1-3 business days. Someone 21 years or older must be present to sign for UPS delivery. [x] HRS will contact patient within 24 hours, an [address] [place] [address] will call the patient directly: If the patient does not answer, HRS will follow up with the clinical team notifying them about the unsuccessful attempt to contact the patient. HRS will make three call attempts to the patient. Provide patient with [facility] Virtual install number is: [phone]. [x] ACM will contact patient once equipment is active to welcome them to the program.                                                         [x] Hours of RPM monitoring - Monday-Friday 9645-2190; encourage patient to get vitals entered by [facility] each day to have the alert addressed same day. [x]CCSS mailed RPM Patient flyer to patient. All questions answered at this time. ACM made aware the RPM kit has been ordered. [facility].

## 2023-09-08 NOTE — TELEPHONE ENCOUNTER
Jessica Matson is calling to request a refill on the following medication(s):    Medication Request:  Requested Prescriptions     Pending Prescriptions Disp Refills    hydroCHLOROthiazide (MICROZIDE) 12.5 MG capsule [Pharmacy Med Name: HYDROCHLOROTHIAZIDE 12.5 MG CP] 90 capsule      Sig: take 1 capsule by mouth every morning    pantoprazole (PROTONIX) 40 MG tablet [Pharmacy Med Name: PANTOPRAZOLE SOD DR 40 MG TAB] 30 tablet 3     Sig: TAKE 1 TABLET BY MOUTH ONCE DAILY       Last Visit Date (If Applicable):  5/38/9146    Next Visit Date:    11/22/2023

## 2023-09-08 NOTE — CARE COORDINATION
understand a low sodium diet?: Yes  Do you understand how to read food labels?: Yes  How many restaurant meals do you eat per week?: 1-2  Do you salt your food before tasting it?: No     No patient-reported symptoms      Symptoms:  None: Yes   CHF associated angina: Neg, CHF associated dyspnea on exertion: Neg, CHF associated fatigue: Neg, CHF associated leg swelling: Neg, CHF associated orthostatic hypotension: Neg, CHF associated PND: Neg, CHF associated shortness of breath: Neg, CHF associated weakness: Neg      Symptom course: stable  Patient-reported weight (lb): 213  Weight trend: stable  Salt intake watch compared to last visit: stable     ,   COPD Assessment    Does the patient understand envrionmental exposure?: Yes  Is the patient able to verbalize Rescue vs. Long Acting medications?: Yes  Does the patient have a nebulizer?: Yes  Does the patient use a space with inhaled medications?: No     No patient-reported symptoms         Symptoms:  None: Yes      Symptom course: stable  Breathlessness: none  Increase use of rapid acting/rescue inhaled medications?: No  Change in chronic cough?: No/At Baseline  Change in sputum?: No/At Baseline  Self Monitoring - SaO2: No  Have you had a recent diagnosis of pneumonia either by PCP or at a hospital?: No     , and   General Assessment    Do you have any symptoms that are causing concern?: Yes  Progression since Onset: Intermittent - Waxing/Waning  Reported Symptoms: Pain (Comment: Left Shoulder Pain)

## 2023-09-09 NOTE — PROGRESS NOTES
Remote Patient Monitoring Treatment Plan    Received request from ACM/CTN Darrell Patricia RN  to order remote patient monitoring for in home monitoring of CHF, COPD, and HTN and order completed. Patient will be monitoring blood pressure   pulse ox   weight  survey questions. Patient will engage in Remote Patient Monitoring each day to develop the skills necessary for self management. RPM Care Team Responsibilities:   Alerts will be reviewed daily and addressed within 2-4 hours during operational hours (Monday -Friday 9 am-4 pm)  Alert response and intervention documented in patient medical record  Alert response escalated to PCP per protocol and documented in patient medical record  Patient monitored over approximately  days  Discharge from program based on self-management readiness    See care coordination encounters for additional details.

## 2023-09-11 DIAGNOSIS — Z12.31 ENCOUNTER FOR SCREENING MAMMOGRAM FOR MALIGNANT NEOPLASM OF BREAST: ICD-10-CM

## 2023-09-11 DIAGNOSIS — E11.42 TYPE 2 DIABETES MELLITUS WITH DIABETIC POLYNEUROPATHY, WITHOUT LONG-TERM CURRENT USE OF INSULIN (HCC): Primary | ICD-10-CM

## 2023-09-13 ENCOUNTER — CARE COORDINATION (OUTPATIENT)
Dept: CARE COORDINATION | Age: 68
End: 2023-09-13

## 2023-09-13 NOTE — CARE COORDINATION
Date/Time:  9/13/2023 11:39 AM  LPN attempted to reach patient by telephone regarding red alert in remote patient monitoring program. Voice mail did not . Unable to leave message requesting return call. Will attempt to reach patient again.

## 2023-09-13 NOTE — CARE COORDINATION
Remote Patient Monitoring Note  Date/Time:  2023 3:16 PM  Patient Current Location: [place]  Pt returned call at this time regarding red alert received for pulse ox reading (89%) and weight increase (3 lbs x 1 day, 5 lbs x 7 days). Verified patients name and  as identifiers. Background: Pt enrolled in RPM r/t CHF, COPD, HTN. Clinical Interventions: Reviewed and followed up on alerts and treatments-Spoke with pt who is in no apparent distress at this time. Verbalizes using Breo Ellipta inhaler, Spiriva inhaler and Albuterol inhaler as ordered. She is out of  albuterol for nebulizer and will  from pharmacy. She is agreeable to recheck oxygen level at this time with updated reading of 95%. Pt educated that if oxygen level is below 92% and she is in no distress, she should reposition oximeter and recheck reading. Verbalizes understanding. Reviewed weights and pt states weights yesterday were inaccurate due to scale being on carpet. She has moved scale to hard, flat surface and states today's weight was accurate. Noted weight during office visit on 23 was 217 lbs. Will remove inaccurate readings from RPM metrics. Plan/Follow Up: Will continue to review, monitor and address alerts with follow up based on severity of symptoms and risk factors.

## 2023-09-13 NOTE — CARE COORDINATION
Remote Alert Monitoring Note  Rpm alert to be reviewed by the provider   red alert   pulse ox reading (89%) and weight (increase of 3 lbs x 1 day, 5 lbs x 7 days)   Additional needs to be addressed by N/A: No      Date/Time:  9/13/2023 11:34 AM  Patient Current Location: [place]  LPN attempted to contact patient and EC by telephone. Background: Pt enrolled in RPM r/t CHF, COPD, HTN. Refer to 911 immediately if:  Patient unresponsive or unable to provide history  Change in cognition or sudden confusion  Patient unable to respond in complete sentences  Intense chest pain/tightness  Any concern for any clinical emergency  Red Alert: Provider response time of 1 hr required for any red alert requiring intervention  Yellow Alert: Provider response time of 3hr required for any escalated yellow alert  Clinical Interventions: Reviewed and followed up on alerts and treatments-Multiple attempts to reach pt with no answer and voice mail not set up so unable to leave message requesting return call. Was able to leave HIPAA compliant voice message for [facility] to return call. Plan/Follow Up: Will continue to review, monitor and address alerts with follow up based on severity of symptoms and risk factors.

## 2023-09-14 ENCOUNTER — CARE COORDINATION (OUTPATIENT)
Dept: CARE COORDINATION | Age: 68
End: 2023-09-14

## 2023-09-14 NOTE — CARE COORDINATION
Attempted outreach for CC needs, COPD, DM and HTN management, RPM monitoring  No answer and unable to LVM at this time

## 2023-09-15 ENCOUNTER — TELEPHONE (OUTPATIENT)
Dept: FAMILY MEDICINE CLINIC | Age: 68
End: 2023-09-15

## 2023-09-15 DIAGNOSIS — J41.1 MUCOPURULENT CHRONIC BRONCHITIS (HCC): Primary | ICD-10-CM

## 2023-09-15 RX ORDER — ALBUTEROL SULFATE 2.5 MG/3ML
SOLUTION RESPIRATORY (INHALATION)
Qty: 120 EACH | Refills: 1 | Status: SHIPPED | OUTPATIENT
Start: 2023-09-15

## 2023-09-15 NOTE — TELEPHONE ENCOUNTER
Patient states that she needs a DME for tubing and medication for Nebulizer.      [facility]

## 2023-09-16 DIAGNOSIS — M19.012 PRIMARY OSTEOARTHRITIS OF LEFT SHOULDER: ICD-10-CM

## 2023-09-18 RX ORDER — TIZANIDINE 4 MG/1
4 TABLET ORAL 3 TIMES DAILY PRN
Qty: 90 TABLET | Refills: 0 | Status: SHIPPED | OUTPATIENT
Start: 2023-09-18

## 2023-09-18 NOTE — TELEPHONE ENCOUNTER
Thaddeus Atkinson is calling to request a refill on the following medication(s):    Medication Request:  Requested Prescriptions     Pending Prescriptions Disp Refills    tiZANidine (ZANAFLEX) 4 MG tablet [Pharmacy Med Name: TIZANIDINE HCL 4 MG TABLET] 90 tablet 0     Sig: TAKE 1 TABLET BY MOUTH 3 TIMES DAILY AS NEEDED (PAIN).        Last Visit Date (If Applicable):  1/88/9601    Next Visit Date:    11/22/2023

## 2023-09-19 ENCOUNTER — CARE COORDINATION (OUTPATIENT)
Dept: CARE COORDINATION | Age: 68
End: 2023-09-19

## 2023-09-19 NOTE — CARE COORDINATION
Remote Alert Monitoring Note  Rpm alert to be reviewed by the provider   red alert   weight (increase of 3 lbs x 1 day,5 lbs x 7 days)   Additional needs to be addressed by N/A: No      Date/Time:  2023 9:12 AM  Patient Current Location: [place]  LPN contacted patient by telephone. Verified patients name and  as identifiers. Background: Pt enrolled in RPM r/t CHF, COPD, HTN. Refer to 911 immediately if:  Patient unresponsive or unable to provide history  Change in cognition or sudden confusion  Patient unable to respond in complete sentences  Intense chest pain/tightness  Any concern for any clinical emergency  Red Alert: Provider response time of 1 hr required for any red alert requiring intervention  Yellow Alert: Provider response time of 3hr required for any escalated yellow alert  Weight Scale Triage  Was your weight obtained upon rising/waking today? yes   Was your weight obtained after voiding and/or use of the bathroom today? yes   Did you weigh yourself in the same amount of clothing today, compared to how you typically do? yes   Was the scale bumped or moved prior to today's weight? no   Is your scale on a flat/hard surface? yes   Did you obtain your weight with shoes on? no   If yes, is this something you normally do during your daily weights? NA   Were you standing up straight on the scale today? yes   Were you leaning on anything while obtaining your weight today? no   Clinical Interventions: Reviewed and followed up on alerts and treatments-Spoke with pt who is in no apparent distress. Denies any SOB/dyspnea, new/increasing edema. Taking HCTZ daily as ordered with good results. She states when she stepped on scale, she had multiple incorrect weights record. The last weight obtained is consistent with daily readings. Will remove inaccurate weights from RPM system. Plan/Follow Up:  Will continue to review, monitor and address alerts with follow up based on severity of symptoms and risk

## 2023-09-20 ENCOUNTER — CARE COORDINATION (OUTPATIENT)
Dept: CARE COORDINATION | Age: 68
End: 2023-09-20

## 2023-09-20 NOTE — CARE COORDINATION
9/20/23 3:43 PM Patient is currently enrolled in Remote Patient Monitoring for CHF, COPD, and HTN. Pt has not updated metrics as of this time. Seen by cardiology this morning and advised her to take her atenolol and Lasix in a.m. and take her Norvasc in p.m. Plan/Follow Up: Will continue to review, monitor and address alerts with follow up based on severity of symptoms and risk factors.
treatments-Spoke with pt who states she forgot to take HTN medications yesterday and was feeling nauseous and weak this morning. She checked BP today prior to taking medications. Reviewed medications and pt verbalizes she took  HCTZ, Amlodipine, and Atenolol after she checked BP. Rechecked BP approximately one hour later with updated reading of 152/72. She is out of Lasix and will ask for refill during cardiology appointment this morning at 11:30. She is in a hurry due to taxi waiting for her and unable to update metrics on tablet at this time. Will add RPM metrics and route to [facility] for review. Plan/Follow Up: Will continue to review, monitor and address alerts with follow up based on severity of symptoms and risk factors.

## 2023-09-21 ENCOUNTER — TELEPHONE (OUTPATIENT)
Dept: FAMILY MEDICINE CLINIC | Age: 68
End: 2023-09-21

## 2023-09-21 ENCOUNTER — CARE COORDINATION (OUTPATIENT)
Dept: CARE COORDINATION | Age: 68
End: 2023-09-21

## 2023-09-21 NOTE — TELEPHONE ENCOUNTER
Patient called and stated she needs a new meter sent to her pharmacy. Patient was also wondering if you could put a 90 day supply on her medications. Patient stated pharmacy requested this because the patient is on so many medications.

## 2023-09-21 NOTE — CARE COORDINATION
9/21/23 9:57 AM Patient is currently enrolled in Remote Patient Monitoring for CHF, COPD, and HTN. Attempted to reach pt to review RPM alerts. Pt answered phone and states she is at the bank and will return call when she gets home.

## 2023-09-21 NOTE — CARE COORDINATION
Remote Alert Monitoring Note  Rpm alert to be reviewed by the provider   red alert   pulse ox reading (91%)   Additional needs to be addressed by N/A: No      Date/Time:  2023 2:13 PM  Patient Current Location: [address] [place] [address]  Pt returned call at this time. Verified patients name and  as identifiers. Background: Pt enrolled in RPM r/t CHF, COPD, HTN. Refer to 911 immediately if:  Patient unresponsive or unable to provide history  Change in cognition or sudden confusion  Patient unable to respond in complete sentences  Intense chest pain/tightness  Any concern for any clinical emergency  Red Alert: Provider response time of 1 hr required for any red alert requiring intervention  Yellow Alert: Provider response time of 3hr required for any escalated yellow alert  O2 Triage  Are you having any Chest Pain? no   Are you having any Shortness of Breath? no   Swelling in your hands or feet? no   Are you having any other health concerns or issues? no   Clinical Interventions: Reviewed and followed up on alerts and treatments- Pt speaking in full sentences, no apparent distress. She is not wearing home O2 at 2 L at this time and denies any SOB/dyspnea. Verbalizes that she has used [employer] and Spiriva inhalers today as ordered. She has not required albuterol inhaler and has to  tubing for nebulizer machine from pharmacy. She is agreeable to recheck oxygen level at this time with updated reading o f 93%. Educated pt that if oxygen level below 92% and she is in no distress, she should reposition oximeter and recheck metric. Verbalizes understanding. Plan/Follow Up: Will continue to review, monitor and address alerts with follow up based on severity of symptoms and risk factors.

## 2023-09-22 DIAGNOSIS — E11.42 TYPE 2 DIABETES MELLITUS WITH DIABETIC POLYNEUROPATHY, WITHOUT LONG-TERM CURRENT USE OF INSULIN (HCC): Primary | ICD-10-CM

## 2023-09-25 ENCOUNTER — CARE COORDINATION (OUTPATIENT)
Dept: CARE COORDINATION | Age: 68
End: 2023-09-25

## 2023-09-25 NOTE — CARE COORDINATION
Remote Alert Monitoring Note  Rpm alert to be reviewed by the provider   red alert and yellow alert   blood pressure reading (yellow alert 172/59) and weight (red alert for increase of 5 lbs x 7 days)   Additional needs to be addressed by N/A: No      Date/Time:  2023 9:58 AM  [facility] Verified patients name and  as identifiers. Background: Pt enrolled in RPM r/t CHF, COPD, HTN. Refer to 911 immediately if:  Patient unresponsive or unable to provide history  Change in cognition or sudden confusion  Patient unable to respond in complete sentences  Intense chest pain/tightness  Any concern for any clinical emergency  Red Alert: Provider response time of 1 hr required for any red alert requiring intervention  Yellow Alert: Provider response time of 3hr required for any escalated yellow alert  BP Triage  Are you having any Chest Pain? no   Are you having any Shortness of Breath? no   Do you have a headache or have any vision changes? no   Are you having any numbness or tingling? no   Are you having any other health concerns or issues? no   Weight Scale Triage  Was your weight obtained upon rising/waking today? yes   Was your weight obtained after voiding and/or use of the bathroom today? yes   Did you weigh yourself in the same amount of clothing today, compared to how you typically do? yes   Was the scale bumped or moved prior to today's weight? no   Is your scale on a flat/hard surface? yes   Did you obtain your weight with shoes on? no   If yes, is this something you normally do during your daily weights? NA   Were you standing up straight on the scale today? yes   Were you leaning on anything while obtaining your weight today? no   Clinical Interventions: Reviewed and followed up on alerts and treatments-Spoke with pt who is in no apparent distress. She is wearing O2 at 2L per order, denies any SOB/dyspnea, new/increasing edema.   Reviewed

## 2023-09-25 NOTE — CARE COORDINATION
9/25/23 2:09 PM Patient is currently enrolled in Remote Patient Monitoring for CHF, COPD, and HTN. Pt has not updated BP as of this time. This nurse reached back out to pt and she states is agreeable to recheck at this time with updated BP reading of 129/62. BP now within RPM parameters. Plan/Follow Up: Will continue to review, monitor and address alerts with follow up based on severity of symptoms and risk factors.

## 2023-09-26 ENCOUNTER — CARE COORDINATION (OUTPATIENT)
Dept: CARE COORDINATION | Age: 68
End: 2023-09-26

## 2023-09-26 RX ORDER — PSEUDOEPHED/ACETAMINOPH/DIPHEN 30MG-500MG
2 TABLET ORAL EVERY 6 HOURS PRN
Qty: 120 TABLET | Refills: 0 | OUTPATIENT
Start: 2023-09-26

## 2023-09-26 RX ORDER — CETIRIZINE HYDROCHLORIDE 10 MG/1
TABLET ORAL
Qty: 90 TABLET | Refills: 0 | Status: SHIPPED | OUTPATIENT
Start: 2023-09-26

## 2023-09-26 NOTE — TELEPHONE ENCOUNTER
Ángel Knott is calling to request a refill on the following medication(s):    Medication Request:  Requested Prescriptions     Pending Prescriptions Disp Refills    cetirizine (ZYRTEC) 10 MG tablet [Pharmacy Med Name: CETIRIZINE HCL 10 MG TABLET] 90 tablet 0     Sig: TAKE 1 TABLET BY MOUTH EVERY DAY AS NEEDED FOR ALLERGY SYMPTOMS       Last Visit Date (If Applicable):  4/58/3666    Next Visit Date:    11/22/2023

## 2023-09-26 NOTE — TELEPHONE ENCOUNTER
Requested Prescriptions     Pending Prescriptions Disp Refills    cetirizine (ZYRTEC) 10 MG tablet [Pharmacy Med Name: CETIRIZINE HCL 10 MG TABLET] 90 tablet 0     Sig: TAKE 1 TABLET BY MOUTH EVERY DAY AS NEEDED FOR ALLERGY SYMPTOMS

## 2023-09-26 NOTE — CARE COORDINATION
Remote Alert Monitoring Note  Rpm alert to be reviewed by the provider   red alert   pulse ox reading (91%)   Additional needs to be addressed by N/A: No      Date/Time:  2023 9:27 AM  Patient Current Location: [facility] contacted patient by telephone. Verified patients name and  as identifiers. Background: Pt enrolled in RPM r/t CHF, COPD, HTN. Refer to 911 immediately if:  Patient unresponsive or unable to provide history  Change in cognition or sudden confusion  Patient unable to respond in complete sentences  Intense chest pain/tightness  Any concern for any clinical emergency  Red Alert: Provider response time of 1 hr required for any red alert requiring intervention  Yellow Alert: Provider response time of 3hr required for any escalated yellow alert  O2 Triage  Are you having any Chest Pain? no   Are you having any Shortness of Breath? yes   Swelling in your hands or feet? no   Are you having any other health concerns or issues? no   Clinical Interventions: Reviewed and followed up on alerts and treatments-Pt speaking in full sentences, no apparent distress. She is not wearing oxygen at this time and states she was having SOB earlier, but used inhalers and a nebulizer tx with relief. She is agreeable to recheck oxygen level at this time with updated reading of 93%. Reminded pt that if oxygen level below 92%, she should put oxygen on, take deep breaths and recheck metric. Verbalizes understanding. Pt having to enter metrics manually and this nurse called tech support to request tablet updates and reset to assist with blue tooth connectivity. Plan/Follow Up: Will continue to review, monitor and address alerts with follow up based on severity of symptoms and risk factors.

## 2023-09-27 ENCOUNTER — CARE COORDINATION (OUTPATIENT)
Dept: CARE COORDINATION | Age: 68
End: 2023-09-27

## 2023-09-27 DIAGNOSIS — J41.1 MUCOPURULENT CHRONIC BRONCHITIS (HCC): Primary | ICD-10-CM

## 2023-09-27 RX ORDER — ALBUTEROL SULFATE 90 UG/1
2 AEROSOL, METERED RESPIRATORY (INHALATION) EVERY 6 HOURS PRN
Qty: 18 G | Refills: 3 | Status: SHIPPED | OUTPATIENT
Start: 2023-09-27

## 2023-09-27 NOTE — CARE COORDINATION
9/27/23 10:37 AM Patient is currently enrolled in Remote Patient Monitoring for CHF, COPD, and HTN. Received message from PCP, Dr. Amada Ambrosio, \"I will send in prescription for albuterol inhaler and will wait for her updated oxygen saturation readings after taking nebulizer treatments. Would also advise her to take extra dose of Lasix for next 2 to 3 days and continue monitoring weight. \"  Pt updated and verbalizes understanding. She completed her nebulizer tx and agreeable to recheck oxygen level at this time with updated reading of 90%. Pt states she is not wearing her home O2 because she is out of her tubing. She is going to pick it up today and will recheck oxygen again later this afternoon. I will send in prescription for albuterol inhaler and will wait for her updated oxygen saturation readings after taking nebulizer treatments. Would also advise her to take extra dose of Lasix for next 2 to 3 days and continue monitoring weight.

## 2023-09-27 NOTE — CARE COORDINATION
9/27/23 3:41 PM Patient is currently enrolled in Remote Patient Monitoring for CHF, COPD, and HTN. Pt rechecked oxygen level at 2:36 PM with updated reading of 92%. Metric now within RPM parameters. Plan/Follow Up: Will continue to review, monitor and address alerts with follow up based on severity of symptoms and risk factors.

## 2023-09-27 NOTE — CARE COORDINATION
Remote Alert Monitoring Note  Rpm alert to be reviewed by the provider   red alert   pulse ox reading (91%) and weight (increase of 3 lbs x 1 day, 5 lbs x 7 days)   Additional needs to be addressed by PCP:  Pt is enrolled in Remote Patient Monitoring r/t CHF, COPD, HTN. Discussed weight increase and pt states she has increased edema to all extremities today. She states she did not eat out yesterday and had tuna fish for dinner. Taking Lasix 20 mg BID and HCTZ 12.5 mg daily as ordered. RPM metrics added for review. Please advise. Date/Time:  2023 10:08 AM  Patient Current Location: [facility] contacted patient by telephone. Verified patients name and  as identifiers. Background: Pt enrolled in RPM r/t CHF, COPD, HTN. Refer to 911 immediately if:  Patient unresponsive or unable to provide history  Change in cognition or sudden confusion  Patient unable to respond in complete sentences  Intense chest pain/tightness  Any concern for any clinical emergency  Red Alert: Provider response time of 1 hr required for any red alert requiring intervention  Yellow Alert: Provider response time of 3hr required for any escalated yellow alert  O2 Triage  Are you having any Chest Pain? no   Are you having any Shortness of Breath? Yes, with activity   Swelling in your hands or feet? yes   Are you having any other health concerns or issues? no    Weight Scale Triage  Was your weight obtained upon rising/waking today? yes   Was your weight obtained after voiding and/or use of the bathroom today? yes   Did you weigh yourself in the same amount of clothing today, compared to how you typically do? yes   Was the scale bumped or moved prior to today's weight? no   Is your scale on a flat/hard surface? yes   Did you obtain your weight with shoes on? no   If yes, is this something you normally do during your daily weights? NA   Were you standing up straight on the scale today?  yes   Were you leaning on anything while

## 2023-09-28 ENCOUNTER — CARE COORDINATION (OUTPATIENT)
Dept: CARE COORDINATION | Age: 68
End: 2023-09-28

## 2023-09-28 ENCOUNTER — TELEPHONE (OUTPATIENT)
Dept: ORTHOPEDIC SURGERY | Age: 68
End: 2023-09-28

## 2023-09-28 ASSESSMENT — ENCOUNTER SYMPTOMS: DYSPNEA ASSOCIATED WITH: MINIMAL EXERTION

## 2023-09-28 NOTE — CARE COORDINATION
Date/Time:  9/28/2023 10:14 AM  LPN attempted to reach patient by telephone regarding red alert in remote patient monitoring program. Left HIPPA compliant message requesting a return call. Will attempt to reach patient again.

## 2023-09-28 NOTE — TELEPHONE ENCOUNTER
Dr. Juan Erwin prescribed patient lodine because her insruance would not cover voltaren. However, patient is stating that the lodine is causing an abnormal reaction (causing heart palpitations, abnormal BP readings, etc). Patient would like to know if there is anything else that you recommend. Patient was advised to stop taking lodine and take ibu at this time until a recommendation is obtained from you.

## 2023-09-28 NOTE — CARE COORDINATION
alerts can be responded to in the same day  Patient weighs self at same time every day (or after urinating and waking up)  Take blood pressure 1-2 hrs after medications   RPM team may have different phone area code (including [facility], [address] or [address])                              [x] Instructed patient to keep scale on flat surface                                                         [x] Instructed patient to keep tablet plugged in at all times                         [x] Instructed how to contact IT support ([phone])  [x] Provided Remote Patient Monitoring care  information               All questions answered at this time. Offered patient enrollment in the Remote Patient Monitoring (RPM) program for in-home monitoring: Yes, patient already enrolled. Lab Results       None                 Goals Addressed                   This Visit's Progress     Conditions and Symptoms   Improving     I will schedule office visits, as directed by my provider. I will keep my appointment or reschedule if I have to cancel. I will notify my provider of any barriers to my plan of care. I will follow my Zone Management tool to seek urgent or emergent care. I will notify my provider of any symptoms that indicate a worsening of my condition. Barriers: overwhelmed by complexity of regimen  Plan for overcoming my barriers: Willingness to work with PCP/ACM for chronic conditions and well-being  Confidence: 9/10  Anticipated Goal Completion Date: 12/8/2023                Future Appointments   Date Time Provider [address] [place] [address]   11/10/2023 11:30 AM Monalisa Nelson MD Resp Sofi Kane Chenkinsey   11/22/2023  1:15 PM [facility]   ,   Diabetes Assessment    Medic Alert ID: No  Meal Planning: Avoidance of concentrated sweets   How often do you test your blood sugar?: Daily (Comment: Not testing currently does not have machine)   Do you have barriers with adherence to non-pharmacologic self-management

## 2023-09-28 NOTE — CARE COORDINATION
Remote Alert Monitoring Note  Rpm alert to be reviewed by the provider   red alert   pulse ox reading (91%) and weight (weight increase of 5 lbs x 7 days)   Additional needs to be addressed by N/A: No      Date/Time:  2023 11:37 AM  Patient Current Location: [place]  LPN contacted patient by telephone. Verified patients name and  as identifiers. Background: Pt enrolled in RPM r/t CHF, COPD, HTN. Refer to 911 immediately if:  Patient unresponsive or unable to provide history  Change in cognition or sudden confusion  Patient unable to respond in complete sentences  Intense chest pain/tightness  Any concern for any clinical emergency  Red Alert: Provider response time of 1 hr required for any red alert requiring intervention  Yellow Alert: Provider response time of 3hr required for any escalated yellow alert  O2 Triage  Are you having any Chest Pain? no   Are you having any Shortness of Breath? no   Swelling in your hands or feet? no   Are you having any other health concerns or issues? no   Clinical Interventions: Reviewed and followed up on alerts and treatments-Routed to PCP on 23 for increased weight and pt ordered to take additional Lasix 20 mg daily x 3 days. Weight today down 2 lbs from yesterday. Pt speaking in full sentences, no apparent distress. She is agreeable to recheck oxygen level at this time with updated reading of 93%. Pt states that she picked up prescription for Etodolac 400 mg yesterday and after she took medication she felt very bad. She had a hard time staying awake and states she checked her BP with systolic reading of 80 noted. She did not record metric on RPM tablet. She is going to call Dr. Jovana Lr office to update and does not wish to take this medication again. Pt continues to enter metrics manually and is agreeable to have tech support call to trouble shoot equipment. This nurse reached out to tech support and they will call pt at this time. Plan/Follow Up:  Will

## 2023-09-29 ENCOUNTER — APPOINTMENT (OUTPATIENT)
Dept: GENERAL RADIOLOGY | Age: 68
End: 2023-09-29
Payer: MEDICARE

## 2023-09-29 ENCOUNTER — CARE COORDINATION (OUTPATIENT)
Dept: CARE COORDINATION | Age: 68
End: 2023-09-29

## 2023-09-29 ENCOUNTER — HOSPITAL ENCOUNTER (INPATIENT)
Age: 68
LOS: 1 days | Discharge: HOME | End: 2023-09-30
Attending: EMERGENCY MEDICINE | Admitting: FAMILY MEDICINE
Payer: MEDICARE

## 2023-09-29 DIAGNOSIS — R09.02 HYPOXIA: ICD-10-CM

## 2023-09-29 DIAGNOSIS — J18.9 PNEUMONIA DUE TO INFECTIOUS ORGANISM, UNSPECIFIED LATERALITY, UNSPECIFIED PART OF LUNG: Primary | ICD-10-CM

## 2023-09-29 LAB
ANION GAP SERPL CALCULATED.3IONS-SCNC: 12 MMOL/L (ref 9–17)
BASOPHILS # BLD: 0.07 K/UL (ref 0–0.2)
BASOPHILS NFR BLD: 1 % (ref 0–2)
BNP SERPL-MCNC: 169 PG/ML
BUN SERPL-MCNC: 9 MG/DL (ref 8–23)
CALCIUM SERPL-MCNC: 9.6 MG/DL (ref 8.6–10.4)
CHLORIDE SERPL-SCNC: 100 MMOL/L (ref 98–107)
CO2 SERPL-SCNC: 25 MMOL/L (ref 20–31)
CREAT SERPL-MCNC: 0.6 MG/DL (ref 0.5–0.9)
EOSINOPHIL # BLD: 0.19 K/UL (ref 0–0.44)
EOSINOPHILS RELATIVE PERCENT: 3 % (ref 1–4)
ERYTHROCYTE [DISTWIDTH] IN BLOOD BY AUTOMATED COUNT: 14.1 % (ref 11.8–14.4)
GFR SERPL CREATININE-BSD FRML MDRD: >60 ML/MIN/1.73M2
GLUCOSE SERPL-MCNC: 145 MG/DL (ref 70–99)
HCT VFR BLD AUTO: 41.8 % (ref 36.3–47.1)
HGB BLD-MCNC: 13.4 G/DL (ref 11.9–15.1)
IMM GRANULOCYTES # BLD AUTO: <0.03 K/UL (ref 0–0.3)
IMM GRANULOCYTES NFR BLD: 0 %
LYMPHOCYTES NFR BLD: 2.5 K/UL (ref 1.1–3.7)
LYMPHOCYTES RELATIVE PERCENT: 34 % (ref 24–43)
MCH RBC QN AUTO: 30.5 PG (ref 25.2–33.5)
MCHC RBC AUTO-ENTMCNC: 32.1 G/DL (ref 28.4–34.8)
MCV RBC AUTO: 95 FL (ref 82.6–102.9)
MONOCYTES NFR BLD: 0.6 K/UL (ref 0.1–1.2)
MONOCYTES NFR BLD: 8 % (ref 3–12)
NEUTROPHILS NFR BLD: 54 % (ref 36–65)
NEUTS SEG NFR BLD: 4.08 K/UL (ref 1.5–8.1)
NRBC BLD-RTO: 0 PER 100 WBC
PLATELET # BLD AUTO: 251 K/UL (ref 138–453)
PMV BLD AUTO: 10 FL (ref 8.1–13.5)
POTASSIUM SERPL-SCNC: 3.8 MMOL/L (ref 3.7–5.3)
RBC # BLD AUTO: 4.4 M/UL (ref 3.95–5.11)
SODIUM SERPL-SCNC: 137 MMOL/L (ref 135–144)
TROPONIN I SERPL HS-MCNC: 8 NG/L (ref 0–14)
WBC OTHER # BLD: 7.5 K/UL (ref 3.5–11.3)

## 2023-09-29 PROCEDURE — 85025 COMPLETE CBC W/AUTO DIFF WBC: CPT

## 2023-09-29 PROCEDURE — 6360000002 HC RX W HCPCS: Performed by: EMERGENCY MEDICINE

## 2023-09-29 PROCEDURE — 84484 ASSAY OF TROPONIN QUANT: CPT

## 2023-09-29 PROCEDURE — 71045 X-RAY EXAM CHEST 1 VIEW: CPT

## 2023-09-29 PROCEDURE — 96374 THER/PROPH/DIAG INJ IV PUSH: CPT

## 2023-09-29 PROCEDURE — 80048 BASIC METABOLIC PNL TOTAL CA: CPT

## 2023-09-29 PROCEDURE — 83880 ASSAY OF NATRIURETIC PEPTIDE: CPT

## 2023-09-29 PROCEDURE — 99285 EMERGENCY DEPT VISIT HI MDM: CPT

## 2023-09-29 RX ADMIN — METHYLPREDNISOLONE SODIUM SUCCINATE 125 MG: 125 INJECTION, POWDER, FOR SOLUTION INTRAMUSCULAR; INTRAVENOUS at 22:57

## 2023-09-29 ASSESSMENT — PAIN DESCRIPTION - LOCATION: LOCATION: CHEST

## 2023-09-29 ASSESSMENT — PAIN SCALES - GENERAL: PAINLEVEL_OUTOF10: 7

## 2023-09-29 ASSESSMENT — PAIN - FUNCTIONAL ASSESSMENT: PAIN_FUNCTIONAL_ASSESSMENT: 0-10

## 2023-09-29 NOTE — CARE COORDINATION
9/29/23 2:45 PM Patient is currently enrolled in Remote Patient Monitoring for CHF, COPD, and HTN. Pt has not updated oxygen level as of this time. Reached out to pt to request she check oxygen level at this time. Pt is in no distress, wearing home O2 at 2L and denies any SOB. Oxygen level reading 83%. Pt is going to change batteries in oximeter and will recheck. Pt changed batteries and oximeter not working. Will reach out to tech support to call pt to assist.  Plan/Follow Up: Will continue to review, monitor and address alerts with follow up based on severity of symptoms and risk factors.

## 2023-09-29 NOTE — CARE COORDINATION
9/29/23 3:31 PM  Patient is currently enrolled in Remote Patient Monitoring for CHF, COPD, and HTN. Spoke with tech support and pt will be receiving a replacement kit. Plan/Follow Up: Will continue to review, monitor and address alerts with follow up based on severity of symptoms and risk factors.

## 2023-09-29 NOTE — TELEPHONE ENCOUNTER
Attempted to contact patient to provide her with Dr. Shira Rich recommendation. No answer and phone just kept ringing; unable to LVM.

## 2023-09-29 NOTE — CARE COORDINATION
Remote Alert Monitoring Note  Rpm alert to be reviewed by the provider   red alert   pulse ox reading (91%)   Additional needs to be addressed by N/A: No      Date/Time:  2023 1:04 PM  Patient Current Location: [facility] contacted patient by telephone. Verified patients name and  as identifiers. Background: Pt enrolled in RPM r/t COPD, CHF, HTN. Refer to 911 immediately if:  Patient unresponsive or unable to provide history  Change in cognition or sudden confusion  Patient unable to respond in complete sentences  Intense chest pain/tightness  Any concern for any clinical emergency  Red Alert: Provider response time of 1 hr required for any red alert requiring intervention  Yellow Alert: Provider response time of 3hr required for any escalated yellow alert  O2 Triage  Are you having any Chest Pain? no   Are you having any Shortness of Breath? no   Swelling in your hands or feet? no   Are you having any other health concerns or issues? no   Clinical Interventions: Reviewed and followed up on alerts and treatments-Pt speaking in full sentences, no apparent distress. Pt wearing home O2 at 2L and denies any SOB/dyspnea at this time. Verbalizes that she has used scheduled inhalers and one nebulizer tx. She is going to use a nebulizer tx and will recheck oxygen level in approximately 30 minutes. Discussed that pt continues to enter metrics manually and that per tech support, they have not been able to reach her. They are going to call her again today at 2 pm.    Plan/Follow Up: Will continue to review, monitor and address alerts with follow up based on severity of symptoms and risk factors.

## 2023-09-30 VITALS
OXYGEN SATURATION: 95 % | HEART RATE: 77 BPM | DIASTOLIC BLOOD PRESSURE: 73 MMHG | SYSTOLIC BLOOD PRESSURE: 132 MMHG | RESPIRATION RATE: 14 BRPM | TEMPERATURE: 97.3 F

## 2023-09-30 PROBLEM — J40 BRONCHITIS: Status: ACTIVE | Noted: 2023-09-30

## 2023-09-30 PROBLEM — J18.9 PNEUMONIA DUE TO INFECTIOUS ORGANISM: Status: ACTIVE | Noted: 2023-09-30

## 2023-09-30 LAB
GLUCOSE BLD-MCNC: 221 MG/DL
GLUCOSE BLD-MCNC: 221 MG/DL (ref 65–105)
TROPONIN I SERPL HS-MCNC: 8 NG/L (ref 0–14)

## 2023-09-30 PROCEDURE — 1200000000 HC SEMI PRIVATE

## 2023-09-30 PROCEDURE — 83036 HEMOGLOBIN GLYCOSYLATED A1C: CPT

## 2023-09-30 PROCEDURE — 94640 AIRWAY INHALATION TREATMENT: CPT

## 2023-09-30 PROCEDURE — 6370000000 HC RX 637 (ALT 250 FOR IP): Performed by: NURSE PRACTITIONER

## 2023-09-30 PROCEDURE — 6370000000 HC RX 637 (ALT 250 FOR IP): Performed by: FAMILY MEDICINE

## 2023-09-30 PROCEDURE — 6360000002 HC RX W HCPCS: Performed by: FAMILY MEDICINE

## 2023-09-30 PROCEDURE — 82947 ASSAY GLUCOSE BLOOD QUANT: CPT

## 2023-09-30 PROCEDURE — 6360000002 HC RX W HCPCS: Performed by: EMERGENCY MEDICINE

## 2023-09-30 PROCEDURE — 2580000003 HC RX 258: Performed by: EMERGENCY MEDICINE

## 2023-09-30 PROCEDURE — 2580000003 HC RX 258: Performed by: NURSE PRACTITIONER

## 2023-09-30 PROCEDURE — 99235 HOSP IP/OBS SAME DATE MOD 70: CPT | Performed by: FAMILY MEDICINE

## 2023-09-30 PROCEDURE — 94664 DEMO&/EVAL PT USE INHALER: CPT

## 2023-09-30 PROCEDURE — 2700000000 HC OXYGEN THERAPY PER DAY

## 2023-09-30 PROCEDURE — 96375 TX/PRO/DX INJ NEW DRUG ADDON: CPT

## 2023-09-30 RX ORDER — DEXTROSE MONOHYDRATE 100 MG/ML
INJECTION, SOLUTION INTRAVENOUS CONTINUOUS PRN
Status: DISCONTINUED | OUTPATIENT
Start: 2023-09-30 | End: 2023-09-30 | Stop reason: HOSPADM

## 2023-09-30 RX ORDER — INSULIN LISPRO 100 [IU]/ML
0-4 INJECTION, SOLUTION INTRAVENOUS; SUBCUTANEOUS NIGHTLY
Status: DISCONTINUED | OUTPATIENT
Start: 2023-09-30 | End: 2023-09-30 | Stop reason: HOSPADM

## 2023-09-30 RX ORDER — ACETAMINOPHEN 325 MG/1
650 TABLET ORAL EVERY 6 HOURS PRN
Status: DISCONTINUED | OUTPATIENT
Start: 2023-09-30 | End: 2023-09-30 | Stop reason: HOSPADM

## 2023-09-30 RX ORDER — POLYETHYLENE GLYCOL 3350 17 G/17G
17 POWDER, FOR SOLUTION ORAL DAILY PRN
Status: DISCONTINUED | OUTPATIENT
Start: 2023-09-30 | End: 2023-09-30 | Stop reason: HOSPADM

## 2023-09-30 RX ORDER — PREDNISONE 20 MG/1
40 TABLET ORAL DAILY
Qty: 6 TABLET | Refills: 0 | Status: SHIPPED | OUTPATIENT
Start: 2023-10-02 | End: 2023-10-05

## 2023-09-30 RX ORDER — HYDROCHLOROTHIAZIDE 12.5 MG/1
12.5 CAPSULE, GELATIN COATED ORAL EVERY MORNING
Status: DISCONTINUED | OUTPATIENT
Start: 2023-09-30 | End: 2023-09-30 | Stop reason: HOSPADM

## 2023-09-30 RX ORDER — BUPRENORPHINE HYDROCHLORIDE AND NALOXONE HYDROCHLORIDE DIHYDRATE 8; 2 MG/1; MG/1
1 TABLET SUBLINGUAL DAILY
Status: DISCONTINUED | OUTPATIENT
Start: 2023-09-30 | End: 2023-09-30

## 2023-09-30 RX ORDER — ACETAMINOPHEN 650 MG/1
650 SUPPOSITORY RECTAL EVERY 6 HOURS PRN
Status: DISCONTINUED | OUTPATIENT
Start: 2023-09-30 | End: 2023-09-30 | Stop reason: HOSPADM

## 2023-09-30 RX ORDER — ALBUTEROL SULFATE 2.5 MG/3ML
2.5 SOLUTION RESPIRATORY (INHALATION)
Status: DISCONTINUED | OUTPATIENT
Start: 2023-09-30 | End: 2023-09-30 | Stop reason: HOSPADM

## 2023-09-30 RX ORDER — TRAZODONE HYDROCHLORIDE 100 MG/1
100 TABLET ORAL NIGHTLY
Status: DISCONTINUED | OUTPATIENT
Start: 2023-09-30 | End: 2023-09-30 | Stop reason: HOSPADM

## 2023-09-30 RX ORDER — SERTRALINE HYDROCHLORIDE 25 MG/1
25 TABLET, FILM COATED ORAL DAILY
Status: DISCONTINUED | OUTPATIENT
Start: 2023-09-30 | End: 2023-09-30 | Stop reason: HOSPADM

## 2023-09-30 RX ORDER — BUPRENORPHINE HYDROCHLORIDE AND NALOXONE HYDROCHLORIDE DIHYDRATE 8; 2 MG/1; MG/1
1 TABLET SUBLINGUAL 2 TIMES DAILY
Status: DISCONTINUED | OUTPATIENT
Start: 2023-09-30 | End: 2023-09-30 | Stop reason: HOSPADM

## 2023-09-30 RX ORDER — IPRATROPIUM BROMIDE AND ALBUTEROL SULFATE 2.5; .5 MG/3ML; MG/3ML
1 SOLUTION RESPIRATORY (INHALATION)
Status: DISCONTINUED | OUTPATIENT
Start: 2023-09-30 | End: 2023-09-30 | Stop reason: HOSPADM

## 2023-09-30 RX ORDER — BUDESONIDE AND FORMOTEROL FUMARATE DIHYDRATE 160; 4.5 UG/1; UG/1
2 AEROSOL RESPIRATORY (INHALATION)
Status: DISCONTINUED | OUTPATIENT
Start: 2023-09-30 | End: 2023-09-30 | Stop reason: HOSPADM

## 2023-09-30 RX ORDER — ONDANSETRON 4 MG/1
4 TABLET, ORALLY DISINTEGRATING ORAL EVERY 8 HOURS PRN
Status: DISCONTINUED | OUTPATIENT
Start: 2023-09-30 | End: 2023-09-30 | Stop reason: HOSPADM

## 2023-09-30 RX ORDER — SODIUM CHLORIDE 0.9 % (FLUSH) 0.9 %
5-40 SYRINGE (ML) INJECTION PRN
Status: DISCONTINUED | OUTPATIENT
Start: 2023-09-30 | End: 2023-09-30 | Stop reason: HOSPADM

## 2023-09-30 RX ORDER — BENZONATATE 100 MG/1
100 CAPSULE ORAL 3 TIMES DAILY PRN
Status: DISCONTINUED | OUTPATIENT
Start: 2023-09-30 | End: 2023-09-30 | Stop reason: HOSPADM

## 2023-09-30 RX ORDER — SODIUM CHLORIDE 0.9 % (FLUSH) 0.9 %
5-40 SYRINGE (ML) INJECTION EVERY 12 HOURS SCHEDULED
Status: DISCONTINUED | OUTPATIENT
Start: 2023-09-30 | End: 2023-09-30 | Stop reason: HOSPADM

## 2023-09-30 RX ORDER — AMLODIPINE BESYLATE 10 MG/1
5 TABLET ORAL DAILY
Status: DISCONTINUED | OUTPATIENT
Start: 2023-09-30 | End: 2023-09-30 | Stop reason: HOSPADM

## 2023-09-30 RX ORDER — AZITHROMYCIN 500 MG/1
500 TABLET, FILM COATED ORAL DAILY
Qty: 2 TABLET | Refills: 0 | Status: SHIPPED | OUTPATIENT
Start: 2023-09-30 | End: 2023-10-02

## 2023-09-30 RX ORDER — ONDANSETRON 2 MG/ML
4 INJECTION INTRAMUSCULAR; INTRAVENOUS EVERY 6 HOURS PRN
Status: DISCONTINUED | OUTPATIENT
Start: 2023-09-30 | End: 2023-09-30 | Stop reason: HOSPADM

## 2023-09-30 RX ORDER — GABAPENTIN 800 MG/1
800 TABLET ORAL 3 TIMES DAILY
Status: DISCONTINUED | OUTPATIENT
Start: 2023-09-30 | End: 2023-09-30 | Stop reason: HOSPADM

## 2023-09-30 RX ORDER — DICYCLOMINE HYDROCHLORIDE 10 MG/1
10 CAPSULE ORAL
Status: DISCONTINUED | OUTPATIENT
Start: 2023-09-30 | End: 2023-09-30 | Stop reason: HOSPADM

## 2023-09-30 RX ORDER — SODIUM CHLORIDE 9 MG/ML
INJECTION, SOLUTION INTRAVENOUS PRN
Status: DISCONTINUED | OUTPATIENT
Start: 2023-09-30 | End: 2023-09-30 | Stop reason: HOSPADM

## 2023-09-30 RX ORDER — INSULIN LISPRO 100 [IU]/ML
0-4 INJECTION, SOLUTION INTRAVENOUS; SUBCUTANEOUS
Status: DISCONTINUED | OUTPATIENT
Start: 2023-09-30 | End: 2023-09-30 | Stop reason: HOSPADM

## 2023-09-30 RX ORDER — PRAVASTATIN SODIUM 20 MG
40 TABLET ORAL DAILY
Status: DISCONTINUED | OUTPATIENT
Start: 2023-09-30 | End: 2023-09-30 | Stop reason: HOSPADM

## 2023-09-30 RX ORDER — PREDNISONE 20 MG/1
40 TABLET ORAL DAILY
Status: DISCONTINUED | OUTPATIENT
Start: 2023-10-02 | End: 2023-09-30 | Stop reason: HOSPADM

## 2023-09-30 RX ORDER — PANTOPRAZOLE SODIUM 40 MG/1
40 TABLET, DELAYED RELEASE ORAL DAILY
Status: DISCONTINUED | OUTPATIENT
Start: 2023-09-30 | End: 2023-09-30 | Stop reason: HOSPADM

## 2023-09-30 RX ORDER — BENZONATATE 100 MG/1
100 CAPSULE ORAL 3 TIMES DAILY PRN
Qty: 15 CAPSULE | Refills: 0 | Status: SHIPPED | OUTPATIENT
Start: 2023-09-30 | End: 2023-10-05

## 2023-09-30 RX ORDER — ATENOLOL 25 MG/1
25 TABLET ORAL DAILY
Status: DISCONTINUED | OUTPATIENT
Start: 2023-09-30 | End: 2023-09-30 | Stop reason: HOSPADM

## 2023-09-30 RX ORDER — ENOXAPARIN SODIUM 100 MG/ML
30 INJECTION SUBCUTANEOUS 2 TIMES DAILY
Status: DISCONTINUED | OUTPATIENT
Start: 2023-09-30 | End: 2023-09-30 | Stop reason: HOSPADM

## 2023-09-30 RX ORDER — ROPINIROLE 0.25 MG/1
0.5 TABLET, FILM COATED ORAL DAILY
Status: DISCONTINUED | OUTPATIENT
Start: 2023-09-30 | End: 2023-09-30 | Stop reason: HOSPADM

## 2023-09-30 RX ORDER — FUROSEMIDE 20 MG/1
20 TABLET ORAL 2 TIMES DAILY
Status: DISCONTINUED | OUTPATIENT
Start: 2023-09-30 | End: 2023-09-30 | Stop reason: HOSPADM

## 2023-09-30 RX ORDER — POTASSIUM CHLORIDE 20 MEQ/1
20 TABLET, EXTENDED RELEASE ORAL
Status: DISCONTINUED | OUTPATIENT
Start: 2023-09-30 | End: 2023-09-30 | Stop reason: HOSPADM

## 2023-09-30 RX ADMIN — SODIUM CHLORIDE, PRESERVATIVE FREE 10 ML: 5 INJECTION INTRAVENOUS at 08:52

## 2023-09-30 RX ADMIN — AMLODIPINE BESYLATE 5 MG: 10 TABLET ORAL at 08:48

## 2023-09-30 RX ADMIN — POTASSIUM CHLORIDE 20 MEQ: 1500 TABLET, EXTENDED RELEASE ORAL at 08:48

## 2023-09-30 RX ADMIN — DICYCLOMINE HYDROCHLORIDE 10 MG: 10 CAPSULE ORAL at 08:48

## 2023-09-30 RX ADMIN — IPRATROPIUM BROMIDE AND ALBUTEROL SULFATE 1 DOSE: 2.5; .5 SOLUTION RESPIRATORY (INHALATION) at 07:46

## 2023-09-30 RX ADMIN — DICYCLOMINE HYDROCHLORIDE 10 MG: 10 CAPSULE ORAL at 02:56

## 2023-09-30 RX ADMIN — HYDROCHLOROTHIAZIDE 12.5 MG: 12.5 CAPSULE ORAL at 08:49

## 2023-09-30 RX ADMIN — ATENOLOL 25 MG: 25 TABLET ORAL at 08:48

## 2023-09-30 RX ADMIN — PANTOPRAZOLE SODIUM 40 MG: 40 TABLET, DELAYED RELEASE ORAL at 08:48

## 2023-09-30 RX ADMIN — BUPRENORPHINE AND NALOXONE 1 TABLET: 8; 2 TABLET SUBLINGUAL at 10:14

## 2023-09-30 RX ADMIN — FUROSEMIDE 20 MG: 20 TABLET ORAL at 02:57

## 2023-09-30 RX ADMIN — PRAVASTATIN SODIUM 40 MG: 20 TABLET ORAL at 08:49

## 2023-09-30 RX ADMIN — BUDESONIDE AND FORMOTEROL FUMARATE DIHYDRATE 2 PUFF: 160; 4.5 AEROSOL RESPIRATORY (INHALATION) at 10:13

## 2023-09-30 RX ADMIN — AZITHROMYCIN MONOHYDRATE 500 MG: 500 INJECTION, POWDER, LYOPHILIZED, FOR SOLUTION INTRAVENOUS at 01:17

## 2023-09-30 RX ADMIN — FUROSEMIDE 20 MG: 20 TABLET ORAL at 08:49

## 2023-09-30 RX ADMIN — GABAPENTIN 800 MG: 800 TABLET ORAL at 08:50

## 2023-09-30 RX ADMIN — CEFTRIAXONE SODIUM 1000 MG: 1 INJECTION, POWDER, FOR SOLUTION INTRAMUSCULAR; INTRAVENOUS at 00:26

## 2023-09-30 RX ADMIN — ROPINIROLE HYDROCHLORIDE 0.5 MG: 0.25 TABLET, FILM COATED ORAL at 08:50

## 2023-09-30 ASSESSMENT — ENCOUNTER SYMPTOMS
SHORTNESS OF BREATH: 1
STRIDOR: 0
CONSTIPATION: 0
VOMITING: 0
ABDOMINAL PAIN: 0
DIARRHEA: 0
NAUSEA: 0
COUGH: 0
BLOOD IN STOOL: 0
WHEEZING: 0
COUGH: 1

## 2023-09-30 ASSESSMENT — HEART SCORE: ECG: 1

## 2023-09-30 NOTE — ED NOTES
The following labs were labeled with appropriate pt sticker and tubed to lab:     [x] Blue     [x] Lavender   [] on ice  [x] Green/yellow  [x] Green/black [] on ice  [] Collie Hammed  [] on ice  [] Yellow  [] Red  [] Pink  [] Type/ Screen  [] ABG  [] VBG    [] COVID-19 swab    [] Rapid  [] PCR  [] Flu swab  [] Peds Viral Panel     [] Urine Sample  [] Fecal Sample  [] Pelvic Cultures  [] Blood Cultures  [] X 2  [] STREP Cultures      Annie Medina RN  09/29/23 0659

## 2023-09-30 NOTE — ED TRIAGE NOTES
Pt presents to ED via triage with c/o SOB for the past few weeks. Spo2 on RA at triage is initially 85 %. Pt is an active smoker with history of COPD, on 2 lpm as needed at home. Pt also has history of heart failure and on water pills, lower extremities have pitting edema bilateral.  Pt states increasing SOB when laying down and is only more comfortable when on high back rest.    Pt placed on 3 lpm via NC, spo2 95 %. Pt attached to full monitor, IV established and call light given.

## 2023-09-30 NOTE — ED NOTES
Unable to interrogate Pacemaker as of this time, medtronic interrogator's batter is fully drained.       Hank Langston, DRE  09/30/23 5750

## 2023-09-30 NOTE — PROGRESS NOTES
Pharmacy Note     Enoxaparin Dose Adjustment    Rm Huang is a 79 y.o. female. Pharmacist assessment of enoxaparin dose for VTE prophylaxis. Recent Labs     09/29/23  2154   BUN 9       Recent Labs     09/29/23 2154   CREATININE 0.6       Estimated Creatinine Clearance: 123 mL/min (based on SCr of 0.6 mg/dL). Estimated CrCl using Ideal Body Weight: 100 mL/min (based on IBW 80 kg)    Height:   Ht Readings from Last 1 Encounters:   09/20/23 6' (1.829 m)     Weight:  Wt Readings from Last 1 Encounters:   09/20/23 222 lb (100.7 kg)       The following enoxaparin dose has been adjusted based upon renal function and/or patient weight per P&T Guidelines:             Enoxaparin 40 mg subcutaneously once daily changed to Enoxaparin 30 mg subcutaneously twice daily.     William Mcdermott [facility]  9/30/2023 2:08 AM

## 2023-09-30 NOTE — PROGRESS NOTES
Discussed with Medtronic and pacer interrogation within normal limits. Normal battery and no events.     Venkata Hansen DO  4:38 AM  09/30/23

## 2023-09-30 NOTE — H&P
[facility]  Office: [phone]  Ebenezer Sim, DO, Genevieve Hernandez, DO, Darrick Essex, DO, Luz Sin Blood, DO, Maryam Caldwell MD, Sisi Rios MD, Wilfredo Cobian MD, Barb Miles MD,  Debbie Zafar MD, Joo Gil MD, Ivania Bowman, DO, Britney Melendrez MD,  En Washington MD, Sobia Rudolph MD, Louise Dawkins, DO, Saadia Celis MD,  Veena Armas, DO, Joy Hernandez MD, Jono Thornton MD, Vero Dowling MD, Quinn Lennon MD,  Lex Mackay MD, Yessenia Arellano MD, Kate Walden MD, Gabriela Tapia MD, Rand Bello DO, Lion Ambrosio MD,  Ashanti Ambrosio MD, Travis Elmore MD, Cherie Soares, CNP,  Rian Mathur, CNP,, Bryon Cruz, CNP,  Aliza Ferreira, DNP, Girish Adkins, CNP, Irasema Vicente, CNP, Terri Lozano, CNP, Shahnaz Sequeira, CNP, Ivy Evangelista, CNP, Sohail Silvestre, CNP, Franca Tompkins, CNS, Kendrick West, CNP, Ginger Monge, [address] [facility]    HISTORY AND PHYSICAL EXAMINATION            Date:   9/30/2023  Patient name:  Keith Hernández  Date of admission:  9/29/2023  8:54 PM  MRN:   [id number]  Account:  [de-identified]  YOB: 1955  PCP:    Fito Manrique MD  Room:   26/26  Code Status:    Full Code    Chief Complaint:     Chief Complaint   Patient presents with    Shortness of Breath    Chest Pain       History Obtained From:     patient, electronic medical record    History of Present Illness:     Keith Hernández is a 79 y.o. Non- / non  female who presents with Shortness of Breath and Chest Pain   and is admitted to the hospital for the management of Bronchitis.     Patient with known past medical history of COPD, chronic diastolic heart failure, chronic respiratory failure on 2 L nasal cannula, chronic pain repair dependence currently on Suboxone treatment, tobacco abuse, diabetes hypertension hyperlipidemia and diabetic polyneuropathy presented to ER with Hearing normal.      Left Ear: Hearing normal.      Nose: Nose normal. No rhinorrhea. Eyes:      General: Lids are normal.      Extraocular Movements:      Right eye: Normal extraocular motion. Left eye: Normal extraocular motion. Conjunctiva/sclera: Conjunctivae normal.      Right eye: Right conjunctiva is not injected. Left eye: Left conjunctiva is not injected. Pupils: Pupils are equal, round, and reactive to light. Pupils are equal.      Right eye: Pupil is reactive. Left eye: Pupil is reactive. Neck:      Thyroid: No thyromegaly. Vascular: No carotid bruit. Trachea: Trachea and phonation normal. No tracheal deviation. Cardiovascular:      Rate and Rhythm: Normal rate and regular rhythm. Pulses: Normal pulses. Heart sounds: Normal heart sounds. No murmur heard. Comments: BLE trace edema which is chronic in nature   Pulmonary:      Effort: Pulmonary effort is normal. Prolonged expiration present. No respiratory distress. Breath sounds: No stridor. Wheezing present. No decreased breath sounds. Comments: Very minimal wheezing  Abdominal:      General: Bowel sounds are normal. There is no distension. Palpations: Abdomen is soft. There is no mass. Tenderness: There is no abdominal tenderness. There is no guarding. Musculoskeletal:         General: No tenderness. Cervical back: Neck supple. Skin:     General: Skin is warm and dry. Findings: No erythema, lesion or rash. Neurological:      Mental Status: She is alert and oriented to person, place, and time. She is not disoriented. Cranial Nerves: No cranial nerve deficit. Psychiatric:         Speech: Speech normal.         Behavior: Behavior normal. Behavior is cooperative.          Investigations:      Laboratory Testing:  Recent Results (from the past 24 hour(s))   CBC with Auto Differential    Collection Time: 09/29/23  9:54 PM   Result Value Ref Range    WBC 7.5 3.5 -

## 2023-09-30 NOTE — ED PROVIDER NOTES
Medication Sig Start Date End Date Taking? Authorizing Provider   albuterol sulfate HFA (PROAIR HFA) 108 (90 Base) MCG/ACT inhaler Inhale 2 puffs into the lungs every 6 hours as needed for Wheezing 9/27/23   Cate Banerjee MD   cetirizine (ZYRTEC) 10 MG tablet TAKE 1 TABLET BY MOUTH EVERY DAY AS NEEDED FOR ALLERGY SYMPTOMS 9/26/23   Cate Banerjee MD   ondansetron (ZOFRAN-ODT) 4 MG disintegrating tablet dissolve 1 tablet under the tongue every 8 hours if needed for NAUSEA OR VOMITING 9/20/23   Krysta Vergara MD   furosemide (LASIX) 20 MG tablet Take 1 tablet by mouth 2 times daily 9/20/23 2/17/24  Krysta Vergara MD   tiZANidine (ZANAFLEX) 4 MG tablet TAKE 1 TABLET BY MOUTH 3 TIMES DAILY AS NEEDED (PAIN).  9/18/23   Cate Banerjee MD   albuterol (PROVENTIL) (2.5 MG/3ML) 0.083% nebulizer solution inhale contents of 1 vial ( 3 milliliters ) in nebulizer by mouth and INTO THE LUNGS every 6 hours 9/15/23   Cate Banerjee MD   hydroCHLOROthiazide (MICROZIDE) 12.5 MG capsule take 1 capsule by mouth every morning 9/8/23   Cate Banerjee MD   pantoprazole (PROTONIX) 40 MG tablet TAKE 1 TABLET BY MOUTH ONCE DAILY 9/8/23   Cate Banerjee MD   etodolac (LODINE) 400 MG tablet Take 1 tablet by mouth 2 times daily with meals for 14 days  Patient not taking: Reported on 9/28/2023 9/7/23   Elena Garcia MD   blood glucose test strips (ONETOUCH ULTRA) strip TEST 4 TIMES DAILY AS NEEDED 9/5/23   Cate Banerjee MD   diclofenac (VOLTAREN) 75 MG EC tablet Take 1 tablet by mouth 2 times daily (with meals) for 14 days 9/1/23 9/15/23  Elena Garcia MD   amLODIPine (NORVASC) 5 MG tablet Take 1 tablet by mouth daily 7/11/23   Historical Provider, MD   ibuprofen (ADVIL;MOTRIN) 800 MG tablet Take 1 tablet by mouth 2 times daily as needed for Pain 8/22/23   Cate Banerjee MD   metFORMIN (GLUCOPHAGE) 500 MG tablet take 1 tablet by mouth twice a day with meals 8/21/23   Cate Banerjee MD   acetaminophen (TYLENOL) 500 MG tablet Take 2 tablets by mouth Rocephin and azithromycin. Given increased oxygen requirement will admit patient to medicine for pneumonia and IV antibiotics. Amount and/or Complexity of Data Reviewed  Labs:  Decision-making details documented in ED Course. Radiology:  Decision-making details documented in ED Course. Risk  Prescription drug management. Decision regarding hospitalization. EKG  EKG Interpretation    Interpreted by emergency department physician    Rhythm:  Atrial paced with prolonged AV conduction  Rate: normal  Axis: left  Ectopy: none  Conduction: Prolonged AV  ST Segments: no acute change  T Waves: no acute change  Q Waves: I, II, and III    Clinical Impression: non-specific EKG    Yanet Zavala DO      All EKG's are interpreted by the Emergency Department Physician who either signs or Co-signs this chart in the absence of a cardiologist.    EMERGENCY DEPARTMENT COURSE:           ED Course as of 10/05/23 0626   Fri Sep 29, 2023   2207 WBC: 7.5 [SK]   2208 Hemoglobin Quant: 13.4 [SK]   2208 Platelet Count: 442 [SK]   2245 Pro-BNP: 169 [SK]   2246 Troponin, High Sensitivity: 8 [SK]   2319 XR CHEST PORTABLE  1. Streaky opacities in both lung bases appear mildly worsened as compared to  the prior study atelectasis versus pneumonia. [SK]   Sat Sep 30, 2023   0014 Troponin, High Sensitivity: 8 [SK]   0032 Signout given to [facility] for admission. [SK]   0032 Reevaluated patient she is resting comfortably in bed. Will admit her to medicine. She is agreeable to plan. [SK]      ED Course User Index  [SK] Yanet Zavala DO       PROCEDURES:      CONSULTS:  IP CONSULT TO HOSPITALIST    CRITICAL CARE:  There was significant risk of life threatening deterioration of patient's condition requiring my direct management. Critical care time 0 minutes, excluding any documented procedures. FINAL IMPRESSION      1. Pneumonia due to infectious organism, unspecified laterality, unspecified part of lung    2.  Hypoxia

## 2023-09-30 NOTE — ED NOTES
09/29/23 2352       Ambulatory Status:  No data recorded    Diagnosis:  DISPOSITION Admitted 09/30/2023 12:31:47 AM   Final diagnoses:   None        Code Status: [unfilled]     Consults:  []  Hospitalist  Completed  [] yes [] no  []  Medicine  Completed  [] yes [] No  []  Cardiology  Completed  [] yes [] No  []  GI   Completed  [] yes [] No  []  Neurology  Completed  [] yes [] No  []  Nephrology Completed  [] yes [] No  []  Vascular  Completed  [] yes [] No   []  Surgery  Completed  [] yes [] No   []  Urology  Completed  [] yes [] No   []  Plastics  Completed  [] yes [] No   []  ENT  Completed  [] yes [] No   []  Other:  Completed  [] yes [] No    Consults:  IP CONSULT TO HOSPITALIST     Treatment Team:   Treatment Team: Attending Provider: Verónica Lyon MD; Registered Nurse: Michelle Bey RN; Resident: Mono Cano DO; Consulting Physician: MARCIA Spear CNP    Treatment:  ED Course as of 09/30/23 0055   Fri Sep 29, 2023   2207 WBC: 7.5 [SK]   2208 Hemoglobin Quant: 13.4 [SK]   2208 Platelet Count: 933 [SK]   2245 Pro-BNP: 169 [SK]   2246 Troponin, High Sensitivity: 8 [SK]   2319 XR CHEST PORTABLE  1. Streaky opacities in both lung bases appear mildly worsened as compared to  the prior study atelectasis versus pneumonia. [SK]   Sat Sep 30, 2023   0014 Troponin, High Sensitivity: 8 [SK]   0032 Signout given to [facility] for admission. [SK]   0032 Reevaluated patient she is resting comfortably in bed. Will admit her to medicine. She is agreeable to plan.  [SK]      ED Course User Index  [SK] Mono Cano DO              Skin Assessment:        Pain Score:  Pain Assessment  Pain Assessment: 0-10  Pain Level: 7  Patient's Stated Pain Goal: 0 - No pain  Pain Location: Chest      SOCIAL HISTORY       Social History     Socioeconomic History    Marital status: Single     Spouse name: None    Number of children: None    Years of education: None    Highest education level: None   Tobacco Use sigmoidscopy,HYPERPLASTIC POLYP    TONSILLECTOMY      UPPER GASTROINTESTINAL ENDOSCOPY N/A 03/14/2019    EGD BIOPSY performed by Dedra Tan MD at [address]       Past Medical History:   Diagnosis Date    RACHEL (acute kidney injury) ([address]) 01/22/2014    Arthritis     generalized    Bilateral chronic knee pain 5/13/2016    Bronchiectasis without complication ([address])     Cancer ([address]) 2004    uterus    CHF (congestive heart failure) (HCC)     Chronic bilateral low back pain with right-sided sciatica 02/20/2017    Chronic bronchitis (HCC)     Chronic bronchitis (HCC)     Chronic respiratory failure with hypoxia (HCC)     Cigarette nicotine dependence without complication 4/03/4297    COPD (chronic obstructive pulmonary disease) (HCC)     on inhaler /  COPD with chronic bronchitis and emphysema    Current smoker on some days     Depression 10/30/2014    Diabetic polyneuropathy associated with type 2 diabetes mellitus ([address])     Diverticulosis     Essential hypertension 12/30/2013    Full dentures     GERD (gastroesophageal reflux disease)     Hep C w/o coma, chronic (HCC)     Hyperlipidemia     Hyperplastic polyp of intestine     Hypertension     DR. MCDANIEL-CARDIO    Irritable bowel syndrome with both constipation and diarrhea 2/15/2019    Liver disease     hepatitis C    Moderate episode of recurrent major depressive disorder ([address]) 10/30/2014    Nasal polyposis     Noncompliance with CPAP treatment     Obesity (BMI 35.0-39.9 without comorbidity)     On home O2     2 liters PRN per pt    JIMENA (obstructive sleep apnea)     JIMENA on CPAP    Pacemaker 2012    Personal history of tobacco use, presenting hazards to health 4/2/2015    Pneumonia 07/2012    RECENTLY HOSPITALIZED    Primary insomnia 9/30/2016    Pulmonary edema cardiac cause (HCC)     Rectal bleeding     Smoking addiction     Tobacco abuse        Labs:  Labs Reviewed   BASIC METABOLIC PANEL W/ REFLEX TO MG FOR LOW K - Abnormal;

## 2023-10-01 LAB
EST. AVERAGE GLUCOSE BLD GHB EST-MCNC: 137 MG/DL
HBA1C MFR BLD: 6.4 % (ref 4–6)

## 2023-10-02 ENCOUNTER — CARE COORDINATION (OUTPATIENT)
Dept: CARE COORDINATION | Age: 68
End: 2023-10-02

## 2023-10-02 DIAGNOSIS — J44.9 CHRONIC OBSTRUCTIVE PULMONARY DISEASE, UNSPECIFIED COPD TYPE (HCC): Primary | ICD-10-CM

## 2023-10-02 PROCEDURE — 1111F DSCHRG MED/CURRENT MED MERGE: CPT | Performed by: STUDENT IN AN ORGANIZED HEALTH CARE EDUCATION/TRAINING PROGRAM

## 2023-10-02 NOTE — CARE COORDINATION
Care Transitions Outreach Attempt #1    Call within 2 business days of discharge: Yes   Attempted to reach patient for transitions of care follow up. Unable to reach patient. Unable to LM. Patient: Rm Huang Patient : 1955 MRN: [id number]    Last Discharge [address] [facility]       Date Complaint Diagnosis Description Type Department Provider    23 Shortness of Breath; Chest Pain  ED (DISCHARGE) [facility] ED Kimber Ceballos MD; Paula De La Vega. .. Was this an external facility discharge?  No Discharge Facility: [facility]    Noted following upcoming appointments from discharge chart review:   [facility] appointment(s):   Future Appointments   Date Time Provider [address]   11/10/2023 11:30 AM Simon Cortez MD Resp Spec Rowena Crenshaw   2023  1:15 PM Cate Banerjee MD [facility] [address] Health/ Care Transition Nurse  [phone]

## 2023-10-02 NOTE — CARE COORDINATION
Remote Alert Monitoring Note  Rpm alert to be reviewed by the provider   red alert and yellow alert   pulse ox reading (red alert for reading 91%) and weight (yellow alert for no metric x 2 days)   Additional needs to be addressed by N/A: No      Date/Time:  10/2/2023 9:25 AM  [facility] Verified patients name and  as identifiers. Background: Pt enrolled in RPM r/t CHF, COPD, HT. Refer to 911 immediately if:  Patient unresponsive or unable to provide history  Change in cognition or sudden confusion  Patient unable to respond in complete sentences  Intense chest pain/tightness  Any concern for any clinical emergency  Red Alert: Provider response time of 1 hr required for any red alert requiring intervention  Yellow Alert: Provider response time of 3hr required for any escalated yellow alert  O2 Triage  Are you having any Chest Pain? no   Are you having any Shortness of Breath? no   Swelling in your hands or feet? no   Are you having any other health concerns or issues? no    Clinical Interventions: Reviewed and followed up on alerts and treatments-Pt is currently waiting on replacement kit from [facility]. Unable to monitor weight at this time. She was seen in ED on 23 for SOB. Currently taking prednisone as ordered. She is not wearing home O2 at this time and denies any SOB/dyspnea. She has used her scheduled Breo Ellipta inhaler but has not used nebulizer or required use of rescue inhaler. She is agreeable to recheck oxygen level at this time with updated reading of 94%. Plan/Follow Up: Will continue to review, monitor and address alerts with follow up based on severity of symptoms and risk factors.

## 2023-10-02 NOTE — CARE COORDINATION
CCSS place call to patient to arrange equipment exchange per HRS IT due to scale not working. RMA submitted for [id number]. New equipment has been ordered. Writer was unable to reach the pt, the phone is unavailable. Will route to LPN and ACM/CTN for awareness.

## 2023-10-02 NOTE — CARE COORDINATION
Patient returned call back. CCSS explained to patient  equipment arrival, return, and installation of new equipment. Will route to LPN and ACM/CTN for awareness.

## 2023-10-02 NOTE — CARE COORDINATION
[address] [address] [facility] 250 Care Transitions Initial Follow Up Call    Call within 2 business days of discharge: Yes    Care Transition Nurse contacted the patient by telephone to perform post hospital discharge assessment. Verified name and  with patient as identifiers. Provided introduction to self, and explanation of the Care Transition Nurse role. Patient: Sharla Juarez Patient : 1955   MRN: [id number]  Reason for Admission: SOB/ CP  Discharge Date: 23 RARS: Readmission Risk Score: 10.2      Last Discharge [address]       Date Complaint Diagnosis Description Type Department Provider    23 Shortness of Breath; Chest Pain  ED (DISCHARGE) [facility] ED Amber Wong MD; Chanelle Giron. .. Was this an external facility discharge? No Discharge Facility: [facility]    Challenges to be reviewed by the provider   Additional needs identified to be addressed with provider:                  Method of communication with provider: none. Spoke with: Patient    Patient states she is feeling much better since returning home. She states her Sp02 has been running at 93% this am.  She has picked up all new meds and is taking everything as directed. Med rec completed with patient. She denies any further cp and reports only minimal SOB on occasion. She states she is still smoking but not nearly as much. She reports that her BLE edema is also improving and she is keeping feet elevated when sitting. She is scheduled to see her PCP tomorrow for HFU at 4:30. She is enrolled in RPM but had some equipment malfunction so she is getting her kit exchanged. Patient declines any needs for assistance at this time. Will hand off to [facility]. Care Transition Nurse reviewed discharge instructions with patient who verbalized understanding. The patient was given an opportunity to ask questions and does not have any further questions or concerns at this time. Were discharge instructions available to patient? Yes.  Reviewed appropriate

## 2023-10-03 ENCOUNTER — OFFICE VISIT (OUTPATIENT)
Dept: FAMILY MEDICINE CLINIC | Age: 68
End: 2023-10-03

## 2023-10-03 ENCOUNTER — CARE COORDINATION (OUTPATIENT)
Dept: CARE COORDINATION | Age: 68
End: 2023-10-03

## 2023-10-03 VITALS
SYSTOLIC BLOOD PRESSURE: 120 MMHG | HEART RATE: 104 BPM | DIASTOLIC BLOOD PRESSURE: 72 MMHG | OXYGEN SATURATION: 95 % | BODY MASS INDEX: 31.15 KG/M2 | WEIGHT: 230 LBS | TEMPERATURE: 97 F | HEIGHT: 72 IN

## 2023-10-03 DIAGNOSIS — J44.1 COPD EXACERBATION (HCC): ICD-10-CM

## 2023-10-03 DIAGNOSIS — Z09 HOSPITAL DISCHARGE FOLLOW-UP: Primary | ICD-10-CM

## 2023-10-03 DIAGNOSIS — R22.1 NECK SWELLING: ICD-10-CM

## 2023-10-03 DIAGNOSIS — I50.32 CHRONIC DIASTOLIC CONGESTIVE HEART FAILURE (HCC): ICD-10-CM

## 2023-10-03 DIAGNOSIS — E11.8 TYPE 2 DIABETES MELLITUS WITH COMPLICATION (HCC): ICD-10-CM

## 2023-10-03 LAB
EKG ATRIAL RATE: 78 BPM
EKG P AXIS: 5 DEGREES
EKG P-R INTERVAL: 228 MS
EKG Q-T INTERVAL: 400 MS
EKG QRS DURATION: 84 MS
EKG QTC CALCULATION (BAZETT): 456 MS
EKG R AXIS: -21 DEGREES
EKG T AXIS: 37 DEGREES
EKG VENTRICULAR RATE: 78 BPM

## 2023-10-03 RX ORDER — DEXTROMETHORPHAN HBR. AND GUAIFENESIN 10; 100 MG/5ML; MG/5ML
SOLUTION ORAL
COMMUNITY
Start: 2023-08-29

## 2023-10-03 RX ORDER — FUROSEMIDE 20 MG/1
20 TABLET ORAL 2 TIMES DAILY
Qty: 60 TABLET | Refills: 4 | Status: SHIPPED | OUTPATIENT
Start: 2023-10-03 | End: 2024-03-01

## 2023-10-03 RX ORDER — GUAIFENESIN 600 MG/1
1200 TABLET, EXTENDED RELEASE ORAL 2 TIMES DAILY
Qty: 40 TABLET | Refills: 0 | Status: SHIPPED | OUTPATIENT
Start: 2023-10-03 | End: 2023-10-13

## 2023-10-03 RX ORDER — LANCETS 30 GAUGE
1 EACH MISCELLANEOUS DAILY
Qty: 400 EACH | Refills: 1 | Status: SHIPPED | OUTPATIENT
Start: 2023-10-03

## 2023-10-03 SDOH — ECONOMIC STABILITY: INCOME INSECURITY: IN THE LAST 12 MONTHS, WAS THERE A TIME WHEN YOU WERE NOT ABLE TO PAY THE MORTGAGE OR RENT ON TIME?: NO

## 2023-10-03 SDOH — ECONOMIC STABILITY: FOOD INSECURITY: WITHIN THE PAST 12 MONTHS, YOU WORRIED THAT YOUR FOOD WOULD RUN OUT BEFORE YOU GOT MONEY TO BUY MORE.: NEVER TRUE

## 2023-10-03 SDOH — ECONOMIC STABILITY: FOOD INSECURITY: WITHIN THE PAST 12 MONTHS, THE FOOD YOU BOUGHT JUST DIDN'T LAST AND YOU DIDN'T HAVE MONEY TO GET MORE.: NEVER TRUE

## 2023-10-03 ASSESSMENT — PATIENT HEALTH QUESTIONNAIRE - PHQ9
SUM OF ALL RESPONSES TO PHQ QUESTIONS 1-9: 0
3. TROUBLE FALLING OR STAYING ASLEEP: 0
6. FEELING BAD ABOUT YOURSELF - OR THAT YOU ARE A FAILURE OR HAVE LET YOURSELF OR YOUR FAMILY DOWN: 0
1. LITTLE INTEREST OR PLEASURE IN DOING THINGS: 0
8. MOVING OR SPEAKING SO SLOWLY THAT OTHER PEOPLE COULD HAVE NOTICED. OR THE OPPOSITE, BEING SO FIGETY OR RESTLESS THAT YOU HAVE BEEN MOVING AROUND A LOT MORE THAN USUAL: 0
9. THOUGHTS THAT YOU WOULD BE BETTER OFF DEAD, OR OF HURTING YOURSELF: 0
10. IF YOU CHECKED OFF ANY PROBLEMS, HOW DIFFICULT HAVE THESE PROBLEMS MADE IT FOR YOU TO DO YOUR WORK, TAKE CARE OF THINGS AT HOME, OR GET ALONG WITH OTHER PEOPLE: 0
SUM OF ALL RESPONSES TO PHQ9 QUESTIONS 1 & 2: 0
2. FEELING DOWN, DEPRESSED OR HOPELESS: 0
5. POOR APPETITE OR OVEREATING: 0
4. FEELING TIRED OR HAVING LITTLE ENERGY: 0
SUM OF ALL RESPONSES TO PHQ QUESTIONS 1-9: 0
SUM OF ALL RESPONSES TO PHQ QUESTIONS 1-9: 0
7. TROUBLE CONCENTRATING ON THINGS, SUCH AS READING THE NEWSPAPER OR WATCHING TELEVISION: 0
SUM OF ALL RESPONSES TO PHQ QUESTIONS 1-9: 0

## 2023-10-03 ASSESSMENT — SOCIAL DETERMINANTS OF HEALTH (SDOH): HOW HARD IS IT FOR YOU TO PAY FOR THE VERY BASICS LIKE FOOD, HOUSING, MEDICAL CARE, AND HEATING?: NOT HARD AT ALL

## 2023-10-03 NOTE — CARE COORDINATION
Remote Alert Monitoring Note  Rpm alert to be reviewed by the provider   red alert and yellow alert   blood pressure heart rate (red alert 188) and weight (yellow alert for no weight x > 2 days)   Additional needs to be addressed by N/A: No      Date/Time:  10/3/2023 9:41 AM  Patient Current Location: [place]  LPN contacted patient by telephone. Verified patients name and  as identifiers. Background: Pt enrolled in RPM r/t CHF, COPD, HTN. Refer to 911 immediately if:  Patient unresponsive or unable to provide history  Change in cognition or sudden confusion  Patient unable to respond in complete sentences  Intense chest pain/tightness  Any concern for any clinical emergency  Red Alert: Provider response time of 1 hr required for any red alert requiring intervention  Yellow Alert: Provider response time of 3hr required for any escalated yellow alert  HR Triage  Are you having any Chest Pain? no   Are you having any Shortness of Breath? no   Are you having any dizziness? no   Are you feeling more fatigued or tired than normal? no   Are you having any other health concerns or issues? no   Clinical Interventions: Reviewed and followed up on alerts and treatments-Pt is currently waiting on replacement kit from [facility]. Unable to monitor weight at this time. Oxygen heart rate noted to be 84. Spoke with pt who is in no apparent distress. She states she manually entered metrics and entered heart rate incorrectly. She meant to enter BP heart rate of 88. Edited in [facility] Numerify system at this time. Plan/Follow Up: Will continue to review, monitor and address alerts with follow up based on severity of symptoms and risk factors.

## 2023-10-03 NOTE — PROGRESS NOTES
Post-Discharge Transitional Care Follow Up      Micheline Scales   YOB: 1955    Date of Office Visit:  10/3/2023  Date of Hospital Admission: 9/29/23  Date of Hospital Discharge: 9/30/23  Readmission Risk Score (high >=14%. Medium >=10%):Readmission Risk Score: 10.2      Care management risk score Rising risk (score 2-5) and Complex Care (Scores >=6): No Risk Score On File     Non face to face  following discharge, date last encounter closed (first attempt may have been earlier): 10/02/2023     Call initiated 2 business days of discharge: Yes     Hospital discharge follow-up  -     [place] DISCHARGE MEDS RECONCILED W/ CURRENT OUTPATIENT MED LIST  COPD exacerbation ([address])  -     guaiFENesin (MUCINEX) 600 MG extended release tablet; Take 2 tablets by mouth 2 times daily for 10 days, Disp-40 tablet, R-0Normal  Neck swelling  -     US HEAD NECK SOFT TISSUE THYROID; Future  Type 2 diabetes mellitus with complication (HCC)  -     Lancets MISC; DAILY Starting Tue 10/3/2023, Disp-400 each, R-1, Normal  Chronic diastolic congestive heart failure (HCC)  -     furosemide (LASIX) 20 MG tablet; Take 1 tablet by mouth 2 times daily, Disp-60 tablet, R-4Normal    Medical Decision Making: moderate complexity  Return in 3 months (on 1/3/2024). On this date 10/3/2023 I have spent 45 minutes reviewing previous notes, test results and face to face with the patient discussing the diagnosis and importance of compliance with the treatment plan as well as documenting on the day of the visit. Subjective:   She was admitted in hospital with complaints of worsening shortness of breath and decreased oxygen saturation levels. She was treated with steroids and antibiotics after which her symptoms improved and she was advised to follow-up as outpatient. Inpatient course: Discharge summary reviewed- see chart.     Interval history/Current status: She states that she has been breathing better now but still has some cough

## 2023-10-03 NOTE — CARE COORDINATION
Date/Time:  10/3/2023 8:47 AM  LPN attempted to reach patient by telephone regarding red alert in remote patient monitoring program. Pt is currently waiting on replacement kit from [facility]. Unable to monitor weight at this time. Will attempt to reach patient again.

## 2023-10-04 ENCOUNTER — CARE COORDINATION (OUTPATIENT)
Dept: CARE COORDINATION | Age: 68
End: 2023-10-04

## 2023-10-04 DIAGNOSIS — E11.8 TYPE 2 DIABETES MELLITUS WITH COMPLICATION (HCC): ICD-10-CM

## 2023-10-04 RX ORDER — BLOOD SUGAR DIAGNOSTIC
STRIP MISCELLANEOUS
Qty: 400 STRIP | Refills: 11 | Status: SHIPPED | OUTPATIENT
Start: 2023-10-04

## 2023-10-04 ASSESSMENT — ENCOUNTER SYMPTOMS
SHORTNESS OF BREATH: 0
EYE DISCHARGE: 0
VOMITING: 0
DIARRHEA: 0
NAUSEA: 0
WHEEZING: 0
ABDOMINAL PAIN: 0
COUGH: 1
CONSTIPATION: 0
SORE THROAT: 0
CHEST TIGHTNESS: 0

## 2023-10-04 NOTE — CARE COORDINATION
Remote Alert Monitoring Note  Rpm alert to be reviewed by the provider   yellow alert   blood pressure reading (177/75)   Additional needs to be addressed by N/A: No      Date/Time:  10/4/2023 3:20 PM  Patient Current Location: [facility] contacted patient by telephone. Verified patients name and  as identifiers. Background: Pt enrolled in RPM r/t CHF, COPD, HTN. Refer to 911 immediately if:  Patient unresponsive or unable to provide history  Change in cognition or sudden confusion  Patient unable to respond in complete sentences  Intense chest pain/tightness  Any concern for any clinical emergency  Red Alert: Provider response time of 1 hr required for any red alert requiring intervention  Yellow Alert: Provider response time of 3hr required for any escalated yellow alert  BP Triage  Are you having any Chest Pain? no   Are you having any Shortness of Breath? no   Do you have a headache or have any vision changes? no   Are you having any numbness or tingling? no   Are you having any other health concerns or issues? no   Clinical Interventions: Reviewed and followed up on alerts and treatments-Spoke with pt who is in no apparent distress. Reviewed HTN medications and pt verbalizes that she took Lasix, HCTZ, Amlodipine and Atenolol at around 7:30 this morning. She is agreeable to recheck BP at this time with updated reading of 142/73. Plan/Follow Up: Will continue to review, monitor and address alerts with follow up based on severity of symptoms and risk factors.

## 2023-10-04 NOTE — CARE COORDINATION
Date/Time:  10/4/2023 2:14 PM  LPN attempted to reach patient by telephone regarding yellow alert in remote patient monitoring program. Voice mail did not . Unable to leave message requesting return call. Will attempt to reach patient again.

## 2023-10-04 NOTE — TELEPHONE ENCOUNTER
Patient states that she didn't receive her strips and she needs to check her sugar, because she believes it is high.

## 2023-10-05 ENCOUNTER — HOSPITAL ENCOUNTER (OUTPATIENT)
Dept: ULTRASOUND IMAGING | Age: 68
Discharge: HOME | End: 2023-10-07
Payer: MEDICARE

## 2023-10-05 DIAGNOSIS — R22.1 NECK SWELLING: ICD-10-CM

## 2023-10-05 PROCEDURE — 76536 US EXAM OF HEAD AND NECK: CPT

## 2023-10-06 ENCOUNTER — OFFICE VISIT (OUTPATIENT)
Dept: PULMONOLOGY | Age: 68
End: 2023-10-06
Payer: MEDICARE

## 2023-10-06 ENCOUNTER — CARE COORDINATION (OUTPATIENT)
Dept: CARE COORDINATION | Age: 68
End: 2023-10-06

## 2023-10-06 VITALS
DIASTOLIC BLOOD PRESSURE: 74 MMHG | HEIGHT: 72 IN | WEIGHT: 226 LBS | BODY MASS INDEX: 30.61 KG/M2 | HEART RATE: 88 BPM | SYSTOLIC BLOOD PRESSURE: 135 MMHG | RESPIRATION RATE: 14 BRPM | OXYGEN SATURATION: 96 %

## 2023-10-06 DIAGNOSIS — Z72.0 TOBACCO USE: Primary | ICD-10-CM

## 2023-10-06 DIAGNOSIS — J44.9 CHRONIC OBSTRUCTIVE PULMONARY DISEASE, UNSPECIFIED COPD TYPE (HCC): ICD-10-CM

## 2023-10-06 DIAGNOSIS — G47.33 OSA (OBSTRUCTIVE SLEEP APNEA): ICD-10-CM

## 2023-10-06 PROCEDURE — 3078F DIAST BP <80 MM HG: CPT | Performed by: INTERNAL MEDICINE

## 2023-10-06 PROCEDURE — 1123F ACP DISCUSS/DSCN MKR DOCD: CPT | Performed by: INTERNAL MEDICINE

## 2023-10-06 PROCEDURE — 4004F PT TOBACCO SCREEN RCVD TLK: CPT | Performed by: INTERNAL MEDICINE

## 2023-10-06 PROCEDURE — G8417 CALC BMI ABV UP PARAM F/U: HCPCS | Performed by: INTERNAL MEDICINE

## 2023-10-06 PROCEDURE — 1111F DSCHRG MED/CURRENT MED MERGE: CPT | Performed by: INTERNAL MEDICINE

## 2023-10-06 PROCEDURE — 3023F SPIROM DOC REV: CPT | Performed by: INTERNAL MEDICINE

## 2023-10-06 PROCEDURE — 1090F PRES/ABSN URINE INCON ASSESS: CPT | Performed by: INTERNAL MEDICINE

## 2023-10-06 PROCEDURE — 99214 OFFICE O/P EST MOD 30 MIN: CPT | Performed by: INTERNAL MEDICINE

## 2023-10-06 PROCEDURE — 3017F COLORECTAL CA SCREEN DOC REV: CPT | Performed by: INTERNAL MEDICINE

## 2023-10-06 PROCEDURE — G8427 DOCREV CUR MEDS BY ELIG CLIN: HCPCS | Performed by: INTERNAL MEDICINE

## 2023-10-06 PROCEDURE — 3075F SYST BP GE 130 - 139MM HG: CPT | Performed by: INTERNAL MEDICINE

## 2023-10-06 PROCEDURE — G8484 FLU IMMUNIZE NO ADMIN: HCPCS | Performed by: INTERNAL MEDICINE

## 2023-10-06 PROCEDURE — G8399 PT W/DXA RESULTS DOCUMENT: HCPCS | Performed by: INTERNAL MEDICINE

## 2023-10-06 NOTE — CARE COORDINATION
10/6/25 9:56 AM Patient is currently enrolled in Remote Patient Monitoring for CHF, COPD, and HTN. Pt rechecked BP at 9:40 am with updated reading of 169/62. BP now within RPM parameters. Plan/Follow Up: Will continue to review, monitor and address alerts with follow up based on severity of symptoms and risk factors.

## 2023-10-06 NOTE — PATIENT INSTRUCTIONS
@[facility]@        YOU ARE BEING REFERRED TO:    CHUCK HARRINGTON (a department [facility])    [address] [facility]    Main Phone Number: [phone]    Phone number for scheduling sleep study: [phone]      Please contact the above numbers to schedule your sleep study. Once you have completed the FIRST NIGHT you may be asked to return for a SECOND NIGHT if necessary. After the SECOND NIGHT the sleep center will order a CPAP/BiPAP machine for you. An order for the machine will be sent to a durable medical equipment company ([employer]). The sleep center will provide you with the [employer] companies information. Once you have your machine please contact our office to schedule a follow up appointment. Your follow up will be scheduled for a date 30-90 days after you have received your machine. At that follow up visit we will need to have a compliance download from your DME company. Please contact your [employer] company to have the compliance download faxed to our office at   [phone] for your follow up appointment. IF YOU HAVE ANY QUESTIONS ABOUT YOUR SLEEP STUDY   PLEASE CONTACT THE SLEEP CENTER.

## 2023-10-06 NOTE — PROGRESS NOTES
Austyn Donaldson  10/6/2023    Chief Complaint   Patient presents with    Sleep Apnea    COPD, obesity, sleep apnea syndrome  Sore throat  The Kristin Landry has chronic obstructive lung disease due to chronic bronchitis and emphysema secondary to prior smoking. Unfortunately she continues smoke in spite of repeated advised to stop smoking. She was again counseled regarding the need to stop smoking. Pulmonary status is stable she is using Spiriva and using Breo. She uses albuterol on as-needed basis. There is no evidence of any chest infection and acute exacerbation. No pedal edema no thromboembolic process. No but she been complaining of fullness and pain in the left supraclavicular region. I cannot find any masses. I cannot find any lymph node. She had an ultrasound done which did not reveal any masses or lymph nodes. She is scheduled to undergo a CT of the neck. For soft tissue evaluation. She very likely has sleep apnea syndrome    Has not done the sleep study was advised. I did order a sleep study again. She had a CT scan done recently which did not reveal any nodules. She is on home oxygen. She uses at night. Did not complete an [place] Sleepiness Scale. She has she was advised to get a flu vaccine but refuses to take the COVID-vaccine. I have had Pneumovax    Review of Systems -as assistant were conductive for all other system including upper and lower extremities. No additional information was obtained. General ROS: negative for - chills, fatigue, fever or weight loss  ENT ROS: negative for - headaches, oral lesions or sore throat  Cardiovascular ROS: no chest pain , orthopnea or pnd   Gastrointestinal ROS: no abdominal pain, change in bowel habits, or black or bloody stools  Skin - no rash   Neuro - no blurry vision , no loc .  No focal weakness   msk - no jt tenderness or swelling    Vascular - no claudication , rest completed and negative   Lymphatic - complete and

## 2023-10-06 NOTE — CARE COORDINATION
Remote Alert Monitoring Note  Rpm alert to be reviewed by the provider   yellow alert   blood pressure reading (175/75)   Additional needs to be addressed by Pulmonologist:  Pt is enrolled in Remote Patient Monitoring r/t CHF, COPD, HTN. She has a scheduled office visit this morning at 10:45 am.  RPM metrics added for review. Date/Time:  10/6/2023 9:20 AM  Patient Current Location: [facility] contacted patient by telephone. Verified patients name and  as identifiers. Background: Pt enrolled in RPM r/t CHF, COPD, HTN. Refer to 911 immediately if:  Patient unresponsive or unable to provide history  Change in cognition or sudden confusion  Patient unable to respond in complete sentences  Intense chest pain/tightness  Any concern for any clinical emergency  Red Alert: Provider response time of 1 hr required for any red alert requiring intervention  Yellow Alert: Provider response time of 3hr required for any escalated yellow alert  BP Triage  Are you having any Chest Pain? no   Are you having any Shortness of Breath? no   Do you have a headache or have any vision changes? no   Are you having any numbness or tingling? no   Are you having any other health concerns or issues? no   Clinical Interventions: Reviewed and followed up on alerts and treatments-Spoke with pt who is in no apparent distress. States she took HTN medications this morning around 7:30 am and checked BP approximately 45 minutes later. She is rushing because she has an appointment with pulmonology this morning at 10:45 am and states she will recheck BP prior to leaving if she has time. Plan/Follow Up: Will continue to review, monitor and address alerts with follow up based on severity of symptoms and risk factors.

## 2023-10-09 ENCOUNTER — TELEPHONE (OUTPATIENT)
Dept: FAMILY MEDICINE CLINIC | Age: 68
End: 2023-10-09

## 2023-10-09 NOTE — TELEPHONE ENCOUNTER
Pt called complaining that her left leg has been hurting her causing her to have difficulty moving it. She is requesting an xray order as well as any advise on how to alleviate her pain.

## 2023-10-11 ENCOUNTER — CARE COORDINATION (OUTPATIENT)
Dept: CARE COORDINATION | Age: 68
End: 2023-10-11

## 2023-10-11 ENCOUNTER — CARE COORDINATION (OUTPATIENT)
Facility: CLINIC | Age: 68
End: 2023-10-11

## 2023-10-11 DIAGNOSIS — E11.8 TYPE 2 DIABETES MELLITUS WITH COMPLICATION (HCC): Primary | ICD-10-CM

## 2023-10-11 DIAGNOSIS — I10 ESSENTIAL HYPERTENSION: ICD-10-CM

## 2023-10-11 RX ORDER — BLOOD-GLUCOSE METER
1 EACH MISCELLANEOUS DAILY
Qty: 1 KIT | Refills: 0 | Status: SHIPPED | OUTPATIENT
Start: 2023-10-11 | End: 2023-10-13 | Stop reason: SDUPTHER

## 2023-10-11 RX ORDER — ATENOLOL 25 MG/1
25 TABLET ORAL DAILY
Qty: 90 TABLET | Refills: 1 | Status: SHIPPED | OUTPATIENT
Start: 2023-10-11

## 2023-10-11 NOTE — CARE COORDINATION
Ambulatory Care Coordination Note  10/11/2023    Patient Current Location:  Home: [address] [facility] [address]     ACM was contacted the patient by telephone. Verified name and  with patient as identifiers. Provided introduction to self, and explanation of the ACM role. Challenges to be reviewed by the provider   Additional needs identified to be addressed with provider: Yes  Weight gain and bilat lower extremity worsening edema               Method of communication with provider: staff message. ACM: Teresa Colby RN    Writer is currently monitoring and covering [employer]. Red Alert received regarding Patient having a 6 pound weight gain. RPM vital signs:  Weight 228.5; BP+ 146/67; HR=88 and 82; Pulse Ox =94%. Phoned Patient for RPM red alert for weight. Writer unable to contact Patient. Phone message states the wireless caller you are trying is unavailable. Writer will return call later today. Return call received from Patient. Patient was informed Writer is covering for [facility]. Writer phoned due to significant increase in her weight from 222 to 228.5. Patient states her \"legs are swollen very bad. \"  She states she can't hardly walk. Patient states she is always short of breath due to her COPD. She states her shortness of breath is worse than normal.  Patient states she takes a breathing treatment of Albuterol via Nebulizer in the morning and at bedtime. Patient denies that she is on a fluid restriction. Patient states she was seen in the CHF clinic in the past.  She states she just stopped going to the CHF clinic. Patient has been taking her Furosemide daily instead of twice daily as ordered. Writer will notify PCP. Patient states she hasn't tested her blood sugar because she doesn't have a meter. She request a prescription for One Touch Ultra Meter. Writer will notify PCP. Patient is not taking Tenormin medication.   Patient states

## 2023-10-11 NOTE — CARE COORDINATION
Remote Patient Monitoring Note      Date/Time:  10/11/2023 8:13 AM  Patient Current Location: Home: [address] [place] [address] attempted contact patient by telephone regarding red alert received for weight increase (6lbs in one day). Verified patients name and  as identifiers. Background: CHF, COPD, HTN  Clinical Interventions:  call place to patient Unable to reach patient or leave message. Left message on [facility] voicemail. Plan/Follow Up: Will continue to review, monitor and address alerts with follow up based on severity of symptoms and risk factors.

## 2023-10-11 NOTE — CARE COORDINATION
Writer notified that [address] [place] [address] received return VM from patient. Writer attempted to reach patient again and did not get an answer.

## 2023-10-12 ENCOUNTER — CLINICAL DOCUMENTATION (OUTPATIENT)
Facility: CLINIC | Age: 68
End: 2023-10-12

## 2023-10-12 ENCOUNTER — CARE COORDINATION (OUTPATIENT)
Facility: CLINIC | Age: 68
End: 2023-10-12

## 2023-10-12 ENCOUNTER — CARE COORDINATION (OUTPATIENT)
Dept: CARE COORDINATION | Age: 68
End: 2023-10-12

## 2023-10-12 NOTE — CARE COORDINATION
ACM attempted outreach, CHF, COPD and DM management, RPM monitoring  No answer and unable to LVM at this time.

## 2023-10-12 NOTE — CARE COORDINATION
Remote Alert Monitoring Note  Rpm alert to be reviewed by the provider   red alert   blood pressure reading (177/64) and pulse ox reading (91%)   Additional needs to be addressed by N/A: No                    Date/Time:  10/12/2023 8:41 AM  Patient Current Location: Home: [address] [place] [address]  LPN contacted patient by telephone. Verified patients name and  as identifiers. Background: CHF, COPD, HTN  Refer to 911 immediately if:  Patient unresponsive or unable to provide history  Change in cognition or sudden confusion  Patient unable to respond in complete sentences  Intense chest pain/tightness  Any concern for any clinical emergency  Red Alert: Provider response time of 1 hr required for any red alert requiring intervention  Yellow Alert: Provider response time of 3hr required for any escalated yellow alert    BP Triage  Are you having any Chest Pain? no   Are you having any Shortness of Breath? no   Do you have a headache or have any vision changes? no   Are you having any numbness or tingling? no   Are you having any other health concerns or issues? no        O2 Triage  Are you having any Chest Pain? no   Are you having any Shortness of Breath? yes   Swelling in your hands or feet? no     Are you having any other health concerns or issues? no      Clinical Interventions: Reviewed and followed up on alerts and treatments-Spoke with patient about elevated blood pressure and low pulse ox. Patient speaking in complete sentences. Patient rechecked blood pressure reading is 144/74 and pulse remain at 91%. Patient states she is having a little sob but is about to do her breathing treatment. Patient will recheck O2 sat a little later. Patient has no questions or concerns at this time. No further follow up needed. Red flags discussed with patient. Plan/Follow Up: Will continue to review, monitor and address alerts with follow up based on severity of symptoms and risk factors.

## 2023-10-12 NOTE — TELEPHONE ENCOUNTER
Marisabel Hickey is calling to request a refill on the following medication(s):    Medication Request:  Requested Prescriptions     Pending Prescriptions Disp Refills    cetirizine (ZYRTEC) 10 MG tablet [Pharmacy Med Name: CETIRIZINE HCL 10 MG TABLET] 90 tablet 0     Sig: TAKE 1 TABLET BY MOUTH EVERY DAY AS NEEDED FOR ALLERGY SYMPTOMS       Last Visit Date (If Applicable):  96/8/6824    Next Visit Date:    11/22/2023

## 2023-10-13 ENCOUNTER — CARE COORDINATION (OUTPATIENT)
Facility: CLINIC | Age: 68
End: 2023-10-13

## 2023-10-13 ENCOUNTER — TELEPHONE (OUTPATIENT)
Dept: PRIMARY CARE CLINIC | Age: 68
End: 2023-10-13

## 2023-10-13 DIAGNOSIS — E11.8 TYPE 2 DIABETES MELLITUS WITH COMPLICATION (HCC): ICD-10-CM

## 2023-10-13 DIAGNOSIS — Z95.0 S/P PLACEMENT OF CARDIAC PACEMAKER: ICD-10-CM

## 2023-10-13 RX ORDER — BLOOD-GLUCOSE METER
1 EACH MISCELLANEOUS DAILY
Qty: 1 KIT | Refills: 0 | Status: SHIPPED | OUTPATIENT
Start: 2023-10-13

## 2023-10-13 RX ORDER — CETIRIZINE HYDROCHLORIDE 10 MG/1
TABLET ORAL
Qty: 90 TABLET | Refills: 1 | Status: SHIPPED | OUTPATIENT
Start: 2023-10-13

## 2023-10-13 RX ORDER — NITROGLYCERIN 0.4 MG/1
0.4 TABLET SUBLINGUAL EVERY 5 MIN PRN
Qty: 25 TABLET | Refills: 11 | Status: SHIPPED | OUTPATIENT
Start: 2023-10-13

## 2023-10-13 SDOH — ECONOMIC STABILITY: HOUSING INSECURITY: IN THE LAST 12 MONTHS, HOW MANY PLACES HAVE YOU LIVED?: 1

## 2023-10-13 SDOH — ECONOMIC STABILITY: INCOME INSECURITY: IN THE LAST 12 MONTHS, WAS THERE A TIME WHEN YOU WERE NOT ABLE TO PAY THE MORTGAGE OR RENT ON TIME?: NO

## 2023-10-13 NOTE — CARE COORDINATION
Remote Alert Monitoring Note  Rpm alert to be reviewed by the provider   red alert   weight (up 7.5 lbs in one day)   Additional needs to be addressed by N/A: Lnai Okeefe                    Date/Time:  10/13/2023 9:12 AM  Patient Current Location: Home: [address] [place] [address]  LPN contacted patient by telephone. Verified patients name and  as identifiers. Background: CHF, COPD, HTN  Refer to 911 immediately if:  Patient unresponsive or unable to provide history  Change in cognition or sudden confusion  Patient unable to respond in complete sentences  Intense chest pain/tightness  Any concern for any clinical emergency  Red Alert: Provider response time of 1 hr required for any red alert requiring intervention  Yellow Alert: Provider response time of 3hr required for any escalated yellow alert    Weight Scale Triage  Was your weight obtained upon rising/waking today? yes   Was your weight obtained after voiding and/or use of the bathroom today? yes   Did you weigh yourself in the same amount of clothing today, compared to how you typically do? yes   Was the scale bumped or moved prior to today's weight? no   Is your scale on a flat/hard surface? yes   Did you obtain your weight with shoes on? no   If yes, is this something you normally do during your daily weights? NA   Were you standing up straight on the scale today? yes   Were you leaning on anything while obtaining your weight today? no      Clinical Interventions: Reviewed and followed up on alerts and treatments-Spoke with patient about weight gain. Patient states she is swollen today. Denies sob but states she did have some overnight. States she is hurting. Patient states she just took her morning medications. Patient is also asking about did glucometer get ordered. Red flags discussed with patient. Patient verbalizes understanding. Plan/Follow Up:  Will continue to review, monitor and address alerts with follow up based on severity of

## 2023-10-13 NOTE — TELEPHONE ENCOUNTER
Julito Duncan is calling to request a refill on the following medication(s):    Medication Request:  Requested Prescriptions     Pending Prescriptions Disp Refills    nitroGLYCERIN (NITROSTAT) 0.4 MG SL tablet 25 tablet 11     Sig: Place 1 tablet under the tongue every 5 minutes as needed for Chest pain       Last Visit Date (If Applicable):  22/4/5471    Next Visit Date:    11/22/2023

## 2023-10-13 NOTE — TELEPHONE ENCOUNTER
10/13/23 12:39 PM     REMOTE PATIENT MONITORING HIGH ALERT RESPONSE     12:42 PM Attempted twice to reach pt but she is not answering the phone at this time and there is no voicemail set up on her phone. Will call back in an hour if no return call before then. Next attempted twice to reach her [address], but she is not answering the phone at this time. Left message introducing myself as the nurse practitioner with the [address] [facility] remote patient monitoring program and advising that I need to speak with pt about an alert received today. Asked Ms. Alvarado to call me back or to ask pt to return my call. Left my phone # in message asking for a return call at their earliest convenience. 1:14 PM Reached pt and we had the following conversation. ASSESSMENT AND PLAN   Weight gain  --advised taking extra 20 mg of Lasix x 3 days, starting tomorrow and resume usual 20 mg BID on Tuesday  --asking ACM to send two copies of low-salt diet education to pt by mail. Has friends who bring groceries and could use this education  --pt agrees to get re-established with CHF Clinic  Chest pain  --describes episode last night; has NTG but did not take any  --urged pt to tell PCP about this at 11/22 appt and to seek ED care if it recurs and she needs to take a second NTG  --advised pt to call 911 and go to ED for any of the following: chest pain/pressure/tightness, new or worsening SOB, sudden loss of use of an arm or leg, sudden numbness or tingling--especially unilateral, confusion, sudden change in vision, facial droop, slurred speech, fainting spell or any other serious or worsening symptoms. Patient agreeable to this plan. CHIEF COMPLAINT    Responding to a high RPM alert for weight gain of 6.5# in 1 day and last 7 days    HPI    Jimy Brooke is a 79 y.o. female who presents with weight gain as above. Denies having any idea what is causing weight fluctuations. Process for obtaining daily weights sounds correct.  States Lasix

## 2023-10-16 ENCOUNTER — TELEPHONE (OUTPATIENT)
Dept: PRIMARY CARE CLINIC | Age: 68
End: 2023-10-16

## 2023-10-16 ENCOUNTER — CARE COORDINATION (OUTPATIENT)
Facility: CLINIC | Age: 68
End: 2023-10-16

## 2023-10-16 ENCOUNTER — CARE COORDINATION (OUTPATIENT)
Dept: CASE MANAGEMENT | Age: 68
End: 2023-10-16

## 2023-10-16 ENCOUNTER — CARE COORDINATION (OUTPATIENT)
Dept: CARE COORDINATION | Age: 68
End: 2023-10-16

## 2023-10-16 DIAGNOSIS — J41.1 MUCOPURULENT CHRONIC BRONCHITIS (HCC): ICD-10-CM

## 2023-10-16 NOTE — CARE COORDINATION
Remote Patient Monitoring Note      Date/Time:  10/16/2023 8:49 AM  Patient Current Location: Home: [address] [place] [address]  LPN attempted to contact patient by telephone regarding red alert received for weight increase (up 7 lbs in 2 days). Background: CHF, COPD, HTN  Clinical Interventions:  call place to patient. Unable to reach patient. Message sent to ACM, NP, and PCP      Plan/Follow Up: Will continue to review, monitor and address alerts with follow up based on severity of symptoms and risk factors.

## 2023-10-16 NOTE — CARE COORDINATION
Date/Time:  10/16/2023 9:01 AM  LPN attempted to reach patient by telephone regarding red alert weight gain 5 lbs in 3 days  in remote patient monitoring program.unable to  leave compliant message requesting a return call. Will attempt to reach patient again.

## 2023-10-16 NOTE — TELEPHONE ENCOUNTER
Pt does not follow here.  Thank you
gabapentin (NEURONTIN) 800 MG tablet Take 1 tablet by mouth 3 times daily for 30 days.  90 tablet 5    OneTouch Delica Lancets 81N MISC Apply 1 Units topically 2 times daily USE 2 TO 3 TIMES DAILY AS DIRECTED 60 each 4    traZODone (DESYREL) 100 MG tablet take 1 tablet by mouth nightly 90 tablet 3    buprenorphine-naloxone (SUBOXONE) 8-2 MG FILM SL film dissolve 1/2 FILM under the tongue twice a day      sertraline (ZOLOFT) 50 MG tablet TAKE 1/2 A TABLET BY MOUTH AT NIGHT FOR 3 NIGHTS AND INCREASE TO 1 TABLET AT NIGHT (Patient not taking: Reported on 10/11/2023)      hydrOXYzine HCl (ATARAX) 25 MG tablet       vitamin D-3 (CHOLECALCIFEROL) 125 MCG (5000 UT) TABS Take 1 tablet by mouth daily (Patient not taking: Reported on 10/11/2023) 30 tablet 3    dicyclomine (BENTYL) 10 MG capsule Take 1 capsule by mouth 4 times daily (before meals and nightly) 120 capsule 11    BREO ELLIPTA 200-25 MCG/INH AEPB inhaler INHALE 2 PUFFS ONTO THE LUNGS DAILY         ALLERGIES    Allergies   Allergen Reactions    Iv Dye [Iodides] Hives       RECENT LABS  Lab Results   Component Value Date/Time     09/29/2023 09:54 PM    K 3.8 09/29/2023 09:54 PM     09/29/2023 09:54 PM    CO2 25 09/29/2023 09:54 PM    BUN 9 09/29/2023 09:54 PM    CREATININE 0.6 09/29/2023 09:54 PM    GLUCOSE 221 09/30/2023 02:20 AM    GLUCOSE 120 02/26/2012 03:50 PM    CALCIUM 9.6 09/29/2023 09:54 PM    LABGLOM >60 09/29/2023 09:54 PM     Lab Results   Component Value Date/Time    PROBNP 169 09/29/2023 09:54 PM    PROBNP 91 08/18/2023 08:16 PM    PROBNP 80 09/04/2019 10:15 PM    PROBNP 65 09/20/2018 08:31 PM    PROBNP 352 07/29/2018 03:35 PM         Regis Wilder, DNP, FNP-C, Remote Patient Monitoring NP, () [phone]

## 2023-10-16 NOTE — CARE COORDINATION
ACM attempted outreach for CC needs, RPM red alert follow up, CHF, COPD and DM management, weight gain  No answer and unable to LVM at this time, VM not available

## 2023-10-17 ENCOUNTER — CARE COORDINATION (OUTPATIENT)
Facility: CLINIC | Age: 68
End: 2023-10-17

## 2023-10-17 RX ORDER — ALBUTEROL SULFATE 2.5 MG/3ML
SOLUTION RESPIRATORY (INHALATION)
Qty: 300 ML | Refills: 1 | Status: SHIPPED | OUTPATIENT
Start: 2023-10-17

## 2023-10-17 NOTE — CARE COORDINATION
Remote Patient Monitoring Note      Date/Time:  10/17/2023 2:35 PM    LPN attempted to contact patient by telephone regarding red alert received for pulse ox reading (89%). Background:  CHF, COPD, HTN    Clinical Interventions: Reviewed and followed up on alerts and treatments-   No return call received from patient. Updated SP02 noted and continues to be below range. Unable to [facility] for patient or EC. Will update ACM. Plan/Follow Up: Will continue to review, monitor and address alerts with follow up based on severity of symptoms and risk factors.        Angela Schulz LPN  [facility]/ CTN/ Remote Patient Monitoring  [phone]

## 2023-10-17 NOTE — CARE COORDINATION
Remote Patient Monitoring Note      Date/Time:  10/17/2023 8:47 AM  Patient Current Location: Home: [address] [place] [address]  LPN contacted patient by telephone regarding red alert received for pulse ox reading ([phone]). Verified patients name and  as identifiers. Background: CHF, COPD, HTN  Clinical Interventions:  Call place to patient. Unable to leave message. Plan/Follow Up: Will continue to review, monitor and address alerts with follow up based on severity of symptoms and risk factors.

## 2023-10-17 NOTE — TELEPHONE ENCOUNTER
Harsha Hart is calling to request a refill on the following medication(s):    Medication Request:  Requested Prescriptions     Pending Prescriptions Disp Refills    albuterol (PROVENTIL) (2.5 MG/3ML) 0.083% nebulizer solution [Pharmacy Med Name: ALBUTEROL SUL 2.5 MG/3 ML SOLN] 300 mL 1     Sig: INHALE CONTENTS OF 1 VIAL ( 3 MILLILITERS ) IN NEBULIZER BY MOUTH AND INTO THE LUNGS EVERY 6 HOURS       Last Visit Date (If Applicable):  82/6/9285    Next Visit Date:    11/22/2023

## 2023-10-17 NOTE — CARE COORDINATION
Remote Patient Monitoring Note      Date/Time:  10/17/2023 11:14 AM  Patient Current Location: Home: [address] [place] [address]  LPN attempted to contact patient by telephone regarding red alert received for pulse ox reading (84%). Clinical Interventions:  2nd attempt to reach patient unsuccessful. Unable to leave voice message. Plan/Follow Up: Will continue to review, monitor and address alerts with follow up based on severity of symptoms and risk factors.

## 2023-10-18 ENCOUNTER — CARE COORDINATION (OUTPATIENT)
Dept: CASE MANAGEMENT | Age: 68
End: 2023-10-18

## 2023-10-18 ENCOUNTER — CARE COORDINATION (OUTPATIENT)
Dept: CARE COORDINATION | Age: 68
End: 2023-10-18

## 2023-10-18 NOTE — CARE COORDINATION
Remote Alert Monitoring Note  Rpm alert to be reviewed by the provider   red alert   weight (2 lb in 1 day)   Additional needs to be addressed by PCP: No                    Date/Time:  10/18/2023 8:46 AM  Patient Current Location: Home: [address] [place] [address] contacted patient by telephone. Verified patients name and  as identifiers. Background: CHF, COPD, HTN  Refer to 911 immediately if:  Patient unresponsive or unable to provide history  Change in cognition or sudden confusion  Patient unable to respond in complete sentences  Intense chest pain/tightness  Any concern for any clinical emergency  Red Alert: Provider response time of 1 hr required for any red alert requiring intervention  Yellow Alert: Provider response time of 3hr required for any escalated yellow alert    Weight Scale Triage  Was your weight obtained upon rising/waking today? yes   Was your weight obtained after voiding and/or use of the bathroom today? yes   Did you weigh yourself in the same amount of clothing today, compared to how you typically do? yes   Was the scale bumped or moved prior to today's weight? no   Is your scale on a flat/hard surface? yes   Did you obtain your weight with shoes on? no   If yes, is this something you normally do during your daily weights? no   Were you standing up straight on the scale today? yes   Were you leaning on anything while obtaining your weight today? no      Clinical Interventions: Reviewed and followed up on alerts and treatments-spoke to patient she states she is doing well today denies any further swelling in legs, denies chest pain, SOB,, she is calling CHF clinic today to check on getting in before Tuesday 10/2 has no concerns will continue to monitor    Plan/Follow Up: Will continue to review, monitor and address alerts with follow up based on severity of symptoms and risk factors.

## 2023-10-18 NOTE — CARE COORDINATION
Attempted outreach for CC needs, DM, COPD and CHF management, RPM alert monitoring  No answer and unable to LVM at this time

## 2023-10-19 ENCOUNTER — CARE COORDINATION (OUTPATIENT)
Facility: CLINIC | Age: 68
End: 2023-10-19

## 2023-10-19 NOTE — CARE COORDINATION
Remote Patient Monitoring Note      Date/Time:  10/19/2023 8:54 AM  Patient Current Location: Home: [address] [place] [address]  LPN attempted to contact patient by telephone regarding red alert received for  91% . Verified patients name and  as identifiers. Background: CHF, COPD, HTN  Clinical Interventions: Reviewed and followed up on alerts and treatments-Call place to patient. No answer, unable to leave message. Plan/Follow Up: Will continue to review, monitor and address alerts with follow up based on severity of symptoms and risk factors.

## 2023-10-20 ENCOUNTER — CARE COORDINATION (OUTPATIENT)
Dept: CARE COORDINATION | Age: 68
End: 2023-10-20

## 2023-10-20 DIAGNOSIS — M19.012 PRIMARY OSTEOARTHRITIS OF LEFT SHOULDER: ICD-10-CM

## 2023-10-20 RX ORDER — TIZANIDINE 4 MG/1
4 TABLET ORAL 3 TIMES DAILY PRN
Qty: 90 TABLET | Refills: 0 | Status: SHIPPED | OUTPATIENT
Start: 2023-10-20

## 2023-10-20 NOTE — TELEPHONE ENCOUNTER
Kumar Thomas is calling to request a refill on the following medication(s):    Medication Request:  Requested Prescriptions     Pending Prescriptions Disp Refills    tiZANidine (ZANAFLEX) 4 MG tablet [Pharmacy Med Name: TIZANIDINE HCL 4 MG TABLET] 90 tablet 0     Sig: TAKE 1 TABLET BY MOUTH 3 TIMES DAILY AS NEEDED (PAIN).        Last Visit Date (If Applicable):  05/6/8422    Next Visit Date:    11/22/2023

## 2023-10-20 NOTE — CARE COORDINATION
ACM attempted outreach for CC needs, COPD, CHF and DM management, RPM alerts  No answer and unable to LVM, VM not available

## 2023-10-23 ENCOUNTER — CARE COORDINATION (OUTPATIENT)
Facility: CLINIC | Age: 68
End: 2023-10-23

## 2023-10-23 NOTE — CARE COORDINATION
Remote Patient Monitoring Note      Date/Time:  10/23/2023 2:36 PM    LPN attempted to contact patient by telephone regarding red alert received for weight increase (235. 3LB). Background: CHF, COPD, HTN    Clinical Interventions:  Attempted to contact EC, no answer, unable to LM. ACM updated. Plan/Follow Up: Will continue to review, monitor and address alerts with follow up based on severity of symptoms and risk factors.        Bety Macdonald LPN  [facility]/ CTN/ Remote Patient Monitoring  [phone]

## 2023-10-23 NOTE — CARE COORDINATION
Remote Patient Monitoring Note      Date/Time:  10/23/2023 8:26 AM  Patient Current Location: Home: [address] [place] [address]  LPN attempted to contact patient by telephone regarding red alert received for weight increase (up 6 lbs in 3 days). Background: CHF, COPD, HTN  Clinical Interventions:  Call place to patient. Unable to leave message. Plan/Follow Up: Will continue to review, monitor and address alerts with follow up based on severity of symptoms and risk factors.

## 2023-10-24 ENCOUNTER — TELEPHONE (OUTPATIENT)
Dept: FAMILY MEDICINE CLINIC | Age: 68
End: 2023-10-24

## 2023-10-24 ENCOUNTER — CARE COORDINATION (OUTPATIENT)
Dept: CASE MANAGEMENT | Age: 68
End: 2023-10-24

## 2023-10-24 ENCOUNTER — HOSPITAL ENCOUNTER (OUTPATIENT)
Dept: OTHER | Age: 68
Discharge: HOME | End: 2023-10-24
Payer: MEDICARE

## 2023-10-24 VITALS
DIASTOLIC BLOOD PRESSURE: 80 MMHG | SYSTOLIC BLOOD PRESSURE: 130 MMHG | WEIGHT: 234 LBS | BODY MASS INDEX: 31.74 KG/M2 | HEART RATE: 75 BPM | OXYGEN SATURATION: 95 %

## 2023-10-24 DIAGNOSIS — I50.32 CHRONIC HEART FAILURE WITH PRESERVED EJECTION FRACTION (HCC): Primary | ICD-10-CM

## 2023-10-24 DIAGNOSIS — Z72.0 TOBACCO USE: ICD-10-CM

## 2023-10-24 PROCEDURE — 99212 OFFICE O/P EST SF 10 MIN: CPT

## 2023-10-24 PROCEDURE — 99214 OFFICE O/P EST MOD 30 MIN: CPT | Performed by: NURSE PRACTITIONER

## 2023-10-24 ASSESSMENT — PAIN SCALES - GENERAL: PAINLEVEL_OUTOF10: 10

## 2023-10-24 ASSESSMENT — PAIN DESCRIPTION - ORIENTATION: ORIENTATION: RIGHT;LEFT

## 2023-10-24 ASSESSMENT — ENCOUNTER SYMPTOMS
ABDOMINAL PAIN: 0
BLOOD IN STOOL: 0
SHORTNESS OF BREATH: 1
EYE DISCHARGE: 0
COUGH: 0

## 2023-10-24 ASSESSMENT — PAIN DESCRIPTION - LOCATION: LOCATION: LEG

## 2023-10-24 NOTE — PROGRESS NOTES
Years: 53.00     Additional pack years: 0.00     Total pack years: 53.00     Types: Cigarettes     Start date: 1969    Smokeless tobacco: Never    Tobacco comments:     Smokes pack/day 6/20/23    Substance Use Topics    Alcohol use: No     Alcohol/week: 0.0 standard drinks of alcohol      Current Outpatient Medications   Medication Sig Dispense Refill    tiZANidine (ZANAFLEX) 4 MG tablet TAKE 1 TABLET BY MOUTH 3 TIMES DAILY AS NEEDED (PAIN).  90 tablet 0    albuterol (PROVENTIL) (2.5 MG/3ML) 0.083% nebulizer solution INHALE CONTENTS OF 1 VIAL ( 3 MILLILITERS ) IN NEBULIZER BY MOUTH AND INTO THE LUNGS EVERY 6 HOURS 300 mL 1    cetirizine (ZYRTEC) 10 MG tablet TAKE 1 TABLET BY MOUTH EVERY DAY AS NEEDED FOR ALLERGY SYMPTOMS 90 tablet 1    Blood Glucose Monitoring Suppl (ONE TOUCH ULTRA 2) w/Device KIT 1 kit by Does not apply route daily 1 kit 0    nitroGLYCERIN (NITROSTAT) 0.4 MG SL tablet Place 1 tablet under the tongue every 5 minutes as needed for Chest pain 25 tablet 11    atenolol (TENORMIN) 25 MG tablet Take 1 tablet by mouth daily 90 tablet 1    tiotropium (SPIRIVA RESPIMAT) 2.5 MCG/ACT AERS inhaler Inhale 2 puffs into the lungs daily 1 each 3    blood glucose test strips (ONETOUCH ULTRA) strip TEST 4 TIMES DAILY AS NEEDED 400 strip 11    dextromethorphan-guaiFENesin (ROBITUSSIN-DM)  MG/5ML syrup       Lancets MISC 1 each by Does not apply route daily 400 each 1    furosemide (LASIX) 20 MG tablet Take 1 tablet by mouth 2 times daily 60 tablet 4    albuterol sulfate HFA (PROAIR HFA) 108 (90 Base) MCG/ACT inhaler Inhale 2 puffs into the lungs every 6 hours as needed for Wheezing 18 g 3    ondansetron (ZOFRAN-ODT) 4 MG disintegrating tablet dissolve 1 tablet under the tongue every 8 hours if needed for NAUSEA OR VOMITING 10 tablet 1    hydroCHLOROthiazide (MICROZIDE) 12.5 MG capsule take 1 capsule by mouth every morning 90 capsule 1    pantoprazole (PROTONIX) 40 MG tablet TAKE 1 TABLET BY MOUTH ONCE DAILY

## 2023-10-24 NOTE — CARE COORDINATION
Date/Time:  10/24/2023 9:12 AM  LPN attempted to reach patient by telephone regarding red alert Pulse ox 90 %  in remote patient monitoring program. Unable to leave message requesting a return call. Will attempt to reach patient again. Date/Time:  10/24/2023 2:53 PM   LPN attempted to reach patient by telephone regarding red alert Pulse ox 90 %  in remote patient monitoring program. Unable to leave message requesting a return call. Will attempt to reach patient again.

## 2023-10-24 NOTE — TELEPHONE ENCOUNTER
Patient was seen at the Heart clinic AND HAD COMPLAINTS of leg pain mostly bright  leg with swelling and warm to touch , so painful that she can hardly warm. The Doctor from clinic suggested that she reach out to PCP for a xray of both legs .

## 2023-10-25 ENCOUNTER — CARE COORDINATION (OUTPATIENT)
Facility: CLINIC | Age: 68
End: 2023-10-25

## 2023-10-25 NOTE — CARE COORDINATION
Remote Alert Monitoring Note  Rpm alert to be reviewed by the provider   red alert   pulse ox reading (86% & 85%)   Additional needs to be addressed by N/A: No                    Date/Time:  10/25/2023 8:36 AM  Patient Current Location: Home: [address] [place] [address]  LPN contacted patient by telephone. Verified patients name and  as identifiers. Background: CHF, COPD, HTN  Refer to 911 immediately if:  Patient unresponsive or unable to provide history  Change in cognition or sudden confusion  Patient unable to respond in complete sentences  Intense chest pain/tightness  Any concern for any clinical emergency  Red Alert: Provider response time of 1 hr required for any red alert requiring intervention  Yellow Alert: Provider response time of 3hr required for any escalated yellow alert    O2 Triage  Are you having any Chest Pain? no   Are you having any Shortness of Breath? no   Swelling in your hands or feet? no     Are you having any other health concerns or issues? no      Clinical Interventions: Reviewed and followed up on alerts and treatments-Spoke with patient about low pulse ox reading. Patient states he is doing fine. Patient is speaking in complete sentences. No distress noted. Patient states she will recheck in a little awhile. Plan/Follow Up: Will continue to review, monitor and address alerts with follow up based on severity of symptoms and risk factors.

## 2023-10-26 ENCOUNTER — CARE COORDINATION (OUTPATIENT)
Dept: CASE MANAGEMENT | Age: 68
End: 2023-10-26

## 2023-10-26 NOTE — CARE COORDINATION
Date/Time:  10/26/2023 8:30 AM  LPN attempted to reach patient by telephone regarding red alert pulse ox 86 %  in remote patient monitoring program.unable to leave message requesting a return call. Message states this wireless customer is not available at this time try again later, patient did update pulse ox it is now 91%Will attempt to reach patient again.

## 2023-10-27 ENCOUNTER — CARE COORDINATION (OUTPATIENT)
Dept: CARE COORDINATION | Age: 68
End: 2023-10-27

## 2023-10-27 ENCOUNTER — CARE COORDINATION (OUTPATIENT)
Facility: CLINIC | Age: 68
End: 2023-10-27

## 2023-10-27 NOTE — CARE COORDINATION
Attempted outreach for CC needs, RPM monitoring red alert, DM, CHF and COPD management  NO answer and unable to LVM at this time, multiple attempts to contact for needs  Red alert 89% pulse ox

## 2023-10-27 NOTE — CARE COORDINATION
Remote Patient Monitoring Note      Date/Time:  10/27/2023 8:37 AM  Patient Current Location: Home: [address] [place] [address]  LPN attempted to contact patient by telephone regarding red alert received for pulse ox reading (89%). Background: CHF, COPD, HTN  Clinical Interventions:  Call place to patient. Unable to reach patient or leave message. Plan/Follow Up: Will continue to review, monitor and address alerts with follow up based on severity of symptoms and risk factors.

## 2023-10-30 ENCOUNTER — CARE COORDINATION (OUTPATIENT)
Facility: CLINIC | Age: 68
End: 2023-10-30

## 2023-10-30 ENCOUNTER — TELEPHONE (OUTPATIENT)
Dept: PULMONOLOGY | Age: 68
End: 2023-10-30

## 2023-10-30 ENCOUNTER — TELEMEDICINE (OUTPATIENT)
Dept: FAMILY MEDICINE CLINIC | Age: 68
End: 2023-10-30
Payer: MEDICARE

## 2023-10-30 DIAGNOSIS — I50.32 CHRONIC HEART FAILURE WITH PRESERVED EJECTION FRACTION (HCC): Primary | ICD-10-CM

## 2023-10-30 DIAGNOSIS — E11.42 TYPE 2 DIABETES MELLITUS WITH DIABETIC POLYNEUROPATHY, WITHOUT LONG-TERM CURRENT USE OF INSULIN (HCC): ICD-10-CM

## 2023-10-30 DIAGNOSIS — B18.2 HEP C W/O COMA, CHRONIC (HCC): ICD-10-CM

## 2023-10-30 DIAGNOSIS — Z72.0 TOBACCO USE: ICD-10-CM

## 2023-10-30 DIAGNOSIS — I73.9 CLAUDICATION OF BOTH LOWER EXTREMITIES (HCC): ICD-10-CM

## 2023-10-30 DIAGNOSIS — M17.0 PRIMARY OSTEOARTHRITIS OF BOTH KNEES: ICD-10-CM

## 2023-10-30 DIAGNOSIS — J44.9 CHRONIC OBSTRUCTIVE PULMONARY DISEASE, UNSPECIFIED COPD TYPE (HCC): ICD-10-CM

## 2023-10-30 DIAGNOSIS — Z12.11 COLON CANCER SCREENING: ICD-10-CM

## 2023-10-30 DIAGNOSIS — R14.0 ABDOMINAL BLOATING: ICD-10-CM

## 2023-10-30 PROCEDURE — 3023F SPIROM DOC REV: CPT | Performed by: STUDENT IN AN ORGANIZED HEALTH CARE EDUCATION/TRAINING PROGRAM

## 2023-10-30 PROCEDURE — G8417 CALC BMI ABV UP PARAM F/U: HCPCS | Performed by: STUDENT IN AN ORGANIZED HEALTH CARE EDUCATION/TRAINING PROGRAM

## 2023-10-30 PROCEDURE — G8427 DOCREV CUR MEDS BY ELIG CLIN: HCPCS | Performed by: STUDENT IN AN ORGANIZED HEALTH CARE EDUCATION/TRAINING PROGRAM

## 2023-10-30 PROCEDURE — G8484 FLU IMMUNIZE NO ADMIN: HCPCS | Performed by: STUDENT IN AN ORGANIZED HEALTH CARE EDUCATION/TRAINING PROGRAM

## 2023-10-30 PROCEDURE — 1111F DSCHRG MED/CURRENT MED MERGE: CPT | Performed by: STUDENT IN AN ORGANIZED HEALTH CARE EDUCATION/TRAINING PROGRAM

## 2023-10-30 PROCEDURE — 4004F PT TOBACCO SCREEN RCVD TLK: CPT | Performed by: STUDENT IN AN ORGANIZED HEALTH CARE EDUCATION/TRAINING PROGRAM

## 2023-10-30 PROCEDURE — 99406 BEHAV CHNG SMOKING 3-10 MIN: CPT | Performed by: STUDENT IN AN ORGANIZED HEALTH CARE EDUCATION/TRAINING PROGRAM

## 2023-10-30 PROCEDURE — 3017F COLORECTAL CA SCREEN DOC REV: CPT | Performed by: STUDENT IN AN ORGANIZED HEALTH CARE EDUCATION/TRAINING PROGRAM

## 2023-10-30 PROCEDURE — 2022F DILAT RTA XM EVC RTNOPTHY: CPT | Performed by: STUDENT IN AN ORGANIZED HEALTH CARE EDUCATION/TRAINING PROGRAM

## 2023-10-30 PROCEDURE — G8399 PT W/DXA RESULTS DOCUMENT: HCPCS | Performed by: STUDENT IN AN ORGANIZED HEALTH CARE EDUCATION/TRAINING PROGRAM

## 2023-10-30 PROCEDURE — 99214 OFFICE O/P EST MOD 30 MIN: CPT | Performed by: STUDENT IN AN ORGANIZED HEALTH CARE EDUCATION/TRAINING PROGRAM

## 2023-10-30 PROCEDURE — 1090F PRES/ABSN URINE INCON ASSESS: CPT | Performed by: STUDENT IN AN ORGANIZED HEALTH CARE EDUCATION/TRAINING PROGRAM

## 2023-10-30 PROCEDURE — 1123F ACP DISCUSS/DSCN MKR DOCD: CPT | Performed by: STUDENT IN AN ORGANIZED HEALTH CARE EDUCATION/TRAINING PROGRAM

## 2023-10-30 PROCEDURE — 3044F HG A1C LEVEL LT 7.0%: CPT | Performed by: STUDENT IN AN ORGANIZED HEALTH CARE EDUCATION/TRAINING PROGRAM

## 2023-10-30 SDOH — ECONOMIC STABILITY: FOOD INSECURITY: WITHIN THE PAST 12 MONTHS, THE FOOD YOU BOUGHT JUST DIDN'T LAST AND YOU DIDN'T HAVE MONEY TO GET MORE.: NEVER TRUE

## 2023-10-30 SDOH — ECONOMIC STABILITY: INCOME INSECURITY: IN THE LAST 12 MONTHS, WAS THERE A TIME WHEN YOU WERE NOT ABLE TO PAY THE MORTGAGE OR RENT ON TIME?: NO

## 2023-10-30 SDOH — ECONOMIC STABILITY: FOOD INSECURITY: WITHIN THE PAST 12 MONTHS, YOU WORRIED THAT YOUR FOOD WOULD RUN OUT BEFORE YOU GOT MONEY TO BUY MORE.: NEVER TRUE

## 2023-10-30 ASSESSMENT — COLUMBIA-SUICIDE SEVERITY RATING SCALE - C-SSRS
3. HAVE YOU BEEN THINKING ABOUT HOW YOU MIGHT KILL YOURSELF?: NO
4. HAVE YOU HAD THESE THOUGHTS AND HAD SOME INTENTION OF ACTING ON THEM?: NO
7. DID THIS OCCUR IN THE LAST THREE MONTHS: NO
5. HAVE YOU STARTED TO WORK OUT OR WORKED OUT THE DETAILS OF HOW TO KILL YOURSELF? DO YOU INTEND TO CARRY OUT THIS PLAN?: NO

## 2023-10-30 ASSESSMENT — ENCOUNTER SYMPTOMS
NAUSEA: 0
ABDOMINAL PAIN: 0
SHORTNESS OF BREATH: 1
COUGH: 0
DIARRHEA: 0
EYE DISCHARGE: 0
CONSTIPATION: 0
ABDOMINAL DISTENTION: 1
VOMITING: 0
WHEEZING: 0
CHEST TIGHTNESS: 0
SORE THROAT: 0

## 2023-10-30 ASSESSMENT — PATIENT HEALTH QUESTIONNAIRE - PHQ9
1. LITTLE INTEREST OR PLEASURE IN DOING THINGS: 0
SUM OF ALL RESPONSES TO PHQ QUESTIONS 1-9: 0
6. FEELING BAD ABOUT YOURSELF - OR THAT YOU ARE A FAILURE OR HAVE LET YOURSELF OR YOUR FAMILY DOWN: 0
SUM OF ALL RESPONSES TO PHQ QUESTIONS 1-9: 0
7. TROUBLE CONCENTRATING ON THINGS, SUCH AS READING THE NEWSPAPER OR WATCHING TELEVISION: 0
SUM OF ALL RESPONSES TO PHQ QUESTIONS 1-9: 0
9. THOUGHTS THAT YOU WOULD BE BETTER OFF DEAD, OR OF HURTING YOURSELF: 0
2. FEELING DOWN, DEPRESSED OR HOPELESS: 0
3. TROUBLE FALLING OR STAYING ASLEEP: 0
4. FEELING TIRED OR HAVING LITTLE ENERGY: 0
10. IF YOU CHECKED OFF ANY PROBLEMS, HOW DIFFICULT HAVE THESE PROBLEMS MADE IT FOR YOU TO DO YOUR WORK, TAKE CARE OF THINGS AT HOME, OR GET ALONG WITH OTHER PEOPLE: 0
5. POOR APPETITE OR OVEREATING: 0
8. MOVING OR SPEAKING SO SLOWLY THAT OTHER PEOPLE COULD HAVE NOTICED. OR THE OPPOSITE, BEING SO FIGETY OR RESTLESS THAT YOU HAVE BEEN MOVING AROUND A LOT MORE THAN USUAL: 0
SUM OF ALL RESPONSES TO PHQ9 QUESTIONS 1 & 2: 0
SUM OF ALL RESPONSES TO PHQ QUESTIONS 1-9: 0

## 2023-10-30 ASSESSMENT — SOCIAL DETERMINANTS OF HEALTH (SDOH): HOW HARD IS IT FOR YOU TO PAY FOR THE VERY BASICS LIKE FOOD, HOUSING, MEDICAL CARE, AND HEATING?: NOT HARD AT ALL

## 2023-10-30 NOTE — PROGRESS NOTES
[id number]  Provider was located at  ([address] [place], [place] [address]t): Jacinto Mckinnon        Total time spent on this encounter: Not billed by time    --Floyd Zimmerman MD on 10/30/2023 at 12:21 PM    An electronic signature was used to authenticate this note.

## 2023-10-30 NOTE — TELEPHONE ENCOUNTER
Patient called stating she needs new oxygen concentrator because it broke. I stated to patient she needed to call the company and let them know so they can either repair or replace it. Patient stated she threw it away. Spoke with Dianna at [facility] she stated she will see what they can do and try and work on new one. At this time nothing on our end needs to be done. Called patient back to make her aware and if she has more questions to call the company regarding this.

## 2023-10-30 NOTE — CARE COORDINATION
Remote Alert Monitoring Note  Rpm alert to be reviewed by the provider   red alert   weight (up 4.5 lbs in one day)   Additional needs to be addressed by N/A: No                    Date/Time:  10/30/2023 11:26 AM  Patient Current Location: Home: [address] [place] [address]  LPN contacted patient by telephone. Verified patients name and  as identifiers. Background: CHF, COPD, CHF  Refer to 911 immediately if:  Patient unresponsive or unable to provide history  Change in cognition or sudden confusion  Patient unable to respond in complete sentences  Intense chest pain/tightness  Any concern for any clinical emergency  Red Alert: Provider response time of 1 hr required for any red alert requiring intervention  Yellow Alert: Provider response time of 3hr required for any escalated yellow alert    Weight Scale Triage  Was your weight obtained upon rising/waking today? yes   Was your weight obtained after voiding and/or use of the bathroom today? yes   Did you weigh yourself in the same amount of clothing today, compared to how you typically do? yes   Was the scale bumped or moved prior to today's weight? no   Is your scale on a flat/hard surface? yes   Did you obtain your weight with shoes on? no   If yes, is this something you normally do during your daily weights? NA   Were you standing up straight on the scale today? yes   Were you leaning on anything while obtaining your weight today? no      Clinical Interventions: Spoke with patient about weight gain. Patient states she is a little swollen today. Patient states she has a  virtual visit with her pcp today at 15. She will discuss the swelling with him then. No distress noted. Plan/Follow Up: Will continue to review, monitor and address alerts with follow up based on severity of symptoms and risk factors.

## 2023-10-30 NOTE — CARE COORDINATION
Remote Patient Monitoring Note      Date/Time:  10/30/2023 8:20 AM  Patient Current Location: Home: [address] [place] [address]  LPN attempted to contact patient by telephone regarding red alert received for weight increase (up 4.5 lbs in one day). Background: CHF, COPD,HTN  Clinical Interventions:  Call place to patient. Unable to reach patient or leave message. Plan/Follow Up: Will continue to review, monitor and address alerts with follow up based on severity of symptoms and risk factors.

## 2023-10-31 ENCOUNTER — CARE COORDINATION (OUTPATIENT)
Dept: PRIMARY CARE CLINIC | Age: 68
End: 2023-10-31

## 2023-10-31 ENCOUNTER — TELEPHONE (OUTPATIENT)
Dept: OTHER | Age: 68
End: 2023-10-31

## 2023-10-31 NOTE — CARE COORDINATION
Remote Patient Monitoring Note      Date/Time:  10/31/2023 10:23 AM  Patient Current Location: Home: [address] [place] [address]  LPN attempted to contact patient by telephone regarding red alert received for  89% . Background: CHF, COPD, HTN  Clinical Interventions: Reviewed and followed up on alerts and treatments-Call place to patient. Unable to reach patient or leave message. Plan/Follow Up: Will continue to review, monitor and address alerts with follow up based on severity of symptoms and risk factors.

## 2023-10-31 NOTE — CARE COORDINATION
Remote Alert Monitoring Note  Rpm alert to be reviewed by the provider   red alert   pulse ox reading (89%)   Additional needs to be addressed by N/A: No                    Date/Time:  10/31/2023 10:57 AM  Patient Current Location: Home: [address] [place] [address]  LPN contacted patient by telephone. Verified patients name and  as identifiers. Background: CHF, COPD, HTN  Refer to 911 immediately if:  Patient unresponsive or unable to provide history  Change in cognition or sudden confusion  Patient unable to respond in complete sentences  Intense chest pain/tightness  Any concern for any clinical emergency  Red Alert: Provider response time of 1 hr required for any red alert requiring intervention  Yellow Alert: Provider response time of 3hr required for any escalated yellow alert    O2 Triage  Are you having any Chest Pain? no   Are you having any Shortness of Breath? no   Swelling in your hands or feet? no     Are you having any other health concerns or issues? no      Clinical Interventions: Reviewed and followed up on alerts and treatments-Spoke with patient about low pulse ox reading. Patient states he feels fine and does not know why it reads low. Patient is speaking in complete sentences. No distress noted. Plan/Follow Up: Will continue to review, monitor and address alerts with follow up based on severity of symptoms and risk factors.

## 2023-11-01 ENCOUNTER — CARE COORDINATION (OUTPATIENT)
Dept: PRIMARY CARE CLINIC | Age: 68
End: 2023-11-01

## 2023-11-01 NOTE — CARE COORDINATION
Remote Patient Monitoring Note      Date/Time:  11/1/2023 10:07 AM  Patient Current Location: Home: [address] [place] [id number]  LPN contacted patient by telephone regarding red alert received for pulse ox reading (88%). Background: CHF, COPD, HTN  Clinical Interventions:  Patient answered call asked writer to call back because she was taking care of a bill right now. Plan/Follow Up: Will continue to review, monitor and address alerts with follow up based on severity of symptoms and risk factors.

## 2023-11-01 NOTE — CARE COORDINATION
Remote Alert Monitoring Note  Rpm alert to be reviewed by the provider   red alert   pulse ox reading (88%)   Additional needs to be addressed by N/A: No                    Date/Time:  2023 11:13 AM  Patient Current Location: Home: [address] [place] [address]  LPN contacted patient by telephone. Verified patients name and  as identifiers. Background: CHF, COPD, HTN  Refer to 911 immediately if:  Patient unresponsive or unable to provide history  Change in cognition or sudden confusion  Patient unable to respond in complete sentences  Intense chest pain/tightness  Any concern for any clinical emergency  Red Alert: Provider response time of 1 hr required for any red alert requiring intervention  Yellow Alert: Provider response time of 3hr required for any escalated yellow alert    O2 Triage  Are you having any Chest Pain? no   Are you having any Shortness of Breath? no   Swelling in your hands or feet? no     Are you having any other health concerns or issues? no      Clinical Interventions:  Spoke with patient about low pulse ox reading. Patient states she is doing fine. States she is out taking care of her bills. No distress noted. No further follow up needed. Plan/Follow Up: Will continue to review, monitor and address alerts with follow up based on severity of symptoms and risk factors.

## 2023-11-02 ENCOUNTER — TELEPHONE (OUTPATIENT)
Dept: FAMILY MEDICINE CLINIC | Age: 68
End: 2023-11-02

## 2023-11-02 DIAGNOSIS — B18.2 HEP C W/O COMA, CHRONIC (HCC): Primary | ICD-10-CM

## 2023-11-02 NOTE — TELEPHONE ENCOUNTER
Patient would like a new referral to GI. She stated she does not want to go to [facility].

## 2023-11-03 ENCOUNTER — CARE COORDINATION (OUTPATIENT)
Facility: CLINIC | Age: 68
End: 2023-11-03

## 2023-11-03 ENCOUNTER — CARE COORDINATION (OUTPATIENT)
Dept: CARE COORDINATION | Age: 68
End: 2023-11-03

## 2023-11-03 NOTE — CARE COORDINATION
Remote Patient Monitoring Note      Date/Time:  11/3/2023 10:18 AM  Patient Current Location: Home: [address] [place] [address] [address] [facility] [address]  LPN attempted to contact patient by telephone regarding red alert received for weight increase (3.5 lbs in one day)     Background: CHF, COPH, HTN  Clinical Interventions:  Call place to patient. Unable to reach patient or leave message. Plan/Follow Up: Will continue to review, monitor and address alerts with follow up based on severity of symptoms and risk factors.

## 2023-11-06 DIAGNOSIS — B18.2 HEP C W/O COMA, CHRONIC (HCC): Primary | ICD-10-CM

## 2023-11-07 ENCOUNTER — TELEPHONE (OUTPATIENT)
Dept: OTHER | Age: 68
End: 2023-11-07

## 2023-11-07 ENCOUNTER — CARE COORDINATION (OUTPATIENT)
Dept: CARE COORDINATION | Age: 68
End: 2023-11-07

## 2023-11-07 NOTE — TELEPHONE ENCOUNTER
Phoned pt to check on her 2 weeks after initial visit to the [address] [place] [address] . No answer.  Unable to leave message

## 2023-11-07 NOTE — CARE COORDINATION
Attempted outreach for CC needs, CHF, COPD, DM and HTN management, RPM monitoring  No answer and unable to LVM at this time.

## 2023-11-08 ENCOUNTER — HOSPITAL ENCOUNTER (OUTPATIENT)
Dept: VASCULAR LAB | Age: 68
Discharge: HOME | End: 2023-11-10
Payer: MEDICARE

## 2023-11-08 ENCOUNTER — HOSPITAL ENCOUNTER (OUTPATIENT)
Dept: CT IMAGING | Age: 68
Discharge: HOME | End: 2023-11-10
Payer: MEDICARE

## 2023-11-08 ENCOUNTER — CARE COORDINATION (OUTPATIENT)
Dept: CARE COORDINATION | Age: 68
End: 2023-11-08

## 2023-11-08 DIAGNOSIS — R14.0 ABDOMINAL BLOATING: ICD-10-CM

## 2023-11-08 DIAGNOSIS — I73.9 CLAUDICATION OF BOTH LOWER EXTREMITIES (HCC): ICD-10-CM

## 2023-11-08 LAB
VAS LEFT ABI: 1.07
VAS LEFT ANKLE BP: 174 MMHG
VAS LEFT ARM BP: 160 MMHG
VAS LEFT DORSALIS PEDIS BP: 165 MMHG
VAS LEFT PTA BP: 174 MMHG
VAS LEFT TBI: 0.99
VAS LEFT TOE PRESSURE: 161 MMHG
VAS RIGHT ABI: 1.04
VAS RIGHT ANKLE BP: 152 MMHG
VAS RIGHT ARM BP: 162 MMHG
VAS RIGHT DORSALIS PEDIS BP: 169 MMHG
VAS RIGHT PTA BP: 152 MMHG
VAS RIGHT TBI: 0.97
VAS RIGHT TOE PRESSURE: 157 MMHG

## 2023-11-08 PROCEDURE — 2500000003 HC RX 250 WO HCPCS: Performed by: STUDENT IN AN ORGANIZED HEALTH CARE EDUCATION/TRAINING PROGRAM

## 2023-11-08 PROCEDURE — 93922 UPR/L XTREMITY ART 2 LEVELS: CPT

## 2023-11-08 PROCEDURE — 93922 UPR/L XTREMITY ART 2 LEVELS: CPT | Performed by: SURGERY

## 2023-11-08 PROCEDURE — 74176 CT ABD & PELVIS W/O CONTRAST: CPT

## 2023-11-08 RX ADMIN — BARIUM SULFATE 900 ML: 20 SUSPENSION ORAL at 14:11

## 2023-11-08 NOTE — CARE COORDINATION
Remote Alert Monitoring Note      Date/Time:  2023 12:21 PM  Patient Current Location: Home: [address] [place] [address]    LPN contacted patient by telephone regarding red alert received for blood pressure reading (185/61) and pulse ox reading (91%). Verified patients name and  as identifiers. Rpm alert to be reviewed by the provider   red alert                      Background: CHF, COPD, High Blood-Pressure    Refer to 911 immediately if:  Patient unresponsive or unable to provide history  Change in cognition or sudden confusion  Patient unable to respond in complete sentences  Intense chest pain/tightness  Any concern for any clinical emergency  Red Alert: Provider response time of 1 hr required for any red alert requiring intervention  Yellow Alert: Provider response time of 3hr required for any escalated yellow alert    BP & 02 Triage  Are you having any Chest Pain? no   Are you having any Shortness of Breath? no   Do you have a headache or have any vision changes? no   Are you having any numbness or tingling? no   Are you having any other health concerns or issues? no              Have you taken your medications as instructed by your doctor today? Yes       Clinical Interventions: Reviewed and followed up on alerts and treatments-. Pt is asymptomatic and in no apparent distress, speaking in full sentences. She denies any current chest pain or SOB. She was able to recheck her metrics while on the call and got normal readings of 156/56 and 92%. She states she has been having a lot of leg pain possibly causing her BP to be elevated at times. She is scheduled for some testing this afternoon. ACM updated. Plan/Follow Up: Will continue to review, monitor and address alerts with follow up based on severity of symptoms and risk factors.     Abhilash Nails LPN  [facility]/ CTN/ Remote Patient monitoring  [phone]

## 2023-11-10 ENCOUNTER — TELEPHONE (OUTPATIENT)
Dept: OTHER | Age: 68
End: 2023-11-10

## 2023-11-10 NOTE — TELEPHONE ENCOUNTER
Attempted to phone pt to remind to get BMP drawn that was ordered on 11/7/23. Received message back stating \"wireless customer unavailable please try again later. \" Was  Unable to leave message reminder.

## 2023-11-12 DIAGNOSIS — I50.32 CHRONIC DIASTOLIC CONGESTIVE HEART FAILURE (HCC): ICD-10-CM

## 2023-11-13 ENCOUNTER — CARE COORDINATION (OUTPATIENT)
Dept: CARE COORDINATION | Age: 68
End: 2023-11-13

## 2023-11-13 ENCOUNTER — TELEPHONE (OUTPATIENT)
Dept: OTHER | Age: 68
End: 2023-11-13

## 2023-11-13 ENCOUNTER — HOSPITAL ENCOUNTER (OUTPATIENT)
Dept: MAMMOGRAPHY | Age: 68
Discharge: HOME | End: 2023-11-15
Payer: MEDICARE

## 2023-11-13 ENCOUNTER — CARE COORDINATION (OUTPATIENT)
Facility: CLINIC | Age: 68
End: 2023-11-13

## 2023-11-13 DIAGNOSIS — Z12.31 ENCOUNTER FOR SCREENING MAMMOGRAM FOR MALIGNANT NEOPLASM OF BREAST: ICD-10-CM

## 2023-11-13 PROCEDURE — 77063 BREAST TOMOSYNTHESIS BI: CPT

## 2023-11-13 RX ORDER — POTASSIUM CHLORIDE 20 MEQ/1
TABLET, EXTENDED RELEASE ORAL
Qty: 60 TABLET | Refills: 1 | Status: SHIPPED | OUTPATIENT
Start: 2023-11-13

## 2023-11-13 NOTE — CARE COORDINATION
Remote Patient Monitoring Note      Date/Time:  11/13/2023 8:28 AM  Patient Current Location: Home: [address] [place] [address]  LPN attempted to contact patient by telephone regarding red alert received for weight increase (12 lbs in one day). Background: CHF, COPD, HTN  Clinical Interventions:  Call place to patient. Unable to reach patient or leave message. ACM notified. Plan/Follow Up: Will continue to review, monitor and address alerts with follow up based on severity of symptoms and risk factors.

## 2023-11-13 NOTE — TELEPHONE ENCOUNTER
Carol Lopez is calling to request a refill on the following medication(s):    Medication Request:  Requested Prescriptions     Pending Prescriptions Disp Refills    potassium chloride (KLOR-CON M20) 20 MEQ extended release tablet [Pharmacy Med Name: KLOR-CON M20 TABLET] 60 tablet 1     Sig: TAKE 1 TABLET BY MOUTH EVERY DAY       Last Visit Date (If Applicable):  96/13/0264    Next Visit Date:    11/22/2023

## 2023-11-13 NOTE — TELEPHONE ENCOUNTER
Attempted to contact patient at home for 2 week follow up after chf clinic visit. Unable to reach, phone not working.

## 2023-11-13 NOTE — CARE COORDINATION
Pt returned call and said she does have swelling but denies sob. She does have instructions to take lasix for wt gain and swelling she did take her lasix today. She does have an apt with chf clinic Thursday if she still has swelling tomorrow after lasix today she can call the chf clinic for further instructions .  She said she avoids salt but does get meals on wheels and thinks some of the food from there is high in sodium

## 2023-11-14 ENCOUNTER — CARE COORDINATION (OUTPATIENT)
Facility: CLINIC | Age: 68
End: 2023-11-14

## 2023-11-14 NOTE — CARE COORDINATION
Remote Patient Monitoring Note      Date/Time:  11/14/2023 9:11 AM  Patient Current Location: Home: [address] [place] [address]  LPN attempted to contact patient by telephone regarding yellow alert received for blood pressure reading (175/54). Background: CHF, COPD, HTN  Clinical Interventions:  Call place to patient. Unable to reach patient or leave message. Plan/Follow Up: Will continue to review, monitor and address alerts with follow up based on severity of symptoms and risk factors.

## 2023-11-15 ENCOUNTER — CARE COORDINATION (OUTPATIENT)
Facility: CLINIC | Age: 68
End: 2023-11-15

## 2023-11-16 ENCOUNTER — CARE COORDINATION (OUTPATIENT)
Facility: CLINIC | Age: 68
End: 2023-11-16

## 2023-11-16 NOTE — CARE COORDINATION
Remote Patient Monitoring Note      Date/Time:  11/16/2023 8:22 AM  Patient Current Location: Home: [address] [place] [address]  LPN attempted to contact patient by telephone regarding red alert received for pulse ox reading (91%). Background: CHF, COPD, HTN  Clinical Interventions:  Call place to patient. Unable to reach patient or leave message. Plan/Follow Up: Will continue to review, monitor and address alerts with follow up based on severity of symptoms and risk factors.

## 2023-11-17 ENCOUNTER — CARE COORDINATION (OUTPATIENT)
Dept: CARE COORDINATION | Age: 68
End: 2023-11-17

## 2023-11-17 ENCOUNTER — CARE COORDINATION (OUTPATIENT)
Facility: CLINIC | Age: 68
End: 2023-11-17

## 2023-11-17 ENCOUNTER — CARE COORDINATION (OUTPATIENT)
Dept: CASE MANAGEMENT | Age: 68
End: 2023-11-17

## 2023-11-17 VITALS
OXYGEN SATURATION: 89 % | DIASTOLIC BLOOD PRESSURE: 71 MMHG | BODY MASS INDEX: 31.74 KG/M2 | HEART RATE: 77 BPM | SYSTOLIC BLOOD PRESSURE: 148 MMHG | WEIGHT: 234 LBS

## 2023-11-17 NOTE — CARE COORDINATION
Remote Patient Monitoring Note      Date/Time:  2023 8:38 AM  Patient Current Location: [address] [place] [address] contacted  Clay Rodriguez her EC  by telephone regarding red alert received for pulse ox reading (86%). Verified patients name and  as identifiers. Background: CHF, cOPD, HTN  Clinical Interventions: Escalated alert to RN-Spoke to [address] [place] [address] patient phone does not ring just states the cell phone is unavailable, Spoke with Clay Rodriguez and let her know we are unable to contact patient and the seriousness of her Oxygen reading she states she will get in contact with patient and have her call back      Plan/Follow Up: Will continue to review, monitor and address alerts with follow up based on severity of symptoms and risk factors.

## 2023-11-17 NOTE — CARE COORDINATION
Remote Alert Monitoring Note  Rpm alert to be reviewed by the provider   red alert   pulse ox reading (86-87%)   Additional needs to be addressed by  no :  AISHA                    Date/Time:  2023 9:31 AM  Patient Current Location: Home: [address] [place] [address] [address] [place] [address]  LPN contacted patient by telephone. Verified patients name and  as identifiers. Background: CHF, COPD, HTN  Refer to 911 immediately if:  Patient unresponsive or unable to provide history  Change in cognition or sudden confusion  Patient unable to respond in complete sentences  Intense chest pain/tightness  Any concern for any clinical emergency  Red Alert: Provider response time of 1 hr required for any red alert requiring intervention  Yellow Alert: Provider response time of 3hr required for any escalated yellow alert    O2 Triage  Are you having any Chest Pain? no   Are you having any Shortness of Breath? no   Swelling in your hands or feet? no     Are you having any other health concerns or issues? no      Clinical Interventions: Reviewed and followed up on alerts and treatments-Spoke with patient about low pulse ox reading. Patient states she is aware. Patient denies any sob, chest pain or swelling. Patient states she will recheck later. Discuss missed CHF appointment. Patient states he was at an eye appointment. Patient states she will call the CHF Clinic to reschedule. Patient has no questions or concerns. No distress noted. Plan/Follow Up: Will continue to review, monitor and address alerts with follow up based on severity of symptoms and risk factors.

## 2023-11-17 NOTE — CARE COORDINATION
Remote Patient Monitoring Note      Date/Time:  11/17/2023 3:27 PM    LPN attempted to contact patient by telephone regarding red alert received for pulse ox reading (89%). Background: CHF, COPD, High Blood-Pressure    Clinical Interventions:  Unable to LM no VM. ACM updated. Plan/Follow Up: Will continue to review, monitor and address alerts with follow up based on severity of symptoms and risk factors.        Doris Casey LPN  [facility]/ CTN/ Remote Patient Monitoring  [phone]

## 2023-11-17 NOTE — CARE COORDINATION
Remote Patient Monitoring Note      Date/Time:  11/17/2023 8:42 AM  Patient Current Location: Home: [address] [place] [address]  LPN attempted to contact patient by telephone regarding red alert received for pulse ox reading (86-87%). Background: CHF, COPD, HTN  Clinical Interventions:  Call place to patient. Unable to reach patient or leave message. Plan/Follow Up: Will continue to review, monitor and address alerts with follow up based on severity of symptoms and risk factors.

## 2023-11-19 DIAGNOSIS — M19.012 PRIMARY OSTEOARTHRITIS OF LEFT SHOULDER: ICD-10-CM

## 2023-11-20 ENCOUNTER — TELEPHONE (OUTPATIENT)
Dept: FAMILY MEDICINE CLINIC | Age: 68
End: 2023-11-20

## 2023-11-20 ENCOUNTER — CARE COORDINATION (OUTPATIENT)
Facility: CLINIC | Age: 68
End: 2023-11-20

## 2023-11-20 RX ORDER — TIZANIDINE 4 MG/1
4 TABLET ORAL 3 TIMES DAILY PRN
Qty: 90 TABLET | Refills: 0 | Status: SHIPPED | OUTPATIENT
Start: 2023-11-20

## 2023-11-20 NOTE — CARE COORDINATION
Remote Alert Monitoring Note  Rpm alert to be reviewed by the provider   red alert   pulse ox reading (91%)   Additional needs to be addressed by N/A: No                    Date/Time:  2023 9:42 AM  Patient Current Location: Home: [address] [place] [address] [address] [place] [address]  LPN contacted patient by telephone. Verified patients name and  as identifiers. Background: CHF, COPD, HTN  Refer to 911 immediately if:  Patient unresponsive or unable to provide history  Change in cognition or sudden confusion  Patient unable to respond in complete sentences  Intense chest pain/tightness  Any concern for any clinical emergency  Red Alert: Provider response time of 1 hr required for any red alert requiring intervention  Yellow Alert: Provider response time of 3hr required for any escalated yellow alert    O2 Triage  Are you having any Chest Pain? no   Are you having any Shortness of Breath? no   Swelling in your hands or feet? no     Are you having any other health concerns or issues? no      Clinical Interventions: Reviewed and followed up on alerts and treatments-Spoke with patient about pulse ox reading of 91%. Patient states she is doing ok. Patient states she will recheck again. No distress noted. Plan/Follow Up: Will continue to review, monitor and address alerts with follow up based on severity of symptoms and risk factors.

## 2023-11-20 NOTE — TELEPHONE ENCOUNTER
Harsha Hart is calling to request a refill on the following medication(s):    Medication Request:  Requested Prescriptions     Pending Prescriptions Disp Refills    tiZANidine (ZANAFLEX) 4 MG tablet [Pharmacy Med Name: TIZANIDINE HCL 4 MG TABLET] 90 tablet 0     Sig: TAKE 1 TABLET BY MOUTH 3 TIMES DAILY AS NEEDED (PAIN).        Last Visit Date (If Applicable):  68/61/8239    Next Visit Date:    11/22/2023

## 2023-11-20 NOTE — TELEPHONE ENCOUNTER
Made hemant aware that she doesn't need the nutrition supplement right now and also called  the [address] and left a detailed message

## 2023-11-21 ENCOUNTER — CARE COORDINATION (OUTPATIENT)
Dept: CARE COORDINATION | Age: 68
End: 2023-11-21

## 2023-11-21 ENCOUNTER — CARE COORDINATION (OUTPATIENT)
Facility: CLINIC | Age: 68
End: 2023-11-21

## 2023-11-21 NOTE — CARE COORDINATION
Remote Patient Monitoring Note      Date/Time:  11/21/2023 10:58 AM  Patient Current Location: Home: [address] [place] [id number]  LPN attempted to contact patient by telephone regarding red alert received for pulse ox reading (88%). Background: CHF, COPD, HTN  Clinical Interventions:  call place to patient. Unable to reach patient or leave a message. Plan/Follow Up: Will continue to review, monitor and address alerts with follow up based on severity of symptoms and risk factors.

## 2023-11-22 ENCOUNTER — OFFICE VISIT (OUTPATIENT)
Dept: FAMILY MEDICINE CLINIC | Age: 68
End: 2023-11-22

## 2023-11-22 VITALS
DIASTOLIC BLOOD PRESSURE: 82 MMHG | SYSTOLIC BLOOD PRESSURE: 142 MMHG | OXYGEN SATURATION: 98 % | WEIGHT: 234 LBS | BODY MASS INDEX: 31.69 KG/M2 | HEIGHT: 72 IN | TEMPERATURE: 97.2 F | HEART RATE: 82 BPM

## 2023-11-22 DIAGNOSIS — E11.42 TYPE 2 DIABETES MELLITUS WITH DIABETIC POLYNEUROPATHY, WITHOUT LONG-TERM CURRENT USE OF INSULIN (HCC): Primary | ICD-10-CM

## 2023-11-22 DIAGNOSIS — B18.2 HEP C W/O COMA, CHRONIC (HCC): ICD-10-CM

## 2023-11-22 DIAGNOSIS — K63.5 HYPERPLASTIC POLYP OF INTESTINE: ICD-10-CM

## 2023-11-22 DIAGNOSIS — I89.0 LYMPHEDEMA OF BOTH LOWER EXTREMITIES: ICD-10-CM

## 2023-11-22 RX ORDER — ONDANSETRON 4 MG/1
TABLET, ORALLY DISINTEGRATING ORAL
Qty: 10 TABLET | Refills: 1 | Status: SHIPPED | OUTPATIENT
Start: 2023-11-22

## 2023-11-22 ASSESSMENT — ENCOUNTER SYMPTOMS
DIARRHEA: 0
CHEST TIGHTNESS: 0
EYE DISCHARGE: 0
VOMITING: 0
CONSTIPATION: 1
ABDOMINAL PAIN: 0
WHEEZING: 0
NAUSEA: 1
SHORTNESS OF BREATH: 0
SORE THROAT: 0
COUGH: 0

## 2023-11-22 ASSESSMENT — PATIENT HEALTH QUESTIONNAIRE - PHQ9
SUM OF ALL RESPONSES TO PHQ QUESTIONS 1-9: 8
2. FEELING DOWN, DEPRESSED OR HOPELESS: 0
SUM OF ALL RESPONSES TO PHQ9 QUESTIONS 1 & 2: 0
5. POOR APPETITE OR OVEREATING: 0
SUM OF ALL RESPONSES TO PHQ QUESTIONS 1-9: 8
3. TROUBLE FALLING OR STAYING ASLEEP: 3
10. IF YOU CHECKED OFF ANY PROBLEMS, HOW DIFFICULT HAVE THESE PROBLEMS MADE IT FOR YOU TO DO YOUR WORK, TAKE CARE OF THINGS AT HOME, OR GET ALONG WITH OTHER PEOPLE: 0
6. FEELING BAD ABOUT YOURSELF - OR THAT YOU ARE A FAILURE OR HAVE LET YOURSELF OR YOUR FAMILY DOWN: 0
9. THOUGHTS THAT YOU WOULD BE BETTER OFF DEAD, OR OF HURTING YOURSELF: 0
1. LITTLE INTEREST OR PLEASURE IN DOING THINGS: 0
SUM OF ALL RESPONSES TO PHQ QUESTIONS 1-9: 8
7. TROUBLE CONCENTRATING ON THINGS, SUCH AS READING THE NEWSPAPER OR WATCHING TELEVISION: 2
8. MOVING OR SPEAKING SO SLOWLY THAT OTHER PEOPLE COULD HAVE NOTICED. OR THE OPPOSITE, BEING SO FIGETY OR RESTLESS THAT YOU HAVE BEEN MOVING AROUND A LOT MORE THAN USUAL: 0
SUM OF ALL RESPONSES TO PHQ QUESTIONS 1-9: 8
4. FEELING TIRED OR HAVING LITTLE ENERGY: 3

## 2023-11-22 NOTE — PROGRESS NOTES
Specialty Services Required     Number of Visits Requested:   Ovi Ferrari MD, Gastroenterology, [facility]     Referral Priority:   Routine     Referral Type:   Eval and Treat     Referral Reason:   Specialty Services Required     Referred to Provider:   Avelina Richmond MD     Requested Specialty:   Gastroenterology     Number of Visits Requested:   1     Orders Placed This Encounter   Medications    ondansetron (ZOFRAN-ODT) 4 MG disintegrating tablet     Sig: dissolve 1 tablet under the tongue every 8 hours if needed for NAUSEA OR VOMITING     Dispense:  10 tablet     Refill:  1       Patient given educational materials - see patient instructions. Discussed use, benefit, and side effects of prescribed medications. All patientquestions answered. Pt voiced understanding. Reviewed health maintenance. Instructedto continue current medications, diet and exercise. Patient agreed with treatmentplan. Follow up as directed.      Electronically signed by Debora Salmon MD on 11/22/2023 at 3:06 PM

## 2023-11-28 ENCOUNTER — CARE COORDINATION (OUTPATIENT)
Dept: CASE MANAGEMENT | Age: 68
End: 2023-11-28

## 2023-11-28 ENCOUNTER — TELEPHONE (OUTPATIENT)
Dept: SURGERY | Age: 68
End: 2023-11-28

## 2023-11-28 DIAGNOSIS — M25.562 PAIN IN BOTH KNEES, UNSPECIFIED CHRONICITY: Primary | ICD-10-CM

## 2023-11-28 DIAGNOSIS — M25.561 PAIN IN BOTH KNEES, UNSPECIFIED CHRONICITY: Primary | ICD-10-CM

## 2023-11-28 NOTE — CARE COORDINATION
Date/Time:  11/28/2023 8:35 AM  LPN attempted to reach patient by telephone regarding red alert pulse ox 89 in remote patient monitoring program. Unable to leave message as phone number cannot be reached at this time per [employer] message  Will attempt to reach patient again.

## 2023-11-28 NOTE — TELEPHONE ENCOUNTER
11/28/23- Called patient (1st attempt) Recording on phone that patient is not available and cannot accept VM at this time.

## 2023-11-29 ENCOUNTER — CARE COORDINATION (OUTPATIENT)
Dept: CARE COORDINATION | Age: 68
End: 2023-11-29

## 2023-11-29 ENCOUNTER — CARE COORDINATION (OUTPATIENT)
Facility: CLINIC | Age: 68
End: 2023-11-29

## 2023-11-29 NOTE — CARE COORDINATION
Remote Patient Monitoring Note      Date/Time:  2023 2:21 PM  Patient Current Location: Home: [address] [place] [address]  LPN attempted to contact patient by telephone regarding yellow alert received for blood pressure reading (180/68). Verified patients name and  as identifiers. Unable to leave message Phone number provided cannot be reached at this time per ver[employer] message. Background: CHF, COPD, High Blood-Pressure    Plan/Follow Up: Will continue to review, monitor and address alerts with follow up based on severity of symptoms and risk factors.

## 2023-11-29 NOTE — CARE COORDINATION
Remote Patient Monitoring Note      Date/Time:  11/29/2023 8:20 AM  Patient Current Location: Home: [address] [place] [address]  LPN attempted to contact patient by telephone regarding yellow alert received for blood pressure reading (180/68). Background: CHF, COPD, HTN  Clinical Interventions:  Call place o patient. Unable to reach patient or leave message      Plan/Follow Up: Will continue to review, monitor and address alerts with follow up based on severity of symptoms and risk factors.

## 2023-11-29 NOTE — CARE COORDINATION
ACM attempted outreach for CC needs, F/U with RPM monitoring. COPD, CHF, DM and HTN management  NO answer and unable to LVM at this time.

## 2023-12-01 ENCOUNTER — CARE COORDINATION (OUTPATIENT)
Dept: CASE MANAGEMENT | Age: 68
End: 2023-12-01

## 2023-12-01 NOTE — TELEPHONE ENCOUNTER
12/1/23- Called patient (2nd attempt) Recording on phone that patient is not available and cannot accept VM at this time.

## 2023-12-01 NOTE — CARE COORDINATION
Date/Time:  12/1/2023 8:25 AM  LPN attempted to reach patient by telephone regarding red alert PO 90%  in remote patient monitoring program.  Will attempt to reach patient again. Unable to leave message Phone number provided cannot be reached at this time per ver[employer] message.

## 2023-12-05 ENCOUNTER — HOSPITAL ENCOUNTER (OUTPATIENT)
Dept: OCCUPATIONAL THERAPY | Age: 68
Setting detail: THERAPIES SERIES
Discharge: HOME | End: 2023-12-05

## 2023-12-05 DIAGNOSIS — E11.42 TYPE 2 DIABETES MELLITUS WITH DIABETIC POLYNEUROPATHY, WITHOUT LONG-TERM CURRENT USE OF INSULIN (HCC): ICD-10-CM

## 2023-12-05 NOTE — SIGNIFICANT EVENT
[][facility]  [address]  P: [phone]  F: [phone] [x][facility] [address]: [phone]  F: [phone] [][facility] [address]  P: [phone]  F: [phone]        Outpatient Lymphedema Occupational Therapy Cancel/No Show note    Date: 2023  Patient: Jimy Brooke  : 1955  MRN: [id number]    Cancels/No Shows to date: 1 eval    For today's appointment patient:    [x]  Cancelled    [] Rescheduled appointment    [] No-show     Reason given by patient:    []  Patient ill    []  Conflicting appointment    [] No transportation      [] Conflict with work    [] No reason given    [] Weather related    [] [id number]    [x] Other   \"Wants to see her doctor first.\"    Comments:       [] Next appointment was confirmed    [] Left message informing of missed visit    [] Unable to contact    [] Other:            Electronically signed by:  Fe Avila, OT

## 2023-12-06 DIAGNOSIS — J41.1 MUCOPURULENT CHRONIC BRONCHITIS (HCC): ICD-10-CM

## 2023-12-06 NOTE — TELEPHONE ENCOUNTER
Requested Prescriptions     Pending Prescriptions Disp Refills    albuterol (PROVENTIL) (2.5 MG/3ML) 0.083% nebulizer solution [Pharmacy Med Name: ALBUTEROL SUL 2.5 MG/3 ML SOLN] 300 mL 1     Sig: INHALE CONTENTS OF 1 VIAL ( 3 MILLILITERS ) IN NEBULIZER BY MOUTH AND INTO THE LUNGS EVERY 6 HOURS

## 2023-12-06 NOTE — TELEPHONE ENCOUNTER
Steve Richardson is calling to request a refill on the following medication(s):    Medication Request:  Requested Prescriptions     Pending Prescriptions Disp Refills    metFORMIN (GLUCOPHAGE) 500 MG tablet [Pharmacy Med Name: METFORMIN  MG TABLET] 180 tablet 1     Sig: TAKE 1 TABLET BY MOUTH TWICE A DAY WITH MEALS       Last Visit Date (If Applicable):  77/99/5537    Next Visit Date:    2/22/2024

## 2023-12-07 ENCOUNTER — CARE COORDINATION (OUTPATIENT)
Dept: CARE COORDINATION | Age: 68
End: 2023-12-07

## 2023-12-07 RX ORDER — ALBUTEROL SULFATE 2.5 MG/3ML
SOLUTION RESPIRATORY (INHALATION)
Qty: 300 ML | Refills: 1 | Status: SHIPPED | OUTPATIENT
Start: 2023-12-07

## 2023-12-09 SDOH — HEALTH STABILITY: PHYSICAL HEALTH: ON AVERAGE, HOW MANY DAYS PER WEEK DO YOU ENGAGE IN MODERATE TO STRENUOUS EXERCISE (LIKE A BRISK WALK)?: 4 DAYS

## 2023-12-12 ENCOUNTER — OFFICE VISIT (OUTPATIENT)
Dept: ORTHOPEDIC SURGERY | Age: 68
End: 2023-12-12
Payer: MEDICARE

## 2023-12-12 ENCOUNTER — CARE COORDINATION (OUTPATIENT)
Facility: CLINIC | Age: 68
End: 2023-12-12

## 2023-12-12 VITALS — RESPIRATION RATE: 14 BRPM | BODY MASS INDEX: 30.48 KG/M2 | HEIGHT: 72 IN | WEIGHT: 225 LBS

## 2023-12-12 DIAGNOSIS — M25.561 PAIN IN BOTH KNEES, UNSPECIFIED CHRONICITY: ICD-10-CM

## 2023-12-12 DIAGNOSIS — M25.562 PAIN IN BOTH KNEES, UNSPECIFIED CHRONICITY: ICD-10-CM

## 2023-12-12 DIAGNOSIS — M17.0 ARTHRITIS OF BOTH KNEES: Primary | ICD-10-CM

## 2023-12-12 PROCEDURE — 1090F PRES/ABSN URINE INCON ASSESS: CPT | Performed by: PHYSICIAN ASSISTANT

## 2023-12-12 PROCEDURE — 3017F COLORECTAL CA SCREEN DOC REV: CPT | Performed by: PHYSICIAN ASSISTANT

## 2023-12-12 PROCEDURE — G8427 DOCREV CUR MEDS BY ELIG CLIN: HCPCS | Performed by: PHYSICIAN ASSISTANT

## 2023-12-12 PROCEDURE — 20610 DRAIN/INJ JOINT/BURSA W/O US: CPT | Performed by: PHYSICIAN ASSISTANT

## 2023-12-12 PROCEDURE — 4004F PT TOBACCO SCREEN RCVD TLK: CPT | Performed by: PHYSICIAN ASSISTANT

## 2023-12-12 PROCEDURE — G8484 FLU IMMUNIZE NO ADMIN: HCPCS | Performed by: PHYSICIAN ASSISTANT

## 2023-12-12 PROCEDURE — 99214 OFFICE O/P EST MOD 30 MIN: CPT | Performed by: PHYSICIAN ASSISTANT

## 2023-12-12 PROCEDURE — G8399 PT W/DXA RESULTS DOCUMENT: HCPCS | Performed by: PHYSICIAN ASSISTANT

## 2023-12-12 PROCEDURE — 1123F ACP DISCUSS/DSCN MKR DOCD: CPT | Performed by: PHYSICIAN ASSISTANT

## 2023-12-12 PROCEDURE — G8417 CALC BMI ABV UP PARAM F/U: HCPCS | Performed by: PHYSICIAN ASSISTANT

## 2023-12-12 RX ORDER — BUPIVACAINE HYDROCHLORIDE 2.5 MG/ML
2 INJECTION, SOLUTION INFILTRATION; PERINEURAL ONCE
Status: COMPLETED | OUTPATIENT
Start: 2023-12-12 | End: 2023-12-12

## 2023-12-12 RX ORDER — METHYLPREDNISOLONE ACETATE 40 MG/ML
40 INJECTION, SUSPENSION INTRA-ARTICULAR; INTRALESIONAL; INTRAMUSCULAR; SOFT TISSUE ONCE
Status: COMPLETED | OUTPATIENT
Start: 2023-12-12 | End: 2023-12-12

## 2023-12-12 RX ADMIN — METHYLPREDNISOLONE ACETATE 40 MG: 40 INJECTION, SUSPENSION INTRA-ARTICULAR; INTRALESIONAL; INTRAMUSCULAR; SOFT TISSUE at 13:44

## 2023-12-12 RX ADMIN — BUPIVACAINE HYDROCHLORIDE 5 MG: 2.5 INJECTION, SOLUTION INFILTRATION; PERINEURAL at 13:43

## 2023-12-12 RX ADMIN — METHYLPREDNISOLONE ACETATE 40 MG: 40 INJECTION, SUSPENSION INTRA-ARTICULAR; INTRALESIONAL; INTRAMUSCULAR; SOFT TISSUE at 13:45

## 2023-12-12 ASSESSMENT — ENCOUNTER SYMPTOMS
VOMITING: 0
COUGH: 0
SHORTNESS OF BREATH: 0
COLOR CHANGE: 0
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
## 2023-12-12 NOTE — CARE COORDINATION
Remote Patient Monitoring Note      Date/Time:  12/12/2023 9:07 AM  Patient Current Location: Home: [address] [place] [address]  LPN attempted to contact patient by telephone regarding yellow alert received for blood pressure reading (176/72). .    Background: CHF, COPD, HTN  Clinical Interventions:  Call place to patient. Unable to reach patient or leave a message. Plan/Follow Up: Will continue to review, monitor and address alerts with follow up based on severity of symptoms and risk factors.

## 2023-12-13 ENCOUNTER — CARE COORDINATION (OUTPATIENT)
Facility: CLINIC | Age: 68
End: 2023-12-13

## 2023-12-13 ENCOUNTER — CARE COORDINATION (OUTPATIENT)
Dept: CARE COORDINATION | Age: 68
End: 2023-12-13

## 2023-12-13 DIAGNOSIS — E11.42 TYPE 2 DIABETES MELLITUS WITH DIABETIC POLYNEUROPATHY, WITHOUT LONG-TERM CURRENT USE OF INSULIN (HCC): ICD-10-CM

## 2023-12-13 NOTE — CARE COORDINATION
Remote Patient Monitoring Note      Date/Time:  12/13/2023 1:48 PM    LPN attempted to contact patient by telephone regarding red alert received for pulse ox reading (84%). Background:  CHF, COPD, High Blood-Pressure    Clinical Interventions:  2nd attempt, unable to reach patient or EC. ACM updated. Plan/Follow Up: Will continue to review, monitor and address alerts with follow up based on severity of symptoms and risk factors.        Elder Bosworth, LPN  [facility]/ CTN/ Remote Patient Monitoring  [phone]

## 2023-12-13 NOTE — CARE COORDINATION
Remote Patient Monitoring Note      Date/Time:  12/13/2023 10:35 AM  Patient Current Location: Home: [address] [facility] [address] [place] [address]  LPN attempted to contact patient by telephone regarding red alert received for  84%    Background: CHF, COPD, HTN  Clinical Interventions:  Call place to patient. Unable to reach patient or leave message. Plan/Follow Up: Will continue to review, monitor and address alerts with follow up based on severity of symptoms and risk factors.

## 2023-12-13 NOTE — TELEPHONE ENCOUNTER
Crystal Parmar is calling to request a refill on the following medication(s):    Medication Request:  Requested Prescriptions     Pending Prescriptions Disp Refills    gabapentin (NEURONTIN) 800 MG tablet [Pharmacy Med Name: GABAPENTIN 800 MG TABLET] 90 tablet 3     Sig: Take 1 tablet by mouth 3 times daily.        Last Visit Date (If Applicable):  18/27/3150    Next Visit Date:    2/22/2024

## 2023-12-14 ENCOUNTER — CARE COORDINATION (OUTPATIENT)
Facility: CLINIC | Age: 68
End: 2023-12-14

## 2023-12-14 ENCOUNTER — CARE COORDINATION (OUTPATIENT)
Dept: CARE COORDINATION | Age: 68
End: 2023-12-14

## 2023-12-14 RX ORDER — GABAPENTIN 800 MG/1
800 TABLET ORAL 3 TIMES DAILY
Qty: 90 TABLET | Refills: 1 | Status: SHIPPED | OUTPATIENT
Start: 2023-12-14 | End: 2024-01-13

## 2023-12-14 NOTE — CARE COORDINATION
ACM placed mutltiple attempts for CC outreach, CHF, COPD and DM management  RPM monitoring  NO answer and unable to LVM at this time

## 2023-12-14 NOTE — CARE COORDINATION
Remote Patient Monitoring Note      Date/Time:  12/14/2023 8:34 AM  Patient Current Location: Home: [address] [place] [address]  LPN attempted to contact patient by telephone regarding red alert received for weight increase (up 4.5 lbs in one day). Background: CHF, COPD, HTN  Clinical Interventions:  Call place to patient. Unable to reach patient or leave a message. Plan/Follow Up: Will continue to review, monitor and address alerts with follow up based on severity of symptoms and risk factors.

## 2023-12-15 ENCOUNTER — CARE COORDINATION (OUTPATIENT)
Dept: CASE MANAGEMENT | Age: 68
End: 2023-12-15

## 2023-12-15 RX ORDER — PSEUDOEPHED/ACETAMINOPH/DIPHEN 30MG-500MG
2 TABLET ORAL EVERY 6 HOURS PRN
Qty: 120 TABLET | Refills: 0 | OUTPATIENT
Start: 2023-12-15

## 2023-12-15 NOTE — CARE COORDINATION
Date/Time:  12/15/2023 8:20 AM  LPN attempted to reach patient by telephone regarding red alert Pulse ox 88% in remote patient monitoring program. Unable to leave message requesting a return call. Will attempt to reach patient again. Remote Alert Monitoring Note  Rpm alert to be reviewed by the provider   red alert   pulse ox reading (88)   Additional needs to be addressed by N/A: No                    Date/Time:  12/15/2023 9:20 AM  Patient Current Location: Home: [address] [place] [address]  LPN contacted patient by telephone. Verified patients name and  as identifiers. Background: CHF, COPD, HTN  Refer to 911 immediately if:  Patient unresponsive or unable to provide history  Change in cognition or sudden confusion  Patient unable to respond in complete sentences  Intense chest pain/tightness  Any concern for any clinical emergency  Red Alert: Provider response time of 1 hr required for any red alert requiring intervention  Yellow Alert: Provider response time of 3hr required for any escalated yellow alert    O2 Triage  Are you having any Chest Pain? no   Are you having any Shortness of Breath? no   Swelling in your hands or feet? no     Are you having any other health concerns or issues? no      Clinical Interventions: Reviewed and followed up on alerts and treatments-Spoke to patient she denies chest pain, SOB, dizziness states it is low every morning but she feels fine, educated patient on warming fingers prior to morning check, she declines rechecking at this time states she will check again around 1 pm     Plan/Follow Up: Will continue to review, monitor and address alerts with follow up based on severity of symptoms and risk factors.

## 2023-12-24 DIAGNOSIS — F51.01 PRIMARY INSOMNIA: ICD-10-CM

## 2023-12-25 DIAGNOSIS — J44.1 COPD EXACERBATION (HCC): ICD-10-CM

## 2023-12-26 ENCOUNTER — HOSPITAL ENCOUNTER (OUTPATIENT)
Dept: PHYSICAL THERAPY | Facility: CLINIC | Age: 68
Setting detail: THERAPIES SERIES
Discharge: HOME | End: 2023-12-26
Payer: MEDICARE

## 2023-12-26 RX ORDER — GUAIFENESIN 600 MG/1
TABLET, EXTENDED RELEASE ORAL
Qty: 40 TABLET | Refills: 0 | Status: SHIPPED | OUTPATIENT
Start: 2023-12-26

## 2023-12-26 RX ORDER — TRAZODONE HYDROCHLORIDE 100 MG/1
TABLET ORAL
Qty: 90 TABLET | Refills: 1 | Status: SHIPPED | OUTPATIENT
Start: 2023-12-26

## 2023-12-26 RX ORDER — DEXTROMETHORPHAN HBR. AND GUAIFENESIN 10; 100 MG/5ML; MG/5ML
SOLUTION ORAL
Qty: 118 ML | Refills: 0 | Status: SHIPPED | OUTPATIENT
Start: 2023-12-26

## 2023-12-26 NOTE — TELEPHONE ENCOUNTER
Sharla Juarez is calling to request a refill on the following medication(s):    Medication Request:  Requested Prescriptions     Pending Prescriptions Disp Refills    traZODone (DESYREL) 100 MG tablet [Pharmacy Med Name: TRAZODONE 100 MG TABLET] 70 tablet 3     Sig: TAKE 1 TABLET BY MOUTH BEFORE BEDTIME       Last Visit Date (If Applicable):  23/56/5720    Next Visit Date:    12/25/2023

## 2023-12-26 NOTE — TELEPHONE ENCOUNTER
Tera West is calling to request a refill on the following medication(s):    Medication Request:  Requested Prescriptions     Pending Prescriptions Disp Refills    MUCUS RELIEF 600 MG extended release tablet [Pharmacy Med Name: MUCUS RELIEF  MG TABLET] 40 tablet 0     Sig: TAKE 2 TABLETS BY MOUTH 2 TIMES DAILY FOR 10 DAYS.     dextromethorphan-guaiFENesin (ROBITUSSIN-DM)  MG/5ML syrup [Pharmacy Med Name: [facility] TUSSIN DM LIQUID] 118 mL 1     Sig: TAKE 5 ML BY MOUTH 3 TIMES DAILY AS BEEDED FOR COUGH       Last Visit Date (If Applicable):  43/83/2692    Next Visit Date:    2/22/2024

## 2023-12-28 ENCOUNTER — CARE COORDINATION (OUTPATIENT)
Dept: CARE COORDINATION | Age: 68
End: 2023-12-28

## 2023-12-28 ENCOUNTER — HOSPITAL ENCOUNTER (OUTPATIENT)
Dept: PHYSICAL THERAPY | Facility: CLINIC | Age: 68
Setting detail: THERAPIES SERIES
Discharge: HOME | End: 2023-12-28
Payer: MEDICARE

## 2023-12-28 ENCOUNTER — APPOINTMENT (OUTPATIENT)
Dept: PHYSICAL THERAPY | Facility: CLINIC | Age: 68
End: 2023-12-28
Payer: MEDICARE

## 2023-12-28 NOTE — CARE COORDINATION
Attempted outreach for CC needs, CHF, COPD and HTN management, RPM monitoring  No answer and unable to LVM at this time

## 2023-12-28 NOTE — FLOWSHEET NOTE
[] [address]  [address] [facility].  [phone]  F: [phone] [] 204 [facility]  [address]   [address]  P: [phone]  F: [phone] [x] [address]  [address] [facility]  P: [phone]  F: [phone] [] [place]: [phone]  F: [phone] [] 224 [facility]   [address]   P: [phone]  F: [phone]  [] [address] [facility].   P: [phone]  F: [phone] [] 205 [facility]  [address]  P: [phone]  F: [phone] [] [place]  [address] [place] [address]  [place]: [phone]  F: [phone] [] [address]  [place]: [phone]  F: [phone]      Therapy Cancel/No Show note    Date: 2023  Patient: Erika Eaton  : 1955  MRN: [id number]    Cancels/No Shows to date:     For today's appointment patient:    [x]  Cancelled     [] Rescheduled appointment    [] No-show     Reason given by patient:    []  Patient ill    []  Conflicting appointment    [] No transportation      [] Conflict with work    [] No reason given    [] Weather related    [] COVID-19    [] Other:      Comments:  Pt states her son passed away.        [] Next appointment was confirmed    Electronically signed by: Omar Diallo PTA

## 2024-01-01 DIAGNOSIS — B18.2 HEP C W/O COMA, CHRONIC (HCC): ICD-10-CM

## 2024-01-02 RX ORDER — ONDANSETRON 4 MG/1
TABLET, ORALLY DISINTEGRATING ORAL
Qty: 10 TABLET | Refills: 1 | Status: SHIPPED | OUTPATIENT
Start: 2024-01-02

## 2024-01-02 NOTE — TELEPHONE ENCOUNTER
Donna Landry is calling to request a refill on the following medication(s):    Medication Request:  Requested Prescriptions     Pending Prescriptions Disp Refills    ondansetron (ZOFRAN-ODT) 4 MG disintegrating tablet [Pharmacy Med Name: ONDANSETRON ODT 4 MG TABLET] 10 tablet 1     Sig: DISSOLVE 1 TABLET UNDER THE TONGUE EVERY 8 HOURS IF NEEDED FOR NAUSEA OR VOMITING       Last Visit Date (If Applicable):  11/22/2023    Next Visit Date:    2/22/2024                 Hpi Title: Evaluation of Skin Lesions How Severe Are Your Spot(S)?: mild Have Your Spot(S) Been Treated In The Past?: has not been treated

## 2024-01-03 ENCOUNTER — HOSPITAL ENCOUNTER (OUTPATIENT)
Dept: PHYSICAL THERAPY | Facility: CLINIC | Age: 69
Setting detail: THERAPIES SERIES
Discharge: HOME | End: 2024-01-03

## 2024-01-03 NOTE — FLOWSHEET NOTE
[] [facility]  Outpatient Rehabilitation &  Therapy  [address]  [phone]  F: [phone] [] [facility]  Outpatient Rehabilitation &  Therapy  [address]   [address]  P: [phone]  F: [phone] [x] [facility]  Outpatient Rehabilitation &  Therapy  [address]  P: [phone]  F: [phone] [] [facility]  Outpatient Rehabilitation &  Therapy  [address]  P: [phone]  F: [phone] [] [facility]  Outpatient Rehabilitation &  Therapy  [address]   P: [phone]  F: [phone]  [] [facility]  Outpatient Rehabilitation &  Therapy  [address]   P: [phone]  F: [phone] [] [facility]  Outpatient Rehabilitation &  Therapy  [address]  P: [phone]  F: [phone] [] [facility]  Outpatient Rehabilitation &  Therapy  [address] [address]  P: [phone]  F: [phone] [] [facility]  Outpatient Rehabilitation &  Therapy  [address]  P: [phone]  F: [phone]      Therapy Cancel/No Show note    Date: 1/3/2024  Patient: Donna Landry  : 1955  MRN: [id number]    Cancels/No Shows to date: 20    For today's appointment patient:    [x]  Cancelled     [] Rescheduled appointment    [] No-show     Reason given by patient:    []  Patient ill    []  Conflicting appointment    [] No transportation      [] Conflict with work    [] No reason given    [] Weather related    [] COVID-19    [] Other:      Comments:  Othello states pt called but did not stay on the line when she was asked to hold.  Pt did no show up for appt.  Will count as a cx since she attempted to call.      [] Next appointment was confirmed    Electronically signed by: JW MCINTYRE PTA

## 2024-01-04 ENCOUNTER — CARE COORDINATION (OUTPATIENT)
Dept: CASE MANAGEMENT | Age: 69
End: 2024-01-04

## 2024-01-04 NOTE — CARE COORDINATION
Date/Time:  2024 9:37 AM  LPN attempted to reach patient by telephone regarding red alert pulse ox 88 % weight gain of 5.5 lbs in 1 day in remote patient monitoring program. Left HIPPA compliant message requesting a return call. Will attempt to reach patient again.          Remote Alert Monitoring Note  Rpm alert to be reviewed by the provider   red alert   pulse ox reading (88) and weight (5.8 lb in 1 day)   Additional needs to be addressed by PCP: VITO spoke to patient she is doing okay she states her only child passed away 2 days after kleber, she denies chest pain, SOB, dizziness states she has cold hands today. Her weight does wobble often denies any unusual swelling, she agrees to recheck pulse ox later today speaking in full clear sentences,                     Date/Time:  2024 9:43 AM  Patient Current Location: Home: [address] [place] [address]  LPN contacted patient by telephone. Verified patients name and  as identifiers.  Background: CHF, COPD, HTN  Refer to 911 immediately if:  Patient unresponsive or unable to provide history  Change in cognition or sudden confusion  Patient unable to respond in complete sentences  Intense chest pain/tightness  Any concern for any clinical emergency  Red Alert: Provider response time of 1 hr required for any red alert requiring intervention  Yellow Alert: Provider response time of 3hr required for any escalated yellow alert    O2 Triage  Are you having any Chest Pain? no   Are you having any Shortness of Breath? no   Swelling in your hands or feet? no     Are you having any other health concerns or issues? no      Weight Scale Triage  Was your weight obtained upon rising/waking today? yes   Was your weight obtained after voiding and/or use of the bathroom today? yes   Did you weigh yourself in the same amount of clothing today, compared to how you typically do? no   Was the scale bumped or moved prior to today's weight? NA   Is your scale on

## 2024-01-05 ENCOUNTER — CARE COORDINATION (OUTPATIENT)
Dept: CASE MANAGEMENT | Age: 69
End: 2024-01-05

## 2024-01-05 NOTE — CARE COORDINATION
Date/Time:  1/5/2024 10:18 AM  LPN attempted to reach patient by telephone regarding red alert pulse ox 88 % in remote patient monitoring program. Unable to leave message requesting a return call. Will attempt to reach patient again.

## 2024-01-08 DIAGNOSIS — J41.1 MUCOPURULENT CHRONIC BRONCHITIS (HCC): ICD-10-CM

## 2024-01-09 ENCOUNTER — CARE COORDINATION (OUTPATIENT)
Dept: CARE COORDINATION | Age: 69
End: 2024-01-09

## 2024-01-09 NOTE — TELEPHONE ENCOUNTER
Donna Landry is calling to request a refill on the following medication(s):    Medication Request:  Requested Prescriptions     Pending Prescriptions Disp Refills    albuterol sulfate HFA (PROVENTIL;VENTOLIN;PROAIR) 108 (90 Base) MCG/ACT inhaler [Pharmacy Med Name: ALBUTEROL HFA (VENTOLIN) INH] 18 each 3     Sig: TAKE 2 PUFFS BY MOUTH EVERY 6 HOURS AS NEEDED FOR WHEEZE       Last Visit Date (If Applicable):  11/22/2023    Next Visit Date:    2/22/2024

## 2024-01-09 NOTE — CARE COORDINATION
Remote Patient Monitoring Note      Date/Time:  1/9/2024 1:43 PM    LPN attempted to contact patient by telephone regarding yellow alert received for no metrics for 4 days  .     Background: CHF, COPD, High Blood-Pressure    Clinical Interventions: Unable to LM.  ACM updated.    Plan/Follow Up: Will continue to review, monitor and address alerts with follow up based on severity of symptoms and risk factors.       Cheryl Alvarado LPN  [facility]/ CTN/ Remote Patient Monitoring  [phone]

## 2024-01-10 ENCOUNTER — OFFICE VISIT (OUTPATIENT)
Dept: ORTHOPEDIC SURGERY | Age: 69
End: 2024-01-10
Payer: MEDICARE

## 2024-01-10 ENCOUNTER — CARE COORDINATION (OUTPATIENT)
Dept: CASE MANAGEMENT | Age: 69
End: 2024-01-10

## 2024-01-10 VITALS — HEIGHT: 72 IN | WEIGHT: 218 LBS | RESPIRATION RATE: 14 BRPM | BODY MASS INDEX: 29.53 KG/M2

## 2024-01-10 DIAGNOSIS — M25.512 LEFT SHOULDER PAIN, UNSPECIFIED CHRONICITY: Primary | ICD-10-CM

## 2024-01-10 PROCEDURE — 1123F ACP DISCUSS/DSCN MKR DOCD: CPT | Performed by: ORTHOPAEDIC SURGERY

## 2024-01-10 PROCEDURE — 3017F COLORECTAL CA SCREEN DOC REV: CPT | Performed by: ORTHOPAEDIC SURGERY

## 2024-01-10 PROCEDURE — 4004F PT TOBACCO SCREEN RCVD TLK: CPT | Performed by: ORTHOPAEDIC SURGERY

## 2024-01-10 PROCEDURE — G8399 PT W/DXA RESULTS DOCUMENT: HCPCS | Performed by: ORTHOPAEDIC SURGERY

## 2024-01-10 PROCEDURE — 1090F PRES/ABSN URINE INCON ASSESS: CPT | Performed by: ORTHOPAEDIC SURGERY

## 2024-01-10 PROCEDURE — 99212 OFFICE O/P EST SF 10 MIN: CPT | Performed by: ORTHOPAEDIC SURGERY

## 2024-01-10 PROCEDURE — G8417 CALC BMI ABV UP PARAM F/U: HCPCS | Performed by: ORTHOPAEDIC SURGERY

## 2024-01-10 PROCEDURE — G8484 FLU IMMUNIZE NO ADMIN: HCPCS | Performed by: ORTHOPAEDIC SURGERY

## 2024-01-10 PROCEDURE — G8427 DOCREV CUR MEDS BY ELIG CLIN: HCPCS | Performed by: ORTHOPAEDIC SURGERY

## 2024-01-10 RX ORDER — ETODOLAC 400 MG/1
400 TABLET, FILM COATED ORAL 2 TIMES DAILY
Qty: 28 TABLET | Refills: 0 | Status: SHIPPED | OUTPATIENT
Start: 2024-01-10 | End: 2024-01-24

## 2024-01-10 RX ORDER — ALBUTEROL SULFATE 90 UG/1
AEROSOL, METERED RESPIRATORY (INHALATION)
Qty: 18 EACH | Refills: 3 | Status: SHIPPED | OUTPATIENT
Start: 2024-01-10

## 2024-01-10 NOTE — CARE COORDINATION
Date/Time:  1/10/2024 8:46 AM  LPN attempted to reach patient by telephone regarding red alert pulse ox 89%  in remote patient monitoring program. Unable to leave message requesting a return call. Will attempt to reach patient again.

## 2024-01-10 NOTE — CARE COORDINATION
Remote Alert Monitoring Note  Rpm alert to be reviewed by the provider   red alert   pulse ox reading (89)   Additional needs to be addressed by N/A: No                    Date/Time:  1/10/2024 8:49 AM  Patient Current Location: Home: [address] [place] [address] [address]  [facility] [address]  LPN contacted patient by telephone. Verified patients name and  as identifiers.  Background: CHF, COPD, HTN  Refer to 911 immediately if:  Patient unresponsive or unable to provide history  Change in cognition or sudden confusion  Patient unable to respond in complete sentences  Intense chest pain/tightness  Any concern for any clinical emergency  Red Alert: Provider response time of 1 hr required for any red alert requiring intervention  Yellow Alert: Provider response time of 3hr required for any escalated yellow alert    O2 Triage  Are you having any Chest Pain? no   Are you having any Shortness of Breath? At baseline   Swelling in your hands or feet? no     Are you having any other health concerns or issues? no      Clinical Interventions: Reviewed and followed up on alerts and treatments-Spoke to patient she denies chest pain, SOB, dizziness States she is always low in the morning and will recheck around noon has no concerns at present time.    Plan/Follow Up: Will continue to review, monitor and address alerts with follow up based on severity of symptoms and risk factors.

## 2024-01-11 ENCOUNTER — CARE COORDINATION (OUTPATIENT)
Dept: CARE COORDINATION | Age: 69
End: 2024-01-11

## 2024-01-11 NOTE — CARE COORDINATION
Remote Patient Monitoring Note      Date/Time:  1/11/2024 11:30 AM    LPN attempted to contact patient by telephone regarding red alert received for SP02 86%.     Background: CHF, COPD, High Blood-Pressure    Clinical Interventions: No answer, unable to LM.  ACM updated.    Plan/Follow Up: Will continue to review, monitor and address alerts with follow up based on severity of symptoms and risk factors.       BETH Leavitt [facility]/ CTN/ Remote Patient Monitoring  [phone] '

## 2024-01-11 NOTE — CARE COORDINATION
ACM attempted outreach for CC needs, CHF, COPD, DM and HTN management, RPM management  No answer and unable to LVM at this time.

## 2024-01-12 ENCOUNTER — CARE COORDINATION (OUTPATIENT)
Dept: CASE MANAGEMENT | Age: 69
End: 2024-01-12

## 2024-01-12 ENCOUNTER — CARE COORDINATION (OUTPATIENT)
Dept: CARE COORDINATION | Age: 69
End: 2024-01-12

## 2024-01-12 RX ORDER — NEBULIZER ACCESSORIES
1 KIT MISCELLANEOUS DAILY PRN
Qty: 1 KIT | Refills: 0 | Status: SHIPPED | OUTPATIENT
Start: 2024-01-12

## 2024-01-12 NOTE — CARE COORDINATION
Remote Patient Monitoring Note      Date/Time:  2024 1:22 PM  Patient Current Location: Home: [address]  [place] [address]  LPN contacted patient by telephone regarding red alert received for weight increase (5 lb in 1 day). Verified patients name and  as identifiers.    Background: CHF, COPD, HTN  Clinical Interventions: Reviewed and followed up on alerts and treatments-Spoke to patient she agrees to plan to contact Pulmonology and cardiologist she has no concerns      Plan/Follow Up: Will continue to review, monitor and address alerts with follow up based on severity of symptoms and risk factors.

## 2024-01-12 NOTE — PROGRESS NOTES
HPI: Ms. Landry is a 68-year-old lady who has been seen in the past for left shoulder pain.  When she was last seen I did place her on a 2-week course of Voltaren.  She stopped taking this because it caused nausea.  She only just recently started physical therapy and has attended only 2 sessions.  I had a discussion with the patient today about this and encouraged her to keep up with physical therapy.  Given the fact that it only began is still too early to gauge the effects.  I did place her on another course of prescription NSAID switching her to Lodine to see if this would be more effective without causing stomach symptoms.  She was encouraged to take it twice a day  with food.  Should this cause any GI symptoms she was instructed to stop taking the medication and notify me.

## 2024-01-12 NOTE — CARE COORDINATION
Attempted multiple outreaches for CC needs, CHF, COPD, DM and HTN management, multiple RPM alerts and monitoring  No answer and unable to LVM at this time

## 2024-01-12 NOTE — CARE COORDINATION
Remote Alert Monitoring Note  Rpm alert to be reviewed by the provider   red alert   pulse ox reading (90) and weight (5 lbs in one day)   Additional needs to be addressed by PCP:  Spoke to patient she is taking a breathing treatment at present time. Has baseline SOB states she does not have oxygen in home, states her concentrator broke and she threw it out, denies chest pain dizziness, states she has swelling in legs, Patient Lasix order is 20 mg twice daily she is taking as directed.                     Date/Time:  2024 11:15 AM  Patient Current Location: Home: [address] [place] [address] [address]  [facility] [address]  LPN contacted patient by telephone. Verified patients name and  as identifiers.  Background: CHF, COPD, HTN  Refer to 911 immediately if:  Patient unresponsive or unable to provide history  Change in cognition or sudden confusion  Patient unable to respond in complete sentences  Intense chest pain/tightness  Any concern for any clinical emergency  Red Alert: Provider response time of 1 hr required for any red alert requiring intervention  Yellow Alert: Provider response time of 3hr required for any escalated yellow alert    O2 Triage  Are you having any Chest Pain? no   Are you having any Shortness of Breath? baseline   Swelling in your hands or feet? yes     Are you having any other health concerns or issues? no      Weight Scale Triage  Was your weight obtained upon rising/waking today? yes   Was your weight obtained after voiding and/or use of the bathroom today? yes   Did you weigh yourself in the same amount of clothing today, compared to how you typically do? yes   Was the scale bumped or moved prior to today's weight? no   Is your scale on a flat/hard surface? yes   Did you obtain your weight with shoes on? no   If yes, is this something you normally do during your daily weights? no   Were you standing up straight on the scale today? yes   Were you leaning on anything while obtaining your weight

## 2024-01-12 NOTE — TELEPHONE ENCOUNTER
Patient is requesting tubing for her nebulizer  machine .       Requested Prescriptions     Pending Prescriptions Disp Refills    Respiratory Therapy Supplies (NEBULIZER/TUBING/MOUTHPIECE) KIT 1 kit 0     Si kit by Does not apply route daily as needed

## 2024-01-13 DIAGNOSIS — M19.012 PRIMARY OSTEOARTHRITIS OF LEFT SHOULDER: ICD-10-CM

## 2024-01-15 ENCOUNTER — CARE COORDINATION (OUTPATIENT)
Dept: CARE COORDINATION | Age: 69
End: 2024-01-15

## 2024-01-15 ENCOUNTER — CARE COORDINATION (OUTPATIENT)
Dept: CASE MANAGEMENT | Age: 69
End: 2024-01-15

## 2024-01-15 ENCOUNTER — TELEPHONE (OUTPATIENT)
Dept: OTHER | Age: 69
End: 2024-01-15

## 2024-01-15 DIAGNOSIS — I50.32 CHRONIC HEART FAILURE WITH PRESERVED EJECTION FRACTION (HCC): Primary | ICD-10-CM

## 2024-01-15 DIAGNOSIS — J41.1 MUCOPURULENT CHRONIC BRONCHITIS (HCC): ICD-10-CM

## 2024-01-15 DIAGNOSIS — J44.9 CHRONIC OBSTRUCTIVE PULMONARY DISEASE, UNSPECIFIED COPD TYPE (HCC): Primary | ICD-10-CM

## 2024-01-15 RX ORDER — TIZANIDINE 4 MG/1
4 TABLET ORAL 3 TIMES DAILY PRN
Qty: 90 TABLET | Refills: 0 | Status: SHIPPED | OUTPATIENT
Start: 2024-01-15

## 2024-01-15 NOTE — TELEPHONE ENCOUNTER
Donna Landry is calling to request a refill on the following medication(s):    Medication Request:  Requested Prescriptions     Pending Prescriptions Disp Refills    tiZANidine (ZANAFLEX) 4 MG tablet [Pharmacy Med Name: TIZANIDINE HCL 4 MG TABLET] 90 tablet 0     Sig: TAKE 1 TABLET BY MOUTH 3 TIMES DAILY AS NEEDED (PAIN).       Last Visit Date (If Applicable):  11/22/2023    Next Visit Date:    2/22/2024

## 2024-01-15 NOTE — TELEPHONE ENCOUNTER
Patient phoned here today requesting an appt soon because her weight has been increasing. She gets check on a home monitoring device daily. Appt made for tomorrow and BMP to be drawn today or tomorrow per Tran Wilson CNP. Discussed with patient and she voices understanding.

## 2024-01-15 NOTE — TELEPHONE ENCOUNTER
oDnna Landry is calling to request a refill on the following medication(s):    Medication Request:  Requested Prescriptions     Pending Prescriptions Disp Refills    albuterol (PROVENTIL) (2.5 MG/3ML) 0.083% nebulizer solution 300 mL 1       Last Visit Date (If Applicable):  11/22/2023    Next Visit Date:    2/22/2024

## 2024-01-15 NOTE — CARE COORDINATION
Date/Time:  1/15/2024 9:10 AM  LPN attempted to reach patient by telephone regarding red alert weight gain 4 bs in 1 dy in remote patient monitoring program.unable tp leave message message requesting a return call. Will attempt to reach patient again.

## 2024-01-16 ENCOUNTER — TELEPHONE (OUTPATIENT)
Dept: PHARMACY | Age: 69
End: 2024-01-16

## 2024-01-16 ENCOUNTER — ENROLLMENT (OUTPATIENT)
Age: 69
End: 2024-01-16

## 2024-01-16 ENCOUNTER — CARE COORDINATION (OUTPATIENT)
Dept: CASE MANAGEMENT | Age: 69
End: 2024-01-16

## 2024-01-16 RX ORDER — ALBUTEROL SULFATE 2.5 MG/3ML
SOLUTION RESPIRATORY (INHALATION)
Qty: 300 ML | Refills: 1 | Status: SHIPPED | OUTPATIENT
Start: 2024-01-16

## 2024-01-16 NOTE — CARE COORDINATION
Date/Time:  1/16/2024 2:19 PM  LPN attempted to reach patient by telephone regarding red alertred alert Pulse ox weight in remote patient monitoring program. Left HIPPA compliant message requesting a return call. Will attempt to reach patient again.

## 2024-01-16 NOTE — CARE COORDINATION
Attempted outreach for CC follow up, CHF , COPD and DM management, RPM monitoring alerts  No answer and unable to LVM   Is in communication with PCP regarding weight increase, will be following up with CHF clinic tomorrow

## 2024-01-16 NOTE — CARE COORDINATION
Date/Time:  1/16/2024 8:40 AM  LPN attempted to reach patient by telephone regarding red alert in remote patient monitoring program. Unable to leave message requesting a return call. Will attempt to reach patient again.

## 2024-01-16 NOTE — TELEPHONE ENCOUNTER
Patient rescheduled CHF appt from today to tomorrow so spoke with CHF nurse regarding patient and Diagnosis of CHF.  Most recent ECHO results in chart are from July 2018 with EF = 55%, and  mild left ventricular hypertrophy.    Need to consider repeat testing to determine current EF if not done in [facility].  Could not find any more recent results in Care Everywhere.     Francheska Coleman, [facility], CACP  Clinical Pharmacist Medication Management  1/16/2024  3:09 PM

## 2024-01-17 ENCOUNTER — HOSPITAL ENCOUNTER (OUTPATIENT)
Dept: OTHER | Age: 69
Discharge: HOME | End: 2024-01-17
Payer: MEDICARE

## 2024-01-17 ENCOUNTER — HOSPITAL ENCOUNTER (OUTPATIENT)
Age: 69
Discharge: HOME | End: 2024-01-17
Payer: MEDICARE

## 2024-01-17 ENCOUNTER — CARE COORDINATION (OUTPATIENT)
Dept: CASE MANAGEMENT | Age: 69
End: 2024-01-17

## 2024-01-17 VITALS
OXYGEN SATURATION: 96 % | HEART RATE: 89 BPM | SYSTOLIC BLOOD PRESSURE: 130 MMHG | DIASTOLIC BLOOD PRESSURE: 70 MMHG | WEIGHT: 226.2 LBS | RESPIRATION RATE: 20 BRPM | BODY MASS INDEX: 30.67 KG/M2

## 2024-01-17 DIAGNOSIS — Z72.0 TOBACCO USE: ICD-10-CM

## 2024-01-17 DIAGNOSIS — E11.42 TYPE 2 DIABETES MELLITUS WITH DIABETIC POLYNEUROPATHY, WITHOUT LONG-TERM CURRENT USE OF INSULIN (HCC): ICD-10-CM

## 2024-01-17 DIAGNOSIS — I50.32 CHRONIC HEART FAILURE WITH PRESERVED EJECTION FRACTION (HCC): Primary | ICD-10-CM

## 2024-01-17 DIAGNOSIS — G25.81 RLS (RESTLESS LEGS SYNDROME): ICD-10-CM

## 2024-01-17 DIAGNOSIS — I50.32 CHRONIC HEART FAILURE WITH PRESERVED EJECTION FRACTION (HCC): ICD-10-CM

## 2024-01-17 LAB
ALBUMIN SERPL-MCNC: 4 G/DL (ref 3.5–5.2)
ALBUMIN/GLOB SERPL: 2 {RATIO} (ref 1–2.5)
ALP SERPL-CCNC: 79 U/L (ref 35–104)
ALT SERPL-CCNC: 7 U/L (ref 10–35)
ANION GAP SERPL CALCULATED.3IONS-SCNC: 10 MMOL/L (ref 9–16)
ANION GAP SERPL CALCULATED.3IONS-SCNC: 10 MMOL/L (ref 9–16)
AST SERPL-CCNC: 17 U/L (ref 10–35)
BILIRUB SERPL-MCNC: 0.4 MG/DL (ref 0–1.2)
BNP SERPL-MCNC: 102 PG/ML (ref 0–300)
BUN SERPL-MCNC: 11 MG/DL (ref 8–23)
BUN SERPL-MCNC: 11 MG/DL (ref 8–23)
CALCIUM SERPL-MCNC: 9.1 MG/DL (ref 8.6–10.4)
CALCIUM SERPL-MCNC: 9.1 MG/DL (ref 8.6–10.4)
CHLORIDE SERPL-SCNC: 101 MMOL/L (ref 98–107)
CHLORIDE SERPL-SCNC: 102 MMOL/L (ref 98–107)
CHOLEST SERPL-MCNC: 123 MG/DL (ref 0–199)
CHOLESTEROL/HDL RATIO: 3
CO2 SERPL-SCNC: 28 MMOL/L (ref 20–31)
CO2 SERPL-SCNC: 28 MMOL/L (ref 20–31)
CREAT SERPL-MCNC: 0.6 MG/DL (ref 0.5–0.9)
CREAT SERPL-MCNC: 0.6 MG/DL (ref 0.5–0.9)
CREAT UR-MCNC: 69.9 MG/DL (ref 28–217)
EST. AVERAGE GLUCOSE BLD GHB EST-MCNC: 143 MG/DL
GFR SERPL CREATININE-BSD FRML MDRD: >60 ML/MIN/1.73M2
GFR SERPL CREATININE-BSD FRML MDRD: >60 ML/MIN/1.73M2
GLUCOSE P FAST SERPL-MCNC: 121 MG/DL (ref 74–99)
GLUCOSE SERPL-MCNC: 122 MG/DL (ref 74–99)
HBA1C MFR BLD: 6.6 % (ref 4–6)
HDLC SERPL-MCNC: 39 MG/DL
LDLC SERPL CALC-MCNC: 67 MG/DL (ref 0–100)
MICROALBUMIN UR-MCNC: <12 MG/L (ref 0–20)
MICROALBUMIN/CREAT UR-RTO: NORMAL MCG/MG CREAT (ref 0–25)
POTASSIUM SERPL-SCNC: 3.3 MMOL/L (ref 3.7–5.3)
POTASSIUM SERPL-SCNC: 3.3 MMOL/L (ref 3.7–5.3)
PROT SERPL-MCNC: 6.6 G/DL (ref 6.6–8.7)
SODIUM SERPL-SCNC: 139 MMOL/L (ref 136–145)
SODIUM SERPL-SCNC: 140 MMOL/L (ref 136–145)
TRIGL SERPL-MCNC: 83 MG/DL (ref 0–149)
VLDLC SERPL CALC-MCNC: 17 MG/DL

## 2024-01-17 PROCEDURE — 83880 ASSAY OF NATRIURETIC PEPTIDE: CPT

## 2024-01-17 PROCEDURE — 36415 COLL VENOUS BLD VENIPUNCTURE: CPT

## 2024-01-17 PROCEDURE — 80048 BASIC METABOLIC PNL TOTAL CA: CPT

## 2024-01-17 PROCEDURE — 80061 LIPID PANEL: CPT

## 2024-01-17 PROCEDURE — 82570 ASSAY OF URINE CREATININE: CPT

## 2024-01-17 PROCEDURE — 99214 OFFICE O/P EST MOD 30 MIN: CPT | Performed by: NURSE PRACTITIONER

## 2024-01-17 PROCEDURE — 82043 UR ALBUMIN QUANTITATIVE: CPT

## 2024-01-17 PROCEDURE — 83036 HEMOGLOBIN GLYCOSYLATED A1C: CPT

## 2024-01-17 PROCEDURE — 99212 OFFICE O/P EST SF 10 MIN: CPT

## 2024-01-17 PROCEDURE — 80053 COMPREHEN METABOLIC PANEL: CPT

## 2024-01-17 RX ORDER — ROPINIROLE 0.5 MG/1
0.5 TABLET, FILM COATED ORAL DAILY
Qty: 90 TABLET | Refills: 3 | Status: SHIPPED | OUTPATIENT
Start: 2024-01-17

## 2024-01-17 ASSESSMENT — ENCOUNTER SYMPTOMS
COUGH: 0
BLOOD IN STOOL: 0
SHORTNESS OF BREATH: 1
EYE DISCHARGE: 0
ABDOMINAL PAIN: 0

## 2024-01-17 NOTE — CARE COORDINATION
Date/Time:  1/17/2024 9:59 AM  LPN attempted to reach patient by telephone regarding red alert Pulse ox 91% in remote patient monitoring program. Unable to leave message requesting a return call. Will attempt to reach patient again.   update patient did not return call

## 2024-01-17 NOTE — PROGRESS NOTES
ORT SP [facility]   1/25/2024  1:00 PM STV CHF CLINIC RM 2 STVZ CHF CLI [facility]   2/5/2024  1:45 PM Rich Gonzalez MD [place] [facility]   2/22/2024  1:45 PM America Cam MD [place] [facility]   3/15/2024 11:15 AM Alejandro Sampson MD Resp Spec [facility]   3/2023 Stress Test   End diastolic volume:  85mL     Left ventricular ejection fraction:  75%    :Assessment      1. Chronic heart failure with preserved ejection fraction (HCC)    2. Tobacco use        :Plan      1. Chronic heart failure with preserved ejection fraction (HCC)    2. Tobacco use        Wt up since last appt and she was notified by care coord.   Foods from [employer]        On Guideline-Directed Medication Therapy:   HFpEF   No ACEI / ARB / ARNi:  will order entresto in 2 w after sglt2 labs.   SGLT2  renal chem ok, order today.   Diuretic  currently , lasix 20 bid, increase lasix to 40 mg chelsy am tomorrow.  start sglt2. Make lifestyle adjustments.          CHF Education completed again, hardcopy recommendations for better food choices and limited fluids.  She acknowledges making poor choices with [employer] and undetstands changes she can make     High Na+ foods to avoid, discussed and advised to share with [facility] so pt can hopefully  avoid their high  Na+ food choices.       Pt was advised to seek care d/t 5 lbs wt gain     RTC 1 w.       Addendum  : PT CALLED AT HOME:    LOW K+ on labs. Pt has been noncompliant. She will take KCl 40 meq today and start back KCl 20 meq q d. Re check labs next appt.         No orders of the defined types were placed in this encounter.    Orders Placed This Encounter   Medications    empagliflozin (JARDIANCE) 10 MG tablet     Sig: Take 1 tablet by mouth daily     Dispense:  30 tablet     Refill:  1       Patientgiven verbal and/or written educational instructions.    Follow up as directed.  I have reviewed and agree with the nursing documentation.  Verbally reviewed medication list with patient; patient verbalized

## 2024-01-18 ENCOUNTER — CARE COORDINATION (OUTPATIENT)
Dept: CASE MANAGEMENT | Age: 69
End: 2024-01-18

## 2024-01-18 DIAGNOSIS — I10 ESSENTIAL HYPERTENSION: ICD-10-CM

## 2024-01-18 RX ORDER — ATENOLOL 25 MG/1
25 TABLET ORAL DAILY
Qty: 90 TABLET | Refills: 1 | Status: SHIPPED | OUTPATIENT
Start: 2024-01-18

## 2024-01-18 NOTE — TELEPHONE ENCOUNTER
Donna Landry is calling to request a refill on the following medication(s):    Medication Request:  Requested Prescriptions     Pending Prescriptions Disp Refills    atenolol (TENORMIN) 25 MG tablet [Pharmacy Med Name: ATENOLOL 25 MG TABLET] 90 tablet 1     Sig: TAKE 1 TABLET BY MOUTH EVERY DAY       Last Visit Date (If Applicable):  11/22/2023    Next Visit Date:    2/22/2024

## 2024-01-18 NOTE — CARE COORDINATION
Remote Alert Monitoring Note  Rpm alert to be reviewed by the provider   red alert   pulse ox reading (89)   Additional needs to be addressed by N/A: No                    Date/Time:  2024 8:40 AM  Patient Current Location: [address]  [place] [id number]  LPN contacted patient by telephone. Verified patients name and  as identifiers.  Background: CHF, COPD, HTN  Refer to 911 immediately if:  Patient unresponsive or unable to provide history  Change in cognition or sudden confusion  Patient unable to respond in complete sentences  Intense chest pain/tightness  Any concern for any clinical emergency  Red Alert: Provider response time of 1 hr required for any red alert requiring intervention  Yellow Alert: Provider response time of 3hr required for any escalated yellow alert    O2 Triage  Are you having any Chest Pain? no   Are you having any Shortness of Breath? With exertion   Swelling in your hands or feet? no     Are you having any other health concerns or issues? no      Clinical Interventions: Reviewed and followed up on alerts and treatments-Spoke to patient she states she is doing okay, she denies chest pain, Dizziness, and swelling, she has SOB with exertion, went to CHF clinic yesterday and had medication change she goes back in 1 week.  She has no concerns and will recheck her pulse ox at 2 PM   Plan/Follow Up: Will continue to review, monitor and address alerts with follow up based on severity of symptoms and risk factors.

## 2024-01-19 ENCOUNTER — CARE COORDINATION (OUTPATIENT)
Dept: CASE MANAGEMENT | Age: 69
End: 2024-01-19

## 2024-01-19 ENCOUNTER — CARE COORDINATION (OUTPATIENT)
Dept: CARE COORDINATION | Age: 69
End: 2024-01-19

## 2024-01-19 NOTE — CARE COORDINATION
LPN attempted to reach patient by telephone regarding red alert Pulse ox 90% in remote patient monitoring program. Unable to leave message requesting a return call. Will attempt to reach patient again

## 2024-01-19 NOTE — CARE COORDINATION
LPN attempted to reach patient by telephone regarding red alert Pulse ox 86% in remote patient monitoring program. Unable to leave message requesting a return call. Will attempt to reach patient again.

## 2024-01-19 NOTE — CARE COORDINATION
Remote Alert Monitoring Note  Rpm alert to be reviewed by the provider   red alert   pulse ox reading (90%)   Additional needs to be addressed by PCP: No                    Date/Time:  1/19/2024 4:17 PM  [facility]   Background: CHF, COPD, HTN  Refer to 911 immediately if:  Patient unresponsive or unable to provide history  Change in cognition or sudden confusion  Patient unable to respond in complete sentences  Intense chest pain/tightness  Any concern for any clinical emergency  Red Alert: Provider response time of 1 hr required for any red alert requiring intervention  Yellow Alert: Provider response time of 3hr required for any escalated yellow alert  Clinical Interventions: Reviewed and followed up on alerts and treatments-Patient rechecked SpO2 level at 90%. SpO2 level before this 93%.      Attempted to reach patient for RPM Red Alert Call. Unable to reach patient.  Left HIPAA Compliant message on Voice Mail to call.  Phone number left on Voice Mail to call back.    ACM Notified  Will continue to follow.     Leti Burntet LPN    [phone]  [facility] Coordinator      Plan/Follow Up: Will continue to review, monitor and address alerts with follow up based on severity of symptoms and risk factors.

## 2024-01-23 ENCOUNTER — OFFICE VISIT (OUTPATIENT)
Dept: ORTHOPEDIC SURGERY | Age: 69
End: 2024-01-23
Payer: COMMERCIAL

## 2024-01-23 ENCOUNTER — CARE COORDINATION (OUTPATIENT)
Dept: CARE COORDINATION | Age: 69
End: 2024-01-23

## 2024-01-23 VITALS — RESPIRATION RATE: 15 BRPM | HEIGHT: 72 IN | BODY MASS INDEX: 29.66 KG/M2 | WEIGHT: 219 LBS

## 2024-01-23 DIAGNOSIS — M17.0 ARTHRITIS OF BOTH KNEES: Primary | ICD-10-CM

## 2024-01-23 DIAGNOSIS — M25.561 PAIN IN BOTH KNEES, UNSPECIFIED CHRONICITY: ICD-10-CM

## 2024-01-23 DIAGNOSIS — M25.562 PAIN IN BOTH KNEES, UNSPECIFIED CHRONICITY: ICD-10-CM

## 2024-01-23 PROCEDURE — 99213 OFFICE O/P EST LOW 20 MIN: CPT | Performed by: PHYSICIAN ASSISTANT

## 2024-01-23 PROCEDURE — 1123F ACP DISCUSS/DSCN MKR DOCD: CPT | Performed by: PHYSICIAN ASSISTANT

## 2024-01-23 NOTE — PROGRESS NOTES
[facility], [facility] ORTHOPEDICS AND SPORTS MEDICINE  [address]  [address]  [place] [id number]  Dept: [phone]  Dept Fax: [phone]        Ambulatory Follow Up      Subjective:   Donna Landry is a 68 y.o. year old female who presents to our office today for routine followup regarding her   1. Arthritis of both knees    2. Pain in both knees, unspecified chronicity        Chief Complaint   Patient presents with    Knee Pain     Bilateral        HPI Donna Landry  is a 68 y.o.  female who presents today in follow for chronic bilateral knee pain. The patient was last seen on 1/10/2024 and underwent treatment in the form of bilateral knee corticosteroid injections. The patient notes 70% improvement with the previous treatment. She notes that she is still doing well but over the last 1.5 weeks her pain has slowly started coming back. She notes that she is able to be more active with the improved pain. She was given a referral to PT at her last appointment and went to the initial eval, but shortly after that a family member passed away and she has been grieving the death and has not been back yet.      She notes that the topical voltaren gel did not help her pain. She also noted that NSAIDs and Tylenol do not help either.     Review of Systems   Constitutional:  Negative for activity change and fever.   HENT:  Negative for sneezing.    Respiratory:  Negative for cough and shortness of breath.    Cardiovascular:  Negative for chest pain.   Gastrointestinal:  Negative for vomiting.   Musculoskeletal:  Positive for arthralgias (bilateral knee). Negative for joint swelling and myalgias.   Skin:  Negative for color change.   Neurological:  Negative for weakness and numbness.   Psychiatric/Behavioral:  Negative for sleep disturbance.          Objective :   Resp 15   Ht 1.829 m (6' 0.01\")   Wt 99.3 kg (219 lb)   LMP 12/09/1996   BMI 29.70

## 2024-01-23 NOTE — CARE COORDINATION
ACM attempted outreaches for CC follow up on chronic conditions and RPM monitoring  Unable to LVM at this time, will continue to outreach

## 2024-01-24 ENCOUNTER — CARE COORDINATION (OUTPATIENT)
Dept: CARE COORDINATION | Age: 69
End: 2024-01-24

## 2024-01-24 DIAGNOSIS — B18.2 HEP C W/O COMA, CHRONIC (HCC): ICD-10-CM

## 2024-01-24 RX ORDER — ONDANSETRON 4 MG/1
TABLET, ORALLY DISINTEGRATING ORAL
Qty: 10 TABLET | Refills: 1 | Status: SHIPPED | OUTPATIENT
Start: 2024-01-24

## 2024-01-24 ASSESSMENT — ENCOUNTER SYMPTOMS
COLOR CHANGE: 0
COUGH: 0
VOMITING: 0
SHORTNESS OF BREATH: 0

## 2024-01-24 NOTE — TELEPHONE ENCOUNTER
Donna Landry is calling to request a refill on the following medication(s):    Medication Request:  Requested Prescriptions     Pending Prescriptions Disp Refills    ondansetron (ZOFRAN-ODT) 4 MG disintegrating tablet [Pharmacy Med Name: ONDANSETRON ODT 4 MG TABLET] 10 tablet 1     Sig: DISSOLVE 1 TABLET UNDER THE TONGUE EVERY 8 HOURS IF NEEDED FOR NAUSEA OR VOMITING       Last Visit Date (If Applicable):  11/22/2023    Next Visit Date:    2/22/2024

## 2024-01-24 NOTE — CARE COORDINATION
Multiple attempts for outreaches to Donna through phone, for CC needs, chronic conditions   RPM monitoring  PCP is in contact with patient  Will attempt Prizzmt message and letter for outreach at this time

## 2024-01-24 NOTE — TELEPHONE ENCOUNTER
patient is requesting the Dr to send her nurses   aid more often since she is on new heart medication and needs to take it   easy

## 2024-01-24 NOTE — TELEPHONE ENCOUNTER
----- Message from Rosangela Cowan sent at 1/24/2024 10:48 AM EST -----  Subject: Message to Provider    QUESTIONS  Information for Provider? patient is requesting the Dr to send her nurses   aid more often since she is on new heart medication and needs to take it   easy  ---------------------------------------------------------------------------  --------------  CALL BACK INFO  [phone]; OK to leave message on voicemail  ---------------------------------------------------------------------------  --------------  SCRIPT ANSWERS  Relationship to Patient? Self

## 2024-01-25 ENCOUNTER — CARE COORDINATION (OUTPATIENT)
Dept: CASE MANAGEMENT | Age: 69
End: 2024-01-25

## 2024-01-25 ENCOUNTER — HOSPITAL ENCOUNTER (OUTPATIENT)
Age: 69
Discharge: HOME | End: 2024-01-25
Payer: COMMERCIAL

## 2024-01-25 ENCOUNTER — HOSPITAL ENCOUNTER (OUTPATIENT)
Dept: OTHER | Age: 69
Discharge: HOME | End: 2024-01-25
Payer: COMMERCIAL

## 2024-01-25 VITALS
OXYGEN SATURATION: 95 % | DIASTOLIC BLOOD PRESSURE: 66 MMHG | BODY MASS INDEX: 29.83 KG/M2 | SYSTOLIC BLOOD PRESSURE: 140 MMHG | HEART RATE: 95 BPM | WEIGHT: 220 LBS

## 2024-01-25 DIAGNOSIS — I50.32 CHRONIC HEART FAILURE WITH PRESERVED EJECTION FRACTION (HCC): Primary | ICD-10-CM

## 2024-01-25 DIAGNOSIS — I50.32 CHRONIC HEART FAILURE WITH PRESERVED EJECTION FRACTION (HCC): ICD-10-CM

## 2024-01-25 LAB
ANION GAP SERPL CALCULATED.3IONS-SCNC: 11 MMOL/L (ref 9–16)
BUN SERPL-MCNC: 11 MG/DL (ref 8–23)
CALCIUM SERPL-MCNC: 9.6 MG/DL (ref 8.6–10.4)
CHLORIDE SERPL-SCNC: 100 MMOL/L (ref 98–107)
CO2 SERPL-SCNC: 25 MMOL/L (ref 20–31)
CREAT SERPL-MCNC: 0.8 MG/DL (ref 0.5–0.9)
GFR SERPL CREATININE-BSD FRML MDRD: >60 ML/MIN/1.73M2
GLUCOSE SERPL-MCNC: 113 MG/DL (ref 74–99)
POTASSIUM SERPL-SCNC: 3.9 MMOL/L (ref 3.7–5.3)
SODIUM SERPL-SCNC: 136 MMOL/L (ref 136–145)

## 2024-01-25 PROCEDURE — 80048 BASIC METABOLIC PNL TOTAL CA: CPT

## 2024-01-25 PROCEDURE — 36415 COLL VENOUS BLD VENIPUNCTURE: CPT

## 2024-01-25 PROCEDURE — 99212 OFFICE O/P EST SF 10 MIN: CPT

## 2024-01-25 PROCEDURE — 99214 OFFICE O/P EST MOD 30 MIN: CPT | Performed by: NURSE PRACTITIONER

## 2024-01-25 ASSESSMENT — ENCOUNTER SYMPTOMS
ABDOMINAL PAIN: 0
BLOOD IN STOOL: 0
COUGH: 0
EYE DISCHARGE: 0

## 2024-01-25 NOTE — PROGRESS NOTES
CHF Clinic at [facility]    Office: [phone] Fax: [phone]    [facility]F [facility]  [address] [address]  Dept: [phone]  Loc: [phone]    Donna Landry is a 68 y.o. female who presents today for CHF evaluation.       HPI:     + shortness of breath,    current smoker ; states her son just  and she is more interested in quitting than last appt. Last appt she stated she had no interest is cessation, she had too much going on.   +   fatigue, needing cart at store to help   +  Edema , improved    +chest pain , occurring in the am , goes away after sev hours. Pt is current smoker. Pt plans on starting smoking cessation. She denies stents.   Denies  palpitations   Sleeps in recliner, bed/ pillows gives her a HA.   Sleep study is recommended for her.          [employer] breakfast ,  lunch and dinner ,s till doing this. She was eduationed on better choidec     Care coord indicated home wts are better.    Last appt  at [facility], 2023   Pt currently having CP in the am only, it resolves quickly. RN helps pt make appt at [facility]. Last stress test was Neg. No cath on record for a very long time.   Pt is current smoker and may be contributing to CP in the am.         Past Medical History:   Diagnosis Date    RACHEL (acute kidney injury) ([facility]) 2014    Arthritis     generalized    Bilateral chronic knee pain 2016    Bronchiectasis without complication ([facility])     Cancer ([facility]) 2004    uterus    CHF (congestive heart failure) ([facility])     Chronic bilateral low back pain with right-sided sciatica 2017    Chronic bronchitis ([facility])     Chronic bronchitis ([facility])     Chronic respiratory failure with hypoxia ([facility])     Cigarette nicotine dependence without complication 2017    COPD (chronic obstructive pulmonary disease) ([facility])     on inhaler /  COPD with chronic bronchitis and emphysema    Current smoker on

## 2024-01-25 NOTE — CARE COORDINATION
Date/Time:  1/25/2024 8:56 AM  LPN attempted to reach patient by telephone regarding red alert pulse ox 89  in remote patient monitoring program. Unable to leave message requesting a return call. Will attempt to reach patient again.   patient did not return call attempts

## 2024-01-26 ENCOUNTER — CARE COORDINATION (OUTPATIENT)
Dept: CASE MANAGEMENT | Age: 69
End: 2024-01-26

## 2024-01-26 NOTE — CARE COORDINATION
Date/Time:  1/26/2024 8:49 AM  LPN attempted to reach patient by telephone regarding red alertpulse ox 90% in remote patient monitoring program. Left HIPPA compliant message requesting a return call. Will attempt to reach patient again.

## 2024-01-29 ENCOUNTER — TELEPHONE (OUTPATIENT)
Dept: FAMILY MEDICINE CLINIC | Age: 69
End: 2024-01-29

## 2024-01-29 NOTE — TELEPHONE ENCOUNTER
Patient states that the Ortho provider wants her to start back her tylenol for her knees. Patient would like to know if you can send in a refill. Next injection isn't until 4/9

## 2024-01-30 RX ORDER — ACETAMINOPHEN 500 MG
1000 TABLET ORAL EVERY 6 HOURS PRN
Qty: 120 TABLET | Refills: 0 | Status: SHIPPED | OUTPATIENT
Start: 2024-01-30

## 2024-01-30 NOTE — TELEPHONE ENCOUNTER
Donna Landry is calling to request a refill on the following medication(s):    Medication Request:  Requested Prescriptions     Pending Prescriptions Disp Refills    acetaminophen (TYLENOL) 500 MG tablet 120 tablet 0     Sig: Take 2 tablets by mouth every 6 hours as needed for Pain       Last Visit Date (If Applicable):  11/22/2023    Next Visit Date:    2/22/2024

## 2024-01-31 ENCOUNTER — CARE COORDINATION (OUTPATIENT)
Dept: CARE COORDINATION | Age: 69
End: 2024-01-31

## 2024-01-31 NOTE — CARE COORDINATION
ACM attempted outreach for CC needs, RPM monitoring, chronic conditions  Is actively in communication with PCP  No answer and unable to LVM at this time.

## 2024-02-01 ENCOUNTER — CARE COORDINATION (OUTPATIENT)
Dept: CARE COORDINATION | Age: 69
End: 2024-02-01

## 2024-02-01 NOTE — CARE COORDINATION
Remote Patient Monitoring Note      Date/Time:  2/1/2024 2:47 PM    LPN attempted to contact patient by telephone regarding yellow alert received for no metrics for >2 days  .     Background: CHF, COPD, High Blood-Pressure    Clinical Interventions: No answer, unable to LM.    Plan/Follow Up: Will continue to review, monitor and address alerts with follow up based on severity of symptoms and risk factors.       Cheryl Alvarado LPN  [facility]/ CTN/ Remote Patient Monitoring  [phone]      alone

## 2024-02-02 ENCOUNTER — CARE COORDINATION (OUTPATIENT)
Dept: CARE COORDINATION | Age: 69
End: 2024-02-02

## 2024-02-02 DIAGNOSIS — B18.2 HEP C W/O COMA, CHRONIC (HCC): ICD-10-CM

## 2024-02-02 NOTE — CARE COORDINATION
Remote Patient Monitoring Note      Date/Time:  2/2/2024 2:19 PM    LPN attempted to contact patient by telephone regarding yellow alert received for no metrics for 4 days  .     Background: CHF, COPD, High Blood-Pressure    Clinical Interventions: Unable to LM.  ACM updated.    Plan/Follow Up: Will continue to review, monitor and address alerts with follow up based on severity of symptoms and risk factors.       Cheryl Alvarado LPN  [facility]/ CTN/ Remote Patient Monitoring  [phone]

## 2024-02-05 RX ORDER — ONDANSETRON 4 MG/1
TABLET, ORALLY DISINTEGRATING ORAL
Qty: 10 TABLET | Refills: 1 | Status: SHIPPED | OUTPATIENT
Start: 2024-02-05

## 2024-02-07 ENCOUNTER — CARE COORDINATION (OUTPATIENT)
Dept: CARE COORDINATION | Age: 69
End: 2024-02-07

## 2024-02-07 NOTE — CARE COORDINATION
ACM attempted outreach for CC need, CHF, COPD and DM management, RPM monitoring  No answer and unable to LVM at this time.

## 2024-02-09 ENCOUNTER — TELEPHONE (OUTPATIENT)
Dept: GASTROENTEROLOGY | Age: 69
End: 2024-02-09

## 2024-02-09 NOTE — TELEPHONE ENCOUNTER
Attempt 1 - no show appt 5/26/23/[facility]  Attempt 2 - writer tried to reach pt to schedule GI appt  Attempt 3 - letter mailed

## 2024-02-11 DIAGNOSIS — E11.42 TYPE 2 DIABETES MELLITUS WITH DIABETIC POLYNEUROPATHY, WITHOUT LONG-TERM CURRENT USE OF INSULIN (HCC): ICD-10-CM

## 2024-02-12 RX ORDER — PSEUDOEPHED/ACETAMINOPH/DIPHEN 30MG-500MG
2 TABLET ORAL EVERY 6 HOURS PRN
Qty: 120 TABLET | Refills: 0 | OUTPATIENT
Start: 2024-02-12

## 2024-02-12 RX ORDER — GABAPENTIN 800 MG/1
800 TABLET ORAL 3 TIMES DAILY
Qty: 90 TABLET | Refills: 0 | Status: SHIPPED | OUTPATIENT
Start: 2024-02-12 | End: 2024-03-13

## 2024-02-12 NOTE — TELEPHONE ENCOUNTER
Donna Landry is calling to request a refill on the following medication(s):    Medication Request:  Requested Prescriptions     Pending Prescriptions Disp Refills    gabapentin (NEURONTIN) 800 MG tablet [Pharmacy Med Name: GABAPENTIN 800 MG TABLET] 90 tablet 1     Sig: Take 1 tablet by mouth 3 times daily for 30 days.    Acetaminophen Extra Strength 500 MG TABS [Pharmacy Med Name: ACETAMINOPHEN 500 MG TABLET] 120 tablet 0     Sig: TAKE 2 TABLETS BY MOUTH EVERY 6 HOURS AS NEEDED FOR PAIN.       Last Visit Date (If Applicable):  11/22/2023    Next Visit Date:    2/22/2024

## 2024-02-13 ENCOUNTER — CARE COORDINATION (OUTPATIENT)
Dept: CARE COORDINATION | Age: 69
End: 2024-02-13

## 2024-02-13 DIAGNOSIS — M19.012 PRIMARY OSTEOARTHRITIS OF LEFT SHOULDER: ICD-10-CM

## 2024-02-13 DIAGNOSIS — B18.2 HEP C W/O COMA, CHRONIC (HCC): ICD-10-CM

## 2024-02-13 RX ORDER — TIZANIDINE 4 MG/1
4 TABLET ORAL 3 TIMES DAILY PRN
Qty: 90 TABLET | Refills: 0 | Status: SHIPPED | OUTPATIENT
Start: 2024-02-13

## 2024-02-13 NOTE — CARE COORDINATION
Remote Patient Monitoring Note      Date/Time:  2/13/2024 2:13 PM    LPN attempted to contact patient by telephone regarding yellow alert received for no metrics for >2 days  .     Background: CHF, COPD, High Blood-Pressure    Clinical Interventions: Unable to LM    Plan/Follow Up: Will continue to review, monitor and address alerts with follow up based on severity of symptoms and risk factors.       Cheryl Alvarado LPN  [facility]/ CTN/ Remote Patient Monitoring  [phone]

## 2024-02-13 NOTE — CARE COORDINATION
ACM attempted outreach for CC follow up, CHF, COPD and DM management. RPM monitoring  Multiple attempts without success, attempting RPM graduation at this time and follow up with needs  No answer and not available to LVM at this time.

## 2024-02-14 ENCOUNTER — CARE COORDINATION (OUTPATIENT)
Dept: CASE MANAGEMENT | Age: 69
End: 2024-02-14

## 2024-02-14 RX ORDER — ONDANSETRON 4 MG/1
TABLET, ORALLY DISINTEGRATING ORAL
Qty: 10 TABLET | Refills: 1 | Status: SHIPPED | OUTPATIENT
Start: 2024-02-14

## 2024-02-14 NOTE — CARE COORDINATION
Date/Time:  2/14/2024 2:17 PM  LPN attempted to reach patient by telephone regarding  no metrics for over 4 days   in remote patient monitoring program. Unable to leave  message requesting a return call. Will attempt to reach patient again.  ACM attempted to reach patient yesterday and is planning graduation from [facility] at this time

## 2024-02-14 NOTE — TELEPHONE ENCOUNTER
Requested Prescriptions     Pending Prescriptions Disp Refills    ondansetron (ZOFRAN-ODT) 4 MG disintegrating tablet [Pharmacy Med Name: ONDANSETRON ODT 4 MG TABLET] 10 tablet 1     Sig: DISSOLVE 1 TABLET UNDER THE TONGUE EVERY 8 HOURS IF NEEDED FOR NAUSEA OR VOMITING

## 2024-02-16 ENCOUNTER — CARE COORDINATION (OUTPATIENT)
Dept: CASE MANAGEMENT | Age: 69
End: 2024-02-16
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
## 2024-02-16 NOTE — CARE COORDINATION
Remote Alert Monitoring Note  Rpm alert to be reviewed by the provider   red alert   pulse ox reading (90)   Additional needs to be addressed by N/A: No                    Date/Time:  2024 10:28 AM  Patient Current Location: Home: [address]  [place] [address]  LPN contacted patient by telephone. Verified patients name and  as identifiers.  Background: CHF, COPD, HTN  Refer to 911 immediately if:  Patient unresponsive or unable to provide history  Change in cognition or sudden confusion  Patient unable to respond in complete sentences  Intense chest pain/tightness  Any concern for any clinical emergency  Red Alert: Provider response time of 1 hr required for any red alert requiring intervention  Yellow Alert: Provider response time of 3hr required for any escalated yellow alert    O2 Triage  Are you having any Chest Pain? no   Are you having any Shortness of Breath? no   Swelling in your hands or feet? no     Are you having any other health concerns or issues? no      Clinical Interventions: Reviewed and followed up on alerts and treatments-Spoke to patient she denies ches tpain, SOB, dizziness, states she is feeling well and has a cardiology appointment this afternoon patient agrees to recheck pulse ox in 1 hour she is unable to do so at present time. She is speaking in clear full sentences and has no cocncerns    Plan/Follow Up: Will continue to review, monitor and address alerts with follow up based on severity of symptoms and risk factors.

## 2024-02-19 ENCOUNTER — CARE COORDINATION (OUTPATIENT)
Dept: CASE MANAGEMENT | Age: 69
End: 2024-02-19

## 2024-02-19 NOTE — CARE COORDINATION
Date/Time:  2/19/2024 8:43 AM  LPN attempted to reach patient by telephone regarding red alert pulse ox 91% in remote patient monitoring program. Left HIPPA compliant message requesting a return call. Will attempt to reach patient again.

## 2024-02-20 ENCOUNTER — CARE COORDINATION (OUTPATIENT)
Dept: CARE COORDINATION | Age: 69
End: 2024-02-20

## 2024-02-21 SDOH — HEALTH STABILITY: PHYSICAL HEALTH: ON AVERAGE, HOW MANY MINUTES DO YOU ENGAGE IN EXERCISE AT THIS LEVEL?: 20 MIN

## 2024-02-21 SDOH — HEALTH STABILITY: PHYSICAL HEALTH: ON AVERAGE, HOW MANY DAYS PER WEEK DO YOU ENGAGE IN MODERATE TO STRENUOUS EXERCISE (LIKE A BRISK WALK)?: 4 DAYS

## 2024-02-21 ASSESSMENT — LIFESTYLE VARIABLES
HOW OFTEN DO YOU HAVE SIX OR MORE DRINKS ON ONE OCCASION: 1
HOW MANY STANDARD DRINKS CONTAINING ALCOHOL DO YOU HAVE ON A TYPICAL DAY: PATIENT DOES NOT DRINK
HOW OFTEN DO YOU HAVE A DRINK CONTAINING ALCOHOL: 98
HOW MANY STANDARD DRINKS CONTAINING ALCOHOL DO YOU HAVE ON A TYPICAL DAY: 0
HOW OFTEN DO YOU HAVE A DRINK CONTAINING ALCOHOL: PATIENT DECLINED

## 2024-02-21 ASSESSMENT — PATIENT HEALTH QUESTIONNAIRE - PHQ9
SUM OF ALL RESPONSES TO PHQ9 QUESTIONS 1 & 2: 6
SUM OF ALL RESPONSES TO PHQ QUESTIONS 1-9: 16
SUM OF ALL RESPONSES TO PHQ QUESTIONS 1-9: 16
1. LITTLE INTEREST OR PLEASURE IN DOING THINGS: 3
2. FEELING DOWN, DEPRESSED OR HOPELESS: 3
SUM OF ALL RESPONSES TO PHQ QUESTIONS 1-9: 16
SUM OF ALL RESPONSES TO PHQ QUESTIONS 1-9: 16
7. TROUBLE CONCENTRATING ON THINGS, SUCH AS READING THE NEWSPAPER OR WATCHING TELEVISION: 2
10. IF YOU CHECKED OFF ANY PROBLEMS, HOW DIFFICULT HAVE THESE PROBLEMS MADE IT FOR YOU TO DO YOUR WORK, TAKE CARE OF THINGS AT HOME, OR GET ALONG WITH OTHER PEOPLE: 1
3. TROUBLE FALLING OR STAYING ASLEEP: 1
6. FEELING BAD ABOUT YOURSELF - OR THAT YOU ARE A FAILURE OR HAVE LET YOURSELF OR YOUR FAMILY DOWN: 2
4. FEELING TIRED OR HAVING LITTLE ENERGY: 2
8. MOVING OR SPEAKING SO SLOWLY THAT OTHER PEOPLE COULD HAVE NOTICED. OR THE OPPOSITE, BEING SO FIGETY OR RESTLESS THAT YOU HAVE BEEN MOVING AROUND A LOT MORE THAN USUAL: 2
5. POOR APPETITE OR OVEREATING: 1
9. THOUGHTS THAT YOU WOULD BE BETTER OFF DEAD, OR OF HURTING YOURSELF: 0

## 2024-02-22 ENCOUNTER — APPOINTMENT (OUTPATIENT)
Dept: GENERAL RADIOLOGY | Age: 69
End: 2024-02-22
Payer: MEDICARE

## 2024-02-22 ENCOUNTER — HOSPITAL ENCOUNTER (EMERGENCY)
Age: 69
Discharge: HOME | End: 2024-02-22
Attending: EMERGENCY MEDICINE | Admitting: FAMILY MEDICINE
Payer: MEDICARE

## 2024-02-22 ENCOUNTER — HOSPITAL ENCOUNTER (OUTPATIENT)
Age: 69
Setting detail: OBSERVATION
Discharge: HOME | End: 2024-02-23
Attending: EMERGENCY MEDICINE | Admitting: EMERGENCY MEDICINE
Payer: MEDICARE

## 2024-02-22 ENCOUNTER — OFFICE VISIT (OUTPATIENT)
Dept: FAMILY MEDICINE CLINIC | Age: 69
End: 2024-02-22

## 2024-02-22 VITALS
WEIGHT: 211 LBS | HEART RATE: 90 BPM | SYSTOLIC BLOOD PRESSURE: 116 MMHG | OXYGEN SATURATION: 92 % | DIASTOLIC BLOOD PRESSURE: 64 MMHG | BODY MASS INDEX: 28.58 KG/M2 | TEMPERATURE: 97 F | HEIGHT: 72 IN

## 2024-02-22 VITALS
TEMPERATURE: 98.4 F | DIASTOLIC BLOOD PRESSURE: 77 MMHG | RESPIRATION RATE: 12 BRPM | OXYGEN SATURATION: 99 % | SYSTOLIC BLOOD PRESSURE: 135 MMHG | HEART RATE: 70 BPM

## 2024-02-22 DIAGNOSIS — R42 DIZZINESS: ICD-10-CM

## 2024-02-22 DIAGNOSIS — I50.32 CHRONIC HEART FAILURE WITH PRESERVED EJECTION FRACTION (HCC): ICD-10-CM

## 2024-02-22 DIAGNOSIS — E87.6 HYPOKALEMIA: ICD-10-CM

## 2024-02-22 DIAGNOSIS — R11.0 NAUSEA: ICD-10-CM

## 2024-02-22 DIAGNOSIS — E87.6 HYPOKALEMIA: Primary | ICD-10-CM

## 2024-02-22 DIAGNOSIS — I50.32 CHRONIC HEART FAILURE WITH PRESERVED EJECTION FRACTION (HCC): Primary | ICD-10-CM

## 2024-02-22 DIAGNOSIS — F33.1 MODERATE EPISODE OF RECURRENT MAJOR DEPRESSIVE DISORDER (HCC): ICD-10-CM

## 2024-02-22 DIAGNOSIS — B18.2 HEP C W/O COMA, CHRONIC (HCC): ICD-10-CM

## 2024-02-22 DIAGNOSIS — R07.9 CHEST PAIN, UNSPECIFIED TYPE: Primary | ICD-10-CM

## 2024-02-22 DIAGNOSIS — J44.9 CHRONIC OBSTRUCTIVE PULMONARY DISEASE, UNSPECIFIED COPD TYPE (HCC): ICD-10-CM

## 2024-02-22 DIAGNOSIS — I73.9 CLAUDICATION OF BOTH LOWER EXTREMITIES (HCC): ICD-10-CM

## 2024-02-22 DIAGNOSIS — Z00.00 MEDICARE ANNUAL WELLNESS VISIT, SUBSEQUENT: Primary | ICD-10-CM

## 2024-02-22 DIAGNOSIS — E11.42 TYPE 2 DIABETES MELLITUS WITH DIABETIC POLYNEUROPATHY, WITHOUT LONG-TERM CURRENT USE OF INSULIN (HCC): ICD-10-CM

## 2024-02-22 LAB
ALBUMIN SERPL-MCNC: 3.7 G/DL (ref 3.5–5.2)
ALBUMIN SERPL-MCNC: 4.1 G/DL (ref 3.5–5.2)
ALBUMIN/GLOB SERPL: 1.2 {RATIO} (ref 1–2.5)
ALBUMIN/GLOB SERPL: 1.6 {RATIO} (ref 1–2.5)
ALP SERPL-CCNC: 69 U/L (ref 35–104)
ALP SERPL-CCNC: 83 U/L (ref 35–104)
ALT SERPL-CCNC: 10 U/L (ref 5–33)
ALT SERPL-CCNC: 8 U/L (ref 5–33)
ANION GAP SERPL CALCULATED.3IONS-SCNC: 10 MMOL/L (ref 9–17)
ANION GAP SERPL CALCULATED.3IONS-SCNC: 9 MMOL/L (ref 9–17)
ANION GAP SERPL CALCULATED.3IONS-SCNC: 9 MMOL/L (ref 9–17)
AST SERPL-CCNC: 13 U/L
AST SERPL-CCNC: 14 U/L
BACTERIA URNS QL MICRO: NORMAL
BASOPHILS # BLD: 0.04 K/UL (ref 0–0.2)
BASOPHILS # BLD: 0.05 K/UL (ref 0–0.2)
BASOPHILS NFR BLD: 1 % (ref 0–2)
BASOPHILS NFR BLD: 1 % (ref 0–2)
BILIRUB DIRECT SERPL-MCNC: 0.1 MG/DL
BILIRUB INDIRECT SERPL-MCNC: 0.3 MG/DL (ref 0–1)
BILIRUB SERPL-MCNC: 0.2 MG/DL (ref 0.3–1.2)
BILIRUB SERPL-MCNC: 0.4 MG/DL (ref 0.3–1.2)
BILIRUB UR QL STRIP: NEGATIVE
BNP SERPL-MCNC: 70 PG/ML
BUN SERPL-MCNC: 10 MG/DL (ref 8–23)
BUN SERPL-MCNC: 7 MG/DL (ref 8–23)
BUN SERPL-MCNC: 9 MG/DL (ref 8–23)
CALCIUM SERPL-MCNC: 8.2 MG/DL (ref 8.6–10.4)
CALCIUM SERPL-MCNC: 8.3 MG/DL (ref 8.6–10.4)
CALCIUM SERPL-MCNC: 9.9 MG/DL (ref 8.6–10.4)
CASTS #/AREA URNS LPF: NORMAL /LPF (ref 0–8)
CHLORIDE SERPL-SCNC: 101 MMOL/L (ref 98–107)
CHLORIDE SERPL-SCNC: 102 MMOL/L (ref 98–107)
CHLORIDE SERPL-SCNC: 103 MMOL/L (ref 98–107)
CLARITY UR: CLEAR
CO2 SERPL-SCNC: 25 MMOL/L (ref 20–31)
CO2 SERPL-SCNC: 27 MMOL/L (ref 20–31)
CO2 SERPL-SCNC: 29 MMOL/L (ref 20–31)
COLOR UR: YELLOW
CREAT SERPL-MCNC: 0.4 MG/DL (ref 0.5–0.9)
CREAT SERPL-MCNC: 0.5 MG/DL (ref 0.5–0.9)
CREAT SERPL-MCNC: 0.6 MG/DL (ref 0.5–0.9)
EOSINOPHIL # BLD: 0.04 K/UL (ref 0–0.44)
EOSINOPHIL # BLD: 0.11 K/UL (ref 0–0.44)
EOSINOPHILS RELATIVE PERCENT: 1 % (ref 1–4)
EOSINOPHILS RELATIVE PERCENT: 1 % (ref 1–4)
EPI CELLS #/AREA URNS HPF: NORMAL /HPF (ref 0–5)
ERYTHROCYTE [DISTWIDTH] IN BLOOD BY AUTOMATED COUNT: 14.3 % (ref 11.8–14.4)
ERYTHROCYTE [DISTWIDTH] IN BLOOD BY AUTOMATED COUNT: 14.3 % (ref 11.8–14.4)
GFR SERPL CREATININE-BSD FRML MDRD: >60 ML/MIN/1.73M2
GLUCOSE BLD-MCNC: 111 MG/DL (ref 65–105)
GLUCOSE SERPL-MCNC: 112 MG/DL (ref 70–99)
GLUCOSE SERPL-MCNC: 113 MG/DL (ref 70–99)
GLUCOSE SERPL-MCNC: 166 MG/DL (ref 70–99)
GLUCOSE UR STRIP-MCNC: ABNORMAL MG/DL
HBA1C MFR BLD: 7 %
HCT VFR BLD AUTO: 43.9 % (ref 36.3–47.1)
HCT VFR BLD AUTO: 46.4 % (ref 36.3–47.1)
HGB BLD-MCNC: 14.9 G/DL (ref 11.9–15.1)
HGB BLD-MCNC: 15.2 G/DL (ref 11.9–15.1)
HGB UR QL STRIP.AUTO: NEGATIVE
IMM GRANULOCYTES # BLD AUTO: 0.04 K/UL (ref 0–0.3)
IMM GRANULOCYTES # BLD AUTO: <0.03 K/UL (ref 0–0.3)
IMM GRANULOCYTES NFR BLD: 0 %
IMM GRANULOCYTES NFR BLD: 1 %
KETONES UR STRIP-MCNC: ABNORMAL MG/DL
LEUKOCYTE ESTERASE UR QL STRIP: NEGATIVE
LIPASE SERPL-CCNC: 15 U/L (ref 13–60)
LIPASE SERPL-CCNC: 17 U/L (ref 13–60)
LYMPHOCYTES NFR BLD: 1.5 K/UL (ref 1.1–3.7)
LYMPHOCYTES NFR BLD: 2.63 K/UL (ref 1.1–3.7)
LYMPHOCYTES RELATIVE PERCENT: 23 % (ref 24–43)
LYMPHOCYTES RELATIVE PERCENT: 33 % (ref 24–43)
MCH RBC QN AUTO: 30.4 PG (ref 25.2–33.5)
MCH RBC QN AUTO: 30.8 PG (ref 25.2–33.5)
MCHC RBC AUTO-ENTMCNC: 32.8 G/DL (ref 28.4–34.8)
MCHC RBC AUTO-ENTMCNC: 33.9 G/DL (ref 28.4–34.8)
MCV RBC AUTO: 89.6 FL (ref 82.6–102.9)
MCV RBC AUTO: 93.9 FL (ref 82.6–102.9)
MONOCYTES NFR BLD: 0.56 K/UL (ref 0.1–1.2)
MONOCYTES NFR BLD: 0.88 K/UL (ref 0.1–1.2)
MONOCYTES NFR BLD: 11 % (ref 3–12)
MONOCYTES NFR BLD: 9 % (ref 3–12)
NEUTROPHILS NFR BLD: 53 % (ref 36–65)
NEUTROPHILS NFR BLD: 66 % (ref 36–65)
NEUTS SEG NFR BLD: 4.23 K/UL (ref 1.5–8.1)
NEUTS SEG NFR BLD: 4.35 K/UL (ref 1.5–8.1)
NITRITE UR QL STRIP: NEGATIVE
NRBC BLD-RTO: 0 PER 100 WBC
NRBC BLD-RTO: 0 PER 100 WBC
PH UR STRIP: 6.5 [PH] (ref 5–8)
PLATELET # BLD AUTO: 159 K/UL (ref 138–453)
PLATELET # BLD AUTO: 210 K/UL (ref 138–453)
PMV BLD AUTO: 10.1 FL (ref 8.1–13.5)
PMV BLD AUTO: 9.8 FL (ref 8.1–13.5)
POTASSIUM SERPL-SCNC: 2.3 MMOL/L (ref 3.7–5.3)
POTASSIUM SERPL-SCNC: 2.9 MMOL/L (ref 3.7–5.3)
POTASSIUM SERPL-SCNC: 3.5 MMOL/L (ref 3.7–5.3)
PROT SERPL-MCNC: 6 G/DL (ref 6.4–8.3)
PROT SERPL-MCNC: 7.5 G/DL (ref 6.4–8.3)
PROT UR STRIP-MCNC: ABNORMAL MG/DL
RBC # BLD AUTO: 4.9 M/UL (ref 3.95–5.11)
RBC # BLD AUTO: 4.94 M/UL (ref 3.95–5.11)
RBC #/AREA URNS HPF: NORMAL /HPF (ref 0–4)
SODIUM SERPL-SCNC: 136 MMOL/L (ref 135–144)
SODIUM SERPL-SCNC: 139 MMOL/L (ref 135–144)
SODIUM SERPL-SCNC: 140 MMOL/L (ref 135–144)
SP GR UR STRIP: 1.03 (ref 1–1.03)
TROPONIN I SERPL HS-MCNC: 8 NG/L (ref 0–14)
TROPONIN I SERPL HS-MCNC: 8 NG/L (ref 0–14)
TROPONIN I SERPL HS-MCNC: 9 NG/L (ref 0–14)
TSH SERPL DL<=0.05 MIU/L-ACNC: 0.9 UIU/ML (ref 0.3–5)
UROBILINOGEN UR STRIP-ACNC: NORMAL EU/DL (ref 0–1)
WBC #/AREA URNS HPF: NORMAL /HPF (ref 0–5)
WBC OTHER # BLD: 6.5 K/UL (ref 3.5–11.3)
WBC OTHER # BLD: 7.9 K/UL (ref 3.5–11.3)

## 2024-02-22 PROCEDURE — 80053 COMPREHEN METABOLIC PANEL: CPT

## 2024-02-22 PROCEDURE — 82947 ASSAY GLUCOSE BLOOD QUANT: CPT

## 2024-02-22 PROCEDURE — G0378 HOSPITAL OBSERVATION PER HR: HCPCS

## 2024-02-22 PROCEDURE — 2500000003 HC RX 250 WO HCPCS: Performed by: STUDENT IN AN ORGANIZED HEALTH CARE EDUCATION/TRAINING PROGRAM

## 2024-02-22 PROCEDURE — 6360000002 HC RX W HCPCS: Performed by: STUDENT IN AN ORGANIZED HEALTH CARE EDUCATION/TRAINING PROGRAM

## 2024-02-22 PROCEDURE — 2580000003 HC RX 258: Performed by: STUDENT IN AN ORGANIZED HEALTH CARE EDUCATION/TRAINING PROGRAM

## 2024-02-22 PROCEDURE — 83690 ASSAY OF LIPASE: CPT

## 2024-02-22 PROCEDURE — 84484 ASSAY OF TROPONIN QUANT: CPT

## 2024-02-22 PROCEDURE — 93005 ELECTROCARDIOGRAM TRACING: CPT | Performed by: STUDENT IN AN ORGANIZED HEALTH CARE EDUCATION/TRAINING PROGRAM

## 2024-02-22 PROCEDURE — 99285 EMERGENCY DEPT VISIT HI MDM: CPT

## 2024-02-22 PROCEDURE — 96374 THER/PROPH/DIAG INJ IV PUSH: CPT

## 2024-02-22 PROCEDURE — 6370000000 HC RX 637 (ALT 250 FOR IP): Performed by: STUDENT IN AN ORGANIZED HEALTH CARE EDUCATION/TRAINING PROGRAM

## 2024-02-22 PROCEDURE — 96365 THER/PROPH/DIAG IV INF INIT: CPT

## 2024-02-22 PROCEDURE — 83880 ASSAY OF NATRIURETIC PEPTIDE: CPT

## 2024-02-22 PROCEDURE — 93005 ELECTROCARDIOGRAM TRACING: CPT | Performed by: EMERGENCY MEDICINE

## 2024-02-22 PROCEDURE — 6370000000 HC RX 637 (ALT 250 FOR IP): Performed by: EMERGENCY MEDICINE

## 2024-02-22 PROCEDURE — 96366 THER/PROPH/DIAG IV INF ADDON: CPT

## 2024-02-22 PROCEDURE — 96368 THER/DIAG CONCURRENT INF: CPT

## 2024-02-22 PROCEDURE — 80048 BASIC METABOLIC PNL TOTAL CA: CPT

## 2024-02-22 PROCEDURE — 80076 HEPATIC FUNCTION PANEL: CPT

## 2024-02-22 PROCEDURE — 2580000003 HC RX 258: Performed by: EMERGENCY MEDICINE

## 2024-02-22 PROCEDURE — 71046 X-RAY EXAM CHEST 2 VIEWS: CPT

## 2024-02-22 PROCEDURE — 1200000000 HC SEMI PRIVATE

## 2024-02-22 PROCEDURE — 6360000002 HC RX W HCPCS: Performed by: EMERGENCY MEDICINE

## 2024-02-22 PROCEDURE — 96375 TX/PRO/DX INJ NEW DRUG ADDON: CPT

## 2024-02-22 PROCEDURE — 85025 COMPLETE CBC W/AUTO DIFF WBC: CPT

## 2024-02-22 PROCEDURE — 84443 ASSAY THYROID STIM HORMONE: CPT

## 2024-02-22 PROCEDURE — 96376 TX/PRO/DX INJ SAME DRUG ADON: CPT

## 2024-02-22 PROCEDURE — 81001 URINALYSIS AUTO W/SCOPE: CPT

## 2024-02-22 RX ORDER — SODIUM CHLORIDE 0.9 % (FLUSH) 0.9 %
5-40 SYRINGE (ML) INJECTION EVERY 12 HOURS SCHEDULED
Status: CANCELLED | OUTPATIENT
Start: 2024-02-22

## 2024-02-22 RX ORDER — ONDANSETRON 2 MG/ML
4 INJECTION INTRAMUSCULAR; INTRAVENOUS EVERY 6 HOURS PRN
Status: DISCONTINUED | OUTPATIENT
Start: 2024-02-22 | End: 2024-02-23 | Stop reason: HOSPADM

## 2024-02-22 RX ORDER — ACETAMINOPHEN 650 MG/1
650 SUPPOSITORY RECTAL EVERY 6 HOURS PRN
Status: CANCELLED | OUTPATIENT
Start: 2024-02-22

## 2024-02-22 RX ORDER — PROMETHAZINE HYDROCHLORIDE 25 MG/1
25 TABLET ORAL 4 TIMES DAILY PRN
Qty: 20 TABLET | Refills: 0 | Status: SHIPPED | OUTPATIENT
Start: 2024-02-22 | End: 2024-02-29

## 2024-02-22 RX ORDER — ONDANSETRON 4 MG/1
4 TABLET, ORALLY DISINTEGRATING ORAL EVERY 4 HOURS PRN
Status: DISCONTINUED | OUTPATIENT
Start: 2024-02-22 | End: 2024-02-23 | Stop reason: HOSPADM

## 2024-02-22 RX ORDER — MAGNESIUM SULFATE IN WATER 40 MG/ML
2000 INJECTION, SOLUTION INTRAVENOUS ONCE
Status: COMPLETED | OUTPATIENT
Start: 2024-02-22 | End: 2024-02-22

## 2024-02-22 RX ORDER — SODIUM CHLORIDE 9 MG/ML
INJECTION, SOLUTION INTRAVENOUS PRN
Status: CANCELLED | OUTPATIENT
Start: 2024-02-22

## 2024-02-22 RX ORDER — SODIUM CHLORIDE 0.9 % (FLUSH) 0.9 %
5-40 SYRINGE (ML) INJECTION PRN
Status: DISCONTINUED | OUTPATIENT
Start: 2024-02-22 | End: 2024-02-23 | Stop reason: HOSPADM

## 2024-02-22 RX ORDER — TIZANIDINE 4 MG/1
4 TABLET ORAL 3 TIMES DAILY PRN
Status: CANCELLED | OUTPATIENT
Start: 2024-02-22

## 2024-02-22 RX ORDER — AMLODIPINE BESYLATE 10 MG/1
5 TABLET ORAL DAILY
Status: CANCELLED | OUTPATIENT
Start: 2024-02-22

## 2024-02-22 RX ORDER — ROPINIROLE 0.25 MG/1
0.5 TABLET, FILM COATED ORAL DAILY
Status: CANCELLED | OUTPATIENT
Start: 2024-02-22

## 2024-02-22 RX ORDER — POTASSIUM CHLORIDE 20 MEQ/1
40 TABLET, EXTENDED RELEASE ORAL PRN
Status: CANCELLED | OUTPATIENT
Start: 2024-02-22

## 2024-02-22 RX ORDER — SODIUM CHLORIDE 0.9 % (FLUSH) 0.9 %
10 SYRINGE (ML) INJECTION PRN
Status: CANCELLED | OUTPATIENT
Start: 2024-02-22

## 2024-02-22 RX ORDER — GABAPENTIN 800 MG/1
800 TABLET ORAL 3 TIMES DAILY
Status: CANCELLED | OUTPATIENT
Start: 2024-02-22

## 2024-02-22 RX ORDER — BLOOD SUGAR DIAGNOSTIC
STRIP MISCELLANEOUS
Qty: 400 STRIP | Refills: 11 | Status: SHIPPED | OUTPATIENT
Start: 2024-02-22

## 2024-02-22 RX ORDER — SODIUM CHLORIDE 9 MG/ML
INJECTION, SOLUTION INTRAVENOUS CONTINUOUS
Status: DISCONTINUED | OUTPATIENT
Start: 2024-02-22 | End: 2024-02-23

## 2024-02-22 RX ORDER — ONDANSETRON 2 MG/ML
4 INJECTION INTRAMUSCULAR; INTRAVENOUS EVERY 6 HOURS PRN
Status: CANCELLED | OUTPATIENT
Start: 2024-02-22

## 2024-02-22 RX ORDER — TRAZODONE HYDROCHLORIDE 100 MG/1
100 TABLET ORAL NIGHTLY
Status: CANCELLED | OUTPATIENT
Start: 2024-02-22

## 2024-02-22 RX ORDER — ONDANSETRON 2 MG/ML
4 INJECTION INTRAMUSCULAR; INTRAVENOUS ONCE
Status: COMPLETED | OUTPATIENT
Start: 2024-02-22 | End: 2024-02-22

## 2024-02-22 RX ORDER — ACETAMINOPHEN 325 MG/1
650 TABLET ORAL EVERY 6 HOURS PRN
Status: CANCELLED | OUTPATIENT
Start: 2024-02-22

## 2024-02-22 RX ORDER — ENOXAPARIN SODIUM 100 MG/ML
40 INJECTION SUBCUTANEOUS DAILY
Status: CANCELLED | OUTPATIENT
Start: 2024-02-22

## 2024-02-22 RX ORDER — DICYCLOMINE HYDROCHLORIDE 10 MG/1
10 CAPSULE ORAL
Status: CANCELLED | OUTPATIENT
Start: 2024-02-22

## 2024-02-22 RX ORDER — METOCLOPRAMIDE HYDROCHLORIDE 5 MG/ML
10 INJECTION INTRAMUSCULAR; INTRAVENOUS ONCE
Status: COMPLETED | OUTPATIENT
Start: 2024-02-22 | End: 2024-02-22

## 2024-02-22 RX ORDER — POTASSIUM CHLORIDE 20 MEQ/1
40 TABLET, EXTENDED RELEASE ORAL ONCE
Status: CANCELLED | OUTPATIENT
Start: 2024-02-22 | End: 2024-02-22

## 2024-02-22 RX ORDER — MAGNESIUM SULFATE 1 G/100ML
1000 INJECTION INTRAVENOUS PRN
Status: CANCELLED | OUTPATIENT
Start: 2024-02-22

## 2024-02-22 RX ORDER — POTASSIUM CHLORIDE 20 MEQ/1
40 TABLET, EXTENDED RELEASE ORAL ONCE
Status: COMPLETED | OUTPATIENT
Start: 2024-02-22 | End: 2024-02-22

## 2024-02-22 RX ORDER — ACETAMINOPHEN 325 MG/1
650 TABLET ORAL EVERY 4 HOURS PRN
Status: DISCONTINUED | OUTPATIENT
Start: 2024-02-22 | End: 2024-02-23 | Stop reason: HOSPADM

## 2024-02-22 RX ORDER — ENOXAPARIN SODIUM 100 MG/ML
40 INJECTION SUBCUTANEOUS DAILY
Status: DISCONTINUED | OUTPATIENT
Start: 2024-02-22 | End: 2024-02-23 | Stop reason: HOSPADM

## 2024-02-22 RX ORDER — POLYETHYLENE GLYCOL 3350 17 G/17G
17 POWDER, FOR SOLUTION ORAL DAILY PRN
Status: CANCELLED | OUTPATIENT
Start: 2024-02-22

## 2024-02-22 RX ORDER — BUDESONIDE AND FORMOTEROL FUMARATE DIHYDRATE 80; 4.5 UG/1; UG/1
2 AEROSOL RESPIRATORY (INHALATION)
Status: CANCELLED | OUTPATIENT
Start: 2024-02-22

## 2024-02-22 RX ORDER — ONDANSETRON 4 MG/1
4 TABLET, ORALLY DISINTEGRATING ORAL EVERY 8 HOURS PRN
Status: CANCELLED | OUTPATIENT
Start: 2024-02-22

## 2024-02-22 RX ORDER — PANTOPRAZOLE SODIUM 40 MG/1
40 TABLET, DELAYED RELEASE ORAL DAILY
Status: CANCELLED | OUTPATIENT
Start: 2024-02-22

## 2024-02-22 RX ORDER — POTASSIUM CHLORIDE 7.45 MG/ML
10 INJECTION INTRAVENOUS
Status: DISCONTINUED | OUTPATIENT
Start: 2024-02-22 | End: 2024-02-22 | Stop reason: HOSPADM

## 2024-02-22 RX ORDER — SODIUM CHLORIDE 9 MG/ML
INJECTION, SOLUTION INTRAVENOUS PRN
Status: DISCONTINUED | OUTPATIENT
Start: 2024-02-22 | End: 2024-02-23 | Stop reason: HOSPADM

## 2024-02-22 RX ORDER — SODIUM CHLORIDE 0.9 % (FLUSH) 0.9 %
5-40 SYRINGE (ML) INJECTION EVERY 12 HOURS SCHEDULED
Status: DISCONTINUED | OUTPATIENT
Start: 2024-02-22 | End: 2024-02-23 | Stop reason: HOSPADM

## 2024-02-22 RX ORDER — BUPRENORPHINE AND NALOXONE 8; 2 MG/1; MG/1
1 FILM, SOLUBLE BUCCAL; SUBLINGUAL DAILY
Status: CANCELLED | OUTPATIENT
Start: 2024-02-22

## 2024-02-22 RX ORDER — CETIRIZINE HYDROCHLORIDE 10 MG/1
10 TABLET ORAL DAILY
Status: CANCELLED | OUTPATIENT
Start: 2024-02-22

## 2024-02-22 RX ORDER — POTASSIUM CHLORIDE 7.45 MG/ML
10 INJECTION INTRAVENOUS PRN
Status: CANCELLED | OUTPATIENT
Start: 2024-02-22

## 2024-02-22 RX ORDER — ATENOLOL 25 MG/1
25 TABLET ORAL DAILY
Status: CANCELLED | OUTPATIENT
Start: 2024-02-22

## 2024-02-22 RX ORDER — POTASSIUM CHLORIDE 20 MEQ/1
40 TABLET, EXTENDED RELEASE ORAL ONCE
Status: CANCELLED | OUTPATIENT
Start: 2024-02-22

## 2024-02-22 RX ORDER — PRAVASTATIN SODIUM 20 MG
40 TABLET ORAL DAILY
Status: CANCELLED | OUTPATIENT
Start: 2024-02-22

## 2024-02-22 RX ADMIN — SODIUM CHLORIDE: 9 INJECTION, SOLUTION INTRAVENOUS at 19:32

## 2024-02-22 RX ADMIN — POTASSIUM CHLORIDE 10 MEQ: 7.46 INJECTION, SOLUTION INTRAVENOUS at 03:38

## 2024-02-22 RX ADMIN — METOCLOPRAMIDE 10 MG: 5 INJECTION, SOLUTION INTRAMUSCULAR; INTRAVENOUS at 19:03

## 2024-02-22 RX ADMIN — ONDANSETRON 4 MG: 2 INJECTION INTRAMUSCULAR; INTRAVENOUS at 04:25

## 2024-02-22 RX ADMIN — POTASSIUM CHLORIDE 40 MEQ: 1500 TABLET, EXTENDED RELEASE ORAL at 04:25

## 2024-02-22 RX ADMIN — POTASSIUM CHLORIDE 10 MEQ: 7.46 INJECTION, SOLUTION INTRAVENOUS at 04:37

## 2024-02-22 RX ADMIN — SODIUM CHLORIDE, PRESERVATIVE FREE 20 MG: 5 INJECTION INTRAVENOUS at 01:43

## 2024-02-22 RX ADMIN — MAGNESIUM SULFATE HEPTAHYDRATE 2000 MG: 40 INJECTION, SOLUTION INTRAVENOUS at 03:37

## 2024-02-22 RX ADMIN — POTASSIUM BICARBONATE 40 MEQ: 782 TABLET, EFFERVESCENT ORAL at 19:30

## 2024-02-22 RX ADMIN — ONDANSETRON 4 MG: 2 INJECTION INTRAMUSCULAR; INTRAVENOUS at 01:43

## 2024-02-22 RX ADMIN — POTASSIUM BICARBONATE 40 MEQ: 782 TABLET, EFFERVESCENT ORAL at 03:36

## 2024-02-22 SDOH — ECONOMIC STABILITY: FOOD INSECURITY: WITHIN THE PAST 12 MONTHS, THE FOOD YOU BOUGHT JUST DIDN'T LAST AND YOU DIDN'T HAVE MONEY TO GET MORE.: NEVER TRUE

## 2024-02-22 SDOH — ECONOMIC STABILITY: FOOD INSECURITY: WITHIN THE PAST 12 MONTHS, YOU WORRIED THAT YOUR FOOD WOULD RUN OUT BEFORE YOU GOT MONEY TO BUY MORE.: NEVER TRUE

## 2024-02-22 SDOH — ECONOMIC STABILITY: INCOME INSECURITY: IN THE LAST 12 MONTHS, WAS THERE A TIME WHEN YOU WERE NOT ABLE TO PAY THE MORTGAGE OR RENT ON TIME?: NO

## 2024-02-22 ASSESSMENT — ENCOUNTER SYMPTOMS
DIARRHEA: 0
NAUSEA: 1
ABDOMINAL PAIN: 1
STRIDOR: 0
SHORTNESS OF BREATH: 1
COUGH: 0
VOMITING: 0
WHEEZING: 0

## 2024-02-22 ASSESSMENT — PAIN - FUNCTIONAL ASSESSMENT: PAIN_FUNCTIONAL_ASSESSMENT: 0-10

## 2024-02-22 ASSESSMENT — HEART SCORE
ECG: 0
ECG: 1

## 2024-02-22 ASSESSMENT — SOCIAL DETERMINANTS OF HEALTH (SDOH): HOW HARD IS IT FOR YOU TO PAY FOR THE VERY BASICS LIKE FOOD, HOUSING, MEDICAL CARE, AND HEATING?: NOT HARD AT ALL

## 2024-02-22 ASSESSMENT — PAIN SCALES - GENERAL: PAINLEVEL_OUTOF10: 10

## 2024-02-22 NOTE — DISCHARGE INSTRUCTIONS
Thank you for coming to [facility]'s emergency department!    You were seen today for dizziness, you were diagnosed with low potassium. Please follow up with your primary care doctor about this.       If you have any worsening of symptoms or any other concerns, please return to the emergency department.  We are always available!

## 2024-02-22 NOTE — ED NOTES
Pt to ED for chest pain, shortness of breath, nausea, and emesis. Pt was seen yesterday in the ER and discharged but states she feels worse. Patient alert and oriented x4, talking in complete sentences. Respirations even and unlabored. Patient placed on continuous cardiac monitoring, BP cuff, and pulse ox. EKG obtained, blood work obtained. Call light in reach, all needs met at this time

## 2024-02-22 NOTE — ED TRIAGE NOTES
Pt comes to ED with c/o abdominal pain and dizziness. Pt states symptoms started two weeks ago, has been taking zofran, feels room is spinning, no falls, no emesis, able to eat, midsternal chest pain non radiating, has usual SOB with COPD. Pt a/o x4, resting on stretcher, attached to monitor, call light within reach, pacemaker interrogated, RR even and non labored.

## 2024-02-22 NOTE — ED PROVIDER NOTES
[facility] ED  Emergency Department Encounter  Emergency Medicine Resident     Pt Name:Donna Landry  MRN: [id number]  Birthdate 1955  Date of evaluation: 2/22/24  PCP:  America Cam MD  Note Started: 1:10 AM EST      CHIEF COMPLAINT       Chief Complaint   Patient presents with    Abdominal Pain    Dizziness     HISTORY OF PRESENT ILLNESS  (Location/Symptom, Timing/Onset, Context/Setting, Quality, Duration, Modifying Factors, Severity.)      Donna Landry is a 68 y.o. female who presents with epigastric abdominal pain with nausea ongoing for the past two weeks. She states it is non-radiating however it is causing dizziness. While she has been taking the zofran, she has not had any episodes of vomiting. Not having diarrhea, is passing flatus. States she also has unchanged shortness of breath, does have COPD.     PAST MEDICAL / SURGICAL / SOCIAL / FAMILY HISTORY      has a past medical history of RACHEL (acute kidney injury) ([facility]), Arthritis, Bilateral chronic knee pain, Bronchiectasis without complication ([facility]), Cancer (HCC), CHF (congestive heart failure) ([facility]), Chronic bilateral low back pain with right-sided sciatica, Chronic bronchitis (HCC), Chronic bronchitis (HCC), Chronic respiratory failure with hypoxia (HCC), Cigarette nicotine dependence without complication, COPD (chronic obstructive pulmonary disease) ([facility]), Current smoker on some days, Depression, Diabetic polyneuropathy associated with type 2 diabetes mellitus (HCC), Diverticulosis, Essential hypertension, Full dentures, GERD (gastroesophageal reflux disease), Hep C w/o coma, chronic (HCC), Hyperlipidemia, Hyperplastic polyp of intestine, Hypertension, Irritable bowel syndrome with both constipation and diarrhea, Liver disease, Moderate episode of recurrent major depressive disorder (HCC), Nasal polyposis, Noncompliance with CPAP treatment, Obesity (BMI 35.0-39.9 without comorbidity), On home O2, JIMENA (obstructive sleep apnea),  MD America   potassium chloride (KLOR-CON M20) 20 MEQ extended release tablet TAKE 1 TABLET BY MOUTH EVERY DAY 11/13/23   America Cam MD   cetirizine (ZYRTEC) 10 MG tablet TAKE 1 TABLET BY MOUTH EVERY DAY AS NEEDED FOR ALLERGY SYMPTOMS 10/13/23   America Cam MD   Blood Glucose Monitoring Suppl (ONE TOUCH ULTRA 2) w/Device KIT 1 kit by Does not apply route daily 10/13/23   America Cam MD   nitroGLYCERIN (NITROSTAT) 0.4 MG SL tablet Place 1 tablet under the tongue every 5 minutes as needed for Chest pain 10/13/23   America Cam MD   tiotropium (SPIRIVA RESPIMAT) 2.5 MCG/ACT AERS inhaler Inhale 2 puffs into the lungs daily 10/6/23 1/25/24  Alejandro Sampson MD   blood glucose test strips (ONETOUCH ULTRA) strip TEST 4 TIMES DAILY AS NEEDED 10/4/23   America Cam MD   Lancets MISC 1 each by Does not apply route daily 10/3/23   America Cam MD   furosemide (LASIX) 20 MG tablet Take 1 tablet by mouth 2 times daily 10/3/23 3/1/24  America Cam MD   amLODIPine (NORVASC) 5 MG tablet Take 1 tablet by mouth daily 7/11/23   Elizabeth Anton MD   diclofenac sodium (VOLTAREN) 1 % GEL Apply 4 g topically 4 times daily  Patient not taking: Reported on 1/17/2024 8/18/23   Jeannette Barkley MD   fluticasone furoate-vilanterol (BREO ELLIPTA) 100-25 MCG/ACT inhaler Inhale 1 puff into the lungs daily 3/24/23   Alejandro Sampson MD   OneTouch Delica Lancets 33G MISC Apply 1 Units topically 2 times daily USE 2 TO 3 TIMES DAILY AS DIRECTED 1/12/23 10/3/23  America Cam MD   buprenorphine-naloxone (SUBOXONE) 8-2 MG FILM SL film dissolve 1/2 FILM under the tongue twice a day 11/14/22   Elizabeth Anton MD   sertraline (ZOLOFT) 50 MG tablet  10/31/22   Elizabeth Anton MD   hydrOXYzine HCl (ATARAX) 25 MG tablet  9/13/22   Provider, MD Elizabeth   vitamin D-3 (CHOLECALCIFEROL) 125 MCG (5000 UT) TABS Take 1 tablet by mouth daily  Patient not taking: Reported on 1/17/2024 1/19/22   America Cam MD

## 2024-02-22 NOTE — ED PROVIDER NOTES
[facility]  eMERGENCY dEPARTMENT eNCOUnter   Attending Attestation     Pt Name: Donna Landry  MRN: [id number]  Birthdate 1955  Date of evaluation: 2/22/24       Donna Landry is a 68 y.o. female who presents with Chest Pain (Seen yesterday for it)      11:40 PM EST      History: Patient presents with chest pain since yesterday.  Patient was seen for it here, patient signed out AGAINST MEDICAL ADVICE and when to return to be admitted.  Patient also had hyponatremia.    Exam: Heart rate and rhythm are regular.  Lungs are clear to station bilaterally.  Abdomen is soft, nontender.  Patient awake and alert and acting appropriately.    Will repeat labs, if still hypokalemia will plan for repletion and likely observation admission.    I performed a history and physical examination of the patient and discussed management with the resident. I reviewed the resident’s note and agree with the documented findings and plan of care. Any areas of disagreement are noted on the chart. I was personally present for the key portions of any procedures. I have documented in the chart those procedures where I was not present during the key portions. I have personally reviewed all images and agree with the resident's interpretation. I have reviewed the emergency nurses triage note. I agree with the chief complaint, past medical history, past surgical history, allergies, medications, social and family history as documented unless otherwise noted below. Documentation of the HPI, Physical Exam and Medical Decision Making performed by medical students or scribes is based on my personal performance of the HPI, PE and MDM. For Phys Assistant/ Nurse Practitioner cases/documentation I have had a face to face evaluation of this patient and have completed at least one if not all key elements of the E/M (history, physical exam, and MDM). Additional findings are as noted.    For APC cases I have personally evaluated and examined the

## 2024-02-22 NOTE — PROGRESS NOTES
Medicare Annual Wellness Visit    Donna Landry is here for Medicare AWV and Follow-up (Went to er potassium was only 2.3)    Assessment & Plan   Medicare annual wellness visit, subsequent  Claudication of both lower extremities (HCC)  Chronic heart failure with preserved ejection fraction (HCC)  Chronic obstructive pulmonary disease, unspecified COPD type (HCC)  Moderate episode of recurrent major depressive disorder (HCC)  Hep C w/o coma, chronic (HCC)  Type 2 diabetes mellitus with diabetic polyneuropathy, without long-term current use of insulin (HCC)  -     POCT glycosylated hemoglobin (Hb A1C)  Nausea  -     promethazine (PHENERGAN) 25 MG tablet; Take 1 tablet by mouth 4 times daily as needed for Nausea, Disp-20 tablet, R-0Normal    Recommendations for Preventive Services Due: see orders and patient instructions/AVS.  Recommended screening schedule for the next 5-10 years is provided to the patient in written form: see Patient Instructions/AVS.     Return in 3 months (on 5/22/2024) for Follow up DM/HTN.     Subjective   The following acute and/or chronic problems were also addressed today:  She states that she was seen in ER this morning with complaints of nausea and vomiting.  She states that she has been feeling dizzy lately and went to ER this morning where she was found to have hypokalemia.  She was given potassium replacement which improved the potassium levels.  She was advised to be admitted but she left hospital and she states she had to take care of her kids.  She was advised to get a stress test done and she states that she is scheduled to have it done tomorrow morning.  She has been taking Zofran frequently in past few months because of her ongoing issues with nausea.  She agrees to switch to promethazine due to concern for Zofran causing prolonged QT.  She states that she has an appointment scheduled with gastroenterology next month for her hepatitis C.  She agrees to go back to ER if she still

## 2024-02-22 NOTE — ED NOTES
50 mL IVPB premix    potassium bicarb-citric acid (EFFER-K) effervescent tablet 40 mEq    potassium chloride 10 mEq/100 mL IVPB (Peripheral Line)       SURGICAL HISTORY       Past Surgical History:   Procedure Laterality Date    ARTHRODESIS Right 5/20/2022    RIGHT FOOT HARDWARE REMOVAL    RIGHT 1ST MTP BUNION IMPLANT REVISION    TranscribeMe performed by Eder Roís DPM at [facility] OR    BACK SURGERY  2008    BUNIONECTOMY Right 4/30/2021    RIGHT FOOT 1ST MPJ ARTHROPLASTY WITH EXTENSOR HALLUCIS LONGUS LENGTHENING performed by Eder Ríos DPM at [facility] OR    CARDIAC SURGERY       cath dec.2013    COLON SURGERY      COLONOSCOPY      COLONOSCOPY  01/23/2015    severe diverticula    COLONOSCOPY  09/20/2016    HYPERPLASTIC POLYP X 2     COLONOSCOPY N/A 03/14/2019    COLONOSCOPY DIAGNOSTIC performed by Rosy Wharton MD at [facility]    COLONOSCOPY N/A 01/09/2020    COLONOSCOPY WITH BIOPSY performed by Rosy Wharton MD at [facility] OR    ENDOSCOPY, COLON, DIAGNOSTIC      FOOT SURGERY      HERNIA REPAIR      UMBILICAL    HYSTERECTOMY (CERVIX STATUS UNKNOWN)      LUMBAR SPINE SURGERY  09/24/2013    decompression & re-exploration L3-4, L4-5    PACEMAKER INSERTION      for very slow heart beat (per patient)    PACEMAKER PLACEMENT      SIGMOIDOSCOPY N/A 06/26/2015    flexable sigmoidscopy,HYPERPLASTIC POLYP    TONSILLECTOMY      UPPER GASTROINTESTINAL ENDOSCOPY N/A 03/14/2019    EGD BIOPSY performed by Rosy Wharton MD at [facility]       PAST MEDICAL HISTORY       Past Medical History:   Diagnosis Date    RACHEL (acute kidney injury) (HCC) 01/22/2014    Arthritis     generalized    Bilateral chronic knee pain 5/13/2016    Bronchiectasis without complication (HCC)     Cancer (HCC) 2004    uterus    CHF (congestive heart failure) (HCC)     Chronic bilateral low back pain with right-sided sciatica 02/20/2017    Chronic bronchitis (HCC)     Chronic bronchitis (HCC)     Chronic respiratory failure with  hypoxia (HCC)     Cigarette nicotine dependence without complication 2/20/2017    COPD (chronic obstructive pulmonary disease) (HCC)     on inhaler /  COPD with chronic bronchitis and emphysema    Current smoker on some days     Depression 10/30/2014    Diabetic polyneuropathy associated with type 2 diabetes mellitus (HCC)     Diverticulosis     Essential hypertension 12/30/2013    Full dentures     GERD (gastroesophageal reflux disease)     Hep C w/o coma, chronic (HCC)     Hyperlipidemia     Hyperplastic polyp of intestine     Hypertension     DR. MCDANIEL-CARDIO    Irritable bowel syndrome with both constipation and diarrhea 2/15/2019    Liver disease     hepatitis C    Moderate episode of recurrent major depressive disorder (HCC) 10/30/2014    Nasal polyposis     Noncompliance with CPAP treatment     Obesity (BMI 35.0-39.9 without comorbidity)     On home O2     2 liters PRN per pt    JIMENA (obstructive sleep apnea)     JIMENA on CPAP    Pacemaker 2012    Personal history of tobacco use, presenting hazards to health 4/2/2015    Pneumonia 07/2012    RECENTLY HOSPITALIZED    Post-op pain     Primary insomnia 9/30/2016    Pulmonary edema cardiac cause (HCC)     Rectal bleeding     Smoking addiction     Tobacco abuse        Labs:  Labs Reviewed   CBC WITH AUTO DIFFERENTIAL - Abnormal; Notable for the following components:       Result Value    Immature Granulocytes 1 (*)     All other components within normal limits   URINALYSIS WITH REFLEX TO CULTURE - Abnormal; Notable for the following components:    Glucose, Ur 3+ (*)     Ketones, Urine TRACE (*)     Specific Gravity, UA 1.035 (*)     Protein, UA 1+ (*)     All other components within normal limits   COMPREHENSIVE METABOLIC PANEL - Abnormal; Notable for the following components:    Potassium 2.3 (*)     Glucose 112 (*)     Calcium 8.2 (*)     Total Protein 6.0 (*)     Total Bilirubin 0.2 (*)     All other components within normal limits   TROPONIN   MICROSCOPIC

## 2024-02-22 NOTE — ED PROVIDER NOTES
[facility]  Emergency Department Encounter  Emergency Medicine Resident     Pt Name:Donna Landry  MRN: [id number]  Birthdate 1955  Date of evaluation: 2/22/24  PCP:  America Cam MD  Note Started: 5:56 PM EST      CHIEF COMPLAINT       Chief Complaint   Patient presents with    Chest Pain     Seen yesterday for it       HISTORY OF PRESENT ILLNESS  (Location/Symptom, Timing/Onset, Context/Setting, Quality, Duration, Modifying Factors, Severity.)      Donna Landry is a 68 y.o. female who presents with chest pain.  Patient has history of congestive heart failure.  Denies history of any cardiac.  Was seen earlier today for similar complaints at which time she had laboratory workup done which showed hypokalemia.  Recommendation at that time was for admission, however patient states she could not stay.  Patient went home and seen by her primary care provider who recommended she be reevaluated in the emergency department.  Patient also worked endorses vomiting.  Has been taking Zofran at home without relief.    PAST MEDICAL / SURGICAL / SOCIAL / FAMILY HISTORY      has a past medical history of RACHEL (acute kidney injury) ([facility]), Arthritis, Bilateral chronic knee pain, Bronchiectasis without complication (HCC), Cancer (HCC), CHF (congestive heart failure) (HCC), Chronic bilateral low back pain with right-sided sciatica, Chronic bronchitis (HCC), Chronic bronchitis (HCC), Chronic respiratory failure with hypoxia (HCC), Cigarette nicotine dependence without complication, COPD (chronic obstructive pulmonary disease) ([facility]), Current smoker on some days, Depression, Diabetic polyneuropathy associated with type 2 diabetes mellitus (HCC), Diverticulosis, Essential hypertension, Full dentures, GERD (gastroesophageal reflux disease), Hep C w/o coma, chronic (HCC), Hyperlipidemia, Hyperplastic polyp of intestine, Hypertension, Irritable bowel syndrome with both constipation and diarrhea, Liver disease, Moderate  treatment with Reglan as the patient has not responded well to Zofran at home.    Amount and/or Complexity of Data Reviewed  Labs: ordered.  ECG/medicine tests: ordered.    Risk  Prescription drug management.  Decision regarding hospitalization.          EMERGENCY DEPARTMENT COURSE:      ED Course as of 02/24/24 1242   Thu Feb 22, 2024 1916 EKG showing atrial paced rhythm.  Prolonged AV conduction with a ID interval 242.  Left axis deviation.  Prolonged QTc at 479.  No acute ST segment elevation, however T wave inversion in V1 V2.    Plan for admission to observation service.  Discussed with the patient who is agreeable. [BL]      ED Course User Index  [BL] Rachele Alcala DO       PROCEDURES:      CONSULTS:  IP CONSULT TO CARDIOLOGY    CRITICAL CARE:  There was significant risk of life threatening deterioration of patient's condition requiring my direct management. Critical care time 0 minutes, excluding any documented procedures.    FINAL IMPRESSION      1. Chest pain, unspecified type    2. Hypokalemia          DISPOSITION / PLAN     DISPOSITION Admitted 02/22/2024 07:19:31 PM      PATIENT REFERRED TO:  America Cam MD  [address] [place] [address]  [phone]    Follow up      [facility]  [address] [place] [address]  [phone]  Follow up        DISCHARGE MEDICATIONS:  Discharge Medication List as of 2/23/2024  3:34 PM        START taking these medications    Details   ondansetron (ZOFRAN-ODT) 4 MG disintegrating tablet Take 1 tablet by mouth every 4 hours as needed for Nausea or Vomiting, Disp-21 tablet, R-0Normal      metoclopramide (REGLAN) 10 MG tablet Take 1 tablet by mouth 2 times daily as needed (abdominal pain, nausea), Disp-120 tablet, R-0Normal             Rachele Alcala DO  Emergency Medicine Resident    (Please note that portions of this note were completed with a voice recognition program.  Efforts were made to edit the dictations but

## 2024-02-22 NOTE — ED PROVIDER NOTES
[facility]   Emergency Department  Faculty Attestation       I performed a history and physical examination of the patient and discussed management with the resident. I reviewed the resident’s note and agree with the documented findings including all diagnostic interpretations and plan of care. Any areas of disagreement are noted on the chart. I was personally present for the key portions of any procedures. I have documented in the chart those procedures where I was not present during the key portions. I have reviewed the emergency nurses triage note. I agree with the chief complaint, past medical history, past surgical history, allergies, medications, social and family history as documented unless otherwise noted below.  For Physician Assistant/ Nurse Practitioner cases/documentation I have personally evaluated this patient and have completed at least one if not all key elements of the E/M (history, physical exam, and MDM). Additional findings are as noted.    Patient Name: Donna Landry  MRN: [id number]  : 1955  Primary Care Physician: America Cam MD    Date of evaluationa: 2024   Note Started: 1:18 AM EST    Pertinent Comments     Chief Complaint: No chief complaint on file.       Initial vitals: (If not listed, please see nursing documentation)  ED Triage Vitals   BP Temp Temp src Pulse Resp SpO2 Height Weight   -- -- -- -- -- -- -- --        HPI/PE/Impression:  This is a 68 y.o. female who presents to the Emergency Department w/ nausea for ~ 2 weeks.  Mild epigastric pain. No vomiting. No diarrhea.  Passing gas.  Is having some chest pain and is scheduled for a stress test on Friday.     Medical Decision Making     EKG Interpretation    Interpreted by emergency department physician    Clinical Impression: Atrial paced with prolonged AV conduction.  Age-indeterminate inferior and anterolateral infarct.    Johana Kurtz MD    Critical Care: {CCTime:[id number]::\"None \"}    Johana

## 2024-02-22 NOTE — PATIENT INSTRUCTIONS
you from feeling overwhelmed.  Help you manage a health problem. For example, ask them to go to doctor visits with you. Your loved ones can offer support by being involved in your medical care.  Respect your relationships  A good relationship is also a two-way street. You count on help from others, but they also count on you.  Know your friends' limits. You don't have to see or call your friends every day. If you are going through a rough patch, ask friends if you can contact them outside of the usual boundaries.  Don't always complain or talk about yourself. Know when it's time to stop talking and listen or just enjoy your friend's company.  Know that good friends can be a bad influence. For example, if a friend encourages you to drink when you know it will harm you, you may want to end the friendship.  Where can you learn more?  Go to https://www.[employer].net/patientEd and enter G092 to learn more about \"Learning About Emotional Support.\"  Current as of: June 25, 2023               Content Version: 13.9  © 7206-3348 [employer].   Care instructions adapted under license by [employer]. If you have questions about a medical condition or this instruction, always ask your healthcare professional. [employer] disclaims any warranty or liability for your use of this information.           Learning About Stress  What is stress?     Stress is your body's response to a hard situation. Your body can have a physical, emotional, or mental response. Stress is a fact of life for most people, and it affects everyone differently. What causes stress for you may not be stressful for someone else.  A lot of things can cause stress. You may feel stress when you go on a job interview, take a test, or run a race. This kind of short-term stress is normal and even useful. It can help you if you need to work hard or react quickly. For example, stress can help you finish an important job on time.  Long-term

## 2024-02-23 ENCOUNTER — HOSPITAL ENCOUNTER (OUTPATIENT)
Age: 69
Setting detail: OBSERVATION
End: 2024-02-23
Payer: MEDICARE

## 2024-02-23 ENCOUNTER — APPOINTMENT (OUTPATIENT)
Dept: NUCLEAR MEDICINE | Age: 69
End: 2024-02-23
Payer: MEDICARE

## 2024-02-23 ENCOUNTER — CARE COORDINATION (OUTPATIENT)
Dept: CARE COORDINATION | Age: 69
End: 2024-02-23

## 2024-02-23 VITALS
BODY MASS INDEX: 28.62 KG/M2 | RESPIRATION RATE: 18 BRPM | HEIGHT: 72 IN | HEART RATE: 72 BPM | OXYGEN SATURATION: 96 % | SYSTOLIC BLOOD PRESSURE: 134 MMHG | DIASTOLIC BLOOD PRESSURE: 72 MMHG | TEMPERATURE: 97.7 F

## 2024-02-23 LAB
ANION GAP SERPL CALCULATED.3IONS-SCNC: 8 MMOL/L (ref 9–17)
BASOPHILS # BLD: 0.04 K/UL (ref 0–0.2)
BASOPHILS NFR BLD: 1 % (ref 0–2)
BUN SERPL-MCNC: 7 MG/DL (ref 8–23)
CA-I BLD-SCNC: 1.22 MMOL/L (ref 1.13–1.33)
CALCIUM SERPL-MCNC: 9 MG/DL (ref 8.6–10.4)
CHLORIDE SERPL-SCNC: 103 MMOL/L (ref 98–107)
CO2 SERPL-SCNC: 27 MMOL/L (ref 20–31)
CREAT SERPL-MCNC: 0.5 MG/DL (ref 0.5–0.9)
EKG ATRIAL RATE: 70 BPM
EKG ATRIAL RATE: 77 BPM
EKG ATRIAL RATE: 79 BPM
EKG P AXIS: -7 DEGREES
EKG P AXIS: 0 DEGREES
EKG P AXIS: 1 DEGREES
EKG P-R INTERVAL: 176 MS
EKG P-R INTERVAL: 242 MS
EKG P-R INTERVAL: 290 MS
EKG Q-T INTERVAL: 418 MS
EKG Q-T INTERVAL: 436 MS
EKG Q-T INTERVAL: 490 MS
EKG QRS DURATION: 100 MS
EKG QRS DURATION: 186 MS
EKG QRS DURATION: 90 MS
EKG QTC CALCULATION (BAZETT): 479 MS
EKG QTC CALCULATION (BAZETT): 493 MS
EKG QTC CALCULATION (BAZETT): 529 MS
EKG R AXIS: -25 DEGREES
EKG R AXIS: -34 DEGREES
EKG R AXIS: 64 DEGREES
EKG T AXIS: -93 DEGREES
EKG T AXIS: 41 DEGREES
EKG T AXIS: 54 DEGREES
EKG VENTRICULAR RATE: 70 BPM
EKG VENTRICULAR RATE: 77 BPM
EKG VENTRICULAR RATE: 79 BPM
EOSINOPHIL # BLD: 0.07 K/UL (ref 0–0.44)
EOSINOPHILS RELATIVE PERCENT: 1 % (ref 1–4)
ERYTHROCYTE [DISTWIDTH] IN BLOOD BY AUTOMATED COUNT: 14.3 % (ref 11.8–14.4)
GFR SERPL CREATININE-BSD FRML MDRD: >60 ML/MIN/1.73M2
GLUCOSE BLD-MCNC: 131 MG/DL (ref 65–105)
GLUCOSE SERPL-MCNC: 109 MG/DL (ref 70–99)
HCT VFR BLD AUTO: 42.3 % (ref 36.3–47.1)
HGB BLD-MCNC: 14.3 G/DL (ref 11.9–15.1)
IMM GRANULOCYTES # BLD AUTO: <0.03 K/UL (ref 0–0.3)
IMM GRANULOCYTES NFR BLD: 0 %
LYMPHOCYTES NFR BLD: 2.76 K/UL (ref 1.1–3.7)
LYMPHOCYTES RELATIVE PERCENT: 43 % (ref 24–43)
MAGNESIUM SERPL-MCNC: 2.2 MG/DL (ref 1.6–2.6)
MCH RBC QN AUTO: 30.8 PG (ref 25.2–33.5)
MCHC RBC AUTO-ENTMCNC: 33.8 G/DL (ref 28.4–34.8)
MCV RBC AUTO: 91 FL (ref 82.6–102.9)
MONOCYTES NFR BLD: 0.72 K/UL (ref 0.1–1.2)
MONOCYTES NFR BLD: 11 % (ref 3–12)
NEUTROPHILS NFR BLD: 44 % (ref 36–65)
NEUTS SEG NFR BLD: 2.89 K/UL (ref 1.5–8.1)
NRBC BLD-RTO: 0 PER 100 WBC
PLATELET # BLD AUTO: NORMAL K/UL (ref 138–453)
PLATELET, FLUORESCENCE: NORMAL K/UL (ref 138–453)
POTASSIUM SERPL-SCNC: 3.5 MMOL/L (ref 3.7–5.3)
RBC # BLD AUTO: 4.65 M/UL (ref 3.95–5.11)
SODIUM SERPL-SCNC: 138 MMOL/L (ref 135–144)
WBC OTHER # BLD: 6.5 K/UL (ref 3.5–11.3)

## 2024-02-23 PROCEDURE — 3430000000 HC RX DIAGNOSTIC RADIOPHARMACEUTICAL: Performed by: EMERGENCY MEDICINE

## 2024-02-23 PROCEDURE — 6370000000 HC RX 637 (ALT 250 FOR IP)

## 2024-02-23 PROCEDURE — 93017 CV STRESS TEST TRACING ONLY: CPT

## 2024-02-23 PROCEDURE — 83735 ASSAY OF MAGNESIUM: CPT

## 2024-02-23 PROCEDURE — 85025 COMPLETE CBC W/AUTO DIFF WBC: CPT

## 2024-02-23 PROCEDURE — 85055 RETICULATED PLATELET ASSAY: CPT

## 2024-02-23 PROCEDURE — 93010 ELECTROCARDIOGRAM REPORT: CPT | Performed by: INTERNAL MEDICINE

## 2024-02-23 PROCEDURE — 6360000002 HC RX W HCPCS: Performed by: STUDENT IN AN ORGANIZED HEALTH CARE EDUCATION/TRAINING PROGRAM

## 2024-02-23 PROCEDURE — A9500 TC99M SESTAMIBI: HCPCS | Performed by: EMERGENCY MEDICINE

## 2024-02-23 PROCEDURE — 96376 TX/PRO/DX INJ SAME DRUG ADON: CPT

## 2024-02-23 PROCEDURE — 82947 ASSAY GLUCOSE BLOOD QUANT: CPT

## 2024-02-23 PROCEDURE — 82330 ASSAY OF CALCIUM: CPT

## 2024-02-23 PROCEDURE — G0378 HOSPITAL OBSERVATION PER HR: HCPCS

## 2024-02-23 PROCEDURE — 2580000003 HC RX 258: Performed by: EMERGENCY MEDICINE

## 2024-02-23 PROCEDURE — 93005 ELECTROCARDIOGRAM TRACING: CPT | Performed by: EMERGENCY MEDICINE

## 2024-02-23 PROCEDURE — 80048 BASIC METABOLIC PNL TOTAL CA: CPT

## 2024-02-23 PROCEDURE — 6360000002 HC RX W HCPCS

## 2024-02-23 PROCEDURE — 36415 COLL VENOUS BLD VENIPUNCTURE: CPT

## 2024-02-23 PROCEDURE — 2580000003 HC RX 258: Performed by: STUDENT IN AN ORGANIZED HEALTH CARE EDUCATION/TRAINING PROGRAM

## 2024-02-23 PROCEDURE — 96375 TX/PRO/DX INJ NEW DRUG ADDON: CPT

## 2024-02-23 PROCEDURE — 99223 1ST HOSP IP/OBS HIGH 75: CPT | Performed by: INTERNAL MEDICINE

## 2024-02-23 PROCEDURE — 78452 HT MUSCLE IMAGE SPECT MULT: CPT

## 2024-02-23 RX ORDER — ATROPINE SULFATE 0.1 MG/ML
0.5 INJECTION INTRAVENOUS EVERY 5 MIN PRN
Status: CANCELLED | OUTPATIENT
Start: 2024-02-23 | End: 2024-02-23

## 2024-02-23 RX ORDER — ONDANSETRON 4 MG/1
4 TABLET, ORALLY DISINTEGRATING ORAL EVERY 4 HOURS PRN
Qty: 21 TABLET | Refills: 0 | Status: SHIPPED | OUTPATIENT
Start: 2024-02-23

## 2024-02-23 RX ORDER — REGADENOSON 0.08 MG/ML
0.4 INJECTION, SOLUTION INTRAVENOUS
Status: COMPLETED | OUTPATIENT
Start: 2024-02-23 | End: 2024-02-23

## 2024-02-23 RX ORDER — ALBUTEROL SULFATE 90 UG/1
2 AEROSOL, METERED RESPIRATORY (INHALATION) PRN
Status: CANCELLED | OUTPATIENT
Start: 2024-02-23 | End: 2024-02-23

## 2024-02-23 RX ORDER — SODIUM CHLORIDE 0.9 % (FLUSH) 0.9 %
10 SYRINGE (ML) INJECTION PRN
Status: DISCONTINUED | OUTPATIENT
Start: 2024-02-23 | End: 2024-02-23 | Stop reason: HOSPADM

## 2024-02-23 RX ORDER — METOCLOPRAMIDE HYDROCHLORIDE 5 MG/ML
10 INJECTION INTRAMUSCULAR; INTRAVENOUS ONCE
Status: COMPLETED | OUTPATIENT
Start: 2024-02-23 | End: 2024-02-23

## 2024-02-23 RX ORDER — TRAZODONE HYDROCHLORIDE 100 MG/1
100 TABLET ORAL NIGHTLY
Status: DISCONTINUED | OUTPATIENT
Start: 2024-02-23 | End: 2024-02-23 | Stop reason: HOSPADM

## 2024-02-23 RX ORDER — ALBUTEROL SULFATE 90 UG/1
2 AEROSOL, METERED RESPIRATORY (INHALATION) PRN
Status: DISCONTINUED | OUTPATIENT
Start: 2024-02-23 | End: 2024-02-23

## 2024-02-23 RX ORDER — NITROGLYCERIN 0.4 MG/1
0.4 TABLET SUBLINGUAL EVERY 5 MIN PRN
Status: DISCONTINUED | OUTPATIENT
Start: 2024-02-23 | End: 2024-02-23

## 2024-02-23 RX ORDER — SODIUM CHLORIDE 9 MG/ML
500 INJECTION, SOLUTION INTRAVENOUS CONTINUOUS PRN
Status: DISCONTINUED | OUTPATIENT
Start: 2024-02-23 | End: 2024-02-23

## 2024-02-23 RX ORDER — ATROPINE SULFATE 0.1 MG/ML
0.5 INJECTION INTRAVENOUS EVERY 5 MIN PRN
Status: DISCONTINUED | OUTPATIENT
Start: 2024-02-23 | End: 2024-02-23

## 2024-02-23 RX ORDER — TETRAKIS(2-METHOXYISOBUTYLISOCYANIDE)COPPER(I) TETRAFLUOROBORATE 1 MG/ML
14.8 INJECTION, POWDER, LYOPHILIZED, FOR SOLUTION INTRAVENOUS
Status: COMPLETED | OUTPATIENT
Start: 2024-02-23 | End: 2024-02-23

## 2024-02-23 RX ORDER — SODIUM CHLORIDE 9 MG/ML
500 INJECTION, SOLUTION INTRAVENOUS CONTINUOUS PRN
Status: CANCELLED | OUTPATIENT
Start: 2024-02-23 | End: 2024-02-23

## 2024-02-23 RX ORDER — BUPRENORPHINE HYDROCHLORIDE AND NALOXONE HYDROCHLORIDE DIHYDRATE 8; 2 MG/1; MG/1
1 TABLET SUBLINGUAL 2 TIMES DAILY
Status: DISCONTINUED | OUTPATIENT
Start: 2024-02-23 | End: 2024-02-23 | Stop reason: HOSPADM

## 2024-02-23 RX ORDER — METOCLOPRAMIDE 10 MG/1
10 TABLET ORAL 2 TIMES DAILY PRN
Qty: 120 TABLET | Refills: 0 | Status: SHIPPED | OUTPATIENT
Start: 2024-02-23

## 2024-02-23 RX ORDER — POTASSIUM CHLORIDE 20 MEQ/1
40 TABLET, EXTENDED RELEASE ORAL ONCE
Status: COMPLETED | OUTPATIENT
Start: 2024-02-23 | End: 2024-02-23

## 2024-02-23 RX ORDER — METOPROLOL TARTRATE 1 MG/ML
5 INJECTION, SOLUTION INTRAVENOUS EVERY 5 MIN PRN
Status: DISCONTINUED | OUTPATIENT
Start: 2024-02-23 | End: 2024-02-23

## 2024-02-23 RX ORDER — PANTOPRAZOLE SODIUM 40 MG/1
40 TABLET, DELAYED RELEASE ORAL DAILY
Status: DISCONTINUED | OUTPATIENT
Start: 2024-02-23 | End: 2024-02-23 | Stop reason: HOSPADM

## 2024-02-23 RX ORDER — SODIUM CHLORIDE 0.9 % (FLUSH) 0.9 %
5-40 SYRINGE (ML) INJECTION PRN
Status: DISCONTINUED | OUTPATIENT
Start: 2024-02-23 | End: 2024-02-23

## 2024-02-23 RX ORDER — FUROSEMIDE 20 MG/1
20 TABLET ORAL 2 TIMES DAILY
Status: DISCONTINUED | OUTPATIENT
Start: 2024-02-23 | End: 2024-02-23 | Stop reason: HOSPADM

## 2024-02-23 RX ORDER — ATENOLOL 25 MG/1
25 TABLET ORAL DAILY
Status: DISCONTINUED | OUTPATIENT
Start: 2024-02-23 | End: 2024-02-23 | Stop reason: HOSPADM

## 2024-02-23 RX ORDER — KETOROLAC TROMETHAMINE 30 MG/ML
30 INJECTION, SOLUTION INTRAMUSCULAR; INTRAVENOUS ONCE
Status: COMPLETED | OUTPATIENT
Start: 2024-02-23 | End: 2024-02-23

## 2024-02-23 RX ORDER — GABAPENTIN 800 MG/1
800 TABLET ORAL 3 TIMES DAILY
Status: DISCONTINUED | OUTPATIENT
Start: 2024-02-23 | End: 2024-02-23 | Stop reason: HOSPADM

## 2024-02-23 RX ORDER — AMINOPHYLLINE 25 MG/ML
50 INJECTION, SOLUTION INTRAVENOUS PRN
Status: CANCELLED | OUTPATIENT
Start: 2024-02-23 | End: 2024-02-23

## 2024-02-23 RX ORDER — TETRAKIS(2-METHOXYISOBUTYLISOCYANIDE)COPPER(I) TETRAFLUOROBORATE 1 MG/ML
42 INJECTION, POWDER, LYOPHILIZED, FOR SOLUTION INTRAVENOUS
Status: COMPLETED | OUTPATIENT
Start: 2024-02-23 | End: 2024-02-23

## 2024-02-23 RX ORDER — NITROGLYCERIN 0.4 MG/1
0.4 TABLET SUBLINGUAL EVERY 5 MIN PRN
Status: CANCELLED | OUTPATIENT
Start: 2024-02-23 | End: 2024-02-23

## 2024-02-23 RX ORDER — METOPROLOL TARTRATE 1 MG/ML
5 INJECTION, SOLUTION INTRAVENOUS EVERY 5 MIN PRN
Status: CANCELLED | OUTPATIENT
Start: 2024-02-23 | End: 2024-02-23

## 2024-02-23 RX ORDER — REGADENOSON 0.08 MG/ML
0.4 INJECTION, SOLUTION INTRAVENOUS
Status: CANCELLED | OUTPATIENT
Start: 2024-02-23

## 2024-02-23 RX ORDER — AMLODIPINE BESYLATE 5 MG/1
5 TABLET ORAL DAILY
Status: DISCONTINUED | OUTPATIENT
Start: 2024-02-23 | End: 2024-02-23 | Stop reason: HOSPADM

## 2024-02-23 RX ORDER — BUPRENORPHINE AND NALOXONE 8; 2 MG/1; MG/1
0.5 FILM, SOLUBLE BUCCAL; SUBLINGUAL 2 TIMES DAILY
Status: DISCONTINUED | OUTPATIENT
Start: 2024-02-23 | End: 2024-02-23

## 2024-02-23 RX ORDER — ROPINIROLE 0.25 MG/1
0.5 TABLET, FILM COATED ORAL DAILY
Status: DISCONTINUED | OUTPATIENT
Start: 2024-02-23 | End: 2024-02-23 | Stop reason: HOSPADM

## 2024-02-23 RX ORDER — AMINOPHYLLINE 25 MG/ML
50 INJECTION, SOLUTION INTRAVENOUS PRN
Status: DISCONTINUED | OUTPATIENT
Start: 2024-02-23 | End: 2024-02-23

## 2024-02-23 RX ORDER — PRAVASTATIN SODIUM 20 MG
40 TABLET ORAL DAILY
Status: DISCONTINUED | OUTPATIENT
Start: 2024-02-23 | End: 2024-02-23 | Stop reason: HOSPADM

## 2024-02-23 RX ORDER — SODIUM CHLORIDE 0.9 % (FLUSH) 0.9 %
5-40 SYRINGE (ML) INJECTION PRN
Status: CANCELLED | OUTPATIENT
Start: 2024-02-23 | End: 2024-02-23

## 2024-02-23 RX ADMIN — SODIUM CHLORIDE, PRESERVATIVE FREE 10 ML: 5 INJECTION INTRAVENOUS at 11:42

## 2024-02-23 RX ADMIN — GABAPENTIN 800 MG: 800 TABLET, FILM COATED ORAL at 13:31

## 2024-02-23 RX ADMIN — SODIUM CHLORIDE, PRESERVATIVE FREE 30 ML: 5 INJECTION INTRAVENOUS at 09:12

## 2024-02-23 RX ADMIN — SODIUM CHLORIDE: 9 INJECTION, SOLUTION INTRAVENOUS at 09:10

## 2024-02-23 RX ADMIN — ATENOLOL 25 MG: 25 TABLET ORAL at 13:30

## 2024-02-23 RX ADMIN — SODIUM CHLORIDE, PRESERVATIVE FREE 10 ML: 5 INJECTION INTRAVENOUS at 10:02

## 2024-02-23 RX ADMIN — KETOROLAC TROMETHAMINE 30 MG: 30 INJECTION, SOLUTION INTRAMUSCULAR; INTRAVENOUS at 09:11

## 2024-02-23 RX ADMIN — AMLODIPINE BESYLATE 5 MG: 5 TABLET ORAL at 13:29

## 2024-02-23 RX ADMIN — SODIUM CHLORIDE, PRESERVATIVE FREE 10 ML: 5 INJECTION INTRAVENOUS at 11:47

## 2024-02-23 RX ADMIN — TETRAKIS(2-METHOXYISOBUTYLISOCYANIDE)COPPER(I) TETRAFLUOROBORATE 42 MILLICURIE: 1 INJECTION, POWDER, LYOPHILIZED, FOR SOLUTION INTRAVENOUS at 11:47

## 2024-02-23 RX ADMIN — BUPRENORPHINE AND NALOXONE 1 TABLET: 8; 2 TABLET SUBLINGUAL at 09:30

## 2024-02-23 RX ADMIN — SODIUM CHLORIDE, PRESERVATIVE FREE 10 ML: 5 INJECTION INTRAVENOUS at 11:49

## 2024-02-23 RX ADMIN — GABAPENTIN 800 MG: 800 TABLET, FILM COATED ORAL at 10:29

## 2024-02-23 RX ADMIN — TETRAKIS(2-METHOXYISOBUTYLISOCYANIDE)COPPER(I) TETRAFLUOROBORATE 14.8 MILLICURIE: 1 INJECTION, POWDER, LYOPHILIZED, FOR SOLUTION INTRAVENOUS at 10:02

## 2024-02-23 RX ADMIN — EMPAGLIFLOZIN 10 MG: 10 TABLET, FILM COATED ORAL at 13:30

## 2024-02-23 RX ADMIN — METOCLOPRAMIDE 10 MG: 5 INJECTION, SOLUTION INTRAMUSCULAR; INTRAVENOUS at 09:11

## 2024-02-23 RX ADMIN — FUROSEMIDE 20 MG: 20 TABLET ORAL at 09:14

## 2024-02-23 RX ADMIN — PANTOPRAZOLE SODIUM 40 MG: 40 TABLET, DELAYED RELEASE ORAL at 09:14

## 2024-02-23 RX ADMIN — REGADENOSON 0.4 MG: 0.08 INJECTION, SOLUTION INTRAVENOUS at 11:47

## 2024-02-23 RX ADMIN — POTASSIUM CHLORIDE 40 MEQ: 1500 TABLET, EXTENDED RELEASE ORAL at 09:15

## 2024-02-23 ASSESSMENT — PAIN DESCRIPTION - ORIENTATION: ORIENTATION: UPPER;MID

## 2024-02-23 ASSESSMENT — PAIN DESCRIPTION - LOCATION: LOCATION: ABDOMEN

## 2024-02-23 ASSESSMENT — PAIN - FUNCTIONAL ASSESSMENT: PAIN_FUNCTIONAL_ASSESSMENT: ACTIVITIES ARE NOT PREVENTED

## 2024-02-23 ASSESSMENT — PAIN SCALES - GENERAL: PAINLEVEL_OUTOF10: 6

## 2024-02-23 ASSESSMENT — PAIN DESCRIPTION - DESCRIPTORS: DESCRIPTORS: CRAMPING

## 2024-02-23 NOTE — CARE COORDINATION
associated with the providers was provided to:     Patient     The Patient and/or Patient Representative Agree with the Discharge Plan?  yes    ELIZABETH HENAO RN  Case Management Department

## 2024-02-23 NOTE — CONSULTS
daily 10/13/23   America Cam MD   nitroGLYCERIN (NITROSTAT) 0.4 MG SL tablet Place 1 tablet under the tongue every 5 minutes as needed for Chest pain 10/13/23   America Cam MD   tiotropium (SPIRIVA RESPIMAT) 2.5 MCG/ACT AERS inhaler Inhale 2 puffs into the lungs daily 10/6/23 1/25/24  Alejandro Sampson MD   Lancets MISC 1 each by Does not apply route daily 10/3/23   America Cam MD   furosemide (LASIX) 20 MG tablet Take 1 tablet by mouth 2 times daily 10/3/23 3/1/24  America Cam MD   amLODIPine (NORVASC) 5 MG tablet Take 1 tablet by mouth daily 7/11/23   Elizabeth Anton MD   fluticasone furoate-vilanterol (BREO ELLIPTA) 100-25 MCG/ACT inhaler Inhale 1 puff into the lungs daily 3/24/23   Alejandro Sampson MD   OneTouch Delica Lancets 33G MISC Apply 1 Units topically 2 times daily USE 2 TO 3 TIMES DAILY AS DIRECTED 1/12/23 10/3/23  America Cam MD   buprenorphine-naloxone (SUBOXONE) 8-2 MG FILM SL film dissolve 1/2 FILM under the tongue twice a day 11/14/22   Elizabeth Anton MD   sertraline (ZOLOFT) 50 MG tablet  10/31/22   Elizabeth Anton MD   hydrOXYzine HCl (ATARAX) 25 MG tablet  9/13/22   Elizabeth Anton MD       Allergies:  Iv dye [iodides]    Social History:   reports that she has been smoking cigarettes. She started smoking about 55 years ago. She has a 55.1 pack-year smoking history. She has never used smokeless tobacco. She reports that she does not drink alcohol and does not use drugs.     Family History: family history includes Cancer in her mother; Crohn's Disease in her sister; Stroke in her father. No for premature CAD. No for h/o sudden cardiac death    REVIEW OF SYSTEMS:    Constitutional: there has been no unanticipated weight loss. There's been No change in activity level.     Eyes: No visual changes or diplopia. No scleral icterus.  ENT: No Headaches, hearing loss or vertigo.   Cardiovascular: + chest pain, - dyspnea on exertion, - palpitations or No loss of  TRIG 54 02/05/2020 10:58 AM     LIVER PROFILE:  Recent Labs     02/22/24  0214 02/22/24  1815   AST 13 14   ALT 8 10   LABALBU 3.7 4.1       DATA:    Diagnostics:      EKG 2/23/24  AV dual-paced rhythm  Abnormal ECG       Cath 12/9/13  Unable to see results    Echo 7/29/2018  Normal left ventricle size and function with an estimated EF > 55%.  No segmental wall motion abnormalities seen.  Mild left ventricular hypertrophy.  Moderate aortic insufficiency.  Trivial mitral regurgitation.  Mild tricuspid regurgitation.  Estimated right ventricular systolic pressure is 30 mmHg.    Stress 12/9/13  Negative for ischemia.  Evidence of left ventricular hypertrophy.    IMPRESSION:    Epigastric pain, dizziness, nausea  Hypokalemia  Trops 9 > 8 > 8  ECG unremarkable   Stress test already scheduled for today      RECOMMENDATIONS:  Non Cardiac/Epigastric pain and nausea likely secondary to GERD. Has follow up with GI in coming months. Zofran controls nausea for patient at home. Patient has Stress test scheduled for today.   Hypokalemia likely secondary to medication non-compliance. Patient stopped taking potassium medication at home one week ago. Patient is aware of importance of medication now and will continue to take it.     Discussed with patient and nursing.    Pancho Jay  Student, Cardiovascular Diseases   [facility]   Pager - [phone]

## 2024-02-23 NOTE — ED NOTES
ED to inpatient nurses report      Chief Complaint:  Chief Complaint   Patient presents with    Chest Pain     Seen yesterday for it     Present to ED from: Home    MOA:     LOC: alert and orientated to name, place, date  Mobility: Independent  Oxygen Baseline: RA    Current needs required: n/a   Pending ED orders: none  Present condition: stable    Why did the patient come to the ED?   Pt to ED for chest pain, shortness of breath, nausea, and emesis. Pt was seen yesterday in the ER and discharged but states she feels worse. Patient alert and oriented x4, talking in complete sentences. Respirations even and unlabored. Patient placed on continuous cardiac monitoring, BP cuff, and pulse ox. EKG obtained, blood work obtained  What is the plan?   Admission to obs  Any procedures or intervention occur?   EFFER-K for potassium replacement  Any safety concerns??    Mental Status:       Psych Assessment:   Psychosocial  Psychosocial (WDL): Within Defined Limits  Vital signs   Vitals:    02/22/24 1736 02/22/24 1900 02/22/24 1910   BP: 123/69 120/72 130/63   Pulse: 83 70 70   Resp: 16 13 13   Temp: 98 °F (36.7 °C)     SpO2: 93% 95% 99%        Vitals:  Patient Vitals for the past 24 hrs:   BP Temp Pulse Resp SpO2   02/22/24 1910 130/63 -- 70 13 99 %   02/22/24 1900 120/72 -- 70 13 95 %   02/22/24 1736 123/69 98 °F (36.7 °C) 83 16 93 %      Visit Vitals  /63   Pulse 70   Temp 98 °F (36.7 °C)   Resp 13   SpO2 99%        LDAs:   Peripheral IV 02/22/24 Left;Proximal Forearm (Active)   Site Assessment Clean, dry & intact 02/22/24 1900   Line Status Brisk blood return;Flushed 02/22/24 1900       Ambulatory Status:  Presents to emergency department  because of falls (Syncope, seizure, or loss of consciousness): No, Age > 70: No, Altered Mental Status, Intoxication with alcohol or substance confusion (Disorientation, impaired judgment, poor safety awaremess, or inability to follow instructions): No, Impaired Mobility: Ambulates  potassium bicarb-citric acid (EFFER-K) effervescent tablet 40 mEq       SURGICAL HISTORY       Past Surgical History:   Procedure Laterality Date    ARTHRODESIS Right 5/20/2022    RIGHT FOOT HARDWARE REMOVAL    RIGHT 1ST MTP BUNION IMPLANT REVISION    [employer] performed by Eder Ríos DPM at [facility] OR    BACK SURGERY  2008    BUNIONECTOMY Right 4/30/2021    RIGHT FOOT 1ST MPJ ARTHROPLASTY WITH EXTENSOR HALLUCIS LONGUS LENGTHENING performed by Eder Ríos DPM at [facility] OR    CARDIAC SURGERY       cath dec.2013    COLON SURGERY      COLONOSCOPY      COLONOSCOPY  01/23/2015    severe diverticula    COLONOSCOPY  09/20/2016    HYPERPLASTIC POLYP X 2     COLONOSCOPY N/A 03/14/2019    COLONOSCOPY DIAGNOSTIC performed by Rosy Wharton MD at [facility]    COLONOSCOPY N/A 01/09/2020    COLONOSCOPY WITH BIOPSY performed by Rosy Wharton MD at [facility] OR    ENDOSCOPY, COLON, DIAGNOSTIC      FOOT SURGERY      HERNIA REPAIR      UMBILICAL    HYSTERECTOMY (CERVIX STATUS UNKNOWN)      LUMBAR SPINE SURGERY  09/24/2013    decompression & re-exploration L3-4, L4-5    PACEMAKER INSERTION      for very slow heart beat (per patient)    PACEMAKER PLACEMENT      SIGMOIDOSCOPY N/A 06/26/2015    flexable sigmoidscopy,HYPERPLASTIC POLYP    TONSILLECTOMY      UPPER GASTROINTESTINAL ENDOSCOPY N/A 03/14/2019    EGD BIOPSY performed by Rosy Wharton MD at [facility]       PAST MEDICAL HISTORY       Past Medical History:   Diagnosis Date    RACHEL (acute kidney injury) (HCC) 01/22/2014    Arthritis     generalized    Bilateral chronic knee pain 5/13/2016    Bronchiectasis without complication (HCC)     Cancer (HCC) 2004    uterus    CHF (congestive heart failure) (HCC)     Chronic bilateral low back pain with right-sided sciatica 02/20/2017    Chronic bronchitis (HCC)     Chronic bronchitis (HCC)     Chronic respiratory failure with hypoxia (HCC)     Cigarette nicotine dependence without complication 2/20/2017

## 2024-02-23 NOTE — DISCHARGE SUMMARY
furoate-vilanterol 100-25 MCG/ACT inhaler  Commonly known as: Breo Ellipta  Inhale 1 puff into the lungs daily     furosemide 20 MG tablet  Commonly known as: LASIX  Take 1 tablet by mouth 2 times daily     gabapentin 800 MG tablet  Commonly known as: NEURONTIN  Take 1 tablet by mouth 3 times daily for 30 days.     metFORMIN 500 MG tablet  Commonly known as: GLUCOPHAGE  TAKE 1 TABLET BY MOUTH TWICE A DAY WITH MEALS     Mucus Relief 600 MG extended release tablet  Generic drug: guaiFENesin  TAKE 2 TABLETS BY MOUTH 2 TIMES DAILY FOR 10 DAYS.     Nebulizer/Tubing/Mouthpiece Kit  1 kit by Does not apply route daily as needed (Shortness of breath)     nitroGLYCERIN 0.4 MG SL tablet  Commonly known as: NITROSTAT  Place 1 tablet under the tongue every 5 minutes as needed for Chest pain     ONE TOUCH ULTRA 2 w/Device Kit  1 kit by Does not apply route daily     * OneTouch Delica Lancets 33G Misc  Apply 1 Units topically 2 times daily USE 2 TO 3 TIMES DAILY AS DIRECTED     * Lancets Misc  1 each by Does not apply route daily     OneTouch Ultra strip  Generic drug: blood glucose test strips  TEST 4 TIMES DAILY AS NEEDED     pantoprazole 40 MG tablet  Commonly known as: PROTONIX  Take 1 tablet by mouth daily     potassium chloride 20 MEQ extended release tablet  Commonly known as: Klor-Con M20  TAKE 1 TABLET BY MOUTH EVERY DAY     pravastatin 40 MG tablet  Commonly known as: PRAVACHOL  Take 1 tablet by mouth daily     promethazine 25 MG tablet  Commonly known as: PHENERGAN  Take 1 tablet by mouth 4 times daily as needed for Nausea     rOPINIRole 0.5 MG tablet  Commonly known as: REQUIP  Take 1 tablet by mouth daily     tiotropium 2.5 MCG/ACT Aers inhaler  Commonly known as: Spiriva Respimat  Inhale 2 puffs into the lungs daily     tiZANidine 4 MG tablet  Commonly known as: ZANAFLEX  TAKE 1 TABLET BY MOUTH 3 TIMES DAILY AS NEEDED (PAIN).     traZODone 100 MG tablet  Commonly known as: DESYREL  TAKE 1 TABLET BY MOUTH BEFORE  followed daily. Cardiology performed inpatient stress test which was low risk. Imaging results as above.  She is medically stable to be discharged.       Disposition: Home    Patient stated that they will not drive themselves home from the hospital if they have gotten pain killers/ narcotics earlier that day and that they will arrange for transportation on their own or work with the  for a ride. Patient counseled NOT to drive while under the influence of narcotics/ pain killers.     Condition: Good    Patient stable and ready for discharge home. I have discussed plan of care with patient and they are in understanding. They were instructed to read discharge paperwork. All of their questions and concerns were addressed.     Time Spent: 0 day      --  Jelly Phan MD  Emergency Medicine Resident Physician    This dictation was generated by voice recognition computer software.  Although all attempts are made to edit the dictation for accuracy, there may be errors in the transcription that are not intended.

## 2024-02-23 NOTE — CONSULTS
[facility]   Consult Note                 Date:   2/23/2024  Patient name: Donna Landry  Date of admission:  2/22/2024  5:55 PM  MRN:   [id number]  YOB: 1955    Reason for Consult:  chest pain    CHIEF COMPLAINT:  abdominal pain, chest pain    History Obtained From:  Patient, chart    HISTORY OF PRESENT ILLNESS:    The patient is a 68 y.o. female who presents with non-radiating epigastric pain with nausea for 2 weeks with associated dizziness. She denies any episodes of vomiting and says she has been taking zofran at home for the nausea. She reports having unchanged sob and she does have COPD. She was noted to have hypokalemia in the ED and was given oral replacement. The plan was for her to be admitted but patient left AMA to take care of children and then returned later on. She was originally seen at her PCP and suggested to head to the ED due to the 2 weeks of symptoms. She has a stress test ordered already in her chart that she says is scheduled at the office here this afternoon. She also has an appointment with her GI physician coming up in May. Patient reported that she stopped taking her potassium medication at home a week ago because they were really big and she didn't feel as if they were that important. She says she will make sure to keep taking them now.    ED course: Trop 9 > 8 > 8. Potassium 2.3 > 3.5 > 2.9 > 3.5 (received 40 mEq once and scheduled for a second this morning). ECG Atrial paced with prolonged AV conduction. Age-indeterminate inferior and anterolateral infarct. CXR/Lipase/CBC/UA/TSH/BNP/ and rest of CMP unremarkable.       Past Medical History:   has a past medical history of RACHEL (acute kidney injury) ([facility]), Arthritis, Bilateral chronic knee pain, Bronchiectasis without complication ([facility]), Cancer ([facility]), CHF (congestive heart failure) ([facility]), Chronic bilateral low back pain with right-sided sciatica, Chronic bronchitis ([facility]), Chronic bronchitis ([facility]), Chronic  consciousness.   Respiratory: No cough or wheezing, no sputum production. No hematemesis. No pleuritic chest pain   Gastrointestinal: + abdominal pain, nausea, - blood in stools, vomiting.  Genitourinary: No dysuria, trouble voiding, or hematuria.  Musculoskeletal:  No gait disturbance, No weakness or joint complaints.  Neurological: No headache, diplopia, change in muscle strength, numbness or tingling. + dizziness        PHYSICAL EXAM:    Physical Examination:    /66   Pulse 71   Temp 97.7 °F (36.5 °C) (Temporal)   Resp 16   Ht 1.829 m (6')   LMP 12/09/1996   SpO2 98%   BMI 28.62 kg/m²    Constitutional and General Appearance: alert, cooperative, in no apparent resp distress HEENT: PERRL, no cervical lymphadenopathy  Respiratory:  Good respiratory effort. No for increased work of breathing.   On auscultation: clear to auscultation bilaterally, no basilar rales, no wheezing   Cardiovascular:  regular S1 and S2.  No murmur audible   No Jugular venous distention, no hepatojugular reflex  Peripheral pulses are symmetrical and full   Abdomen:  No masses or tenderness  Bowel sounds present  Extremities:   No Cyanosis or Clubbing   Lower extremity edema: No   Skin: Warm and dry  Neurological:  Not done       Labs:     CBC:   Recent Labs     02/22/24  1815 02/23/24  0544   WBC 6.5 6.5   HGB 15.2* 14.3   HCT 46.4 42.3    See Reflexed IPF Result       BMP:   Recent Labs     02/22/24  1815 02/23/24  0544    138   K 2.9* 3.5*   CO2 29 27   BUN 7* 7*   CREATININE 0.6 0.5   LABGLOM >60 >60   GLUCOSE 166* 109*       BNP: No results for input(s): \"BNP\" in the last 72 hours.  PT/INR: No results for input(s): \"PROTIME\", \"INR\" in the last 72 hours.  APTT:No results for input(s): \"APTT\" in the last 72 hours.  CARDIAC ENZYMES:No results for input(s): \"CKTOTAL\", \"CKMB\", \"CKMBINDEX\", \"TROPONINI\" in the last 72 hours.  FASTING LIPID PANEL:  Lab Results   Component Value Date/Time    HDL 39 01/17/2024 02:46 PM

## 2024-02-23 NOTE — H&P
[facility]  CDU / OBSERVATION ENCOUNTER  ATTENDING NOTE     Pt Name: Donna Landry  MRN: [id number]  Birthdate 1955  Date of evaluation: 2/23/24  Patient's PCP is :  America Cam MD    CHIEF COMPLAINT       Chief Complaint   Patient presents with    Chest Pain     Seen yesterday for it         HISTORY OF PRESENT ILLNESS    Donna Landry is a 68 y.o. female who presents with nonradiating epigastric pain with nausea that has been ongoing for 2 weeks.  She has associated dizziness.  No episodes of vomiting due to taking Zofran at home.  History of COPD, no new shortness of breath.  No abnormal bowel movements.  Patient originally left AMA and then returned.    This morning, patient reports that her pain is in her abdomen.  She still feels nauseous.  Denies chest pain.    History was obtained in part through review of the ED chart. When possible, a direct discussion was had with ED nurses, residents, and attendings  REVIEW OF SYSTEMS       General ROS - No fevers, No malaise   Ophthalmic ROS - No discharge, No changes in vision  ENT ROS -  No sore throat, No rhinorrhea,   Respiratory ROS - no shortness of breath, no cough, no  wheezing  Cardiovascular ROS - No chest pain, no dyspnea on exertion  Gastrointestinal ROS -positive abdominal pain, positive nausea, no vomiting, no change in bowel habits, no black or bloody stools  Genito-Urinary ROS - No dysuria, trouble voiding, or hematuria  Musculoskeletal ROS - No myalgias, No arthalgias  Neurological ROS - No headache, no dizziness/lightheadedness, No focal weakness, no loss of sensation  Dermatological ROS - No lesions, No rash     (PQRS) Advance directives on face sheet per hospital policy. No change unless specifically mentioned in chart    PAST MEDICAL HISTORY    has a past medical history of RACHEL (acute kidney injury) (HCC), Arthritis, Bilateral chronic knee pain, Bronchiectasis without complication (HCC), Cancer (HCC), CHF (congestive heart  Notable for the following components:       Result Value    Hemoglobin 15.2 (*)     Neutrophils % 66 (*)     Lymphocytes % 23 (*)     All other components within normal limits   BASIC METABOLIC PANEL - Abnormal; Notable for the following components:    Potassium 2.9 (*)     Glucose 166 (*)     BUN 7 (*)     All other components within normal limits   BASIC METABOLIC PANEL - Abnormal; Notable for the following components:    Potassium 3.5 (*)     Anion Gap 8 (*)     Glucose 109 (*)     BUN 7 (*)     All other components within normal limits   POC GLUCOSE FINGERSTICK - Abnormal; Notable for the following components:    POC Glucose 111 (*)     All other components within normal limits   TROPONIN   LIPASE   HEPATIC FUNCTION PANEL   CALCIUM, IONIZED   CBC WITH AUTO DIFFERENTIAL   MAGNESIUM         CDU IMPRESSION / PLAN      Donna Landry is a 68 y.o. female who presents with epigastric pain, nausea    Epigastric pain, nausea  Hypokalemia at 2.9  Improved to 3.5 this morning, given additional replacement  No leukocytosis  No tenderness on exam, will treat symptomatically  Vague chest pain  Troponins: 9, 8, 8  Cardiology consulted, appreciate recommendations  Stress test ordered  Continue home medications and pain control  Monitor vitals, labs, and imaging  DISPO: pending consults and clinical improvement    CONSULTS:    IP CONSULT TO CARDIOLOGY    PROCEDURES:  Not indicated       PATIENT REFERRED TO:    No follow-up provider specified.    --  Jelly Phan MD   Emergency Medicine Resident    This dictation was generated by voice recognition computer software.  Although all attempts are made to edit the dictation for accuracy, there may be errors in the transcription that are not intended.

## 2024-02-23 NOTE — DISCHARGE INSTRUCTIONS
Take your medication as indicated.  For pain use ibuprofen (Motrin / Advil) or acetaminophen (Tylenol), unless prescribed medications that have acetaminophen in it.  You can take over the counter acetaminophen tablets (1 - 2 tablets of the 500-mg strength every 6 hours) or ibuprofen tablets (2 tablets every 4 hours).    If you have not had a stress test in over a year your primary care physician may order this test as further work-up for your chest pain.  If you have a cardiologist, then you should also call them to discuss further treatment options.    PLEASE RETURN TO THE EMERGENCY DEPARTMENT IMMEDIATELY for worsening symptoms of increasing pain, shortness of breath, feeling of your heart fluttering or racing, swelling to your feet, unable to lay flat, or if you develop any concerning symptoms such as: high fever not relieved by acetaminophen (Tylenol) and/or ibuprofen (Motrin / Advil), chills, persistent nausea and/or vomiting, loss of consciousness, numbness, weakness or tingling in the arms or legs or change in color of the extremities, changes in mental status, persistent headache, blurry vision, loss of bladder / bowel control, unable to follow up with your physician, or other any other care or concern.

## 2024-02-23 NOTE — DISCHARGE SUMMARY
Discharge teaching and instructions completed with patient using teachback method. AVS reviewed; CHF education given to patient in AVS packet. Medications sent to patient's preferred pharmacy. Patient voiced understanding regarding prescriptions, follow up appointments, and care of self at home. Discharged home with all belongings. All questions answered.

## 2024-02-23 NOTE — PROGRESS NOTES
Verbally reviewed medication list with patient; patient verbalized understanding.    Discussed 2000mg/day sodium restricted diet; patient verbalized understanding.    Moderate daily exercise encouraged as tolerated. Discussed rest breaks as needed; patient verbalized understanding.    Patient instructed to weigh self at the same time of each day, using same clothes and same scale; reinforced teaching to monitor for 3-5 lb weight increase over 1-2 days, and to notify the CHF clinic at [phone] or physician office if weight change noted.  Patient verbalized understanding.    Risks of smoking discussed with the patient if applicable; patient strongly discouraged to smoke.  Patient verbalized understanding.    Signs and symptoms of CHF discussed with patient, such as feeling more tired than normal, feeling short of breath, coughing that increases when you lie down, sudden weight gain, swelling of your feet, legs or belly.  Patient verbalized understanding to notify the CHF clinic at [phone] or physician office if these symptoms occur.    Compliance with plan of care and further disease process causes discussed with patient, patient encouraged to keep all follow up appointments.  Patient verbalized understanding.

## 2024-02-23 NOTE — CARE COORDINATION
ACM to attempt outreach, currently admitted to hospital for hypokalemia  Will follow for discharge dispositions.

## 2024-02-23 NOTE — PLAN OF CARE
Problem: Safety - Adult  Goal: Free from fall injury  Outcome: Progressing  Flowsheets (Taken 2/23/2024 0911)  Free From Fall Injury: Instruct family/caregiver on patient safety     Problem: Chronic Conditions and Co-morbidities  Goal: Patient's chronic conditions and co-morbidity symptoms are monitored and maintained or improved  Outcome: Progressing  Flowsheets (Taken 2/23/2024 0911)  Care Plan - Patient's Chronic Conditions and Co-Morbidity Symptoms are Monitored and Maintained or Improved:   Monitor and assess patient's chronic conditions and comorbid symptoms for stability, deterioration, or improvement   Collaborate with multidisciplinary team to address chronic and comorbid conditions and prevent exacerbation or deterioration   Update acute care plan with appropriate goals if chronic or comorbid symptoms are exacerbated and prevent overall improvement and discharge     Problem: Discharge Planning  Goal: Discharge to home or other facility with appropriate resources  Outcome: Progressing  Flowsheets (Taken 2/23/2024 0911)  Discharge to home or other facility with appropriate resources:   Identify barriers to discharge with patient and caregiver   Arrange for needed discharge resources and transportation as appropriate   Identify discharge learning needs (meds, wound care, etc)   Refer to discharge planning if patient needs post-hospital services based on physician order or complex needs related to functional status, cognitive ability or social support system     Problem: Pain  Goal: Verbalizes/displays adequate comfort level or baseline comfort level  Outcome: Progressing  Flowsheets (Taken 2/23/2024 0911)  Verbalizes/displays adequate comfort level or baseline comfort level:   Encourage patient to monitor pain and request assistance   Assess pain using appropriate pain scale   Administer analgesics based on type and severity of pain and evaluate response   Implement non-pharmacological measures as

## 2024-02-23 NOTE — PROGRESS NOTES
[facility]  CDU / OBSERVATION ENCOUNTER  ATTENDING NOTE       I performed a history and physical examination of the patient and discussed management with the resident or midlevel provider. I reviewed the resident or midlevel provider's note and agree with the documented findings and plan of care. Any areas of disagreement are noted on the chart. I was personally present for the key portions of any procedures. I have documented in the chart those procedures where I was not present during the key portions. I have reviewed the nurses notes. I agree with the chief complaint, past medical history, past surgical history, allergies, medications, social and family history as documented unless otherwise noted below.    The Family history, social history, and ROS are effectively unchanged since admission unless noted elsewhere in the chart.    This patient was placed in the observation unit for reevaluation for possible admission to the hospital    The patient is a 68 year old female with history of chronic bronchitis, COPD, tobacco use, DM, HTN, GERD, Hep C, HLD, IBS, JIMENA, and pulmonary edema who presents for evaluation of chest pain. She initially was found to be hypokalemic and plan was to admit but she left AMA. She has an outpatient stress test ordered. The patient returned and agreed to placement in the observation unit. She was given potassium. ED workup was otherwise unremarkable. Plan for cardiology consult and we will recheck her potassium.     Potassium this morning has improved. Cardiology evaluated the patient and has ordered a stress test. Will plan disposition based on results of stress test.     Grace Farias DO, NATALIA  Attending Emergency  Physician

## 2024-02-23 NOTE — DISCHARGE INSTR - COC
Continuity of Care Form    Patient Name: Donna Landry   :  1955  MRN:  [id number]    Admit date:  2024  Discharge date:  ***    Code Status Order: Full Code   Advance Directives:     Admitting Physician:  Juan Carlos Bhatti MD  PCP: America Cam MD    Discharging Nurse: ***  Discharging Hospital Unit/Room#: OBS 16/16-1  Discharging Unit Phone Number: ***    Emergency Contact:   Extended Emergency Contact Information  Primary Emergency Contact: Cary Alvarado  Address: [address] [place] [address]  Home Phone: [phone]  Work Phone: [phone]  Mobile Phone: [phone]  Relation: Other  Hearing or visual needs: None  Other needs: None  Preferred language: English   needed? No  Secondary Emergency Contact: Josiane Flores  Address: N/A   [facility]  Home Phone: [phone]  Work Phone: [phone]  Mobile Phone: [phone]  Relation: Other   needed? No    Past Surgical History:  Past Surgical History:   Procedure Laterality Date    ARTHRODESIS Right 2022    RIGHT FOOT HARDWARE REMOVAL    RIGHT 1ST MTP BUNION IMPLANT REVISION    [employer] [facility] performed by Eder Ríos DPM at [facility] OR    BACK SURGERY      BUNIONECTOMY Right 2021    RIGHT FOOT 1ST MPJ ARTHROPLASTY WITH EXTENSOR HALLUCIS LONGUS LENGTHENING performed by Eder Ríos DPM at [facility] OR    CARDIAC SURGERY       cath dec.2013    COLON SURGERY      COLONOSCOPY      COLONOSCOPY  2015    severe diverticula    COLONOSCOPY  2016    HYPERPLASTIC POLYP X 2     COLONOSCOPY N/A 2019    COLONOSCOPY DIAGNOSTIC performed by Rosy Wharton MD at [facility]    COLONOSCOPY N/A 2020    COLONOSCOPY WITH BIOPSY performed by Rosy hWarton MD at [facility] OR    ENDOSCOPY, COLON, DIAGNOSTIC      FOOT SURGERY      HERNIA REPAIR      UMBILICAL    HYSTERECTOMY (CERVIX STATUS UNKNOWN)      LUMBAR SPINE SURGERY  2013     {Prognosis:0851878744}    Recommended Labs or Other Treatments After Discharge: ***    Physician Certification: I certify the above information and transfer of Donna Landry  is necessary for the continuing treatment of the diagnosis listed and that she requires {Admit to Appropriate Level of Care:23824} for {GREATER/LESS:353096597} 30 days.     Update Admission H&P: {CHP DME Changes in HandP:048065349}    PHYSICIAN SIGNATURE:  Electronically signed by Grace Farias DO on 2/23/24 at 6:21 AM EST

## 2024-02-23 NOTE — PLAN OF CARE
Problem: Safety - Adult  Goal: Free from fall injury  2/23/2024 1559 by Sharmila Johnson RN  Outcome: Adequate for Discharge  2/23/2024 1041 by Sharmila Johnson RN  Outcome: Progressing  Flowsheets (Taken 2/23/2024 0911)  Free From Fall Injury: Instruct family/caregiver on patient safety     Problem: Chronic Conditions and Co-morbidities  Goal: Patient's chronic conditions and co-morbidity symptoms are monitored and maintained or improved  2/23/2024 1559 by Sharmila Johnson RN  Outcome: Adequate for Discharge  2/23/2024 1041 by Sharmila Johnson RN  Outcome: Progressing  Flowsheets (Taken 2/23/2024 0911)  Care Plan - Patient's Chronic Conditions and Co-Morbidity Symptoms are Monitored and Maintained or Improved:   Monitor and assess patient's chronic conditions and comorbid symptoms for stability, deterioration, or improvement   Collaborate with multidisciplinary team to address chronic and comorbid conditions and prevent exacerbation or deterioration   Update acute care plan with appropriate goals if chronic or comorbid symptoms are exacerbated and prevent overall improvement and discharge     Problem: Discharge Planning  Goal: Discharge to home or other facility with appropriate resources  2/23/2024 1559 by Sharmila Johnson RN  Outcome: Adequate for Discharge  2/23/2024 1041 by Sharmila Johnson RN  Outcome: Progressing  Flowsheets (Taken 2/23/2024 0911)  Discharge to home or other facility with appropriate resources:   Identify barriers to discharge with patient and caregiver   Arrange for needed discharge resources and transportation as appropriate   Identify discharge learning needs (meds, wound care, etc)   Refer to discharge planning if patient needs post-hospital services based on physician order or complex needs related to functional status, cognitive ability or social support system     Problem: Pain  Goal: Verbalizes/displays adequate comfort level or baseline comfort level  2/23/2024 1559 by Sharmila Johnson  RN  Outcome: Adequate for Discharge  2/23/2024 1041 by Sharmila Johnson RN  Outcome: Progressing  Flowsheets (Taken 2/23/2024 0911)  Verbalizes/displays adequate comfort level or baseline comfort level:   Encourage patient to monitor pain and request assistance   Assess pain using appropriate pain scale   Administer analgesics based on type and severity of pain and evaluate response   Implement non-pharmacological measures as appropriate and evaluate response   Notify Licensed Independent Practitioner if interventions unsuccessful or patient reports new pain   Consider cultural and social influences on pain and pain management     Problem: Cardiovascular - Adult  Goal: Absence of cardiac dysrhythmias or at baseline  2/23/2024 1559 by Sharmila Johnson RN  Outcome: Adequate for Discharge  2/23/2024 1041 by Sharmila Johnson RN  Outcome: Progressing  Flowsheets (Taken 2/23/2024 0911)  Absence of cardiac dysrhythmias or at baseline:   Monitor cardiac rate and rhythm   Assess for signs of decreased cardiac output   Administer antiarrhythmia medication and electrolyte replacement as ordered

## 2024-02-24 ENCOUNTER — TELEPHONE (OUTPATIENT)
Dept: OTHER | Age: 69
End: 2024-02-24

## 2024-02-24 LAB
ECHO BSA: 2.2 M2
STRESS BASELINE DIAS BP: 65 MMHG
STRESS BASELINE HR: 73 BPM
STRESS BASELINE SYS BP: 128 MMHG
STRESS ESTIMATED WORKLOAD: 1 METS
STRESS PEAK DIAS BP: 60 MMHG
STRESS PEAK SYS BP: 135 MMHG
STRESS PERCENT HR ACHIEVED: 49 %
STRESS POST PEAK HR: 75 BPM
STRESS RATE PRESSURE PRODUCT: NORMAL BPM*MMHG
STRESS TARGET HR: 154 BPM

## 2024-02-24 ASSESSMENT — ENCOUNTER SYMPTOMS
COUGH: 0
VOMITING: 1
NAUSEA: 1
SHORTNESS OF BREATH: 0
ABDOMINAL PAIN: 1

## 2024-02-24 NOTE — TELEPHONE ENCOUNTER
Pt discharged from hospital yesterday. Writer attempted to phone pt to remind of next appt with [facility]. No answer. Unable to leave message phone  says \" wireless customer not available. \"

## 2024-02-26 ENCOUNTER — CARE COORDINATION (OUTPATIENT)
Dept: CARE COORDINATION | Age: 69
End: 2024-02-26

## 2024-02-26 ENCOUNTER — CARE COORDINATION (OUTPATIENT)
Dept: CASE MANAGEMENT | Age: 69
End: 2024-02-26

## 2024-02-26 NOTE — CARE COORDINATION
Care Transitions Outreach Attempt #1  Initial 24 hour call.     Call within 2 business days of discharge: Yes   Attempted to reach patient for transitions of care follow up. Unable to reach patient. Unable to leave message.     Patient: Donna Landry Patient : 1955 MRN: [id number]    Last Discharge Facility       Date Complaint Diagnosis Description Type Department Provider    24 Chest Pain Chest pain, unspecified type ... ED to Hosp-Admission (Discharged) (ADMITTED) [facility] Juan Carlos Bhatti MD; Brook Velarde...    24 Abdominal Pain; Dizziness Hypokalemia ... ED (DISCHARGE) [facility] Johana Kurtz MD; Kalyan Martines...              Was this an external facility discharge? No Discharge Facility: [facility]    Noted following upcoming appointments from discharge chart review:   [facility] follow up appointment(s):   Future Appointments   Date Time Provider Department Center   3/1/2024  1:30 PM [facility] RM 1 [facility]F [facility]   3/15/2024 11:15 AM Alejandro Sampson MD Resp Spec [facility]   2024  1:00 PM Anupama Camilo PA-C [facility]   2024 12:30 PM Rich Gonzalez MD [facility]   2024  1:00 PM America Cam MD [facility]        Brianna Bui LPN  Care Transition Nurse

## 2024-02-26 NOTE — CARE COORDINATION
ACM attempted outreach for CC needs, DM, COPD and CHF management, recent discharge from hospital for chest pain  No answer and unable to LVM at this time.

## 2024-02-27 ENCOUNTER — CARE COORDINATION (OUTPATIENT)
Dept: CASE MANAGEMENT | Age: 69
End: 2024-02-27

## 2024-02-27 NOTE — CARE COORDINATION
Care Transitions Outreach Attempt #2  Initial 24 hour call.     Call within 2 business days of discharge: Yes   Attempted to reach patient for transitions of care follow up. Unable to reach patient. Unable to leave message. HIPAA compliant message left on VM of  requesting a return call.    Patient: Donna Landry Patient : 1955 MRN: [id number]    Last Discharge Facility       Date Complaint Diagnosis Description Type Department Provider    24 Chest Pain Chest pain, unspecified type ... ED to Hosp-Admission (Discharged) (ADMITTED) [facility] Juan Carlos Bhatti MD; Brook Velarde...    24 Abdominal Pain; Dizziness Hypokalemia ... ED (DISCHARGE) [facility] Johana Kurtz MD; Kalyan Martines...              Was this an external facility discharge? No Discharge Facility: [facility]    Noted following upcoming appointments from discharge chart review:   [facility] follow up appointment(s):   Future Appointments   Date Time Provider Department Center   3/1/2024  1:30 PM [facility]IN[facility]  1 [facility]F [facility]   3/15/2024 11:15 AM Alejandro Sampson MD Resp Spec [facility]   2024  1:00 PM Anupama Camilo PA-C [facility]   2024 12:30 PM Rich Gonzalez MD [facility]   2024  1:00 PM America Cam MD [facility]        Brianna Bui LPSHERWIN  Care Transition Nurse

## 2024-03-01 ENCOUNTER — TELEPHONE (OUTPATIENT)
Dept: GASTROENTEROLOGY | Age: 69
End: 2024-03-01

## 2024-03-01 ENCOUNTER — HOSPITAL ENCOUNTER (OUTPATIENT)
Dept: OTHER | Age: 69
Discharge: HOME | End: 2024-03-01
Payer: MEDICARE

## 2024-03-01 VITALS
RESPIRATION RATE: 20 BRPM | DIASTOLIC BLOOD PRESSURE: 60 MMHG | OXYGEN SATURATION: 95 % | BODY MASS INDEX: 29.92 KG/M2 | WEIGHT: 220.6 LBS | SYSTOLIC BLOOD PRESSURE: 120 MMHG | HEART RATE: 89 BPM

## 2024-03-01 DIAGNOSIS — E87.6 HYPOKALEMIA: ICD-10-CM

## 2024-03-01 DIAGNOSIS — E11.42 TYPE 2 DIABETES MELLITUS WITH DIABETIC POLYNEUROPATHY, WITHOUT LONG-TERM CURRENT USE OF INSULIN (HCC): ICD-10-CM

## 2024-03-01 DIAGNOSIS — I50.32 CHRONIC HEART FAILURE WITH PRESERVED EJECTION FRACTION (HCC): Primary | ICD-10-CM

## 2024-03-01 PROCEDURE — 99212 OFFICE O/P EST SF 10 MIN: CPT

## 2024-03-01 PROCEDURE — 99214 OFFICE O/P EST MOD 30 MIN: CPT | Performed by: NURSE PRACTITIONER

## 2024-03-01 RX ORDER — PSEUDOEPHED/ACETAMINOPH/DIPHEN 30MG-500MG
2 TABLET ORAL EVERY 6 HOURS PRN
Qty: 120 TABLET | Refills: 0 | Status: SHIPPED | OUTPATIENT
Start: 2024-03-01

## 2024-03-01 RX ORDER — GABAPENTIN 800 MG/1
800 TABLET ORAL 3 TIMES DAILY
Qty: 90 TABLET | Refills: 0 | Status: SHIPPED | OUTPATIENT
Start: 2024-03-01 | End: 2024-03-31

## 2024-03-01 ASSESSMENT — PAIN DESCRIPTION - ORIENTATION: ORIENTATION: RIGHT

## 2024-03-01 ASSESSMENT — PAIN DESCRIPTION - DESCRIPTORS: DESCRIPTORS: ACHING

## 2024-03-01 ASSESSMENT — ENCOUNTER SYMPTOMS
SHORTNESS OF BREATH: 1
ABDOMINAL PAIN: 0
COUGH: 0
BLOOD IN STOOL: 0
EYE DISCHARGE: 0

## 2024-03-01 ASSESSMENT — PAIN DESCRIPTION - LOCATION: LOCATION: FLANK

## 2024-03-01 ASSESSMENT — PAIN DESCRIPTION - PAIN TYPE: TYPE: CHRONIC PAIN

## 2024-03-01 ASSESSMENT — PAIN - FUNCTIONAL ASSESSMENT: PAIN_FUNCTIONAL_ASSESSMENT: ACTIVITIES ARE NOT PREVENTED

## 2024-03-01 NOTE — TELEPHONE ENCOUNTER
Requested Prescriptions     Pending Prescriptions Disp Refills    gabapentin (NEURONTIN) 800 MG tablet [Pharmacy Med Name: GABAPENTIN 800 MG TABLET] 90 tablet 0     Sig: Take 1 tablet by mouth 3 times daily for 30 days.

## 2024-03-01 NOTE — TELEPHONE ENCOUNTER
Pt called requesting a sooner appt. Pt stated her stomach is swollen, she is in a lot of pain and is having issues using the bathroom. Pt stated she is nauseated.

## 2024-03-01 NOTE — PROGRESS NOTES
up  Still has Foods delivered  from [employer] ; she says now salads         On Guideline-Directed Medication Therapy:   HFpEF   No ACEI / ARB / ARNi:  will order entresto in the future, perhaps next appt.  SGLT2  new start, renal fx remains ok   Diuretic  currently , lasix 20  qd after PCP adjustment .    leg measurements  are up, pt will need more than 2o qd   Pt to take extra lasix 20 mg on MWF.  Hand written instructions given to patient  Patient verbalizes understanding.      #2   KCl20 meq; take qd. Take extra 20 meq in am x 3 d ONLY. Patient verbalizes understanding.        Hand written instructions given to patient      RTC 2  w.   Labs in 1 w to monitor K_           No orders of the defined types were placed in this encounter.    No orders of the defined types were placed in this encounter.      Patientgiven verbal and/or written educational instructions.    Follow up as directed.  I have reviewed and agree with the nursing documentation.  Verbally reviewed medication list with patient; patient verbalized understanding.     Discussed 2000mg/day sodium restricted diet; patient verbalized understanding.     Moderate daily exercise encouraged as tolerated. Discussed rest breaks as needed; patient verbalized understanding.     Patient instructed to weigh self at the same time of each day, using same clothes and same scale; reinforced teaching to monitor for 3-5 lb weight increase over 1-2 days, and to notify the CHF clinic at [phone] or physician office if weight change noted.  Patient verbalized understanding.     Risks of smoking discussed with the patient if applicable; patient strongly discouraged to smoke.  Patient verbalized understanding.     Signs and symptoms of CHF discussed with patient, such as feeling more tired than normal, feeling short of breath, coughing that increases when you lie down, sudden weight gain, swelling of your feet, legs or belly.  Patient verbalized understanding to notify

## 2024-03-01 NOTE — TELEPHONE ENCOUNTER
Requested Prescriptions     Pending Prescriptions Disp Refills    Acetaminophen Extra Strength 500 MG TABS [Pharmacy Med Name: ACETAMINOPHEN 500 MG TABLET] 120 tablet 0     Sig: TAKE 2 TABLETS BY MOUTH EVERY 6 HOURS AS NEEDED FOR PAIN.

## 2024-03-04 ENCOUNTER — CARE COORDINATION (OUTPATIENT)
Dept: CARE COORDINATION | Age: 69
End: 2024-03-04

## 2024-03-04 ENCOUNTER — TELEPHONE (OUTPATIENT)
Dept: FAMILY MEDICINE CLINIC | Age: 69
End: 2024-03-04

## 2024-03-04 DIAGNOSIS — B18.2 HEP C W/O COMA, CHRONIC (HCC): Primary | ICD-10-CM

## 2024-03-04 NOTE — CARE COORDINATION
ACM attempted outreach for CC needs, DM, COPD and CHF management  No answer and unable to LVM at this time.

## 2024-03-06 ENCOUNTER — HOSPITAL ENCOUNTER (OUTPATIENT)
Age: 69
Discharge: HOME | End: 2024-03-06
Payer: MEDICARE

## 2024-03-06 LAB
ANION GAP SERPL CALCULATED.3IONS-SCNC: 9 MMOL/L (ref 9–16)
BUN SERPL-MCNC: 10 MG/DL (ref 8–23)
CALCIUM SERPL-MCNC: 9.8 MG/DL (ref 8.6–10.4)
CHLORIDE SERPL-SCNC: 105 MMOL/L (ref 98–107)
CO2 SERPL-SCNC: 27 MMOL/L (ref 20–31)
CREAT SERPL-MCNC: 0.7 MG/DL (ref 0.5–0.9)
GFR SERPL CREATININE-BSD FRML MDRD: >90 ML/MIN/1.73M2
GLUCOSE SERPL-MCNC: 117 MG/DL (ref 74–99)
POTASSIUM SERPL-SCNC: 4.1 MMOL/L (ref 3.7–5.3)
SODIUM SERPL-SCNC: 141 MMOL/L (ref 136–145)

## 2024-03-06 PROCEDURE — 36415 COLL VENOUS BLD VENIPUNCTURE: CPT

## 2024-03-06 PROCEDURE — 80048 BASIC METABOLIC PNL TOTAL CA: CPT

## 2024-03-11 ENCOUNTER — TELEPHONE (OUTPATIENT)
Dept: GASTROENTEROLOGY | Age: 69
End: 2024-03-11

## 2024-03-11 ENCOUNTER — CARE COORDINATION (OUTPATIENT)
Dept: CARE COORDINATION | Age: 69
End: 2024-03-11

## 2024-03-11 ENCOUNTER — OFFICE VISIT (OUTPATIENT)
Dept: GASTROENTEROLOGY | Age: 69
End: 2024-03-11
Payer: MEDICARE

## 2024-03-11 VITALS
HEIGHT: 72 IN | DIASTOLIC BLOOD PRESSURE: 69 MMHG | OXYGEN SATURATION: 94 % | TEMPERATURE: 97.9 F | BODY MASS INDEX: 28.71 KG/M2 | SYSTOLIC BLOOD PRESSURE: 110 MMHG | WEIGHT: 212 LBS | HEART RATE: 73 BPM

## 2024-03-11 DIAGNOSIS — R11.0 NAUSEA: ICD-10-CM

## 2024-03-11 DIAGNOSIS — R19.4 ALTERED BOWEL HABITS: Primary | ICD-10-CM

## 2024-03-11 DIAGNOSIS — K59.00 CONSTIPATION, UNSPECIFIED CONSTIPATION TYPE: ICD-10-CM

## 2024-03-11 PROCEDURE — 3078F DIAST BP <80 MM HG: CPT | Performed by: INTERNAL MEDICINE

## 2024-03-11 PROCEDURE — G8399 PT W/DXA RESULTS DOCUMENT: HCPCS | Performed by: INTERNAL MEDICINE

## 2024-03-11 PROCEDURE — G8427 DOCREV CUR MEDS BY ELIG CLIN: HCPCS | Performed by: INTERNAL MEDICINE

## 2024-03-11 PROCEDURE — 1090F PRES/ABSN URINE INCON ASSESS: CPT | Performed by: INTERNAL MEDICINE

## 2024-03-11 PROCEDURE — 3074F SYST BP LT 130 MM HG: CPT | Performed by: INTERNAL MEDICINE

## 2024-03-11 PROCEDURE — 3017F COLORECTAL CA SCREEN DOC REV: CPT | Performed by: INTERNAL MEDICINE

## 2024-03-11 PROCEDURE — 99214 OFFICE O/P EST MOD 30 MIN: CPT | Performed by: INTERNAL MEDICINE

## 2024-03-11 PROCEDURE — G8417 CALC BMI ABV UP PARAM F/U: HCPCS | Performed by: INTERNAL MEDICINE

## 2024-03-11 PROCEDURE — 4004F PT TOBACCO SCREEN RCVD TLK: CPT | Performed by: INTERNAL MEDICINE

## 2024-03-11 PROCEDURE — G8484 FLU IMMUNIZE NO ADMIN: HCPCS | Performed by: INTERNAL MEDICINE

## 2024-03-11 PROCEDURE — 1123F ACP DISCUSS/DSCN MKR DOCD: CPT | Performed by: INTERNAL MEDICINE

## 2024-03-11 RX ORDER — BISACODYL 5 MG/1
10 TABLET, DELAYED RELEASE ORAL
Qty: 90 TABLET | Refills: 2 | Status: SHIPPED | OUTPATIENT
Start: 2024-03-11 | End: 2024-06-09

## 2024-03-11 RX ORDER — POLYETHYLENE GLYCOL 3350 17 G/17G
POWDER, FOR SOLUTION ORAL
Qty: 1530 G | Refills: 1 | Status: SHIPPED | OUTPATIENT
Start: 2024-03-11

## 2024-03-11 RX ORDER — POLYETHYLENE GLYCOL 3350, SODIUM SULFATE ANHYDROUS, SODIUM BICARBONATE, SODIUM CHLORIDE, POTASSIUM CHLORIDE 236; 22.74; 6.74; 5.86; 2.97 G/4L; G/4L; G/4L; G/4L; G/4L
4 POWDER, FOR SOLUTION ORAL ONCE
Qty: 4000 ML | Refills: 0 | Status: SHIPPED | OUTPATIENT
Start: 2024-03-11 | End: 2024-03-11

## 2024-03-11 ASSESSMENT — ENCOUNTER SYMPTOMS
ABDOMINAL PAIN: 1
TROUBLE SWALLOWING: 0
ABDOMINAL DISTENTION: 1
NAUSEA: 1
CONSTIPATION: 1

## 2024-03-11 NOTE — PROGRESS NOTES
Reason for Referral:   Follow-up    America Cam MD  [address]  [place] [address]    Chief Complaint   Patient presents with    New Patient     Hepatitis C without coma  Hyperplastic polyp of intestine            HISTORY OF PRESENT ILLNESS: Ms.Sharon AGATA Landry is a 68 y.o. female with a past history remarkable for bronchiectasis, CHF, COPD, hyperlipidemia, hypertension, obstructive sleep apnea, referred for evaluation of altered bowel habits and chronic hepatitis C (treatment completed and eradicated with Harvoni).    The patient reports that she has been having lower quadrant abdominal pain and nausea for a long time.  She has issues with constipation and cannot have a bowel movement for 2 to 3 weeks.  She is currently not using any laxatives apart from Colace.  She was previously given Linzess and had some good response with this.  Her last colonoscopy was in 2019.      Previous Endoscopies    EGD:  mild gastritis      Colonsocopy:  Redundant colon  Suboptimal prep    Previous GI workup   Most recent LFTs 2/22/2024:  AST 14, ALT 10, alk phos 83, total bilirubin 0.4    Past Medical,Family, and Social History reviewed and does not contribute to the patient presentingcondition.    Patient's PMH/PSH,SH,PSYCH Hx, MEDs, ALLERGIES, and ROS were all reviewed and updated in the appropriate sections.    PAST MEDICAL HISTORY:  Past Medical History:   Diagnosis Date    RACHEL (acute kidney injury) (HCC) 01/22/2014    Arthritis     generalized    Bilateral chronic knee pain 5/13/2016    Bronchiectasis without complication (HCC)     Cancer (HCC) 2004    uterus    CHF (congestive heart failure) (HCC)     Chronic bilateral low back pain with right-sided sciatica 02/20/2017    Chronic bronchitis (HCC)     Chronic bronchitis (HCC)     Chronic respiratory failure with hypoxia (HCC)     Cigarette nicotine dependence without complication 2/20/2017    COPD (chronic obstructive pulmonary disease) (HCC)     on inhaler /

## 2024-03-11 NOTE — CARE COORDINATION
ACM attempted outreach for CC needs, CHF, COPD and DM management, well-being  No answer and unable to LVM at this time, multiple attempts, will discharge from CC program at this time  RPM monitoring return referral sent

## 2024-03-11 NOTE — TELEPHONE ENCOUNTER
Procedure scheduled    EGD/Colonoscopy (Diagnostic)/Aziz  Dx: Altered bowel habits; Nausea  Friday 05/03/24 at 1:15 pm/Arrive at 11:15 am  [facility]; Surgery Entrance, back of hospital    PAT Phone Call: Friday 04/19/24 at 10:15 am    EGD Prep/Golytely Split Bowel Prep given to pt in office. Pt instructed in office. Pt states she does not take blood thinners.

## 2024-03-14 ENCOUNTER — CLINICAL DOCUMENTATION (OUTPATIENT)
Dept: PHYSICAL THERAPY | Facility: CLINIC | Age: 69
End: 2024-03-14

## 2024-03-14 DIAGNOSIS — I50.32 CHRONIC HEART FAILURE WITH PRESERVED EJECTION FRACTION (HCC): Primary | ICD-10-CM

## 2024-03-14 NOTE — DISCHARGE SUMMARY
[facility]  [address]  Phone: [phone]  Fax: [phone]      Physical Therapy Discharge Note    Date: 3/14/2024      Patient: Donna Landry  : 1955  MRN: [id number]    Physician: Anupama Camilo PA-C                                            Medical Diagnosis: M25.561, M25.562 (ICD-10-CM) - Pain in both knees, unspecified chronicity  M17.0 (ICD-10-CM) - Arthritis of both knees  Rehab Codes: M25.561, M25.661, M25.562, M25.662, R26.2, R53.1  Onset date: 23                           Total visits attended: 1  Cancels/No shows:   Date of initial visit: 23                   [] Patient recovered from conditions. Treatment goals were met.  [] Patient received maximum benefit. No further therapy indicated at this time.  [] Patient demonstrated improvement from condition with  ** Of  ** Short term goals met.  []Patient demonstrated improvement from condition with **   Of **  Long term goals met.  [] Patient to continue exercise/home instructions independently.  [] Therapy interrupted due to:    [x] Patient has 2 or more no shows/cancels, is discontinued per our policy.    [] Patient has completed prescribed number of treatment sessions.    [] Other:      Pain level at evaluation was      6 /10 and at discharge was      Unknown /10    It Is My Understanding That The:  [] Patient returned to work.  [] Patient demonstrated improved level of function.  [] Patient returned to previous functional level.  [x] Patient's current functional status is unknown due to no-shows/ cancels  [] Other:     Recommendations/Comments:                   Treatment Included:     [] Therapeutic Exercise   79498  [] Iontophoresis: 4 mg/mL Dexamethasone Sodium Phosphate  mAmin  39169   [] Therapeutic Activity  41648 [] Vasopneumatic cold with compression  03018    [] Gait Training   56346 [] Ultrasound   73794   [] Neuromuscular Re-education  33658

## 2024-03-14 NOTE — TELEPHONE ENCOUNTER
Patient smokes 1 pack a day for 45 years as documented in HPI in my note and attending attestation.
Please reorder with correct info on order.  Order pended
Reordered with associated diagnosis
Scheduling calling about pt CT lung screening pt does not qualify for test as pt only smokes half a pack a day. Pt needs minium 1 pack for 30 years. Pt needs to smoke 30 pack years total. [phone] if you have any questions.
Encounter for general medical examination

## 2024-03-15 ENCOUNTER — HOSPITAL ENCOUNTER (OUTPATIENT)
Dept: OTHER | Age: 69
Discharge: HOME | End: 2024-03-15
Payer: MEDICARE

## 2024-03-15 ENCOUNTER — OFFICE VISIT (OUTPATIENT)
Dept: PULMONOLOGY | Age: 69
End: 2024-03-15
Payer: MEDICARE

## 2024-03-15 ENCOUNTER — TELEPHONE (OUTPATIENT)
Dept: GASTROENTEROLOGY | Age: 69
End: 2024-03-15

## 2024-03-15 VITALS
WEIGHT: 214.2 LBS | BODY MASS INDEX: 29.05 KG/M2 | OXYGEN SATURATION: 95 % | DIASTOLIC BLOOD PRESSURE: 82 MMHG | SYSTOLIC BLOOD PRESSURE: 140 MMHG | HEART RATE: 89 BPM

## 2024-03-15 VITALS
OXYGEN SATURATION: 95 % | WEIGHT: 210 LBS | SYSTOLIC BLOOD PRESSURE: 110 MMHG | BODY MASS INDEX: 28.44 KG/M2 | RESPIRATION RATE: 16 BRPM | DIASTOLIC BLOOD PRESSURE: 73 MMHG | HEART RATE: 84 BPM | HEIGHT: 72 IN

## 2024-03-15 DIAGNOSIS — G47.33 OSA (OBSTRUCTIVE SLEEP APNEA): Primary | ICD-10-CM

## 2024-03-15 PROCEDURE — 1090F PRES/ABSN URINE INCON ASSESS: CPT | Performed by: INTERNAL MEDICINE

## 2024-03-15 PROCEDURE — 99212 OFFICE O/P EST SF 10 MIN: CPT

## 2024-03-15 PROCEDURE — G8427 DOCREV CUR MEDS BY ELIG CLIN: HCPCS | Performed by: INTERNAL MEDICINE

## 2024-03-15 PROCEDURE — 3074F SYST BP LT 130 MM HG: CPT | Performed by: INTERNAL MEDICINE

## 2024-03-15 PROCEDURE — G8484 FLU IMMUNIZE NO ADMIN: HCPCS | Performed by: INTERNAL MEDICINE

## 2024-03-15 PROCEDURE — G8417 CALC BMI ABV UP PARAM F/U: HCPCS | Performed by: INTERNAL MEDICINE

## 2024-03-15 PROCEDURE — G8399 PT W/DXA RESULTS DOCUMENT: HCPCS | Performed by: INTERNAL MEDICINE

## 2024-03-15 PROCEDURE — 3017F COLORECTAL CA SCREEN DOC REV: CPT | Performed by: INTERNAL MEDICINE

## 2024-03-15 PROCEDURE — 4004F PT TOBACCO SCREEN RCVD TLK: CPT | Performed by: INTERNAL MEDICINE

## 2024-03-15 PROCEDURE — 99214 OFFICE O/P EST MOD 30 MIN: CPT | Performed by: INTERNAL MEDICINE

## 2024-03-15 PROCEDURE — 1123F ACP DISCUSS/DSCN MKR DOCD: CPT | Performed by: INTERNAL MEDICINE

## 2024-03-15 PROCEDURE — 3078F DIAST BP <80 MM HG: CPT | Performed by: INTERNAL MEDICINE

## 2024-03-15 ASSESSMENT — SLEEP AND FATIGUE QUESTIONNAIRES
HOW LIKELY ARE YOU TO NOD OFF OR FALL ASLEEP WHILE WATCHING TV: 3
ESS TOTAL SCORE: 16
HOW LIKELY ARE YOU TO NOD OFF OR FALL ASLEEP WHEN YOU ARE A PASSENGER IN A CAR FOR AN HOUR WITHOUT A BREAK: 2
HOW LIKELY ARE YOU TO NOD OFF OR FALL ASLEEP WHILE SITTING AND TALKING TO SOMEONE: 2
HOW LIKELY ARE YOU TO NOD OFF OR FALL ASLEEP WHILE LYING DOWN TO REST IN THE AFTERNOON WHEN CIRCUMSTANCES PERMIT: 2
HOW LIKELY ARE YOU TO NOD OFF OR FALL ASLEEP WHILE SITTING QUIETLY AFTER LUNCH WITHOUT ALCOHOL: 2
HOW LIKELY ARE YOU TO NOD OFF OR FALL ASLEEP IN A CAR, WHILE STOPPED FOR A FEW MINUTES IN TRAFFIC: 0
HOW LIKELY ARE YOU TO NOD OFF OR FALL ASLEEP WHILE SITTING AND READING: 3
HOW LIKELY ARE YOU TO NOD OFF OR FALL ASLEEP WHILE SITTING INACTIVE IN A PUBLIC PLACE: 2

## 2024-03-15 NOTE — TELEPHONE ENCOUNTER
Pt called and stated icbinacodyl medication that was recently prescribed, makes her have diarrhea minutes after taking it. Pt would like advice. Please contact pt.

## 2024-03-15 NOTE — PROGRESS NOTES
Date:  3/15/2024  Time:  11:17 AM    CHF Clinic at [facility]    Office: [phone] Fax: [phone]    Re:  Donna Landry   Patient : 1955    Vital Signs: BP (!) 140/82   Pulse 89   Wt 97.2 kg (214 lb 3.2 oz)   LMP 1996   SpO2 95%   BMI 29.05 kg/m²                       O2 Device: None (Room air)                           No results for input(s): \"CBC\", \"HGB\", \"HCT\", \"WBC\", \"PLATELET\", \"NA\", \"K\", \"CL\", \"CO2\", \"BUN\", \"CREATININE\", \"GLUCOSE\", \"BNP\", \"INR\" in the last 72 hours.     Respiratory:    Assessment  Charting Type: Reassessment    Breath Sounds  Right Upper Lobe: Clear  Right Middle Lobe: Clear  Right Lower Lobe: Clear, Diminished  Left Upper Lobe: Clear  Left Lower Lobe: Clear, Diminished    Cough/Sputum  Cough: None         Peripheral Vascular  RLE Edema: +1, Pitting  LLE Edema: Trace    Future Appointments   Date Time Provider Department Center   2024  1:00 PM Anupama Camilo PA-C [facility]   2024 10:00 AM [facility]   2024 10:15 AM [facility] PAT RM 4 [facility] PAT [facility]   2024  1:00 PM America Cam MD [facility]   2024  2:45 PM Alejandro Sampson MD Resp Spec [facility]   2024  9:00 AM Rich Gonzalez MD [facility]      Complaints: Nothing new      Comment : Patient here ambulatory for routine visit. Weight decreased 6 lbs in 2 weeks. Last visit weight, swelling and SOB were all increased. Patient took extra lasix and improvements shown today. States compliance with low salt diet and fluid limits. Is only getting salads now form [facility]. States compliance with all meds. Lasix is 20 mg daily. Seeing pulmonologist also today. Wears oxygen some nights. Next visit here 4 weeks. Encouraged to phone here for any new s/s CHF.     Electronically signed by MARIIA VELASCO RN on 3/15/2024 at 11:17 AM

## 2024-03-15 NOTE — PATIENT INSTRUCTIONS
[email or web address]@        YOU ARE BEING REFERRED TO:    [facility] (a department of [facility])    [address] [place] [address] [address]  [place] [address]    Main Phone Number: [phone]    Phone number for scheduling sleep study: [phone]      Please contact the above numbers to schedule your sleep study.     Once you have completed the FIRST NIGHT you may be asked to return for a SECOND NIGHT if necessary.   After the SECOND NIGHT the sleep center will order a CPAP/BiPAP machine for you.     An order for the machine will be sent to a durable medical equipment company ([employer]).   The sleep center will provide you with the [employer] companies information.     Once you have your machine please contact our office to schedule a follow up appointment.   Your follow up will be scheduled for a date 30-90 days after you have received your machine.   At that follow up visit we will need to have a compliance download from your DME company.   Please contact your [employer] company to have the compliance download faxed to our office at   [phone] for your follow up appointment.       IF YOU HAVE ANY QUESTIONS ABOUT YOUR SLEEP STUDY   PLEASE CONTACT THE SLEEP CENTER.

## 2024-03-15 NOTE — PROGRESS NOTES
Called [facility] to verify about oxygen. Waiting for call back   
accessory muscles.  Very pleasant obese not excessively using accessory muscles no fever no distress   Head:    Normocephalic, without obvious abnormality, atraumatic   Eye examination revealed no jaundice.  No Diego's syndrome.    Nose revealed no polyps.  No sinus tenderness.    Throat examination unremarkable.    Ear examination is unremarkable.                 :    Neck:   Supple, symmetrical, trachea midline, no adenopathy;     thyroid:  no enlargement/tenderness/nodules; no carotid    bruit or JVD none.  Hepatojugular reflux is not elevated neck is short and fat there are some fullness in the left supraclavicular area which is tender no masses no lymph nodes noted.   Back:     Symmetric, no curvature, ROM normal, no CVA tenderness   Lungs:       Lv is increased.  Percussion note is hyperresonant.  Expiration is prolonged.  No rales or rhonchi are audible.  No pleural friction rub is audible.       Chest Wall:    No tenderness or deformity significant tenderness over the anterior chest especially on the second left intercostal space and intercostal cartilages.  No pericardial friction is audible no pleural pericardial rub is audible.  No gallops are audible.          Heart:    Regular rate and rhythm, S1 and S2 normal, no murmur, rub        or gallop no rvh murmurs or gallops no pericardial friction rub                          Abdomen:                                                 Pulses:                                            Lymph nodes:                    Neurologic:                  Soft, non-tender, bowel sounds active all four quadrants,     no masses, no organomegaly         2+ and symmetric all extremities            Cervical, supraclavicular not enlarged or matted or tender      CNII-XII intact, normal strength 5/5 .  Sensation grossly normal  and reflexes normal 2+  throughout     Clubbing No  Lower ext edema No1+   [] , 2 +  [] , 3+   []  Upper ext edema No       Musculoskeletal - no

## 2024-03-15 NOTE — TELEPHONE ENCOUNTER
Writer called patient she noted that she feels like dulcolax was to strong at two a day she notes that she can't even have a sip of water without running to the restroom I explained that this was to be expected with laxative. I asked if patient is also taking miralax she noted that she is not. Patient notes that she was afraid of potassium dropping low and notes pcp told her to take another tablet if she is running to the restroom. I explained for her to stay hydrated and drink electrolytes. Patient notes she will try 1 tablet next time to see if the stool more controlled.

## 2024-03-20 DIAGNOSIS — J41.1 MUCOPURULENT CHRONIC BRONCHITIS (HCC): ICD-10-CM

## 2024-03-20 DIAGNOSIS — E11.42 TYPE 2 DIABETES MELLITUS WITH DIABETIC POLYNEUROPATHY, WITHOUT LONG-TERM CURRENT USE OF INSULIN (HCC): ICD-10-CM

## 2024-03-20 RX ORDER — PSEUDOEPHED/ACETAMINOPH/DIPHEN 30MG-500MG
2 TABLET ORAL EVERY 6 HOURS PRN
Qty: 120 TABLET | Refills: 0 | Status: SHIPPED | OUTPATIENT
Start: 2024-03-20

## 2024-03-20 RX ORDER — ALBUTEROL SULFATE 90 UG/1
AEROSOL, METERED RESPIRATORY (INHALATION)
Qty: 18 EACH | Refills: 3 | Status: SHIPPED | OUTPATIENT
Start: 2024-03-20

## 2024-03-20 RX ORDER — GABAPENTIN 800 MG/1
800 TABLET ORAL 3 TIMES DAILY
Qty: 90 TABLET | Refills: 2 | Status: SHIPPED | OUTPATIENT
Start: 2024-03-20 | End: 2024-04-19

## 2024-03-20 NOTE — TELEPHONE ENCOUNTER
Donna Landry is calling to request a refill on the following medication(s):    Medication Request:  Requested Prescriptions     Pending Prescriptions Disp Refills    albuterol sulfate HFA (PROVENTIL;VENTOLIN;PROAIR) 108 (90 Base) MCG/ACT inhaler [Pharmacy Med Name: ALBUTEROL HFA (VENTOLIN) INH] 18 each 3     Sig: INHALE 2 PUFFS BY MOUTH EVERY 6 HOURS AS NEEDED FOR WHEEZE       Last Visit Date (If Applicable):  2/22/2024    Next Visit Date:    5/22/2024

## 2024-03-20 NOTE — TELEPHONE ENCOUNTER
Donna Landry is calling to request a refill on the following medication(s):    Medication Request:  Requested Prescriptions     Pending Prescriptions Disp Refills    gabapentin (NEURONTIN) 800 MG tablet [Pharmacy Med Name: GABAPENTIN 800 MG TABLET] 90 tablet 0     Sig: Take 1 tablet by mouth 3 times daily for 30 days.    Acetaminophen Extra Strength 500 MG TABS [Pharmacy Med Name: ACETAMINOPHEN 500 MG TABLET] 120 tablet 0     Sig: TAKE 2 TABLETS BY MOUTH EVERY 6 HOURS AS NEEDED FOR PAIN.       Last Visit Date (If Applicable):  2/22/2024    Next Visit Date:    5/22/2024

## 2024-03-23 DIAGNOSIS — K21.9 GASTROESOPHAGEAL REFLUX DISEASE WITHOUT ESOPHAGITIS: ICD-10-CM

## 2024-03-25 RX ORDER — PSEUDOEPHED/ACETAMINOPH/DIPHEN 30MG-500MG
2 TABLET ORAL EVERY 6 HOURS PRN
Qty: 120 TABLET | Refills: 0 | Status: SHIPPED | OUTPATIENT
Start: 2024-03-25

## 2024-03-25 RX ORDER — HYDROCHLOROTHIAZIDE 12.5 MG/1
12.5 CAPSULE, GELATIN COATED ORAL EVERY MORNING
Qty: 90 CAPSULE | Refills: 1 | OUTPATIENT
Start: 2024-03-25

## 2024-03-25 RX ORDER — PANTOPRAZOLE SODIUM 40 MG/1
40 TABLET, DELAYED RELEASE ORAL DAILY
Qty: 90 TABLET | Refills: 1 | Status: SHIPPED | OUTPATIENT
Start: 2024-03-25

## 2024-03-25 NOTE — TELEPHONE ENCOUNTER
Donna Landry is calling to request a refill on the following medication(s):    Medication Request:  Requested Prescriptions     Pending Prescriptions Disp Refills    pantoprazole (PROTONIX) 40 MG tablet [Pharmacy Med Name: PANTOPRAZOLE SOD DR 40 MG TAB] 90 tablet 1     Sig: TAKE 1 TABLET BY MOUTH EVERY DAY    Acetaminophen Extra Strength 500 MG TABS [Pharmacy Med Name: ACETAMINOPHEN 500 MG TABLET] 120 tablet 0     Sig: TAKE 2 TABLETS BY MOUTH EVERY 6 HOURS AS NEEDED FOR PAIN.    hydroCHLOROthiazide 12.5 MG capsule [Pharmacy Med Name: HYDROCHLOROTHIAZIDE 12.5 MG CP] 90 capsule 1     Sig: TAKE 1 CAPSULE BY MOUTH EVERY DAY IN THE MORNING       Last Visit Date (If Applicable):  2/22/2024    Next Visit Date:    5/22/2024

## 2024-03-27 ENCOUNTER — TELEPHONE (OUTPATIENT)
Dept: FAMILY MEDICINE CLINIC | Age: 69
End: 2024-03-27

## 2024-03-27 DIAGNOSIS — Z95.0 S/P PLACEMENT OF CARDIAC PACEMAKER: ICD-10-CM

## 2024-03-27 NOTE — TELEPHONE ENCOUNTER
Patient called and stated she misplaced her jardiance and gabapentin. She is wondering if you can send those two prescriptions in to her pharmacy.

## 2024-03-28 DIAGNOSIS — E11.42 TYPE 2 DIABETES MELLITUS WITH DIABETIC POLYNEUROPATHY, WITHOUT LONG-TERM CURRENT USE OF INSULIN (HCC): ICD-10-CM

## 2024-03-28 DIAGNOSIS — I50.32 CHRONIC HEART FAILURE WITH PRESERVED EJECTION FRACTION (HCC): ICD-10-CM

## 2024-03-28 RX ORDER — NITROGLYCERIN 0.4 MG/1
0.4 TABLET SUBLINGUAL EVERY 5 MIN PRN
Qty: 25 TABLET | Refills: 11 | Status: SHIPPED | OUTPATIENT
Start: 2024-03-28

## 2024-03-28 RX ORDER — GABAPENTIN 800 MG/1
800 TABLET ORAL 3 TIMES DAILY
Qty: 90 TABLET | Refills: 2 | Status: SHIPPED | OUTPATIENT
Start: 2024-03-28 | End: 2024-04-27

## 2024-03-28 NOTE — TELEPHONE ENCOUNTER
Donna Landry is calling to request a refill on the following medication(s):    Medication Request:  Requested Prescriptions     Pending Prescriptions Disp Refills    nitroGLYCERIN (NITROSTAT) 0.4 MG SL tablet [Pharmacy Med Name: NITROGLYCERIN 0.4 MG TABLET SL] 25 tablet 11     Sig: PLACE 1 TABLET UNDER THE TONGUE EVERY 5 MINUTES AS NEEDED FOR CHEST PAIN.       Last Visit Date (If Applicable):  2/22/2024    Next Visit Date:    5/22/2024                 Miguelina Mayer  is a 6 year old female who presents for her well child evaluation.    Concerns raised today include bedwetting.    Nurses notes (i.e., Visit Notes) reviewed.    In the household no one is ill.     REVIEW OF SYSTEMS see HPI; otherwise denies HEENT, NECK, RESP, CARDIAC, GI, , NEURO or PSYCH Sx.    Speech: normal  Appetite: normal.   Sleep: normal  General development/learning:normal.  Toilet training: normal    History reviewed. No pertinent past medical history.     History reviewed. No pertinent surgical history.     History reviewed. No pertinent family history.    Current Outpatient Medications   Medication Sig Dispense Refill   • hydroCORTisone (CORTIZONE) 2.5 % ointment Apply to affected skin regions twice daily to control inflammation 28.35 g 6     No current facility-administered medications for this visit.        SOCIAL HISTORY:  Lives with : family  /: yes  Car seat: yes  Dental care: yes    PHYSICAL EXAM  Visit Vitals  BP 88/52 (BP Location: RUE - Right upper extremity, Patient Position: Sitting, Cuff Size: Pediatric)   Pulse 108   Ht 3' 3.41\" (1.001 m)   Wt 14.3 kg (31 lb 9.6 oz)   SpO2 97%   BMI 14.31 kg/m²      GENERAL: Miguelina is an alert, vigorous female with appropriate behavior. She is in no acute distress.  SKIN: Skin color, texture, turgor are normal. There are no bruises, rashes, or lesions. There are no signs of injury.  HEAD: The head is atraumatic and normocephalic.  EYES: The eyelids are normal. Both eyes open. There is no fixed deviation of gaze. Corneal light reflex is normal. The monocular cover test reveals no abnormal eye movement. Red reflexes are seen bilaterally; no dark spots are visualized.  EARS:  Patient responds to the spoken word. Exam of the ears reveals the pinnae to be normal. The external auditory canals are clear and the tympanic membranes are normal.   NOSE: The nares and turbinates are normal appearing  THROAT: The oropharynx is normal.  Moist mucous membranes, oropharynx pink, tonsils are normal.   TEETH: The teeth are normal.  NECK: The neck is normal. The thyroid is not palpably enlarged.  LYMPH NODES: There are no palpably enlarged lymph nodes in the neck or groin.  TRUNK AND THORAX: There are no lesions on the trunk.   LUNGS: The lung fields are clear to auscultation.  HEART: The heart rhythm is grossly regular. S1 and S2 are normal. The heart tones are strong. There are no murmurs.   ABDOMEN: There is not an umbilical hernia. The abdomen is flat, soft, and not tender. There are no masses. Neither the liver nor the spleen is enlarged. The bowel sounds are normal.  BACK: The back is normal.  GENITALIA: Normal, Vamsi stage I, female genitalia with no adhesions of the labia minora. There is no vaginal discharge. No inguinal herniae are present.  EXTREMITIES: The hip exam is normal, there is normal range of motion with full abduction of the flexed thigh to the tabletop bilaterally. No asymmetry of hip creases is noted. The Galeazzi test is normal. The legs are of equal length.  The foot exam reveals normal feet. There is no clubbing. There is no edema.  NEUROLOGIC: Normal tone and strength throughout. She is walking normally. Her speech includes full sentences.      ASSESSMENT/PLAN:   1. Well 6 year old female child, normal growth and development  2. Return for next well child exam in 1 year.     Update immunizations today - MMRV, Dtap-IPV and Flu  The parent was counseled about each component of the vaccine, including possible side effects, risks, and benefits.    Bedwetting - normal for this age, continue to follow    GUIDANCE: Age appropriate guidance including healthy eating habits, avoid sweet beverages, school readiness, limit screen time, toilet training, safety/injury prevention, etc are discussed and handouts given as needed.

## 2024-04-01 DIAGNOSIS — F51.01 PRIMARY INSOMNIA: ICD-10-CM

## 2024-04-01 NOTE — TELEPHONE ENCOUNTER
Donna Landry is calling to request a refill on the following medication(s):    Medication Request:  Requested Prescriptions     Pending Prescriptions Disp Refills    traZODone (DESYREL) 100 MG tablet [Pharmacy Med Name: TRAZODONE 100 MG TABLET] 90 tablet 1     Sig: TAKE 1 TABLET BY MOUTH BEFORE BEDTIME       Last Visit Date (If Applicable):  2/22/2024    Next Visit Date:    5/22/2024

## 2024-04-02 RX ORDER — TRAZODONE HYDROCHLORIDE 100 MG/1
TABLET ORAL
Qty: 90 TABLET | Refills: 1 | Status: SHIPPED | OUTPATIENT
Start: 2024-04-02

## 2024-04-03 ENCOUNTER — OFFICE VISIT (OUTPATIENT)
Dept: FAMILY MEDICINE CLINIC | Age: 69
End: 2024-04-03
Payer: MEDICARE

## 2024-04-03 VITALS
RESPIRATION RATE: 16 BRPM | SYSTOLIC BLOOD PRESSURE: 118 MMHG | HEART RATE: 82 BPM | WEIGHT: 217.8 LBS | OXYGEN SATURATION: 93 % | DIASTOLIC BLOOD PRESSURE: 62 MMHG | BODY MASS INDEX: 29.54 KG/M2

## 2024-04-03 DIAGNOSIS — R22.1 NECK SWELLING: ICD-10-CM

## 2024-04-03 DIAGNOSIS — M19.012 PRIMARY OSTEOARTHRITIS OF LEFT SHOULDER: Primary | ICD-10-CM

## 2024-04-03 PROCEDURE — 3017F COLORECTAL CA SCREEN DOC REV: CPT | Performed by: STUDENT IN AN ORGANIZED HEALTH CARE EDUCATION/TRAINING PROGRAM

## 2024-04-03 PROCEDURE — 3074F SYST BP LT 130 MM HG: CPT | Performed by: STUDENT IN AN ORGANIZED HEALTH CARE EDUCATION/TRAINING PROGRAM

## 2024-04-03 PROCEDURE — 4004F PT TOBACCO SCREEN RCVD TLK: CPT | Performed by: STUDENT IN AN ORGANIZED HEALTH CARE EDUCATION/TRAINING PROGRAM

## 2024-04-03 PROCEDURE — 1123F ACP DISCUSS/DSCN MKR DOCD: CPT | Performed by: STUDENT IN AN ORGANIZED HEALTH CARE EDUCATION/TRAINING PROGRAM

## 2024-04-03 PROCEDURE — G8399 PT W/DXA RESULTS DOCUMENT: HCPCS | Performed by: STUDENT IN AN ORGANIZED HEALTH CARE EDUCATION/TRAINING PROGRAM

## 2024-04-03 PROCEDURE — G8427 DOCREV CUR MEDS BY ELIG CLIN: HCPCS | Performed by: STUDENT IN AN ORGANIZED HEALTH CARE EDUCATION/TRAINING PROGRAM

## 2024-04-03 PROCEDURE — G8417 CALC BMI ABV UP PARAM F/U: HCPCS | Performed by: STUDENT IN AN ORGANIZED HEALTH CARE EDUCATION/TRAINING PROGRAM

## 2024-04-03 PROCEDURE — 3078F DIAST BP <80 MM HG: CPT | Performed by: STUDENT IN AN ORGANIZED HEALTH CARE EDUCATION/TRAINING PROGRAM

## 2024-04-03 PROCEDURE — 1090F PRES/ABSN URINE INCON ASSESS: CPT | Performed by: STUDENT IN AN ORGANIZED HEALTH CARE EDUCATION/TRAINING PROGRAM

## 2024-04-03 PROCEDURE — 99214 OFFICE O/P EST MOD 30 MIN: CPT | Performed by: STUDENT IN AN ORGANIZED HEALTH CARE EDUCATION/TRAINING PROGRAM

## 2024-04-03 RX ORDER — HYDROCHLOROTHIAZIDE 12.5 MG/1
12.5 CAPSULE, GELATIN COATED ORAL EVERY MORNING
COMMUNITY
Start: 2024-03-15

## 2024-04-03 RX ORDER — IBUPROFEN 800 MG/1
800 TABLET ORAL EVERY 6 HOURS PRN
COMMUNITY
Start: 2024-04-01

## 2024-04-03 RX ORDER — TIZANIDINE 4 MG/1
4 TABLET ORAL 3 TIMES DAILY PRN
Qty: 90 TABLET | Refills: 0 | Status: SHIPPED | OUTPATIENT
Start: 2024-04-03

## 2024-04-03 RX ORDER — PSEUDOEPHED/ACETAMINOPH/DIPHEN 30MG-500MG
2 TABLET ORAL EVERY 6 HOURS PRN
Qty: 120 TABLET | Refills: 1 | Status: SHIPPED | OUTPATIENT
Start: 2024-04-03

## 2024-04-03 ASSESSMENT — ENCOUNTER SYMPTOMS
COUGH: 0
NAUSEA: 0
DIARRHEA: 0
SHORTNESS OF BREATH: 0
SORE THROAT: 0
VOMITING: 0
WHEEZING: 0
CHEST TIGHTNESS: 0
CONSTIPATION: 0
EYE DISCHARGE: 0

## 2024-04-03 NOTE — PROGRESS NOTES
amLODIPine (NORVASC) 5 MG tablet Take 1 tablet by mouth daily      fluticasone furoate-vilanterol (BREO ELLIPTA) 100-25 MCG/ACT inhaler Inhale 1 puff into the lungs daily 1 each 9    OneTouch Delica Lancets 33G MISC Apply 1 Units topically 2 times daily USE 2 TO 3 TIMES DAILY AS DIRECTED 60 each 4    buprenorphine-naloxone (SUBOXONE) 8-2 MG FILM SL film Place 1 Film under the tongue 2 times daily.      hydrOXYzine HCl (ATARAX) 25 MG tablet        No current facility-administered medications for this visit.     Allergies   Allergen Reactions    Iv Dye [Iodides] Hives       Health Maintenance   Topic Date Due    COVID-19 Vaccine (1) Never done    Shingles vaccine (1 of 2) Never done    Respiratory Syncytial Virus (RSV) Pregnant or age 60 yrs+ (1 - 1-dose 60+ series) Never done    Colorectal Cancer Screen  01/09/2023    Diabetic foot exam  04/26/2023    Hepatitis A vaccine (2 of 2 - Risk 2-dose series) 10/03/2024 (Originally 5/17/2023)    Hepatitis B vaccine (2 of 3 - Risk 3-dose series) 10/03/2024 (Originally 12/15/2022)    Flu vaccine (Season Ended) 10/03/2024 (Originally 8/1/2024)    Low dose CT lung screening &/or counseling  06/16/2024    Diabetic retinal exam  11/16/2024    Diabetic Alb to Cr ratio (uACR) test  01/17/2025    Lipids  01/17/2025    Depression Monitoring  02/21/2025    A1C test (Diabetic or Prediabetic)  02/22/2025    GFR test (Diabetes, CKD 3-4, OR last GFR 15-59)  03/06/2025    Breast cancer screen  11/13/2025    DTaP/Tdap/Td vaccine (3 - Td or Tdap) 06/16/2031    DEXA (modify frequency per FRAX score)  Completed    Annual Wellness Visit (Medicare Advantage)  Completed    Pneumococcal 65+ years Vaccine  Completed    Hib vaccine  Aged Out    Polio vaccine  Aged Out    Meningococcal (ACWY) vaccine  Aged Out    Pneumococcal 0-64 years Vaccine  Discontinued    HIV screen  Discontinued       Subjective:     Review of Systems   Constitutional:  Negative for appetite change, fatigue and fever.   HENT:

## 2024-04-08 ENCOUNTER — HOSPITAL ENCOUNTER (OUTPATIENT)
Dept: CT IMAGING | Age: 69
Discharge: HOME | End: 2024-04-10
Payer: MEDICARE

## 2024-04-08 DIAGNOSIS — R22.1 NECK SWELLING: ICD-10-CM

## 2024-04-08 PROCEDURE — 70490 CT SOFT TISSUE NECK W/O DYE: CPT

## 2024-04-09 ENCOUNTER — OFFICE VISIT (OUTPATIENT)
Dept: ORTHOPEDIC SURGERY | Age: 69
End: 2024-04-09
Payer: MEDICARE

## 2024-04-09 VITALS — WEIGHT: 215 LBS | BODY MASS INDEX: 29.12 KG/M2 | HEIGHT: 72 IN

## 2024-04-09 DIAGNOSIS — M17.0 ARTHRITIS OF BOTH KNEES: Primary | ICD-10-CM

## 2024-04-09 PROCEDURE — 1090F PRES/ABSN URINE INCON ASSESS: CPT | Performed by: PHYSICIAN ASSISTANT

## 2024-04-09 PROCEDURE — 20610 DRAIN/INJ JOINT/BURSA W/O US: CPT | Performed by: PHYSICIAN ASSISTANT

## 2024-04-09 PROCEDURE — G8417 CALC BMI ABV UP PARAM F/U: HCPCS | Performed by: PHYSICIAN ASSISTANT

## 2024-04-09 PROCEDURE — 4004F PT TOBACCO SCREEN RCVD TLK: CPT | Performed by: PHYSICIAN ASSISTANT

## 2024-04-09 PROCEDURE — 1123F ACP DISCUSS/DSCN MKR DOCD: CPT | Performed by: PHYSICIAN ASSISTANT

## 2024-04-09 PROCEDURE — G8399 PT W/DXA RESULTS DOCUMENT: HCPCS | Performed by: PHYSICIAN ASSISTANT

## 2024-04-09 PROCEDURE — 3017F COLORECTAL CA SCREEN DOC REV: CPT | Performed by: PHYSICIAN ASSISTANT

## 2024-04-09 PROCEDURE — 99214 OFFICE O/P EST MOD 30 MIN: CPT | Performed by: PHYSICIAN ASSISTANT

## 2024-04-09 PROCEDURE — G8427 DOCREV CUR MEDS BY ELIG CLIN: HCPCS | Performed by: PHYSICIAN ASSISTANT

## 2024-04-09 RX ORDER — BUPIVACAINE HYDROCHLORIDE 2.5 MG/ML
2 INJECTION, SOLUTION INFILTRATION; PERINEURAL ONCE
Status: COMPLETED | OUTPATIENT
Start: 2024-04-09 | End: 2024-04-09

## 2024-04-09 RX ORDER — METHYLPREDNISOLONE ACETATE 40 MG/ML
40 INJECTION, SUSPENSION INTRA-ARTICULAR; INTRALESIONAL; INTRAMUSCULAR; SOFT TISSUE ONCE
Status: COMPLETED | OUTPATIENT
Start: 2024-04-09 | End: 2024-04-09

## 2024-04-09 RX ADMIN — METHYLPREDNISOLONE ACETATE 40 MG: 40 INJECTION, SUSPENSION INTRA-ARTICULAR; INTRALESIONAL; INTRAMUSCULAR; SOFT TISSUE at 14:06

## 2024-04-09 RX ADMIN — BUPIVACAINE HYDROCHLORIDE 5 MG: 2.5 INJECTION, SOLUTION INFILTRATION; PERINEURAL at 14:04

## 2024-04-09 RX ADMIN — METHYLPREDNISOLONE ACETATE 40 MG: 40 INJECTION, SUSPENSION INTRA-ARTICULAR; INTRALESIONAL; INTRAMUSCULAR; SOFT TISSUE at 14:05

## 2024-04-09 RX ADMIN — BUPIVACAINE HYDROCHLORIDE 5 MG: 2.5 INJECTION, SOLUTION INFILTRATION; PERINEURAL at 14:03

## 2024-04-09 NOTE — PROGRESS NOTES
Placed This Encounter   Medications    BUPivacaine (MARCAINE) 0.25 % injection 5 mg    BUPivacaine (MARCAINE) 0.25 % injection 5 mg    methylPREDNISolone acetate (DEPO-MEDROL) injection 40 mg    methylPREDNISolone acetate (DEPO-MEDROL) injection 40 mg       Orders Placed This Encounter   Procedures    20610 - DRAIN/INJECT LARGE JOINT BURSA       This note is created with the assistance of a speech recognition program.  While intending to generate a document that actually reflects the content of the visit, the document can still have some errors including those of syntax and sound a like substitutions which may escape proof reading.  In such instances, actual meaning can be extrapolated by contextual diversion.     Electronically signed by Anupama Camilo PA-C on 4/10/2024 at 10:25 PM

## 2024-04-10 ASSESSMENT — ENCOUNTER SYMPTOMS
COUGH: 0
COLOR CHANGE: 0
VOMITING: 0
SHORTNESS OF BREATH: 0

## 2024-04-12 ENCOUNTER — HOSPITAL ENCOUNTER (OUTPATIENT)
Dept: OTHER | Age: 69
Discharge: HOME | End: 2024-04-12
Payer: MEDICARE

## 2024-04-12 VITALS
DIASTOLIC BLOOD PRESSURE: 70 MMHG | RESPIRATION RATE: 16 BRPM | HEART RATE: 82 BPM | OXYGEN SATURATION: 95 % | WEIGHT: 214 LBS | SYSTOLIC BLOOD PRESSURE: 118 MMHG | BODY MASS INDEX: 29.02 KG/M2

## 2024-04-12 PROCEDURE — 99212 OFFICE O/P EST SF 10 MIN: CPT

## 2024-04-12 NOTE — PROGRESS NOTES
Date:  2024  Time:  10:10 AM    CHF Clinic at [facility]    Office: [phone] Fax: [phone]    Re:  Donna Landry   Patient : 1955    Vital Signs: /70   Pulse 82   Resp 16   Wt 97.1 kg (214 lb)   LMP 1996   SpO2 95%   BMI 29.02 kg/m²                                                   No results for input(s): \"CBC\", \"HGB\", \"HCT\", \"WBC\", \"PLATELET\", \"NA\", \"K\", \"CL\", \"CO2\", \"BUN\", \"CREATININE\", \"GLUCOSE\", \"BNP\", \"INR\" in the last 72 hours.     Respiratory:    Assessment  Charting Type: Reassessment    Breath Sounds  Right Upper Lobe: Clear  Right Middle Lobe: Clear  Right Lower Lobe: Clear  Left Upper Lobe: Clear  Left Lower Lobe: Clear              Peripheral Vascular  RLE Edema: +1, Non-pitting  LLE Edema: +1, Non-pitting    Future Appointments   Date Time Provider Department Center   2024 10:15 AM STC[facility] PAT RM 4 [facility]CZ PAT [facility]   2024 11:00 AM STV CHF CLINIC  1 ST[facility]Z CHF I [facility]   2024  1:00 PM America Cam MD [facility]   2024  2:45 PM Alejandro Sampson MD Resp Spec [facility]   [facility]   2024 10:00 AM Anupama Camilo PA-C [facility]      Complaints: No new complaints     Physician Orders No new orders     Comment : Weight is the same as the last visit, denies any SOB or chest pain, had injections in knees last week in a lot of pain still and has fallen due to pain recommended to call doctor who injected her knees she agrees to call today. Discussed in detail low sodium diet 2000mg per day and 64 ounces of fluid per day. We will see in one month 24.    Electronically signed by Anuja Greene RN on 2024 at 10:10 AM

## 2024-04-17 ENCOUNTER — TELEPHONE (OUTPATIENT)
Dept: FAMILY MEDICINE CLINIC | Age: 69
End: 2024-04-17

## 2024-04-17 NOTE — TELEPHONE ENCOUNTER
----- Message from Donna aWllace sent at 4/17/2024 11:46 AM EDT -----  Subject: Message to Provider    QUESTIONS  Information for Provider? Dnona is saying she already has an aid coming   in Mon-Fri but also will be needing someone for 4 hours on Sat as will if   you would be able to write the order for that please. Please call if you   need more information  ---------------------------------------------------------------------------  --------------  CALL BACK INFO  [phone]; OK to leave message on voicemail  ---------------------------------------------------------------------------  --------------  SCRIPT ANSWERS  Relationship to Patient? Self

## 2024-04-17 NOTE — TELEPHONE ENCOUNTER
Donna is saying she already has an aid coming   in Mon-Fri but also will be needing someone for 4 hours on Sat as will if   you would be able to write the order for that please.

## 2024-04-23 ENCOUNTER — HOSPITAL ENCOUNTER (OUTPATIENT)
Dept: PREADMISSION TESTING | Age: 69
Discharge: HOME | End: 2024-04-27

## 2024-04-23 ENCOUNTER — TELEPHONE (OUTPATIENT)
Dept: FAMILY MEDICINE CLINIC | Age: 69
End: 2024-04-23

## 2024-04-23 VITALS — WEIGHT: 214 LBS | HEIGHT: 72 IN | BODY MASS INDEX: 28.99 KG/M2

## 2024-04-23 NOTE — PROGRESS NOTES
Writer received a call back from Jacquie at [facility], they can get pt in today, 4/23/24 at 1230 ([address] for device interrogation. Writer called pt to inform her of this appointment and pt stated she will be there.

## 2024-04-23 NOTE — PROGRESS NOTES
Pre-op Instructions For Out-Patient Endoscopy Surgery    Medication Instructions:  Please stop herbs and any supplements now (includes vitamins and minerals).    Please contact your surgeon and prescribing physician for pre-op instructions for any blood thinners. Lodine and Ibuprofen     If you have inhalers/aerosol treatments at home, please use them the morning of your surgery and bring the inhalers with you to the hospital.    Please take the following medications the morning of your surgery with a sip of water:    Inhaler, Amlodipine and Atenolol.     Surgery Instructions:  After midnight before surgery:  Do not eat or drink anything, including water, mints, gum, and hard candy.  You may brush your teeth without swallowing.  No smoking, chewing tobacco, or street drugs.    Please shower or bathe before surgery.       Please do not wear any cologne, lotion, powder, jewelry, piercings, perfume, makeup, nail polish, hair accessories, or hair spray on the day of surgery.  Wear loose comfortable clothing.    Leave your valuables at home but bring a payment source for any after-surgery prescriptions you plan to fill at [facility].  Bring a storage case for any glasses/contacts.    An adult who is responsible for you MUST drive you home and should be with you for the first 24 hours after surgery.     The Day of Surgery:  Arrive at [facility] Surgery Entrance at the time directed by your surgeon and check in at the desk.     If you have a living will or healthcare power of , please bring a copy.    You will be taken to the pre-op holding area where you will be prepared for surgery.  A physical assessment will be performed by a nurse practitioner or house officer.  Your IV will be started and you will meet your anesthesiologist.    When you go to surgery, your family will be directed to the surgical waiting room, where the doctor should speak with them after your surgery.    After

## 2024-04-23 NOTE — PROGRESS NOTES
Writer informed pt that her pacemaker interrogation needed to be done since her last device interrogation was 7/2022. Caitlinr informed her that [facility] attempted to contact her yesterday to try and get her in today, 4/23/24 to get this done. Writer attempted to contact Jacquie at [facility], writer left message with call back number. Caitlinr also provided pt with [facility] number and instructed her to call Jacquie to try and get an appointment today for her device interrogation.

## 2024-04-23 NOTE — TELEPHONE ENCOUNTER
Patient is requesting rollator due to having knee pain .  Her  Milly from [facility], stated she can not order the device due to her insurance it will have to come from her PCP

## 2024-04-24 DIAGNOSIS — M17.0 PRIMARY OSTEOARTHRITIS OF BOTH KNEES: Primary | ICD-10-CM

## 2024-05-01 NOTE — PRE-PROCEDURE INSTRUCTIONS
No answer, left message ?                             Unable to leave message ?NO    When were you told to arrive at hospital ?      Do you have a  ?    Are you on any blood thinners ?                     If yes when did you stop taking ?    Do you have your prep Rx filled and instruction ?      Nothing to eat the day before , only clear liquids.    Are you experiencing any covid symptoms ?     Do you have any infections or rash we should be aware of ?      Do you have the Hibiclens soap to use the night before and the morning of surgery ?    Nothing to eat or drink after midnight, only a sip of water to take any medication instructed to take the night before.  Wear comfortable clothing, leave any valuables at home, remove any jewelry and body piercing . NOT A WORKING NUMBER, ONLY NUMBER LISTED.

## 2024-05-02 ENCOUNTER — ANESTHESIA EVENT (OUTPATIENT)
Dept: ENDOSCOPY | Age: 69
End: 2024-05-02
Payer: MEDICARE

## 2024-05-02 DIAGNOSIS — I50.32 CHRONIC DIASTOLIC CONGESTIVE HEART FAILURE (HCC): ICD-10-CM

## 2024-05-02 RX ORDER — POTASSIUM CHLORIDE 20 MEQ/1
TABLET, EXTENDED RELEASE ORAL
Qty: 90 TABLET | Refills: 2 | Status: SHIPPED | OUTPATIENT
Start: 2024-05-02

## 2024-05-02 NOTE — TELEPHONE ENCOUNTER
Donna Landry is calling to request a refill on the following medication(s):    Medication Request:  Requested Prescriptions     Pending Prescriptions Disp Refills    potassium chloride (KLOR-CON M20) 20 MEQ extended release tablet [Pharmacy Med Name: KLOR-CON M20 TABLET] 90 tablet 2     Sig: TAKE 1 TABLET BY MOUTH EVERY DAY       Last Visit Date (If Applicable):  4/3/2024    Next Visit Date:    5/22/2024

## 2024-05-03 ENCOUNTER — ANESTHESIA (OUTPATIENT)
Dept: ENDOSCOPY | Age: 69
End: 2024-05-03
Payer: MEDICARE

## 2024-05-03 ENCOUNTER — HOSPITAL ENCOUNTER (OUTPATIENT)
Age: 69
Setting detail: OUTPATIENT SURGERY
Discharge: HOME | End: 2024-05-03
Attending: INTERNAL MEDICINE | Admitting: INTERNAL MEDICINE
Payer: MEDICARE

## 2024-05-03 VITALS
TEMPERATURE: 98 F | RESPIRATION RATE: 19 BRPM | SYSTOLIC BLOOD PRESSURE: 130 MMHG | DIASTOLIC BLOOD PRESSURE: 60 MMHG | OXYGEN SATURATION: 96 % | HEART RATE: 71 BPM | BODY MASS INDEX: 28.99 KG/M2 | WEIGHT: 214 LBS | HEIGHT: 72 IN

## 2024-05-03 DIAGNOSIS — R19.4 ALTERED BOWEL HABITS: ICD-10-CM

## 2024-05-03 DIAGNOSIS — R11.0 NAUSEA: ICD-10-CM

## 2024-05-03 PROBLEM — Z86.0100 HISTORY OF COLON POLYPS: Status: ACTIVE | Noted: 2024-05-03

## 2024-05-03 PROBLEM — Z86.010 HISTORY OF COLON POLYPS: Status: ACTIVE | Noted: 2024-05-03

## 2024-05-03 LAB
GLUCOSE BLD-MCNC: 78 MG/DL (ref 65–105)
GLUCOSE BLD-MCNC: 94 MG/DL (ref 65–105)

## 2024-05-03 PROCEDURE — 6360000002 HC RX W HCPCS: Performed by: NURSE ANESTHETIST, CERTIFIED REGISTERED

## 2024-05-03 PROCEDURE — 3700000001 HC ADD 15 MINUTES (ANESTHESIA): Performed by: INTERNAL MEDICINE

## 2024-05-03 PROCEDURE — 2580000003 HC RX 258: Performed by: ANESTHESIOLOGY

## 2024-05-03 PROCEDURE — 7100000011 HC PHASE II RECOVERY - ADDTL 15 MIN: Performed by: INTERNAL MEDICINE

## 2024-05-03 PROCEDURE — 2500000003 HC RX 250 WO HCPCS: Performed by: ANESTHESIOLOGY

## 2024-05-03 PROCEDURE — 88305 TISSUE EXAM BY PATHOLOGIST: CPT

## 2024-05-03 PROCEDURE — 82947 ASSAY GLUCOSE BLOOD QUANT: CPT

## 2024-05-03 PROCEDURE — 2500000003 HC RX 250 WO HCPCS: Performed by: NURSE ANESTHETIST, CERTIFIED REGISTERED

## 2024-05-03 PROCEDURE — 43239 EGD BIOPSY SINGLE/MULTIPLE: CPT | Performed by: INTERNAL MEDICINE

## 2024-05-03 PROCEDURE — 45385 COLONOSCOPY W/LESION REMOVAL: CPT | Performed by: INTERNAL MEDICINE

## 2024-05-03 PROCEDURE — 3700000000 HC ANESTHESIA ATTENDED CARE: Performed by: INTERNAL MEDICINE

## 2024-05-03 PROCEDURE — 7100000010 HC PHASE II RECOVERY - FIRST 15 MIN: Performed by: INTERNAL MEDICINE

## 2024-05-03 PROCEDURE — 3609012400 HC EGD TRANSORAL BIOPSY SINGLE/MULTIPLE: Performed by: INTERNAL MEDICINE

## 2024-05-03 PROCEDURE — 2709999900 HC NON-CHARGEABLE SUPPLY: Performed by: INTERNAL MEDICINE

## 2024-05-03 PROCEDURE — 3609010600 HC COLONOSCOPY POLYPECTOMY SNARE/COLD BIOPSY: Performed by: INTERNAL MEDICINE

## 2024-05-03 PROCEDURE — 45380 COLONOSCOPY AND BIOPSY: CPT | Performed by: INTERNAL MEDICINE

## 2024-05-03 RX ORDER — MIDAZOLAM HYDROCHLORIDE 1 MG/ML
INJECTION INTRAMUSCULAR; INTRAVENOUS PRN
Status: DISCONTINUED | OUTPATIENT
Start: 2024-05-03 | End: 2024-05-03 | Stop reason: SDUPTHER

## 2024-05-03 RX ORDER — SIMETHICONE 40MG/0.6ML
SUSPENSION, DROPS(FINAL DOSAGE FORM)(ML) ORAL PRN
Status: DISCONTINUED | OUTPATIENT
Start: 2024-05-03 | End: 2024-05-03 | Stop reason: ALTCHOICE

## 2024-05-03 RX ORDER — LIDOCAINE HYDROCHLORIDE 20 MG/ML
INJECTION, SOLUTION EPIDURAL; INFILTRATION; INTRACAUDAL; PERINEURAL PRN
Status: DISCONTINUED | OUTPATIENT
Start: 2024-05-03 | End: 2024-05-03 | Stop reason: SDUPTHER

## 2024-05-03 RX ORDER — SODIUM CHLORIDE 9 MG/ML
INJECTION, SOLUTION INTRAVENOUS PRN
Status: DISCONTINUED | OUTPATIENT
Start: 2024-05-03 | End: 2024-05-03 | Stop reason: HOSPADM

## 2024-05-03 RX ORDER — LIDOCAINE HYDROCHLORIDE 10 MG/ML
1 INJECTION, SOLUTION EPIDURAL; INFILTRATION; INTRACAUDAL; PERINEURAL
Status: COMPLETED | OUTPATIENT
Start: 2024-05-03 | End: 2024-05-03

## 2024-05-03 RX ORDER — PROPOFOL 10 MG/ML
INJECTION, EMULSION INTRAVENOUS PRN
Status: DISCONTINUED | OUTPATIENT
Start: 2024-05-03 | End: 2024-05-03 | Stop reason: SDUPTHER

## 2024-05-03 RX ORDER — SODIUM CHLORIDE 0.9 % (FLUSH) 0.9 %
5-40 SYRINGE (ML) INJECTION PRN
Status: DISCONTINUED | OUTPATIENT
Start: 2024-05-03 | End: 2024-05-03 | Stop reason: HOSPADM

## 2024-05-03 RX ORDER — SODIUM CHLORIDE 9 MG/ML
INJECTION, SOLUTION INTRAVENOUS CONTINUOUS
Status: DISCONTINUED | OUTPATIENT
Start: 2024-05-03 | End: 2024-05-03 | Stop reason: HOSPADM

## 2024-05-03 RX ORDER — SODIUM CHLORIDE 0.9 % (FLUSH) 0.9 %
5-40 SYRINGE (ML) INJECTION EVERY 12 HOURS SCHEDULED
Status: DISCONTINUED | OUTPATIENT
Start: 2024-05-03 | End: 2024-05-03 | Stop reason: HOSPADM

## 2024-05-03 RX ADMIN — LIDOCAINE HYDROCHLORIDE 100 MG: 20 INJECTION, SOLUTION EPIDURAL; INFILTRATION; INTRACAUDAL; PERINEURAL at 13:30

## 2024-05-03 RX ADMIN — PROPOFOL 30 MG: 10 INJECTION, EMULSION INTRAVENOUS at 13:34

## 2024-05-03 RX ADMIN — PROPOFOL 140 MG: 10 INJECTION, EMULSION INTRAVENOUS at 13:30

## 2024-05-03 RX ADMIN — PROPOFOL 30 MG: 10 INJECTION, EMULSION INTRAVENOUS at 13:36

## 2024-05-03 RX ADMIN — MIDAZOLAM 2 MG: 1 INJECTION INTRAMUSCULAR; INTRAVENOUS at 13:16

## 2024-05-03 RX ADMIN — SODIUM CHLORIDE: 9 INJECTION, SOLUTION INTRAVENOUS at 12:48

## 2024-05-03 RX ADMIN — PROPOFOL 30 MG: 10 INJECTION, EMULSION INTRAVENOUS at 13:39

## 2024-05-03 RX ADMIN — LIDOCAINE HYDROCHLORIDE 1 ML: 10 INJECTION, SOLUTION EPIDURAL; INFILTRATION; INTRACAUDAL; PERINEURAL at 12:47

## 2024-05-03 RX ADMIN — PROPOFOL 30 MG: 10 INJECTION, EMULSION INTRAVENOUS at 13:52

## 2024-05-03 RX ADMIN — PROPOFOL 10 MG: 10 INJECTION, EMULSION INTRAVENOUS at 13:58

## 2024-05-03 RX ADMIN — PROPOFOL 30 MG: 10 INJECTION, EMULSION INTRAVENOUS at 13:49

## 2024-05-03 RX ADMIN — PROPOFOL 30 MG: 10 INJECTION, EMULSION INTRAVENOUS at 13:56

## 2024-05-03 RX ADMIN — PROPOFOL 30 MG: 10 INJECTION, EMULSION INTRAVENOUS at 13:32

## 2024-05-03 RX ADMIN — PROPOFOL 30 MG: 10 INJECTION, EMULSION INTRAVENOUS at 13:45

## 2024-05-03 RX ADMIN — PROPOFOL 30 MG: 10 INJECTION, EMULSION INTRAVENOUS at 13:42

## 2024-05-03 ASSESSMENT — ENCOUNTER SYMPTOMS
SHORTNESS OF BREATH: 1
APNEA: 1
BACK PAIN: 1
SORE THROAT: 0
TROUBLE SWALLOWING: 0
COUGH: 0

## 2024-05-03 ASSESSMENT — PAIN - FUNCTIONAL ASSESSMENT
PAIN_FUNCTIONAL_ASSESSMENT: NONE - DENIES PAIN
PAIN_FUNCTIONAL_ASSESSMENT: 0-10

## 2024-05-03 NOTE — ANESTHESIA POSTPROCEDURE EVALUATION
Department of Anesthesiology  Postprocedure Note    Patient: Donna Landry  MRN: [id number]  YOB: 1955  Date of evaluation: 5/3/2024    Procedure Summary       Date: 05/03/24 Room / Location: [place] [address] / [facility]    Anesthesia Start: 1311 Anesthesia Stop: 1412    Procedures:       ESOPHAGOGASTRODUODENOSCOPY BIOPSY (Esophagus)      COLONOSCOPY BIOPSY Diagnosis:       Altered bowel habits      Nausea      (Altered bowel habits [R19.4])      (Nausea [R11.0])    Surgeons: Rich Gonzalez MD Responsible Provider: Mimi Davies MD    Anesthesia Type: general ASA Status: 4            Anesthesia Type: No value filed.    Michael Phase I: Michael Score: 9    Michael Phase II: Michael Score: 10    Anesthesia Post Evaluation    Comments: POST- ANESTHESIA EVALUATION       Pt Name: Donna Landry  MRN: [id number]  YOB: 1955  Date of evaluation: 5/3/2024  Time:  2:45 PM      /60   Pulse 71   Temp 98 °F (36.7 °C)   Resp 19   Ht 1.829 m (6' 0.01\")   Wt 97.1 kg (214 lb)   LMP 12/09/1996   SpO2 96%   BMI 29.02 kg/m²      Consciousness Level  Awake  Cardiopulmonary Status  Stable  Pain Adequately Treated YES  Nausea / Vomiting  NO  Adequate Hydration  YES  Anesthesia Related Complications NONE      Electronically signed by Mimi Davies MD on 5/3/2024 at 2:45 PM           No notable events documented.

## 2024-05-03 NOTE — OP NOTE
EGD report    Esophagogastroduodenoscopy (EGD) Procedure Note    Procedure:  EGD with biopsies    Procedure Date: 5/3/2024    Indications:  GERD, abdominal pain    Sedation:  MAC    Attending Physician:  Dr. Rich Gonzalez MD    Assistant:  None    Procedure Details:    Informed consent was obtained for the procedure, including sedation. Risks of infection, perforation, hemorrhage, adverse drug reaction, and aspiration were discussed. The patient was placed in the left lateral decubitus position. The patient was monitored continuously with ECG tracing, pulse oximetry, blood pressure monitoring, and direct observation.      The gastroscope was inserted into the mouth and advanced under direct vision to second portion of the duodenum.  A careful inspection was made as the gastroscope was withdrawn, including a retroflexed view of the proximal stomach; findings and interventions are described below. Appropriate photodocumentation was obtained.    Findings:  Retropharyngeal area was grossly normal appearing     Esophagus: normal                          Esophagogastric markings: Diaphragmatic hiatus-40 cm; GE junction-40 cm     Stomach:    Fundus: normal    Body: Diffuse erythema with scattered erosions noted    Antrum: normal  Biopsies obtained to rule out H. pylori     Duodenum:     Bulb: normal    First part: Normal    Second Part: Normal  Biopsies obtained to rule out celiac disease      Complications:  None           Estimated blood loss: Minimal    Disposition: Home           Condition: Stable    Specimen Removed: Stomach, duodenum    Impression:    Diffuse erythema and erosions noted in the stomach body, biopsies obtained to rule H. pylori.  Normal duodenum, biopsies obtained to rule out celiac disease.    Recommendations:  Follow pathology results.  Avoid NSAIDs.  Continue Protonix, if the patient has ongoing symptoms, will increase it to twice a day.  Proceed with colonoscopy today for further  evaluation.    Attending Attestation: I performed the procedure  Rich Gonzalez MD

## 2024-05-03 NOTE — H&P
HISTORY and PHYSICAL  [facility]       NAME:  Donna Landry  MRN: [id number]   YOB: 1955   Date: 5/3/2024   Age: 68 y.o.  Gender: female       COMPLAINT AND PRESENT HISTORY:     Donna Landry is 68 y.o.,  female, presents for ESOPHAGOGASTRODUODENOSCOPY BIOPSY, COLONOSCOPY DIAGNOSTIC   Primary dx: Altered bowel habits [R19.4]  Nausea [R11.0].    Office visit per Dr Gonzalez on 3/11/2024    HISTORY OF PRESENT ILLNESS: Ms.Sharon AGATA Landry is a 68 y.o. female with a past history remarkable for bronchiectasis, CHF, COPD, hyperlipidemia, hypertension, obstructive sleep apnea, referred for evaluation of altered bowel habits and chronic hepatitis C (treatment completed and eradicated with Harvoni).     The patient reports that she has been having lower quadrant abdominal pain and nausea for a long time.  She has issues with constipation and cannot have a bowel movement for 2 to 3 weeks.  She is currently not using any laxatives apart from Colace.  She was previously given Linzess and had some good response with this.  Her last colonoscopy was in 2019.     Previous Endoscopies   EGD:  mild gastritis     Colonsocopy:  Redundant colon  Suboptimal prep   Previous GI workup   Most recent LFTs 2/22/2024:  AST 14, ALT 10, alk phos 83, total bilirubin 0.4    UPDATE 5/3/2024  Donna Landry is 68 y.o.,  female, will be having a Colonoscopy and EGD.  Prior Colonoscopy was done 2020 with polyp removed.   Prior EGD with biopsy was done 2019    Patient has hx of Colon Polyps.  Pt also has hx of Diverticulosis.       Patient denies any  FH of Colon or esophogeal  Cancer.       Patient has a history of frequent abd. pain in the LUQ area and RUQ, The pain is sharp   Patient denies any Dysphagia.  Pt has hx of GERD  Pt is on Protonix that is helping to control symptoms.  Pt reports  nausea.  Denies vomiting.  No diarrhea.  Pt reports frequent constipation.   Pt denies red or dark tarry stool.  No fever or chills.   disease of native coronary artery with unspecified angina pectoris 06/20/2023    Vitamin D deficiency 01/19/2022    S/P placement of cardiac pacemaker 01/19/2022    RLS (restless legs syndrome) 01/19/2022    Tobacco use 01/19/2022    Hallux rigidus of right foot     Right foot pain     Contracture of joint of right foot     Irritable bowel syndrome with constipation 02/15/2019    Cigarette nicotine dependence without complication 02/20/2017    Chronic bilateral low back pain with right-sided sciatica 02/20/2017    Primary insomnia 09/30/2016    Diabetic polyneuropathy associated with type 2 diabetes mellitus ([facility]) 05/13/2016    Bilateral chronic knee pain 05/13/2016    Type 2 diabetes mellitus with diabetic polyneuropathy, without long-term current use of insulin ([facility]) 05/13/2016    Diverticulosis     Hyperplastic polyp of intestine     Chronic heart failure with preserved ejection fraction ([facility]) 10/16/2015    COPD (chronic obstructive pulmonary disease) ([facility]) 04/02/2015    Personal history of tobacco use, presenting hazards to health 04/02/2015    GERD (gastroesophageal reflux disease)     Hep C w/o coma, chronic ([facility]) completed tx 2016     Moderate episode of recurrent major depressive disorder ([facility]) 10/30/2014    History of heroin use 10/30/2014    Pure hypercholesterolemia 01/22/2014    Essential hypertension 12/30/2013           MARCIA Monroy - CNP on 5/3/2024 at 11:29 AM

## 2024-05-03 NOTE — OP NOTE
Colonoscopy report    Colonoscopy Procedure Note    Procedure:  Colonoscopy with random colon biopsy, polypectomy with snare    Procedure Date: 5/3/2024    Indications:  History of polyps, altered bowel habits    Sedation:  MAC    Attending Physician:  Dr. Rich Gonzalez MD.     Assistant Physician: None    Consent:   Informed consent was obtained for the procedure after explaining the risks including bleeding, perforation, aspiration, arrhythmia, risks related to sedation, reaction to medications and rarely death, benefits and alternatives to the patient. The patient verbalized understanding and agreed to proceed with the procedure.       Procedure Details:  The patient was placed in the left lateral decubitus position.  Oxygen and cardiac monitoring equipment was attached. The patient's vital signs were monitored continuously  throughout the procedure. After appropriate sedation was achieved, a rectal examination was performed.  The pediatric colonoscope was inserted into the rectum and advanced under direct vision to the cecum which was identified by ileocecal valve and appendiceal orifice.  The quality of the colonic preparation was good.  A careful inspection was made as the colonoscope was withdrawn, including a retroflexed view of the rectum; findings and interventions are described below.  Appropriate photodocumentation was obtained.           Complications:  None    Estimated blood loss:  Minimal           Disposition:  Home           Condition: stable    Withdrawal Time: 18 minutes    Findings:   The bowel prep was good.  Multiple small and large mouth diverticula noted in the sigmoid and descending colon  Attempted to intubate the terminal ileum was unsuccessful given significant looping.  At least 5 polyps noted in the transverse colon, ranging between 2 to 3 mm, all removed with cold snare.  Multiple sessile hyperplastic appearing polyps noted in the sigmoid colon, 3 of these measuring 2 to 3 mm were

## 2024-05-03 NOTE — ANESTHESIA PRE PROCEDURE
Department of Anesthesiology  Preprocedure Note       Name:  Donna Landry   Age:  68 y.o.  :  1955                                          MRN:  [id number]         Date:  5/3/2024      Surgeon: Surgeon(s):  Rich Gonzalez MD    Procedure: Procedure(s):  ESOPHAGOGASTRODUODENOSCOPY BIOPSY  COLONOSCOPY DIAGNOSTIC    Medications prior to admission:   Prior to Admission medications    Medication Sig Start Date End Date Taking? Authorizing Provider   potassium chloride (KLOR-CON M20) 20 MEQ extended release tablet TAKE 1 TABLET BY MOUTH EVERY DAY 24   America Cam MD   hydroCHLOROthiazide 12.5 MG capsule Take 1 capsule by mouth every morning 3/15/24   ProviderElizabeth MD   ibuprofen (ADVIL;MOTRIN) 800 MG tablet Take 1 tablet by mouth every 6 hours as needed 24   Elizabeth Anton MD   tiZANidine (ZANAFLEX) 4 MG tablet Take 1 tablet by mouth 3 times daily as needed (pain) 4/3/24   America Cam MD   Acetaminophen Extra Strength 500 MG TABS Take 2 tablets by mouth every 6 hours as needed (pain) 4/3/24   America Cam MD   furosemide (LASIX) 20 MG tablet TAKE 1 TABLET BY MOUTH TWICE A DAY 4/3/24   Juan Miguel Lebron MD   traZODone (DESYREL) 100 MG tablet TAKE 1 TABLET BY MOUTH BEFORE BEDTIME 24   America Cam MD   nitroGLYCERIN (NITROSTAT) 0.4 MG SL tablet PLACE 1 TABLET UNDER THE TONGUE EVERY 5 MINUTES AS NEEDED FOR CHEST PAIN. 3/28/24   America Cam MD   gabapentin (NEURONTIN) 800 MG tablet Take 1 tablet by mouth 3 times daily for 30 days. 3/28/24 4/27/24  America Cam MD   empagliflozin (JARDIANCE) 10 MG tablet Take 1 tablet by mouth daily 3/28/24   America Cam MD   pantoprazole (PROTONIX) 40 MG tablet TAKE 1 TABLET BY MOUTH EVERY DAY 3/25/24   America Cam MD   albuterol sulfate HFA (PROVENTIL;VENTOLIN;PROAIR) 108 (90 Base) MCG/ACT inhaler INHALE 2 PUFFS BY MOUTH EVERY 6 HOURS AS NEEDED FOR WHEEZE 3/20/24   America Cam MD   polyethylene glycol (GLYCOLAX) 17 GM/SCOOP powder Take

## 2024-05-04 DIAGNOSIS — M19.012 PRIMARY OSTEOARTHRITIS OF LEFT SHOULDER: ICD-10-CM

## 2024-05-06 RX ORDER — TIZANIDINE 4 MG/1
4 TABLET ORAL 3 TIMES DAILY PRN
Qty: 270 TABLET | Refills: 1 | Status: SHIPPED | OUTPATIENT
Start: 2024-05-06

## 2024-05-06 NOTE — TELEPHONE ENCOUNTER
Donna Landry is calling to request a refill on the following medication(s):    Medication Request:  Requested Prescriptions     Pending Prescriptions Disp Refills    tiZANidine (ZANAFLEX) 4 MG tablet [Pharmacy Med Name: TIZANIDINE HCL 4 MG TABLET] 270 tablet 1     Sig: TAKE 1 TABLET BY MOUTH 3 TIMES DAILY AS NEEDED (PAIN).       Last Visit Date (If Applicable):  4/3/2024    Next Visit Date:    5/22/2024

## 2024-05-07 LAB — SURGICAL PATHOLOGY REPORT: NORMAL

## 2024-05-13 ENCOUNTER — HOSPITAL ENCOUNTER (OUTPATIENT)
Dept: OTHER | Age: 69
Discharge: HOME | End: 2024-05-13
Payer: MEDICARE

## 2024-05-13 VITALS
HEART RATE: 89 BPM | WEIGHT: 207.8 LBS | BODY MASS INDEX: 28.18 KG/M2 | RESPIRATION RATE: 20 BRPM | SYSTOLIC BLOOD PRESSURE: 120 MMHG | DIASTOLIC BLOOD PRESSURE: 78 MMHG | OXYGEN SATURATION: 96 %

## 2024-05-13 PROCEDURE — 99212 OFFICE O/P EST SF 10 MIN: CPT

## 2024-05-13 NOTE — PROGRESS NOTES
Date:  2024  Time:  11:23 AM    CHF Clinic at [facility]    Office: [phone] Fax: [phone]    Re:  Donna Landry   Patient : 1955    Vital Signs: /78   Pulse 89   Resp 20   Wt 94.3 kg (207 lb 12.8 oz)   LMP 1996   SpO2 96%   BMI 28.18 kg/m²                       O2 Device: None (Room air)                           No results for input(s): \"CBC\", \"HGB\", \"HCT\", \"WBC\", \"PLATELET\", \"NA\", \"K\", \"CL\", \"CO2\", \"BUN\", \"CREATININE\", \"GLUCOSE\", \"BNP\", \"INR\" in the last 72 hours.     Respiratory:         Breath Sounds  Right Upper Lobe: Clear  Right Middle Lobe: Clear  Right Lower Lobe: Clear  Left Upper Lobe: Clear  Left Lower Lobe: Clear    Cough/Sputum  Cough: None         Peripheral Vascular  RLE Edema: Non-pitting, Trace  LLE Edema: Non-pitting, Trace    Future Appointments   Date Time Provider Department Center   2024  1:00 PM America Cam MD [facility]   2024  2:45 PM Alejandro Sampson MD Resp Spec [facility]   2024  9:00 AM Rich Gonzalez MD [place] [facility]   2024 10:00 AM Anupama Camilo PA-C [facility] OR[facility] SP [facility]   2024 11:45 AM SCHEDULE, AFL [facility] DEVICE CLINIC SCHEDULE AFL [facility]      Complaints: no complaints      Comment : Pt arrived  per ambulation from registration to the CHF Clinic . Wt is down 6.2 lbs in one month. She denies increased shortness of breath, chest pain, dizziness, or palpitations.  She had a colonoscopy completed last week .States they removed some polyps , is waiting for biopsy results.   She has No acute S/S of  CHF noted on assessment today. She states she just had a device pacemaker check at [facility] office last month  and stated  \" everything is fine. My Pacemaker is due to be changed next April. \". She's been  compliant with low sodium diet and fluid restriction most days. Current diuretics ordered  are Lasix 20 mg BID although pt states she has only been taking Lasix once daily

## 2024-05-22 ENCOUNTER — TELEPHONE (OUTPATIENT)
Dept: ONCOLOGY | Age: 69
End: 2024-05-22

## 2024-05-22 ENCOUNTER — OFFICE VISIT (OUTPATIENT)
Dept: FAMILY MEDICINE CLINIC | Age: 69
End: 2024-05-22
Payer: MEDICARE

## 2024-05-22 VITALS
HEART RATE: 84 BPM | OXYGEN SATURATION: 91 % | BODY MASS INDEX: 27.9 KG/M2 | DIASTOLIC BLOOD PRESSURE: 68 MMHG | WEIGHT: 206 LBS | SYSTOLIC BLOOD PRESSURE: 102 MMHG | HEIGHT: 72 IN

## 2024-05-22 DIAGNOSIS — J44.9 CHRONIC OBSTRUCTIVE PULMONARY DISEASE, UNSPECIFIED COPD TYPE (HCC): ICD-10-CM

## 2024-05-22 DIAGNOSIS — Z87.891 PERSONAL HISTORY OF NICOTINE DEPENDENCE: Primary | ICD-10-CM

## 2024-05-22 DIAGNOSIS — T84.84XA PAINFUL ORTHOPAEDIC HARDWARE (HCC): ICD-10-CM

## 2024-05-22 DIAGNOSIS — I10 ESSENTIAL HYPERTENSION: ICD-10-CM

## 2024-05-22 DIAGNOSIS — E11.42 TYPE 2 DIABETES MELLITUS WITH DIABETIC POLYNEUROPATHY, WITHOUT LONG-TERM CURRENT USE OF INSULIN (HCC): Primary | ICD-10-CM

## 2024-05-22 PROBLEM — R07.9 CHEST PAIN: Status: RESOLVED | Noted: 2024-02-22 | Resolved: 2024-05-22

## 2024-05-22 LAB — HBA1C MFR BLD: 6.6 %

## 2024-05-22 PROCEDURE — 4004F PT TOBACCO SCREEN RCVD TLK: CPT | Performed by: STUDENT IN AN ORGANIZED HEALTH CARE EDUCATION/TRAINING PROGRAM

## 2024-05-22 PROCEDURE — 3078F DIAST BP <80 MM HG: CPT | Performed by: STUDENT IN AN ORGANIZED HEALTH CARE EDUCATION/TRAINING PROGRAM

## 2024-05-22 PROCEDURE — 83036 HEMOGLOBIN GLYCOSYLATED A1C: CPT | Performed by: STUDENT IN AN ORGANIZED HEALTH CARE EDUCATION/TRAINING PROGRAM

## 2024-05-22 PROCEDURE — G8399 PT W/DXA RESULTS DOCUMENT: HCPCS | Performed by: STUDENT IN AN ORGANIZED HEALTH CARE EDUCATION/TRAINING PROGRAM

## 2024-05-22 PROCEDURE — G8427 DOCREV CUR MEDS BY ELIG CLIN: HCPCS | Performed by: STUDENT IN AN ORGANIZED HEALTH CARE EDUCATION/TRAINING PROGRAM

## 2024-05-22 PROCEDURE — 1123F ACP DISCUSS/DSCN MKR DOCD: CPT | Performed by: STUDENT IN AN ORGANIZED HEALTH CARE EDUCATION/TRAINING PROGRAM

## 2024-05-22 PROCEDURE — 3023F SPIROM DOC REV: CPT | Performed by: STUDENT IN AN ORGANIZED HEALTH CARE EDUCATION/TRAINING PROGRAM

## 2024-05-22 PROCEDURE — 3044F HG A1C LEVEL LT 7.0%: CPT | Performed by: STUDENT IN AN ORGANIZED HEALTH CARE EDUCATION/TRAINING PROGRAM

## 2024-05-22 PROCEDURE — 3017F COLORECTAL CA SCREEN DOC REV: CPT | Performed by: STUDENT IN AN ORGANIZED HEALTH CARE EDUCATION/TRAINING PROGRAM

## 2024-05-22 PROCEDURE — 99214 OFFICE O/P EST MOD 30 MIN: CPT | Performed by: STUDENT IN AN ORGANIZED HEALTH CARE EDUCATION/TRAINING PROGRAM

## 2024-05-22 PROCEDURE — 1090F PRES/ABSN URINE INCON ASSESS: CPT | Performed by: STUDENT IN AN ORGANIZED HEALTH CARE EDUCATION/TRAINING PROGRAM

## 2024-05-22 PROCEDURE — G8417 CALC BMI ABV UP PARAM F/U: HCPCS | Performed by: STUDENT IN AN ORGANIZED HEALTH CARE EDUCATION/TRAINING PROGRAM

## 2024-05-22 PROCEDURE — 3074F SYST BP LT 130 MM HG: CPT | Performed by: STUDENT IN AN ORGANIZED HEALTH CARE EDUCATION/TRAINING PROGRAM

## 2024-05-22 PROCEDURE — 2022F DILAT RTA XM EVC RTNOPTHY: CPT | Performed by: STUDENT IN AN ORGANIZED HEALTH CARE EDUCATION/TRAINING PROGRAM

## 2024-05-22 RX ORDER — METFORMIN HYDROCHLORIDE 500 MG/1
500 TABLET, EXTENDED RELEASE ORAL
Qty: 90 TABLET | Refills: 1 | Status: SHIPPED | OUTPATIENT
Start: 2024-05-22

## 2024-05-22 ASSESSMENT — ENCOUNTER SYMPTOMS
CHEST TIGHTNESS: 0
VOMITING: 0
DIARRHEA: 0
EYE DISCHARGE: 0
ABDOMINAL PAIN: 0
BACK PAIN: 1
WHEEZING: 0
COUGH: 0
SORE THROAT: 0
CONSTIPATION: 0
NAUSEA: 0
SHORTNESS OF BREATH: 1

## 2024-05-22 NOTE — TELEPHONE ENCOUNTER
Our records indicate that your patient is coming due for their annual lung cancer screening follow up testing.     For your convenience, we have pended the order for the scan for you. If you do not agree with the need for the test, please cancel the order and let us know.     Sincerely,    [facility]   Lung Cancer Screening Program    Auto printed reminder letter sent to patient.

## 2024-05-22 NOTE — PROGRESS NOTES
(Originally 5/17/2023)    Hepatitis B vaccine (2 of 3 - Risk 3-dose series) 10/03/2024 (Originally 12/15/2022)    Flu vaccine (Season Ended) 10/03/2024 (Originally 8/1/2024)    Low dose CT lung screening &/or counseling  06/16/2024    Diabetic retinal exam  11/16/2024    Diabetic Alb to Cr ratio (uACR) test  01/17/2025    Lipids  01/17/2025    Depression Monitoring  02/21/2025    GFR test (Diabetes, CKD 3-4, OR last GFR 15-59)  03/06/2025    A1C test (Diabetic or Prediabetic)  05/22/2025    Breast cancer screen  11/13/2025    Colorectal Cancer Screen  05/03/2027    DTaP/Tdap/Td vaccine (3 - Td or Tdap) 06/16/2031    DEXA (modify frequency per FRAX score)  Completed    Annual Wellness Visit (Medicare Advantage)  Completed    Pneumococcal 65+ years Vaccine  Completed    Hib vaccine  Aged Out    Polio vaccine  Aged Out    Meningococcal (ACWY) vaccine  Aged Out    Pneumococcal 0-64 years Vaccine  Discontinued    HIV screen  Discontinued       Subjective:     Review of Systems   Constitutional:  Negative for appetite change, fatigue and fever.   HENT:  Negative for congestion, ear pain, hearing loss and sore throat.    Eyes:  Negative for discharge and visual disturbance.   Respiratory:  Positive for shortness of breath. Negative for cough, chest tightness and wheezing.    Cardiovascular:  Negative for chest pain, palpitations and leg swelling.   Gastrointestinal:  Negative for abdominal pain, constipation, diarrhea, nausea and vomiting.   Genitourinary:  Negative for flank pain, frequency, hematuria and urgency.   Musculoskeletal:  Positive for arthralgias, back pain and gait problem. Negative for joint swelling and myalgias.   Skin: Negative.    Neurological:  Negative for dizziness, weakness, numbness and headaches.   Psychiatric/Behavioral: Negative.  Negative for dysphoric mood. The patient is not nervous/anxious.        Objective:     Physical Exam  Vitals reviewed.   Constitutional:       Appearance: Normal

## 2024-05-27 DIAGNOSIS — I50.32 CHRONIC HEART FAILURE WITH PRESERVED EJECTION FRACTION (HCC): ICD-10-CM

## 2024-05-28 RX ORDER — EMPAGLIFLOZIN 10 MG/1
10 TABLET, FILM COATED ORAL DAILY
Qty: 30 TABLET | Refills: 1 | Status: SHIPPED | OUTPATIENT
Start: 2024-05-28

## 2024-05-28 NOTE — TELEPHONE ENCOUNTER
Donna Landry is calling to request a refill on the following medication(s):    Medication Request:  Requested Prescriptions     Pending Prescriptions Disp Refills    JARDIANCE 10 MG tablet [Pharmacy Med Name: JARDIANCE 10 MG TABLET] 30 tablet 1     Sig: TAKE 1 TABLET BY MOUTH EVERY DAY       Last Visit Date (If Applicable):  5/22/2024    Next Visit Date:    Visit date not found

## 2024-06-01 ENCOUNTER — HOSPITAL ENCOUNTER (OUTPATIENT)
Age: 69
Setting detail: OBSERVATION
Discharge: HOME | End: 2024-06-02
Attending: EMERGENCY MEDICINE | Admitting: STUDENT IN AN ORGANIZED HEALTH CARE EDUCATION/TRAINING PROGRAM
Payer: MEDICARE

## 2024-06-01 DIAGNOSIS — I50.32 CHRONIC DIASTOLIC CONGESTIVE HEART FAILURE (HCC): ICD-10-CM

## 2024-06-01 DIAGNOSIS — E87.6 HYPOKALEMIA: Primary | ICD-10-CM

## 2024-06-01 LAB — GLUCOSE BLD-MCNC: 167 MG/DL (ref 65–105)

## 2024-06-01 PROCEDURE — 96374 THER/PROPH/DIAG INJ IV PUSH: CPT

## 2024-06-01 PROCEDURE — 82947 ASSAY GLUCOSE BLOOD QUANT: CPT

## 2024-06-01 PROCEDURE — 99285 EMERGENCY DEPT VISIT HI MDM: CPT

## 2024-06-01 PROCEDURE — 93005 ELECTROCARDIOGRAM TRACING: CPT | Performed by: STUDENT IN AN ORGANIZED HEALTH CARE EDUCATION/TRAINING PROGRAM

## 2024-06-01 RX ORDER — 0.9 % SODIUM CHLORIDE 0.9 %
1000 INTRAVENOUS SOLUTION INTRAVENOUS ONCE
Status: COMPLETED | OUTPATIENT
Start: 2024-06-01 | End: 2024-06-02

## 2024-06-01 RX ORDER — ONDANSETRON 2 MG/ML
4 INJECTION INTRAMUSCULAR; INTRAVENOUS ONCE
Status: COMPLETED | OUTPATIENT
Start: 2024-06-01 | End: 2024-06-02

## 2024-06-01 ASSESSMENT — PAIN SCALES - GENERAL: PAINLEVEL_OUTOF10: 8

## 2024-06-01 ASSESSMENT — PAIN - FUNCTIONAL ASSESSMENT: PAIN_FUNCTIONAL_ASSESSMENT: 0-10

## 2024-06-02 ENCOUNTER — APPOINTMENT (OUTPATIENT)
Dept: GENERAL RADIOLOGY | Age: 69
End: 2024-06-02
Payer: MEDICARE

## 2024-06-02 VITALS
HEIGHT: 72 IN | HEART RATE: 73 BPM | WEIGHT: 208 LBS | RESPIRATION RATE: 18 BRPM | TEMPERATURE: 96.8 F | BODY MASS INDEX: 28.17 KG/M2 | SYSTOLIC BLOOD PRESSURE: 126 MMHG | OXYGEN SATURATION: 97 % | DIASTOLIC BLOOD PRESSURE: 72 MMHG

## 2024-06-02 LAB
ALBUMIN SERPL-MCNC: 4.7 G/DL (ref 3.5–5.2)
ALBUMIN/GLOB SERPL: 2 {RATIO} (ref 1–2.5)
ALP SERPL-CCNC: 85 U/L (ref 35–104)
ALT SERPL-CCNC: <5 U/L (ref 10–35)
ANION GAP SERPL CALCULATED.3IONS-SCNC: 14 MMOL/L (ref 9–16)
AST SERPL-CCNC: 21 U/L (ref 10–35)
BASOPHILS # BLD: 0.05 K/UL (ref 0–0.2)
BASOPHILS NFR BLD: 1 % (ref 0–2)
BILIRUB SERPL-MCNC: 0.4 MG/DL (ref 0–1.2)
BUN SERPL-MCNC: 17 MG/DL (ref 8–23)
CALCIUM SERPL-MCNC: 9.9 MG/DL (ref 8.6–10.4)
CHLORIDE SERPL-SCNC: 97 MMOL/L (ref 98–107)
CO2 SERPL-SCNC: 27 MMOL/L (ref 20–31)
CREAT SERPL-MCNC: 0.9 MG/DL (ref 0.5–0.9)
EOSINOPHIL # BLD: <0.03 K/UL (ref 0–0.44)
EOSINOPHILS RELATIVE PERCENT: 0 % (ref 1–4)
ERYTHROCYTE [DISTWIDTH] IN BLOOD BY AUTOMATED COUNT: 13.2 % (ref 11.8–14.4)
GFR, ESTIMATED: 74 ML/MIN/1.73M2
GLUCOSE SERPL-MCNC: 131 MG/DL (ref 74–99)
HCT VFR BLD AUTO: 45.4 % (ref 36.3–47.1)
HGB BLD-MCNC: 15.2 G/DL (ref 11.9–15.1)
IMM GRANULOCYTES # BLD AUTO: <0.03 K/UL (ref 0–0.3)
IMM GRANULOCYTES NFR BLD: 0 %
LYMPHOCYTES NFR BLD: 2.72 K/UL (ref 1.1–3.7)
LYMPHOCYTES RELATIVE PERCENT: 30 % (ref 24–43)
MCH RBC QN AUTO: 31.2 PG (ref 25.2–33.5)
MCHC RBC AUTO-ENTMCNC: 33.5 G/DL (ref 28.4–34.8)
MCV RBC AUTO: 93.2 FL (ref 82.6–102.9)
MONOCYTES NFR BLD: 0.69 K/UL (ref 0.1–1.2)
MONOCYTES NFR BLD: 8 % (ref 3–12)
NEUTROPHILS NFR BLD: 61 % (ref 36–65)
NEUTS SEG NFR BLD: 5.46 K/UL (ref 1.5–8.1)
NRBC BLD-RTO: 0 PER 100 WBC
PLATELET # BLD AUTO: 211 K/UL (ref 138–453)
PMV BLD AUTO: 10.7 FL (ref 8.1–13.5)
POTASSIUM SERPL-SCNC: 2.9 MMOL/L (ref 3.7–5.3)
POTASSIUM SERPL-SCNC: 3.9 MMOL/L (ref 3.7–5.3)
PROCALCITONIN SERPL-MCNC: 0.03 NG/ML (ref 0–0.09)
PROT SERPL-MCNC: 7.5 G/DL (ref 6.6–8.7)
RBC # BLD AUTO: 4.87 M/UL (ref 3.95–5.11)
SODIUM SERPL-SCNC: 138 MMOL/L (ref 136–145)
TROPONIN I SERPL HS-MCNC: 10 NG/L (ref 0–14)
TROPONIN I SERPL HS-MCNC: 8 NG/L (ref 0–14)
WBC OTHER # BLD: 9 K/UL (ref 3.5–11.3)

## 2024-06-02 PROCEDURE — 96365 THER/PROPH/DIAG IV INF INIT: CPT

## 2024-06-02 PROCEDURE — 80053 COMPREHEN METABOLIC PANEL: CPT

## 2024-06-02 PROCEDURE — 96376 TX/PRO/DX INJ SAME DRUG ADON: CPT

## 2024-06-02 PROCEDURE — 96366 THER/PROPH/DIAG IV INF ADDON: CPT

## 2024-06-02 PROCEDURE — 96375 TX/PRO/DX INJ NEW DRUG ADDON: CPT

## 2024-06-02 PROCEDURE — 84145 PROCALCITONIN (PCT): CPT

## 2024-06-02 PROCEDURE — 99222 1ST HOSP IP/OBS MODERATE 55: CPT | Performed by: STUDENT IN AN ORGANIZED HEALTH CARE EDUCATION/TRAINING PROGRAM

## 2024-06-02 PROCEDURE — G0378 HOSPITAL OBSERVATION PER HR: HCPCS

## 2024-06-02 PROCEDURE — 2580000003 HC RX 258: Performed by: STUDENT IN AN ORGANIZED HEALTH CARE EDUCATION/TRAINING PROGRAM

## 2024-06-02 PROCEDURE — 84132 ASSAY OF SERUM POTASSIUM: CPT

## 2024-06-02 PROCEDURE — 6360000002 HC RX W HCPCS: Performed by: STUDENT IN AN ORGANIZED HEALTH CARE EDUCATION/TRAINING PROGRAM

## 2024-06-02 PROCEDURE — 6360000002 HC RX W HCPCS: Performed by: NURSE PRACTITIONER

## 2024-06-02 PROCEDURE — 94640 AIRWAY INHALATION TREATMENT: CPT

## 2024-06-02 PROCEDURE — 6370000000 HC RX 637 (ALT 250 FOR IP): Performed by: NURSE PRACTITIONER

## 2024-06-02 PROCEDURE — 36415 COLL VENOUS BLD VENIPUNCTURE: CPT

## 2024-06-02 PROCEDURE — 85025 COMPLETE CBC W/AUTO DIFF WBC: CPT

## 2024-06-02 PROCEDURE — 84484 ASSAY OF TROPONIN QUANT: CPT

## 2024-06-02 PROCEDURE — 6370000000 HC RX 637 (ALT 250 FOR IP): Performed by: STUDENT IN AN ORGANIZED HEALTH CARE EDUCATION/TRAINING PROGRAM

## 2024-06-02 PROCEDURE — 71046 X-RAY EXAM CHEST 2 VIEWS: CPT

## 2024-06-02 RX ORDER — BUPRENORPHINE HYDROCHLORIDE AND NALOXONE HYDROCHLORIDE DIHYDRATE 8; 2 MG/1; MG/1
1 TABLET SUBLINGUAL DAILY
Status: DISCONTINUED | OUTPATIENT
Start: 2024-06-02 | End: 2024-06-02 | Stop reason: HOSPADM

## 2024-06-02 RX ORDER — PRAVASTATIN SODIUM 20 MG
40 TABLET ORAL DAILY
Status: DISCONTINUED | OUTPATIENT
Start: 2024-06-02 | End: 2024-06-02 | Stop reason: HOSPADM

## 2024-06-02 RX ORDER — ONDANSETRON 4 MG/1
4 TABLET, ORALLY DISINTEGRATING ORAL EVERY 8 HOURS PRN
Status: DISCONTINUED | OUTPATIENT
Start: 2024-06-02 | End: 2024-06-02 | Stop reason: HOSPADM

## 2024-06-02 RX ORDER — ENOXAPARIN SODIUM 100 MG/ML
40 INJECTION SUBCUTANEOUS DAILY
Status: DISCONTINUED | OUTPATIENT
Start: 2024-06-02 | End: 2024-06-02 | Stop reason: HOSPADM

## 2024-06-02 RX ORDER — PANTOPRAZOLE SODIUM 40 MG/1
40 TABLET, DELAYED RELEASE ORAL DAILY
Status: DISCONTINUED | OUTPATIENT
Start: 2024-06-02 | End: 2024-06-02 | Stop reason: HOSPADM

## 2024-06-02 RX ORDER — ACETAMINOPHEN 650 MG/1
650 SUPPOSITORY RECTAL EVERY 6 HOURS PRN
Status: DISCONTINUED | OUTPATIENT
Start: 2024-06-02 | End: 2024-06-02 | Stop reason: HOSPADM

## 2024-06-02 RX ORDER — SODIUM CHLORIDE 0.9 % (FLUSH) 0.9 %
5-40 SYRINGE (ML) INJECTION EVERY 12 HOURS SCHEDULED
Status: DISCONTINUED | OUTPATIENT
Start: 2024-06-02 | End: 2024-06-02 | Stop reason: HOSPADM

## 2024-06-02 RX ORDER — ACETAMINOPHEN 325 MG/1
650 TABLET ORAL EVERY 6 HOURS PRN
Status: DISCONTINUED | OUTPATIENT
Start: 2024-06-02 | End: 2024-06-02 | Stop reason: HOSPADM

## 2024-06-02 RX ORDER — POTASSIUM CHLORIDE 7.45 MG/ML
10 INJECTION INTRAVENOUS
Status: DISPENSED | OUTPATIENT
Start: 2024-06-02 | End: 2024-06-02

## 2024-06-02 RX ORDER — SODIUM CHLORIDE 0.9 % (FLUSH) 0.9 %
10 SYRINGE (ML) INJECTION PRN
Status: DISCONTINUED | OUTPATIENT
Start: 2024-06-02 | End: 2024-06-02 | Stop reason: HOSPADM

## 2024-06-02 RX ORDER — POTASSIUM CHLORIDE 7.45 MG/ML
10 INJECTION INTRAVENOUS PRN
Status: DISCONTINUED | OUTPATIENT
Start: 2024-06-02 | End: 2024-06-02 | Stop reason: HOSPADM

## 2024-06-02 RX ORDER — SODIUM CHLORIDE 9 MG/ML
INJECTION, SOLUTION INTRAVENOUS PRN
Status: DISCONTINUED | OUTPATIENT
Start: 2024-06-02 | End: 2024-06-02 | Stop reason: HOSPADM

## 2024-06-02 RX ORDER — TRAZODONE HYDROCHLORIDE 100 MG/1
100 TABLET ORAL NIGHTLY
Status: DISCONTINUED | OUTPATIENT
Start: 2024-06-02 | End: 2024-06-02 | Stop reason: HOSPADM

## 2024-06-02 RX ORDER — POTASSIUM CHLORIDE 20 MEQ/1
40 TABLET, EXTENDED RELEASE ORAL PRN
Status: DISCONTINUED | OUTPATIENT
Start: 2024-06-02 | End: 2024-06-02 | Stop reason: HOSPADM

## 2024-06-02 RX ORDER — FUROSEMIDE 20 MG/1
20 TABLET ORAL DAILY
Status: DISCONTINUED | OUTPATIENT
Start: 2024-06-02 | End: 2024-06-02 | Stop reason: HOSPADM

## 2024-06-02 RX ORDER — ONDANSETRON 2 MG/ML
4 INJECTION INTRAMUSCULAR; INTRAVENOUS EVERY 6 HOURS PRN
Status: DISCONTINUED | OUTPATIENT
Start: 2024-06-02 | End: 2024-06-02 | Stop reason: HOSPADM

## 2024-06-02 RX ORDER — MAGNESIUM SULFATE 1 G/100ML
1000 INJECTION INTRAVENOUS PRN
Status: DISCONTINUED | OUTPATIENT
Start: 2024-06-02 | End: 2024-06-02 | Stop reason: HOSPADM

## 2024-06-02 RX ORDER — POLYETHYLENE GLYCOL 3350 17 G/17G
17 POWDER, FOR SOLUTION ORAL DAILY PRN
Status: DISCONTINUED | OUTPATIENT
Start: 2024-06-02 | End: 2024-06-02 | Stop reason: HOSPADM

## 2024-06-02 RX ORDER — ROPINIROLE 0.25 MG/1
0.5 TABLET, FILM COATED ORAL DAILY
Status: DISCONTINUED | OUTPATIENT
Start: 2024-06-02 | End: 2024-06-02 | Stop reason: HOSPADM

## 2024-06-02 RX ORDER — BUDESONIDE AND FORMOTEROL FUMARATE DIHYDRATE 80; 4.5 UG/1; UG/1
2 AEROSOL RESPIRATORY (INHALATION)
Status: DISCONTINUED | OUTPATIENT
Start: 2024-06-02 | End: 2024-06-02 | Stop reason: HOSPADM

## 2024-06-02 RX ORDER — METFORMIN HYDROCHLORIDE 500 MG/1
500 TABLET, EXTENDED RELEASE ORAL
Status: DISCONTINUED | OUTPATIENT
Start: 2024-06-02 | End: 2024-06-02 | Stop reason: HOSPADM

## 2024-06-02 RX ORDER — FUROSEMIDE 20 MG/1
20 TABLET ORAL DAILY
Qty: 60 TABLET | Refills: 3 | Status: SHIPPED | OUTPATIENT
Start: 2024-06-03

## 2024-06-02 RX ORDER — POTASSIUM CHLORIDE 20 MEQ/1
40 TABLET, EXTENDED RELEASE ORAL
Status: DISCONTINUED | OUTPATIENT
Start: 2024-06-02 | End: 2024-06-02 | Stop reason: HOSPADM

## 2024-06-02 RX ORDER — ATENOLOL 25 MG/1
25 TABLET ORAL DAILY
Status: DISCONTINUED | OUTPATIENT
Start: 2024-06-02 | End: 2024-06-02 | Stop reason: HOSPADM

## 2024-06-02 RX ADMIN — ATENOLOL 25 MG: 25 TABLET ORAL at 10:01

## 2024-06-02 RX ADMIN — SODIUM CHLORIDE 1000 ML: 9 INJECTION, SOLUTION INTRAVENOUS at 00:23

## 2024-06-02 RX ADMIN — POTASSIUM CHLORIDE 40 MEQ: 1500 TABLET, EXTENDED RELEASE ORAL at 10:01

## 2024-06-02 RX ADMIN — BUDESONIDE AND FORMOTEROL FUMARATE DIHYDRATE 2 PUFF: 80; 4.5 AEROSOL RESPIRATORY (INHALATION) at 08:23

## 2024-06-02 RX ADMIN — ROPINIROLE HYDROCHLORIDE 0.5 MG: 0.25 TABLET, FILM COATED ORAL at 10:00

## 2024-06-02 RX ADMIN — EMPAGLIFLOZIN 10 MG: 10 TABLET, FILM COATED ORAL at 10:01

## 2024-06-02 RX ADMIN — METFORMIN HYDROCHLORIDE 500 MG: 500 TABLET, EXTENDED RELEASE ORAL at 10:00

## 2024-06-02 RX ADMIN — POTASSIUM CHLORIDE 10 MEQ: 7.46 INJECTION, SOLUTION INTRAVENOUS at 06:06

## 2024-06-02 RX ADMIN — POTASSIUM CHLORIDE 10 MEQ: 7.46 INJECTION, SOLUTION INTRAVENOUS at 03:45

## 2024-06-02 RX ADMIN — PANTOPRAZOLE SODIUM 40 MG: 40 TABLET, DELAYED RELEASE ORAL at 10:01

## 2024-06-02 RX ADMIN — POTASSIUM BICARBONATE 40 MEQ: 782 TABLET, EFFERVESCENT ORAL at 01:31

## 2024-06-02 RX ADMIN — ONDANSETRON 4 MG: 2 INJECTION INTRAMUSCULAR; INTRAVENOUS at 00:24

## 2024-06-02 RX ADMIN — POTASSIUM CHLORIDE 10 MEQ: 7.46 INJECTION, SOLUTION INTRAVENOUS at 02:39

## 2024-06-02 RX ADMIN — BUPRENORPHINE AND NALOXONE 1 TABLET: 8; 2 TABLET SUBLINGUAL at 11:03

## 2024-06-02 NOTE — ED PROVIDER NOTES
[facility] OBSERVATION UNIT  Emergency Department Encounter  Emergency Medicine Resident     Pt Name:Donna Landry  MRN: [id number]  Birthdate 1955  Date of evaluation: 6/1/24  PCP:  America Cam MD  Note Started: 11:25 PM EDT      CHIEF COMPLAINT       Chief Complaint   Patient presents with    Fatigue    Shortness of Breath       HISTORY OF PRESENT ILLNESS  (Location/Symptom, Timing/Onset, Context/Setting, Quality, Duration, Modifying Factors, Severity.)      Donna Landry is a 68 y.o. female who presents with general weakness and fatigue.  Patient states symptoms ongoing for 1 day.  Some shortness of breath, no chest pain.  Patient states last time she had symptoms like this her potassium was low.  Is on oral potassium supplements at home.  No fevers or chills.  No cough or congestion.  No known sick contacts.    PAST MEDICAL / SURGICAL / SOCIAL / FAMILY HISTORY      has a past medical history of RACHEL (acute kidney injury) ([facility]), Arthritis, Bilateral chronic knee pain, Bronchiectasis without complication ([facility]), Cancer ([facility]), Chest pain, CHF (congestive heart failure) ([facility]), Chronic bilateral low back pain with right-sided sciatica, Chronic bronchitis (HCC), Chronic bronchitis (HCC), Chronic respiratory failure with hypoxia (HCC), Cigarette nicotine dependence without complication, COPD (chronic obstructive pulmonary disease) ([facility]), Current smoker on some days, Depression, Diabetic polyneuropathy associated with type 2 diabetes mellitus (HCC), Diverticulosis, Essential hypertension, Full dentures, GERD (gastroesophageal reflux disease), Hep C w/o coma, chronic (HCC), Hyperlipidemia, Hyperplastic polyp of intestine, Hypertension, Irritable bowel syndrome with both constipation and diarrhea, Liver disease, Moderate episode of recurrent major depressive disorder (HCC), Nasal polyposis, Noncompliance with CPAP treatment, Obesity (BMI 35.0-39.9 without comorbidity), On home O2, JIMENA (obstructive sleep apnea),

## 2024-06-02 NOTE — CARE COORDINATION
DISCHARGE PLANNING EVALUATION: OP/OBSERVATION        6/2/24, 12:55 PM EDT    Donna AGATA Eviamy         Location: OBS 23/23-1   Reason for hospitalization: Hypokalemia [E87.6]     CM Services requested for transitional needs.     PCP: America Cam MD    Transportation/Food Security/Housekeeping Addressed:  No issues identified.     Equipment needs:     Case Management Services Information Letter Provided []    Transition plan: DISCHARGE PLANNING EVALUATION: OP/OBSERVATION        6/2/24, 12:55 PM EDT    Donna Landry         Location: OBS 23/23-1   Reason for hospitalization: Hypokalemia [E87.6]     CM Services requested for transitional needs.     PCP: America Cam MD    Transportation/Food Security/Housekeeping Addressed:  No issues identified.     Equipment needs: none; has cane & home O2 (does not know name of O2 company)    Case Management Services Information Letter Provided [x]    Transition plan: plan is to return home independent and has cab transport

## 2024-06-02 NOTE — ED PROVIDER NOTES
Note Started: 11:58 PM EDT         [facility]     Emergency Department     Faculty Attestation    I performed a history and physical examination of the patient and discussed management with the resident. I reviewed the resident’s note and agree with the documented findings and plan of care. Any areas of disagreement are noted on the chart. I was personally present for the key portions of any procedures. I have documented in the chart those procedures where I was not present during the key portions. I have reviewed the emergency nurses triage note. I agree with the chief complaint, past medical history, past surgical history, allergies, medications, social and family history as documented unless otherwise noted below.        For Physician Assistant/ Nurse Practitioner cases/documentation I have personally evaluated this patient and have completed at least one if not all key elements of the E/M (history, physical exam, and MDM). Additional findings are as noted.  I have personally seen and evaluated the patient.  I find the patient's history and physical exam are consistent with the NP/PA documentation.  I agree with the care provided, treatment rendered, disposition and follow-up plan.    68-year-old female with history of diabetes, cardiac pacemaker present, COPD presenting with malaise and fatigue.  States that it feels like when she has had hypokalemia in the past.  She states that she has been taking her oral potassium as prescribed.  No recent falls or injuries.  No new medications.    Exam:  General : Laying on the bed, awake, alert, and in no acute distress  CV : normal rate and regular rhythm  Lungs : Breathing comfortably on room air with no tachypnea, hypoxia, or increased work of breathing    DDx: Electrolyte abnormality, cardiac event    Plan:  Lab workup including CBC, electrolytes, troponin  EKG, pacemaker interrogation    Medical Decision Making  Amount and/or Complexity of Data

## 2024-06-02 NOTE — ED NOTES
Arrived patient to ED presents with fatigue and shortness of breath. Patient states that fatigue has been going on for few days, patient states she has SOB on exertion, patient states that she is using Oxygen 2L/min at home, patient has a history of COPD, hypertension, and pacemaker. Painscale of 8/10. Hooked to cardiac monitor at 73bpm, patient appears to be tachypniec, EKG done and reviewed by Dr. Sierra, alert and oriented, able to follow commands. Bed on lowest position. Call light provided.  
Patient to xray via stretcher  
Diverticulosis     Essential hypertension 12/30/2013    Full dentures     GERD (gastroesophageal reflux disease)     Hep C w/o coma, chronic (HCC)     Hyperlipidemia     Hyperplastic polyp of intestine     Hypertension     DR. MCDANIEL-CARDIO    Irritable bowel syndrome with both constipation and diarrhea 02/15/2019    Liver disease     hepatitis C    Moderate episode of recurrent major depressive disorder (HCC) 10/30/2014    Nasal polyposis     Noncompliance with CPAP treatment     Obesity (BMI 35.0-39.9 without comorbidity)     On home O2     2 liters PRN per pt    JIMEAN (obstructive sleep apnea)     JIMENA on CPAP    Pacemaker 2012    Personal history of tobacco use, presenting hazards to health 04/02/2015    Pneumonia 07/2012    RECENTLY HOSPITALIZED    Post-op pain     Primary insomnia 09/30/2016    Pulmonary edema cardiac cause (HCC)     Rectal bleeding     Smoking addiction     Tobacco abuse        Labs:  Labs Reviewed   CBC WITH AUTO DIFFERENTIAL - Abnormal; Notable for the following components:       Result Value    Hemoglobin 15.2 (*)     Eosinophils % 0 (*)     All other components within normal limits   COMPREHENSIVE METABOLIC PANEL - Abnormal; Notable for the following components:    Potassium 2.9 (*)     Chloride 97 (*)     Glucose 131 (*)     ALT <5 (*)     All other components within normal limits   POC GLUCOSE FINGERSTICK - Abnormal; Notable for the following components:    POC Glucose 167 (*)     All other components within normal limits   TROPONIN   TROPONIN       Electronically signed by Blayne Lares RN on 6/2/2024 at 2:21 AM

## 2024-06-02 NOTE — H&P
Immature Granulocytes % 0 0 %    Neutrophils Absolute 5.46 1.50 - 8.10 k/uL    Lymphocytes Absolute 2.72 1.10 - 3.70 k/uL    Monocytes Absolute 0.69 0.10 - 1.20 k/uL    Eosinophils Absolute <0.03 0.00 - 0.44 k/uL    Basophils Absolute 0.05 0.00 - 0.20 k/uL    Immature Granulocytes Absolute <0.03 0.00 - 0.30 k/uL   CMP    Collection Time: 06/02/24 12:30 AM   Result Value Ref Range    Sodium 138 136 - 145 mmol/L    Potassium 2.9 (LL) 3.7 - 5.3 mmol/L    Chloride 97 (L) 98 - 107 mmol/L    CO2 27 20 - 31 mmol/L    Anion Gap 14 9 - 16 mmol/L    Glucose 131 (H) 74 - 99 mg/dL    BUN 17 8 - 23 mg/dL    Creatinine 0.9 0.50 - 0.90 mg/dL    Est, Glom Filt Rate 74 >60 mL/min/1.73m2    Calcium 9.9 8.6 - 10.4 mg/dL    Total Protein 7.5 6.6 - 8.7 g/dL    Albumin 4.7 3.5 - 5.2 g/dL    Albumin/Globulin Ratio 2.0 1.0 - 2.5    Total Bilirubin 0.4 0.00 - 1.20 mg/dL    Alkaline Phosphatase 85 35 - 104 U/L    ALT <5 (L) 10 - 35 U/L    AST 21 10 - 35 U/L   Troponin    Collection Time: 06/02/24 12:30 AM   Result Value Ref Range    Troponin, High Sensitivity 10 0 - 14 ng/L   Troponin    Collection Time: 06/02/24 12:58 AM   Result Value Ref Range    Troponin, High Sensitivity 8 0 - 14 ng/L   Potassium    Collection Time: 06/02/24  8:11 AM   Result Value Ref Range    Potassium 3.9 3.7 - 5.3 mmol/L   Procalcitonin    Collection Time: 06/02/24 11:52 AM   Result Value Ref Range    Procalcitonin 0.03 0.00 - 0.09 ng/mL       Imaging/Diagnostics:  XR CHEST (2 VW)    Result Date: 6/2/2024  Bibasilar parenchymal infiltrates and possible small effusions, which may represent atelectasis or pneumonia.  Aspiration in the differential.       Assessment :      Hospital Problems             Last Modified POA    * (Principal) Hypokalemia 6/2/2024 Yes       Plan:     Initial potassium was 2.9, repeat potassium is 3.9, patient is very eager to be discharged home, will discontinue hydrochlorothiazide, change Lasix to once daily, plan to repeat BMP as

## 2024-06-02 NOTE — ED PROVIDER NOTES
Faculty Sign-Out Attestation  Handoff taken on the following patient from prior Attending Physician: Mariela  Note Started: 2:07 AM EDT    I was available and discussed any additional care issues that arose and coordinated the management plans with the resident(s) caring for the patient during my duty period. Any areas of disagreement with resident’s documentation of care or procedures are noted on the chart. I was personally present for the key portions of any/all procedures during my duty period. I have documented in the chart those procedures where I was not present during the key portions.    Admit for low K,   Juan C Parker DO  Attending Physician       Juan C Chanel DO  06/02/24 0207       Juan C Chanel DO  06/02/24 0504

## 2024-06-02 NOTE — DISCHARGE INSTRUCTIONS
Please take medications as prescribed, follow up with PCP within 1 week, need to get your potassium checked again as given and follow up for results with PCP

## 2024-06-02 NOTE — DISCHARGE SUMMARY
[email or web address]    [facility]   IN-PATIENT SERVICE   [facility]    Discharge Summary     Patient ID: Donna Landry  :  1955   MRN: [id number]     ACCOUNT:  [id number]   Patient's PCP: America Cam MD  Admit Date: 2024   Discharge Date: 2024    Length of Stay: 0  Code Status:  Full Code  Admitting Physician: Jasmin Snider Sra, MD  Discharge Physician: Jasmin Snider Sra, MD     Active Discharge Diagnoses:     Hospital Problem Lists:  Principal Problem:    Hypokalemia  Resolved Problems:    * No resolved hospital problems. *      Admission Condition:  stable     Discharged Condition: stable    Hospital Stay:     Hospital Course:  68-year old female with history of essential hypertension, diabetes, diastolic heart failure, pacemaker in place, COPD on 2 L oxygen at home, came in with feeling weak, sick to stomach with nausea, dizziness, states that she felt as if she was going to pass out which is why she came to the ER, found to have low potassium 2.9 on arrival, given replacement in the ER, now feeling much better.     Of note patient is on Lasix and hydrochlorothiazide at home, also takes potassium supplements    Initial potassium was 2.9, repeat potassium is 3.9, patient is very eager to be discharged home, will discontinue hydrochlorothiazide, change Lasix to once daily, plan to repeat BMP as outpatient and instructions to follow-up with PCP regarding same.       Significant therapeutic interventions: As described above    Significant Diagnostic Studies:   Labs / Micro:  CBC:   Lab Results   Component Value Date/Time    WBC 9.0 2024 12:30 AM    RBC 4.87 2024 12:30 AM    RBC 4.92 2012 03:50 PM    HGB 15.2 2024 12:30 AM    HCT 45.4 2024 12:30 AM    MCV 93.2 2024 12:30 AM    MCH 31.2 2024 12:30 AM    MCHC 33.5 2024 12:30 AM    RDW 13.2 2024 12:30 AM     2024 12:30 AM    PLT Platelet clumps present,

## 2024-06-03 ENCOUNTER — TELEPHONE (OUTPATIENT)
Dept: FAMILY MEDICINE CLINIC | Age: 69
End: 2024-06-03

## 2024-06-03 ENCOUNTER — CARE COORDINATION (OUTPATIENT)
Dept: CARE COORDINATION | Age: 69
End: 2024-06-03

## 2024-06-03 NOTE — TELEPHONE ENCOUNTER
Care Transitions Initial Follow Up Call    Outreach made within 2 business days of discharge: Yes    Patient: Donna Landry Patient : 1955   MRN: [id number]  Reason for Admission: There are no discharge diagnoses documented for the most recent discharge.  Discharge Date: 24       Spoke with: Patient     Discharge department/facility: [facility]    TCM Interactive Patient Contact:  Was patient able to fill all prescriptions: Yes  Was patient instructed to bring all medications to the follow-up visit: Yes  Is patient taking all medications as directed in the discharge summary? Yes  Does patient understand their discharge instructions: Yes  Does patient have questions or concerns that need addressed prior to 7-14 day follow up office visit: yes - Low potassium     Scheduled appointment with PCP within 7-14 days    Follow Up  Future Appointments   Date Time Provider Department Center   2024  9:45 AM America Cam MD [facility]   2024  5:00 PM [place] CT RM 2 [facility] PB CT [facility]   2024  2:45 PM Alejandro Sampson MD Resp Spec [facility]   2024  9:00 AM Rich Gonzalez MD [facility] GI [facility]   2024 10:00 AM Anupama Camilo PA-C [facility] ORT SP [facility]   2024 11:45 AM SCHEDULE, AFL [facility] [place] DEVICE CLINIC SCHEDULE AFL T[facility] PBUR YAMIL Shin MA

## 2024-06-03 NOTE — CARE COORDINATION
Care Transitions Outreach Attempt #1    Call within 2 business days of discharge: Yes   Attempt #1 to reach patient for transitions of care follow up. Unable to reach patient. Left message requesting call back.     Patient: Donna Landry Patient : 1955 MRN: [id number]    Last Discharge Facility       Date Complaint Diagnosis Description Type Department Provider    24 Fatigue; Shortness of Breath Hypokalemia ... ED to Hosp-Admission (Discharged) (ADMITTED) MATTHIEU RAWLS Sra, Jasmin Snider MD; Mariela...              Was this an external facility discharge? No Discharge Facility Name: [facility]    Noted following upcoming appointments from discharge chart review:   [facility] follow up appointment(s):   Future Appointments   Date Time Provider Department Center   2024  9:45 AM America Cam MD [facility]   2024  5:00 PM [place] CT RM 2 [facility] PB CT [facility] [place]   2024  2:45 PM Alejandro Sampson MD Resp Spec [facility]   2024  9:00 AM Rich Gonzalez MD [facility] GI [facility]   2024 10:00 AM Anupama Camilo PA-C [facility] ORT SP [facility]   2024 11:45 AM SCHEDULE, AFL [facility] [place] DEVICE CLINIC SCHEDULE AFL TCC PBUR AFL MARIXA C     Lisette Del Cid, RN BSN [facility]  Care Transitions Nurse  [phone]   [email or web address]

## 2024-06-04 ENCOUNTER — CARE COORDINATION (OUTPATIENT)
Dept: CARE COORDINATION | Age: 69
End: 2024-06-04

## 2024-06-04 ENCOUNTER — OFFICE VISIT (OUTPATIENT)
Dept: FAMILY MEDICINE CLINIC | Age: 69
End: 2024-06-04

## 2024-06-04 VITALS
WEIGHT: 188 LBS | OXYGEN SATURATION: 91 % | DIASTOLIC BLOOD PRESSURE: 62 MMHG | HEART RATE: 68 BPM | BODY MASS INDEX: 25.47 KG/M2 | HEIGHT: 72 IN | TEMPERATURE: 97 F | SYSTOLIC BLOOD PRESSURE: 112 MMHG

## 2024-06-04 DIAGNOSIS — I10 ESSENTIAL HYPERTENSION: ICD-10-CM

## 2024-06-04 DIAGNOSIS — Z09 HOSPITAL DISCHARGE FOLLOW-UP: Primary | ICD-10-CM

## 2024-06-04 DIAGNOSIS — E11.42 TYPE 2 DIABETES MELLITUS WITH DIABETIC POLYNEUROPATHY, WITHOUT LONG-TERM CURRENT USE OF INSULIN (HCC): ICD-10-CM

## 2024-06-04 DIAGNOSIS — E87.6 HYPOKALEMIA: ICD-10-CM

## 2024-06-04 LAB
EKG ATRIAL RATE: 75 BPM
EKG P AXIS: 61 DEGREES
EKG P-R INTERVAL: 252 MS
EKG Q-T INTERVAL: 438 MS
EKG QRS DURATION: 96 MS
EKG QTC CALCULATION (BAZETT): 489 MS
EKG R AXIS: 29 DEGREES
EKG T AXIS: 59 DEGREES
EKG VENTRICULAR RATE: 75 BPM
HBA1C MFR BLD: 6.5 %

## 2024-06-04 RX ORDER — AMLODIPINE BESYLATE 5 MG/1
5 TABLET ORAL DAILY
COMMUNITY

## 2024-06-04 SDOH — ECONOMIC STABILITY: FOOD INSECURITY: WITHIN THE PAST 12 MONTHS, THE FOOD YOU BOUGHT JUST DIDN'T LAST AND YOU DIDN'T HAVE MONEY TO GET MORE.: NEVER TRUE

## 2024-06-04 SDOH — ECONOMIC STABILITY: FOOD INSECURITY: WITHIN THE PAST 12 MONTHS, YOU WORRIED THAT YOUR FOOD WOULD RUN OUT BEFORE YOU GOT MONEY TO BUY MORE.: NEVER TRUE

## 2024-06-04 SDOH — ECONOMIC STABILITY: INCOME INSECURITY: IN THE LAST 12 MONTHS, WAS THERE A TIME WHEN YOU WERE NOT ABLE TO PAY THE MORTGAGE OR RENT ON TIME?: NO

## 2024-06-04 ASSESSMENT — PATIENT HEALTH QUESTIONNAIRE - PHQ9
5. POOR APPETITE OR OVEREATING: NOT AT ALL
3. TROUBLE FALLING OR STAYING ASLEEP: NOT AT ALL
SUM OF ALL RESPONSES TO PHQ QUESTIONS 1-9: 0
6. FEELING BAD ABOUT YOURSELF - OR THAT YOU ARE A FAILURE OR HAVE LET YOURSELF OR YOUR FAMILY DOWN: NOT AT ALL
SUM OF ALL RESPONSES TO PHQ QUESTIONS 1-9: 0
4. FEELING TIRED OR HAVING LITTLE ENERGY: NOT AT ALL
1. LITTLE INTEREST OR PLEASURE IN DOING THINGS: NOT AT ALL
SUM OF ALL RESPONSES TO PHQ9 QUESTIONS 1 & 2: 0
8. MOVING OR SPEAKING SO SLOWLY THAT OTHER PEOPLE COULD HAVE NOTICED. OR THE OPPOSITE, BEING SO FIGETY OR RESTLESS THAT YOU HAVE BEEN MOVING AROUND A LOT MORE THAN USUAL: NOT AT ALL
SUM OF ALL RESPONSES TO PHQ QUESTIONS 1-9: 0
10. IF YOU CHECKED OFF ANY PROBLEMS, HOW DIFFICULT HAVE THESE PROBLEMS MADE IT FOR YOU TO DO YOUR WORK, TAKE CARE OF THINGS AT HOME, OR GET ALONG WITH OTHER PEOPLE: NOT DIFFICULT AT ALL
SUM OF ALL RESPONSES TO PHQ QUESTIONS 1-9: 0
2. FEELING DOWN, DEPRESSED OR HOPELESS: NOT AT ALL
7. TROUBLE CONCENTRATING ON THINGS, SUCH AS READING THE NEWSPAPER OR WATCHING TELEVISION: NOT AT ALL
9. THOUGHTS THAT YOU WOULD BE BETTER OFF DEAD, OR OF HURTING YOURSELF: NOT AT ALL

## 2024-06-04 ASSESSMENT — ENCOUNTER SYMPTOMS
SHORTNESS OF BREATH: 0
SORE THROAT: 0
COUGH: 0
NAUSEA: 1
EYE DISCHARGE: 0
CONSTIPATION: 0
DIARRHEA: 0
CHEST TIGHTNESS: 0
VOMITING: 0
ABDOMINAL PAIN: 0
WHEEZING: 0

## 2024-06-04 ASSESSMENT — SOCIAL DETERMINANTS OF HEALTH (SDOH): HOW HARD IS IT FOR YOU TO PAY FOR THE VERY BASICS LIKE FOOD, HOUSING, MEDICAL CARE, AND HEATING?: NOT HARD AT ALL

## 2024-06-04 NOTE — CARE COORDINATION
Care Transitions Note    Initial Call - Call within 2 business days of discharge: Yes     Attempted to reach patient for transitions of care follow up. Unable to reach patient. No answer. Noted patient had PCP HFU today. CTN sign off for transitions.    Outreach Attempts:   Multiple attempts to contact patient at phone numbers on file.     Patient: Donna Landry    Patient : 1955   MRN: [id number]    Reason for Admission: hypokalemia  Discharge Date: 24  RURS: Readmission Risk Score: 14.8    Last Discharge Facility       Date Complaint Diagnosis Description Type Department Provider    24 Fatigue; Shortness of Breath Hypokalemia ... ED to Hosp-Admission (Discharged) (ADMITTED) MATTHIEU RAWLS Sra, Jasmin Snider MD; [facility]            Was this an external facility discharge? No    Follow Up Appointment:   Patient has hospital follow up appointment scheduled within 7 days of discharge.    Future Appointments         Provider Specialty Dept Phone    2024 5:00 PM (Arrive by 4:30 PM) [place] [address] Radiology [phone]    2024 2:45 PM Alejandro Sampson MD Pulmonology [phone]    2024 9:00 AM Rich Gonzalez MD Gastroenterology [phone]    2024 10:00 AM Anupama Camilo PA-C Orthopedic Surgery [phone]    2024 1:00 PM America Cam MD Family Medicine [phone]    2024 11:45 AM SCHEDULE, AFL Pending [facility] to [facility] DEVICE CLINIC SCHEDULE Cardiology [phone]            No further follow-up call indicated     Lisette Del Cid RN

## 2024-06-04 NOTE — PROGRESS NOTES
Post-Discharge Transitional Care Follow Up      Donna Landry   YOB: 1955    Date of Office Visit:  6/4/2024  Date of Hospital Admission: 6/1/24  Date of Hospital Discharge: 6/2/24  Readmission Risk Score (high >=14%. Medium >=10%):Readmission Risk Score: 14.8      Care management risk score Rising risk (score 2-5) and Complex Care (Scores >=6): No Risk Score On File     Non face to face  following discharge, date last encounter closed (first attempt may have been earlier): 06/03/2024     Call initiated 2 business days of discharge: Yes     Hospital discharge follow-up  -     HI DISCHARGE MEDS RECONCILED W/ CURRENT OUTPATIENT MED LIST  Essential hypertension  -     Magnesium; Future  -     Basic Metabolic Panel; Future  Type 2 diabetes mellitus with diabetic polyneuropathy, without long-term current use of insulin (HCC)  -     POCT glycosylated hemoglobin (Hb A1C)  Hypokalemia  -     Magnesium; Future  -     Basic Metabolic Panel; Future    Medical Decision Making: moderate complexity  Return in 3 months (on 9/4/2024) for Follow up HTN.    On this date 6/4/2024 I have spent 45 minutes reviewing previous notes, test results and face to face with the patient discussing the diagnosis and importance of compliance with the treatment plan as well as documenting on the day of the visit.       Subjective:   She presented to [place] [facility] with complaints of nausea and dizziness and feeling weak.  She was found to have hypokalemia with potassium of 2.9.  She was given IV potassium and admitted in the hospital for further workup.  She has had recurrent admissions for hypokalemia in the past few months.  She reported taking hydrochlorothiazide which has been discontinued from her outpatient medication list previously but she states that she is still getting prescription from the pharmacy.  She also takes Lasix and potassium supplements.  Her potassium improved with supplementation and she was advised

## 2024-06-05 ENCOUNTER — HOSPITAL ENCOUNTER (OUTPATIENT)
Age: 69
Discharge: HOME | End: 2024-06-05
Payer: MEDICARE

## 2024-06-05 DIAGNOSIS — E87.6 HYPOKALEMIA: ICD-10-CM

## 2024-06-05 DIAGNOSIS — I10 ESSENTIAL HYPERTENSION: ICD-10-CM

## 2024-06-05 LAB
ANION GAP SERPL CALCULATED.3IONS-SCNC: 6 MMOL/L (ref 9–16)
ANION GAP SERPL CALCULATED.3IONS-SCNC: 6 MMOL/L (ref 9–16)
BUN SERPL-MCNC: 10 MG/DL (ref 8–23)
BUN SERPL-MCNC: 10 MG/DL (ref 8–23)
CALCIUM SERPL-MCNC: 9.2 MG/DL (ref 8.6–10.4)
CALCIUM SERPL-MCNC: 9.3 MG/DL (ref 8.6–10.4)
CHLORIDE SERPL-SCNC: 108 MMOL/L (ref 98–107)
CHLORIDE SERPL-SCNC: 108 MMOL/L (ref 98–107)
CO2 SERPL-SCNC: 28 MMOL/L (ref 20–31)
CO2 SERPL-SCNC: 28 MMOL/L (ref 20–31)
CREAT SERPL-MCNC: 0.6 MG/DL (ref 0.5–0.9)
CREAT SERPL-MCNC: 0.6 MG/DL (ref 0.5–0.9)
GFR, ESTIMATED: >90 ML/MIN/1.73M2
GFR, ESTIMATED: >90 ML/MIN/1.73M2
GLUCOSE SERPL-MCNC: 124 MG/DL (ref 74–99)
GLUCOSE SERPL-MCNC: 124 MG/DL (ref 74–99)
MAGNESIUM SERPL-MCNC: 2.1 MG/DL (ref 1.6–2.4)
POTASSIUM SERPL-SCNC: 4.1 MMOL/L (ref 3.7–5.3)
POTASSIUM SERPL-SCNC: 4.1 MMOL/L (ref 3.7–5.3)
SODIUM SERPL-SCNC: 142 MMOL/L (ref 136–145)
SODIUM SERPL-SCNC: 142 MMOL/L (ref 136–145)

## 2024-06-05 PROCEDURE — 36415 COLL VENOUS BLD VENIPUNCTURE: CPT

## 2024-06-05 PROCEDURE — 83735 ASSAY OF MAGNESIUM: CPT

## 2024-06-05 PROCEDURE — 80048 BASIC METABOLIC PNL TOTAL CA: CPT

## 2024-06-06 ENCOUNTER — HOSPITAL ENCOUNTER (OUTPATIENT)
Dept: CT IMAGING | Age: 69
Discharge: HOME | End: 2024-06-08
Attending: INTERNAL MEDICINE
Payer: MEDICARE

## 2024-06-06 DIAGNOSIS — Z87.891 PERSONAL HISTORY OF NICOTINE DEPENDENCE: ICD-10-CM

## 2024-06-06 PROCEDURE — 71271 CT THORAX LUNG CANCER SCR C-: CPT

## 2024-06-11 ENCOUNTER — TELEPHONE (OUTPATIENT)
Dept: FAMILY MEDICINE CLINIC | Age: 69
End: 2024-06-11

## 2024-06-11 NOTE — TELEPHONE ENCOUNTER
Patient called and stated she wants a referral to an OB so she can get scheduled for a PAP. She stated her last PAP was abnormal and she never got another after it.

## 2024-06-12 ENCOUNTER — PATIENT MESSAGE (OUTPATIENT)
Dept: GASTROENTEROLOGY | Age: 69
End: 2024-06-12

## 2024-06-12 ENCOUNTER — TELEPHONE (OUTPATIENT)
Dept: GASTROENTEROLOGY | Age: 69
End: 2024-06-12

## 2024-06-12 NOTE — TELEPHONE ENCOUNTER
Pt has appt on Friday 06/28/24 at 9:00 am with Dr. Gonzalez. Appt needs to be r/s due to provider in procedures Fri mornings.    Attempt 1, writer called pt, no answer. Left voicemail message appt needs to be r/s    Attempt 2, writer sent pt letter in mail    Attempt 3, writer sent pt MyChart message

## 2024-06-14 DIAGNOSIS — J41.1 MUCOPURULENT CHRONIC BRONCHITIS (HCC): ICD-10-CM

## 2024-06-17 ENCOUNTER — TELEPHONE (OUTPATIENT)
Dept: FAMILY MEDICINE CLINIC | Age: 69
End: 2024-06-17

## 2024-06-17 RX ORDER — ALBUTEROL SULFATE 2.5 MG/3ML
SOLUTION RESPIRATORY (INHALATION)
Qty: 300 ML | Refills: 1 | Status: SHIPPED | OUTPATIENT
Start: 2024-06-17

## 2024-06-17 NOTE — TELEPHONE ENCOUNTER
Pt calling in wanting the results of her ct scan I checked with dr kelly she said it is ok I made patient aware she verbally understood

## 2024-06-17 NOTE — TELEPHONE ENCOUNTER
Donna Landry is calling to request a refill on the following medication(s):    Medication Request:  Requested Prescriptions     Pending Prescriptions Disp Refills    albuterol (PROVENTIL) (2.5 MG/3ML) 0.083% nebulizer solution [Pharmacy Med Name: ALBUTEROL SUL 2.5 MG/3 ML SOLN] 300 mL 1     Sig: INHALE CONTENTS OF 1 VIAL ( 3 MILLILITERS ) IN NEBULIZER BY MOUTH AND INTO THE LUNGS EVERY 6 HOURS       Last Visit Date (If Applicable):  6/4/2024    Next Visit Date:    9/4/2024              3

## 2024-06-18 ENCOUNTER — CARE COORDINATION (OUTPATIENT)
Dept: CARE COORDINATION | Age: 69
End: 2024-06-18

## 2024-06-18 ENCOUNTER — CARE COORDINATION (OUTPATIENT)
Dept: PRIMARY CARE CLINIC | Age: 69
End: 2024-06-18

## 2024-06-18 DIAGNOSIS — J44.9 CHRONIC OBSTRUCTIVE PULMONARY DISEASE, UNSPECIFIED COPD TYPE (HCC): ICD-10-CM

## 2024-06-18 DIAGNOSIS — I50.32 CHRONIC HEART FAILURE WITH PRESERVED EJECTION FRACTION (HCC): ICD-10-CM

## 2024-06-18 DIAGNOSIS — I10 ESSENTIAL HYPERTENSION: Primary | ICD-10-CM

## 2024-06-18 NOTE — CARE COORDINATION
Patient Donna Landry  06/18/24     Care Coordination  placed call to patient to arrange RPM kit  through [employer]. Left HIPAA Compliant Message     provided return and how to pack equipment in original packing via the patients voicemail if available and provided call back number should patient have questions.    Patient made aware [employer] will  equipment in 2-4 days.

## 2024-06-18 NOTE — PROGRESS NOTES
Remote Patient Order Discontinued    Received request from Christy Khalil RN   to discontinue order for remote patient monitoring of CHF, COPD, and HTN and order completed.

## 2024-06-24 DIAGNOSIS — J41.1 MUCOPURULENT CHRONIC BRONCHITIS (HCC): ICD-10-CM

## 2024-06-24 RX ORDER — POLYETHYLENE GLYCOL 3350 17 G/17G
POWDER, FOR SOLUTION ORAL
Qty: 1530 G | Refills: 1 | Status: SHIPPED | OUTPATIENT
Start: 2024-06-24

## 2024-06-25 RX ORDER — ALBUTEROL SULFATE 90 UG/1
2 AEROSOL, METERED RESPIRATORY (INHALATION) EVERY 6 HOURS PRN
Qty: 18 EACH | Refills: 3 | Status: SHIPPED | OUTPATIENT
Start: 2024-06-25

## 2024-06-25 RX ORDER — HYDROCHLOROTHIAZIDE 12.5 MG/1
12.5 CAPSULE, GELATIN COATED ORAL EVERY MORNING
Qty: 90 CAPSULE | Refills: 1 | OUTPATIENT
Start: 2024-06-25

## 2024-06-25 NOTE — TELEPHONE ENCOUNTER
Donna Landry is calling to request a refill on the following medication(s):    Medication Request:  Requested Prescriptions     Pending Prescriptions Disp Refills    albuterol sulfate HFA (PROVENTIL;VENTOLIN;PROAIR) 108 (90 Base) MCG/ACT inhaler [Pharmacy Med Name: ALBUTEROL HFA (VENTOLIN) INH] 18 each 3     Sig: INHALE 2 PUFFS BY MOUTH EVERY 6 HOURS AS NEEDED FOR WHEEZING    hydroCHLOROthiazide 12.5 MG capsule [Pharmacy Med Name: HYDROCHLOROTHIAZIDE 12.5 MG CP] 90 capsule 1     Sig: TAKE 1 CAPSULE BY MOUTH EVERY DAY IN THE MORNING       Last Visit Date (If Applicable):  6/4/2024    Next Visit Date:    9/4/2024

## 2024-06-29 ENCOUNTER — HOSPITAL ENCOUNTER (EMERGENCY)
Age: 69
Discharge: HOME | End: 2024-06-29
Attending: EMERGENCY MEDICINE
Payer: COMMERCIAL

## 2024-06-29 ENCOUNTER — APPOINTMENT (OUTPATIENT)
Dept: GENERAL RADIOLOGY | Age: 69
End: 2024-06-29
Payer: COMMERCIAL

## 2024-06-29 VITALS
HEART RATE: 70 BPM | RESPIRATION RATE: 12 BRPM | SYSTOLIC BLOOD PRESSURE: 129 MMHG | TEMPERATURE: 97.8 F | DIASTOLIC BLOOD PRESSURE: 70 MMHG | OXYGEN SATURATION: 99 %

## 2024-06-29 DIAGNOSIS — E87.6 HYPOKALEMIA: ICD-10-CM

## 2024-06-29 DIAGNOSIS — R53.83 OTHER FATIGUE: Primary | ICD-10-CM

## 2024-06-29 LAB
ALBUMIN SERPL-MCNC: 4.2 G/DL (ref 3.5–5.2)
ALBUMIN/GLOB SERPL: 1 {RATIO} (ref 1–2.5)
ALP SERPL-CCNC: 81 U/L (ref 35–104)
ALT SERPL-CCNC: 6 U/L (ref 10–35)
ANION GAP SERPL CALCULATED.3IONS-SCNC: 12 MMOL/L (ref 9–16)
AST SERPL-CCNC: 17 U/L (ref 10–35)
BASOPHILS # BLD: 0.04 K/UL (ref 0–0.2)
BASOPHILS NFR BLD: 1 % (ref 0–2)
BILIRUB SERPL-MCNC: 0.3 MG/DL (ref 0–1.2)
BUN SERPL-MCNC: 10 MG/DL (ref 8–23)
CALCIUM SERPL-MCNC: 9.4 MG/DL (ref 8.6–10.4)
CHLORIDE SERPL-SCNC: 104 MMOL/L (ref 98–107)
CO2 SERPL-SCNC: 27 MMOL/L (ref 20–31)
CREAT SERPL-MCNC: 0.7 MG/DL (ref 0.5–0.9)
D DIMER PPP FEU-MCNC: 0.45 UG/ML FEU (ref 0–0.57)
EKG ATRIAL RATE: 70 BPM
EKG P AXIS: -7 DEGREES
EKG P-R INTERVAL: 258 MS
EKG Q-T INTERVAL: 410 MS
EKG QRS DURATION: 86 MS
EKG QTC CALCULATION (BAZETT): 442 MS
EKG R AXIS: -38 DEGREES
EKG T AXIS: 35 DEGREES
EKG VENTRICULAR RATE: 70 BPM
EOSINOPHIL # BLD: 0.25 K/UL (ref 0–0.44)
EOSINOPHILS RELATIVE PERCENT: 3 % (ref 1–4)
ERYTHROCYTE [DISTWIDTH] IN BLOOD BY AUTOMATED COUNT: 13.2 % (ref 11.8–14.4)
GFR, ESTIMATED: >90 ML/MIN/1.73M2
GLUCOSE SERPL-MCNC: 126 MG/DL (ref 74–99)
HCT VFR BLD AUTO: 43.3 % (ref 36.3–47.1)
HGB BLD-MCNC: 14.4 G/DL (ref 11.9–15.1)
IMM GRANULOCYTES # BLD AUTO: <0.03 K/UL (ref 0–0.3)
IMM GRANULOCYTES NFR BLD: 0 %
LYMPHOCYTES NFR BLD: 2.1 K/UL (ref 1.1–3.7)
LYMPHOCYTES RELATIVE PERCENT: 28 % (ref 24–43)
MCH RBC QN AUTO: 30.9 PG (ref 25.2–33.5)
MCHC RBC AUTO-ENTMCNC: 33.3 G/DL (ref 28.4–34.8)
MCV RBC AUTO: 92.9 FL (ref 82.6–102.9)
MONOCYTES NFR BLD: 0.64 K/UL (ref 0.1–1.2)
MONOCYTES NFR BLD: 8 % (ref 3–12)
NEUTROPHILS NFR BLD: 60 % (ref 36–65)
NEUTS SEG NFR BLD: 4.6 K/UL (ref 1.5–8.1)
NRBC BLD-RTO: 0 PER 100 WBC
PLATELET # BLD AUTO: 216 K/UL (ref 138–453)
PMV BLD AUTO: 9.9 FL (ref 8.1–13.5)
POTASSIUM SERPL-SCNC: 3.1 MMOL/L (ref 3.7–5.3)
PROT SERPL-MCNC: 7.1 G/DL (ref 6.6–8.7)
RBC # BLD AUTO: 4.66 M/UL (ref 3.95–5.11)
SODIUM SERPL-SCNC: 143 MMOL/L (ref 136–145)
TROPONIN I SERPL HS-MCNC: 10 NG/L (ref 0–14)
TROPONIN I SERPL HS-MCNC: 11 NG/L (ref 0–14)
WBC OTHER # BLD: 7.6 K/UL (ref 3.5–11.3)

## 2024-06-29 PROCEDURE — 6370000000 HC RX 637 (ALT 250 FOR IP): Performed by: STUDENT IN AN ORGANIZED HEALTH CARE EDUCATION/TRAINING PROGRAM

## 2024-06-29 PROCEDURE — 80053 COMPREHEN METABOLIC PANEL: CPT

## 2024-06-29 PROCEDURE — 85025 COMPLETE CBC W/AUTO DIFF WBC: CPT

## 2024-06-29 PROCEDURE — 93005 ELECTROCARDIOGRAM TRACING: CPT | Performed by: STUDENT IN AN ORGANIZED HEALTH CARE EDUCATION/TRAINING PROGRAM

## 2024-06-29 PROCEDURE — 84484 ASSAY OF TROPONIN QUANT: CPT

## 2024-06-29 PROCEDURE — 71045 X-RAY EXAM CHEST 1 VIEW: CPT

## 2024-06-29 PROCEDURE — 85379 FIBRIN DEGRADATION QUANT: CPT

## 2024-06-29 PROCEDURE — 99285 EMERGENCY DEPT VISIT HI MDM: CPT | Performed by: EMERGENCY MEDICINE

## 2024-06-29 RX ADMIN — POTASSIUM BICARBONATE 40 MEQ: 782 TABLET, EFFERVESCENT ORAL at 03:37

## 2024-06-29 ASSESSMENT — ENCOUNTER SYMPTOMS
COUGH: 0
DIARRHEA: 0
CHEST TIGHTNESS: 0
STRIDOR: 0
COLOR CHANGE: 0
ABDOMINAL PAIN: 0
VOMITING: 0
SHORTNESS OF BREATH: 1
BACK PAIN: 0
WHEEZING: 0
NAUSEA: 0

## 2024-06-29 ASSESSMENT — PAIN - FUNCTIONAL ASSESSMENT: PAIN_FUNCTIONAL_ASSESSMENT: NONE - DENIES PAIN

## 2024-06-29 NOTE — ED NOTES
The following labs were labeled with appropriate pt sticker and tubed to lab:     [x] Blue     [x] Lavender   [] on ice  [x] Green/yellow  [x] Green/black [] on ice  [] Grey  [] on ice  [] Yellow  [] Red  [] Pink  [] Type/ Screen  [] ABG  [] VBG    [] COVID-19 swab    [] Rapid  [] PCR  [] Flu swab  [] Peds Viral Panel     [] Urine Sample  [] Fecal Sample  [] Pelvic Cultures  [] Blood Cultures  [] X 2  [] STREP Cultures  [] Wound Cultures

## 2024-06-29 NOTE — ED PROVIDER NOTES
[facility]     Emergency Department     Faculty Attestation    I performed a history and physical examination of the patient and discussed management with the resident. I have reviewed and agree with the resident’s findings including all diagnostic interpretations, and treatment plans as written. Any areas of disagreement are noted on the chart. I was personally present for the key portions of any procedures. I have documented in the chart those procedures where I was not present during the key portions. I have reviewed the emergency nurses triage note. I agree with the chief complaint, past medical history, past surgical history, allergies, medications, social and family history as documented unless otherwise noted below. Documentation of the HPI, Physical Exam and Medical Decision Making performed by [email or web address] is based on my personal performance of the HPI, PE and MDM. For Physician Assistant/ Nurse Practitioner cases/documentation I have personally evaluated this patient and have completed at least one if not all key elements of the E/M (history, physical exam, and MDM). Additional findings are as noted.    Note Started: 2:14 AM EDT     70 yo F reports she feels weak, no cp, hx of low potassium, on home O2, no fever,   PE vss 92 % on ra, gcs 15 Rajesh RN escort for exam  Abdomen nontender, no distention, no rigidity, no guarding    K replaced, d dimer-, pt not wishing to stay, patient aware of risks, signs symptoms improved, patient appears to have decisional capacity    EKG Interpretation    Interpreted by me  Atrial paced rhythm, heart rate 70,    CRITICAL CARE: There was a high probability of clinically significant/life threatening deterioration in this patient's condition which required my urgent intervention.  Total critical care time was 5 minutes.  This excludes any time for separately reportable procedures.       Juan C Chavez,

## 2024-06-29 NOTE — DISCHARGE INSTRUCTIONS
Seen in the emergency department for fatigue and shortness of breath.  Found to have low potassium level.  This was replaced in the emergency department.  Medically stable to be discharged home.  Unremarkable workup otherwise.  Please follow-up with your primary care within the next 2 to 3 days.  Please return emergency department if develop chest pain, shortness of breath, episodes of passing out, or any other concerning symptoms

## 2024-06-29 NOTE — ED NOTES
Pt to ED 9 via triage c/o increased fatigue. Pt states that she sometimes has issues with fatigue if her potassium gets low. Pt takes potassium supplements regularly with her water pill. Pt states she has had some increased bowel activity as well which is uncommon for her. Pt states she was trying to go to the restroom earlier tonight and felt dizzy while standing up to get to it. Pt states she has SOB d/t her COPD but it's worse than normal. Pt denies chest pain. Pt placed on full monitor, EKG complete, line established and blood drawn. Resident is at the bedside to assess, plan of care ongoing.

## 2024-06-29 NOTE — ED PROVIDER NOTES
[facility] ED  Emergency Department Encounter  Emergency Medicine Resident     Pt Name:Donna Landry  MRN: [id number]  Birthdate 1955  Date of evaluation: 6/29/24  PCP:  America Cam MD  Note Started: 4:19 AM EDT      CHIEF COMPLAINT       Chief Complaint   Patient presents with    Fatigue     Bilateral legs       HISTORY OF PRESENT ILLNESS  (Location/Symptom, Timing/Onset, Context/Setting, Quality, Duration, Modifying Factors, Severity.)      Donna Landry is a 69 y.o. female with PMH including COPD, on low-dose home nasal cannula, CAD, DM 2, HLD, JIMENA on CPAP, tobacco use who presents emergency department with several generalized complaints of fatigue, shortness of breath, generalized weakness which \"woke her from her sleep\" earlier tonight.  Patient arrives requiring baseline oxygen speaking in complete sentences without conversational dyspnea.  She states that she usually gets episodes like this and when she does her potassium is usually low.  She denies headaches, visual changes, chest pain, syncopal episodes, recent trauma, any aches/pains, fevers, chills, shakes.  No recent medication adjustments.  Patient does not feel like she is accumulating fluid on her bilateral lower extremities.    PAST MEDICAL / SURGICAL / SOCIAL / FAMILY HISTORY      has a past medical history of RACHEL (acute kidney injury) ([facility]), Altered bowel habits, Arthritis, Bilateral chronic knee pain, Bronchiectasis without complication (HCC), Cancer (HCC), Chest pain, CHF (congestive heart failure) ([facility]), Chronic bilateral low back pain with right-sided sciatica, Chronic bronchitis (HCC), Chronic bronchitis (HCC), Chronic respiratory failure with hypoxia (HCC), Cigarette nicotine dependence without complication, COPD (chronic obstructive pulmonary disease) ([facility]), Current smoker on some days, Depression, Diabetic polyneuropathy associated with type 2 diabetes mellitus (HCC), Diverticulosis, Essential hypertension, Full  heroine    Sexual activity: Never   Other Topics Concern    Not on file   Social History Narrative    Not on file     Social Determinants of Health     Financial Resource Strain: Low Risk  (6/4/2024)    Overall Financial Resource Strain (CARDIA)     Difficulty of Paying Living Expenses: Not hard at all   Food Insecurity: No Food Insecurity (6/4/2024)    Hunger Vital Sign     Worried About Running Out of Food in the Last Year: Never true     Ran Out of Food in the Last Year: Never true   Transportation Needs: No Transportation Needs (6/4/2024)    PRAPARE - Transportation     Lack of Transportation (Medical): No     Lack of Transportation (Non-Medical): No   Physical Activity: Insufficiently Active (2/21/2024)    Exercise Vital Sign     Days of Exercise per Week: 4 days     Minutes of Exercise per Session: 20 min   Stress: No Stress Concern Present (9/8/2023)    [employer] [place] [employer] - Occupational Stress Questionnaire     Feeling of Stress : Only a little   Social Connections: Moderately Integrated (9/8/2023)    Social Connection and Isolation Panel [NHANES]     Frequency of Communication with Friends and Family: More than three times a week     Frequency of Social Gatherings with Friends and Family: More than three times a week     Attends Taoism Services: 1 to 4 times per year     Active Member of Clubs or Organizations: Yes     Attends Club or Organization Meetings: 1 to 4 times per year     Marital Status: Never    Intimate Partner Violence: Not At Risk (8/29/2023)    Humiliation, Afraid, Rape, and Kick questionnaire     Fear of Current or Ex-Partner: No     Emotionally Abused: No     Physically Abused: No     Sexually Abused: No   Housing Stability: Low Risk  (6/4/2024)    Housing Stability Vital Sign     Unable to Pay for Housing in the Last Year: No     Number of Places Lived in the Last Year: 1     Unstable Housing in the Last Year: No       Family History   Problem Relation Age

## 2024-06-30 ENCOUNTER — HOSPITAL ENCOUNTER (EMERGENCY)
Age: 69
Discharge: HOME | End: 2024-06-30
Attending: EMERGENCY MEDICINE
Payer: COMMERCIAL

## 2024-06-30 VITALS
TEMPERATURE: 97.5 F | SYSTOLIC BLOOD PRESSURE: 134 MMHG | RESPIRATION RATE: 17 BRPM | OXYGEN SATURATION: 91 % | HEART RATE: 70 BPM | DIASTOLIC BLOOD PRESSURE: 72 MMHG

## 2024-06-30 DIAGNOSIS — R11.0 NAUSEA: Primary | ICD-10-CM

## 2024-06-30 DIAGNOSIS — R42 DIZZINESS: ICD-10-CM

## 2024-06-30 LAB
ALBUMIN SERPL-MCNC: 4.2 G/DL (ref 3.5–5.2)
ALBUMIN/GLOB SERPL: 2 {RATIO} (ref 1–2.5)
ALP SERPL-CCNC: 77 U/L (ref 35–104)
ALT SERPL-CCNC: <5 U/L (ref 10–35)
ANION GAP SERPL CALCULATED.3IONS-SCNC: 6 MMOL/L (ref 9–16)
AST SERPL-CCNC: 17 U/L (ref 10–35)
BASOPHILS # BLD: 0.03 K/UL (ref 0–0.2)
BASOPHILS NFR BLD: 1 % (ref 0–2)
BILIRUB SERPL-MCNC: 0.4 MG/DL (ref 0–1.2)
BUN SERPL-MCNC: 6 MG/DL (ref 8–23)
CALCIUM SERPL-MCNC: 9.9 MG/DL (ref 8.6–10.4)
CHLORIDE SERPL-SCNC: 103 MMOL/L (ref 98–107)
CO2 SERPL-SCNC: 29 MMOL/L (ref 20–31)
CREAT SERPL-MCNC: 0.5 MG/DL (ref 0.5–0.9)
EOSINOPHIL # BLD: 0.15 K/UL (ref 0–0.44)
EOSINOPHILS RELATIVE PERCENT: 3 % (ref 1–4)
ERYTHROCYTE [DISTWIDTH] IN BLOOD BY AUTOMATED COUNT: 13.2 % (ref 11.8–14.4)
GFR, ESTIMATED: >90 ML/MIN/1.73M2
GLUCOSE SERPL-MCNC: 128 MG/DL (ref 74–99)
HCT VFR BLD AUTO: 43 % (ref 36.3–47.1)
HGB BLD-MCNC: 14.3 G/DL (ref 11.9–15.1)
IMM GRANULOCYTES # BLD AUTO: <0.03 K/UL (ref 0–0.3)
IMM GRANULOCYTES NFR BLD: 0 %
LYMPHOCYTES NFR BLD: 1.77 K/UL (ref 1.1–3.7)
LYMPHOCYTES RELATIVE PERCENT: 31 % (ref 24–43)
MAGNESIUM SERPL-MCNC: 2 MG/DL (ref 1.6–2.4)
MCH RBC QN AUTO: 31.2 PG (ref 25.2–33.5)
MCHC RBC AUTO-ENTMCNC: 33.3 G/DL (ref 28.4–34.8)
MCV RBC AUTO: 93.7 FL (ref 82.6–102.9)
MONOCYTES NFR BLD: 0.52 K/UL (ref 0.1–1.2)
MONOCYTES NFR BLD: 9 % (ref 3–12)
NEUTROPHILS NFR BLD: 56 % (ref 36–65)
NEUTS SEG NFR BLD: 3.29 K/UL (ref 1.5–8.1)
NRBC BLD-RTO: 0 PER 100 WBC
PLATELET # BLD AUTO: 221 K/UL (ref 138–453)
PMV BLD AUTO: 9.8 FL (ref 8.1–13.5)
POTASSIUM SERPL-SCNC: 3.7 MMOL/L (ref 3.7–5.3)
PROT SERPL-MCNC: 6.8 G/DL (ref 6.6–8.7)
RBC # BLD AUTO: 4.59 M/UL (ref 3.95–5.11)
SODIUM SERPL-SCNC: 138 MMOL/L (ref 136–145)
TROPONIN I SERPL HS-MCNC: 11 NG/L (ref 0–14)
TSH SERPL DL<=0.05 MIU/L-ACNC: 1.48 UIU/ML (ref 0.27–4.2)
WBC OTHER # BLD: 5.8 K/UL (ref 3.5–11.3)

## 2024-06-30 PROCEDURE — 84443 ASSAY THYROID STIM HORMONE: CPT

## 2024-06-30 PROCEDURE — 93005 ELECTROCARDIOGRAM TRACING: CPT | Performed by: EMERGENCY MEDICINE

## 2024-06-30 PROCEDURE — 83735 ASSAY OF MAGNESIUM: CPT

## 2024-06-30 PROCEDURE — 85025 COMPLETE CBC W/AUTO DIFF WBC: CPT

## 2024-06-30 PROCEDURE — 99284 EMERGENCY DEPT VISIT MOD MDM: CPT

## 2024-06-30 PROCEDURE — 96374 THER/PROPH/DIAG INJ IV PUSH: CPT

## 2024-06-30 PROCEDURE — 6360000002 HC RX W HCPCS

## 2024-06-30 PROCEDURE — 84484 ASSAY OF TROPONIN QUANT: CPT

## 2024-06-30 PROCEDURE — 80053 COMPREHEN METABOLIC PANEL: CPT

## 2024-06-30 RX ORDER — ONDANSETRON 2 MG/ML
4 INJECTION INTRAMUSCULAR; INTRAVENOUS ONCE
Status: COMPLETED | OUTPATIENT
Start: 2024-06-30 | End: 2024-06-30

## 2024-06-30 RX ADMIN — ONDANSETRON 4 MG: 2 INJECTION INTRAMUSCULAR; INTRAVENOUS at 05:36

## 2024-06-30 NOTE — DISCHARGE INSTRUCTIONS
You were seen and evaluated for dizziness and nausea.  Your symptoms improved with rest, nausea medication.  Our workup ruled out life-threatening heart causes of your symptoms.  Your potassium was within normal range.  Please follow-up with your primary care provider in order to discuss your symptoms, this ER visit, labs, and to answer any other questions or concerns.  Additional steps you can take to improve your health would be to keep track of your blood sugar levels, stop smoking, adequate sleep.

## 2024-06-30 NOTE — ED PROVIDER NOTES
[facility]     Emergency Department     Faculty Attestation    I performed a history and physical examination of the patient and discussed management with the resident. I have reviewed and agree with the resident’s findings including all diagnostic interpretations, and treatment plans as written. Any areas of disagreement are noted on the chart. I was personally present for the key portions of any procedures. I have documented in the chart those procedures where I was not present during the key portions. I have reviewed the emergency nurses triage note. I agree with the chief complaint, past medical history, past surgical history, allergies, medications, social and family history as documented unless otherwise noted below. Documentation of the HPI, Physical Exam and Medical Decision Making performed by [email or web address] is based on my personal performance of the HPI, PE and MDM. For Physician Assistant/ Nurse Practitioner cases/documentation I have personally evaluated this patient and have completed at least one if not all key elements of the E/M (history, physical exam, and MDM). Additional findings are as noted.    Note Started: 5:03 AM EDT     70 yo F c/o fatigue & nausea, no vomit, no fever, no cp, + smoker  Pt denies dizziness to me  PE vss gcs 15 Liliya RN escort for exam: Abdomen nontender, no rebound, no guarding, no rigidity, no mass, 2+ ankle edema, no calf tenderness, no venous cords,    K 3.7, trop 11, pt feeling better, declines admit, pt aware of risk pt appears to have decision making capacity    EKG Interpretation    Interpreted by me  Atrial paced rhythm, heart rate 78, left axis, qtc 449    CRITICAL CARE: There was a high probability of clinically significant/life threatening deterioration in this patient's condition which required my urgent intervention.  Total critical care time was 5 minutes.  This excludes any time for separately reportable

## 2024-06-30 NOTE — ED NOTES
The following labs were labeled with appropriate pt sticker and tubed to lab:     [] Blue     [x] Lavender   [] on ice  [x] Green/yellow  [] Green/black [] on ice  [] Grey  [] on ice  [] Yellow  [] Red  [] Pink  [] Type/ Screen  [] ABG  [] VBG    [] COVID-19 swab    [] Rapid  [] PCR  [] Flu swab  [] Peds Viral Panel     [] Urine Sample  [] Fecal Sample  [] Pelvic Cultures  [] Blood Cultures  [] X 2  [] STREP Cultures  [] Wound Cultures

## 2024-06-30 NOTE — ED PROVIDER NOTES
[facility] ED  Emergency Department Encounter  Emergency Medicine Resident     Pt Name:Donna Landry  MRN: [id number]  Birthdate 1955  Date of evaluation: 6/30/24  PCP:  America Cam MD  Note Started: 5:14 AM EDT      CHIEF COMPLAINT       Chief Complaint   Patient presents with    Dizziness    Nausea    Labs Only     States her potassium is low        HISTORY OF PRESENT ILLNESS  (Location/Symptom, Timing/Onset, Context/Setting, Quality, Duration, Modifying Factors, Severity.)      Donna Landry is a 69 y.o. female with PMH of arthritis, CHF, CAD bronchitis, COPD, diabetes type 2, diverticulosis, GERD, IBS, JIMENA, tobacco use who presents with continued dizziness and nausea.  Patient states she was seen yesterday for electrolyte derangement that required potassium repletion.  Patient states she is on Lasix and potassium repletion pills.  Patient currently denying chest pain, shortness of breath.    PAST MEDICAL / SURGICAL / SOCIAL / FAMILY HISTORY      has a past medical history of RACHEL (acute kidney injury) ([facility]), Altered bowel habits, Arthritis, Bilateral chronic knee pain, Bronchiectasis without complication (HCC), Cancer (HCC), Chest pain, CHF (congestive heart failure) (HCC), Chronic bilateral low back pain with right-sided sciatica, Chronic bronchitis (HCC), Chronic bronchitis (HCC), Chronic respiratory failure with hypoxia (HCC), Cigarette nicotine dependence without complication, COPD (chronic obstructive pulmonary disease) ([facility]), Current smoker on some days, Depression, Diabetic polyneuropathy associated with type 2 diabetes mellitus (HCC), Diverticulosis, Essential hypertension, Full dentures, GERD (gastroesophageal reflux disease), Hep C w/o coma, chronic (HCC), Hyperlipidemia, Hyperplastic polyp of intestine, Hypertension, Irritable bowel syndrome with both constipation and diarrhea, Liver disease, Moderate episode of recurrent major depressive disorder (HCC), Nasal polyposis,

## 2024-06-30 NOTE — ED NOTES
Pt presents to the ED with c/o dizziness and nausea.   Pt was evaluated yesterday for similar sx and her potassium is low. Pt concerned potassium is low now.   Pt is in no distress, RR even and unlabored. Pt answering questions appropriately.   Pt placed on cardiac monitor, EKG done, IV attempted but unsuccessful. Plan for DRE Encarnacion to attempt with US.   Call light within reach. Pt given warm blanket for comfort.

## 2024-06-30 NOTE — PROGRESS NOTES
do not hesitate to contact me for clarificationDiscussed with the patient the current USPSTF guidelines released March 9, 2021 for screening for lung cancer.    For adults aged 50 to 80 years who have a 20 pack-year smoking history and currently smoke or have quit within the past 15 years the grade B recommendation is to:  Screen for lung cancer with low-dose computed tomography (LDCT) every year.  Stop screening once a person has not smoked for 15 years or has a health problem that limits life expectancy or the ability to have lung surgery.    The patient  reports that she has been smoking cigarettes. She started smoking about 55 years ago. She has a 55.5 pack-year smoking history. She has never used smokeless tobacco.. Discussed with patient the risks and benefits of screening, including over-diagnosis, false positive rate, and total radiation exposure.  The patient currently exhibits no signs or symptoms suggestive of lung cancer.  Discussed with patient the importance of compliance with yearly annual lung cancer screenings and willingness to undergo diagnosis and treatment if screening scan is positive.  In addition, the patient was counseled regarding the importance of remaining smoke free and/or total smoking cessation.    Also reviewed the following if the patient has Medicare that as of February 10, 2022, Medicare only covers LDCT screening in patients aged 50-77 with at least a 20 pack-year smoking history who currently smoke or have quit in the last 15 years

## 2024-07-01 ENCOUNTER — OFFICE VISIT (OUTPATIENT)
Dept: PULMONOLOGY | Age: 69
End: 2024-07-01
Payer: MEDICARE

## 2024-07-01 VITALS
HEIGHT: 72 IN | WEIGHT: 206 LBS | RESPIRATION RATE: 16 BRPM | BODY MASS INDEX: 27.9 KG/M2 | DIASTOLIC BLOOD PRESSURE: 64 MMHG | HEART RATE: 84 BPM | OXYGEN SATURATION: 95 % | SYSTOLIC BLOOD PRESSURE: 116 MMHG

## 2024-07-01 DIAGNOSIS — J44.9 CHRONIC OBSTRUCTIVE PULMONARY DISEASE, UNSPECIFIED COPD TYPE (HCC): ICD-10-CM

## 2024-07-01 DIAGNOSIS — J41.1 MUCOPURULENT CHRONIC BRONCHITIS (HCC): ICD-10-CM

## 2024-07-01 DIAGNOSIS — Z87.891 PERSONAL HISTORY OF TOBACCO USE: ICD-10-CM

## 2024-07-01 DIAGNOSIS — G47.33 OSA (OBSTRUCTIVE SLEEP APNEA): Primary | ICD-10-CM

## 2024-07-01 DIAGNOSIS — J96.11 HYPOXEMIC RESPIRATORY FAILURE, CHRONIC (HCC): ICD-10-CM

## 2024-07-01 LAB
EKG ATRIAL RATE: 70 BPM
EKG P AXIS: -7 DEGREES
EKG P-R INTERVAL: 258 MS
EKG Q-T INTERVAL: 410 MS
EKG QRS DURATION: 86 MS
EKG QTC CALCULATION (BAZETT): 442 MS
EKG R AXIS: -38 DEGREES
EKG T AXIS: 35 DEGREES
EKG VENTRICULAR RATE: 70 BPM

## 2024-07-01 PROCEDURE — 1123F ACP DISCUSS/DSCN MKR DOCD: CPT | Performed by: NURSE PRACTITIONER

## 2024-07-01 PROCEDURE — 3074F SYST BP LT 130 MM HG: CPT | Performed by: NURSE PRACTITIONER

## 2024-07-01 PROCEDURE — G8399 PT W/DXA RESULTS DOCUMENT: HCPCS | Performed by: NURSE PRACTITIONER

## 2024-07-01 PROCEDURE — 4004F PT TOBACCO SCREEN RCVD TLK: CPT | Performed by: NURSE PRACTITIONER

## 2024-07-01 PROCEDURE — G0296 VISIT TO DETERM LDCT ELIG: HCPCS | Performed by: NURSE PRACTITIONER

## 2024-07-01 PROCEDURE — 99214 OFFICE O/P EST MOD 30 MIN: CPT | Performed by: NURSE PRACTITIONER

## 2024-07-01 PROCEDURE — G8417 CALC BMI ABV UP PARAM F/U: HCPCS | Performed by: NURSE PRACTITIONER

## 2024-07-01 PROCEDURE — 3017F COLORECTAL CA SCREEN DOC REV: CPT | Performed by: NURSE PRACTITIONER

## 2024-07-01 PROCEDURE — 3023F SPIROM DOC REV: CPT | Performed by: NURSE PRACTITIONER

## 2024-07-01 PROCEDURE — G8427 DOCREV CUR MEDS BY ELIG CLIN: HCPCS | Performed by: NURSE PRACTITIONER

## 2024-07-01 PROCEDURE — 3078F DIAST BP <80 MM HG: CPT | Performed by: NURSE PRACTITIONER

## 2024-07-01 PROCEDURE — 1090F PRES/ABSN URINE INCON ASSESS: CPT | Performed by: NURSE PRACTITIONER

## 2024-07-01 RX ORDER — FLUTICASONE FUROATE AND VILANTEROL 100; 25 UG/1; UG/1
1 POWDER RESPIRATORY (INHALATION) DAILY
Qty: 1 EACH | Refills: 5 | Status: SHIPPED | OUTPATIENT
Start: 2024-07-01

## 2024-07-01 RX ORDER — ALBUTEROL SULFATE 90 UG/1
2 AEROSOL, METERED RESPIRATORY (INHALATION) EVERY 6 HOURS PRN
Qty: 18 EACH | Refills: 3 | Status: SHIPPED | OUTPATIENT
Start: 2024-07-01

## 2024-07-01 RX ORDER — FLUTICASONE PROPIONATE AND SALMETEROL XINAFOATE 45; 21 UG/1; UG/1
AEROSOL, METERED RESPIRATORY (INHALATION)
Refills: 0 | OUTPATIENT
Start: 2024-07-01

## 2024-07-01 ASSESSMENT — SLEEP AND FATIGUE QUESTIONNAIRES
HOW LIKELY ARE YOU TO NOD OFF OR FALL ASLEEP WHILE SITTING INACTIVE IN A PUBLIC PLACE: HIGH CHANCE OF DOZING
HOW LIKELY ARE YOU TO NOD OFF OR FALL ASLEEP WHEN YOU ARE A PASSENGER IN A CAR FOR AN HOUR WITHOUT A BREAK: HIGH CHANCE OF DOZING
ESS TOTAL SCORE: 19
HOW LIKELY ARE YOU TO NOD OFF OR FALL ASLEEP WHILE WATCHING TV: HIGH CHANCE OF DOZING
HOW LIKELY ARE YOU TO NOD OFF OR FALL ASLEEP WHILE SITTING QUIETLY AFTER LUNCH WITHOUT ALCOHOL: HIGH CHANCE OF DOZING
HOW LIKELY ARE YOU TO NOD OFF OR FALL ASLEEP WHILE SITTING AND READING: HIGH CHANCE OF DOZING
HOW LIKELY ARE YOU TO NOD OFF OR FALL ASLEEP WHILE SITTING AND TALKING TO SOMEONE: HIGH CHANCE OF DOZING
HOW LIKELY ARE YOU TO NOD OFF OR FALL ASLEEP WHILE LYING DOWN TO REST IN THE AFTERNOON WHEN CIRCUMSTANCES PERMIT: SLIGHT CHANCE OF DOZING
HOW LIKELY ARE YOU TO NOD OFF OR FALL ASLEEP IN A CAR, WHILE STOPPED FOR A FEW MINUTES IN TRAFFIC: WOULD NEVER DOZE

## 2024-07-01 ASSESSMENT — ENCOUNTER SYMPTOMS
GASTROINTESTINAL NEGATIVE: 1
EYES NEGATIVE: 1
ALLERGIC/IMMUNOLOGIC NEGATIVE: 1

## 2024-07-01 NOTE — PLAN OF CARE
Open chart in error.  Did not provide medical care to this patient during this ED visit    Tristin Nixon DO  6/30/2024, 20:59

## 2024-07-01 NOTE — PATIENT INSTRUCTIONS
PFT is scheduled for 6/3/24 at 1:00pm at the [facility] office.  Claudia will schedule CT at [facility] and will inform pt via [employer].    7/3/24mm: 6 mwt completed today and was routed to Dominic Benedict for review. When POC order is placed Claudia will fax it to [facility], f) [phone]  (p) [phone]    7/5/24mm: [employer] message was sent with the dates, times and locations of CT and f/u.      YOU ARE BEING REFERRED TO:    [facility] (a department of [facility])    [address] [place] [address] [address]  [place] [address]    Main Phone Number: [phone]    Phone number for scheduling sleep study: [phone]      Please contact the above numbers to schedule your sleep study.     Once you have completed the FIRST NIGHT you may be asked to return for a SECOND NIGHT if necessary.   After the SECOND NIGHT the sleep center will order a CPAP/BiPAP machine for you.     An order for the machine will be sent to a durable medical equipment company ([employer]).   The sleep center will provide you with the [employer] companies information.     Once you have your machine please contact our office to schedule a follow up appointment.   Your follow up will be scheduled for a date 30-90 days after you have received your machine.   At that follow up visit we will need to have a compliance download from your DME company.   Please contact your [employer] company to have the compliance download faxed to our office at   [phone] for your follow up appointment.       IF YOU HAVE ANY QUESTIONS ABOUT YOUR SLEEP STUDY   PLEASE CONTACT THE SLEEP CENTER.                Learning About Lung Cancer Screening  What is screening for lung cancer?     Lung cancer screening is a way to find some lung cancers early, before a person has any symptoms of the cancer.  Lung cancer screening may help those who have the highest risk for lung cancer--people age 50 and older who are or were heavy smokers. For most people, who aren't at increased risk,

## 2024-07-02 LAB
EKG ATRIAL RATE: 78 BPM
EKG P-R INTERVAL: 174 MS
EKG Q-T INTERVAL: 394 MS
EKG QRS DURATION: 86 MS
EKG QTC CALCULATION (BAZETT): 449 MS
EKG R AXIS: -33 DEGREES
EKG T AXIS: 32 DEGREES
EKG VENTRICULAR RATE: 78 BPM

## 2024-07-03 ENCOUNTER — PROCEDURE VISIT (OUTPATIENT)
Dept: PULMONOLOGY | Age: 69
End: 2024-07-03

## 2024-07-03 DIAGNOSIS — R06.02 SHORTNESS OF BREATH: Primary | ICD-10-CM

## 2024-07-03 NOTE — PROGRESS NOTES
[facility], [facility].  [address]  [facility] [address]    Oximetry Report  Testing Date: 07/03/24      Name: Donna Landry    Age: 69 y.o.     Gender: female   Diagnosis: No diagnosis found.                                        Referring Physician: Dominic Benedict CNP  Weight: 206 lb                                                   Height: 6'   Smoke HX:  reports that she has been smoking cigarettes. She started smoking about 55 years ago. She has a 55.5 pack-year smoking history. She has never used smokeless tobacco.                                                            Max - [employer]  Heart Rate 183 / 146  Inspired O2 SP02% Max Heart Rate Assist Device Activity Pace Distance Walked (ft) Time (min.)   R/A 93 76  rest      R/A 86 95  walk moderate 240 2   N/C-2 91 93  walk moderate 480 4   N/C-3          N/C          R/A= Roomed Air, NC = Oxygen by Nasal Cannula    Distance Walk Predicted Distance LLN** Predicted % Duration (min) Duration of Stops Sec Daysi Dyspnea Daysi Fatigue             **LLN= Lower limits of normal **LLN= Lower limits of normal  (from Liana and Carmelina, American Journal of Respiratory and Critical Care Medicine;158:1384-87)       Comments: The patient walked for 6 minutes at a moderate pace. She walked for 2 min on room air and her oxygen saturation dropped to 86%. She was started on oxygen at flow of 2L with a conserving device. She was titrated to a flow of 2L of oxygen to keep her saturation above 88%. She  did exhibit signs of dyspnea and did not complain.

## 2024-07-05 ENCOUNTER — TELEPHONE (OUTPATIENT)
Dept: PULMONOLOGY | Age: 69
End: 2024-07-05

## 2024-07-05 ENCOUNTER — CARE COORDINATION (OUTPATIENT)
Dept: CARE COORDINATION | Age: 69
End: 2024-07-05

## 2024-07-05 DIAGNOSIS — J44.9 CHRONIC OBSTRUCTIVE PULMONARY DISEASE, UNSPECIFIED COPD TYPE (HCC): Primary | ICD-10-CM

## 2024-07-05 DIAGNOSIS — J96.11 HYPOXEMIC RESPIRATORY FAILURE, CHRONIC (HCC): ICD-10-CM

## 2024-07-05 NOTE — TELEPHONE ENCOUNTER
6 mwt was completed on 7/3/24 and documentation was routed to provider for review. Please place order for POC if appropriate. Thank you.

## 2024-07-05 NOTE — CARE COORDINATION
Ambulatory Care Coordination Note     7/5/2024 2:15 PM     Patient outreach attempt by this ACM today to offer care management services. ACM was unable to reach the patient by telephone today; left voice message requesting a return phone call to this ACM.     ACM: Christy Khalil RN     Care Summary Note: CC enrollment for DM, COPD, CHF and HTN management    PCP/Specialist follow up:   Future Appointments         Provider Specialty Dept Phone    7/10/2024 2:30 PM America Cam MD Family Medicine [phone]    8/13/2024 10:00 AM Anupama Camilo PA-C Orthopedic Surgery [phone]    9/4/2024 1:00 PM America Cam MD Family Medicine [phone]    9/12/2024 11:45 AM SCHEDULE, [facility] DEVICE CLINIC SCHEDULE Cardiology [phone]    10/28/2024 10:30 AM Rich Gonzalez MD Gastroenterology [phone]    11/1/2024 11:00 AM Alejandro Sampson MD; SCHEDULE, [facility] RESPIRATORY SPEC Pulmonology [phone]            Follow Up:   Plan for next ACM outreach in approximately 1 week to complete:  - outreach attempt to offer care management services.

## 2024-07-10 ENCOUNTER — OFFICE VISIT (OUTPATIENT)
Dept: FAMILY MEDICINE CLINIC | Age: 69
End: 2024-07-10

## 2024-07-10 VITALS
HEART RATE: 89 BPM | HEIGHT: 72 IN | SYSTOLIC BLOOD PRESSURE: 110 MMHG | OXYGEN SATURATION: 93 % | DIASTOLIC BLOOD PRESSURE: 64 MMHG | WEIGHT: 209 LBS | BODY MASS INDEX: 28.31 KG/M2

## 2024-07-10 DIAGNOSIS — I10 ESSENTIAL HYPERTENSION: ICD-10-CM

## 2024-07-10 DIAGNOSIS — E87.6 HYPOKALEMIA: Primary | ICD-10-CM

## 2024-07-10 DIAGNOSIS — B18.2 HEP C W/O COMA, CHRONIC (HCC): ICD-10-CM

## 2024-07-10 DIAGNOSIS — R14.0 ABDOMINAL BLOATING: ICD-10-CM

## 2024-07-10 DIAGNOSIS — E11.42 TYPE 2 DIABETES MELLITUS WITH DIABETIC POLYNEUROPATHY, WITHOUT LONG-TERM CURRENT USE OF INSULIN (HCC): ICD-10-CM

## 2024-07-10 DIAGNOSIS — I50.32 CHRONIC HEART FAILURE WITH PRESERVED EJECTION FRACTION (HCC): ICD-10-CM

## 2024-07-10 RX ORDER — BISACODYL 5 MG
TABLET, DELAYED RELEASE (ENTERIC COATED) ORAL
COMMUNITY
Start: 2024-06-14

## 2024-07-10 ASSESSMENT — ENCOUNTER SYMPTOMS
VOMITING: 0
SHORTNESS OF BREATH: 0
EYE DISCHARGE: 0
ABDOMINAL PAIN: 0
SORE THROAT: 0
DIARRHEA: 0
CONSTIPATION: 1
ABDOMINAL DISTENTION: 1
NAUSEA: 0
COUGH: 0
WHEEZING: 0
CHEST TIGHTNESS: 0

## 2024-07-10 NOTE — PROGRESS NOTES
[facility]  [facility] PRIMARY CARE  [address] [place] [address]  [address]  [facility] [address]  Dept: [phone]  Dept Fax: [phone]    Donna Landry is a 69 y.o. female who is a Established patient, who presents today for her medical conditions/complaints as noted below:  Chief Complaint   Patient presents with    ED Follow-up         HPI:     She is here today to follow-up on her ER visit.  She has been having recurrent episodes of hypokalemia despite taking daily potassium.  She has been on Lasix 20 mg daily for her history of CHF.  She also complains of chronic constipation, states that she feels bloated all the time and gets frequent nausea for which she is been taking Zofran.  She states that she has not had a bowel movement in 2 weeks now.  She has been taking bisacodyl 5 mg twice daily.  She has chronic hepatitis C for which she was to see gastroenterology and needs to schedule a follow-up.        Hemoglobin A1C (%)   Date Value   06/04/2024 6.5   05/22/2024 6.6   02/22/2024 7.0             ( goal A1Cis < 7)   No components found for: \"LABMICR\"  No components found for: \"LDLCHOLESTEROL\", \"LDLCALC\"    (goal LDL is <100)   AST (U/L)   Date Value   06/30/2024 17     ALT (U/L)   Date Value   06/30/2024 <5 (L)     BUN (mg/dL)   Date Value   06/30/2024 6 (L)     BP Readings from Last 3 Encounters:   07/10/24 110/64   07/01/24 116/64   06/30/24 134/72          (goal 120/80)    Past Medical History:   Diagnosis Date    RACHEL (acute kidney injury) (HCC) 01/22/2014    Altered bowel habits 09/30/2016    Arthritis     generalized    Bilateral chronic knee pain 05/13/2016    Bronchiectasis without complication (HCC)     Cancer (HCC) 2004    uterus    Chest pain 02/22/2024    CHF (congestive heart failure) (HCC)     Chronic bilateral low back pain with right-sided sciatica 02/20/2017    Chronic bronchitis (HCC)     Chronic bronchitis (HCC)     Chronic respiratory failure with hypoxia (HCC)     Cigarette nicotine

## 2024-07-11 ENCOUNTER — TELEPHONE (OUTPATIENT)
Dept: FAMILY MEDICINE CLINIC | Age: 69
End: 2024-07-11

## 2024-07-11 NOTE — TELEPHONE ENCOUNTER
Patient stated that she can't get into Nephrology until November, she would like to know what do you suggest or a referral to another provider

## 2024-07-11 NOTE — TELEPHONE ENCOUNTER
Donna Landry is calling to request a refill on the following medication(s):    Medication Request:  Requested Prescriptions     Pending Prescriptions Disp Refills    atenolol (TENORMIN) 25 MG tablet [Pharmacy Med Name: ATENOLOL 25 MG TABLET] 90 tablet 1     Sig: TAKE 1 TABLET BY MOUTH EVERY DAY    JARDIANCE 10 MG tablet [Pharmacy Med Name: JARDIANCE 10 MG TABLET] 30 tablet 1     Sig: TAKE 1 TABLET BY MOUTH EVERY DAY    metFORMIN (GLUCOPHAGE) 500 MG tablet [Pharmacy Med Name: METFORMIN  MG TABLET] 180 tablet 1     Sig: TAKE 1 TABLET BY MOUTH TWICE A DAY WITH FOOD       Last Visit Date (If Applicable):  7/10/2024    Next Visit Date:    9/4/2024

## 2024-07-12 DIAGNOSIS — E87.6 HYPOKALEMIA: Primary | ICD-10-CM

## 2024-07-12 RX ORDER — ATENOLOL 25 MG/1
25 TABLET ORAL DAILY
Qty: 90 TABLET | Refills: 1 | Status: SHIPPED | OUTPATIENT
Start: 2024-07-12

## 2024-07-12 RX ORDER — EMPAGLIFLOZIN 10 MG/1
10 TABLET, FILM COATED ORAL DAILY
Qty: 30 TABLET | Refills: 1 | Status: SHIPPED | OUTPATIENT
Start: 2024-07-12

## 2024-07-12 NOTE — TELEPHONE ENCOUNTER
I reached out to Nephrology and got her a sooner appointment for July 26th at 3:10 at the [place] location patient notified

## 2024-07-18 ENCOUNTER — HOSPITAL ENCOUNTER (OUTPATIENT)
Age: 69
Discharge: HOME | End: 2024-07-18
Payer: COMMERCIAL

## 2024-07-18 ENCOUNTER — HOSPITAL ENCOUNTER (OUTPATIENT)
Dept: GENERAL RADIOLOGY | Age: 69
Discharge: HOME | End: 2024-07-20
Payer: COMMERCIAL

## 2024-07-18 ENCOUNTER — HOSPITAL ENCOUNTER (OUTPATIENT)
Age: 69
Discharge: HOME | End: 2024-07-20
Payer: COMMERCIAL

## 2024-07-18 DIAGNOSIS — R14.0 ABDOMINAL BLOATING: ICD-10-CM

## 2024-07-18 DIAGNOSIS — E87.6 HYPOKALEMIA: ICD-10-CM

## 2024-07-18 DIAGNOSIS — I10 ESSENTIAL HYPERTENSION: ICD-10-CM

## 2024-07-18 LAB
ANION GAP SERPL CALCULATED.3IONS-SCNC: 10 MMOL/L (ref 9–16)
BUN SERPL-MCNC: 10 MG/DL (ref 8–23)
CALCIUM SERPL-MCNC: 9.9 MG/DL (ref 8.6–10.4)
CHLORIDE SERPL-SCNC: 104 MMOL/L (ref 98–107)
CO2 SERPL-SCNC: 27 MMOL/L (ref 20–31)
CREAT SERPL-MCNC: 0.6 MG/DL (ref 0.5–0.9)
GFR, ESTIMATED: >90 ML/MIN/1.73M2
GLUCOSE SERPL-MCNC: 120 MG/DL (ref 74–99)
POTASSIUM SERPL-SCNC: 3.9 MMOL/L (ref 3.7–5.3)
SODIUM SERPL-SCNC: 141 MMOL/L (ref 136–145)

## 2024-07-18 PROCEDURE — 80048 BASIC METABOLIC PNL TOTAL CA: CPT

## 2024-07-18 PROCEDURE — 74019 RADEX ABDOMEN 2 VIEWS: CPT

## 2024-07-18 PROCEDURE — 36415 COLL VENOUS BLD VENIPUNCTURE: CPT

## 2024-07-21 DIAGNOSIS — J41.1 MUCOPURULENT CHRONIC BRONCHITIS (HCC): ICD-10-CM

## 2024-07-22 RX ORDER — ALBUTEROL SULFATE 2.5 MG/3ML
SOLUTION RESPIRATORY (INHALATION)
Qty: 270 ML | Refills: 1 | Status: SHIPPED | OUTPATIENT
Start: 2024-07-22

## 2024-07-22 NOTE — TELEPHONE ENCOUNTER
Donna Landry is calling to request a refill on the following medication(s):    Medication Request:  Requested Prescriptions     Pending Prescriptions Disp Refills    albuterol (PROVENTIL) (2.5 MG/3ML) 0.083% nebulizer solution [Pharmacy Med Name: ALBUTEROL SUL 2.5 MG/3 ML SOLN] 270 mL 1     Sig: INHALE CONTENTS OF 1 VIAL ( 3 MILLILITERS ) IN NEBULIZER BY MOUTH AND INTO THE LUNGS EVERY 6 HOURS       Last Visit Date (If Applicable):  7/10/2024    Next Visit Date:    9/4/2024

## 2024-07-29 ENCOUNTER — HOSPITAL ENCOUNTER (OUTPATIENT)
Age: 69
Discharge: HOME | End: 2024-07-29
Payer: MEDICARE

## 2024-07-29 DIAGNOSIS — I10 ESSENTIAL HYPERTENSION: ICD-10-CM

## 2024-07-29 LAB
ANION GAP SERPL CALCULATED.3IONS-SCNC: 13 MMOL/L (ref 9–16)
BUN SERPL-MCNC: 10 MG/DL (ref 8–23)
CALCIUM SERPL-MCNC: 9.8 MG/DL (ref 8.6–10.4)
CHLORIDE SERPL-SCNC: 104 MMOL/L (ref 98–107)
CO2 SERPL-SCNC: 24 MMOL/L (ref 20–31)
CORTIS SERPL-MCNC: 18.6 UG/DL (ref 2.5–19.5)
CREAT SERPL-MCNC: 0.8 MG/DL (ref 0.5–0.9)
CREAT UR-MCNC: 28.1 MG/DL (ref 28–217)
GFR, ESTIMATED: 86 ML/MIN/1.73M2
GLUCOSE SERPL-MCNC: 116 MG/DL (ref 74–99)
MAGNESIUM SERPL-MCNC: 2.1 MG/DL (ref 1.6–2.4)
POTASSIUM SERPL-SCNC: 4.3 MMOL/L (ref 3.7–5.3)
POTASSIUM, UR: 39.1 MMOL/L
SODIUM SERPL-SCNC: 141 MMOL/L (ref 136–145)
SODIUM UR-SCNC: 113 MMOL/L
TOTAL PROTEIN, URINE: <4 MG/DL

## 2024-07-29 PROCEDURE — 84156 ASSAY OF PROTEIN URINE: CPT

## 2024-07-29 PROCEDURE — 83735 ASSAY OF MAGNESIUM: CPT

## 2024-07-29 PROCEDURE — 84133 ASSAY OF URINE POTASSIUM: CPT

## 2024-07-29 PROCEDURE — 82570 ASSAY OF URINE CREATININE: CPT

## 2024-07-29 PROCEDURE — 80048 BASIC METABOLIC PNL TOTAL CA: CPT

## 2024-07-29 PROCEDURE — 82533 TOTAL CORTISOL: CPT

## 2024-07-29 PROCEDURE — 84300 ASSAY OF URINE SODIUM: CPT

## 2024-07-29 PROCEDURE — 36415 COLL VENOUS BLD VENIPUNCTURE: CPT

## 2024-08-08 DIAGNOSIS — E11.42 TYPE 2 DIABETES MELLITUS WITH DIABETIC POLYNEUROPATHY, WITHOUT LONG-TERM CURRENT USE OF INSULIN (HCC): ICD-10-CM

## 2024-08-08 NOTE — TELEPHONE ENCOUNTER
Donna Landry is calling to request a refill on the following medication(s):    Medication Request:  Requested Prescriptions     Pending Prescriptions Disp Refills    metFORMIN (GLUCOPHAGE) 500 MG tablet [Pharmacy Med Name: METFORMIN  MG TABLET] 180 tablet 1     Sig: TAKE 1 TABLET BY MOUTH TWICE A DAY WITH FOOD       Last Visit Date (If Applicable):  7/10/2024    Next Visit Date:    9/4/2024

## 2024-08-13 ENCOUNTER — OFFICE VISIT (OUTPATIENT)
Dept: ORTHOPEDIC SURGERY | Age: 69
End: 2024-08-13
Payer: MEDICARE

## 2024-08-13 VITALS — WEIGHT: 204.4 LBS | HEIGHT: 72 IN | RESPIRATION RATE: 16 BRPM | BODY MASS INDEX: 27.68 KG/M2

## 2024-08-13 DIAGNOSIS — B18.2 HEP C W/O COMA, CHRONIC (HCC): ICD-10-CM

## 2024-08-13 DIAGNOSIS — M17.0 ARTHRITIS OF BOTH KNEES: Primary | ICD-10-CM

## 2024-08-13 PROCEDURE — G8427 DOCREV CUR MEDS BY ELIG CLIN: HCPCS | Performed by: PHYSICIAN ASSISTANT

## 2024-08-13 PROCEDURE — 99406 BEHAV CHNG SMOKING 3-10 MIN: CPT | Performed by: PHYSICIAN ASSISTANT

## 2024-08-13 PROCEDURE — 3017F COLORECTAL CA SCREEN DOC REV: CPT | Performed by: PHYSICIAN ASSISTANT

## 2024-08-13 PROCEDURE — G8399 PT W/DXA RESULTS DOCUMENT: HCPCS | Performed by: PHYSICIAN ASSISTANT

## 2024-08-13 PROCEDURE — 99214 OFFICE O/P EST MOD 30 MIN: CPT | Performed by: PHYSICIAN ASSISTANT

## 2024-08-13 PROCEDURE — 1123F ACP DISCUSS/DSCN MKR DOCD: CPT | Performed by: PHYSICIAN ASSISTANT

## 2024-08-13 PROCEDURE — 20610 DRAIN/INJ JOINT/BURSA W/O US: CPT | Performed by: PHYSICIAN ASSISTANT

## 2024-08-13 PROCEDURE — 4004F PT TOBACCO SCREEN RCVD TLK: CPT | Performed by: PHYSICIAN ASSISTANT

## 2024-08-13 PROCEDURE — 1090F PRES/ABSN URINE INCON ASSESS: CPT | Performed by: PHYSICIAN ASSISTANT

## 2024-08-13 PROCEDURE — G8417 CALC BMI ABV UP PARAM F/U: HCPCS | Performed by: PHYSICIAN ASSISTANT

## 2024-08-13 RX ORDER — ONDANSETRON 4 MG/1
TABLET, ORALLY DISINTEGRATING ORAL
Qty: 10 TABLET | Refills: 1 | Status: SHIPPED | OUTPATIENT
Start: 2024-08-13

## 2024-08-13 RX ORDER — METHYLPREDNISOLONE ACETATE 80 MG/ML
80 INJECTION, SUSPENSION INTRA-ARTICULAR; INTRALESIONAL; INTRAMUSCULAR; SOFT TISSUE ONCE
Status: COMPLETED | OUTPATIENT
Start: 2024-08-13 | End: 2024-08-13

## 2024-08-13 RX ORDER — BUPIVACAINE HYDROCHLORIDE 2.5 MG/ML
2 INJECTION, SOLUTION INFILTRATION; PERINEURAL ONCE
Status: COMPLETED | OUTPATIENT
Start: 2024-08-13 | End: 2024-08-13

## 2024-08-13 RX ADMIN — BUPIVACAINE HYDROCHLORIDE 5 MG: 2.5 INJECTION, SOLUTION INFILTRATION; PERINEURAL at 11:47

## 2024-08-13 RX ADMIN — METHYLPREDNISOLONE ACETATE 80 MG: 80 INJECTION, SUSPENSION INTRA-ARTICULAR; INTRALESIONAL; INTRAMUSCULAR; SOFT TISSUE at 11:50

## 2024-08-13 RX ADMIN — BUPIVACAINE HYDROCHLORIDE 2 ML: 2.5 INJECTION, SOLUTION INFILTRATION; PERINEURAL at 11:42

## 2024-08-13 RX ADMIN — METHYLPREDNISOLONE ACETATE 80 MG: 80 INJECTION, SUSPENSION INTRA-ARTICULAR; INTRALESIONAL; INTRAMUSCULAR; SOFT TISSUE at 11:48

## 2024-08-13 ASSESSMENT — ENCOUNTER SYMPTOMS
SHORTNESS OF BREATH: 0
COUGH: 0
COLOR CHANGE: 0

## 2024-08-13 NOTE — TELEPHONE ENCOUNTER
Donna Landry is calling to request a refill on the following medication(s):    Medication Request:  Requested Prescriptions     Pending Prescriptions Disp Refills    ondansetron (ZOFRAN-ODT) 4 MG disintegrating tablet [Pharmacy Med Name: ONDANSETRON ODT 4 MG TABLET] 10 tablet 1     Sig: DISSOLVE 1 TABLET UNDER THE TONGUE EVERY 8 HOURS IF NEEDED FOR NAUSEA OR VOMITING       Last Visit Date (If Applicable):  Visit date not found    Next Visit Date:    Visit date not found

## 2024-08-13 NOTE — PROGRESS NOTES
[facility] ORTHOPEDICS AND SPORTS MEDICINE  [address]  [address]  [place] [id number]  Dept: [phone]  Dept Fax: [phone]        Ambulatory Follow Up      Subjective:   Donna Landry is a 69 y.o. year old female who presents to our office today for routine followup regarding her   1. Arthritis of both knees        Chief Complaint   Patient presents with    Knee Pain     Bilateral arthritis        HPI Donna Landry  is a 69 y.o.  female who presents today in follow for chronic bilateral knee pain.  The patient was last seen on 4/9/2024 and underwent treatment in the form of bilateral knee corticosteroid injection.   The patient notes improvement in her knee pain with the previous treatment that lasted ~2.5 months. The patient was also given a new referral to PT but she notes that she did not go because she doesn't want to.     The patient continues to smoke 1/2 pack of cigarettes per day. She is a diabetic with a last documented HgbA1c of  6.5 as of 6/4/2024.     Review of Systems   Constitutional:  Negative for activity change and fever.   HENT:  Negative for sneezing.    Respiratory:  Negative for cough and shortness of breath.    Cardiovascular:  Negative for chest pain.   Gastrointestinal:  Negative for vomiting.   Musculoskeletal:  Positive for arthralgias (bilateral knee). Negative for joint swelling and myalgias.   Skin:  Negative for color change.   Neurological:  Negative for weakness and numbness.   Psychiatric/Behavioral:  Negative for sleep disturbance.          Objective :   Resp 16   Ht 1.829 m (6' 0.01\")   Wt 92.7 kg (204 lb 6.4 oz)   LMP 12/09/1996   BMI 27.72 kg/m²  Body mass index is 27.72 kg/m².  General: Donna Landry is a 69 y.o. female who is alert and oriented and sitting comfortably in our office.  Ortho Exam    MS: Patient ambulates independently with a mild valgus deformity noted bilaterally

## 2024-08-22 NOTE — TELEPHONE ENCOUNTER
Michelle Steven is calling to request a refill on the following medication(s):    Medication Request:  Requested Prescriptions     Pending Prescriptions Disp Refills    traZODone (DESYREL) 100 MG tablet [Pharmacy Med Name: TRAZODONE 100 MG TABLET] 90 tablet 3     Sig: take 1 tablet by mouth nightly       Last Visit Date (If Applicable):  64/82/9353    Next Visit Date:    2/20/2023 Spoke to patient regarding CT Liver Biopsy scheduled on 8/29/24. Pt currently on Apixaban BID. Patient needs to hold Apixaban X 4 doses. Instructed pt that he needs to hold it on 8/27 and 8/28/24. Last dose of taking will be 8/26. Patient verbalized understanding.

## 2024-08-26 DIAGNOSIS — M19.012 PRIMARY OSTEOARTHRITIS OF LEFT SHOULDER: ICD-10-CM

## 2024-08-27 RX ORDER — PSEUDOEPHED/ACETAMINOPH/DIPHEN 30MG-500MG
2 TABLET ORAL EVERY 6 HOURS PRN
Qty: 120 TABLET | Refills: 1 | Status: SHIPPED | OUTPATIENT
Start: 2024-08-27

## 2024-08-27 NOTE — TELEPHONE ENCOUNTER
Donna Landry is calling to request a refill on the following medication(s):    Medication Request:  Requested Prescriptions     Pending Prescriptions Disp Refills    Acetaminophen Extra Strength 500 MG TABS [Pharmacy Med Name: ACETAMINOPHEN 500 MG TABLET] 120 tablet 1     Sig: TAKE 2 TABLETS BY MOUTH EVERY 6 HOURS AS NEEDED (PAIN)       Last Visit Date (If Applicable):  7/10/2024    Next Visit Date:    9/4/2024

## 2024-09-04 ENCOUNTER — OFFICE VISIT (OUTPATIENT)
Dept: FAMILY MEDICINE CLINIC | Age: 69
End: 2024-09-04
Payer: MEDICARE

## 2024-09-04 VITALS
OXYGEN SATURATION: 93 % | BODY MASS INDEX: 26.68 KG/M2 | HEIGHT: 72 IN | HEART RATE: 85 BPM | SYSTOLIC BLOOD PRESSURE: 136 MMHG | WEIGHT: 197 LBS | DIASTOLIC BLOOD PRESSURE: 78 MMHG

## 2024-09-04 DIAGNOSIS — E11.42 TYPE 2 DIABETES MELLITUS WITH DIABETIC POLYNEUROPATHY, WITHOUT LONG-TERM CURRENT USE OF INSULIN (HCC): Primary | ICD-10-CM

## 2024-09-04 DIAGNOSIS — R63.4 WEIGHT LOSS: ICD-10-CM

## 2024-09-04 DIAGNOSIS — Z72.0 TOBACCO USE: ICD-10-CM

## 2024-09-04 DIAGNOSIS — I10 ESSENTIAL HYPERTENSION: ICD-10-CM

## 2024-09-04 DIAGNOSIS — F51.01 PRIMARY INSOMNIA: ICD-10-CM

## 2024-09-04 PROCEDURE — 3075F SYST BP GE 130 - 139MM HG: CPT | Performed by: STUDENT IN AN ORGANIZED HEALTH CARE EDUCATION/TRAINING PROGRAM

## 2024-09-04 PROCEDURE — 3044F HG A1C LEVEL LT 7.0%: CPT | Performed by: STUDENT IN AN ORGANIZED HEALTH CARE EDUCATION/TRAINING PROGRAM

## 2024-09-04 PROCEDURE — 1123F ACP DISCUSS/DSCN MKR DOCD: CPT | Performed by: STUDENT IN AN ORGANIZED HEALTH CARE EDUCATION/TRAINING PROGRAM

## 2024-09-04 PROCEDURE — 3078F DIAST BP <80 MM HG: CPT | Performed by: STUDENT IN AN ORGANIZED HEALTH CARE EDUCATION/TRAINING PROGRAM

## 2024-09-04 PROCEDURE — 99214 OFFICE O/P EST MOD 30 MIN: CPT | Performed by: STUDENT IN AN ORGANIZED HEALTH CARE EDUCATION/TRAINING PROGRAM

## 2024-09-04 RX ORDER — IBUPROFEN 800 MG/1
800 TABLET, FILM COATED ORAL 2 TIMES DAILY
COMMUNITY
Start: 2024-08-13

## 2024-09-04 RX ORDER — ROPINIROLE 0.5 MG/1
0.5 TABLET, FILM COATED ORAL DAILY
COMMUNITY
Start: 2024-08-08

## 2024-09-04 RX ORDER — TRAZODONE HYDROCHLORIDE 100 MG/1
100 TABLET ORAL NIGHTLY
Qty: 90 TABLET | Refills: 1 | Status: SHIPPED | OUTPATIENT
Start: 2024-09-04

## 2024-09-04 RX ORDER — GABAPENTIN 800 MG/1
800 TABLET ORAL 3 TIMES DAILY
Qty: 90 TABLET | Refills: 2 | Status: SHIPPED | OUTPATIENT
Start: 2024-09-04 | End: 2024-10-04

## 2024-09-04 RX ORDER — METFORMIN HCL 500 MG
500 TABLET, EXTENDED RELEASE 24 HR ORAL
COMMUNITY
Start: 2024-08-08

## 2024-09-04 NOTE — PROGRESS NOTES
tablet by mouth daily 90 tablet 4    cetirizine (ZYRTEC) 10 MG tablet TAKE 1 TABLET BY MOUTH EVERY DAY AS NEEDED FOR ALLERGY SYMPTOMS 90 tablet 1    Blood Glucose Monitoring Suppl (ONE TOUCH ULTRA 2) w/Device KIT 1 kit by Does not apply route daily 1 kit 0    Lancets MISC 1 each by Does not apply route daily 400 each 1    buprenorphine-naloxone (SUBOXONE) 8-2 MG FILM SL film Place 1 Film under the tongue 2 times daily.      hydrOXYzine HCl (ATARAX) 25 MG tablet       magnesium citrate solution Take 296 mLs by mouth once for 1 dose 296 mL 0    OneTouch Delica Lancets 33G MISC Apply 1 Units topically 2 times daily USE 2 TO 3 TIMES DAILY AS DIRECTED 60 each 4     No current facility-administered medications for this visit.     Allergies   Allergen Reactions    Iv Dye [Iodides] Hives       Health Maintenance   Topic Date Due    Shingles vaccine (1 of 2) Never done    Respiratory Syncytial Virus (RSV) Pregnant or age 60 yrs+ (1 - 1-dose 60+ series) Never done    Diabetic foot exam  04/26/2023    Flu vaccine (1) 08/01/2024    COVID-19 Vaccine (1 - 2023-24 season) Never done    Hepatitis A vaccine (2 of 2 - Risk 2-dose series) 10/03/2024 (Originally 5/17/2023)    Hepatitis B vaccine (2 of 3 - Risk 3-dose series) 10/03/2024 (Originally 12/15/2022)    Diabetic retinal exam  11/16/2024    Diabetic Alb to Cr ratio (uACR) test  01/17/2025    Lipids  01/17/2025    A1C test (Diabetic or Prediabetic)  06/04/2025    Depression Monitoring  06/04/2025    Lung Cancer Screening &/or Counseling  06/06/2025    GFR test (Diabetes, CKD 3-4, OR last GFR 15-59)  07/29/2025    Breast cancer screen  11/13/2025    Colorectal Cancer Screen  05/03/2027    DTaP/Tdap/Td vaccine (3 - Td or Tdap) 06/16/2031    DEXA (modify frequency per FRAX score)  Completed    Annual Wellness Visit (Medicare Advantage)  Completed    Pneumococcal 65+ years Vaccine  Completed    Hib vaccine  Aged Out    Polio vaccine  Aged Out    Meningococcal (ACWY) vaccine  Aged

## 2024-09-05 ENCOUNTER — HOSPITAL ENCOUNTER (OUTPATIENT)
Age: 69
Discharge: HOME | End: 2024-09-05
Payer: COMMERCIAL

## 2024-09-05 PROBLEM — J40 BRONCHITIS: Status: RESOLVED | Noted: 2023-09-30 | Resolved: 2024-09-05

## 2024-09-05 LAB
ALLEN TEST: POSITIVE
ANION GAP SERPL CALCULATED.3IONS-SCNC: 14 MMOL/L (ref 9–16)
BUN SERPL-MCNC: 13 MG/DL (ref 8–23)
CALCIUM SERPL-MCNC: 9.9 MG/DL (ref 8.6–10.4)
CHLORIDE SERPL-SCNC: 101 MMOL/L (ref 98–107)
CO2 SERPL-SCNC: 24 MMOL/L (ref 20–31)
CREAT SERPL-MCNC: 0.7 MG/DL (ref 0.5–0.9)
EST. AVERAGE GLUCOSE BLD GHB EST-MCNC: 140 MG/DL
GFR, ESTIMATED: >90 ML/MIN/1.73M2
GLUCOSE SERPL-MCNC: 99 MG/DL (ref 74–99)
HBA1C MFR BLD: 6.5 % (ref 4–6)
HCO3 VENOUS: 29.3 MMOL/L (ref 22–29)
O2 DELIVERY DEVICE: ABNORMAL
O2 SAT, VEN: 67.7 % (ref 60–85)
PCO2 VENOUS: 48.4 MM HG (ref 41–51)
PH VENOUS: 7.39 (ref 7.32–7.43)
PO2 VENOUS: 36.1 MM HG (ref 30–50)
POSITIVE BASE EXCESS, VEN: 3.1 MMOL/L (ref 0–3)
POTASSIUM SERPL-SCNC: 4.1 MMOL/L (ref 3.7–5.3)
SAMPLE SITE: ABNORMAL
SODIUM SERPL-SCNC: 139 MMOL/L (ref 136–145)
VIT B12 SERPL-MCNC: 491 PG/ML (ref 232–1245)

## 2024-09-05 PROCEDURE — 80048 BASIC METABOLIC PNL TOTAL CA: CPT

## 2024-09-05 PROCEDURE — 83036 HEMOGLOBIN GLYCOSYLATED A1C: CPT

## 2024-09-05 PROCEDURE — 82607 VITAMIN B-12: CPT

## 2024-09-05 PROCEDURE — 36415 COLL VENOUS BLD VENIPUNCTURE: CPT

## 2024-09-05 PROCEDURE — 82803 BLOOD GASES ANY COMBINATION: CPT

## 2024-09-05 ASSESSMENT — ENCOUNTER SYMPTOMS
ABDOMINAL PAIN: 0
DIARRHEA: 0
NAUSEA: 0
CHEST TIGHTNESS: 0
SHORTNESS OF BREATH: 0
EYE DISCHARGE: 0
CONSTIPATION: 0
WHEEZING: 0
COUGH: 0
VOMITING: 0
SORE THROAT: 0

## 2024-09-10 RX ORDER — IBUPROFEN 800 MG/1
800 TABLET, FILM COATED ORAL 2 TIMES DAILY
Qty: 60 TABLET | Refills: 29 | OUTPATIENT
Start: 2024-09-10

## 2024-09-19 DIAGNOSIS — M19.012 PRIMARY OSTEOARTHRITIS OF LEFT SHOULDER: ICD-10-CM

## 2024-09-19 DIAGNOSIS — K21.9 GASTROESOPHAGEAL REFLUX DISEASE WITHOUT ESOPHAGITIS: ICD-10-CM

## 2024-09-19 DIAGNOSIS — E11.42 TYPE 2 DIABETES MELLITUS WITH DIABETIC POLYNEUROPATHY (HCC): ICD-10-CM

## 2024-09-19 RX ORDER — METFORMIN HCL 500 MG
500 TABLET, EXTENDED RELEASE 24 HR ORAL
Qty: 90 TABLET | Refills: 1 | Status: SHIPPED | OUTPATIENT
Start: 2024-09-19

## 2024-09-19 RX ORDER — PANTOPRAZOLE SODIUM 40 MG/1
40 TABLET, DELAYED RELEASE ORAL DAILY
Qty: 90 TABLET | Refills: 1 | Status: SHIPPED | OUTPATIENT
Start: 2024-09-19

## 2024-09-26 ENCOUNTER — TELEPHONE (OUTPATIENT)
Dept: FAMILY MEDICINE CLINIC | Age: 69
End: 2024-09-26

## 2024-09-26 NOTE — TELEPHONE ENCOUNTER
Lm for her to call back we received a form for boost and dr kelly said she will not sign this because she does not need boost

## 2024-09-27 DIAGNOSIS — B18.2 HEP C W/O COMA, CHRONIC (HCC): ICD-10-CM

## 2024-09-27 DIAGNOSIS — E11.42 TYPE 2 DIABETES MELLITUS WITH DIABETIC POLYNEUROPATHY, WITHOUT LONG-TERM CURRENT USE OF INSULIN (HCC): ICD-10-CM

## 2024-09-27 NOTE — TELEPHONE ENCOUNTER
Donna Landry is calling to request a refill on the following medication(s):    Medication Request:  Requested Prescriptions     Pending Prescriptions Disp Refills    ibuprofen (ADVIL;MOTRIN) 800 MG tablet [Pharmacy Med Name: IBUPROFEN 800 MG TABLET] 60 tablet 29     Sig: TAKE 1 TABLET BY MOUTH TWICE A DAY AS NEEDED FOR PAIN    ondansetron (ZOFRAN-ODT) 4 MG disintegrating tablet [Pharmacy Med Name: ONDANSETRON ODT 4 MG TABLET] 10 tablet 1     Sig: DISSOLVE 1 TABLET UNDER THE TONGUE EVERY 8 HOURS IF NEEDED FOR NAUSEA OR VOMITING    metFORMIN (GLUCOPHAGE) 500 MG tablet [Pharmacy Med Name: METFORMIN  MG TABLET] 180 tablet 1     Sig: TAKE 1 TABLET BY MOUTH TWICE A DAY WITH FOOD       Last Visit Date (If Applicable):  9/4/2024    Next Visit Date:    2/24/2025

## 2024-09-29 DIAGNOSIS — I50.32 CHRONIC HEART FAILURE WITH PRESERVED EJECTION FRACTION (HCC): ICD-10-CM

## 2024-09-30 NOTE — TELEPHONE ENCOUNTER
Donna Landry is calling to request a refill on the following medication(s):    Medication Request:  Requested Prescriptions     Pending Prescriptions Disp Refills    JARDIANCE 10 MG tablet [Pharmacy Med Name: JARDIANCE 10 MG TABLET] 30 tablet 1     Sig: TAKE 1 TABLET BY MOUTH EVERY DAY       Last Visit Date (If Applicable):  9/4/2024    Next Visit Date:    2/24/2025

## 2024-10-01 RX ORDER — EMPAGLIFLOZIN 10 MG/1
10 TABLET, FILM COATED ORAL DAILY
Qty: 30 TABLET | Refills: 1 | Status: SHIPPED | OUTPATIENT
Start: 2024-10-01

## 2024-10-01 RX ORDER — ONDANSETRON 4 MG/1
TABLET, ORALLY DISINTEGRATING ORAL
Qty: 10 TABLET | Refills: 1 | Status: SHIPPED | OUTPATIENT
Start: 2024-10-01

## 2024-10-01 RX ORDER — IBUPROFEN 800 MG/1
800 TABLET, FILM COATED ORAL 2 TIMES DAILY
Qty: 60 TABLET | Refills: 1 | OUTPATIENT
Start: 2024-10-01

## 2024-10-03 DIAGNOSIS — M19.012 PRIMARY OSTEOARTHRITIS OF LEFT SHOULDER: ICD-10-CM

## 2024-10-03 RX ORDER — PSEUDOEPHED/ACETAMINOPH/DIPHEN 30MG-500MG
2 TABLET ORAL EVERY 6 HOURS PRN
Qty: 120 TABLET | Refills: 1 | Status: SHIPPED | OUTPATIENT
Start: 2024-10-03

## 2024-10-03 NOTE — TELEPHONE ENCOUNTER
Donna Landry is calling to request a refill on the following medication(s):    Medication Request:  Requested Prescriptions     Pending Prescriptions Disp Refills    Acetaminophen Extra Strength 500 MG TABS [Pharmacy Med Name: ACETAMINOPHEN 500 MG TABLET] 120 tablet 1     Sig: TAKE 2 TABLETS BY MOUTH EVERY 6 HOURS AS NEEDED (PAIN)       Last Visit Date (If Applicable):  9/4/2024    Next Visit Date:    2/24/2025

## 2024-10-05 DIAGNOSIS — J41.1 MUCOPURULENT CHRONIC BRONCHITIS (HCC): ICD-10-CM

## 2024-10-07 RX ORDER — ALBUTEROL SULFATE 0.83 MG/ML
SOLUTION RESPIRATORY (INHALATION)
Qty: 300 ML | Refills: 1 | Status: SHIPPED | OUTPATIENT
Start: 2024-10-07

## 2024-10-07 NOTE — TELEPHONE ENCOUNTER
Donna Landry is calling to request a refill on the following medication(s):    Medication Request:  Requested Prescriptions     Pending Prescriptions Disp Refills    albuterol (PROVENTIL) (2.5 MG/3ML) 0.083% nebulizer solution [Pharmacy Med Name: ALBUTEROL SUL 2.5 MG/3 ML SOLN] 300 mL 1     Sig: INHALE CONTENTS OF 1 VIAL ( 3 MILLILITERS ) IN NEBULIZER BY MOUTH AND INTO THE LUNGS EVERY 6 HOURS       Last Visit Date (If Applicable):  9/4/2024    Next Visit Date:    2/24/2025

## 2024-10-11 ENCOUNTER — TELEPHONE (OUTPATIENT)
Dept: FAMILY MEDICINE CLINIC | Age: 69
End: 2024-10-11

## 2024-10-11 ENCOUNTER — HOSPITAL ENCOUNTER (EMERGENCY)
Age: 69
Discharge: HOME | End: 2024-10-12
Attending: EMERGENCY MEDICINE
Payer: MEDICARE

## 2024-10-11 VITALS
WEIGHT: 204 LBS | SYSTOLIC BLOOD PRESSURE: 131 MMHG | HEIGHT: 72 IN | OXYGEN SATURATION: 96 % | TEMPERATURE: 98.2 F | DIASTOLIC BLOOD PRESSURE: 69 MMHG | HEART RATE: 91 BPM | BODY MASS INDEX: 27.63 KG/M2 | RESPIRATION RATE: 18 BRPM

## 2024-10-11 DIAGNOSIS — K59.00 CONSTIPATION, UNSPECIFIED CONSTIPATION TYPE: ICD-10-CM

## 2024-10-11 DIAGNOSIS — R10.84 GENERALIZED ABDOMINAL PAIN: Primary | ICD-10-CM

## 2024-10-11 LAB
ALBUMIN SERPL-MCNC: 4.5 G/DL (ref 3.5–5.2)
ALBUMIN/GLOB SERPL: 2 {RATIO} (ref 1–2.5)
ALP SERPL-CCNC: 71 U/L (ref 35–104)
ALT SERPL-CCNC: 6 U/L (ref 10–35)
ANION GAP SERPL CALCULATED.3IONS-SCNC: 10 MMOL/L (ref 9–16)
AST SERPL-CCNC: 13 U/L (ref 10–35)
BASOPHILS # BLD: 0.03 K/UL (ref 0–0.2)
BASOPHILS NFR BLD: 0 % (ref 0–2)
BILIRUB SERPL-MCNC: 0.4 MG/DL (ref 0–1.2)
BUN SERPL-MCNC: 7 MG/DL (ref 8–23)
CALCIUM SERPL-MCNC: 10 MG/DL (ref 8.6–10.4)
CHLORIDE SERPL-SCNC: 102 MMOL/L (ref 98–107)
CO2 SERPL-SCNC: 25 MMOL/L (ref 20–31)
CREAT SERPL-MCNC: 0.6 MG/DL (ref 0.5–0.9)
EOSINOPHIL # BLD: 0.06 K/UL (ref 0–0.44)
EOSINOPHILS RELATIVE PERCENT: 1 % (ref 1–4)
ERYTHROCYTE [DISTWIDTH] IN BLOOD BY AUTOMATED COUNT: 13.2 % (ref 11.8–14.4)
GFR, ESTIMATED: >90 ML/MIN/1.73M2
GLUCOSE SERPL-MCNC: 100 MG/DL (ref 74–99)
HCT VFR BLD AUTO: 45.9 % (ref 36.3–47.1)
HGB BLD-MCNC: 14.8 G/DL (ref 11.9–15.1)
IMM GRANULOCYTES # BLD AUTO: 0.03 K/UL (ref 0–0.3)
IMM GRANULOCYTES NFR BLD: 0 %
LACTIC ACID, WHOLE BLOOD: 1.4 MMOL/L (ref 0.7–2.1)
LIPASE SERPL-CCNC: 17 U/L (ref 13–60)
LYMPHOCYTES NFR BLD: 2.09 K/UL (ref 1.1–3.7)
LYMPHOCYTES RELATIVE PERCENT: 24 % (ref 24–43)
MCH RBC QN AUTO: 30.3 PG (ref 25.2–33.5)
MCHC RBC AUTO-ENTMCNC: 32.2 G/DL (ref 28.4–34.8)
MCV RBC AUTO: 93.9 FL (ref 82.6–102.9)
MONOCYTES NFR BLD: 1.21 K/UL (ref 0.1–1.2)
MONOCYTES NFR BLD: 14 % (ref 3–12)
NEUTROPHILS NFR BLD: 61 % (ref 36–65)
NEUTS SEG NFR BLD: 5.35 K/UL (ref 1.5–8.1)
NRBC BLD-RTO: 0 PER 100 WBC
PLATELET # BLD AUTO: 228 K/UL (ref 138–453)
PMV BLD AUTO: 10.4 FL (ref 8.1–13.5)
POTASSIUM SERPL-SCNC: 3.7 MMOL/L (ref 3.7–5.3)
PROT SERPL-MCNC: 7.5 G/DL (ref 6.6–8.7)
RBC # BLD AUTO: 4.89 M/UL (ref 3.95–5.11)
SODIUM SERPL-SCNC: 137 MMOL/L (ref 136–145)
TROPONIN I SERPL HS-MCNC: 7 NG/L (ref 0–14)
WBC OTHER # BLD: 8.8 K/UL (ref 3.5–11.3)

## 2024-10-11 PROCEDURE — 85025 COMPLETE CBC W/AUTO DIFF WBC: CPT

## 2024-10-11 PROCEDURE — 83605 ASSAY OF LACTIC ACID: CPT

## 2024-10-11 PROCEDURE — 84484 ASSAY OF TROPONIN QUANT: CPT

## 2024-10-11 PROCEDURE — 80053 COMPREHEN METABOLIC PANEL: CPT

## 2024-10-11 PROCEDURE — 99284 EMERGENCY DEPT VISIT MOD MDM: CPT

## 2024-10-11 PROCEDURE — 83690 ASSAY OF LIPASE: CPT

## 2024-10-11 RX ADMIN — Medication 240 ML: at 23:57

## 2024-10-11 ASSESSMENT — PAIN SCALES - GENERAL: PAINLEVEL_OUTOF10: 10

## 2024-10-11 ASSESSMENT — PAIN DESCRIPTION - LOCATION: LOCATION: ABDOMEN

## 2024-10-11 ASSESSMENT — PAIN - FUNCTIONAL ASSESSMENT: PAIN_FUNCTIONAL_ASSESSMENT: 0-10

## 2024-10-11 NOTE — TELEPHONE ENCOUNTER
She went to er on Monday night for constipation and she is supposed to have her pace maker removed on Monday and she isnt only have loose stools nothing formed for her nurse told her she needs a massive removal I am assuming an enema please advise she was advised that she will most likely not her back from us until Monday.

## 2024-10-12 ASSESSMENT — ENCOUNTER SYMPTOMS
ABDOMINAL PAIN: 1
CONSTIPATION: 1
CHEST TIGHTNESS: 0
VOMITING: 0
COUGH: 0
NAUSEA: 1
SHORTNESS OF BREATH: 0
BACK PAIN: 0
DIARRHEA: 0

## 2024-10-12 NOTE — ED TRIAGE NOTES
Pt presents to ED with complaint of Abdominal pain, nausea and diarrhea.  Pt states she was having constipation since Monday, was seen at [facility] and was prescribed Maalox and Glycolax which she takes twice daily.  Pt states she has been having watery bowel movement no stool coming out and its all water.  Pt states her abdomen hurts and goes to her chest.  Pt is alert and oriented x4.

## 2024-10-12 NOTE — ED PROVIDER NOTES
Faculty Sign-Out Attestation  Handoff taken on the following patient from prior Attending Physician: Nati  Note Started: 11:03 PM EDT    I was available and discussed any additional care issues that arose and coordinated the management plans with the resident(s) caring for the patient during my duty period. Any areas of disagreement with resident’s documentation of care or procedures are noted on the chart. I was personally present for the key portions of any/all procedures during my duty period. I have documented in the chart those procedures where I was not present during the key portions.    Abdominal pain, intermittent constipation,  lab pending,     -wbc stable, s/s improved pt passed stool,   Out pt follow up in place,     Juan C Chanel DO  Attending Physician       Juan C Chanel DO  10/11/24 0621       Juan C Chanel DO  10/12/24 3421

## 2024-10-12 NOTE — ED PROVIDER NOTES
[facility] ED  Emergency Department Encounter  Emergency Medicine Resident     Pt Name:Donna Landry  MRN: [id number]  Birthdate 1955  Date of evaluation: 10/11/24  PCP:  America Cam MD  Note Started: 10:01 PM EDT      CHIEF COMPLAINT       Chief Complaint   Patient presents with    Nausea     \"Since Monday\"     Abdominal Pain       HISTORY OF PRESENT ILLNESS  (Location/Symptom, Timing/Onset, Context/Setting, Quality, Duration, Modifying Factors, Severity.)      Donna Landry is a 69 y.o. female with PMH including CHF, COPD, DM2, diverticulosis, HTN, HLD, IBS with constipation and diarrhea, obesity, JIMENA, vasovagal syncope s/p [employer] Adapta pacemaker placement who presents emergency department with abdominal pain and concerns for constipation.  Patient states that she was seen in at [facility] ED on 10/7/2024 where she was brought in by EMS for abdominal pain and constipation as well.  She states that she has had multiple enemas in the past week with attempts using MiraLAX and mag citrate without improvement of her constipation.  She states that her last bowel movement was 3 weeks ago.  CT abdomen pelvis was obtained on 10/7/2024 which was unremarkable.  Patient states that her \"pacemaker battery is dying\" and she needs this replaced.  She is concerned due to the abdominal pain she is experienced for undergoing that procedure.  Tonight she feels as if her abdomen is \"bloated.  Denies fever, chills, shakes, vomiting, diarrhea, chest pain, shortness of breath.    PAST MEDICAL / SURGICAL / SOCIAL / FAMILY HISTORY      has a past medical history of RACHEL (acute kidney injury) ([facility]), Altered bowel habits, Arthritis, Bilateral chronic knee pain, Bronchiectasis without complication ([facility]), Bronchitis, Cancer ([facility]), Chest pain, CHF (congestive heart failure) ([facility]), Chronic bilateral low back pain with right-sided sciatica, Chronic bronchitis ([facility]), Chronic bronchitis ([facility]), Chronic respiratory

## 2024-10-12 NOTE — DISCHARGE INSTRUCTIONS
Seen in the emergency department for abdominal pain and constipation.  You received an enema here in the emergency department with resolution of your constipation.  You had several episodes of loose stool here.  Additionally your labs are unremarkable here.  Please follow-up with gastroenterology and your primary care first thing Monday morning by calling their clinics.  Please return to the emergency department if you develop worsened abdominal pain, chest pain, shortness of breath, fevers, episodes of passing out, or any other concerning symptoms.

## 2024-10-12 NOTE — ED PROVIDER NOTES
[facility]     Emergency Department     Faculty Note/ Attestation      Pt Name: Donna Landry                                       MRN: [id number]  Birthdate 1955  Date of evaluation: 10/11/2024    Patients PCP:    America Cam MD    Note Started: 10:48 PM EDT      Attestation  I performed a history and physical examination of the patient and discussed management with the resident. I reviewed the resident’s note and agree with the documented findings and plan of care. Any areas of disagreement are noted on the chart. I was personally present for the key portions of any procedures. I have documented in the chart those procedures where I was not present during the key portions. I have reviewed the emergency nurses triage note. I agree with the chief complaint, past medical history, past surgical history, allergies, medications, social and family history as documented unless otherwise noted below.    For Physician Assistant/ Nurse Practitioner cases/documentation I have personally evaluated this patient and have completed at least one if not all key elements of the E/M (history, physical exam, and MDM). Additional findings are as noted.      Initial Screens:        Silvina Coma Scale  Eye Opening: Spontaneous  Best Verbal Response: Oriented  Best Motor Response: Obeys commands  Silvina Coma Scale Score: 15    Vitals:    Vitals:    10/11/24 2144   BP: 131/69   Pulse: 91   Resp: 18   Temp: 98.2 °F (36.8 °C)   SpO2: 96%   Weight: 92.5 kg (204 lb)   Height: 1.829 m (6')       CHIEF COMPLAINT       Chief Complaint   Patient presents with    Nausea     \"Since Monday\"     Abdominal Pain             DIAGNOSTIC RESULTS             RADIOLOGY:   No orders to display         LABS:  Labs Reviewed   COMPREHENSIVE METABOLIC PANEL   CBC WITH AUTO DIFFERENTIAL   LIPASE   TROPONIN   LACTIC ACID         EMERGENCY DEPARTMENT COURSE:     -------------------------  BP: 131/69, Temp: 98.2 °F (36.8 °C), Pulse:

## 2024-10-14 ENCOUNTER — TELEPHONE (OUTPATIENT)
Dept: GASTROENTEROLOGY | Age: 69
End: 2024-10-14

## 2024-10-14 NOTE — TELEPHONE ENCOUNTER
She is at the hospital now and they are saying that she needs a manual, I spoke with  and she said she needs to contact the gastro dr they will take care of this issue since she is already established with them, I let her know and the hospital where she is told ehr to go back down to er and have her get a scan so they can see if she has a blockage so that is what she is doing just a fyi

## 2024-10-14 NOTE — TELEPHONE ENCOUNTER
They gave her another enema but she still hasn't had a formed bowel movement this has been going on for about three weeks its all just watery please advise

## 2024-10-14 NOTE — TELEPHONE ENCOUNTER
Patient called and voicemail left in regards to patient needing to call cardiologist in regards to the imaging/scans that patient is needing. Dr. Gonzalez didn't order these testings. Please advise.

## 2024-10-14 NOTE — TELEPHONE ENCOUNTER
Patient lvm stating she was supposed to have a surgery today with Dr Mak, states they had to cancel surgery due to needing some type of imaging/scan to evaluate any blockages, please return pt call #[phone]

## 2024-10-15 LAB
EKG ATRIAL RATE: 79 BPM
EKG P AXIS: 55 DEGREES
EKG P-R INTERVAL: 164 MS
EKG Q-T INTERVAL: 396 MS
EKG QRS DURATION: 86 MS
EKG QTC CALCULATION (BAZETT): 454 MS
EKG R AXIS: -68 DEGREES
EKG T AXIS: 38 DEGREES
EKG VENTRICULAR RATE: 79 BPM

## 2024-10-16 ENCOUNTER — HOSPITAL ENCOUNTER (EMERGENCY)
Age: 69
Discharge: HOME | End: 2024-10-16
Attending: EMERGENCY MEDICINE

## 2024-10-16 VITALS
OXYGEN SATURATION: 99 % | TEMPERATURE: 97.3 F | HEART RATE: 72 BPM | SYSTOLIC BLOOD PRESSURE: 133 MMHG | RESPIRATION RATE: 18 BRPM | DIASTOLIC BLOOD PRESSURE: 78 MMHG

## 2024-10-16 DIAGNOSIS — Z45.010 ENCOUNTER FOR SERVICING OF PACEMAKER AT END OF BATTERY LIFE: Primary | ICD-10-CM

## 2024-10-16 ASSESSMENT — PAIN - FUNCTIONAL ASSESSMENT: PAIN_FUNCTIONAL_ASSESSMENT: 0-10

## 2024-10-16 ASSESSMENT — PAIN SCALES - GENERAL: PAINLEVEL_OUTOF10: 0

## 2024-10-16 NOTE — PLAN OF CARE
69-year-old female presenting to the ED for which she reports is previous surgical imaging for pacemaker battery replacement.  Patient had surgery scheduled for 10/14/2024 and this was canceled due to not having imaging completed (per patient).  Patient reports pacemaker battery has been reading low during recent follow-up appointments.  Patient denying any current complaints at this time.  Denying any chest pain, shortness of breath, lightheadedness, dizziness.  GCS 15, vital signs stable, patient no acute distress.  Heart rate and rhythm regular, no murmurs, rubs, gallops, lungs clear to auscultation bilateral no wheeze, rhonchi, rales, abdomen soft, nontender in all quadrants.  Will reach out to cardiology for recommendations on imaging and if the patient needs to be admitted.    Discussed with Dr. Mak, cardiologist, who reports that the patient was having bad diarrhea and that was why the surgery was canceled.  At this time patient reports improvement in diarrhea.  Reports that she has chronic bowel issues ever since she was a teenager.  Patient has not concern for obstruction at this time.  No nausea or vomiting.  Abdomen is soft, nontender in all quadrants.  No imaging necessary at this time.  Dr. Mak recommending the patient to call his office to reschedule the surgery.    Discussed with the patient.  Patient verbalized understanding plan and is currently rescheduling appointment.

## 2024-10-16 NOTE — DISCHARGE INSTRUCTIONS
Please schedule an appointment with your cardiologist to reschedule your surgery.  Please return to the ED with any new or worsening symptoms or concerns including lightheadedness, dizziness, feeling like you are in a pass out, passing out, chest pain, shortness of breath, or any other concerns.

## 2024-10-16 NOTE — ED NOTES
Pt arrived via triage for c/o needing a an abd CT  PT states  called her and told her to get one  PT supposed to get Pacemaker out on Monday  PT states she's compliant with meds  PT denies pain or any other medical concerns  A&O x4  Call light in reach

## 2024-10-16 NOTE — ED PROVIDER NOTES
Patient sent from cardiology clinic. Pacemaker surgery reschedule, and patient sent to ER for eval, Patient with no complaints Resident clarified with cardiology.   Encounter to be removed,  Patient needs to reschedule surgery with cardiology  She will walk over there at this time     Milly Patel,   10/16/24 9707

## 2024-10-21 ENCOUNTER — TELEPHONE (OUTPATIENT)
Dept: PULMONOLOGY | Age: 69
End: 2024-10-21

## 2024-10-21 NOTE — TELEPHONE ENCOUNTER
Patient called to cancel PFT and follow up. Per patient she's going to try finding another Dr. she can get in sooner with  Please advise  Thank you

## 2024-10-21 NOTE — TELEPHONE ENCOUNTER
Patient had an appt today that was canceled last minute.  That would be next available.  Patient just needs to reach out to us if she needs to reschedule

## 2024-10-22 PROBLEM — C55 MALIGNANT NEOPLASM OF UTERUS (HCC): Status: ACTIVE | Noted: 2024-10-22

## 2024-10-22 PROBLEM — E11.9 DIABETES MELLITUS (HCC): Status: ACTIVE | Noted: 2024-10-22

## 2024-10-28 ENCOUNTER — TELEPHONE (OUTPATIENT)
Dept: GASTROENTEROLOGY | Age: 69
End: 2024-10-28

## 2024-10-28 NOTE — TELEPHONE ENCOUNTER
Pt left a vm advising she has an appointment scheduled at the same time as her GI appointment. Pt would like a call back to discuss her options.

## 2024-10-30 NOTE — TELEPHONE ENCOUNTER
Pt called back in about trying to get an appt. [facility] has a surg schedule for 11/01/24 but [facility] has an impacted bowel and needs to get this resolved prior to surg.      Please call pt to discuss @ [phone]

## 2024-11-01 ENCOUNTER — HOSPITAL ENCOUNTER (OUTPATIENT)
Age: 69
Setting detail: OUTPATIENT SURGERY
Discharge: HOME | End: 2024-11-01
Attending: INTERNAL MEDICINE | Admitting: INTERNAL MEDICINE
Payer: MEDICARE

## 2024-11-01 VITALS
SYSTOLIC BLOOD PRESSURE: 125 MMHG | RESPIRATION RATE: 12 BRPM | OXYGEN SATURATION: 100 % | TEMPERATURE: 97.9 F | HEIGHT: 72 IN | BODY MASS INDEX: 27.9 KG/M2 | WEIGHT: 206 LBS | DIASTOLIC BLOOD PRESSURE: 57 MMHG | HEART RATE: 67 BPM

## 2024-11-01 DIAGNOSIS — Z45.010 PACEMAKER BATTERY DEPLETION: ICD-10-CM

## 2024-11-01 LAB — ECHO BSA: 2.18 M2

## 2024-11-01 PROCEDURE — 33213 INSERT PULSE GEN DUAL LEADS: CPT | Performed by: INTERNAL MEDICINE

## 2024-11-01 PROCEDURE — 99153 MOD SED SAME PHYS/QHP EA: CPT | Performed by: INTERNAL MEDICINE

## 2024-11-01 PROCEDURE — 99152 MOD SED SAME PHYS/QHP 5/>YRS: CPT | Performed by: INTERNAL MEDICINE

## 2024-11-01 PROCEDURE — 7100000010 HC PHASE II RECOVERY - FIRST 15 MIN: Performed by: INTERNAL MEDICINE

## 2024-11-01 PROCEDURE — 7100000011 HC PHASE II RECOVERY - ADDTL 15 MIN: Performed by: INTERNAL MEDICINE

## 2024-11-01 PROCEDURE — 2709999900 HC NON-CHARGEABLE SUPPLY: Performed by: INTERNAL MEDICINE

## 2024-11-01 PROCEDURE — 33228 REMV&REPLC PM GEN DUAL LEAD: CPT

## 2024-11-01 PROCEDURE — C1785 PMKR, DUAL, RATE-RESP: HCPCS | Performed by: INTERNAL MEDICINE

## 2024-11-01 PROCEDURE — 33230 INSRT PULSE GEN W/DUAL LEADS: CPT | Performed by: INTERNAL MEDICINE

## 2024-11-01 PROCEDURE — 2580000003 HC RX 258: Performed by: INTERNAL MEDICINE

## 2024-11-01 PROCEDURE — 6360000002 HC RX W HCPCS: Performed by: INTERNAL MEDICINE

## 2024-11-01 PROCEDURE — C1892 INTRO/SHEATH,FIXED,PEEL-AWAY: HCPCS | Performed by: INTERNAL MEDICINE

## 2024-11-01 DEVICE — IPG W1DR01 AZURE XT DR MRI USA
Type: IMPLANTABLE DEVICE | Site: CHEST  WALL | Status: FUNCTIONAL
Brand: AZURE™ XT DR MRI SURESCAN™

## 2024-11-01 RX ORDER — FENTANYL CITRATE 50 UG/ML
INJECTION, SOLUTION INTRAMUSCULAR; INTRAVENOUS PRN
Status: DISCONTINUED | OUTPATIENT
Start: 2024-11-01 | End: 2024-11-01 | Stop reason: HOSPADM

## 2024-11-01 RX ORDER — DOXYCYCLINE HYCLATE 100 MG
100 TABLET ORAL EVERY 12 HOURS SCHEDULED
Qty: 20 TABLET | Refills: 0 | Status: SHIPPED | OUTPATIENT
Start: 2024-11-01 | End: 2024-11-11

## 2024-11-01 RX ORDER — DOXYCYCLINE HYCLATE 100 MG
100 TABLET ORAL EVERY 12 HOURS SCHEDULED
Status: DISCONTINUED | OUTPATIENT
Start: 2024-11-01 | End: 2024-11-01 | Stop reason: HOSPADM

## 2024-11-01 RX ORDER — MIDAZOLAM HYDROCHLORIDE 1 MG/ML
INJECTION, SOLUTION INTRAMUSCULAR; INTRAVENOUS PRN
Status: DISCONTINUED | OUTPATIENT
Start: 2024-11-01 | End: 2024-11-01 | Stop reason: HOSPADM

## 2024-11-01 RX ORDER — SODIUM CHLORIDE 9 MG/ML
INJECTION, SOLUTION INTRAVENOUS CONTINUOUS
Status: DISCONTINUED | OUTPATIENT
Start: 2024-11-01 | End: 2024-11-01

## 2024-11-01 RX ADMIN — VANCOMYCIN HYDROCHLORIDE 1000 MG: 1 INJECTION, POWDER, LYOPHILIZED, FOR SOLUTION INTRAVENOUS at 14:10

## 2024-11-01 RX ADMIN — SODIUM CHLORIDE: 9 INJECTION, SOLUTION INTRAVENOUS at 11:31

## 2024-11-01 ASSESSMENT — PULMONARY FUNCTION TESTS
PIF_VALUE: 0

## 2024-11-01 NOTE — PROGRESS NOTES
Patient admitted to pcc room 10 per ambulation.  All procedures explained prior to implementation.  Assessment and vitals as charted.  Left side of chest washed with chlorahexedine wipes per essence with writer in attendance.  Visitor in attendance.  Patient ready for procedure

## 2024-11-01 NOTE — PROGRESS NOTES
Unable to reach patients visitor for return home at [phone]; messge left per writer at this number. Patient notified on inability to reach her contact.  Patient informed writer that [employer] will take her home.   Dr. Mak notified of above information.  Dr. Mak informed writer she could go home with cab company..

## 2024-11-01 NOTE — DISCHARGE INSTRUCTIONS
DISCHARGE INSTRUCTIONS FOR ICD/PACEMAKER REPLACEMENTS/DR BYNUM        -REMOVE DRESSING THE FOLLOWING DAY AND LEAVE CLEAN, DRY AND OPEN TO AIR     -NO SHOWERING OR BATHS FOR 7 DAYS        -NO DRIVING FOR 24 hours    -NO HEAVY LIFTING WITH AFFECTED ARM FOR 2 - 3 DAYS     -AVOID ACTIVITIES THAT MAY RESULT IN HIGH IMPACT OR STRESS AT INCISIONAL SITE     -AVOID ANYTHING THAT WILL RUB AGAINST THE INCISION     -WATCH FOR SIGNS OF INFECTION WHICH INCLUDE: REDNESS / WARMTH / DRAINAGE FROM INCISION / INCREASE SWELLING / TEMPERATURE BY MOUTH 101 OR GREATER     -IF YOU ARE ON BLOOD THINNER CHECK WITH YOUR DOCTOR WHEN TO RESUME MEDICATION     -CARRY DEVICE ID AND HOME MEDICATION LIST WITH YOU AT ALL TIMES.     -ANY QUESTIONS OR CONCERNS PLEASE CONTACT YOUR DOCTOR AND/OR SEEK HELP                 SEDATION / ANALGESIA INFORMATION / HOME GOING ADVICE  You have received the sedation/analgesia medication during your visit    Sedation/analgesia is used during short medical procedures under controlled supervision. The medication will produce a strong relaxation. You will be able to hear, speak and follow instructions, but your memory and alertness will be decreased.    You will be able to swallow and breathe on your own. During sedation/analgesia your blood pressure, heart and breathing will be watched closely. After the procedure, you may not remember what was said or done.    You may have the following effects from the medication.  \" Drowsiness, dizziness, sleepiness or confusion.  \" Difficulty remembering or delayed reaction times.  \" Loss of fine muscle control or difficulty with your balance especially while walking.  \" Difficulty focusing or blurred vision.  You may not be aware of slight changes in your behavior and/or your reaction time because of the medication used during the procedure. Therefore you should follow these instructions.  \" Have someone responsible help you with your care.  \" Do not drive for 24 hours.  \"  feel.  Keep your regular doctor appointments. Your doctor:  Sets both the rate at which a shock will occur and the level of shock needed to restore your heart to a normal rate.  Checks to see whether the ICD has given you any shocks since your last visit. This helps your doctor know if your medicines need to be adjusted.  Checks the ICD battery and replaces it as needed.  Talk with others who have an ICD. Ask them if they have been shocked and what it was like. Ask them how they cope with it. Talking with others can help you feel better.  Always carry your ICD identity card, a list of all the medicines you are taking, and your doctor's name and phone number. This will help you get the best possible treatment if you get a shock and need help.  Make an action plan  Talk to your doctor about making an action plan for what to do if you get shocked. Here is an example:  After one shock:  Call 911 or other emergency services right away if you feel bad or have symptoms like chest pain.  Call your doctor soon if you feel fine right away after the shock. Your doctor may want to talk about the shock and schedule a follow-up visit.  If you get a second shock in a 24-hour period, call your doctor right away. Call even if you feel fine right away.  Stay calm after a shock  Follow the action plan you made with your doctor.  Do some breathing exercises. They may help you relax.  Sit or lie in a comfortable position. Put one hand on your belly just below your ribs and the other hand on your chest.  Take a deep breath in through your nose, and let your belly push your hand out. Your chest should not move.  Breathe out through pursed lips as if you were whistling. Feel the hand on your belly go in, and use it to push all the air out.  Breathe in and out like this until you feel more relaxed.  Keep a good attitude. When you've had a shock, you may question how healthy you are or worry about getting another shock. But try to focus on

## 2024-11-04 ENCOUNTER — TELEPHONE (OUTPATIENT)
Dept: GASTROENTEROLOGY | Age: 69
End: 2024-11-04

## 2024-11-04 ENCOUNTER — OFFICE VISIT (OUTPATIENT)
Dept: GASTROENTEROLOGY | Age: 69
End: 2024-11-04
Payer: MEDICARE

## 2024-11-04 VITALS
SYSTOLIC BLOOD PRESSURE: 113 MMHG | BODY MASS INDEX: 27.36 KG/M2 | HEIGHT: 72 IN | HEART RATE: 60 BPM | TEMPERATURE: 97.3 F | WEIGHT: 202 LBS | DIASTOLIC BLOOD PRESSURE: 60 MMHG | RESPIRATION RATE: 20 BRPM

## 2024-11-04 DIAGNOSIS — R19.4 ALTERED BOWEL HABITS: ICD-10-CM

## 2024-11-04 DIAGNOSIS — K59.00 CONSTIPATION, UNSPECIFIED CONSTIPATION TYPE: Primary | ICD-10-CM

## 2024-11-04 DIAGNOSIS — K29.70 GASTRITIS WITHOUT BLEEDING, UNSPECIFIED CHRONICITY, UNSPECIFIED GASTRITIS TYPE: ICD-10-CM

## 2024-11-04 PROCEDURE — 1123F ACP DISCUSS/DSCN MKR DOCD: CPT | Performed by: INTERNAL MEDICINE

## 2024-11-04 PROCEDURE — 1090F PRES/ABSN URINE INCON ASSESS: CPT | Performed by: INTERNAL MEDICINE

## 2024-11-04 PROCEDURE — 99214 OFFICE O/P EST MOD 30 MIN: CPT | Performed by: INTERNAL MEDICINE

## 2024-11-04 PROCEDURE — 1125F AMNT PAIN NOTED PAIN PRSNT: CPT | Performed by: INTERNAL MEDICINE

## 2024-11-04 PROCEDURE — 1159F MED LIST DOCD IN RCRD: CPT | Performed by: INTERNAL MEDICINE

## 2024-11-04 PROCEDURE — G8417 CALC BMI ABV UP PARAM F/U: HCPCS | Performed by: INTERNAL MEDICINE

## 2024-11-04 PROCEDURE — 3078F DIAST BP <80 MM HG: CPT | Performed by: INTERNAL MEDICINE

## 2024-11-04 PROCEDURE — 4004F PT TOBACCO SCREEN RCVD TLK: CPT | Performed by: INTERNAL MEDICINE

## 2024-11-04 PROCEDURE — G8399 PT W/DXA RESULTS DOCUMENT: HCPCS | Performed by: INTERNAL MEDICINE

## 2024-11-04 PROCEDURE — G8484 FLU IMMUNIZE NO ADMIN: HCPCS | Performed by: INTERNAL MEDICINE

## 2024-11-04 PROCEDURE — G2211 COMPLEX E/M VISIT ADD ON: HCPCS | Performed by: INTERNAL MEDICINE

## 2024-11-04 PROCEDURE — 3074F SYST BP LT 130 MM HG: CPT | Performed by: INTERNAL MEDICINE

## 2024-11-04 PROCEDURE — 3017F COLORECTAL CA SCREEN DOC REV: CPT | Performed by: INTERNAL MEDICINE

## 2024-11-04 PROCEDURE — G8427 DOCREV CUR MEDS BY ELIG CLIN: HCPCS | Performed by: INTERNAL MEDICINE

## 2024-11-04 RX ORDER — LINACLOTIDE 290 UG/1
290 CAPSULE, GELATIN COATED ORAL
Qty: 90 CAPSULE | Refills: 1 | Status: SHIPPED | OUTPATIENT
Start: 2024-11-04 | End: 2025-02-02

## 2024-11-04 RX ORDER — LINACLOTIDE 290 UG/1
CAPSULE, GELATIN COATED ORAL
Qty: 4 CAPSULE | Refills: 0 | Status: SHIPPED | COMMUNITY
Start: 2024-11-04

## 2024-11-04 ASSESSMENT — ENCOUNTER SYMPTOMS
ANAL BLEEDING: 0
VOICE CHANGE: 0
NAUSEA: 1
COUGH: 0
ABDOMINAL DISTENTION: 1
RECTAL PAIN: 0
DIARRHEA: 0
CHOKING: 0
BLOOD IN STOOL: 0
ABDOMINAL PAIN: 1
SORE THROAT: 0
TROUBLE SWALLOWING: 0
WHEEZING: 0
COLOR CHANGE: 0
VOMITING: 1
CONSTIPATION: 1

## 2024-11-04 NOTE — TELEPHONE ENCOUNTER
Pt saw Dr. Gonzalez today in clinic. Pt was given 2 boxes of Linzess 290 mcg samples from Dr. Gonzalez. 1 box=4 capsules    Linzess 290 mcg  Lot #: [id number]  Exp: 06/2025  Product of: [place]  Manufacture: [employer]    Linzess 290 mcg  Lot #: [id number]  Exp: 06/2025  Product of: [place]  Manufacture: [employer]

## 2024-11-04 NOTE — PROGRESS NOTES
Reason for Referral:   Follow-up    No referring provider defined for this encounter.    Chief Complaint   Patient presents with    Follow Up After Procedure     EGD    Colonoscopy    Constipation           HISTORY OF PRESENT ILLNESS: Ms.Sharon AGATA Landry is a 69 y.o. female with a past history remarkable for bronchiectasis, CHF, COPD, hyperlipidemia, hypertension, obstructive sleep apnea, referred for evaluation of altered bowel habits and chronic hepatitis C (treatment completed and eradicated with Harvoni).    The patient reports that she has been having lower quadrant abdominal pain and nausea for a long time.  She has issues with constipation and cannot have a bowel movement for 2 to 3 weeks.  She is currently not using any laxatives apart from Colace.  She was previously given Linzess and had some good response with this.  Her last colonoscopy was in 2019.    Interval history 11/4/2024:  Continues to endorse having constipation.  Previously the combination of Dulcolax and MiraLAX worked okay but then it stopped working.  She is currently taking MiraLAX twice daily and has liquid stool but sense of incomplete evacuation.  She was recently seen at [facility] was told that she has significant bowel burden.      Previous Endoscopies    EGD 2024:  Impression:    Diffuse erythema and erosions noted in the stomach body, biopsies obtained to rule H. pylori.  Normal duodenum, biopsies obtained to rule out celiac disease.    Colonoscopy 2024:  Endoscopic Impression:    Good bowel prep  Sigmoid/descending colon diverticulosis.  5 polyps in the transverse colon ranging 2 to 3 mm, removed with cold snare.  Multiple hyperplastic sessile polyps, 3 of these measuring 2 to 3 mm removed with cold snare.  Small external hemorrhoids  A.  SMALL BOWEL, BIOPSY:   -DUODENAL MUCOSA WITH A NORMAL VILLOUS ARCHITECTURE AND NO FEATURES OF   CELIAC DISEASE.     B.  STOMACH, BIOPSY:   -GASTRIC ANTRAL MUCOSA WITH MILD CHRONIC

## 2024-11-05 ENCOUNTER — TELEPHONE (OUTPATIENT)
Dept: GASTROENTEROLOGY | Age: 69
End: 2024-11-05

## 2024-11-05 NOTE — TELEPHONE ENCOUNTER
Pt saw Carols in office. Anorectal Motility Study was ordered. Pt informed order will be sent to [facility] and they will call pt to schedule.    Writer faxed on 11/05/24, 16 pgs to [facility], [phone]. Writer faxed pt order, demographics pg, insurance card and most recent GI OV Notes.

## 2024-11-06 LAB — ECHO BSA: 2.18 M2

## 2024-11-06 RX ORDER — BLOOD SUGAR DIAGNOSTIC
STRIP MISCELLANEOUS
Qty: 400 STRIP | Refills: 11 | Status: SHIPPED | OUTPATIENT
Start: 2024-11-06

## 2024-11-07 DIAGNOSIS — I50.32 CHRONIC HEART FAILURE WITH PRESERVED EJECTION FRACTION (HCC): ICD-10-CM

## 2024-11-07 DIAGNOSIS — M19.012 PRIMARY OSTEOARTHRITIS OF LEFT SHOULDER: ICD-10-CM

## 2024-11-07 DIAGNOSIS — E11.42 TYPE 2 DIABETES MELLITUS WITH DIABETIC POLYNEUROPATHY, WITHOUT LONG-TERM CURRENT USE OF INSULIN (HCC): ICD-10-CM

## 2024-11-07 NOTE — TELEPHONE ENCOUNTER
Requested Prescriptions     Pending Prescriptions Disp Refills    Acetaminophen Extra Strength 500 MG TABS [Pharmacy Med Name: ACETAMINOPHEN 500 MG TABLET] 120 tablet 1     Sig: TAKE 2 TABLETS BY MOUTH EVERY 6 HOURS AS NEEDED (PAIN)    gabapentin (NEURONTIN) 800 MG tablet 90 tablet 2     Sig: Take 1 tablet by mouth 3 times daily for 30 days.

## 2024-11-08 RX ORDER — PSEUDOEPHED/ACETAMINOPH/DIPHEN 30MG-500MG
2 TABLET ORAL EVERY 6 HOURS PRN
Qty: 120 TABLET | Refills: 1 | Status: SHIPPED | OUTPATIENT
Start: 2024-11-08

## 2024-11-08 RX ORDER — GABAPENTIN 800 MG/1
800 TABLET ORAL 3 TIMES DAILY
Qty: 90 TABLET | Refills: 2 | Status: SHIPPED | OUTPATIENT
Start: 2024-11-08 | End: 2024-12-08

## 2024-11-11 ENCOUNTER — TELEPHONE (OUTPATIENT)
Dept: GASTROENTEROLOGY | Age: 69
End: 2024-11-11

## 2024-11-11 DIAGNOSIS — K59.00 CONSTIPATION, UNSPECIFIED CONSTIPATION TYPE: Primary | ICD-10-CM

## 2024-11-11 NOTE — TELEPHONE ENCOUNTER
Patient left message informing Linzess is not working and asking for something else she can try. Patient contact [phone].

## 2024-11-12 RX ORDER — BISACODYL 5 MG/1
10 TABLET, DELAYED RELEASE ORAL DAILY
Qty: 30 TABLET | Refills: 1 | Status: SHIPPED | OUTPATIENT
Start: 2024-11-12

## 2024-11-12 NOTE — TELEPHONE ENCOUNTER
Return call back to patient and voicemail left in that recommendations are that patient is to take dulcolax 10mg daily to the linzess and recommend that the patient complete the Sitz marker study and anorectal manometry.  Please advise.

## 2024-12-01 DIAGNOSIS — J41.1 MUCOPURULENT CHRONIC BRONCHITIS (HCC): ICD-10-CM

## 2024-12-02 RX ORDER — ALBUTEROL SULFATE 0.83 MG/ML
SOLUTION RESPIRATORY (INHALATION)
Qty: 300 ML | Refills: 1 | Status: SHIPPED | OUTPATIENT
Start: 2024-12-02

## 2024-12-15 PROBLEM — Z45.010 PACEMAKER BATTERY DEPLETION: Status: ACTIVE | Noted: 2024-12-15

## 2024-12-31 ENCOUNTER — OFFICE VISIT (OUTPATIENT)
Dept: ORTHOPEDIC SURGERY | Age: 69
End: 2024-12-31
Payer: MEDICARE

## 2024-12-31 VITALS — RESPIRATION RATE: 14 BRPM | HEIGHT: 72 IN | WEIGHT: 202 LBS | BODY MASS INDEX: 27.36 KG/M2

## 2024-12-31 DIAGNOSIS — M25.562 CHRONIC PAIN OF BOTH KNEES: ICD-10-CM

## 2024-12-31 DIAGNOSIS — G89.29 CHRONIC PAIN OF BOTH KNEES: ICD-10-CM

## 2024-12-31 DIAGNOSIS — M17.0 ARTHRITIS OF BOTH KNEES: Primary | ICD-10-CM

## 2024-12-31 DIAGNOSIS — M25.561 CHRONIC PAIN OF BOTH KNEES: ICD-10-CM

## 2024-12-31 PROCEDURE — 20610 DRAIN/INJ JOINT/BURSA W/O US: CPT | Performed by: PHYSICIAN ASSISTANT

## 2024-12-31 PROCEDURE — 1125F AMNT PAIN NOTED PAIN PRSNT: CPT | Performed by: PHYSICIAN ASSISTANT

## 2024-12-31 PROCEDURE — G8417 CALC BMI ABV UP PARAM F/U: HCPCS | Performed by: PHYSICIAN ASSISTANT

## 2024-12-31 PROCEDURE — 1123F ACP DISCUSS/DSCN MKR DOCD: CPT | Performed by: PHYSICIAN ASSISTANT

## 2024-12-31 PROCEDURE — 4004F PT TOBACCO SCREEN RCVD TLK: CPT | Performed by: PHYSICIAN ASSISTANT

## 2024-12-31 PROCEDURE — 1159F MED LIST DOCD IN RCRD: CPT | Performed by: PHYSICIAN ASSISTANT

## 2024-12-31 PROCEDURE — 1090F PRES/ABSN URINE INCON ASSESS: CPT | Performed by: PHYSICIAN ASSISTANT

## 2024-12-31 PROCEDURE — G8427 DOCREV CUR MEDS BY ELIG CLIN: HCPCS | Performed by: PHYSICIAN ASSISTANT

## 2024-12-31 PROCEDURE — 99213 OFFICE O/P EST LOW 20 MIN: CPT | Performed by: PHYSICIAN ASSISTANT

## 2024-12-31 PROCEDURE — G8484 FLU IMMUNIZE NO ADMIN: HCPCS | Performed by: PHYSICIAN ASSISTANT

## 2024-12-31 PROCEDURE — G8399 PT W/DXA RESULTS DOCUMENT: HCPCS | Performed by: PHYSICIAN ASSISTANT

## 2024-12-31 PROCEDURE — 3017F COLORECTAL CA SCREEN DOC REV: CPT | Performed by: PHYSICIAN ASSISTANT

## 2024-12-31 RX ORDER — BUPIVACAINE HYDROCHLORIDE 2.5 MG/ML
2 INJECTION, SOLUTION INFILTRATION; PERINEURAL ONCE
Status: COMPLETED | OUTPATIENT
Start: 2024-12-31 | End: 2024-12-31

## 2024-12-31 RX ORDER — METHYLPREDNISOLONE ACETATE 80 MG/ML
80 INJECTION, SUSPENSION INTRA-ARTICULAR; INTRALESIONAL; INTRAMUSCULAR; SOFT TISSUE ONCE
Status: COMPLETED | OUTPATIENT
Start: 2024-12-31 | End: 2024-12-31

## 2024-12-31 RX ADMIN — METHYLPREDNISOLONE ACETATE 80 MG: 80 INJECTION, SUSPENSION INTRA-ARTICULAR; INTRALESIONAL; INTRAMUSCULAR; SOFT TISSUE at 11:49

## 2024-12-31 RX ADMIN — METHYLPREDNISOLONE ACETATE 80 MG: 80 INJECTION, SUSPENSION INTRA-ARTICULAR; INTRALESIONAL; INTRAMUSCULAR; SOFT TISSUE at 11:48

## 2024-12-31 RX ADMIN — BUPIVACAINE HYDROCHLORIDE 5 MG: 2.5 INJECTION, SOLUTION INFILTRATION; PERINEURAL at 11:47

## 2024-12-31 ASSESSMENT — ENCOUNTER SYMPTOMS
COLOR CHANGE: 0
VOMITING: 0
COUGH: 0
SHORTNESS OF BREATH: 0

## 2024-12-31 NOTE — PROGRESS NOTES
[facility]  [address]  [address]  [place] [address]  Dept: [phone]  Dept Fax: [phone]        Ambulatory Follow Up      Subjective:   Donna Landry is a 69 y.o. year old female who presents to our office today for routine followup regarding her   1. Arthritis of both knees    2. Chronic pain of both knees        Chief Complaint   Patient presents with    Follow-up     Bilateral knee pain       HPI Donna Landry  is a 69 y.o. female who presents today in follow for chronic bilateral knee pain. The patient was last seen on 8/13/2024 and underwent treatment in the form of bilateral knee corticosteroid injections.  The patient notes 100% improvement with the previous treatment and states that it lasted ~3 months.  Patient is interested in repeat injections today.  She has not had a new trauma or injury.  Overall she notes she is doing well.    Patient continues to smoke half a pack of cigarettes per day.  She is a diabetic with a last documented hemoglobin A1c of 6.5 as of 9/5/2024.      Review of Systems   Constitutional:  Negative for activity change and fever.   HENT:  Negative for sneezing.    Respiratory:  Negative for cough and shortness of breath.    Cardiovascular:  Negative for chest pain.   Gastrointestinal:  Negative for vomiting.   Musculoskeletal:  Positive for arthralgias (bilateral knee). Negative for joint swelling and myalgias.   Skin:  Negative for color change.   Neurological:  Negative for weakness and numbness.   Psychiatric/Behavioral:  Negative for sleep disturbance.        Objective :   Resp 14   Ht 1.829 m (6' 0.01\")   Wt 91.6 kg (202 lb)   LMP 12/09/1996   BMI 27.39 kg/m²  Body mass index is 27.39 kg/m².  General: Donna Landry is a 69 y.o. female who is alert and oriented and sitting comfortably in our office.  Ortho Exam    MS:   Patient ambulates independently

## 2025-01-05 DIAGNOSIS — B18.2 HEP C W/O COMA, CHRONIC (HCC): ICD-10-CM

## 2025-01-05 DIAGNOSIS — M19.012 PRIMARY OSTEOARTHRITIS OF LEFT SHOULDER: ICD-10-CM

## 2025-01-06 RX ORDER — ONDANSETRON 4 MG/1
TABLET, ORALLY DISINTEGRATING ORAL
Qty: 10 TABLET | Refills: 1 | Status: SHIPPED | OUTPATIENT
Start: 2025-01-06

## 2025-01-06 RX ORDER — PSEUDOEPHED/ACETAMINOPH/DIPHEN 30MG-500MG
2 TABLET ORAL EVERY 6 HOURS PRN
Qty: 120 TABLET | Refills: 1 | Status: SHIPPED | OUTPATIENT
Start: 2025-01-06

## 2025-01-06 NOTE — TELEPHONE ENCOUNTER
Donna Landry is calling to request a refill on the following medication(s):    Medication Request:  Requested Prescriptions     Pending Prescriptions Disp Refills    Acetaminophen Extra Strength 500 MG TABS [Pharmacy Med Name: ACETAMINOPHEN 500 MG TABLET] 120 tablet 1     Sig: TAKE 2 TABLETS BY MOUTH EVERY 6 HOURS AS NEEDED (PAIN)    ondansetron (ZOFRAN-ODT) 4 MG disintegrating tablet [Pharmacy Med Name: ONDANSETRON ODT 4 MG TABLET] 10 tablet 1     Sig: DISSOLVE 1 TABLET UNDER THE TONGUE EVERY 8 HOURS IF NEEDED FOR NAUSEA OR VOMITING       Last Visit Date (If Applicable):  9/4/2024    Next Visit Date:    2/24/2025

## 2025-01-15 ENCOUNTER — HOSPITAL ENCOUNTER (EMERGENCY)
Age: 70
Discharge: HOME | End: 2025-01-15
Attending: EMERGENCY MEDICINE
Payer: MEDICARE

## 2025-01-15 ENCOUNTER — APPOINTMENT (OUTPATIENT)
Dept: CT IMAGING | Age: 70
End: 2025-01-15
Payer: MEDICARE

## 2025-01-15 ENCOUNTER — APPOINTMENT (OUTPATIENT)
Dept: GENERAL RADIOLOGY | Age: 70
End: 2025-01-15
Payer: MEDICARE

## 2025-01-15 VITALS
TEMPERATURE: 97.5 F | BODY MASS INDEX: 27.66 KG/M2 | DIASTOLIC BLOOD PRESSURE: 57 MMHG | WEIGHT: 204 LBS | RESPIRATION RATE: 18 BRPM | HEART RATE: 64 BPM | OXYGEN SATURATION: 100 % | SYSTOLIC BLOOD PRESSURE: 140 MMHG

## 2025-01-15 DIAGNOSIS — R06.00 DYSPNEA, UNSPECIFIED TYPE: ICD-10-CM

## 2025-01-15 DIAGNOSIS — R10.84 GENERALIZED ABDOMINAL PAIN: Primary | ICD-10-CM

## 2025-01-15 LAB
ALBUMIN SERPL-MCNC: 4.3 G/DL (ref 3.5–5.2)
ALBUMIN/GLOB SERPL: 1.4 {RATIO} (ref 1–2.5)
ALP SERPL-CCNC: 80 U/L (ref 35–104)
ALT SERPL-CCNC: 10 U/L (ref 10–35)
ANION GAP SERPL CALCULATED.3IONS-SCNC: 7 MMOL/L (ref 9–16)
AST SERPL-CCNC: 20 U/L (ref 10–35)
BASOPHILS # BLD: 0.04 K/UL (ref 0–0.2)
BASOPHILS NFR BLD: 1 % (ref 0–2)
BILIRUB SERPL-MCNC: 0.3 MG/DL (ref 0–1.2)
BNP SERPL-MCNC: 42 PG/ML (ref 0–125)
BUN SERPL-MCNC: 10 MG/DL (ref 8–23)
CALCIUM SERPL-MCNC: 10.2 MG/DL (ref 8.6–10.4)
CHLORIDE SERPL-SCNC: 103 MMOL/L (ref 98–107)
CO2 SERPL-SCNC: 26 MMOL/L (ref 20–31)
CREAT SERPL-MCNC: 0.7 MG/DL (ref 0.6–0.9)
D DIMER PPP FEU-MCNC: 0.89 UG/ML FEU (ref 0–0.57)
EOSINOPHIL # BLD: <0.03 K/UL (ref 0–0.44)
EOSINOPHILS RELATIVE PERCENT: 0 % (ref 1–4)
ERYTHROCYTE [DISTWIDTH] IN BLOOD BY AUTOMATED COUNT: 13.2 % (ref 11.8–14.4)
FLUAV AG SPEC QL: NEGATIVE
FLUBV AG SPEC QL: NEGATIVE
GFR, ESTIMATED: >90 ML/MIN/1.73M2
GLUCOSE SERPL-MCNC: 160 MG/DL (ref 74–99)
HCT VFR BLD AUTO: 45.7 % (ref 36.3–47.1)
HGB BLD-MCNC: 14.8 G/DL (ref 11.9–15.1)
IMM GRANULOCYTES # BLD AUTO: <0.03 K/UL (ref 0–0.3)
IMM GRANULOCYTES NFR BLD: 0 %
LIPASE SERPL-CCNC: 21 U/L (ref 13–60)
LYMPHOCYTES NFR BLD: 1.73 K/UL (ref 1.1–3.7)
LYMPHOCYTES RELATIVE PERCENT: 26 % (ref 24–43)
MCH RBC QN AUTO: 30.5 PG (ref 25.2–33.5)
MCHC RBC AUTO-ENTMCNC: 32.4 G/DL (ref 28.4–34.8)
MCV RBC AUTO: 94.2 FL (ref 82.6–102.9)
MONOCYTES NFR BLD: 0.63 K/UL (ref 0.1–1.2)
MONOCYTES NFR BLD: 9 % (ref 3–12)
NEUTROPHILS NFR BLD: 64 % (ref 36–65)
NEUTS SEG NFR BLD: 4.35 K/UL (ref 1.5–8.1)
NRBC BLD-RTO: 0 PER 100 WBC
PLATELET # BLD AUTO: 172 K/UL (ref 138–453)
PMV BLD AUTO: 10.5 FL (ref 8.1–13.5)
POTASSIUM SERPL-SCNC: 4.2 MMOL/L (ref 3.7–5.3)
PROT SERPL-MCNC: 7.4 G/DL (ref 6.6–8.7)
RBC # BLD AUTO: 4.85 M/UL (ref 3.95–5.11)
SARS-COV-2 RDRP RESP QL NAA+PROBE: NOT DETECTED
SODIUM SERPL-SCNC: 136 MMOL/L (ref 136–145)
SPECIMEN DESCRIPTION: NORMAL
TROPONIN I SERPL HS-MCNC: 8 NG/L (ref 0–14)
TROPONIN I SERPL HS-MCNC: 9 NG/L (ref 0–14)
WBC OTHER # BLD: 6.8 K/UL (ref 3.5–11.3)

## 2025-01-15 PROCEDURE — 71260 CT THORAX DX C+: CPT

## 2025-01-15 PROCEDURE — 6370000000 HC RX 637 (ALT 250 FOR IP)

## 2025-01-15 PROCEDURE — 84484 ASSAY OF TROPONIN QUANT: CPT

## 2025-01-15 PROCEDURE — 87635 SARS-COV-2 COVID-19 AMP PRB: CPT

## 2025-01-15 PROCEDURE — 87804 INFLUENZA ASSAY W/OPTIC: CPT

## 2025-01-15 PROCEDURE — 96374 THER/PROPH/DIAG INJ IV PUSH: CPT

## 2025-01-15 PROCEDURE — 85025 COMPLETE CBC W/AUTO DIFF WBC: CPT

## 2025-01-15 PROCEDURE — 96375 TX/PRO/DX INJ NEW DRUG ADDON: CPT

## 2025-01-15 PROCEDURE — 96376 TX/PRO/DX INJ SAME DRUG ADON: CPT

## 2025-01-15 PROCEDURE — 83880 ASSAY OF NATRIURETIC PEPTIDE: CPT

## 2025-01-15 PROCEDURE — 6360000002 HC RX W HCPCS

## 2025-01-15 PROCEDURE — 99285 EMERGENCY DEPT VISIT HI MDM: CPT

## 2025-01-15 PROCEDURE — 93005 ELECTROCARDIOGRAM TRACING: CPT

## 2025-01-15 PROCEDURE — 2500000003 HC RX 250 WO HCPCS

## 2025-01-15 PROCEDURE — 83690 ASSAY OF LIPASE: CPT

## 2025-01-15 PROCEDURE — 71046 X-RAY EXAM CHEST 2 VIEWS: CPT

## 2025-01-15 PROCEDURE — 6360000004 HC RX CONTRAST MEDICATION

## 2025-01-15 PROCEDURE — 80053 COMPREHEN METABOLIC PANEL: CPT

## 2025-01-15 PROCEDURE — 85379 FIBRIN DEGRADATION QUANT: CPT

## 2025-01-15 PROCEDURE — 74177 CT ABD & PELVIS W/CONTRAST: CPT

## 2025-01-15 RX ORDER — AZITHROMYCIN 250 MG/1
TABLET, FILM COATED ORAL
Qty: 6 TABLET | Refills: 0 | Status: SHIPPED | OUTPATIENT
Start: 2025-01-15 | End: 2025-01-25

## 2025-01-15 RX ORDER — ACETAMINOPHEN 500 MG
1000 TABLET ORAL ONCE
Status: COMPLETED | OUTPATIENT
Start: 2025-01-15 | End: 2025-01-15

## 2025-01-15 RX ORDER — FENTANYL CITRATE 50 UG/ML
50 INJECTION, SOLUTION INTRAMUSCULAR; INTRAVENOUS ONCE
Status: DISCONTINUED | OUTPATIENT
Start: 2025-01-15 | End: 2025-01-16 | Stop reason: HOSPADM

## 2025-01-15 RX ORDER — IOPAMIDOL 755 MG/ML
75 INJECTION, SOLUTION INTRAVASCULAR
Status: COMPLETED | OUTPATIENT
Start: 2025-01-15 | End: 2025-01-15

## 2025-01-15 RX ORDER — AMOXICILLIN 500 MG/1
500 CAPSULE ORAL 2 TIMES DAILY
Qty: 14 CAPSULE | Refills: 0 | Status: SHIPPED | OUTPATIENT
Start: 2025-01-15 | End: 2025-01-22

## 2025-01-15 RX ORDER — PREDNISONE 10 MG/1
TABLET ORAL
Qty: 20 TABLET | Refills: 0 | Status: SHIPPED | OUTPATIENT
Start: 2025-01-15 | End: 2025-01-25

## 2025-01-15 RX ORDER — DIPHENHYDRAMINE HYDROCHLORIDE 50 MG/ML
50 INJECTION INTRAMUSCULAR; INTRAVENOUS ONCE
Status: COMPLETED | OUTPATIENT
Start: 2025-01-15 | End: 2025-01-15

## 2025-01-15 RX ADMIN — ACETAMINOPHEN 1000 MG: 500 TABLET, FILM COATED ORAL at 16:14

## 2025-01-15 RX ADMIN — METHYLPREDNISOLONE SODIUM SUCCINATE 40 MG: 40 INJECTION, POWDER, LYOPHILIZED, FOR SOLUTION INTRAMUSCULAR; INTRAVENOUS at 22:01

## 2025-01-15 RX ADMIN — DIPHENHYDRAMINE HYDROCHLORIDE 50 MG: 50 INJECTION INTRAMUSCULAR; INTRAVENOUS at 21:00

## 2025-01-15 RX ADMIN — IOPAMIDOL 75 ML: 755 INJECTION, SOLUTION INTRAVENOUS at 22:06

## 2025-01-15 RX ADMIN — METHYLPREDNISOLONE SODIUM SUCCINATE 40 MG: 40 INJECTION, POWDER, LYOPHILIZED, FOR SOLUTION INTRAMUSCULAR; INTRAVENOUS at 17:45

## 2025-01-15 ASSESSMENT — PAIN SCALES - GENERAL
PAINLEVEL_OUTOF10: 7
PAINLEVEL_OUTOF10: 7

## 2025-01-15 ASSESSMENT — PAIN DESCRIPTION - DESCRIPTORS: DESCRIPTORS: ACHING

## 2025-01-15 ASSESSMENT — PAIN DESCRIPTION - LOCATION: LOCATION: ABDOMEN

## 2025-01-15 NOTE — ED NOTES
Patient to ED for chest pain that woke her up from sleep this morning as well as diffuse abdominal pain and nausea. Patient states she took zofran at home. Patient states she had a pacemaker placed back in November. Patient alert and oriented x4, talking in complete sentences. Respirations even and unlabored. Patient placed on continuous cardiac monitoring, BP cuff, and pulse ox. EKG obtained, blood work obtained. Call light in reach, all needs met at this time

## 2025-01-16 LAB
EKG ATRIAL RATE: 60 BPM
EKG P AXIS: 48 DEGREES
EKG P-R INTERVAL: 244 MS
EKG Q-T INTERVAL: 442 MS
EKG QRS DURATION: 82 MS
EKG QTC CALCULATION (BAZETT): 442 MS
EKG R AXIS: -16 DEGREES
EKG T AXIS: 33 DEGREES
EKG VENTRICULAR RATE: 60 BPM

## 2025-01-16 PROCEDURE — 93010 ELECTROCARDIOGRAM REPORT: CPT | Performed by: INTERNAL MEDICINE

## 2025-01-16 ASSESSMENT — ENCOUNTER SYMPTOMS
ABDOMINAL PAIN: 1
DIARRHEA: 1
SHORTNESS OF BREATH: 1
NAUSEA: 1

## 2025-01-16 NOTE — ED NOTES
Report received from Christy ERICKSON, all questions answered. Pt remains on full cardiac monitor. Care ongoing

## 2025-01-16 NOTE — ED PROVIDER NOTES
[facility]     Emergency Department     Faculty Note/ Attestation      Pt Name: Donna Landry                                       MRN: [id number]  Birthdate 1955  Date of evaluation: 1/15/2025    Patients PCP:    America Cam MD    Note Started: 2:36 PM EST      Attestation  I performed a history and physical examination of the patient and discussed management with the resident. I reviewed the resident’s note and agree with the documented findings and plan of care. Any areas of disagreement are noted on the chart. I was personally present for the key portions of any procedures. I have documented in the chart those procedures where I was not present during the key portions. I have reviewed the emergency nurses triage note. I agree with the chief complaint, past medical history, past surgical history, allergies, medications, social and family history as documented unless otherwise noted below.    For Physician Assistant/ Nurse Practitioner cases/documentation I have personally evaluated this patient and have completed at least one if not all key elements of the E/M (history, physical exam, and MDM). Additional findings are as noted.      Initial Screens:        Silvina Coma Scale  Eye Opening: Spontaneous  Best Verbal Response: Oriented  Best Motor Response: Obeys commands  Silvina Coma Scale Score: 15    Vitals:    Vitals:    01/15/25 1344 01/15/25 1345 01/15/25 1425   BP:  134/64 139/69   Pulse:  63    Resp:  19    Temp:  97.5 °F (36.4 °C)    TempSrc:  Oral    SpO2:  92% 96%   Weight: 92.5 kg (204 lb)         CHIEF COMPLAINT       Chief Complaint   Patient presents with    Abdominal Pain    Shortness of Breath     COPD             DIAGNOSTIC RESULTS             RADIOLOGY:   XR CHEST (2 VW)    (Results Pending)         LABS:  Labs Reviewed   COVID-19, RAPID   RAPID INFLUENZA A/B ANTIGENS   CBC WITH AUTO DIFFERENTIAL   COMPREHENSIVE METABOLIC PANEL   LIPASE   BRAIN NATRIURETIC PEPTIDE 
     [facility] EMERGENCY DEPARTMENT  Emergency Department Encounter  Emergency Medicine Resident     Pt Name:Donna Landry  MRN: [id number]  Birthdate 1955  Date of evaluation: 1/16/25  PCP:  America Cam MD  Note Started: 1:49 PM EST      CHIEF COMPLAINT       Chief Complaint   Patient presents with    Abdominal Pain    Shortness of Breath     COPD       HISTORY OF PRESENT ILLNESS  (Location/Symptom, Timing/Onset, Context/Setting, Quality, Duration, Modifying Factors, Severity.)      Donna Landry is a 69 y.o. female who presents with a past medical history of diabetes, sinus bradycardia with permanent pacemaker in place, CHF, uterine cancer, COPD on 2 L home O2 (nighttime) presenting to the ED complaining of chest pain and shortness of breath.  Patient states that she has been having chest pain since November after recently undergoing replacement of permanent pacemaker secondary to battery depletion.  Patient states that today she woke up with shortness of breath requiring oxygen and thus came to the ED for further evaluation.  Patient has also been having cough with production of whitish sputum and experiencing subjective chills did not take any home temperature measurements.  Patient is also been experiencing abdominal pain with diarrhea.  Patient took some Pepto-Bismol and Zofran for the nausea.  Denies any episodes of emesis or blood in stool.    PAST MEDICAL / SURGICAL / SOCIAL / FAMILY HISTORY      has a past medical history of RACHEL (acute kidney injury) ([facility]), Altered bowel habits, Arthritis, Bilateral chronic knee pain, Bronchiectasis without complication ([facility]), Bronchitis, Cancer (HCC), Chest pain, CHF (congestive heart failure) ([facility]), Chronic bilateral low back pain with right-sided sciatica, Chronic bronchitis ([facility]), Chronic bronchitis (HCC), Chronic respiratory failure with hypoxia, Cigarette nicotine dependence without complication, COPD (chronic obstructive pulmonary disease) ([facility]), 
 [facility]  FACULTY HANDOFF       Handoff taken on the following patient from prior Attending Physician: Dr. Damian  Pt Name: Donna Landry  PCP:  America Cam MD  7:30 PM EST    Attestation  I was available and discussed any additional care issues that arose and coordinated the management plans with the resident(s) caring for the patient during my duty period. Any areas of disagreement with resident's documentation of care or procedures are noted on the chart. I was personally present for the key portions of any/all procedures during my duty period. I have documented in the chart those procedures where I was not present during the key portions.         CHIEF COMPLAINT       Chief Complaint   Patient presents with    Abdominal Pain    Shortness of Breath     COPD         CURRENT MEDICATIONS     Previous Medications  Previous Medications    ACETAMINOPHEN EXTRA STRENGTH 500 MG TABS    TAKE 2 TABLETS BY MOUTH EVERY 6 HOURS AS NEEDED (PAIN)    ALBUTEROL (PROVENTIL) (2.5 MG/3ML) 0.083% NEBULIZER SOLUTION    INHALE CONTENTS OF 1 VIAL ( 3 MILLILITERS ) IN NEBULIZER BY MOUTH AND INTO THE LUNGS EVERY 6 HOURS    ALBUTEROL SULFATE HFA (PROVENTIL;VENTOLIN;PROAIR) 108 (90 BASE) MCG/ACT INHALER    Inhale 2 puffs into the lungs every 6 hours as needed for Wheezing    AMLODIPINE (NORVASC) 5 MG TABLET    Take 1 tablet by mouth daily    ATENOLOL (TENORMIN) 25 MG TABLET    TAKE 1 TABLET BY MOUTH EVERY DAY    BARIUM SULFATE (SITZMARKS) CAPS    Take 1 capsule by mouth ONCE PRN for Other (constipation)    BISACODYL 5 MG EC TABLET    Take 2 tablets by mouth daily    BLOOD GLUCOSE MONITORING SUPPL (ONE TOUCH ULTRA 2) W/DEVICE KIT    1 kit by Does not apply route daily    BLOOD GLUCOSE TEST STRIPS (ONETOUCH ULTRA) STRIP    TEST 4 TIMES DAILY AS NEEDED    BUPRENORPHINE-NALOXONE (SUBOXONE) 8-2 MG FILM SL FILM    Place 1 Film under the tongue 2 times daily.    CETIRIZINE (ZYRTEC) 10 MG TABLET    TAKE 1 TABLET BY MOUTH 
CONTRAST   Final Result   No evidence of a pulmonary embolus.      Mildly dilated and atherosclerotic thoracic aorta with no aneurysm or   dissection.      Small lymph nodes scattered in the mediastinum which are not pathologic by   size criteria and are unchanged.      Mild chronic obstructive lung changes with mild subpleural fibrosis and   scarring along the upper lobes.  There is mild bibasilar atelectasis vs early   infiltrates along the lung bases posteriorly which is more prominent on the   right.  Recommend follow-up with serial chest x-rays.         CT ABDOMEN PELVIS W IV CONTRAST Additional Contrast? None   Final Result   No acute abnormality identified in the abdomen or pelvis.         XR CHEST (2 VW)   Final Result   New small right pleural effusion.             RECENT VITALS:     Temp: 97.5 °F (36.4 °C),  Pulse: 64, Respirations: 18, BP: (!) 140/57, SpO2: 100 %    This patient is a 69 y.o. Female with   ED Course as of 01/15/25 2319   Wed Levi 15, 2025   1521 XR CHEST (2 VW) [AS]   1521 New small right pleural effusion.   [AS]   1723 CBC with Auto Differential(!):    WBC 6.8   RBC 4.85   Hemoglobin Quant 14.8   Hematocrit 45.7   MCV 94.2   MCH 30.5   MCHC 32.4   RDW 13.2   Platelet Count 172   MPV 10.5   NRBC Automated 0.0   Neutrophils % 64   Lymphocyte % 26   Monocytes % 9   Eosinophils % 0(!)   Basophils % 1   Immature Granulocytes % 0   Neutrophils Absolute 4.35   Lymphocytes Absolute 1.73   Monocytes Absolute 0.63   Eosinophils Absolute <0.03   Basophils Absolute 0.04   Immature Granulocytes Absolute <0.03 [AS]   1723 Comprehensive Metabolic Panel(!):    Sodium 136   Potassium 4.2   Chloride 103   CARBON DIOXIDE 26   Anion Gap 7(!)   Glucose 160(!)   BUN,BUNPL 10   Creatinine 0.7   Est, Glom Filt Rate >90   Calcium 10.2   Total Protein 7.4   Albumin 4.3   Albumin/Globulin Ratio 1.4   Total Bilirubin 0.3   Alkaline Phosphatase 80   ALT 10   AST 20 [AS]   1723 D-Dimer, Quantitative(!):    D-Dimer,

## 2025-01-16 NOTE — DISCHARGE INSTRUCTIONS
You are seen in the emergency department for shortness of breath and abdominal pain.  You are started on 2 antibiotics for COPD exacerbation as well as a short course of steroids.  You should take these until they are completely gone.  Please follow-up with your primary care provider in the next 3 to 5 days to ensure your symptoms are resolving appropriately.  If you do have any worsening symptoms to include significant vomiting, chest pain, worsening shortness of breath, you should return the emergency department for further evaluation.

## 2025-01-18 DIAGNOSIS — J41.1 MUCOPURULENT CHRONIC BRONCHITIS (HCC): ICD-10-CM

## 2025-01-20 ENCOUNTER — OFFICE VISIT (OUTPATIENT)
Dept: GASTROENTEROLOGY | Age: 70
End: 2025-01-20
Payer: MEDICARE

## 2025-01-20 ENCOUNTER — TELEPHONE (OUTPATIENT)
Dept: GASTROENTEROLOGY | Age: 70
End: 2025-01-20

## 2025-01-20 ENCOUNTER — PREP FOR PROCEDURE (OUTPATIENT)
Dept: GASTROENTEROLOGY | Age: 70
End: 2025-01-20

## 2025-01-20 VITALS
RESPIRATION RATE: 19 BRPM | TEMPERATURE: 98.2 F | DIASTOLIC BLOOD PRESSURE: 77 MMHG | BODY MASS INDEX: 27.36 KG/M2 | HEART RATE: 64 BPM | WEIGHT: 202 LBS | HEIGHT: 72 IN | SYSTOLIC BLOOD PRESSURE: 144 MMHG

## 2025-01-20 DIAGNOSIS — K59.00 CONSTIPATION, UNSPECIFIED CONSTIPATION TYPE: ICD-10-CM

## 2025-01-20 DIAGNOSIS — R13.10 DYSPHAGIA, UNSPECIFIED TYPE: Primary | ICD-10-CM

## 2025-01-20 PROCEDURE — 3077F SYST BP >= 140 MM HG: CPT | Performed by: INTERNAL MEDICINE

## 2025-01-20 PROCEDURE — 99214 OFFICE O/P EST MOD 30 MIN: CPT | Performed by: INTERNAL MEDICINE

## 2025-01-20 PROCEDURE — 1090F PRES/ABSN URINE INCON ASSESS: CPT | Performed by: INTERNAL MEDICINE

## 2025-01-20 PROCEDURE — 1126F AMNT PAIN NOTED NONE PRSNT: CPT | Performed by: INTERNAL MEDICINE

## 2025-01-20 PROCEDURE — 4004F PT TOBACCO SCREEN RCVD TLK: CPT | Performed by: INTERNAL MEDICINE

## 2025-01-20 PROCEDURE — G2211 COMPLEX E/M VISIT ADD ON: HCPCS | Performed by: INTERNAL MEDICINE

## 2025-01-20 PROCEDURE — 1159F MED LIST DOCD IN RCRD: CPT | Performed by: INTERNAL MEDICINE

## 2025-01-20 PROCEDURE — 3078F DIAST BP <80 MM HG: CPT | Performed by: INTERNAL MEDICINE

## 2025-01-20 PROCEDURE — 3017F COLORECTAL CA SCREEN DOC REV: CPT | Performed by: INTERNAL MEDICINE

## 2025-01-20 PROCEDURE — G8399 PT W/DXA RESULTS DOCUMENT: HCPCS | Performed by: INTERNAL MEDICINE

## 2025-01-20 PROCEDURE — G8417 CALC BMI ABV UP PARAM F/U: HCPCS | Performed by: INTERNAL MEDICINE

## 2025-01-20 PROCEDURE — 1123F ACP DISCUSS/DSCN MKR DOCD: CPT | Performed by: INTERNAL MEDICINE

## 2025-01-20 PROCEDURE — G8427 DOCREV CUR MEDS BY ELIG CLIN: HCPCS | Performed by: INTERNAL MEDICINE

## 2025-01-20 RX ORDER — POLYETHYLENE GLYCOL 3350 17 G/17G
17 POWDER, FOR SOLUTION ORAL 2 TIMES DAILY
Qty: 1530 G | Refills: 1 | Status: SHIPPED | OUTPATIENT
Start: 2025-01-20 | End: 2025-04-20

## 2025-01-20 RX ORDER — ALBUTEROL SULFATE 0.83 MG/ML
SOLUTION RESPIRATORY (INHALATION)
Qty: 300 ML | Refills: 1 | Status: SHIPPED | OUTPATIENT
Start: 2025-01-20

## 2025-01-20 ASSESSMENT — ENCOUNTER SYMPTOMS
VOMITING: 1
CONSTIPATION: 1
ANAL BLEEDING: 0
DIARRHEA: 0
TROUBLE SWALLOWING: 0
COUGH: 0
COLOR CHANGE: 0
NAUSEA: 1
VOICE CHANGE: 0
ABDOMINAL PAIN: 1
WHEEZING: 0
ABDOMINAL DISTENTION: 1
BLOOD IN STOOL: 0
RECTAL PAIN: 0
CHOKING: 0
SORE THROAT: 0

## 2025-01-20 NOTE — PROGRESS NOTES
Reason for Referral:   Follow-up    No referring provider defined for this encounter.    Chief Complaint   Patient presents with    Follow-up     XR Sitz Marker KUB, Anorectal Motility Study, Constipation, Altered Bowel Habits, Gastritis           HISTORY OF PRESENT ILLNESS: Ms.Sharon AGATA Landry is a 69 y.o. female with a past history remarkable for bronchiectasis, CHF, COPD, hyperlipidemia, hypertension, obstructive sleep apnea, referred for evaluation of altered bowel habits and chronic hepatitis C (treatment completed and eradicated with Harvoni).    The patient reports that she has been having lower quadrant abdominal pain and nausea for a long time.  She has issues with constipation and cannot have a bowel movement for 2 to 3 weeks.  She is currently not using any laxatives apart from Colace.  She was previously given Linzess and had some good response with this.  Her last colonoscopy was in 2019.    Interval history 1/20/2025:  Continues to endorse having constipation despite taking Linzess 290 mcg.  Only MiraLAX twice a day works.  She has liquid stools with this.  She has bloating or abdominal pain if she does not have a bowel movement.  She also reports having some hiccups and trouble swallowing.      Previous Endoscopies    EGD 2024:  Impression:    Diffuse erythema and erosions noted in the stomach body, biopsies obtained to rule H. pylori.  Normal duodenum, biopsies obtained to rule out celiac disease.    Colonoscopy 2024:  Endoscopic Impression:    Good bowel prep  Sigmoid/descending colon diverticulosis.  5 polyps in the transverse colon ranging 2 to 3 mm, removed with cold snare.  Multiple hyperplastic sessile polyps, 3 of these measuring 2 to 3 mm removed with cold snare.  Small external hemorrhoids  A.  SMALL BOWEL, BIOPSY:   -DUODENAL MUCOSA WITH A NORMAL VILLOUS ARCHITECTURE AND NO FEATURES OF   CELIAC DISEASE.     B.  STOMACH, BIOPSY:   -GASTRIC ANTRAL MUCOSA WITH MILD CHRONIC INACTIVE

## 2025-01-20 NOTE — TELEPHONE ENCOUNTER
Pt saw Dr. Gonzalez today in clinic. EGD ordered w/ or w/o dilation. Pt states she does not take blood thinners.     Procedure scheduled/Dr Gonzalez  Procedure: EGD w/ or w/o dilation  Dx:  Dysphagia, unspecified type  Date: Friday 02/07/25  Time: 10:45 am/Arrive at 8:45 am  Hospital: [facility]; Surgery Entrance, back of hospital  PAT Phone Call: Thursday 01/30/25 at 9:30 am  Bowel Prep instructions given: EGD Prep  In office/via phone: in office  Clearance needed: N/A    Pt informed they will receive a PAT Phone Call from a [facility] PAT Nurse 1-2 weeks prior to procedure. Pt informed they must complete PAT Call or procedure may be cancelled.     Pt informed it is required they must have a /responsible adult that takes them to their procedure, stays at the hospital (before, during, and after procedure), and drives pt home. Pt informed /responsible adult must stay inside the hospital during their procedure. Pt voiced understanding of  protocol for procedure.

## 2025-01-26 DIAGNOSIS — I50.32 CHRONIC DIASTOLIC CONGESTIVE HEART FAILURE (HCC): ICD-10-CM

## 2025-01-27 RX ORDER — POTASSIUM CHLORIDE 1500 MG/1
TABLET, EXTENDED RELEASE ORAL
Qty: 90 TABLET | Refills: 2 | Status: SHIPPED | OUTPATIENT
Start: 2025-01-27

## 2025-01-27 NOTE — TELEPHONE ENCOUNTER
Donna Landry is calling to request a refill on the following medication(s):    Medication Request:  Requested Prescriptions     Pending Prescriptions Disp Refills    potassium chloride (KLOR-CON M20) 20 MEQ extended release tablet [Pharmacy Med Name: KLOR-CON M20 TABLET] 90 tablet 2     Sig: TAKE 1 TABLET BY MOUTH EVERY DAY       Last Visit Date (If Applicable):  9/4/2024    Next Visit Date:    2/24/2025

## 2025-01-30 ENCOUNTER — HOSPITAL ENCOUNTER (OUTPATIENT)
Dept: PREADMISSION TESTING | Age: 70
Discharge: HOME | End: 2025-02-03

## 2025-01-30 VITALS — WEIGHT: 204 LBS | BODY MASS INDEX: 27.63 KG/M2 | HEIGHT: 72 IN

## 2025-01-30 NOTE — PROGRESS NOTES
Pre-op Instructions For Out-Patient Endoscopy Surgery    Medication Instructions:  Please stop herbs and any supplements now (includes vitamins and minerals).    For these medications:  Dulaglutide (Trulicity), Exenatide (Byetta and Bydureon, Liraglutide (Victoza), Lixisenatide (Adlyxin), Semaglutide (Ozempic and Rybelsus), Tirzepatide (Mounjaro, Zepbound)- Stop 1 week prior if taking weekly or 1 day prior if taking every 12 hours or daily. N/A    Please contact your surgeon and prescribing physician for pre-op instructions for any blood thinners.    If you have inhalers/aerosol treatments at home, please use them the morning of your surgery and bring the inhalers with you to the hospital.    Please take the following medications the morning of your surgery with a sip of water:    Atenolol and Amlodipine      (Donna will take her Suboxone when she gets home)     Surgery Instructions:  After midnight before surgery:  Do not eat or drink anything, including water, mints, gum, and hard candy.  You may brush your teeth without swallowing.  No smoking, chewing tobacco, or street drugs.    ** Please Follow Bowel Prep instructions if given by surgeon's office**    Please shower or bathe before surgery.       Please do not wear any cologne, lotion, powder, jewelry, piercings, perfume, makeup, nail polish, hair accessories, or hair spray on the day of surgery.  Wear loose comfortable clothing.    Leave your valuables at home but bring a payment source for any after-surgery prescriptions you plan to fill at [facility].  Bring a storage case for any glasses/contacts.    An adult who is responsible for you MUST drive you home and should be with you for the first 24 hours after surgery.     The Day of Surgery:  Arrive at [facility] Surgery Entrance at the time directed by your surgeon and check in at the desk. 8:45 am    If you have a living will or healthcare power of , please bring a

## 2025-01-31 ENCOUNTER — TELEPHONE (OUTPATIENT)
Dept: FAMILY MEDICINE CLINIC | Age: 70
End: 2025-01-31

## 2025-01-31 ENCOUNTER — E-VISIT (OUTPATIENT)
Dept: FAMILY MEDICINE CLINIC | Age: 70
End: 2025-01-31

## 2025-01-31 DIAGNOSIS — B18.2 HEP C W/O COMA, CHRONIC (HCC): ICD-10-CM

## 2025-01-31 DIAGNOSIS — J44.9 CHRONIC OBSTRUCTIVE PULMONARY DISEASE, UNSPECIFIED COPD TYPE (HCC): ICD-10-CM

## 2025-01-31 DIAGNOSIS — K59.00 CONSTIPATION, UNSPECIFIED CONSTIPATION TYPE: ICD-10-CM

## 2025-01-31 DIAGNOSIS — R05.9 COUGH, UNSPECIFIED TYPE: Primary | ICD-10-CM

## 2025-01-31 DIAGNOSIS — R05.3 PERSISTENT COUGH: Primary | ICD-10-CM

## 2025-01-31 DIAGNOSIS — E11.42 TYPE 2 DIABETES MELLITUS WITH DIABETIC POLYNEUROPATHY, WITHOUT LONG-TERM CURRENT USE OF INSULIN (HCC): ICD-10-CM

## 2025-02-01 RX ORDER — BISACODYL 5 MG
10 TABLET, DELAYED RELEASE (ENTERIC COATED) ORAL DAILY
Qty: 30 TABLET | Refills: 1 | Status: SHIPPED | OUTPATIENT
Start: 2025-02-01

## 2025-02-03 ENCOUNTER — TELEPHONE (OUTPATIENT)
Dept: FAMILY MEDICINE CLINIC | Age: 70
End: 2025-02-03

## 2025-02-03 DIAGNOSIS — R05.3 PERSISTENT COUGH: Primary | ICD-10-CM

## 2025-02-03 DIAGNOSIS — E11.42 TYPE 2 DIABETES MELLITUS WITH DIABETIC POLYNEUROPATHY, WITHOUT LONG-TERM CURRENT USE OF INSULIN (HCC): ICD-10-CM

## 2025-02-03 RX ORDER — GABAPENTIN 800 MG/1
800 TABLET ORAL 3 TIMES DAILY
Qty: 90 TABLET | Refills: 2 | Status: SHIPPED | OUTPATIENT
Start: 2025-02-03 | End: 2025-03-05

## 2025-02-03 RX ORDER — ONDANSETRON 4 MG/1
TABLET, ORALLY DISINTEGRATING ORAL
Qty: 10 TABLET | Refills: 1 | Status: SHIPPED | OUTPATIENT
Start: 2025-02-03

## 2025-02-03 RX ORDER — BENZONATATE 100 MG/1
100-200 CAPSULE ORAL 3 TIMES DAILY PRN
Qty: 60 CAPSULE | Refills: 0 | Status: SHIPPED | OUTPATIENT
Start: 2025-02-03 | End: 2025-02-10

## 2025-02-03 NOTE — TELEPHONE ENCOUNTER
Donna Lanrdy is calling to request a refill on the following medication(s):    Medication Request:  Requested Prescriptions     Pending Prescriptions Disp Refills    gabapentin (NEURONTIN) 800 MG tablet 90 tablet 2     Sig: Take 1 tablet by mouth 3 times daily for 30 days.       Last Visit Date (If Applicable):  9/4/2024    Next Visit Date:    2/24/2025

## 2025-02-03 NOTE — PATIENT INSTRUCTIONS
Donna, I have sent a prescription for cough suppressant to your pharmacy. Given that you were treated for potential PNA and you feel that things are worsening, I do feel that you need a face to face appointment with Dr. Cam or one of her partners (myself or Marlon Mendoza). I will have the office reach out to you

## 2025-02-03 NOTE — TELEPHONE ENCOUNTER
Please place the chest xray and based off those results then we can see if we hugo koenig move her appointment sooner

## 2025-02-03 NOTE — TELEPHONE ENCOUNTER
Donna Landry is calling to request a refill on the following medication(s):    Medication Request:  Requested Prescriptions     Pending Prescriptions Disp Refills    gabapentin (NEURONTIN) 800 MG tablet [Pharmacy Med Name: GABAPENTIN 800 MG TABLET] 90 tablet 2     Sig: Take 1 tablet by mouth 3 times daily for 30 days.    ondansetron (ZOFRAN-ODT) 4 MG disintegrating tablet [Pharmacy Med Name: ONDANSETRON ODT 4 MG TABLET] 10 tablet 1     Sig: DISSOLVE 1 TABLET UNDER THE TONGUE EVERY 8 HOURS IF NEEDED FOR NAUSEA OR VOMITING       Last Visit Date (If Applicable):  9/4/2024    Next Visit Date:    2/24/2025

## 2025-02-03 NOTE — E-VISIT ROUTING COMMENT
Spoke with patient and she is going to get the chest xray done and then we will see what the results are and se if we need to move up her appointment

## 2025-02-03 NOTE — PROGRESS NOTES
S: Donna is a 69 year old female, with a PMHX of CHF, CAD, COPD, DM who has submitted E-visit questionare    Pt treated with Zpack, Amoxicillin and prednisone on 1/15/25 in ER. CT for PE negative. Potential early infiltrates bilaterally.     Pt reports continued coughing for the past 1 week, worse at night, feels that it is getting worse. She has been using delsym syrup.     O: N/A    A: Persistent Cough, Recent COPD exacerbation, potential PNA    P: Rx for Tessalon sent, Recommend appt for lung exam, potential repeat CXR

## 2025-02-04 ENCOUNTER — TELEPHONE (OUTPATIENT)
Dept: FAMILY MEDICINE CLINIC | Age: 70
End: 2025-02-04

## 2025-02-04 NOTE — TELEPHONE ENCOUNTER
Patient needs the 1 touch zero test strip and glucose meter sent to [facility] pharmacy. They no longer make the other one she was getting

## 2025-02-05 RX ORDER — BLOOD SUGAR DIAGNOSTIC
STRIP MISCELLANEOUS
Qty: 400 STRIP | Refills: 11 | Status: SHIPPED | OUTPATIENT
Start: 2025-02-05

## 2025-02-05 NOTE — TELEPHONE ENCOUNTER
Donna Landry is calling to request a refill on the following medication(s):    Medication Request:  Requested Prescriptions     Pending Prescriptions Disp Refills    blood glucose test strips (ONETOUCH ULTRA) strip 400 strip 11     Sig: TEST 4 TIMES DAILY AS NEEDED       Last Visit Date (If Applicable):  9/4/2024    Next Visit Date:    2/24/2025

## 2025-02-06 ENCOUNTER — HOSPITAL ENCOUNTER (OUTPATIENT)
Dept: GENERAL RADIOLOGY | Age: 70
Discharge: HOME | End: 2025-02-08
Attending: INTERNAL MEDICINE
Payer: MEDICARE

## 2025-02-06 DIAGNOSIS — R13.10 DYSPHAGIA, UNSPECIFIED TYPE: ICD-10-CM

## 2025-02-06 PROCEDURE — 2500000003 HC RX 250 WO HCPCS: Performed by: INTERNAL MEDICINE

## 2025-02-06 PROCEDURE — 74220 X-RAY XM ESOPHAGUS 1CNTRST: CPT

## 2025-02-06 RX ADMIN — BARIUM SULFATE 160 ML: 960 POWDER, FOR SUSPENSION ORAL at 11:14

## 2025-02-06 RX ADMIN — BARIUM SULFATE 140 ML: 980 POWDER, FOR SUSPENSION ORAL at 11:15

## 2025-02-12 DIAGNOSIS — Z95.0 S/P PLACEMENT OF CARDIAC PACEMAKER: ICD-10-CM

## 2025-02-12 RX ORDER — NITROGLYCERIN 0.4 MG/1
0.4 TABLET SUBLINGUAL EVERY 5 MIN PRN
Qty: 25 TABLET | Refills: 11 | Status: SHIPPED | OUTPATIENT
Start: 2025-02-12

## 2025-02-12 NOTE — TELEPHONE ENCOUNTER
Donna Landry is calling to request a refill on the following medication(s):    Medication Request:  Requested Prescriptions     Pending Prescriptions Disp Refills    nitroGLYCERIN (NITROSTAT) 0.4 MG SL tablet [Pharmacy Med Name: NITROGLYCERIN 0.4 MG TABLET SL] 25 tablet 11     Sig: PLACE 1 TABLET UNDER THE TONGUE EVERY 5 MINUTES AS NEEDED FOR CHEST PAIN.       Last Visit Date (If Applicable):  9/4/2024    Next Visit Date:    2/24/2025

## 2025-02-14 ENCOUNTER — TELEPHONE (OUTPATIENT)
Dept: GASTROENTEROLOGY | Age: 70
End: 2025-02-14

## 2025-02-14 NOTE — TELEPHONE ENCOUNTER
Patient call to reschedule for ESOPHAGOGASTRODUODENOSCOPY BIOPSY but need to be after 3/11/25 due to getting a stress test and echo for her heart before she can get schedule.

## 2025-02-18 DIAGNOSIS — I50.32 CHRONIC HEART FAILURE WITH PRESERVED EJECTION FRACTION (HCC): Primary | ICD-10-CM

## 2025-02-20 DIAGNOSIS — B18.2 HEP C W/O COMA, CHRONIC (HCC): ICD-10-CM

## 2025-02-20 NOTE — TELEPHONE ENCOUNTER
Donna Landry is calling to request a refill on the following medication(s):    Medication Request:  Requested Prescriptions     Pending Prescriptions Disp Refills    ondansetron (ZOFRAN-ODT) 4 MG disintegrating tablet [Pharmacy Med Name: ONDANSETRON ODT 4 MG TABLET] 10 tablet 1     Sig: DISSOLVE 1 TABLET UNDER THE TONGUE EVERY 8 HOURS IF NEEDED FOR NAUSEA OR VOMITING       Last Visit Date (If Applicable):  9/4/2024    Next Visit Date:    2/24/2025

## 2025-02-21 RX ORDER — ONDANSETRON 4 MG/1
TABLET, ORALLY DISINTEGRATING ORAL
Qty: 10 TABLET | Refills: 1 | Status: SHIPPED | OUTPATIENT
Start: 2025-02-21

## 2025-02-24 ENCOUNTER — OFFICE VISIT (OUTPATIENT)
Dept: FAMILY MEDICINE CLINIC | Age: 70
End: 2025-02-24
Payer: MEDICARE

## 2025-02-24 VITALS
WEIGHT: 199 LBS | SYSTOLIC BLOOD PRESSURE: 118 MMHG | BODY MASS INDEX: 26.95 KG/M2 | DIASTOLIC BLOOD PRESSURE: 64 MMHG | HEART RATE: 67 BPM | OXYGEN SATURATION: 98 % | HEIGHT: 72 IN

## 2025-02-24 DIAGNOSIS — Z00.00 MEDICARE ANNUAL WELLNESS VISIT, SUBSEQUENT: Primary | ICD-10-CM

## 2025-02-24 PROCEDURE — 1159F MED LIST DOCD IN RCRD: CPT | Performed by: STUDENT IN AN ORGANIZED HEALTH CARE EDUCATION/TRAINING PROGRAM

## 2025-02-24 PROCEDURE — 3017F COLORECTAL CA SCREEN DOC REV: CPT | Performed by: STUDENT IN AN ORGANIZED HEALTH CARE EDUCATION/TRAINING PROGRAM

## 2025-02-24 PROCEDURE — 1160F RVW MEDS BY RX/DR IN RCRD: CPT | Performed by: STUDENT IN AN ORGANIZED HEALTH CARE EDUCATION/TRAINING PROGRAM

## 2025-02-24 PROCEDURE — 1123F ACP DISCUSS/DSCN MKR DOCD: CPT | Performed by: STUDENT IN AN ORGANIZED HEALTH CARE EDUCATION/TRAINING PROGRAM

## 2025-02-24 PROCEDURE — G0439 PPPS, SUBSEQ VISIT: HCPCS | Performed by: STUDENT IN AN ORGANIZED HEALTH CARE EDUCATION/TRAINING PROGRAM

## 2025-02-24 PROCEDURE — 3078F DIAST BP <80 MM HG: CPT | Performed by: STUDENT IN AN ORGANIZED HEALTH CARE EDUCATION/TRAINING PROGRAM

## 2025-02-24 PROCEDURE — 3074F SYST BP LT 130 MM HG: CPT | Performed by: STUDENT IN AN ORGANIZED HEALTH CARE EDUCATION/TRAINING PROGRAM

## 2025-02-24 SDOH — ECONOMIC STABILITY: INCOME INSECURITY: IN THE LAST 12 MONTHS, WAS THERE A TIME WHEN YOU WERE NOT ABLE TO PAY THE MORTGAGE OR RENT ON TIME?: NO

## 2025-02-24 SDOH — ECONOMIC STABILITY: FOOD INSECURITY: WITHIN THE PAST 12 MONTHS, YOU WORRIED THAT YOUR FOOD WOULD RUN OUT BEFORE YOU GOT MONEY TO BUY MORE.: OFTEN TRUE

## 2025-02-24 SDOH — HEALTH STABILITY: PHYSICAL HEALTH: ON AVERAGE, HOW MANY DAYS PER WEEK DO YOU ENGAGE IN MODERATE TO STRENUOUS EXERCISE (LIKE A BRISK WALK)?: 2 DAYS

## 2025-02-24 SDOH — ECONOMIC STABILITY: FOOD INSECURITY: WITHIN THE PAST 12 MONTHS, THE FOOD YOU BOUGHT JUST DIDN'T LAST AND YOU DIDN'T HAVE MONEY TO GET MORE.: OFTEN TRUE

## 2025-02-24 SDOH — HEALTH STABILITY: PHYSICAL HEALTH: ON AVERAGE, HOW MANY MINUTES DO YOU ENGAGE IN EXERCISE AT THIS LEVEL?: 30 MIN

## 2025-02-24 ASSESSMENT — PATIENT HEALTH QUESTIONNAIRE - PHQ9
5. POOR APPETITE OR OVEREATING: SEVERAL DAYS
SUM OF ALL RESPONSES TO PHQ QUESTIONS 1-9: 10
1. LITTLE INTEREST OR PLEASURE IN DOING THINGS: MORE THAN HALF THE DAYS
10. IF YOU CHECKED OFF ANY PROBLEMS, HOW DIFFICULT HAVE THESE PROBLEMS MADE IT FOR YOU TO DO YOUR WORK, TAKE CARE OF THINGS AT HOME, OR GET ALONG WITH OTHER PEOPLE: NOT DIFFICULT AT ALL
SUM OF ALL RESPONSES TO PHQ QUESTIONS 1-9: 10
4. FEELING TIRED OR HAVING LITTLE ENERGY: SEVERAL DAYS
6. FEELING BAD ABOUT YOURSELF - OR THAT YOU ARE A FAILURE OR HAVE LET YOURSELF OR YOUR FAMILY DOWN: MORE THAN HALF THE DAYS
3. TROUBLE FALLING OR STAYING ASLEEP: SEVERAL DAYS
SUM OF ALL RESPONSES TO PHQ QUESTIONS 1-9: 10
2. FEELING DOWN, DEPRESSED OR HOPELESS: MORE THAN HALF THE DAYS
SUM OF ALL RESPONSES TO PHQ QUESTIONS 1-9: 10
9. THOUGHTS THAT YOU WOULD BE BETTER OFF DEAD, OR OF HURTING YOURSELF: NOT AT ALL
7. TROUBLE CONCENTRATING ON THINGS, SUCH AS READING THE NEWSPAPER OR WATCHING TELEVISION: SEVERAL DAYS
8. MOVING OR SPEAKING SO SLOWLY THAT OTHER PEOPLE COULD HAVE NOTICED. OR THE OPPOSITE, BEING SO FIGETY OR RESTLESS THAT YOU HAVE BEEN MOVING AROUND A LOT MORE THAN USUAL: NOT AT ALL
SUM OF ALL RESPONSES TO PHQ9 QUESTIONS 1 & 2: 4

## 2025-02-24 ASSESSMENT — LIFESTYLE VARIABLES
HOW OFTEN DO YOU HAVE A DRINK CONTAINING ALCOHOL: MONTHLY OR LESS
HOW OFTEN DO YOU HAVE A DRINK CONTAINING ALCOHOL: 2
HOW OFTEN DO YOU HAVE SIX OR MORE DRINKS ON ONE OCCASION: 1
HOW MANY STANDARD DRINKS CONTAINING ALCOHOL DO YOU HAVE ON A TYPICAL DAY: 1 OR 2
HOW MANY STANDARD DRINKS CONTAINING ALCOHOL DO YOU HAVE ON A TYPICAL DAY: 1

## 2025-02-24 NOTE — PROGRESS NOTES
Medicare Annual Wellness Visit    Donna Landry is here for Medicare AWV    Assessment & Plan   Medicare annual wellness visit, subsequent     Return in 6 months (on 8/24/2025) for Follow up HTN.     Subjective   The following acute and/or chronic problems were also addressed today:  She has been doing well on her medications and denies any issues currently    Patient's complete Health Risk Assessment and screening values have been reviewed and are found in Flowsheets. The following problems were reviewed today and where indicated follow up appointments were made and/or referrals ordered.    Positive Risk Factor Screenings with Interventions:    Fall Risk:  Do you feel unsteady or are you worried about falling? : (!) yes  2 or more falls in past year?: no  Fall with injury in past year?: no     Interventions:    Reviewed medications, home hazards, visual acuity, and co-morbidities that can increase risk for falls     Depression:  PHQ-2 Score: 4  PHQ-9 Total Score: 10  Total Score Interpretation: 10-14 = moderate depression  Interventions:  Monitored by specialist. No acute findings meriting change in the plan      Controlled Medication Review:      Today's Pain Level: No data recorded     Opioid Risk: (High risk score >=55) Opioid risk score: The patient doesn't have any registry metric data available      Last PDMP Blayne as Reviewed:  Review User Review Instant Review Result   JAILYN WYMAN 2/25/2025  1:06 PM @   Reviewed PDMP [1]     Last Controlled Substance Monitoring Documentation      Flowsheet Row Office Visit from 2/24/2025 in [facility]   Periodic Controlled Substance Monitoring No signs of potential drug abuse or diversion identified. filed at 02/25/2025 1306   Chronic Pain > 50 MEDD Re-evaluated the status of the patient's underlying condition causing pain. filed at 02/25/2025 1306                Inactivity:  On average, how many days per week do you engage in moderate to strenuous

## 2025-03-03 ENCOUNTER — HOSPITAL ENCOUNTER (OUTPATIENT)
Dept: NUCLEAR MEDICINE | Age: 70
Discharge: HOME | End: 2025-03-05
Payer: MEDICARE

## 2025-03-03 ENCOUNTER — HOSPITAL ENCOUNTER (OUTPATIENT)
Age: 70
Discharge: HOME | End: 2025-03-05
Payer: MEDICARE

## 2025-03-03 VITALS — SYSTOLIC BLOOD PRESSURE: 142 MMHG | DIASTOLIC BLOOD PRESSURE: 57 MMHG | HEART RATE: 60 BPM

## 2025-03-03 DIAGNOSIS — I50.32 CHRONIC HEART FAILURE WITH PRESERVED EJECTION FRACTION (HCC): ICD-10-CM

## 2025-03-03 LAB
NUC STRESS EJECTION FRACTION: 70 %
STRESS BASELINE DIAS BP: 57 MMHG
STRESS BASELINE HR: 60 BPM
STRESS BASELINE SYS BP: 142 MMHG
STRESS ESTIMATED WORKLOAD: 1 METS
STRESS PEAK DIAS BP: 56 MMHG
STRESS PEAK SYS BP: 143 MMHG
STRESS PERCENT HR ACHIEVED: 48 %
STRESS POST PEAK HR: 73 BPM
STRESS RATE PRESSURE PRODUCT: NORMAL BPM*MMHG
STRESS TARGET HR: 153 BPM
TID: 1.17

## 2025-03-03 PROCEDURE — 2500000003 HC RX 250 WO HCPCS: Performed by: SURGERY

## 2025-03-03 PROCEDURE — A9500 TC99M SESTAMIBI: HCPCS | Performed by: SURGERY

## 2025-03-03 PROCEDURE — 6360000002 HC RX W HCPCS: Performed by: SURGERY

## 2025-03-03 PROCEDURE — 93017 CV STRESS TEST TRACING ONLY: CPT

## 2025-03-03 PROCEDURE — 78451 HT MUSCLE IMAGE SPECT SING: CPT

## 2025-03-03 PROCEDURE — 93016 CV STRESS TEST SUPVJ ONLY: CPT | Performed by: INTERNAL MEDICINE

## 2025-03-03 PROCEDURE — 93018 CV STRESS TEST I&R ONLY: CPT | Performed by: INTERNAL MEDICINE

## 2025-03-03 PROCEDURE — 3430000000 HC RX DIAGNOSTIC RADIOPHARMACEUTICAL: Performed by: SURGERY

## 2025-03-03 RX ORDER — SODIUM CHLORIDE 0.9 % (FLUSH) 0.9 %
10 SYRINGE (ML) INJECTION PRN
Status: DISCONTINUED | OUTPATIENT
Start: 2025-03-03 | End: 2025-03-06 | Stop reason: HOSPADM

## 2025-03-03 RX ORDER — NITROGLYCERIN 0.4 MG/1
0.4 TABLET SUBLINGUAL EVERY 5 MIN PRN
Status: DISCONTINUED | OUTPATIENT
Start: 2025-03-03 | End: 2025-03-03

## 2025-03-03 RX ORDER — SODIUM CHLORIDE 9 MG/ML
500 INJECTION, SOLUTION INTRAVENOUS CONTINUOUS PRN
Status: DISCONTINUED | OUTPATIENT
Start: 2025-03-03 | End: 2025-03-03

## 2025-03-03 RX ORDER — ATROPINE SULFATE 0.1 MG/ML
0.5 INJECTION INTRAVENOUS EVERY 5 MIN PRN
Status: DISCONTINUED | OUTPATIENT
Start: 2025-03-03 | End: 2025-03-03

## 2025-03-03 RX ORDER — ALBUTEROL SULFATE 90 UG/1
2 INHALANT RESPIRATORY (INHALATION) PRN
Status: DISCONTINUED | OUTPATIENT
Start: 2025-03-03 | End: 2025-03-03

## 2025-03-03 RX ORDER — TETRAKIS(2-METHOXYISOBUTYLISOCYANIDE)COPPER(I) TETRAFLUOROBORATE 1 MG/ML
38.7 INJECTION, POWDER, LYOPHILIZED, FOR SOLUTION INTRAVENOUS
Status: COMPLETED | OUTPATIENT
Start: 2025-03-03 | End: 2025-03-03

## 2025-03-03 RX ORDER — METOPROLOL TARTRATE 1 MG/ML
5 INJECTION, SOLUTION INTRAVENOUS EVERY 5 MIN PRN
Status: DISCONTINUED | OUTPATIENT
Start: 2025-03-03 | End: 2025-03-03

## 2025-03-03 RX ORDER — AMINOPHYLLINE 25 MG/ML
50 INJECTION, SOLUTION INTRAVENOUS PRN
Status: DISCONTINUED | OUTPATIENT
Start: 2025-03-03 | End: 2025-03-03

## 2025-03-03 RX ORDER — SODIUM CHLORIDE 0.9 % (FLUSH) 0.9 %
5-40 SYRINGE (ML) INJECTION PRN
Status: DISCONTINUED | OUTPATIENT
Start: 2025-03-03 | End: 2025-03-03

## 2025-03-03 RX ORDER — REGADENOSON 0.08 MG/ML
0.4 INJECTION, SOLUTION INTRAVENOUS
Status: COMPLETED | OUTPATIENT
Start: 2025-03-03 | End: 2025-03-03

## 2025-03-03 RX ORDER — TETRAKIS(2-METHOXYISOBUTYLISOCYANIDE)COPPER(I) TETRAFLUOROBORATE 1 MG/ML
15.3 INJECTION, POWDER, LYOPHILIZED, FOR SOLUTION INTRAVENOUS
Status: COMPLETED | OUTPATIENT
Start: 2025-03-03 | End: 2025-03-03

## 2025-03-03 RX ADMIN — SODIUM CHLORIDE, PRESERVATIVE FREE 10 ML: 5 INJECTION INTRAVENOUS at 08:26

## 2025-03-03 RX ADMIN — SODIUM CHLORIDE, PRESERVATIVE FREE 10 ML: 5 INJECTION INTRAVENOUS at 10:02

## 2025-03-03 RX ADMIN — REGADENOSON 0.4 MG: 0.08 INJECTION, SOLUTION INTRAVENOUS at 10:01

## 2025-03-03 RX ADMIN — TETRAKIS(2-METHOXYISOBUTYLISOCYANIDE)COPPER(I) TETRAFLUOROBORATE 38.7 MILLICURIE: 1 INJECTION, POWDER, LYOPHILIZED, FOR SOLUTION INTRAVENOUS at 10:02

## 2025-03-03 RX ADMIN — SODIUM CHLORIDE, PRESERVATIVE FREE 10 ML: 5 INJECTION INTRAVENOUS at 10:01

## 2025-03-03 RX ADMIN — TETRAKIS(2-METHOXYISOBUTYLISOCYANIDE)COPPER(I) TETRAFLUOROBORATE 15.3 MILLICURIE: 1 INJECTION, POWDER, LYOPHILIZED, FOR SOLUTION INTRAVENOUS at 08:26

## 2025-03-05 DIAGNOSIS — F51.01 PRIMARY INSOMNIA: ICD-10-CM

## 2025-03-05 RX ORDER — TRAZODONE HYDROCHLORIDE 100 MG/1
100 TABLET ORAL NIGHTLY
Qty: 90 TABLET | Refills: 1 | Status: SHIPPED | OUTPATIENT
Start: 2025-03-05

## 2025-03-05 NOTE — TELEPHONE ENCOUNTER
Donna Landry is calling to request a refill on the following medication(s):    Medication Request:  Requested Prescriptions     Pending Prescriptions Disp Refills    traZODone (DESYREL) 100 MG tablet [Pharmacy Med Name: TRAZODONE 100 MG TABLET] 90 tablet 1     Sig: TAKE 1 TABLET BY MOUTH EVERY DAY AT NIGHT       Last Visit Date (If Applicable):  2/24/2025    Next Visit Date:    8/25/2025              f

## 2025-03-09 DIAGNOSIS — M19.012 PRIMARY OSTEOARTHRITIS OF LEFT SHOULDER: ICD-10-CM

## 2025-03-09 DIAGNOSIS — E11.42 TYPE 2 DIABETES MELLITUS WITH DIABETIC POLYNEUROPATHY (HCC): ICD-10-CM

## 2025-03-09 DIAGNOSIS — I10 ESSENTIAL HYPERTENSION: ICD-10-CM

## 2025-03-10 RX ORDER — ATENOLOL 25 MG/1
25 TABLET ORAL DAILY
Qty: 90 TABLET | Refills: 1 | Status: SHIPPED | OUTPATIENT
Start: 2025-03-10

## 2025-03-10 RX ORDER — METFORMIN HYDROCHLORIDE 500 MG/1
500 TABLET, EXTENDED RELEASE ORAL
Qty: 90 TABLET | Refills: 1 | Status: SHIPPED | OUTPATIENT
Start: 2025-03-10

## 2025-03-10 NOTE — TELEPHONE ENCOUNTER
Donna Landry is calling to request a refill on the following medication(s):    Medication Request:  Requested Prescriptions     Pending Prescriptions Disp Refills    metFORMIN (GLUCOPHAGE-XR) 500 MG extended release tablet [Pharmacy Med Name: METFORMIN HCL  MG TABLET] 90 tablet 1     Sig: TAKE 1 TABLET BY MOUTH EVERY DAY WITH BREAKFAST    tiZANidine (ZANAFLEX) 4 MG tablet [Pharmacy Med Name: TIZANIDINE HCL 4 MG TABLET] 270 tablet 1     Sig: TAKE 1 TABLET BY MOUTH 3 TIMES DAILY AS NEEDED (PAIN).    atenolol (TENORMIN) 25 MG tablet [Pharmacy Med Name: ATENOLOL 25 MG TABLET] 90 tablet 1     Sig: TAKE 1 TABLET BY MOUTH EVERY DAY       Last Visit Date (If Applicable):  2/24/2025    Next Visit Date:    8/25/2025

## 2025-03-18 DIAGNOSIS — E78.00 PURE HYPERCHOLESTEROLEMIA: ICD-10-CM

## 2025-03-18 NOTE — TELEPHONE ENCOUNTER
Donna Landry is calling to request a refill on the following medication(s):    Medication Request:  Requested Prescriptions     Pending Prescriptions Disp Refills    pravastatin (PRAVACHOL) 40 MG tablet [Pharmacy Med Name: PRAVASTATIN SODIUM 40 MG TAB] 90 tablet 4     Sig: TAKE 1 TABLET BY MOUTH EVERY DAY       Last Visit Date (If Applicable):  2/24/2025    Next Visit Date:    8/25/2025

## 2025-03-19 RX ORDER — PRAVASTATIN SODIUM 40 MG
40 TABLET ORAL DAILY
Qty: 90 TABLET | Refills: 1 | Status: SHIPPED | OUTPATIENT
Start: 2025-03-19

## 2025-03-21 ENCOUNTER — TELEPHONE (OUTPATIENT)
Dept: GASTROENTEROLOGY | Age: 70
End: 2025-03-21

## 2025-03-21 NOTE — TELEPHONE ENCOUNTER
Writer called pt and attempted to LVM canc appt on 04/22/2025 due to provider being on call, but the VM was full. Writer could not leave a message.

## 2025-03-22 DIAGNOSIS — J41.1 MUCOPURULENT CHRONIC BRONCHITIS (HCC): ICD-10-CM

## 2025-03-24 RX ORDER — ALBUTEROL SULFATE 0.83 MG/ML
SOLUTION RESPIRATORY (INHALATION)
Qty: 300 ML | Refills: 1 | Status: SHIPPED | OUTPATIENT
Start: 2025-03-24

## 2025-03-24 NOTE — TELEPHONE ENCOUNTER
Donna Landry is calling to request a refill on the following medication(s):    Medication Request:  Requested Prescriptions     Pending Prescriptions Disp Refills    albuterol (PROVENTIL) (2.5 MG/3ML) 0.083% nebulizer solution [Pharmacy Med Name: ALBUTEROL SUL 2.5 MG/3 ML SOLN] 300 mL 1     Sig: INHALE CONTENTS OF 1 VIAL ( 3 MILLILITERS ) IN NEBULIZER BY MOUTH AND INTO THE LUNGS EVERY 6 HOURS       Last Visit Date (If Applicable):  2/24/2025    Next Visit Date:    8/25/2025

## 2025-03-25 DIAGNOSIS — J41.1 MUCOPURULENT CHRONIC BRONCHITIS (HCC): ICD-10-CM

## 2025-03-25 DIAGNOSIS — K59.00 CONSTIPATION, UNSPECIFIED CONSTIPATION TYPE: ICD-10-CM

## 2025-03-25 RX ORDER — BISACODYL 5 MG
10 TABLET, DELAYED RELEASE (ENTERIC COATED) ORAL DAILY
Qty: 30 TABLET | Refills: 1 | Status: SHIPPED | OUTPATIENT
Start: 2025-03-25

## 2025-03-25 RX ORDER — POLYETHYLENE GLYCOL 3350 17 G/17G
POWDER, FOR SOLUTION ORAL
Qty: 238 G | Refills: 2 | Status: SHIPPED | OUTPATIENT
Start: 2025-03-25

## 2025-03-25 RX ORDER — ALBUTEROL SULFATE 90 UG/1
INHALANT RESPIRATORY (INHALATION)
Qty: 1 EACH | Refills: 0 | Status: SHIPPED | OUTPATIENT
Start: 2025-03-25

## 2025-03-25 NOTE — TELEPHONE ENCOUNTER
LAST VISIT: 7/1/24 with NORI Benedict  No follow up scheduled (patient cancelled once and our office cancelled once since last visit)    Message sent to pharmacy asking that patient call for an appointment. Per last dictation patient is on this medication. Please sign for refill if ok. Thank you.

## 2025-03-29 DIAGNOSIS — B18.2 HEP C W/O COMA, CHRONIC (HCC): ICD-10-CM

## 2025-03-31 RX ORDER — ONDANSETRON 4 MG/1
TABLET, ORALLY DISINTEGRATING ORAL
Qty: 10 TABLET | Refills: 1 | Status: SHIPPED | OUTPATIENT
Start: 2025-03-31

## 2025-04-22 NOTE — TELEPHONE ENCOUNTER
Donna Landry is calling to request a refill on the following medication(s):    Medication Request:  Requested Prescriptions     Pending Prescriptions Disp Refills    rOPINIRole (REQUIP) 0.5 MG tablet 90 tablet      Sig: Take 1 tablet by mouth daily       Last Visit Date (If Applicable):  2/24/2025    Next Visit Date:    8/25/2025

## 2025-04-23 DIAGNOSIS — E11.42 TYPE 2 DIABETES MELLITUS WITH DIABETIC POLYNEUROPATHY, WITHOUT LONG-TERM CURRENT USE OF INSULIN (HCC): ICD-10-CM

## 2025-04-23 RX ORDER — GABAPENTIN 800 MG/1
800 TABLET ORAL 3 TIMES DAILY
Qty: 90 TABLET | Refills: 2 | Status: SHIPPED | OUTPATIENT
Start: 2025-04-23 | End: 2025-05-23

## 2025-04-23 RX ORDER — ROPINIROLE 0.5 MG/1
0.5 TABLET, FILM COATED ORAL DAILY
Qty: 90 TABLET | Refills: 1 | Status: SHIPPED | OUTPATIENT
Start: 2025-04-23

## 2025-04-23 NOTE — TELEPHONE ENCOUNTER
Donna Landry is calling to request a refill on the following medication(s):    Medication Request:  Requested Prescriptions     Pending Prescriptions Disp Refills    gabapentin (NEURONTIN) 800 MG tablet [Pharmacy Med Name: GABAPENTIN 800 MG TABLET] 90 tablet 2     Sig: Take 1 tablet by mouth 3 times daily for 30 days.       Last Visit Date (If Applicable):  2/24/2025    Next Visit Date:    8/25/2025

## 2025-04-25 RX ORDER — FUROSEMIDE 20 MG/1
20 TABLET ORAL DAILY
Qty: 60 TABLET | Refills: 3 | Status: CANCELLED | OUTPATIENT
Start: 2025-04-25

## 2025-04-25 NOTE — TELEPHONE ENCOUNTER
Donna Landry is calling to request a refill on the following medication(s):    Medication Request:  Requested Prescriptions     Pending Prescriptions Disp Refills    furosemide (LASIX) 20 MG tablet 60 tablet 3     Sig: Take 1 tablet by mouth daily       Last Visit Date (If Applicable):  2/24/2025    Next Visit Date:    8/25/2025

## 2025-04-28 ENCOUNTER — OFFICE VISIT (OUTPATIENT)
Dept: FAMILY MEDICINE CLINIC | Age: 70
End: 2025-04-28
Payer: MEDICARE

## 2025-04-28 VITALS
OXYGEN SATURATION: 94 % | HEIGHT: 72 IN | SYSTOLIC BLOOD PRESSURE: 140 MMHG | DIASTOLIC BLOOD PRESSURE: 68 MMHG | BODY MASS INDEX: 27.09 KG/M2 | WEIGHT: 200 LBS | HEART RATE: 63 BPM

## 2025-04-28 DIAGNOSIS — Z72.0 TOBACCO USE: ICD-10-CM

## 2025-04-28 DIAGNOSIS — B18.2 HEP C W/O COMA, CHRONIC (HCC): ICD-10-CM

## 2025-04-28 DIAGNOSIS — J96.11 HYPOXEMIC RESPIRATORY FAILURE, CHRONIC (HCC): ICD-10-CM

## 2025-04-28 DIAGNOSIS — F33.1 MODERATE EPISODE OF RECURRENT MAJOR DEPRESSIVE DISORDER (HCC): ICD-10-CM

## 2025-04-28 DIAGNOSIS — I50.32 CHRONIC DIASTOLIC CONGESTIVE HEART FAILURE (HCC): ICD-10-CM

## 2025-04-28 DIAGNOSIS — J41.1 MUCOPURULENT CHRONIC BRONCHITIS (HCC): ICD-10-CM

## 2025-04-28 DIAGNOSIS — E11.42 TYPE 2 DIABETES MELLITUS WITH DIABETIC POLYNEUROPATHY, WITHOUT LONG-TERM CURRENT USE OF INSULIN (HCC): ICD-10-CM

## 2025-04-28 DIAGNOSIS — E55.9 VITAMIN D DEFICIENCY: ICD-10-CM

## 2025-04-28 DIAGNOSIS — I10 ESSENTIAL HYPERTENSION: Primary | ICD-10-CM

## 2025-04-28 DIAGNOSIS — C54.9 MALIGNANT NEOPLASM OF BODY OF UTERUS, UNSPECIFIED SITE (HCC): ICD-10-CM

## 2025-04-28 DIAGNOSIS — R09.81 NASAL CONGESTION: ICD-10-CM

## 2025-04-28 PROCEDURE — 1159F MED LIST DOCD IN RCRD: CPT | Performed by: STUDENT IN AN ORGANIZED HEALTH CARE EDUCATION/TRAINING PROGRAM

## 2025-04-28 PROCEDURE — 3078F DIAST BP <80 MM HG: CPT | Performed by: STUDENT IN AN ORGANIZED HEALTH CARE EDUCATION/TRAINING PROGRAM

## 2025-04-28 PROCEDURE — G8417 CALC BMI ABV UP PARAM F/U: HCPCS | Performed by: STUDENT IN AN ORGANIZED HEALTH CARE EDUCATION/TRAINING PROGRAM

## 2025-04-28 PROCEDURE — 1123F ACP DISCUSS/DSCN MKR DOCD: CPT | Performed by: STUDENT IN AN ORGANIZED HEALTH CARE EDUCATION/TRAINING PROGRAM

## 2025-04-28 PROCEDURE — 4004F PT TOBACCO SCREEN RCVD TLK: CPT | Performed by: STUDENT IN AN ORGANIZED HEALTH CARE EDUCATION/TRAINING PROGRAM

## 2025-04-28 PROCEDURE — 99406 BEHAV CHNG SMOKING 3-10 MIN: CPT | Performed by: STUDENT IN AN ORGANIZED HEALTH CARE EDUCATION/TRAINING PROGRAM

## 2025-04-28 PROCEDURE — G8427 DOCREV CUR MEDS BY ELIG CLIN: HCPCS | Performed by: STUDENT IN AN ORGANIZED HEALTH CARE EDUCATION/TRAINING PROGRAM

## 2025-04-28 PROCEDURE — 3046F HEMOGLOBIN A1C LEVEL >9.0%: CPT | Performed by: STUDENT IN AN ORGANIZED HEALTH CARE EDUCATION/TRAINING PROGRAM

## 2025-04-28 PROCEDURE — G8399 PT W/DXA RESULTS DOCUMENT: HCPCS | Performed by: STUDENT IN AN ORGANIZED HEALTH CARE EDUCATION/TRAINING PROGRAM

## 2025-04-28 PROCEDURE — 3023F SPIROM DOC REV: CPT | Performed by: STUDENT IN AN ORGANIZED HEALTH CARE EDUCATION/TRAINING PROGRAM

## 2025-04-28 PROCEDURE — 1160F RVW MEDS BY RX/DR IN RCRD: CPT | Performed by: STUDENT IN AN ORGANIZED HEALTH CARE EDUCATION/TRAINING PROGRAM

## 2025-04-28 PROCEDURE — 1090F PRES/ABSN URINE INCON ASSESS: CPT | Performed by: STUDENT IN AN ORGANIZED HEALTH CARE EDUCATION/TRAINING PROGRAM

## 2025-04-28 PROCEDURE — 2022F DILAT RTA XM EVC RTNOPTHY: CPT | Performed by: STUDENT IN AN ORGANIZED HEALTH CARE EDUCATION/TRAINING PROGRAM

## 2025-04-28 PROCEDURE — 3017F COLORECTAL CA SCREEN DOC REV: CPT | Performed by: STUDENT IN AN ORGANIZED HEALTH CARE EDUCATION/TRAINING PROGRAM

## 2025-04-28 PROCEDURE — 3077F SYST BP >= 140 MM HG: CPT | Performed by: STUDENT IN AN ORGANIZED HEALTH CARE EDUCATION/TRAINING PROGRAM

## 2025-04-28 PROCEDURE — 99214 OFFICE O/P EST MOD 30 MIN: CPT | Performed by: STUDENT IN AN ORGANIZED HEALTH CARE EDUCATION/TRAINING PROGRAM

## 2025-04-28 RX ORDER — FUROSEMIDE 20 MG/1
20 TABLET ORAL DAILY
Qty: 90 TABLET | Refills: 1 | Status: SHIPPED | OUTPATIENT
Start: 2025-04-28

## 2025-04-28 RX ORDER — MIRTAZAPINE 15 MG/1
1 TABLET, FILM COATED ORAL
COMMUNITY

## 2025-04-28 RX ORDER — AZELASTINE 1 MG/ML
1 SPRAY, METERED NASAL 2 TIMES DAILY
Qty: 30 ML | Refills: 0 | Status: SHIPPED | OUTPATIENT
Start: 2025-04-28 | End: 2025-05-01

## 2025-04-28 SDOH — ECONOMIC STABILITY: FOOD INSECURITY: WITHIN THE PAST 12 MONTHS, THE FOOD YOU BOUGHT JUST DIDN'T LAST AND YOU DIDN'T HAVE MONEY TO GET MORE.: SOMETIMES TRUE

## 2025-04-28 SDOH — ECONOMIC STABILITY: FOOD INSECURITY: WITHIN THE PAST 12 MONTHS, YOU WORRIED THAT YOUR FOOD WOULD RUN OUT BEFORE YOU GOT MONEY TO BUY MORE.: OFTEN TRUE

## 2025-04-28 ASSESSMENT — PATIENT HEALTH QUESTIONNAIRE - PHQ9
2. FEELING DOWN, DEPRESSED OR HOPELESS: NOT AT ALL
7. TROUBLE CONCENTRATING ON THINGS, SUCH AS READING THE NEWSPAPER OR WATCHING TELEVISION: NOT AT ALL
9. THOUGHTS THAT YOU WOULD BE BETTER OFF DEAD, OR OF HURTING YOURSELF: NOT AT ALL
10. IF YOU CHECKED OFF ANY PROBLEMS, HOW DIFFICULT HAVE THESE PROBLEMS MADE IT FOR YOU TO DO YOUR WORK, TAKE CARE OF THINGS AT HOME, OR GET ALONG WITH OTHER PEOPLE: NOT DIFFICULT AT ALL
SUM OF ALL RESPONSES TO PHQ QUESTIONS 1-9: 0
SUM OF ALL RESPONSES TO PHQ QUESTIONS 1-9: 0
1. LITTLE INTEREST OR PLEASURE IN DOING THINGS: NOT AT ALL
4. FEELING TIRED OR HAVING LITTLE ENERGY: NOT AT ALL
6. FEELING BAD ABOUT YOURSELF - OR THAT YOU ARE A FAILURE OR HAVE LET YOURSELF OR YOUR FAMILY DOWN: NOT AT ALL
SUM OF ALL RESPONSES TO PHQ QUESTIONS 1-9: 0
3. TROUBLE FALLING OR STAYING ASLEEP: NOT AT ALL
5. POOR APPETITE OR OVEREATING: NOT AT ALL
SUM OF ALL RESPONSES TO PHQ QUESTIONS 1-9: 0
8. MOVING OR SPEAKING SO SLOWLY THAT OTHER PEOPLE COULD HAVE NOTICED. OR THE OPPOSITE, BEING SO FIGETY OR RESTLESS THAT YOU HAVE BEEN MOVING AROUND A LOT MORE THAN USUAL: NOT AT ALL

## 2025-04-28 ASSESSMENT — ENCOUNTER SYMPTOMS
CONSTIPATION: 0
SORE THROAT: 0
EYE DISCHARGE: 0
COUGH: 0
SHORTNESS OF BREATH: 0
DIARRHEA: 0
VOMITING: 0
ABDOMINAL PAIN: 0
NAUSEA: 0
WHEEZING: 0
CHEST TIGHTNESS: 0

## 2025-04-28 NOTE — PROGRESS NOTES
gastroenterology    Hypoxemic respiratory failure-on home oxygen, following with pulmonary    History of uterine cancer-in remission    Patient was counseled on tobacco cessation.  Based upon patient's motivation to change her behavior, the following plan was agreed upon: patient will avoid triggers and negative influences.  She was provided with a list of local tobacco cessation resources. Provider spent 3 minutes counseling patient.       Return in about 6 months (around 10/28/2025) for Follow up labs, Follow up DM/HTN.    Orders Placed This Encounter   Procedures    CBC with Auto Differential     Standing Status:   Future     Expected Date:   2025     Expiration Date:   2026    Comprehensive Metabolic Panel     Standing Status:   Future     Expected Date:   2025     Expiration Date:   2026    Lipid Panel     Standing Status:   Future     Expected Date:   2025     Expiration Date:   2026    Hemoglobin A1C     Standing Status:   Future     Expected Date:   2025     Expiration Date:   2026    Vitamin D 25 Hydroxy     Standing Status:   Future     Expected Date:   2025     Expiration Date:   2026    TSH reflex to FT4     Standing Status:   Future     Expected Date:   2025     Expiration Date:   2026    Albumin/Creatinine Ratio, Urine     Standing Status:   Future     Expected Date:   2025     Expiration Date:   2026     Orders Placed This Encounter   Medications    furosemide (LASIX) 20 MG tablet     Sig: Take 1 tablet by mouth daily     Dispense:  90 tablet     Refill:  1    azelastine (ASTELIN) 0.1 % nasal spray     Si spray by Nasal route 2 times daily for 3 days Use in each nostril as directed. Do not use more than 3 days     Dispense:  30 mL     Refill:  0       Patient given educational materials - see patient instructions.Discussed use, benefit, and side effects of prescribed medications.  All patientquestions answered. Pt voiced

## 2025-04-28 NOTE — PATIENT INSTRUCTIONS
[facility] Food Resources*  (Call [facility] for more resources)    [facility]  What they offer: Food pantry second and fourth Tuesday 4:30-6pm  Phone Number and Address: [phone] Toll Free: [employer], between [employer]; [address] [facility] [address]  [facility] Office on Aging of [facility]  What they offer: “Meals & Nutrition” search option on website  Phone Number and Website: 309.100.3239; https://[employer] ([employer])/  [facility]  What they offer: Dinner is open to all (must be registered and in good standing) and three hot meals a day for shelter residents. Breakfast 7-8am, Lunch 12-1pm, and Dinner 5-6pm daily.  Phone and Address: [phone]; [address] [facility] [address] [facility] [address]  [employer] Delivery program  What they offer: Food Box Delivery for those within [facility] who are homebound or without transportation. Applications done by phone. Qualifying individuals are eligible for 3 deliveries for the lifetime of the program.  Phone number: Call for eligibility check at 2-1-1 or 5-021-228Flayr (0577)  [facility]  What they offer: Food Pantry second and fourth Saturday 1-5pm  Phone and Address: [phone]; [address] [facility] [facility] [address]  [employer] Food Pantries   What they offer: Mobile pantries throughout the [facility]. Anyone in need may visit one pantry per month.  Phone Number and Website: For locations and schedule call 2-1-1 or 3-790-889-HELP (2035) or check the website www.feedtoledo.org  [employer] [place] [employer] Char[employer]  What they offer: Food Pantry, call for schedule, open to All  Phone Number: [phone]  [facility]  What they offer: Hot meals, Breakfast: Monday, Wednesday, Friday 8-10am, Lunch: Monday-Friday 10am-12:30pm. Food pantry (serves [address] or east of [facility]) Tuesday

## 2025-05-01 ENCOUNTER — RESULTS FOLLOW-UP (OUTPATIENT)
Dept: FAMILY MEDICINE CLINIC | Age: 70
End: 2025-05-01

## 2025-05-01 ENCOUNTER — HOSPITAL ENCOUNTER (OUTPATIENT)
Age: 70
Discharge: HOME | End: 2025-05-01
Payer: MEDICARE

## 2025-05-01 DIAGNOSIS — I10 ESSENTIAL HYPERTENSION: ICD-10-CM

## 2025-05-01 DIAGNOSIS — E11.42 TYPE 2 DIABETES MELLITUS WITH DIABETIC POLYNEUROPATHY, WITHOUT LONG-TERM CURRENT USE OF INSULIN (HCC): ICD-10-CM

## 2025-05-01 DIAGNOSIS — E55.9 VITAMIN D DEFICIENCY: Primary | ICD-10-CM

## 2025-05-01 DIAGNOSIS — E55.9 VITAMIN D DEFICIENCY: ICD-10-CM

## 2025-05-01 LAB
25(OH)D3 SERPL-MCNC: 9.5 NG/ML (ref 30–100)
ALBUMIN SERPL-MCNC: 4.2 G/DL (ref 3.5–5.2)
ALBUMIN/GLOB SERPL: 1.3 {RATIO} (ref 1–2.5)
ALP SERPL-CCNC: 98 U/L (ref 35–104)
ALT SERPL-CCNC: 7 U/L (ref 10–35)
ANION GAP SERPL CALCULATED.3IONS-SCNC: 10 MMOL/L (ref 9–16)
AST SERPL-CCNC: 16 U/L (ref 10–35)
BASOPHILS # BLD: 0.04 K/UL (ref 0–0.2)
BASOPHILS NFR BLD: 1 % (ref 0–2)
BILIRUB SERPL-MCNC: 0.4 MG/DL (ref 0–1.2)
BUN SERPL-MCNC: 7 MG/DL (ref 8–23)
CALCIUM SERPL-MCNC: 9.8 MG/DL (ref 8.6–10.4)
CHLORIDE SERPL-SCNC: 101 MMOL/L (ref 98–107)
CHOLEST SERPL-MCNC: 122 MG/DL (ref 0–199)
CHOLESTEROL/HDL RATIO: 2.8
CO2 SERPL-SCNC: 28 MMOL/L (ref 20–31)
CREAT SERPL-MCNC: 0.5 MG/DL (ref 0.6–0.9)
CREAT UR-MCNC: 38.5 MG/DL (ref 28–217)
EOSINOPHIL # BLD: 0.05 K/UL (ref 0–0.44)
EOSINOPHILS RELATIVE PERCENT: 1 % (ref 1–4)
ERYTHROCYTE [DISTWIDTH] IN BLOOD BY AUTOMATED COUNT: 14.4 % (ref 11.8–14.4)
EST. AVERAGE GLUCOSE BLD GHB EST-MCNC: 131 MG/DL
GFR, ESTIMATED: >90 ML/MIN/1.73M2
GLUCOSE SERPL-MCNC: 104 MG/DL (ref 74–99)
HBA1C MFR BLD: 6.2 % (ref 4–6)
HCT VFR BLD AUTO: 41.6 % (ref 36.3–47.1)
HDLC SERPL-MCNC: 44 MG/DL
HGB BLD-MCNC: 13.4 G/DL (ref 11.9–15.1)
IMM GRANULOCYTES # BLD AUTO: <0.03 K/UL (ref 0–0.3)
IMM GRANULOCYTES NFR BLD: 0 %
LDLC SERPL CALC-MCNC: 58 MG/DL (ref 0–100)
LYMPHOCYTES NFR BLD: 2.04 K/UL (ref 1.1–3.7)
LYMPHOCYTES RELATIVE PERCENT: 30 % (ref 24–43)
MCH RBC QN AUTO: 30.9 PG (ref 25.2–33.5)
MCHC RBC AUTO-ENTMCNC: 32.2 G/DL (ref 28.4–34.8)
MCV RBC AUTO: 95.9 FL (ref 82.6–102.9)
MICROALBUMIN UR-MCNC: <12 MG/L (ref 0–20)
MICROALBUMIN/CREAT UR-RTO: NORMAL MCG/MG CREAT (ref 0–25)
MONOCYTES NFR BLD: 0.71 K/UL (ref 0.1–1.2)
MONOCYTES NFR BLD: 10 % (ref 3–12)
NEUTROPHILS NFR BLD: 58 % (ref 36–65)
NEUTS SEG NFR BLD: 4.03 K/UL (ref 1.5–8.1)
NRBC BLD-RTO: 0 PER 100 WBC
PLATELET # BLD AUTO: 245 K/UL (ref 138–453)
PMV BLD AUTO: 9.5 FL (ref 8.1–13.5)
POTASSIUM SERPL-SCNC: 4 MMOL/L (ref 3.7–5.3)
PROT SERPL-MCNC: 7.4 G/DL (ref 6.6–8.7)
RBC # BLD AUTO: 4.34 M/UL (ref 3.95–5.11)
SODIUM SERPL-SCNC: 139 MMOL/L (ref 136–145)
TRIGL SERPL-MCNC: 100 MG/DL
TSH SERPL DL<=0.05 MIU/L-ACNC: 0.88 UIU/ML (ref 0.27–4.2)
VLDLC SERPL CALC-MCNC: 20 MG/DL (ref 1–30)
WBC OTHER # BLD: 6.9 K/UL (ref 3.5–11.3)

## 2025-05-01 PROCEDURE — 80061 LIPID PANEL: CPT

## 2025-05-01 PROCEDURE — 85025 COMPLETE CBC W/AUTO DIFF WBC: CPT

## 2025-05-01 PROCEDURE — 82306 VITAMIN D 25 HYDROXY: CPT

## 2025-05-01 PROCEDURE — 84443 ASSAY THYROID STIM HORMONE: CPT

## 2025-05-01 PROCEDURE — 36415 COLL VENOUS BLD VENIPUNCTURE: CPT

## 2025-05-01 PROCEDURE — 82043 UR ALBUMIN QUANTITATIVE: CPT

## 2025-05-01 PROCEDURE — 82570 ASSAY OF URINE CREATININE: CPT

## 2025-05-01 PROCEDURE — 83036 HEMOGLOBIN GLYCOSYLATED A1C: CPT

## 2025-05-01 PROCEDURE — 80053 COMPREHEN METABOLIC PANEL: CPT

## 2025-05-15 ENCOUNTER — OFFICE VISIT (OUTPATIENT)
Dept: PULMONOLOGY | Age: 70
End: 2025-05-15
Payer: MEDICARE

## 2025-05-15 VITALS
BODY MASS INDEX: 26.01 KG/M2 | HEART RATE: 63 BPM | HEIGHT: 72 IN | OXYGEN SATURATION: 95 % | DIASTOLIC BLOOD PRESSURE: 60 MMHG | WEIGHT: 192 LBS | RESPIRATION RATE: 12 BRPM | SYSTOLIC BLOOD PRESSURE: 117 MMHG

## 2025-05-15 DIAGNOSIS — J96.11 HYPOXEMIC RESPIRATORY FAILURE, CHRONIC (HCC): Primary | ICD-10-CM

## 2025-05-15 PROCEDURE — 1090F PRES/ABSN URINE INCON ASSESS: CPT | Performed by: INTERNAL MEDICINE

## 2025-05-15 PROCEDURE — 4004F PT TOBACCO SCREEN RCVD TLK: CPT | Performed by: INTERNAL MEDICINE

## 2025-05-15 PROCEDURE — 1123F ACP DISCUSS/DSCN MKR DOCD: CPT | Performed by: INTERNAL MEDICINE

## 2025-05-15 PROCEDURE — 3074F SYST BP LT 130 MM HG: CPT | Performed by: INTERNAL MEDICINE

## 2025-05-15 PROCEDURE — 99214 OFFICE O/P EST MOD 30 MIN: CPT | Performed by: INTERNAL MEDICINE

## 2025-05-15 PROCEDURE — G8399 PT W/DXA RESULTS DOCUMENT: HCPCS | Performed by: INTERNAL MEDICINE

## 2025-05-15 PROCEDURE — 1159F MED LIST DOCD IN RCRD: CPT | Performed by: INTERNAL MEDICINE

## 2025-05-15 PROCEDURE — 3017F COLORECTAL CA SCREEN DOC REV: CPT | Performed by: INTERNAL MEDICINE

## 2025-05-15 PROCEDURE — 3078F DIAST BP <80 MM HG: CPT | Performed by: INTERNAL MEDICINE

## 2025-05-15 PROCEDURE — G8427 DOCREV CUR MEDS BY ELIG CLIN: HCPCS | Performed by: INTERNAL MEDICINE

## 2025-05-15 PROCEDURE — G8417 CALC BMI ABV UP PARAM F/U: HCPCS | Performed by: INTERNAL MEDICINE

## 2025-05-15 RX ORDER — ALBUTEROL SULFATE 90 UG/1
2 INHALANT RESPIRATORY (INHALATION) 4 TIMES DAILY PRN
Qty: 54 G | Refills: 1 | Status: SHIPPED | OUTPATIENT
Start: 2025-05-15

## 2025-05-15 ASSESSMENT — SLEEP AND FATIGUE QUESTIONNAIRES
ESS TOTAL SCORE: 11
HOW LIKELY ARE YOU TO NOD OFF OR FALL ASLEEP WHEN YOU ARE A PASSENGER IN A CAR FOR AN HOUR WITHOUT A BREAK: HIGH CHANCE OF DOZING
HOW LIKELY ARE YOU TO NOD OFF OR FALL ASLEEP WHILE SITTING QUIETLY AFTER LUNCH WITHOUT ALCOHOL: MODERATE CHANCE OF DOZING
HOW LIKELY ARE YOU TO NOD OFF OR FALL ASLEEP WHILE LYING DOWN TO REST IN THE AFTERNOON WHEN CIRCUMSTANCES PERMIT: HIGH CHANCE OF DOZING
HOW LIKELY ARE YOU TO NOD OFF OR FALL ASLEEP WHILE SITTING AND READING: SLIGHT CHANCE OF DOZING
HOW LIKELY ARE YOU TO NOD OFF OR FALL ASLEEP IN A CAR, WHILE STOPPED FOR A FEW MINUTES IN TRAFFIC: WOULD NEVER DOZE
HOW LIKELY ARE YOU TO NOD OFF OR FALL ASLEEP WHILE WATCHING TV: MODERATE CHANCE OF DOZING
HOW LIKELY ARE YOU TO NOD OFF OR FALL ASLEEP WHILE SITTING INACTIVE IN A PUBLIC PLACE: WOULD NEVER DOZE
HOW LIKELY ARE YOU TO NOD OFF OR FALL ASLEEP WHILE SITTING AND TALKING TO SOMEONE: WOULD NEVER DOZE

## 2025-05-15 ASSESSMENT — ENCOUNTER SYMPTOMS
GASTROINTESTINAL NEGATIVE: 1
EYES NEGATIVE: 1
ALLERGIC/IMMUNOLOGIC NEGATIVE: 1

## 2025-05-15 NOTE — PROGRESS NOTES
Subjective:      Patient ID: Donna Landry is a 69 y.o. female.  Has been a chronic smoker.  She is still smoking.  I will let her do this conservatively.  She is being treated with the Spiriva Breo and albuterol on as-needed basis.    She is also on home oxygen.  Does not have a portable oxygen however.  She also has been on Requip.  For the treatment of periodic leg movements.  Patient pulmonary status is stable.  He however is complaining of inability to pull portable oxygen which is a large slender.  She would like to have portable oxygen hardware which she can easily carry around.  She has a stable oxygen converter at home.  Pulmonary status is stable.  There is no excessive dyspnea cough or sputum production no hemoptysis no chest pain no pedal edema no thromboembolic process.    Patient also been diagnosed with sleep apnea syndrome advised to use CPAP.  However she has been having great difficulty in using the CPAP.  Apparently she has almost given up using CPAP.  She only uses supplemental oxygen.  He using Spiriva and albuterol.  She does have some pedal edema which is very likely a manifestation of cor pulmonale.  Pulmonary function study has revealed a severe degree of mixed ventilatory dysfunction.    Occupational status/pets:  Disability- retired worked in cath lab  Chemical exposure: no  Pets at home: no  Bird/turtle exposure: no  Indoor hot tub/sauna's: no  Family history of lung cancer: Mother- lung cancer-  was a smoker      PRIOR WORKUP:  PFT:    6-minute walk test completed 7/3/2024: Patient's SpO2 on room air was 93%, with exertion she dropped to 86% on room air and was titrated onto nasal cannula oxygen at 2 L to maintain an SpO2 of greater than 90%.  Patient 5 qualify for supplemental oxygen.    PFT 2018: FVC 2.35, 61% of predicted, FEV1 1.62, 54% of predicted.  FEV1/FVC ratio 69, 90% of predicted.  FEF 25-75 0.90, 32% of predicted.  RV 2.01, 80% of predicted TLC 4.39, 68% of

## 2025-05-18 DIAGNOSIS — J41.1 MUCOPURULENT CHRONIC BRONCHITIS (HCC): ICD-10-CM

## 2025-05-19 RX ORDER — ALBUTEROL SULFATE 0.83 MG/ML
SOLUTION RESPIRATORY (INHALATION)
Qty: 300 ML | Refills: 1 | Status: SHIPPED | OUTPATIENT
Start: 2025-05-19

## 2025-05-20 ENCOUNTER — OFFICE VISIT (OUTPATIENT)
Dept: ORTHOPEDIC SURGERY | Age: 70
End: 2025-05-20
Payer: MEDICARE

## 2025-05-20 VITALS — WEIGHT: 192 LBS | RESPIRATION RATE: 14 BRPM | HEIGHT: 72 IN | BODY MASS INDEX: 26.01 KG/M2

## 2025-05-20 DIAGNOSIS — M25.561 CHRONIC PAIN OF BOTH KNEES: ICD-10-CM

## 2025-05-20 DIAGNOSIS — M25.562 CHRONIC PAIN OF BOTH KNEES: ICD-10-CM

## 2025-05-20 DIAGNOSIS — M17.0 ARTHRITIS OF BOTH KNEES: Primary | ICD-10-CM

## 2025-05-20 DIAGNOSIS — G89.29 CHRONIC PAIN OF BOTH KNEES: ICD-10-CM

## 2025-05-20 PROCEDURE — 1090F PRES/ABSN URINE INCON ASSESS: CPT | Performed by: PHYSICIAN ASSISTANT

## 2025-05-20 PROCEDURE — 4004F PT TOBACCO SCREEN RCVD TLK: CPT | Performed by: PHYSICIAN ASSISTANT

## 2025-05-20 PROCEDURE — G8427 DOCREV CUR MEDS BY ELIG CLIN: HCPCS | Performed by: PHYSICIAN ASSISTANT

## 2025-05-20 PROCEDURE — 1159F MED LIST DOCD IN RCRD: CPT | Performed by: PHYSICIAN ASSISTANT

## 2025-05-20 PROCEDURE — 3017F COLORECTAL CA SCREEN DOC REV: CPT | Performed by: PHYSICIAN ASSISTANT

## 2025-05-20 PROCEDURE — 20610 DRAIN/INJ JOINT/BURSA W/O US: CPT | Performed by: PHYSICIAN ASSISTANT

## 2025-05-20 PROCEDURE — 99214 OFFICE O/P EST MOD 30 MIN: CPT | Performed by: PHYSICIAN ASSISTANT

## 2025-05-20 PROCEDURE — 1123F ACP DISCUSS/DSCN MKR DOCD: CPT | Performed by: PHYSICIAN ASSISTANT

## 2025-05-20 PROCEDURE — 1125F AMNT PAIN NOTED PAIN PRSNT: CPT | Performed by: PHYSICIAN ASSISTANT

## 2025-05-20 PROCEDURE — G8417 CALC BMI ABV UP PARAM F/U: HCPCS | Performed by: PHYSICIAN ASSISTANT

## 2025-05-20 PROCEDURE — G8399 PT W/DXA RESULTS DOCUMENT: HCPCS | Performed by: PHYSICIAN ASSISTANT

## 2025-05-20 RX ORDER — METHYLPREDNISOLONE ACETATE 80 MG/ML
80 INJECTION, SUSPENSION INTRA-ARTICULAR; INTRALESIONAL; INTRAMUSCULAR; SOFT TISSUE ONCE
Status: COMPLETED | OUTPATIENT
Start: 2025-05-20 | End: 2025-05-20

## 2025-05-20 RX ORDER — BUPIVACAINE HYDROCHLORIDE 2.5 MG/ML
2 INJECTION, SOLUTION INFILTRATION; PERINEURAL ONCE
Status: COMPLETED | OUTPATIENT
Start: 2025-05-20 | End: 2025-05-20

## 2025-05-20 RX ADMIN — BUPIVACAINE HYDROCHLORIDE 5 MG: 2.5 INJECTION, SOLUTION INFILTRATION; PERINEURAL at 14:07

## 2025-05-20 RX ADMIN — METHYLPREDNISOLONE ACETATE 80 MG: 80 INJECTION, SUSPENSION INTRA-ARTICULAR; INTRALESIONAL; INTRAMUSCULAR; SOFT TISSUE at 14:08

## 2025-05-20 RX ADMIN — BUPIVACAINE HYDROCHLORIDE 5 MG: 2.5 INJECTION, SOLUTION INFILTRATION; PERINEURAL at 14:06

## 2025-05-20 RX ADMIN — METHYLPREDNISOLONE ACETATE 80 MG: 80 INJECTION, SUSPENSION INTRA-ARTICULAR; INTRALESIONAL; INTRAMUSCULAR; SOFT TISSUE at 14:09

## 2025-05-20 ASSESSMENT — ENCOUNTER SYMPTOMS
COLOR CHANGE: 0
VOMITING: 0
COUGH: 0
SHORTNESS OF BREATH: 0

## 2025-05-20 NOTE — PROGRESS NOTES
[facility], [facility] ORTHOPEDICS AND SPORTS MEDICINE  [address] [address]  [address] [place] [address]  Dept: [phone]  Dept Fax: [phone]        Ambulatory Follow Up      Subjective:   Donna Landry is a 69 y.o. year old female who presents to our office today for routine followup regarding her   1. Arthritis of both knees    2. Chronic pain of both knees        Chief Complaint   Patient presents with    Follow-up    Knee Pain     F/U bilateral knee pain, inejction on 12/31/24       History of Present Illness  The patient is a 69-year-old female who presents today for chronic bilateral knee arthritis. She is here today requesting a repeat injection. She had injections on 12/31/2024 and notes 100% improvement in her knee pain for approximately 2 months. She has not experienced any new trauma, injury, or fall. The right knee is more painful than the left. She acknowledges the significant relief provided by the injections upon initial administration.    She has been making progress in quitting smoking. She was smoking a pack a day but is now down to 5 cigarettes a day. Her doctor advised her not to quit all at once.      Review of Systems   Constitutional:  Negative for activity change and fever.   HENT:  Negative for sneezing.    Respiratory:  Negative for cough and shortness of breath.    Cardiovascular:  Negative for chest pain.   Gastrointestinal:  Negative for vomiting.   Musculoskeletal:  Positive for arthralgias (bilateral knee). Negative for joint swelling and myalgias.   Skin:  Negative for color change.   Neurological:  Negative for weakness and numbness.   Psychiatric/Behavioral:  Negative for sleep disturbance.          Objective :   Resp 14   Ht 1.829 m (6')   Wt 87.1 kg (192 lb)   LMP 12/09/1996   BMI 26.04 kg/m²  Body mass index is 26.04 kg/m².  General: Donna Landry is a 69 y.o. female who is alert and oriented and

## 2025-06-03 DIAGNOSIS — R09.81 NASAL CONGESTION: ICD-10-CM

## 2025-06-03 NOTE — TELEPHONE ENCOUNTER
Donna Landry is calling to request a refill on the following medication(s):    Medication Request:  Requested Prescriptions     Pending Prescriptions Disp Refills    azelastine (ASTEPRO) 137 MCG/SPRAY nasal spray [Pharmacy Med Name: AZELASTINE 0.1% (137 MCG) SPRY]       Sig: SPRAY 1 SPRAY BY NASAL ROUTE 2 TIMES DAILY FOR 3 DAYS USE IN EACH NOSTRIL AS DIRECTED. DO NOT USE MORE THAN 3 DAYS       Last Visit Date (If Applicable):  4/28/2025    Next Visit Date:    10/28/2025

## 2025-06-05 RX ORDER — AZELASTINE HYDROCHLORIDE 137 UG/1
SPRAY, METERED NASAL
OUTPATIENT
Start: 2025-06-05

## 2025-06-11 ENCOUNTER — HOSPITAL ENCOUNTER (OUTPATIENT)
Dept: CT IMAGING | Age: 70
Discharge: HOME | End: 2025-06-13
Payer: MEDICARE

## 2025-06-11 DIAGNOSIS — Z87.891 PERSONAL HISTORY OF TOBACCO USE: ICD-10-CM

## 2025-06-11 PROCEDURE — 71271 CT THORAX LUNG CANCER SCR C-: CPT

## 2025-06-15 DIAGNOSIS — K21.9 GASTROESOPHAGEAL REFLUX DISEASE WITHOUT ESOPHAGITIS: ICD-10-CM

## 2025-06-16 RX ORDER — PANTOPRAZOLE SODIUM 40 MG/1
40 TABLET, DELAYED RELEASE ORAL DAILY
Qty: 90 TABLET | Refills: 1 | Status: SHIPPED | OUTPATIENT
Start: 2025-06-16

## 2025-06-16 NOTE — TELEPHONE ENCOUNTER
Requested Prescriptions     Pending Prescriptions Disp Refills    pantoprazole (PROTONIX) 40 MG tablet [Pharmacy Med Name: PANTOPRAZOLE SOD DR 40 MG TAB] 90 tablet 1     Sig: TAKE 1 TABLET BY MOUTH EVERY DAY

## 2025-06-17 ENCOUNTER — PATIENT MESSAGE (OUTPATIENT)
Dept: GASTROENTEROLOGY | Age: 70
End: 2025-06-17

## 2025-06-17 NOTE — TELEPHONE ENCOUNTER
Patient stated she was returning a missed call to get scheduled and can be reached at  [phone].

## 2025-06-20 ENCOUNTER — RESULTS FOLLOW-UP (OUTPATIENT)
Dept: PULMONOLOGY | Age: 70
End: 2025-06-20

## 2025-06-28 DIAGNOSIS — R09.81 NASAL CONGESTION: ICD-10-CM

## 2025-06-30 RX ORDER — AZELASTINE HYDROCHLORIDE 137 UG/1
SPRAY, METERED NASAL
Qty: 30 EACH | Refills: 0 | Status: SHIPPED | OUTPATIENT
Start: 2025-06-30

## 2025-06-30 NOTE — TELEPHONE ENCOUNTER
Requested Prescriptions     Pending Prescriptions Disp Refills    azelastine (ASTEPRO) 137 MCG/SPRAY nasal spray [Pharmacy Med Name: AZELASTINE 0.1% (137 MCG) SPRY]       Sig: SPRAY 1 SPRAY BY NASAL ROUTE 2 TIMES DAILY FOR 3 DAYS USE IN EACH NOSTRIL AS DIRECTED. DO NOT USE MORE THAN 3 DAYS

## 2025-07-05 DIAGNOSIS — B18.2 HEP C W/O COMA, CHRONIC (HCC): ICD-10-CM

## 2025-07-07 DIAGNOSIS — B18.2 HEP C W/O COMA, CHRONIC (HCC): ICD-10-CM

## 2025-07-07 RX ORDER — ONDANSETRON 4 MG/1
TABLET, ORALLY DISINTEGRATING ORAL
Qty: 10 TABLET | Refills: 1 | OUTPATIENT
Start: 2025-07-07

## 2025-07-07 RX ORDER — ONDANSETRON 4 MG/1
TABLET, ORALLY DISINTEGRATING ORAL
Qty: 10 TABLET | Refills: 1 | Status: SHIPPED | OUTPATIENT
Start: 2025-07-07

## 2025-07-07 NOTE — TELEPHONE ENCOUNTER
Requested Prescriptions     Pending Prescriptions Disp Refills    ondansetron (ZOFRAN-ODT) 4 MG disintegrating tablet 10 tablet 1     Sig: dissolve 1 tablet under the tongue every 8 hours if needed for NAUSEA OR VOMITING

## 2025-07-07 NOTE — TELEPHONE ENCOUNTER
Donna Landry is calling to request a refill on the following medication(s):    Medication Request:  Requested Prescriptions     Pending Prescriptions Disp Refills    ondansetron (ZOFRAN-ODT) 4 MG disintegrating tablet [Pharmacy Med Name: ONDANSETRON ODT 4 MG TABLET] 10 tablet 1     Sig: DISSOLVE 1 TABLET UNDER THE TONGUE EVERY 8 HOURS IF NEEDED FOR NAUSEA OR VOMITING       Last Visit Date (If Applicable):  4/28/2025    Next Visit Date:    7/7/2025

## 2025-07-10 ENCOUNTER — OFFICE VISIT (OUTPATIENT)
Dept: FAMILY MEDICINE CLINIC | Age: 70
End: 2025-07-10

## 2025-07-10 VITALS
HEART RATE: 61 BPM | WEIGHT: 201 LBS | HEIGHT: 72 IN | BODY MASS INDEX: 27.22 KG/M2 | SYSTOLIC BLOOD PRESSURE: 122 MMHG | DIASTOLIC BLOOD PRESSURE: 82 MMHG | OXYGEN SATURATION: 95 %

## 2025-07-10 DIAGNOSIS — E11.42 TYPE 2 DIABETES MELLITUS WITH DIABETIC POLYNEUROPATHY, WITHOUT LONG-TERM CURRENT USE OF INSULIN (HCC): ICD-10-CM

## 2025-07-10 DIAGNOSIS — I10 ESSENTIAL HYPERTENSION: Primary | ICD-10-CM

## 2025-07-10 DIAGNOSIS — E55.9 VITAMIN D DEFICIENCY: ICD-10-CM

## 2025-07-10 DIAGNOSIS — K76.0 FATTY LIVER: ICD-10-CM

## 2025-07-10 RX ORDER — LISINOPRIL 10 MG/1
10 TABLET ORAL EVERY MORNING
COMMUNITY

## 2025-07-10 RX ORDER — RAMELTEON 8 MG/1
8 TABLET ORAL NIGHTLY
COMMUNITY

## 2025-07-10 SDOH — ECONOMIC STABILITY: FOOD INSECURITY: WITHIN THE PAST 12 MONTHS, YOU WORRIED THAT YOUR FOOD WOULD RUN OUT BEFORE YOU GOT MONEY TO BUY MORE.: NEVER TRUE

## 2025-07-10 SDOH — ECONOMIC STABILITY: FOOD INSECURITY: WITHIN THE PAST 12 MONTHS, THE FOOD YOU BOUGHT JUST DIDN'T LAST AND YOU DIDN'T HAVE MONEY TO GET MORE.: NEVER TRUE

## 2025-07-10 ASSESSMENT — PATIENT HEALTH QUESTIONNAIRE - PHQ9
4. FEELING TIRED OR HAVING LITTLE ENERGY: NOT AT ALL
SUM OF ALL RESPONSES TO PHQ QUESTIONS 1-9: 0
7. TROUBLE CONCENTRATING ON THINGS, SUCH AS READING THE NEWSPAPER OR WATCHING TELEVISION: NOT AT ALL
5. POOR APPETITE OR OVEREATING: NOT AT ALL
SUM OF ALL RESPONSES TO PHQ QUESTIONS 1-9: 0
2. FEELING DOWN, DEPRESSED OR HOPELESS: NOT AT ALL
SUM OF ALL RESPONSES TO PHQ QUESTIONS 1-9: 0
6. FEELING BAD ABOUT YOURSELF - OR THAT YOU ARE A FAILURE OR HAVE LET YOURSELF OR YOUR FAMILY DOWN: NOT AT ALL
9. THOUGHTS THAT YOU WOULD BE BETTER OFF DEAD, OR OF HURTING YOURSELF: NOT AT ALL
10. IF YOU CHECKED OFF ANY PROBLEMS, HOW DIFFICULT HAVE THESE PROBLEMS MADE IT FOR YOU TO DO YOUR WORK, TAKE CARE OF THINGS AT HOME, OR GET ALONG WITH OTHER PEOPLE: NOT DIFFICULT AT ALL
3. TROUBLE FALLING OR STAYING ASLEEP: NOT AT ALL
8. MOVING OR SPEAKING SO SLOWLY THAT OTHER PEOPLE COULD HAVE NOTICED. OR THE OPPOSITE, BEING SO FIGETY OR RESTLESS THAT YOU HAVE BEEN MOVING AROUND A LOT MORE THAN USUAL: NOT AT ALL
1. LITTLE INTEREST OR PLEASURE IN DOING THINGS: NOT AT ALL
SUM OF ALL RESPONSES TO PHQ QUESTIONS 1-9: 0

## 2025-07-10 ASSESSMENT — ENCOUNTER SYMPTOMS
CONSTIPATION: 0
DIARRHEA: 0
NAUSEA: 0
ABDOMINAL PAIN: 0
CHEST TIGHTNESS: 0
WHEEZING: 0
SHORTNESS OF BREATH: 0
VOMITING: 0
COUGH: 0
SORE THROAT: 0
EYE DISCHARGE: 0

## 2025-07-10 NOTE — PROGRESS NOTES
Patient agreed with treatmentplan. Follow up as directed.     Electronically signed by America Cam MD on 7/10/2025 at 4:58 PM    The patient (or guardian, if applicable) and other individuals in attendance with the patient were advised that Artificial Intelligence will be utilized during this visit to record, process the conversation to generate a clinical note, and support improvement of the AI technology. The patient (or guardian, if applicable) and other individuals in attendance at the appointment consented to the use of AI, including the recording.

## 2025-07-12 DIAGNOSIS — J41.1 MUCOPURULENT CHRONIC BRONCHITIS (HCC): ICD-10-CM

## 2025-07-14 RX ORDER — ALBUTEROL SULFATE 0.83 MG/ML
SOLUTION RESPIRATORY (INHALATION)
Qty: 300 ML | Refills: 1 | Status: SHIPPED | OUTPATIENT
Start: 2025-07-14

## 2025-07-16 ENCOUNTER — HOSPITAL ENCOUNTER (OUTPATIENT)
Age: 70
Discharge: HOME | End: 2025-07-16
Payer: MEDICARE

## 2025-07-16 DIAGNOSIS — E55.9 VITAMIN D DEFICIENCY: ICD-10-CM

## 2025-07-16 DIAGNOSIS — I10 ESSENTIAL HYPERTENSION: ICD-10-CM

## 2025-07-16 LAB
25(OH)D3 SERPL-MCNC: 27.9 NG/ML (ref 30–100)
ANION GAP SERPL CALCULATED.3IONS-SCNC: 12 MMOL/L (ref 9–16)
BUN SERPL-MCNC: 7 MG/DL (ref 8–23)
CALCIUM SERPL-MCNC: 10.2 MG/DL (ref 8.6–10.4)
CHLORIDE SERPL-SCNC: 102 MMOL/L (ref 98–107)
CO2 SERPL-SCNC: 28 MMOL/L (ref 20–31)
CREAT SERPL-MCNC: 0.6 MG/DL (ref 0.6–0.9)
GFR, ESTIMATED: >90 ML/MIN/1.73M2
GLUCOSE SERPL-MCNC: 99 MG/DL (ref 74–99)
POTASSIUM SERPL-SCNC: 4.4 MMOL/L (ref 3.7–5.3)
SODIUM SERPL-SCNC: 142 MMOL/L (ref 136–145)

## 2025-07-16 PROCEDURE — 82306 VITAMIN D 25 HYDROXY: CPT

## 2025-07-16 PROCEDURE — 36415 COLL VENOUS BLD VENIPUNCTURE: CPT

## 2025-07-16 PROCEDURE — 80048 BASIC METABOLIC PNL TOTAL CA: CPT

## 2025-07-19 DIAGNOSIS — E11.42 TYPE 2 DIABETES MELLITUS WITH DIABETIC POLYNEUROPATHY, WITHOUT LONG-TERM CURRENT USE OF INSULIN (HCC): ICD-10-CM

## 2025-07-21 RX ORDER — GABAPENTIN 800 MG/1
800 TABLET ORAL 3 TIMES DAILY
Qty: 90 TABLET | Refills: 2 | Status: SHIPPED | OUTPATIENT
Start: 2025-07-21 | End: 2025-08-20

## 2025-07-21 NOTE — TELEPHONE ENCOUNTER
Requested Prescriptions     Pending Prescriptions Disp Refills    gabapentin (NEURONTIN) 800 MG tablet [Pharmacy Med Name: GABAPENTIN 800 MG TABLET] 90 tablet 2     Sig: Take 1 tablet by mouth 3 times daily for 30 days.

## 2025-07-23 ENCOUNTER — HOSPITAL ENCOUNTER (OUTPATIENT)
Dept: ULTRASOUND IMAGING | Age: 70
Discharge: HOME | End: 2025-07-25
Payer: MEDICARE

## 2025-07-23 DIAGNOSIS — K76.0 FATTY LIVER: ICD-10-CM

## 2025-07-23 PROCEDURE — 76705 ECHO EXAM OF ABDOMEN: CPT

## 2025-07-29 ENCOUNTER — TELEPHONE (OUTPATIENT)
Dept: GASTROENTEROLOGY | Age: 70
End: 2025-07-29

## 2025-07-29 NOTE — TELEPHONE ENCOUNTER
Francheska from [facility] is requesting a call regarding obtaining a sooner appointment and can be reached at [phone] . Okay to leave a message.

## 2025-07-30 NOTE — TELEPHONE ENCOUNTER
Writer spoke to Francheska and informed Francheska of appointment availability, writer also reached out to patient to advise of options , no answer lvm for pt to call back

## 2025-08-05 ENCOUNTER — HOSPITAL ENCOUNTER (EMERGENCY)
Age: 70
Discharge: HOME | End: 2025-08-05
Attending: EMERGENCY MEDICINE
Payer: MEDICARE

## 2025-08-05 ENCOUNTER — APPOINTMENT (OUTPATIENT)
Dept: GENERAL RADIOLOGY | Age: 70
End: 2025-08-05
Payer: MEDICARE

## 2025-08-05 ENCOUNTER — APPOINTMENT (OUTPATIENT)
Dept: CT IMAGING | Age: 70
End: 2025-08-05
Payer: MEDICARE

## 2025-08-05 ENCOUNTER — TELEPHONE (OUTPATIENT)
Dept: FAMILY MEDICINE CLINIC | Age: 70
End: 2025-08-05

## 2025-08-05 VITALS
SYSTOLIC BLOOD PRESSURE: 107 MMHG | TEMPERATURE: 97.9 F | RESPIRATION RATE: 18 BRPM | DIASTOLIC BLOOD PRESSURE: 63 MMHG | WEIGHT: 196 LBS | HEART RATE: 64 BPM | OXYGEN SATURATION: 96 % | BODY MASS INDEX: 26.58 KG/M2

## 2025-08-05 DIAGNOSIS — J18.9 PNEUMONIA OF RIGHT LOWER LOBE DUE TO INFECTIOUS ORGANISM: Primary | ICD-10-CM

## 2025-08-05 LAB
ALBUMIN SERPL-MCNC: 4.3 G/DL (ref 3.5–5.2)
ALBUMIN/GLOB SERPL: 1.4 {RATIO} (ref 1–2.5)
ALP SERPL-CCNC: 78 U/L (ref 35–104)
ALT SERPL-CCNC: 9 U/L (ref 10–35)
ANION GAP SERPL CALCULATED.3IONS-SCNC: 11 MMOL/L (ref 9–16)
AST SERPL-CCNC: 15 U/L (ref 10–35)
BASOPHILS # BLD: 0.06 K/UL (ref 0–0.2)
BASOPHILS NFR BLD: 1 % (ref 0–2)
BILIRUB SERPL-MCNC: 0.3 MG/DL (ref 0–1.2)
BILIRUB UR QL STRIP: NEGATIVE
BNP SERPL-MCNC: 58 PG/ML (ref 0–125)
BUN SERPL-MCNC: 10 MG/DL (ref 8–23)
CALCIUM SERPL-MCNC: 9.9 MG/DL (ref 8.6–10.4)
CHLORIDE SERPL-SCNC: 101 MMOL/L (ref 98–107)
CLARITY UR: CLEAR
CO2 SERPL-SCNC: 26 MMOL/L (ref 20–31)
COLOR UR: YELLOW
COMMENT: NORMAL
CREAT SERPL-MCNC: 0.7 MG/DL (ref 0.6–0.9)
EKG ATRIAL RATE: 62 BPM
EKG P AXIS: -3 DEGREES
EKG P-R INTERVAL: 242 MS
EKG Q-T INTERVAL: 440 MS
EKG QRS DURATION: 84 MS
EKG QTC CALCULATION (BAZETT): 446 MS
EKG R AXIS: -6 DEGREES
EKG T AXIS: 24 DEGREES
EKG VENTRICULAR RATE: 62 BPM
EOSINOPHIL # BLD: 0.15 K/UL (ref 0–0.44)
EOSINOPHILS RELATIVE PERCENT: 2 % (ref 1–4)
ERYTHROCYTE [DISTWIDTH] IN BLOOD BY AUTOMATED COUNT: 12.9 % (ref 11.8–14.4)
GFR, ESTIMATED: >90 ML/MIN/1.73M2
GLUCOSE SERPL-MCNC: 122 MG/DL (ref 74–99)
GLUCOSE UR STRIP-MCNC: NEGATIVE MG/DL
HCT VFR BLD AUTO: 40.7 % (ref 36.3–47.1)
HGB BLD-MCNC: 13.7 G/DL (ref 11.9–15.1)
HGB UR QL STRIP.AUTO: NEGATIVE
IMM GRANULOCYTES # BLD AUTO: <0.03 K/UL (ref 0–0.3)
IMM GRANULOCYTES NFR BLD: 0 %
KETONES UR STRIP-MCNC: NEGATIVE MG/DL
LEUKOCYTE ESTERASE UR QL STRIP: NEGATIVE
LYMPHOCYTES NFR BLD: 2.44 K/UL (ref 1.1–3.7)
LYMPHOCYTES RELATIVE PERCENT: 40 % (ref 24–43)
MCH RBC QN AUTO: 31.6 PG (ref 25.2–33.5)
MCHC RBC AUTO-ENTMCNC: 33.7 G/DL (ref 28.4–34.8)
MCV RBC AUTO: 94 FL (ref 82.6–102.9)
MONOCYTES NFR BLD: 0.52 K/UL (ref 0.1–1.2)
MONOCYTES NFR BLD: 9 % (ref 3–12)
NEUTROPHILS NFR BLD: 48 % (ref 36–65)
NEUTS SEG NFR BLD: 2.94 K/UL (ref 1.5–8.1)
NITRITE UR QL STRIP: NEGATIVE
NRBC BLD-RTO: 0 PER 100 WBC
PH UR STRIP: 6.5 [PH] (ref 5–8)
PLATELET # BLD AUTO: 194 K/UL (ref 138–453)
PMV BLD AUTO: 9.6 FL (ref 8.1–13.5)
POTASSIUM SERPL-SCNC: 4.1 MMOL/L (ref 3.7–5.3)
PROT SERPL-MCNC: 7.3 G/DL (ref 6.6–8.7)
PROT UR STRIP-MCNC: NEGATIVE MG/DL
RBC # BLD AUTO: 4.33 M/UL (ref 3.95–5.11)
SODIUM SERPL-SCNC: 138 MMOL/L (ref 136–145)
SP GR UR STRIP: 1.01 (ref 1–1.03)
TROPONIN I SERPL HS-MCNC: 7 NG/L (ref 0–14)
UROBILINOGEN UR STRIP-ACNC: NORMAL EU/DL (ref 0–1)
WBC OTHER # BLD: 6.1 K/UL (ref 3.5–11.3)

## 2025-08-05 PROCEDURE — 84484 ASSAY OF TROPONIN QUANT: CPT

## 2025-08-05 PROCEDURE — 93010 ELECTROCARDIOGRAM REPORT: CPT | Performed by: INTERNAL MEDICINE

## 2025-08-05 PROCEDURE — 74176 CT ABD & PELVIS W/O CONTRAST: CPT

## 2025-08-05 PROCEDURE — 99285 EMERGENCY DEPT VISIT HI MDM: CPT | Performed by: EMERGENCY MEDICINE

## 2025-08-05 PROCEDURE — 85025 COMPLETE CBC W/AUTO DIFF WBC: CPT

## 2025-08-05 PROCEDURE — 81003 URINALYSIS AUTO W/O SCOPE: CPT

## 2025-08-05 PROCEDURE — 96375 TX/PRO/DX INJ NEW DRUG ADDON: CPT | Performed by: EMERGENCY MEDICINE

## 2025-08-05 PROCEDURE — 6360000002 HC RX W HCPCS

## 2025-08-05 PROCEDURE — 80053 COMPREHEN METABOLIC PANEL: CPT

## 2025-08-05 PROCEDURE — 2500000003 HC RX 250 WO HCPCS

## 2025-08-05 PROCEDURE — 71046 X-RAY EXAM CHEST 2 VIEWS: CPT

## 2025-08-05 PROCEDURE — 6370000000 HC RX 637 (ALT 250 FOR IP)

## 2025-08-05 PROCEDURE — 96374 THER/PROPH/DIAG INJ IV PUSH: CPT | Performed by: EMERGENCY MEDICINE

## 2025-08-05 PROCEDURE — 83880 ASSAY OF NATRIURETIC PEPTIDE: CPT

## 2025-08-05 PROCEDURE — 93005 ELECTROCARDIOGRAM TRACING: CPT

## 2025-08-05 RX ORDER — ONDANSETRON 2 MG/ML
4 INJECTION INTRAMUSCULAR; INTRAVENOUS ONCE
Status: COMPLETED | OUTPATIENT
Start: 2025-08-05 | End: 2025-08-05

## 2025-08-05 RX ORDER — AMOXICILLIN 500 MG/1
500 CAPSULE ORAL ONCE
Status: COMPLETED | OUTPATIENT
Start: 2025-08-05 | End: 2025-08-05

## 2025-08-05 RX ORDER — AMOXICILLIN 500 MG/1
500 CAPSULE ORAL 2 TIMES DAILY
Qty: 14 CAPSULE | Refills: 0 | Status: SHIPPED | OUTPATIENT
Start: 2025-08-05 | End: 2025-08-12

## 2025-08-05 RX ADMIN — FAMOTIDINE 20 MG: 10 INJECTION, SOLUTION INTRAVENOUS at 02:58

## 2025-08-05 RX ADMIN — ONDANSETRON 4 MG: 2 INJECTION, SOLUTION INTRAMUSCULAR; INTRAVENOUS at 02:58

## 2025-08-05 RX ADMIN — AMOXICILLIN 500 MG: 500 CAPSULE ORAL at 03:50

## 2025-08-05 ASSESSMENT — PAIN DESCRIPTION - DESCRIPTORS: DESCRIPTORS: ACHING

## 2025-08-05 ASSESSMENT — PAIN DESCRIPTION - ORIENTATION: ORIENTATION: MID

## 2025-08-05 ASSESSMENT — PAIN - FUNCTIONAL ASSESSMENT: PAIN_FUNCTIONAL_ASSESSMENT: 0-10

## 2025-08-05 ASSESSMENT — ENCOUNTER SYMPTOMS
NAUSEA: 1
SHORTNESS OF BREATH: 0
VOMITING: 0
ABDOMINAL PAIN: 1

## 2025-08-05 ASSESSMENT — PAIN DESCRIPTION - LOCATION: LOCATION: ABDOMEN;CHEST

## 2025-08-05 ASSESSMENT — PAIN DESCRIPTION - FREQUENCY: FREQUENCY: INTERMITTENT

## 2025-08-05 ASSESSMENT — PAIN SCALES - GENERAL: PAINLEVEL_OUTOF10: 7

## 2025-08-07 ENCOUNTER — OFFICE VISIT (OUTPATIENT)
Dept: GASTROENTEROLOGY | Age: 70
End: 2025-08-07
Payer: MEDICARE

## 2025-08-07 ENCOUNTER — TELEPHONE (OUTPATIENT)
Dept: FAMILY MEDICINE CLINIC | Age: 70
End: 2025-08-07

## 2025-08-07 ENCOUNTER — TELEPHONE (OUTPATIENT)
Dept: GASTROENTEROLOGY | Age: 70
End: 2025-08-07

## 2025-08-07 VITALS
HEART RATE: 68 BPM | RESPIRATION RATE: 18 BRPM | HEIGHT: 72 IN | DIASTOLIC BLOOD PRESSURE: 78 MMHG | SYSTOLIC BLOOD PRESSURE: 134 MMHG | TEMPERATURE: 97.6 F | WEIGHT: 191 LBS | BODY MASS INDEX: 25.87 KG/M2

## 2025-08-07 DIAGNOSIS — K59.00 CONSTIPATION, UNSPECIFIED CONSTIPATION TYPE: Primary | ICD-10-CM

## 2025-08-07 DIAGNOSIS — K59.02 DYSSYNERGIC CONSTIPATION: ICD-10-CM

## 2025-08-07 DIAGNOSIS — K29.70 GASTRITIS WITHOUT BLEEDING, UNSPECIFIED CHRONICITY, UNSPECIFIED GASTRITIS TYPE: ICD-10-CM

## 2025-08-07 PROCEDURE — G8399 PT W/DXA RESULTS DOCUMENT: HCPCS | Performed by: INTERNAL MEDICINE

## 2025-08-07 PROCEDURE — 3078F DIAST BP <80 MM HG: CPT | Performed by: INTERNAL MEDICINE

## 2025-08-07 PROCEDURE — 1090F PRES/ABSN URINE INCON ASSESS: CPT | Performed by: INTERNAL MEDICINE

## 2025-08-07 PROCEDURE — 3075F SYST BP GE 130 - 139MM HG: CPT | Performed by: INTERNAL MEDICINE

## 2025-08-07 PROCEDURE — 99213 OFFICE O/P EST LOW 20 MIN: CPT | Performed by: INTERNAL MEDICINE

## 2025-08-07 PROCEDURE — G2211 COMPLEX E/M VISIT ADD ON: HCPCS | Performed by: INTERNAL MEDICINE

## 2025-08-07 PROCEDURE — G8427 DOCREV CUR MEDS BY ELIG CLIN: HCPCS | Performed by: INTERNAL MEDICINE

## 2025-08-07 PROCEDURE — 1123F ACP DISCUSS/DSCN MKR DOCD: CPT | Performed by: INTERNAL MEDICINE

## 2025-08-07 PROCEDURE — 4004F PT TOBACCO SCREEN RCVD TLK: CPT | Performed by: INTERNAL MEDICINE

## 2025-08-07 PROCEDURE — 1126F AMNT PAIN NOTED NONE PRSNT: CPT | Performed by: INTERNAL MEDICINE

## 2025-08-07 PROCEDURE — 3017F COLORECTAL CA SCREEN DOC REV: CPT | Performed by: INTERNAL MEDICINE

## 2025-08-07 PROCEDURE — 1159F MED LIST DOCD IN RCRD: CPT | Performed by: INTERNAL MEDICINE

## 2025-08-07 PROCEDURE — G8417 CALC BMI ABV UP PARAM F/U: HCPCS | Performed by: INTERNAL MEDICINE

## 2025-08-07 RX ORDER — POLYETHYLENE GLYCOL 3350 17 G/17G
POWDER, FOR SOLUTION ORAL
Qty: 1530 G | Refills: 1 | Status: SHIPPED | OUTPATIENT
Start: 2025-08-07

## 2025-08-07 ASSESSMENT — ENCOUNTER SYMPTOMS
DIARRHEA: 0
CHOKING: 0
COLOR CHANGE: 0
NAUSEA: 1
VOMITING: 1
WHEEZING: 0
CONSTIPATION: 1
BLOOD IN STOOL: 0
ANAL BLEEDING: 0
ABDOMINAL PAIN: 1
COUGH: 0
SORE THROAT: 0
VOICE CHANGE: 0
TROUBLE SWALLOWING: 0
ABDOMINAL DISTENTION: 1
RECTAL PAIN: 0

## 2025-08-28 DIAGNOSIS — R09.81 NASAL CONGESTION: ICD-10-CM

## 2025-08-28 RX ORDER — AZELASTINE HYDROCHLORIDE 137 UG/1
SPRAY, METERED NASAL
Qty: 30 ML | Refills: 0 | Status: SHIPPED | OUTPATIENT
Start: 2025-08-28

## 2025-08-31 DIAGNOSIS — E11.42 TYPE 2 DIABETES MELLITUS WITH DIABETIC POLYNEUROPATHY (HCC): ICD-10-CM

## 2025-08-31 DIAGNOSIS — F51.01 PRIMARY INSOMNIA: ICD-10-CM

## 2025-08-31 DIAGNOSIS — I10 ESSENTIAL HYPERTENSION: ICD-10-CM

## 2025-08-31 DIAGNOSIS — M19.012 PRIMARY OSTEOARTHRITIS OF LEFT SHOULDER: ICD-10-CM

## 2025-09-02 RX ORDER — ATENOLOL 25 MG/1
25 TABLET ORAL DAILY
Qty: 90 TABLET | Refills: 1 | Status: SHIPPED | OUTPATIENT
Start: 2025-09-02

## 2025-09-02 RX ORDER — METFORMIN HYDROCHLORIDE 500 MG/1
500 TABLET, EXTENDED RELEASE ORAL
Qty: 90 TABLET | Refills: 1 | Status: SHIPPED | OUTPATIENT
Start: 2025-09-02

## 2025-09-02 RX ORDER — TRAZODONE HYDROCHLORIDE 100 MG/1
100 TABLET ORAL NIGHTLY
Qty: 90 TABLET | Refills: 1 | Status: SHIPPED | OUTPATIENT
Start: 2025-09-02

## (undated) DEVICE — DEFENDO AIR WATER SUCTION AND BIOPSY VALVE KIT FOR  OLYMPUS: Brand: DEFENDO AIR/WATER/SUCTION AND BIOPSY VALVE

## (undated) DEVICE — ELECTRODE PT RET AD L9FT HI MOIST COND ADH HYDRGEL CORDED

## (undated) DEVICE — YANKAUER,FLEXIBLE HANDLE,REGLR CAPACITY: Brand: MEDLINE INDUSTRIES, INC.

## (undated) DEVICE — DRESSING TRNSPAR FLM 2.4X2.8IN SURESITE 123

## (undated) DEVICE — C-ARMOR C-ARM EQUIPMENT COVERS CLEAR STERILE UNIVERSAL FIT 12 PER CASE: Brand: C-ARMOR

## (undated) DEVICE — SOLUTION IV IRRIG 500ML 0.9% SODIUM CHL 2F7123

## (undated) DEVICE — APPLICATOR MEDICATED 26 CC SOLUTION HI LT ORNG CHLORAPREP

## (undated) DEVICE — Device

## (undated) DEVICE — NEEDLE HYPO 25GA L1.5IN BLU POLYPR HUB S STL REG BVL STR

## (undated) DEVICE — GLOVE SURG SZ 8 CRM LTX FREE POLYISOPRENE POLYMER BEAD ANTI

## (undated) DEVICE — SUTURE VICRYL 2-0 L27IN ABSRB CT BRAID COAT UD J275H

## (undated) DEVICE — C-ARM: Brand: UNBRANDED

## (undated) DEVICE — GLOVE SURG SZ 75 CRM LTX FREE POLYISOPRENE POLYMER BEAD ANTI

## (undated) DEVICE — INTENDED FOR TISSUE SEPARATION, AND OTHER PROCEDURES THAT REQUIRE A SHARP SURGICAL BLADE TO PUNCTURE OR CUT.: Brand: BARD-PARKER ® CARBON RIB-BACK BLADES

## (undated) DEVICE — STAPLER 35 WIDE: Brand: MEDLINE INDUSTRIES, INC.

## (undated) DEVICE — SUTURE MCRYL SZ 2-0 L27IN ABSRB VLT SH L26MM 1/2 CIR TAPR Y317H

## (undated) DEVICE — Z DISCONTINUED USE 2859063 SUTURE VICRYL 3-0 L27IN ABSRB UD PSL L30MM 1/2 CIR TAPERPOINT J502H

## (undated) DEVICE — BITEBLOCK 54FR W/ DENT RIM BLOX

## (undated) DEVICE — DRAPE EQUIP CARM PK SNAP OEC/GE 9800 PLAS STRL

## (undated) DEVICE — BAG SPECIMEN BIOHAZARD 6IN X 9IN

## (undated) DEVICE — FORCEPS BX L240CM JAW DIA22MM ORNG STD CAP W NDL RAD JAW 4

## (undated) DEVICE — SMALL TEAR CROSS CUT RASP (11.0 X 5.0MM)

## (undated) DEVICE — STRIP SKIN CLSR W0.25XL4IN WHT SPUNBOUND FBR NYL HI ADH

## (undated) DEVICE — ENDO KIT W/SYRINGE: Brand: MEDLINE INDUSTRIES, INC.

## (undated) DEVICE — 450 ML BOTTLE OF 0.05% CHLORHEXIDINE GLUCONATE IN 99.95% STERILE WATER FOR IRRIGATION, USP AND APPLICATOR.: Brand: IRRISEPT ANTIMICROBIAL WOUND LAVAGE

## (undated) DEVICE — SKIN PREP TRAY W/CHG: Brand: MEDLINE INDUSTRIES, INC.

## (undated) DEVICE — CUFF REPROCESS BP TOURN SING BLDR 2 PORT 4X18IN

## (undated) DEVICE — BASIN EMSIS 700ML GRAPHITE PLAS KID SHP GRAD

## (undated) DEVICE — PRECISION THIN (9.0 X 0.38 X 25.0MM)

## (undated) DEVICE — SUTURE PROL SZ 3-0 L18IN NONABSORBABLE BLU L19MM PS-2 3/8 8687H

## (undated) DEVICE — BLANKET WRM W29.9XL79.1IN UP BODY FORC AIR MISTRAL-AIR

## (undated) DEVICE — KIT MICRO INTRO 4FR STIFF 40CM NIGHTENALL TUNGSTEN 7SMT

## (undated) DEVICE — SUTURE VICRYL + SZ 3-0 L36IN ABSRB UD L36MM CT-1 1/2 CIR VCP944H

## (undated) DEVICE — MEDI-TRACE CADENCE ADULT, DEFIBRILLATION ELECTRODE -RTS  (10 PR/PK) - PHYSIO-CONTROL: Brand: MEDI-TRACE CADENCE

## (undated) DEVICE — FORCEPS BX L240CM WRK CHN 2.8MM STD CAP W/ NDL MIC MESH

## (undated) DEVICE — SINGLE-USE BIOPSY FORCEPS: Brand: RADIAL JAW 4

## (undated) DEVICE — STRAP ARMBRD W1.5XL32IN FOAM STR YET SFT W/ HK AND LOOP

## (undated) DEVICE — SNARE ENDOSCP L240CM OD24MM LOOP W10MM RND INSUL STIFF BRAID

## (undated) DEVICE — SPONGE LAP W18XL18IN WHT COT 4 PLY FLD STRUNG RADPQ DISP ST 2 PER PACK

## (undated) DEVICE — GOWN,AURORA,NONREINFORCED,LARGE: Brand: MEDLINE